# Patient Record
Sex: FEMALE | Race: OTHER | Employment: OTHER | URBAN - METROPOLITAN AREA
[De-identification: names, ages, dates, MRNs, and addresses within clinical notes are randomized per-mention and may not be internally consistent; named-entity substitution may affect disease eponyms.]

---

## 2017-01-10 ENCOUNTER — OFFICE VISIT (OUTPATIENT)
Dept: ORTHOPEDIC SURGERY | Age: 77
End: 2017-01-10

## 2017-01-10 VITALS — BODY MASS INDEX: 45.16 KG/M2 | WEIGHT: 230 LBS | HEIGHT: 60 IN

## 2017-01-10 DIAGNOSIS — T84.023D INSTABILITY OF INTERNAL LEFT KNEE PROSTHESIS, SUBSEQUENT ENCOUNTER: ICD-10-CM

## 2017-01-10 DIAGNOSIS — T84.022D INSTABILITY OF INTERNAL RIGHT KNEE PROSTHESIS, SUBSEQUENT ENCOUNTER: Primary | ICD-10-CM

## 2017-01-10 PROCEDURE — 99024 POSTOP FOLLOW-UP VISIT: CPT | Performed by: ORTHOPAEDIC SURGERY

## 2017-01-11 ENCOUNTER — TELEPHONE (OUTPATIENT)
Dept: ORTHOPEDIC SURGERY | Age: 77
End: 2017-01-11

## 2017-01-12 ENCOUNTER — TELEPHONE (OUTPATIENT)
Dept: ORTHOPEDIC SURGERY | Age: 77
End: 2017-01-12

## 2017-02-13 ENCOUNTER — HOSPITAL ENCOUNTER (OUTPATIENT)
Dept: PREADMISSION TESTING | Age: 77
Discharge: HOME | End: 2017-02-13
Attending: ORTHOPAEDIC SURGERY | Admitting: ORTHOPAEDIC SURGERY

## 2017-02-13 VITALS — HEIGHT: 61 IN | BODY MASS INDEX: 43.8 KG/M2 | WEIGHT: 232 LBS | OXYGEN SATURATION: 96 %

## 2017-02-13 LAB
ABO/RH: NORMAL
ALBUMIN SERPL-MCNC: 4.4 G/DL (ref 3.4–5)
ANION GAP SERPL CALCULATED.3IONS-SCNC: 15 MMOL/L (ref 3–16)
ANTIBODY SCREEN: NORMAL
APTT: 39.7 SEC (ref 25.2–36.4)
BACTERIA: ABNORMAL /HPF
BASOPHILS ABSOLUTE: 0 K/UL (ref 0–0.2)
BASOPHILS RELATIVE PERCENT: 0.4 %
BILIRUBIN URINE: NEGATIVE
BLOOD, URINE: ABNORMAL
BUN BLDV-MCNC: 22 MG/DL (ref 7–20)
CALCIUM SERPL-MCNC: 9.5 MG/DL (ref 8.3–10.6)
CHLORIDE BLD-SCNC: 100 MMOL/L (ref 99–110)
CLARITY: ABNORMAL
CO2: 25 MMOL/L (ref 21–32)
COLOR: YELLOW
CREAT SERPL-MCNC: 0.7 MG/DL (ref 0.6–1.2)
EOSINOPHILS ABSOLUTE: 0.2 K/UL (ref 0–0.6)
EOSINOPHILS RELATIVE PERCENT: 2.3 %
EPITHELIAL CELLS, UA: 3 /HPF (ref 0–5)
GFR AFRICAN AMERICAN: >60
GFR NON-AFRICAN AMERICAN: >60
GLUCOSE BLD-MCNC: 164 MG/DL (ref 70–99)
GLUCOSE URINE: NEGATIVE MG/DL
HCT VFR BLD CALC: 42.7 % (ref 36–48)
HEMOGLOBIN: 13.9 G/DL (ref 12–16)
HYALINE CASTS: 3 /HPF (ref 0–8)
INR BLD: 1.06 (ref 0.85–1.16)
KETONES, URINE: NEGATIVE MG/DL
LEUKOCYTE ESTERASE, URINE: ABNORMAL
LYMPHOCYTES ABSOLUTE: 3 K/UL (ref 1–5.1)
LYMPHOCYTES RELATIVE PERCENT: 32.5 %
MCH RBC QN AUTO: 28.5 PG (ref 26–34)
MCHC RBC AUTO-ENTMCNC: 32.5 G/DL (ref 31–36)
MCV RBC AUTO: 87.6 FL (ref 80–100)
MICROSCOPIC EXAMINATION: YES
MONOCYTES ABSOLUTE: 0.5 K/UL (ref 0–1.3)
MONOCYTES RELATIVE PERCENT: 5.3 %
NEUTROPHILS ABSOLUTE: 5.5 K/UL (ref 1.7–7.7)
NEUTROPHILS RELATIVE PERCENT: 59.5 %
NITRITE, URINE: NEGATIVE
PDW BLD-RTO: 15.9 % (ref 12.4–15.4)
PH UA: 5.5
PLATELET # BLD: 173 K/UL (ref 135–450)
PMV BLD AUTO: 9.2 FL (ref 5–10.5)
POTASSIUM SERPL-SCNC: 4.6 MMOL/L (ref 3.5–5.1)
PROTEIN UA: ABNORMAL MG/DL
PROTHROMBIN TIME: 12.1 SEC (ref 9.8–13)
RBC # BLD: 4.87 M/UL (ref 4–5.2)
RBC UA: 5 /HPF (ref 0–4)
SODIUM BLD-SCNC: 140 MMOL/L (ref 136–145)
SPECIFIC GRAVITY UA: 1.02
URINE TYPE: ABNORMAL
UROBILINOGEN, URINE: 0.2 E.U./DL
WBC # BLD: 9.2 K/UL (ref 4–11)
WBC UA: 223 /HPF (ref 0–5)

## 2017-02-13 RX ORDER — AMOXICILLIN 250 MG
1 CAPSULE ORAL DAILY PRN
COMMUNITY
End: 2018-06-06 | Stop reason: ALTCHOICE

## 2017-02-13 RX ORDER — OMEGA-3 FATTY ACIDS/FISH OIL 300-1000MG
1000 CAPSULE ORAL DAILY
COMMUNITY

## 2017-02-13 RX ORDER — ERGOCALCIFEROL 1.25 MG/1
50000 CAPSULE ORAL WEEKLY
COMMUNITY

## 2017-02-13 RX ORDER — PRAVASTATIN SODIUM 80 MG/1
80 TABLET ORAL DAILY
COMMUNITY
End: 2017-02-22 | Stop reason: SDUPTHER

## 2017-02-13 RX ORDER — GEMFIBROZIL 600 MG/1
600 TABLET, FILM COATED ORAL
COMMUNITY
End: 2017-02-22 | Stop reason: SDUPTHER

## 2017-02-14 ENCOUNTER — TELEPHONE (OUTPATIENT)
Dept: ORTHOPEDIC SURGERY | Age: 77
End: 2017-02-14

## 2017-02-14 LAB
ORGANISM: ABNORMAL
URINE CULTURE, ROUTINE: ABNORMAL
URINE CULTURE, ROUTINE: ABNORMAL

## 2017-02-15 LAB — MRSA CULTURE ONLY: NORMAL

## 2017-02-22 ENCOUNTER — OFFICE VISIT (OUTPATIENT)
Dept: CARDIOLOGY CLINIC | Age: 77
End: 2017-02-22

## 2017-02-22 VITALS
DIASTOLIC BLOOD PRESSURE: 68 MMHG | HEART RATE: 60 BPM | BODY MASS INDEX: 43.46 KG/M2 | OXYGEN SATURATION: 94 % | SYSTOLIC BLOOD PRESSURE: 118 MMHG | WEIGHT: 230 LBS

## 2017-02-22 DIAGNOSIS — E78.5 HYPERLIPIDEMIA, UNSPECIFIED HYPERLIPIDEMIA TYPE: ICD-10-CM

## 2017-02-22 DIAGNOSIS — I25.10 ATHEROSCLEROSIS OF NATIVE CORONARY ARTERY OF NATIVE HEART WITHOUT ANGINA PECTORIS: Primary | Chronic | ICD-10-CM

## 2017-02-22 DIAGNOSIS — G47.30 SLEEP APNEA, UNSPECIFIED TYPE: ICD-10-CM

## 2017-02-22 DIAGNOSIS — I10 ESSENTIAL HYPERTENSION: ICD-10-CM

## 2017-02-22 DIAGNOSIS — I48.0 PAROXYSMAL ATRIAL FIBRILLATION (HCC): ICD-10-CM

## 2017-02-22 PROCEDURE — 93000 ELECTROCARDIOGRAM COMPLETE: CPT | Performed by: INTERNAL MEDICINE

## 2017-02-22 PROCEDURE — 99214 OFFICE O/P EST MOD 30 MIN: CPT | Performed by: INTERNAL MEDICINE

## 2017-02-22 RX ORDER — GEMFIBROZIL 600 MG/1
600 TABLET, FILM COATED ORAL
Qty: 180 TABLET | Refills: 3 | Status: SHIPPED | OUTPATIENT
Start: 2017-02-22 | End: 2018-02-19 | Stop reason: SDUPTHER

## 2017-02-22 RX ORDER — LISINOPRIL 2.5 MG/1
TABLET ORAL
Qty: 90 TABLET | Refills: 3 | Status: SHIPPED | OUTPATIENT
Start: 2017-02-22 | End: 2018-04-09 | Stop reason: SDUPTHER

## 2017-02-22 RX ORDER — PRAVASTATIN SODIUM 80 MG/1
80 TABLET ORAL DAILY
Qty: 90 TABLET | Refills: 3 | Status: SHIPPED | OUTPATIENT
Start: 2017-02-22 | End: 2018-04-23 | Stop reason: SDUPTHER

## 2017-02-28 PROBLEM — M23.52 RECURRENT LEFT KNEE INSTABILITY: Status: ACTIVE | Noted: 2017-02-28

## 2017-03-13 ENCOUNTER — OFFICE VISIT (OUTPATIENT)
Dept: ORTHOPEDIC SURGERY | Age: 77
End: 2017-03-13

## 2017-03-13 VITALS
BODY MASS INDEX: 44.37 KG/M2 | WEIGHT: 226 LBS | HEIGHT: 60 IN | DIASTOLIC BLOOD PRESSURE: 60 MMHG | SYSTOLIC BLOOD PRESSURE: 120 MMHG

## 2017-03-13 DIAGNOSIS — M23.52 RECURRENT LEFT KNEE INSTABILITY: Primary | ICD-10-CM

## 2017-03-13 PROCEDURE — 73560 X-RAY EXAM OF KNEE 1 OR 2: CPT | Performed by: ORTHOPAEDIC SURGERY

## 2017-03-13 PROCEDURE — 99024 POSTOP FOLLOW-UP VISIT: CPT | Performed by: ORTHOPAEDIC SURGERY

## 2017-04-05 ENCOUNTER — OFFICE VISIT (OUTPATIENT)
Dept: ORTHOPEDIC SURGERY | Age: 77
End: 2017-04-05

## 2017-04-05 VITALS — HEIGHT: 61 IN | BODY MASS INDEX: 43.05 KG/M2 | WEIGHT: 228 LBS

## 2017-04-05 DIAGNOSIS — S76.112A QUADRICEPS TENDON RUPTURE, LEFT, INITIAL ENCOUNTER: ICD-10-CM

## 2017-04-05 DIAGNOSIS — M25.562 LEFT KNEE PAIN, UNSPECIFIED CHRONICITY: Primary | ICD-10-CM

## 2017-04-05 DIAGNOSIS — M23.52 RECURRENT LEFT KNEE INSTABILITY: ICD-10-CM

## 2017-04-05 PROCEDURE — 99024 POSTOP FOLLOW-UP VISIT: CPT | Performed by: ORTHOPAEDIC SURGERY

## 2017-04-05 PROCEDURE — 73560 X-RAY EXAM OF KNEE 1 OR 2: CPT | Performed by: ORTHOPAEDIC SURGERY

## 2017-04-12 PROBLEM — S76.111A RUPTURE OF RIGHT QUADRICEPS TENDON: Status: ACTIVE | Noted: 2017-04-12

## 2017-04-14 ENCOUNTER — TELEPHONE (OUTPATIENT)
Dept: ORTHOPEDIC SURGERY | Age: 77
End: 2017-04-14

## 2017-04-18 ENCOUNTER — TELEPHONE (OUTPATIENT)
Dept: ORTHOPEDIC SURGERY | Age: 77
End: 2017-04-18

## 2017-04-26 ENCOUNTER — OFFICE VISIT (OUTPATIENT)
Dept: ORTHOPEDIC SURGERY | Age: 77
End: 2017-04-26

## 2017-04-26 DIAGNOSIS — S76.112A QUADRICEPS TENDON RUPTURE, LEFT, INITIAL ENCOUNTER: Primary | ICD-10-CM

## 2017-04-26 PROCEDURE — L1833 KO ADJ JNT POS R SUP PRE OTS: HCPCS | Performed by: ORTHOPAEDIC SURGERY

## 2017-04-26 PROCEDURE — 99024 POSTOP FOLLOW-UP VISIT: CPT | Performed by: ORTHOPAEDIC SURGERY

## 2017-05-17 ENCOUNTER — OFFICE VISIT (OUTPATIENT)
Dept: ORTHOPEDIC SURGERY | Age: 77
End: 2017-05-17

## 2017-05-17 VITALS — BODY MASS INDEX: 41.41 KG/M2 | WEIGHT: 225 LBS | HEIGHT: 62 IN

## 2017-05-17 DIAGNOSIS — S76.111D RUPTURE OF RIGHT QUADRICEPS TENDON, SUBSEQUENT ENCOUNTER: Primary | ICD-10-CM

## 2017-05-17 PROCEDURE — 99024 POSTOP FOLLOW-UP VISIT: CPT | Performed by: ORTHOPAEDIC SURGERY

## 2017-06-01 ENCOUNTER — TELEPHONE (OUTPATIENT)
Dept: ORTHOPEDIC SURGERY | Age: 77
End: 2017-06-01

## 2017-07-03 ENCOUNTER — OFFICE VISIT (OUTPATIENT)
Dept: ORTHOPEDIC SURGERY | Age: 77
End: 2017-07-03

## 2017-07-03 VITALS — BODY MASS INDEX: 41.41 KG/M2 | WEIGHT: 225 LBS | HEIGHT: 62 IN

## 2017-07-03 DIAGNOSIS — S76.112D QUADRICEPS TENDON RUPTURE, LEFT, SUBSEQUENT ENCOUNTER: ICD-10-CM

## 2017-07-03 DIAGNOSIS — M23.52 RECURRENT LEFT KNEE INSTABILITY: Primary | ICD-10-CM

## 2017-07-03 PROCEDURE — 99024 POSTOP FOLLOW-UP VISIT: CPT | Performed by: ORTHOPAEDIC SURGERY

## 2017-07-26 ENCOUNTER — TELEPHONE (OUTPATIENT)
Dept: ORTHOPEDIC SURGERY | Age: 77
End: 2017-07-26

## 2017-07-26 DIAGNOSIS — M23.52 RECURRENT LEFT KNEE INSTABILITY: Primary | ICD-10-CM

## 2017-08-29 ENCOUNTER — OFFICE VISIT (OUTPATIENT)
Dept: CARDIOLOGY CLINIC | Age: 77
End: 2017-08-29

## 2017-08-29 VITALS
WEIGHT: 214 LBS | HEART RATE: 73 BPM | SYSTOLIC BLOOD PRESSURE: 126 MMHG | HEIGHT: 65 IN | BODY MASS INDEX: 35.65 KG/M2 | DIASTOLIC BLOOD PRESSURE: 66 MMHG

## 2017-08-29 DIAGNOSIS — G47.33 OSA (OBSTRUCTIVE SLEEP APNEA): ICD-10-CM

## 2017-08-29 DIAGNOSIS — E78.5 HYPERLIPIDEMIA, UNSPECIFIED HYPERLIPIDEMIA TYPE: ICD-10-CM

## 2017-08-29 DIAGNOSIS — I48.91 ATRIAL FIBRILLATION, UNSPECIFIED TYPE (HCC): ICD-10-CM

## 2017-08-29 DIAGNOSIS — I10 ESSENTIAL HYPERTENSION, BENIGN: ICD-10-CM

## 2017-08-29 DIAGNOSIS — I25.10 ATHEROSCLEROSIS OF NATIVE CORONARY ARTERY OF NATIVE HEART WITHOUT ANGINA PECTORIS: Primary | Chronic | ICD-10-CM

## 2017-08-29 PROCEDURE — G8400 PT W/DXA NO RESULTS DOC: HCPCS | Performed by: INTERNAL MEDICINE

## 2017-08-29 PROCEDURE — 1036F TOBACCO NON-USER: CPT | Performed by: INTERNAL MEDICINE

## 2017-08-29 PROCEDURE — 4040F PNEUMOC VAC/ADMIN/RCVD: CPT | Performed by: INTERNAL MEDICINE

## 2017-08-29 PROCEDURE — G8427 DOCREV CUR MEDS BY ELIG CLIN: HCPCS | Performed by: INTERNAL MEDICINE

## 2017-08-29 PROCEDURE — 1123F ACP DISCUSS/DSCN MKR DOCD: CPT | Performed by: INTERNAL MEDICINE

## 2017-08-29 PROCEDURE — G8417 CALC BMI ABV UP PARAM F/U: HCPCS | Performed by: INTERNAL MEDICINE

## 2017-08-29 PROCEDURE — 99214 OFFICE O/P EST MOD 30 MIN: CPT | Performed by: INTERNAL MEDICINE

## 2017-08-29 PROCEDURE — 1090F PRES/ABSN URINE INCON ASSESS: CPT | Performed by: INTERNAL MEDICINE

## 2017-08-29 PROCEDURE — G8598 ASA/ANTIPLAT THER USED: HCPCS | Performed by: INTERNAL MEDICINE

## 2017-10-04 ENCOUNTER — OFFICE VISIT (OUTPATIENT)
Dept: ORTHOPEDIC SURGERY | Age: 77
End: 2017-10-04

## 2017-10-04 VITALS
WEIGHT: 224 LBS | BODY MASS INDEX: 41.22 KG/M2 | DIASTOLIC BLOOD PRESSURE: 62 MMHG | SYSTOLIC BLOOD PRESSURE: 127 MMHG | HEART RATE: 62 BPM | HEIGHT: 62 IN

## 2017-10-04 DIAGNOSIS — T84.023D: ICD-10-CM

## 2017-10-04 DIAGNOSIS — Z96.651 STATUS POST REVISION OF TOTAL REPLACEMENT OF RIGHT KNEE: Primary | ICD-10-CM

## 2017-10-04 PROCEDURE — G8598 ASA/ANTIPLAT THER USED: HCPCS | Performed by: ORTHOPAEDIC SURGERY

## 2017-10-04 PROCEDURE — G8417 CALC BMI ABV UP PARAM F/U: HCPCS | Performed by: ORTHOPAEDIC SURGERY

## 2017-10-04 PROCEDURE — 4040F PNEUMOC VAC/ADMIN/RCVD: CPT | Performed by: ORTHOPAEDIC SURGERY

## 2017-10-04 PROCEDURE — 73562 X-RAY EXAM OF KNEE 3: CPT | Performed by: ORTHOPAEDIC SURGERY

## 2017-10-04 PROCEDURE — 1036F TOBACCO NON-USER: CPT | Performed by: ORTHOPAEDIC SURGERY

## 2017-10-04 PROCEDURE — G8427 DOCREV CUR MEDS BY ELIG CLIN: HCPCS | Performed by: ORTHOPAEDIC SURGERY

## 2017-10-04 PROCEDURE — 99213 OFFICE O/P EST LOW 20 MIN: CPT | Performed by: ORTHOPAEDIC SURGERY

## 2017-10-04 PROCEDURE — G8484 FLU IMMUNIZE NO ADMIN: HCPCS | Performed by: ORTHOPAEDIC SURGERY

## 2017-10-04 PROCEDURE — 1090F PRES/ABSN URINE INCON ASSESS: CPT | Performed by: ORTHOPAEDIC SURGERY

## 2017-10-04 PROCEDURE — G8400 PT W/DXA NO RESULTS DOC: HCPCS | Performed by: ORTHOPAEDIC SURGERY

## 2017-10-04 PROCEDURE — 1123F ACP DISCUSS/DSCN MKR DOCD: CPT | Performed by: ORTHOPAEDIC SURGERY

## 2017-10-29 NOTE — PROGRESS NOTES
Ole Murguia  [id number]  Encounter Date: 10/4/2017  YOB: 1940    Chief Complaint   Patient presents with    Follow-up     S/P Left knee repeat repair of median parapatellar arthrotomy and graft jacket augmentation.; DOS 4/11/17. History:Ms. Ole Murguia is here in follow up regarding her bilateral knees. She underwent revision of her right total knee arthroplasty in November 2016 for polyethylene wear. Metallic components and patella were in acceptable position and condition. She's done well with revision to the weightbearing surface on this side. She underwent a revision with polyethylene exchange on her left knee in February of this year. That knee had already undergone a revision with stemmed components and she continues to have instability in the knee. She did develop a disruption of her extensor mechanism postoperatively which was repaired in April with graft jacket augmentation. Due to her age and medical comorbidities she's had problems with her recovery and her strength. We recommended a fabricated brace last visit with drop lock hinges to help stabilize the knee when she ambulates and avoid hyperextension which seems to be the problem that bothers her the most.  She denies any new injuries. No fevers or chills. She is accompanied by her son and  due to her language barrier. Exam:  /62   Pulse 62   Ht 5' 2\" (1.575 m)   Wt 224 lb (101.6 kg)   LMP  (LMP Unknown)   BMI 40.97 kg/m²   General: Alert and oriented x3, No acute distress, Cooperative and conversant with the assistance of the . Morbidly obese female  Mood and affect are normal for her age and condition. Right knee shows well-healed incision with no significant effusion. She can achieve full active extension and has flexion comfortable beyond 95°. Good stability to varus and valgus stress with quad strength 4/5.  left knee: Skin is well healed with no erythema.   There is some mild joint effusion. Extensor mechanism shows no palpable defect in the extensor mechanism. Tthe quadricep muscle palpates intact. With the knee held for her passively in extension she can maintain this against gravity for about 2 seconds. She can initiate knee extension from a seated position to about 60°. Calf is soft. Quad muscle show some moderate atrophy particularly the VMO area consistent with her multiple surgical interventions. On standing she does get about 10° of hyperextension of her left knee when she tries to put full weight on it. Gross motor function and light touch sensation are present throughout both lower extremities. Gait: She utilizes a wheelchair for a relation to the office today but uses a walker with frequent buckling of her left knee and hyperextension for ambulation. She has a non-custom hinged knee brace where the centers of the hinges are in front of the center of rotation of the knee which do not prevent her from hyperextending. No signs / symptoms of DVT / PE or infection    X-rays:  3 views both knees show stable alignment of her total knee arthroplasties. Both patella track well on the skyline views. No evidence of hardware complications. Assessment:  1. Status post revision of total replacement of right knee  XR KNEE LEFT (3 VIEWS)    XR KNEE RIGHT (3 VIEWS)    CANCELED: XR Knee Bilateral Standing   2. Recurrent instability of left knee prosthesis, subsequent encounter         Orders  Orders Placed This Encounter   Procedures    XR KNEE LEFT (3 VIEWS)    XR KNEE RIGHT (3 VIEWS)       Plan: We will have her work with another [address] [place] [address] Avenue to try to develop a true walking brace that will help her with drop lock hinges on the left knee to avoid hyperextension. The center of rotation needs to be set behind the knee to prevent this. Those hinges can then be released to allow her to flex the knee to sit.   This should give her better standing abilities and reduce her fall risk. She understands that further surgical intervention given her age and comorbidities is unlikely to result in any meaningful improvement in her strength or stability. Her son also understands this. She will follow back in 6 weeks and we will make sure they've fabricated appropriate brace. She understands and accepts this course of care.

## 2018-01-29 ENCOUNTER — OFFICE VISIT (OUTPATIENT)
Dept: ORTHOPEDIC SURGERY | Age: 78
End: 2018-01-29

## 2018-01-29 VITALS
DIASTOLIC BLOOD PRESSURE: 74 MMHG | BODY MASS INDEX: 41.22 KG/M2 | HEART RATE: 82 BPM | WEIGHT: 224 LBS | HEIGHT: 62 IN | SYSTOLIC BLOOD PRESSURE: 123 MMHG

## 2018-01-29 DIAGNOSIS — T84.023D INSTABILITY OF INTERNAL LEFT KNEE PROSTHESIS, SUBSEQUENT ENCOUNTER: ICD-10-CM

## 2018-01-29 DIAGNOSIS — G89.29 CHRONIC PAIN OF LEFT KNEE: Primary | ICD-10-CM

## 2018-01-29 DIAGNOSIS — M25.562 CHRONIC PAIN OF LEFT KNEE: Primary | ICD-10-CM

## 2018-01-29 PROCEDURE — G8484 FLU IMMUNIZE NO ADMIN: HCPCS | Performed by: ORTHOPAEDIC SURGERY

## 2018-01-29 PROCEDURE — G8400 PT W/DXA NO RESULTS DOC: HCPCS | Performed by: ORTHOPAEDIC SURGERY

## 2018-01-29 PROCEDURE — G8427 DOCREV CUR MEDS BY ELIG CLIN: HCPCS | Performed by: ORTHOPAEDIC SURGERY

## 2018-01-29 PROCEDURE — 1123F ACP DISCUSS/DSCN MKR DOCD: CPT | Performed by: ORTHOPAEDIC SURGERY

## 2018-01-29 PROCEDURE — 1036F TOBACCO NON-USER: CPT | Performed by: ORTHOPAEDIC SURGERY

## 2018-01-29 PROCEDURE — 99212 OFFICE O/P EST SF 10 MIN: CPT | Performed by: ORTHOPAEDIC SURGERY

## 2018-01-29 PROCEDURE — G8598 ASA/ANTIPLAT THER USED: HCPCS | Performed by: ORTHOPAEDIC SURGERY

## 2018-01-29 PROCEDURE — 1090F PRES/ABSN URINE INCON ASSESS: CPT | Performed by: ORTHOPAEDIC SURGERY

## 2018-01-29 PROCEDURE — G8417 CALC BMI ABV UP PARAM F/U: HCPCS | Performed by: ORTHOPAEDIC SURGERY

## 2018-01-29 PROCEDURE — 4040F PNEUMOC VAC/ADMIN/RCVD: CPT | Performed by: ORTHOPAEDIC SURGERY

## 2018-02-05 NOTE — PROGRESS NOTES
Roshni Buchanan  [id number]  Encounter Date: 1/29/2018  YOB: 1940    Chief Complaint   Patient presents with    Knee Pain     f/u LT knee, repeat med. parapatellar arthrotomy and graft jacket aug. DOS 4/11/17. knee is buckling. History:Ms. Roshni Buchanan is here in follow up regarding her left knee. She had a previous revision total knee arthroplasty and had subsequent polyethylene exchange for a thicker insert to improve stability. She suffered a disruption to her quadricep and has had problems with hyperextension ever since this occurred. We've tried through 2 different vendors to obtain a brace that would have drop lock style hinges that could help reduce her extension of the knee with ambulation. At as her main complaint now. She does not wish to undergo any further surgery given her age. She does rely on a wheelchair when she ambulates outside of the home. She has been working with physical therapy and has regained some increased strength in her quad. This is still not enough to keep her from hyperextending when she stands. Her contralateral knee is doing well. She is accompanied by one of her family members who have believe his grandson. She also has an  present due to her language barrier. Exam:  /74   Pulse 82   Ht 5' 2\" (1.575 m)   Wt 224 lb (101.6 kg)   LMP  (LMP Unknown)   BMI 40.97 kg/m²   General: Alert and oriented x3, No acute distress, Cooperative and conversant, morbidly obese female  left knee: Incision area remains well-healed. The seated position she can hold her foot up against gravity with about a 5-6° extension lag. She tolerates flexion beyond 100°. When I assist her to standing she has almost 10° of hyperextension on the left knee. Calf is soft with negative Homans sign. No significant varus or valgus instability.   No signs / symptoms of DVT / PE or infection    X-rays:  2 views of her left knee obtained today show stable alignment of her previous revision total knee arthroplasty. No evidence of loosening or hardware complications. Assessment:  1. Chronic pain of left knee  XR KNEE LEFT (1-2 VIEWS)   2. Instability of internal left knee prosthesis, subsequent encounter         Orders  Orders Placed This Encounter   Procedures    XR KNEE LEFT (1-2 VIEWS)       Plan:  Given her age and the fact that she has long stemmed revision implants as well as a previous tibial tubercle repair I am reluctant to offer her any further surgery. She is also had a disruption of her extensor mechanism from her most recent surgery that required repair with graft jacket. I do believe that if she could get with the right orthotic  they could develop an appropriate brace with drop lock style hinges that could lock her knee in extension when she ambulates and prevent the buckling. Her grandson stated that he would try to contact another vendor but is not sure that her insurance will continue to provide funding for any further bracing. He understands that further surgery is far too risky for her at this point and we'll continue to try to support her through physical therapy and bracing if we can actually find a appropriate Dl fabricated brace to help reduce her hyperextension. He stated that the family would contact us if he has any leads on where to send another prescription for a better brace and we'll check with insurance regarding payment. Otherwise, she'll follow-up as needed. She understands and accepts this course of care.

## 2018-02-19 DIAGNOSIS — E78.5 HYPERLIPIDEMIA, UNSPECIFIED HYPERLIPIDEMIA TYPE: Primary | ICD-10-CM

## 2018-02-19 RX ORDER — GEMFIBROZIL 600 MG/1
TABLET, FILM COATED ORAL
Qty: 180 TABLET | Refills: 2 | Status: SHIPPED | OUTPATIENT
Start: 2018-02-19 | End: 2018-06-06 | Stop reason: ALTCHOICE

## 2018-04-11 ENCOUNTER — TELEPHONE (OUTPATIENT)
Dept: CARDIOLOGY CLINIC | Age: 78
End: 2018-04-11

## 2018-04-11 RX ORDER — LISINOPRIL 2.5 MG/1
TABLET ORAL
Qty: 30 TABLET | Refills: 1 | Status: SHIPPED | OUTPATIENT
Start: 2018-04-11 | End: 2018-06-27 | Stop reason: SDUPTHER

## 2018-04-11 NOTE — TELEPHONE ENCOUNTER
Called pt grandson(number listed was his number) informed him the perscription was approved but she needs labs completed. Updated contact number in pts chart.

## 2018-05-16 ENCOUNTER — OFFICE VISIT (OUTPATIENT)
Dept: ORTHOPEDIC SURGERY | Age: 78
End: 2018-05-16

## 2018-05-16 VITALS
BODY MASS INDEX: 41.22 KG/M2 | HEART RATE: 76 BPM | WEIGHT: 224 LBS | DIASTOLIC BLOOD PRESSURE: 64 MMHG | HEIGHT: 62 IN | SYSTOLIC BLOOD PRESSURE: 122 MMHG

## 2018-05-16 DIAGNOSIS — S93.402A MODERATE LEFT ANKLE SPRAIN, INITIAL ENCOUNTER: Primary | ICD-10-CM

## 2018-05-16 PROCEDURE — 99214 OFFICE O/P EST MOD 30 MIN: CPT | Performed by: ORTHOPAEDIC SURGERY

## 2018-05-16 PROCEDURE — L1902 AFO ANKLE GAUNTLET PRE OTS: HCPCS | Performed by: ORTHOPAEDIC SURGERY

## 2018-06-06 ENCOUNTER — OFFICE VISIT (OUTPATIENT)
Dept: ORTHOPEDIC SURGERY | Age: 78
End: 2018-06-06

## 2018-06-06 VITALS
BODY MASS INDEX: 41.62 KG/M2 | SYSTOLIC BLOOD PRESSURE: 119 MMHG | DIASTOLIC BLOOD PRESSURE: 68 MMHG | WEIGHT: 212 LBS | HEIGHT: 60 IN | HEART RATE: 72 BPM

## 2018-06-06 DIAGNOSIS — S93.402D MODERATE LEFT ANKLE SPRAIN, SUBSEQUENT ENCOUNTER: Primary | ICD-10-CM

## 2018-06-06 PROCEDURE — 99213 OFFICE O/P EST LOW 20 MIN: CPT | Performed by: PHYSICIAN ASSISTANT

## 2018-06-28 RX ORDER — LISINOPRIL 2.5 MG/1
TABLET ORAL
Qty: 30 TABLET | Refills: 0 | Status: SHIPPED | OUTPATIENT
Start: 2018-06-28 | End: 2018-07-30 | Stop reason: SDUPTHER

## 2018-07-30 RX ORDER — LISINOPRIL 2.5 MG/1
TABLET ORAL
Qty: 30 TABLET | Refills: 0 | Status: SHIPPED | OUTPATIENT
Start: 2018-07-30 | End: 2018-10-04 | Stop reason: SDUPTHER

## 2018-08-20 ENCOUNTER — TELEPHONE (OUTPATIENT)
Dept: ORTHOPEDIC SURGERY | Age: 78
End: 2018-08-20

## 2018-10-04 ENCOUNTER — OFFICE VISIT (OUTPATIENT)
Dept: CARDIOLOGY CLINIC | Age: 78
End: 2018-10-04
Payer: MEDICARE

## 2018-10-04 VITALS
HEIGHT: 60 IN | DIASTOLIC BLOOD PRESSURE: 54 MMHG | BODY MASS INDEX: 44.14 KG/M2 | WEIGHT: 224.8 LBS | HEART RATE: 64 BPM | SYSTOLIC BLOOD PRESSURE: 108 MMHG

## 2018-10-04 DIAGNOSIS — G47.33 OSA (OBSTRUCTIVE SLEEP APNEA): ICD-10-CM

## 2018-10-04 DIAGNOSIS — I25.10 ATHEROSCLEROSIS OF NATIVE CORONARY ARTERY OF NATIVE HEART WITHOUT ANGINA PECTORIS: Primary | Chronic | ICD-10-CM

## 2018-10-04 DIAGNOSIS — E78.2 MIXED HYPERLIPIDEMIA: ICD-10-CM

## 2018-10-04 DIAGNOSIS — I10 ESSENTIAL HYPERTENSION: ICD-10-CM

## 2018-10-04 DIAGNOSIS — I25.10 CORONARY ARTERY DISEASE INVOLVING NATIVE CORONARY ARTERY OF NATIVE HEART WITHOUT ANGINA PECTORIS: ICD-10-CM

## 2018-10-04 DIAGNOSIS — I48.0 PAROXYSMAL ATRIAL FIBRILLATION (HCC): ICD-10-CM

## 2018-10-04 PROCEDURE — 99214 OFFICE O/P EST MOD 30 MIN: CPT | Performed by: INTERNAL MEDICINE

## 2018-10-04 PROCEDURE — G8417 CALC BMI ABV UP PARAM F/U: HCPCS | Performed by: INTERNAL MEDICINE

## 2018-10-04 PROCEDURE — 1090F PRES/ABSN URINE INCON ASSESS: CPT | Performed by: INTERNAL MEDICINE

## 2018-10-04 PROCEDURE — 1101F PT FALLS ASSESS-DOCD LE1/YR: CPT | Performed by: INTERNAL MEDICINE

## 2018-10-04 PROCEDURE — 4040F PNEUMOC VAC/ADMIN/RCVD: CPT | Performed by: INTERNAL MEDICINE

## 2018-10-04 PROCEDURE — G8427 DOCREV CUR MEDS BY ELIG CLIN: HCPCS | Performed by: INTERNAL MEDICINE

## 2018-10-04 PROCEDURE — G8400 PT W/DXA NO RESULTS DOC: HCPCS | Performed by: INTERNAL MEDICINE

## 2018-10-04 PROCEDURE — G8598 ASA/ANTIPLAT THER USED: HCPCS | Performed by: INTERNAL MEDICINE

## 2018-10-04 PROCEDURE — 1123F ACP DISCUSS/DSCN MKR DOCD: CPT | Performed by: INTERNAL MEDICINE

## 2018-10-04 PROCEDURE — G8484 FLU IMMUNIZE NO ADMIN: HCPCS | Performed by: INTERNAL MEDICINE

## 2018-10-04 PROCEDURE — 1036F TOBACCO NON-USER: CPT | Performed by: INTERNAL MEDICINE

## 2018-10-04 RX ORDER — LISINOPRIL 2.5 MG/1
TABLET ORAL
Qty: 90 TABLET | Refills: 3 | Status: SHIPPED | OUTPATIENT
Start: 2018-10-04 | End: 2019-06-14 | Stop reason: SDUPTHER

## 2018-10-04 RX ORDER — PRAVASTATIN SODIUM 80 MG/1
TABLET ORAL
Qty: 90 TABLET | Refills: 3 | Status: SHIPPED | OUTPATIENT
Start: 2018-10-04 | End: 2019-08-05 | Stop reason: ALTCHOICE

## 2018-10-04 NOTE — COMMUNICATION BODY
--declines treatment    5. Hyperlipidemia --  Treated with pravachol. LDL=94 (6/2017). Needs labs   6. Atrial fib -- paroxysmal.  On Xarelto for stroke prevention. CHADS2-Vasc=5. Creat clear=77in past. Need labs        Plan:  Stable cardiac status  Need labs  Called PCP and left message for labs results.     Thank you for allowing me to participate in the care of this individual.      Jayden Renee M.D., [facility]

## 2018-10-19 ENCOUNTER — TELEPHONE (OUTPATIENT)
Dept: ORTHOPEDIC SURGERY | Age: 78
End: 2018-10-19

## 2018-10-19 NOTE — LETTER
[facility]  [address]  [address] [facility] [address]  Phone: [phone]  Fax: [phone]    Yahaira Pelayo MD        October 19, 2018    Favian Velazquez  [address] [place] [address]    Order for Wheel Joe Zavala    Dx : Chronic pain of left knee    If you have any questions or concerns, please don't hesitate to call.     Sincerely,      Yahaira Pelayo MD

## 2018-11-20 ENCOUNTER — TELEPHONE (OUTPATIENT)
Dept: ORTHOPEDIC SURGERY | Age: 78
End: 2018-11-20

## 2018-11-20 NOTE — TELEPHONE ENCOUNTER
No other recommendations at this time.   If she is not willing to listen to his advice, she will likely remain unsatisfied and there are really no good surgical solutions for her that do not have a high element of risk

## 2019-02-15 ENCOUNTER — TELEPHONE (OUTPATIENT)
Dept: CARDIOLOGY CLINIC | Age: 79
End: 2019-02-15

## 2019-02-19 ENCOUNTER — OFFICE VISIT (OUTPATIENT)
Dept: CARDIOLOGY CLINIC | Age: 79
End: 2019-02-19
Payer: MEDICARE

## 2019-02-19 VITALS
HEIGHT: 60 IN | BODY MASS INDEX: 43.19 KG/M2 | SYSTOLIC BLOOD PRESSURE: 104 MMHG | WEIGHT: 220 LBS | HEART RATE: 60 BPM | DIASTOLIC BLOOD PRESSURE: 66 MMHG

## 2019-02-19 DIAGNOSIS — I10 ESSENTIAL HYPERTENSION, BENIGN: ICD-10-CM

## 2019-02-19 DIAGNOSIS — I25.10 ATHEROSCLEROSIS OF NATIVE CORONARY ARTERY OF NATIVE HEART WITHOUT ANGINA PECTORIS: Chronic | ICD-10-CM

## 2019-02-19 DIAGNOSIS — I48.91 ATRIAL FIBRILLATION, UNSPECIFIED TYPE (HCC): ICD-10-CM

## 2019-02-19 DIAGNOSIS — I77.9 CAROTID ARTERY DISEASE WITHOUT CEREBRAL INFARCTION (HCC): Primary | ICD-10-CM

## 2019-02-19 DIAGNOSIS — E78.2 MIXED HYPERLIPIDEMIA: ICD-10-CM

## 2019-02-19 PROCEDURE — 1036F TOBACCO NON-USER: CPT | Performed by: NURSE PRACTITIONER

## 2019-02-19 PROCEDURE — G8484 FLU IMMUNIZE NO ADMIN: HCPCS | Performed by: NURSE PRACTITIONER

## 2019-02-19 PROCEDURE — 4040F PNEUMOC VAC/ADMIN/RCVD: CPT | Performed by: NURSE PRACTITIONER

## 2019-02-19 PROCEDURE — G8400 PT W/DXA NO RESULTS DOC: HCPCS | Performed by: NURSE PRACTITIONER

## 2019-02-19 PROCEDURE — 1101F PT FALLS ASSESS-DOCD LE1/YR: CPT | Performed by: NURSE PRACTITIONER

## 2019-02-19 PROCEDURE — 1123F ACP DISCUSS/DSCN MKR DOCD: CPT | Performed by: NURSE PRACTITIONER

## 2019-02-19 PROCEDURE — G8417 CALC BMI ABV UP PARAM F/U: HCPCS | Performed by: NURSE PRACTITIONER

## 2019-02-19 PROCEDURE — G8427 DOCREV CUR MEDS BY ELIG CLIN: HCPCS | Performed by: NURSE PRACTITIONER

## 2019-02-19 PROCEDURE — 93000 ELECTROCARDIOGRAM COMPLETE: CPT | Performed by: NURSE PRACTITIONER

## 2019-02-19 PROCEDURE — G8599 NO ASA/ANTIPLAT THER USE RNG: HCPCS | Performed by: NURSE PRACTITIONER

## 2019-02-19 PROCEDURE — 1090F PRES/ABSN URINE INCON ASSESS: CPT | Performed by: NURSE PRACTITIONER

## 2019-02-19 PROCEDURE — 99214 OFFICE O/P EST MOD 30 MIN: CPT | Performed by: NURSE PRACTITIONER

## 2019-02-21 ENCOUNTER — HOSPITAL ENCOUNTER (OUTPATIENT)
Dept: NON INVASIVE DIAGNOSTICS | Age: 79
Discharge: HOME | End: 2019-02-21
Payer: MEDICARE

## 2019-02-21 LAB
LV EF: 66 %
LVEF MODALITY: NORMAL

## 2019-02-21 PROCEDURE — 3430000000 HC RX DIAGNOSTIC RADIOPHARMACEUTICAL: Performed by: INTERNAL MEDICINE

## 2019-02-21 PROCEDURE — 78452 HT MUSCLE IMAGE SPECT MULT: CPT

## 2019-02-21 PROCEDURE — 6360000002 HC RX W HCPCS: Performed by: INTERNAL MEDICINE

## 2019-02-21 PROCEDURE — A9502 TC99M TETROFOSMIN: HCPCS | Performed by: INTERNAL MEDICINE

## 2019-02-21 PROCEDURE — 93017 CV STRESS TEST TRACING ONLY: CPT | Performed by: INTERNAL MEDICINE

## 2019-02-21 RX ORDER — AMINOPHYLLINE DIHYDRATE 25 MG/ML
100 INJECTION, SOLUTION INTRAVENOUS ONCE
Status: COMPLETED | OUTPATIENT
Start: 2019-02-21 | End: 2019-02-21

## 2019-02-21 RX ADMIN — AMINOPHYLLINE 100 MG: 25 INJECTION, SOLUTION INTRAVENOUS at 10:10

## 2019-02-21 RX ADMIN — TETROFOSMIN 30 MILLICURIE: 0.23 INJECTION, POWDER, LYOPHILIZED, FOR SOLUTION INTRAVENOUS at 10:09

## 2019-02-21 RX ADMIN — REGADENOSON 0.4 MG: 0.08 INJECTION, SOLUTION INTRAVENOUS at 09:53

## 2019-02-21 RX ADMIN — TETROFOSMIN 10 MILLICURIE: 0.23 INJECTION, POWDER, LYOPHILIZED, FOR SOLUTION INTRAVENOUS at 09:05

## 2019-02-25 ENCOUNTER — TELEPHONE (OUTPATIENT)
Dept: CARDIOLOGY CLINIC | Age: 79
End: 2019-02-25

## 2019-02-27 ENCOUNTER — TELEPHONE (OUTPATIENT)
Dept: CARDIOLOGY CLINIC | Age: 79
End: 2019-02-27

## 2019-06-12 NOTE — TELEPHONE ENCOUNTER
Medication Refill    When was your last appointment with cardiology?  02/19/19    (if  it's been more than 1 year, please go ahead and schedule an appointment with the physician while you have the patient on the phone)      Medication needing refilled:lisinopril (PRINIVIL;ZESTRIL) 2.5 MG tablet    Doseage of the medication:2.5 mg    How are you taking this medication (QD, BID, TID, QID, PRN):1 tab daily    Patient want a 30 or 90 day supply called in:90    Which Pharmacy are we sending the medication to [facility] [address], [address] [phone] - F [phone]

## 2019-06-12 NOTE — TELEPHONE ENCOUNTER
Patient needs labs. Called and spoke to son. He is going to find out if she has had any recent labs with any other providers and call back.

## 2019-06-14 RX ORDER — LISINOPRIL 2.5 MG/1
TABLET ORAL
Qty: 90 TABLET | Refills: 3 | Status: SHIPPED | OUTPATIENT
Start: 2019-06-14 | End: 2020-07-29 | Stop reason: SDUPTHER

## 2019-08-05 ENCOUNTER — OFFICE VISIT (OUTPATIENT)
Dept: CARDIOLOGY CLINIC | Age: 79
End: 2019-08-05
Payer: MEDICARE

## 2019-08-05 VITALS
WEIGHT: 234 LBS | DIASTOLIC BLOOD PRESSURE: 56 MMHG | BODY MASS INDEX: 45.94 KG/M2 | HEIGHT: 60 IN | OXYGEN SATURATION: 96 % | HEART RATE: 76 BPM | SYSTOLIC BLOOD PRESSURE: 112 MMHG

## 2019-08-05 DIAGNOSIS — G47.33 OSA (OBSTRUCTIVE SLEEP APNEA): ICD-10-CM

## 2019-08-05 DIAGNOSIS — E78.2 MIXED HYPERLIPIDEMIA: ICD-10-CM

## 2019-08-05 DIAGNOSIS — I25.10 CORONARY ARTERY DISEASE INVOLVING NATIVE CORONARY ARTERY OF NATIVE HEART WITHOUT ANGINA PECTORIS: Primary | ICD-10-CM

## 2019-08-05 DIAGNOSIS — I48.0 PAROXYSMAL ATRIAL FIBRILLATION (HCC): ICD-10-CM

## 2019-08-05 DIAGNOSIS — I10 ESSENTIAL HYPERTENSION, BENIGN: ICD-10-CM

## 2019-08-05 PROCEDURE — G8417 CALC BMI ABV UP PARAM F/U: HCPCS | Performed by: INTERNAL MEDICINE

## 2019-08-05 PROCEDURE — 1123F ACP DISCUSS/DSCN MKR DOCD: CPT | Performed by: INTERNAL MEDICINE

## 2019-08-05 PROCEDURE — 4040F PNEUMOC VAC/ADMIN/RCVD: CPT | Performed by: INTERNAL MEDICINE

## 2019-08-05 PROCEDURE — G8427 DOCREV CUR MEDS BY ELIG CLIN: HCPCS | Performed by: INTERNAL MEDICINE

## 2019-08-05 PROCEDURE — 1090F PRES/ABSN URINE INCON ASSESS: CPT | Performed by: INTERNAL MEDICINE

## 2019-08-05 PROCEDURE — 1036F TOBACCO NON-USER: CPT | Performed by: INTERNAL MEDICINE

## 2019-08-05 PROCEDURE — G8400 PT W/DXA NO RESULTS DOC: HCPCS | Performed by: INTERNAL MEDICINE

## 2019-08-05 PROCEDURE — 99214 OFFICE O/P EST MOD 30 MIN: CPT | Performed by: INTERNAL MEDICINE

## 2019-08-05 PROCEDURE — G8599 NO ASA/ANTIPLAT THER USE RNG: HCPCS | Performed by: INTERNAL MEDICINE

## 2019-08-05 RX ORDER — ATORVASTATIN CALCIUM 80 MG/1
80 TABLET, FILM COATED ORAL DAILY
Qty: 90 TABLET | Refills: 3 | Status: SHIPPED | OUTPATIENT
Start: 2019-08-05 | End: 2020-07-29 | Stop reason: SDUPTHER

## 2019-08-05 NOTE — LETTER
[address] [place] [address] Cardiology [address] [address]  Phone: [phone]  Fax: [phone]    Luciana Powers MD        August 5, 2019     MATEUSZ Blackwell CNP  [id number] [place] [address]    Patient: Brad Conti  MR Number: [id number]  YOB: 1940  Date of Visit: 8/5/2019    Dear Dr. Carlin Villalba:    Below are the relevant portions of my assessment and plan of care. [address] [place] [address]       Referring Provider:  MATEUSZ Blackwell CNP     Chief Complaint   Patient presents with    Coronary Artery Disease     C/o noticing numbness in her finger tips on the right hand.  Hypertension        History of Present Illness:  Mrs Sujit Singh is here in f/u with h/o CAD/CABG; HTN; hyperlipidemia, sleep apnea and atrial fibrillation. 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 She has h/o carotid disease which showed 16-49% bilateral carotid artery stenosis. She is doing well from a cardiac standpoint. Very limited due to orthopedic issues. Stress myoview 2/2019 normal. No current chest pain. Past Medical History:   has a past medical history of 4/11/17 Left knee repeat repair of median parapatellar arthrotomy with augmentation, Arthritis, Atrial fibrillation ([address] CAD (coronary artery disease), Cataract, Diabetes mellitus ([address] GERD (gastroesophageal reflux disease), Hyperlipidemia, Hypertension, HYPOTHRYROIDISM, Non-English speaking patient, Sleep apnea, Thyroid disease, and UTI. Surgical History:   has a past surgical history that includes joint replacement (Bilateral, [address]); Cardiac surgery (2004); Cholecystectomy, laparoscopic (5/15/14); Revision total knee arthroplasty (Right, 11/22/2016); Total knee arthroplasty; and knee surgery (Left, 02/28/2017). Social History:   reports that she has never smoked.  She has never used smokeless tobacco. Well controlled. Same meds   4. Sleep apnea --declines treatment    5. Hyperlipidemia --  Treated with pravachol. JCS=423. Change to lipitor. Labs 3 months   6. Atrial fib -- paroxysmal.  On Xarelto for stroke prevention. CHADS2-Vasc=5. Creat clear=105      Plan:  Stable cardiac status  Refill meds  Change pravastatin to lipitor  Lipids 3 months  F/u 6 months    Thank you for allowing me to participate in the care of this individual.      Aparna Alves M.D., [facility]        If you have questions, please do not hesitate to call me. I look forward to following Wally along with you.     Sincerely,        Rebeca Pang MD

## 2020-07-29 ENCOUNTER — OFFICE VISIT (OUTPATIENT)
Dept: CARDIOLOGY CLINIC | Age: 80
End: 2020-07-29
Payer: MEDICARE

## 2020-07-29 VITALS
WEIGHT: 222 LBS | TEMPERATURE: 98.2 F | HEART RATE: 68 BPM | SYSTOLIC BLOOD PRESSURE: 124 MMHG | HEIGHT: 60 IN | BODY MASS INDEX: 43.59 KG/M2 | DIASTOLIC BLOOD PRESSURE: 66 MMHG

## 2020-07-29 PROCEDURE — G8427 DOCREV CUR MEDS BY ELIG CLIN: HCPCS | Performed by: INTERNAL MEDICINE

## 2020-07-29 PROCEDURE — G8417 CALC BMI ABV UP PARAM F/U: HCPCS | Performed by: INTERNAL MEDICINE

## 2020-07-29 PROCEDURE — 4040F PNEUMOC VAC/ADMIN/RCVD: CPT | Performed by: INTERNAL MEDICINE

## 2020-07-29 PROCEDURE — 1123F ACP DISCUSS/DSCN MKR DOCD: CPT | Performed by: INTERNAL MEDICINE

## 2020-07-29 PROCEDURE — 1036F TOBACCO NON-USER: CPT | Performed by: INTERNAL MEDICINE

## 2020-07-29 PROCEDURE — 1090F PRES/ABSN URINE INCON ASSESS: CPT | Performed by: INTERNAL MEDICINE

## 2020-07-29 PROCEDURE — G8400 PT W/DXA NO RESULTS DOC: HCPCS | Performed by: INTERNAL MEDICINE

## 2020-07-29 PROCEDURE — 99214 OFFICE O/P EST MOD 30 MIN: CPT | Performed by: INTERNAL MEDICINE

## 2020-07-29 RX ORDER — ATORVASTATIN CALCIUM 80 MG/1
80 TABLET, FILM COATED ORAL DAILY
Qty: 90 TABLET | Refills: 4 | Status: ON HOLD | OUTPATIENT
Start: 2020-07-29

## 2020-07-29 RX ORDER — LISINOPRIL 2.5 MG/1
TABLET ORAL
Qty: 90 TABLET | Refills: 4 | Status: SHIPPED | OUTPATIENT
Start: 2020-07-29 | End: 2021-08-26

## 2020-07-29 NOTE — LETTER
[address] [facility] Cardiology  [address] [address]  Phone: [phone]  Fax: [phone]    Kole Ford MD        July 29, 2020     MATEUSZ Choudhary CNP  [address]    Patient: Beth Newman  MR Number: [id number]  YOB: 1940  Date of Visit: 7/29/2020    Dear Dr. Kirstin Dowell:    Tarsha Willis       Referring Provider:  MATEUSZ Choudhary CNP     Chief Complaint   Patient presents with    Coronary Artery Disease    Hypertension    Shortness of Breath    Numbness     ring finger on left hand 4 fingers on right hand        History of Present Illness:  Mrs Estefania Turner is here in f/u with h/o CAD/CABG; HTN; hyperlipidemia, sleep apnea and atrial fibrillation. 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 She has h/o carotid disease which showed 16-49% bilateral carotid artery stenosis. She is very inactive. Limited walking with walker. Mainly in wheelchair. She complains of shortness or breath with any activity. No real associated symptoms. Resolves with rest. She also has pain in left chest at times. \"Ache\" No exacerbating or relieving factors, but does seem tender to palpation. Past Medical History:   has a past medical history of 4/11/17 Left knee repeat repair of median parapatellar arthrotomy with augmentation, Arthritis, Atrial fibrillation ([address] CAD (coronary artery disease), Cataract, Diabetes mellitus ([address] GERD (gastroesophageal reflux disease), Hyperlipidemia, Hypertension, HYPOTHRYROIDISM, Non-English speaking patient, Sleep apnea, Thyroid disease, and UTI. Surgical History:   has a past surgical history that includes joint replacement (Bilateral, [address]); Cardiac surgery (2004); Cholecystectomy, laparoscopic (5/15/14);  Revision total knee arthroplasty (Right, 2016); Total knee arthroplasty; and knee surgery (Left, 2017). Social History:   reports that she has never smoked. She has never used smokeless tobacco. She reports that she does not drink alcohol or use drugs. Family History: Mother--  Father--    Home Medications:  Outpatient Encounter Medications as of 2020   Medication Sig Dispense Refill    metoprolol tartrate (LOPRESSOR) 25 MG tablet TAKE ONE-HALF TABLET BY MOUTH TWICE A DAY 90 tablet 3    atorvastatin (LIPITOR) 80 MG tablet Take 1 tablet by mouth daily 90 tablet 3    lisinopril (PRINIVIL;ZESTRIL) 2.5 MG tablet TAKE ONE TABLET BY MOUTH DAILY 90 tablet 3    Insulin Pen Needle (NOVOFINE) 32G X 6 MM MISC by Does not apply route      hydrocortisone (ANUSOL-HC) 2.5 % rectal cream Place rectally 2 times daily Place rectally 2 times daily.  Acetaminophen-Codeine (TYLENOL WITH CODEINE #3 PO) Take by mouth      insulin detemir (LEVEMIR FLEXPEN) 100 UNIT/ML injection pen Inject into the skin nightly      insulin lispro (HUMALOG KWIKPEN) 100 UNIT/ML pen Inject into the skin 3 times daily (before meals)      rivaroxaban (XARELTO) 20 MG TABS tablet Take 1 tablet by mouth daily (Patient taking differently: Take 20 mg by mouth daily [facility] last dose of xarelto was 17) 90 tablet 3    vitamin D (ERGOCALCIFEROL) 54825 UNITS CAPS capsule Take 50,000 Units by mouth once a week      Omega 3 1000 MG CAPS Take 1,000 mg by mouth daily      Liraglutide (VICTOZA) 18 MG/3ML SOPN SC injection Inject 1.2 mg into the skin daily      Calcium Carb-Cholecalciferol (OYSTER SHELL CALCIUM + D PO) Take by mouth 2 times daily      Biotin 5000 MCG TABS Take by mouth daily      lidocaine (LIDODERM) 5 % Place 1 patch onto the skin daily 12 hours on, 12 hours off. 30 patch 0    oxybutynin (DITROPAN XL) 15 MG CR tablet Take 15 mg by mouth daily.       Loratadine 10 MG CAPS Take by mouth daily  levothyroxine (SYNTHROID) 175 MCG tablet Take 150 mcg by mouth daily.  alendronate (FOSAMAX) 70 MG tablet Take 70 mg by mouth every 7 days.  gabapentin (NEURONTIN) 300 MG capsule Take 300 mg by mouth daily.  Acetaminophen (TYLENOL) 325 MG CAPS Take 650 mg by mouth as needed      APAP-CODEINE & DIET MANAGE NC PO Take 300 mg by mouth      Multiple Vitamins-Minerals (MULTIVITAMIN ADULT PO) Take by mouth daily      [DISCONTINUED] ranitidine (ZANTAC) 150 MG tablet Take 150 mg by mouth 2 times daily. No facility-administered encounter medications on file as of 7/29/2020. Allergies:  Aspirin; Fluconazole; Ibuprofen; Macrobid [nitrofurantoin monohydrate macrocrystals]; Adhesive tape; Tiajuana Boga [regadenoson]; and Penicillins     [x] Medications and dosages reviewed. ROS:  [x]Full ROS obtained and negative except as mentioned in HPI      Physical Examination:    Vitals:    07/29/20 1454   BP: 124/66   Pulse: 68   Temp: 98.2 °F (36.8 °C)        · GENERAL:elderly female in wheelchair in NAD  · NEUROLOGICAL: Alert and oriented  · PSYCH: Calm affect  · SKIN: Warm and dry, no rash  · HEENT: Normocephalic, Sclera non-icteric, mucus membranes moist  · NECK: supple, JVP normal  · CAROTID: Normal upstroke, no bruits  · CARDIAC: Normal PMI, Regular rate and rhythm, normal S1S2, No murmur, rub, or gallop  · RESPIRATORY: Normal respiratory effort, clear to auscultation bilaterally  · EXTREMITIES: Trace bilateral edema  · MUSCULOSKELETAL: No joint swelling or tenderness,  Left chest wall tender to palpation  · GASTROINTESTINAL: normal bowel sounds, soft, non-tender, no bruit          Assessment:     1. CAD (coronary artery disease). Atypical chest pain. DOubt angina, likely muscular. She is concerned and would like to repeat stress  Myoview 2/2019 normal. Continue medical Rx  2008 cardiac cath  Patent grafts. 2010 Myoview was neg for ischemia  5/2014 [facility] showed patent grafts.

## 2020-07-29 NOTE — PROGRESS NOTES
[facility]       Referring Provider:  MATEUSZ José - CNP     Chief Complaint   Patient presents with    Coronary Artery Disease    Hypertension    Shortness of Breath    Numbness     ring finger on left hand 4 fingers on right hand        History of Present Illness:  Mrs Kristen Jerome is here in f/u with h/o CAD/CABG; HTN; hyperlipidemia, sleep apnea and atrial fibrillation. 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 She has h/o carotid disease which showed 16-49% bilateral carotid artery stenosis. She is very inactive. Limited walking with walker. Mainly in wheelchair. She complains of shortness or breath with any activity. No real associated symptoms. Resolves with rest. She also has pain in left chest at times. \"Ache\" No exacerbating or relieving factors, but does seem tender to palpation. Past Medical History:   has a past medical history of 17 Left knee repeat repair of median parapatellar arthrotomy with augmentation, Arthritis, Atrial fibrillation ([address] CAD (coronary artery disease), Cataract, Diabetes mellitus ([address] GERD (gastroesophageal reflux disease), Hyperlipidemia, Hypertension, HYPOTHRYROIDISM, Non-English speaking patient, Sleep apnea, Thyroid disease, and UTI. Surgical History:   has a past surgical history that includes joint replacement (Bilateral, [address]); Cardiac surgery (); Cholecystectomy, laparoscopic (5/15/14); Revision total knee arthroplasty (Right, 2016); Total knee arthroplasty; and knee surgery (Left, 2017). Social History:   reports that she has never smoked. She has never used smokeless tobacco. She reports that she does not drink alcohol or use drugs. Family History:   Mother--  Father--    Home Medications:  Outpatient Encounter Medications as of 2020   Medication Sig Dispense Refill    metoprolol tartrate (LOPRESSOR) 25 MG tablet TAKE ONE-HALF TABLET BY MOUTH TWICE A DAY 90 tablet 3    atorvastatin (LIPITOR) 80 MG tablet Take 1 tablet by mouth daily 90 tablet 3    lisinopril (PRINIVIL;ZESTRIL) 2.5 MG tablet TAKE ONE TABLET BY MOUTH DAILY 90 tablet 3    Insulin Pen Needle (NOVOFINE) 32G X 6 MM MISC by Does not apply route      hydrocortisone (ANUSOL-HC) 2.5 % rectal cream Place rectally 2 times daily Place rectally 2 times daily.  Acetaminophen-Codeine (TYLENOL WITH CODEINE #3 PO) Take by mouth      insulin detemir (LEVEMIR FLEXPEN) 100 UNIT/ML injection pen Inject into the skin nightly      insulin lispro (HUMALOG KWIKPEN) 100 UNIT/ML pen Inject into the skin 3 times daily (before meals)      rivaroxaban (XARELTO) 20 MG TABS tablet Take 1 tablet by mouth daily (Patient taking differently: Take 20 mg by mouth daily [facility] last dose of xarelto was 4/5/17) 90 tablet 3    vitamin D (ERGOCALCIFEROL) 94052 UNITS CAPS capsule Take 50,000 Units by mouth once a week      Omega 3 1000 MG CAPS Take 1,000 mg by mouth daily      Liraglutide (VICTOZA) 18 MG/3ML SOPN SC injection Inject 1.2 mg into the skin daily      Calcium Carb-Cholecalciferol (OYSTER SHELL CALCIUM + D PO) Take by mouth 2 times daily      Biotin 5000 MCG TABS Take by mouth daily      lidocaine (LIDODERM) 5 % Place 1 patch onto the skin daily 12 hours on, 12 hours off. 30 patch 0    oxybutynin (DITROPAN XL) 15 MG CR tablet Take 15 mg by mouth daily.  Loratadine 10 MG CAPS Take by mouth daily       levothyroxine (SYNTHROID) 175 MCG tablet Take 150 mcg by mouth daily.  alendronate (FOSAMAX) 70 MG tablet Take 70 mg by mouth every 7 days.  gabapentin (NEURONTIN) 300 MG capsule Take 300 mg by mouth daily.        Acetaminophen (TYLENOL) 325 MG CAPS Take 650 mg by mouth as needed      APAP-CODEINE & DIET MANAGE NE PO Take 300 mg by mouth      Multiple Vitamins-Minerals (MULTIVITAMIN ADULT PO) Take by mouth daily      [DISCONTINUED] ranitidine (ZANTAC) 150 MG tablet Take 150 mg by mouth 2 times daily. No facility-administered encounter medications on file as of 7/29/2020. Allergies:  Aspirin; Fluconazole; Ibuprofen; Macrobid [nitrofurantoin monohydrate macrocrystals]; Adhesive tape; Radha Ganja [regadenoson]; and Penicillins     [x] Medications and dosages reviewed. ROS:  [x]Full ROS obtained and negative except as mentioned in HPI      Physical Examination:    Vitals:    07/29/20 1454   BP: 124/66   Pulse: 68   Temp: 98.2 °F (36.8 °C)        · GENERAL:elderly female in wheelchair in NAD  · NEUROLOGICAL: Alert and oriented  · PSYCH: Calm affect  · SKIN: Warm and dry, no rash  · HEENT: Normocephalic, Sclera non-icteric, mucus membranes moist  · NECK: supple, JVP normal  · CAROTID: Normal upstroke, no bruits  · CARDIAC: Normal PMI, Regular rate and rhythm, normal S1S2, No murmur, rub, or gallop  · RESPIRATORY: Normal respiratory effort, clear to auscultation bilaterally  · EXTREMITIES: Trace bilateral edema  · MUSCULOSKELETAL: No joint swelling or tenderness,  Left chest wall tender to palpation  · GASTROINTESTINAL: normal bowel sounds, soft, non-tender, no bruit          Assessment:     1. CAD (coronary artery disease). Atypical chest pain. DOubt angina, likely muscular. She is concerned and would like to repeat stress  Myoview 2/2019 normal. Continue medical Rx  2008 cardiac cath  Patent grafts. 2010 Myoview was neg for ischemia  5/2014 [facility] showed patent grafts. NOT ON ASA DUE TO ALLERGY-She thinks she may be taking plavix \"a white pill\"  Will bring meds to appointment with stress to review     2. Hx of CABG 2003 -- EF = 55%   3. HTN (hypertension)    /66 (Site: Right Upper Arm, Position: Sitting, Cuff Size: Large Adult)   Pulse 68   Temp 98.2 °F (36.8 °C)   Ht 5' (1.524 m)   Wt 222 lb (100.7 kg)   LMP  (LMP Unknown)   BMI 43.36 kg/m²   Well controlled. Continue current medical therapy   4.  Sleep apnea

## 2020-07-31 ENCOUNTER — TELEPHONE (OUTPATIENT)
Dept: CARDIOLOGY CLINIC | Age: 80
End: 2020-07-31

## 2020-07-31 NOTE — TELEPHONE ENCOUNTER
Patient's son Jenny Flores called with times and date of echo, stress, and OV with [facility]. See appt desk for date and times. LMOM and asked him to call back and confirm.

## 2020-08-12 ENCOUNTER — HOSPITAL ENCOUNTER (OUTPATIENT)
Dept: NON INVASIVE DIAGNOSTICS | Age: 80
Discharge: HOME | End: 2020-08-12
Payer: MEDICARE

## 2020-08-12 ENCOUNTER — OFFICE VISIT (OUTPATIENT)
Dept: CARDIOLOGY CLINIC | Age: 80
End: 2020-08-12
Payer: MEDICARE

## 2020-08-12 VITALS
BODY MASS INDEX: 43.59 KG/M2 | WEIGHT: 222 LBS | HEIGHT: 60 IN | HEART RATE: 64 BPM | DIASTOLIC BLOOD PRESSURE: 71 MMHG | OXYGEN SATURATION: 96 % | SYSTOLIC BLOOD PRESSURE: 129 MMHG

## 2020-08-12 VITALS — WEIGHT: 222 LBS | BODY MASS INDEX: 43.36 KG/M2

## 2020-08-12 LAB
LV EF: 55 %
LV EF: 58 %
LVEF MODALITY: NORMAL
LVEF MODALITY: NORMAL

## 2020-08-12 PROCEDURE — G8427 DOCREV CUR MEDS BY ELIG CLIN: HCPCS | Performed by: INTERNAL MEDICINE

## 2020-08-12 PROCEDURE — 1090F PRES/ABSN URINE INCON ASSESS: CPT | Performed by: INTERNAL MEDICINE

## 2020-08-12 PROCEDURE — A9502 TC99M TETROFOSMIN: HCPCS | Performed by: INTERNAL MEDICINE

## 2020-08-12 PROCEDURE — 3430000000 HC RX DIAGNOSTIC RADIOPHARMACEUTICAL: Performed by: INTERNAL MEDICINE

## 2020-08-12 PROCEDURE — 93017 CV STRESS TEST TRACING ONLY: CPT | Performed by: INTERNAL MEDICINE

## 2020-08-12 PROCEDURE — 6360000002 HC RX W HCPCS: Performed by: INTERNAL MEDICINE

## 2020-08-12 PROCEDURE — G8417 CALC BMI ABV UP PARAM F/U: HCPCS | Performed by: INTERNAL MEDICINE

## 2020-08-12 PROCEDURE — 99214 OFFICE O/P EST MOD 30 MIN: CPT | Performed by: INTERNAL MEDICINE

## 2020-08-12 PROCEDURE — 1123F ACP DISCUSS/DSCN MKR DOCD: CPT | Performed by: INTERNAL MEDICINE

## 2020-08-12 PROCEDURE — 93306 TTE W/DOPPLER COMPLETE: CPT

## 2020-08-12 PROCEDURE — G8400 PT W/DXA NO RESULTS DOC: HCPCS | Performed by: INTERNAL MEDICINE

## 2020-08-12 PROCEDURE — 1036F TOBACCO NON-USER: CPT | Performed by: INTERNAL MEDICINE

## 2020-08-12 PROCEDURE — 4040F PNEUMOC VAC/ADMIN/RCVD: CPT | Performed by: INTERNAL MEDICINE

## 2020-08-12 PROCEDURE — 78452 HT MUSCLE IMAGE SPECT MULT: CPT | Performed by: INTERNAL MEDICINE

## 2020-08-12 RX ORDER — DOBUTAMINE HYDROCHLORIDE 200 MG/100ML
10 INJECTION INTRAVENOUS CONTINUOUS
Status: DISCONTINUED | OUTPATIENT
Start: 2020-08-12 | End: 2020-08-13 | Stop reason: HOSPADM

## 2020-08-12 RX ADMIN — TETROFOSMIN 30 MILLICURIE: 1.38 INJECTION, POWDER, LYOPHILIZED, FOR SOLUTION INTRAVENOUS at 11:30

## 2020-08-12 RX ADMIN — DOBUTAMINE HYDROCHLORIDE 10 MCG/KG/MIN: 200 INJECTION INTRAVENOUS at 11:25

## 2020-08-12 RX ADMIN — TETROFOSMIN 10 MILLICURIE: 1.38 INJECTION, POWDER, LYOPHILIZED, FOR SOLUTION INTRAVENOUS at 10:44

## 2020-08-12 NOTE — PROGRESS NOTES
[address] [place] [address]       Referring Provider:  Radha Sheppard APRN - CNP     No chief complaint on file. History of Present Illness:  Mrs Rosa Charles is here in f/u with h/o CAD/CABG; HTN; hyperlipidemia, sleep apnea and atrial fibrillation. 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 She has h/o carotid disease which showed 16-49% bilateral carotid artery stenosis. She is very inactive. Limited walking with walker. Mainly in wheelchair. She complains of shortness or breath with any activity. No real associated symptoms. Resolves with rest. She also has pain in left chest at times. \"Ache\" No exacerbating or relieving factors, but does seem tender to palpation. Past Medical History:   has a past medical history of 15 Left knee repeat repair of median parapatellar arthrotomy with augmentation, Arthritis, Atrial fibrillation ([address] CAD (coronary artery disease), Cataract, Diabetes mellitus ([address] GERD (gastroesophageal reflux disease), Hyperlipidemia, Hypertension, HYPOTHRYROIDISM, Non-English speaking patient, Sleep apnea, Thyroid disease, and UTI. Surgical History:   has a past surgical history that includes joint replacement (Bilateral, [address]); Cardiac surgery (); Cholecystectomy, laparoscopic (5/15/14); Revision total knee arthroplasty (Right, 2016); Total knee arthroplasty; and knee surgery (Left, 2017). Social History:   reports that she has never smoked. She has never used smokeless tobacco. She reports that she does not drink alcohol or use drugs. Family History:   Mother--  Father--    Home Medications:  Outpatient Encounter Medications as of 2020   Medication Sig Dispense Refill    metoprolol tartrate (LOPRESSOR) 25 MG tablet TAKE ONE-HALF TABLET BY MOUTH TWICE A DAY 90 tablet 4    atorvastatin (LIPITOR) 80 MG tablet Take 1 tablet by mouth daily 90 tablet 4    lisinopril (PRINIVIL;ZESTRIL) 2.5 MG tablet TAKE ONE TABLET BY MOUTH DAILY 90 tablet 4    Insulin Pen Needle (NOVOFINE) 32G X 6 MM MISC by Does not apply route      hydrocortisone (ANUSOL-HC) 2.5 % rectal cream Place rectally 2 times daily Place rectally 2 times daily.  Acetaminophen-Codeine (TYLENOL WITH CODEINE #3 PO) Take by mouth      insulin detemir (LEVEMIR FLEXPEN) 100 UNIT/ML injection pen Inject into the skin nightly      insulin lispro (HUMALOG KWIKPEN) 100 UNIT/ML pen Inject into the skin 3 times daily (before meals)      rivaroxaban (XARELTO) 20 MG TABS tablet Take 1 tablet by mouth daily (Patient taking differently: Take 20 mg [facility] mouth daily [facility]) 90 tablet 3    vitamin D (ERGOCALCIFEROL) 47237 UNITS CAPS capsule Take 50,000 Units by mouth once a week      Omega 3 1000 MG CAPS Take 1,000 mg by mouth daily      Liraglutide (VICTOZA) 18 MG/3ML SOPN SC injection Inject 1.2 mg into the skin daily      Calcium Carb-Cholecalciferol (OYSTER SHELL CALCIUM + D PO) Take by mouth 2 times daily      Biotin 5000 MCG TABS Take by mouth daily      lidocaine (LIDODERM) 5 % Place 1 patch onto the skin daily 12 hours on, 12 hours off. 30 patch 0    oxybutynin (DITROPAN XL) 15 MG CR tablet Take 15 mg by mouth daily.  Loratadine 10 MG CAPS Take by mouth daily       levothyroxine (SYNTHROID) 175 MCG tablet Take 150 mcg by mouth daily.  alendronate (FOSAMAX) 70 MG tablet Take 70 mg by mouth every 7 days.  gabapentin (NEURONTIN) 300 MG capsule Take 300 mg by mouth daily. No facility-administered encounter medications on file as of 8/12/2020. Allergies:  Aspirin; Fluconazole; Ibuprofen; Macrobid [nitrofurantoin monohydrate macrocrystals]; Adhesive tape; Valri Spell [regadenoson]; and Penicillins     [x] Medications and dosages reviewed.   ROS:  [x]Full ROS obtained and negative except as mentioned in HPI      Physical Examination:    There were no vitals filed for this visit. · GENERAL:elderly female in wheelchair in NAD  · NEUROLOGICAL: Alert and oriented  · PSYCH: Calm affect  · SKIN: Warm and dry, no rash  · HEENT: Normocephalic, Sclera non-icteric, mucus membranes moist  · NECK: supple, JVP normal  · CAROTID: Normal upstroke, no bruits  · CARDIAC: Normal PMI, Regular rate and rhythm, normal S1S2, No murmur, rub, or gallop  · RESPIRATORY: Normal respiratory effort, clear to auscultation bilaterally  · EXTREMITIES: Trace bilateral edema  · MUSCULOSKELETAL: No joint swelling or tenderness,  Left chest wall tender to palpation  · GASTROINTESTINAL: normal bowel sounds, soft, non-tender, no bruit    Assessment:     1. Coronary artery disease / CABG 2003   Atypical chest pain. DOubt angina, likely muscular. NOT ON ASA DUE TO ALLERGY-She thinks she may be taking plavix \"a white pill. \"  [facility] ramakrishna today 8/12/2020>  Myoview 2/2019> Normal.   [facility] 2014> Patent grafts. Myoview stress 2010> Neg for ischemia  [facility] 2008>  Patent grafts     2. HTN (hypertension)      Well controlled. Continue current medical therapy   3. Hyperlipidemia --  On lipitor. Trying to get recent labs    4. Atrial fib, paroxysmal: HR regular & controlled. On Xarelto for stroke prevention. CHADS2-Vasc=5. Creat clear=105 in past. Need recent labs   5. Sleep apnea --declines treatment    6. Shortness of breath -- c/o at recent visit: Likely deconditioning.    ECHO today 8/12/2020>    Plan:  ECHO and myoview  F/u with meds to review    Thank you for allowing me to participate in the care of this individual.      Jordon Garner M.D., [facility]

## 2020-08-12 NOTE — PROGRESS NOTES
ER was able start IV in RT hand. Allergy noted in chart to Nina Hendrix. Called and spoke with Dr. Mamadou Suh regarding this. Ordered Dobutamine Myoview. Pt did not take any meds today. Instructed on Dobutamine Stress Test Procedure including possible side effects/ adverse reactions. Patient verbalizes understanding and denies having any questions per  at bedside.

## 2020-08-12 NOTE — PROGRESS NOTES
tablet TAKE ONE TABLET BY MOUTH DAILY 90 tablet 4    Insulin Pen Needle (NOVOFINE) 32G X 6 MM MISC by Does not apply route 3 times daily with meals      hydrocortisone (ANUSOL-HC) 2.5 % rectal cream Place rectally 2 times daily Place rectally 2 times daily.  Acetaminophen-Codeine (TYLENOL WITH CODEINE #3 PO) Take by mouth      insulin detemir (LEVEMIR FLEXPEN) 100 UNIT/ML injection pen Inject 55 Units into the skin nightly 55u in the am and 55u in the evening      insulin lispro (HUMALOG KWIKPEN) 100 UNIT/ML pen Inject into the skin 3 times daily (before meals)      rivaroxaban (XARELTO) 20 MG TABS tablet Take 1 tablet by mouth daily (Patient taking differently: Take 20 mg [facility] mouth [facility] of [facility] was [facility]) 90 tablet 3    vitamin D (ERGOCALCIFEROL) 58204 UNITS CAPS capsule Take 50,000 Units by mouth once a week      Omega 3 1000 MG CAPS Take 1,000 mg by mouth daily      Liraglutide (VICTOZA) 18 MG/3ML SOPN SC injection Inject 1.2 mg into the skin daily      Calcium Carb-Cholecalciferol (OYSTER SHELL CALCIUM + D PO) Take by mouth 2 times daily      Biotin 5000 MCG TABS Take by mouth daily      lidocaine (LIDODERM) 5 % Place 1 patch onto the skin daily 12 hours on, 12 hours off. 30 patch 0    oxybutynin (DITROPAN XL) 15 MG CR tablet Take 15 mg by mouth daily.  Loratadine 10 MG CAPS Take by mouth daily       levothyroxine (SYNTHROID) 175 MCG tablet Take 150 mcg by mouth daily.  alendronate (FOSAMAX) 70 MG tablet Take 70 mg by mouth every 7 days.  gabapentin (NEURONTIN) 300 MG capsule Take 300 mg by mouth daily. Facility-Administered Encounter Medications as of 8/12/2020   Medication Dose Route Frequency Provider Last Rate Last Dose    DOBUTamine (DOBUTREX) 500 mg in dextrose 5 % 250 mL infusion  10 mcg/kg/min Intravenous Continuous Shankar Schmidt MD   Stopped at 08/12/20 7283         Allergies:  Aspirin; Fluconazole;  Ibuprofen; Macrobid [nitrofurantoin monohydrate macrocrystals]; Adhesive tape; Benson Stefanie [regadenoson]; and Penicillins     [x] Medications and dosages reviewed. ROS:  [x]Full ROS obtained and negative except as mentioned in HPI      Physical Examination:    Vitals:    08/12/20 1226   BP: 129/71   Pulse: 64   SpO2: 96%        · GENERAL:elderly female in wheelchair in NAD  · NEUROLOGICAL: Alert and oriented  · PSYCH: Calm affect  · SKIN: Warm and dry, no rash  · HEENT: Normocephalic, Sclera non-icteric, mucus membranes moist  · NECK: supple, JVP normal  · CAROTID: Normal upstroke, no bruits  · CARDIAC: Normal PMI, regular rate and rhythm, normal S1S2, no murmur, rub, or gallop  · RESPIRATORY: Normal respiratory effort, clear to auscultation bilaterally  · EXTREMITIES: Trace bilateral edema  · MUSCULOSKELETAL: No joint swelling or tenderness,  Left chest wall tender to palpation  · GASTROINTESTINAL: normal bowel sounds, soft, non-tender, no bruit    MYOVIEW 8/2020   There is some bowel artifact and breast attenuation that limits study. There is no clear ischemia or scar seen. LV function is normal with EF=58%     ECHO 8/2020   Normal left ventricle size, wall thickness and systolic function with an   estimated ejection fraction of 55%. No regional wall motion abnormalities are seen. Normal diastolic filling pattern for age. Mild mitral regurgitation is present. The aortic valve appears sclerotic but opens well. Mild tricuspid regurgitation. Estimated PASP=30 mmHg      Assessment:     1. CAD (coronary artery disease). Atypical chest pain. Myoview 8/2020-some artifact but no clear ischemia  Symptoms very atypical. Discussed options. Plan medical therapy unless she has concerning symptoms then would need cath. Myoview 2/2019 normal.   2008 cardiac cath  Patent grafts. 2010 Myoview was neg for ischemia  5/2014 [facility] showed patent grafts.       NOT ON ASA DUE TO ALLERGY-She thinks she may be taking plavix \"a white pill\"  Will bring meds to appointment with stress to review     2. Hx of CABG 2003 -- EF = 55%   3. HTN (hypertension)    /71 (Site: Left Upper Arm, Position: Sitting, Cuff Size: Large Adult)   Pulse 64   Ht 5' (1.524 m)   Wt 222 lb (100.7 kg)   LMP  (LMP Unknown)   SpO2 96%   BMI 43.36 kg/m²   Controlled. Continue current medical therapy   4. Sleep apnea --declines treatment    5. Hyperlipidemia --  On lipitor. LDL=80. Fair control. Continue treatment   6. Atrial fib -- paroxysmal.  On Xarelto for stroke prevention. CHADS2-Vasc=5. Creat clear=105 in past. Need recent labs  7. Shortness of breath: Likely deconditioning. ECHO normal    Plan:  Continue medical management.   F/u 2-3 months    Thank you for allowing me to participate in the care of this individual.      Jordon Garner M.D., [facility]

## 2020-08-12 NOTE — PROGRESS NOTES
Multiple IV attempts by nuc med tech(X2)  and Rn(X1). Blood returned but unable  to advance IV. Pt requesting no more attempts. Spoke with pt and verbalized understanding need for IV for stress test.  LM with PICC team. Called ER and will attempt to have ER RN start IV.

## 2020-08-18 ENCOUNTER — TELEPHONE (OUTPATIENT)
Dept: CARDIOLOGY CLINIC | Age: 80
End: 2020-08-18

## 2021-06-24 ENCOUNTER — OFFICE VISIT (OUTPATIENT)
Dept: ORTHOPEDIC SURGERY | Age: 81
End: 2021-06-24
Payer: MEDICARE

## 2021-06-24 VITALS — BODY MASS INDEX: 43.59 KG/M2 | WEIGHT: 222 LBS | HEIGHT: 60 IN | RESPIRATION RATE: 16 BRPM

## 2021-06-24 DIAGNOSIS — M75.81 ROTATOR CUFF TENDONITIS, RIGHT: ICD-10-CM

## 2021-06-24 DIAGNOSIS — M54.12 CERVICAL RADICULAR PAIN: ICD-10-CM

## 2021-06-24 DIAGNOSIS — M25.511 ACUTE PAIN OF RIGHT SHOULDER: Primary | ICD-10-CM

## 2021-06-24 PROCEDURE — 1090F PRES/ABSN URINE INCON ASSESS: CPT | Performed by: PHYSICIAN ASSISTANT

## 2021-06-24 PROCEDURE — 1123F ACP DISCUSS/DSCN MKR DOCD: CPT | Performed by: PHYSICIAN ASSISTANT

## 2021-06-24 PROCEDURE — 1036F TOBACCO NON-USER: CPT | Performed by: PHYSICIAN ASSISTANT

## 2021-06-24 PROCEDURE — G8400 PT W/DXA NO RESULTS DOC: HCPCS | Performed by: PHYSICIAN ASSISTANT

## 2021-06-24 PROCEDURE — 4040F PNEUMOC VAC/ADMIN/RCVD: CPT | Performed by: PHYSICIAN ASSISTANT

## 2021-06-24 PROCEDURE — G8417 CALC BMI ABV UP PARAM F/U: HCPCS | Performed by: PHYSICIAN ASSISTANT

## 2021-06-24 PROCEDURE — 99214 OFFICE O/P EST MOD 30 MIN: CPT | Performed by: PHYSICIAN ASSISTANT

## 2021-06-24 PROCEDURE — G8427 DOCREV CUR MEDS BY ELIG CLIN: HCPCS | Performed by: PHYSICIAN ASSISTANT

## 2021-06-24 RX ORDER — METHYLPREDNISOLONE 4 MG/1
TABLET ORAL
Qty: 1 KIT | Refills: 0 | Status: SHIPPED | OUTPATIENT
Start: 2021-06-24 | End: 2021-07-07

## 2021-06-24 NOTE — PROGRESS NOTES
Subjective:      Patient ID: Evgeny Ibarra is a 78 y.o. female with a history of insulin-dependent diabetes, heart disease, hypercholesterolemia, peripheral vascular disease. She is status post multiple revision knee arthroplasties. Chief Complaint   Patient presents with    Shoulder Pain     right shoulder        HPI:   Evgeny Ibarra is a 78 y.o. female with a history of insulin-dependent diabetes, heart disease, hypercholesterolemia, peripheral vascular disease. She is status post multiple revision knee arthroplasties. She is here in the [address] [place] [address] for an initial evaluation of right shoulder pain and cervical radicular pain. She states approximately 10 days ago she was reaching for an object and felt a pop in her right shoulder. Since that time she has had pain that radiates from the shoulder up to the lateral neck as well as down into the hand and forearm. She complains of tingling into her right hand. She denies any perceived weakness in her upper extremities. Pain Scale 7/10 VAS. Location of pain deltoid region of the right shoulder as well as radicular pain into the right upper extremity. Pain is worse with activity. Pain improves with rest.   Previous treatments have included Tylenol, ice, heat with no improvement. She utilizes a walker to assist with ambulation which has become more difficult due to right shoulder pain. Review Of Systems:   A 14 point review of systems and history form completed by the patient has been reviewed. This scanned in the media tab of the patient's chart under today's date date. As noted in the HPI. Negative for fever or chills. Positive for poly-joint pain, swelling and stiffness. Positive for numbness or tingling RUE.      Past Medical History:   Diagnosis Date    4/11/17 Left knee repeat repair of median parapatellar arthrotomy with augmentation 4/12/2017    Arthritis     Atrial fibrillation (HCC)     CAD (coronary artery disease)     Cataract     Diabetes mellitus (HCC)     GERD (gastroesophageal reflux disease)     Hyperlipidemia     Hypertension     HYPOTHRYROIDISM     Non-English speaking patient     SPEAKS Macedonian. SHE SPEAKS A LITTLE ENGLISH    Sleep apnea     unspecified    Thyroid disease     UTI        Family History   Problem Relation Age of Onset    Arthritis Other     Diabetes Other     Heart Disease Other     Hypertension Other     High Cholesterol Brother        Past Surgical History:   Procedure Laterality Date    CARDIAC SURGERY  2004    CABG X 4 VESSELS    CHOLECYSTECTOMY, LAPAROSCOPIC  5/15/14    with IOC    JOINT REPLACEMENT Bilateral 2000 [facility]    knee    KNEE SURGERY Left 02/28/2017    left knee poly exchange revision     REVISION TOTAL KNEE ARTHROPLASTY Right 11/22/2016    RIGHT TOTAL KNEE REVISION WITH POLY EXCHANGE    TOTAL KNEE ARTHROPLASTY      partical/ left        Social History     Occupational History    Occupation: Retired   Tobacco Use    Smoking status: Never Smoker    Smokeless tobacco: Never Used   Vaping Use    Vaping Use: Never used   Substance and Sexual Activity    Alcohol use: No    Drug use: No    Sexual activity: Not on file       Current Outpatient Medications   Medication Sig Dispense Refill    methylPREDNISolone (MEDROL, ELODIA,) 4 MG tablet Take by mouth. 6 po day one 5 po day 2 4 po day 3 3 po day 4 2 po day 5 1 po day 6. 1 kit 0    metoprolol tartrate (LOPRESSOR) 25 MG tablet TAKE ONE-HALF TABLET BY MOUTH TWICE A DAY 90 tablet 4    atorvastatin (LIPITOR) 80 MG tablet Take 1 tablet by mouth daily 90 tablet 4    lisinopril (PRINIVIL;ZESTRIL) 2.5 MG tablet TAKE ONE TABLET BY MOUTH DAILY 90 tablet 4    Insulin Pen Needle (NOVOFINE) 32G X 6 MM MISC by Does not apply route 3 times daily with meals      hydrocortisone (ANUSOL-HC) 2.5 % rectal cream Place rectally 2 times daily Place rectally 2 times daily.       Acetaminophen-Codeine (TYLENOL WITH CODEINE #3 PO) Take by mouth      insulin detemir (LEVEMIR FLEXPEN) 100 UNIT/ML injection pen Inject 55 Units into the skin nightly 55u in the am and 55u in the evening      insulin lispro (HUMALOG KWIKPEN) 100 UNIT/ML pen Inject into the skin 3 times daily (before meals)      rivaroxaban (XARELTO) 20 MG TABS tablet Take 1 tablet by mouth daily (Patient taking differently: Take 20 mg by mouth daily [facility] last dose of xarelto was 4/5/17) 90 tablet 3    vitamin D (ERGOCALCIFEROL) 82177 UNITS CAPS capsule Take 50,000 Units by mouth once a week      Omega 3 1000 MG CAPS Take 1,000 mg by mouth daily      Liraglutide (VICTOZA) 18 MG/3ML SOPN SC injection Inject 1.2 mg into the skin daily      Calcium Carb-Cholecalciferol (OYSTER SHELL CALCIUM + D PO) Take by mouth 2 times daily      Biotin 5000 MCG TABS Take by mouth daily      lidocaine (LIDODERM) 5 % Place 1 patch onto the skin daily 12 hours on, 12 hours off. 30 patch 0    oxybutynin (DITROPAN XL) 15 MG CR tablet Take 15 mg by mouth daily.  Loratadine 10 MG CAPS Take by mouth daily       levothyroxine (SYNTHROID) 175 MCG tablet Take 150 mcg by mouth daily.  alendronate (FOSAMAX) 70 MG tablet Take 70 mg by mouth every 7 days.  gabapentin (NEURONTIN) 300 MG capsule Take 300 mg by mouth daily. No current facility-administered medications for this visit. Objective:     She is alert, oriented x 3, pleasant, well nourished, developed and in no   acute distress. Resp 16   Ht 5' (1.524 m)   Wt 222 lb (100.7 kg)   LMP  (LMP Unknown)   BMI 43.36 kg/m²        Examination of the right shoulder: There is no deformity. There is no erythema. There is no  soft tissue swelling. Deltoid region is  tender to palpation. AC Joint is not tender to palpation. Clavicle is not tender to palpation. Bicipital Groove is   tender to palpation. Pectoralis  is  tender to palpation.   Scapula/ trapezius is not tender to palpation. There is mild weakness with supraspinatus testing. There is moderate pain with supraspinatus testing. Positive impingement testing. Shoulder Active ROM -       IR to L5 ER 75    Cervical Spine Exam:  There is not deformity. There is  loss of motion. There is  muscular spasm. There is  trapezius/ rhomboid tenderness. There is not spinous process tenderness. There is not cervical lymphadenopathy. Spurling Test is Negative. Examination of the upper extremities are intact with sensation to light touch. Motor testing 4+ to 5/5 in all major motor groups including hand intrinsics. Radial, Median and Ulnar nerves are intact. Mohamud's Sign absent. Examination of the upper extremities shows intact perfusion to all extremities. No cyanosis. Digits are warm to touch. Capillary refill is less than 2 seconds. There is no edema noted. Examination of the skin over the upper extremities:  Reveals the skin to be intact without lacerations or abrasions. There are no significant erythema, rashes or skin lesions. X Rays: performed in the office today:   AP, Y and Axillary views of the right shoulder. There is no evidence of fracture or dislocation. There is narrowing of the subacromial space as well as superior migration of the humeral head within the glenoid. There is mild glenohumeral arthritis and mild to moderate AC joint arthritis. Additional Tests reviewed: none  Additional Outside Records reviewed: none    Diagnosis:       ICD-10-CM    1. Acute pain of right shoulder  M25.511 XR SHOULDER RIGHT (MIN 2 VIEWS)   2. Rotator cuff tendonitis, right  M75.81 Ambulatory referral to Physical Therapy   3. Cervical radicular pain  M54.12 Ambulatory referral to Physical Therapy        Assessment and Plan:       Assessment:  Right shoulder pain probable rotator cuff tendinitis or rotator cuff tear. Right cervical radicular pain.       Plan:  Medications- Rx Medrol dose pack. She will discuss oral steroids with her PCP- [facility] before starting. May take Tylenol for pain. PT- Rx for PT. She ahs a home care agency she may utilize. Further Imaging- none. Follow up- She ahs an appointment in 1 week with Álvaro Stover PA-C. Call or return to clinic if these symptoms worsen or fail to improve as anticipated.

## 2021-07-07 ENCOUNTER — OFFICE VISIT (OUTPATIENT)
Dept: ORTHOPEDIC SURGERY | Age: 81
End: 2021-07-07
Payer: MEDICARE

## 2021-07-07 VITALS — BODY MASS INDEX: 43.59 KG/M2 | HEIGHT: 60 IN | WEIGHT: 222 LBS

## 2021-07-07 DIAGNOSIS — M19.011 PRIMARY OSTEOARTHRITIS OF RIGHT SHOULDER: ICD-10-CM

## 2021-07-07 DIAGNOSIS — M75.81 TENDINITIS OF RIGHT ROTATOR CUFF: ICD-10-CM

## 2021-07-07 DIAGNOSIS — S46.011D TRAUMATIC TEAR OF RIGHT ROTATOR CUFF, UNSPECIFIED TEAR EXTENT, SUBSEQUENT ENCOUNTER: Primary | ICD-10-CM

## 2021-07-07 PROCEDURE — 1090F PRES/ABSN URINE INCON ASSESS: CPT | Performed by: PHYSICIAN ASSISTANT

## 2021-07-07 PROCEDURE — 99213 OFFICE O/P EST LOW 20 MIN: CPT | Performed by: PHYSICIAN ASSISTANT

## 2021-07-07 PROCEDURE — G8427 DOCREV CUR MEDS BY ELIG CLIN: HCPCS | Performed by: PHYSICIAN ASSISTANT

## 2021-07-07 PROCEDURE — 20610 DRAIN/INJ JOINT/BURSA W/O US: CPT | Performed by: PHYSICIAN ASSISTANT

## 2021-07-07 PROCEDURE — 4040F PNEUMOC VAC/ADMIN/RCVD: CPT | Performed by: PHYSICIAN ASSISTANT

## 2021-07-07 PROCEDURE — G8400 PT W/DXA NO RESULTS DOC: HCPCS | Performed by: PHYSICIAN ASSISTANT

## 2021-07-07 PROCEDURE — 1036F TOBACCO NON-USER: CPT | Performed by: PHYSICIAN ASSISTANT

## 2021-07-07 PROCEDURE — 1123F ACP DISCUSS/DSCN MKR DOCD: CPT | Performed by: PHYSICIAN ASSISTANT

## 2021-07-07 PROCEDURE — G8417 CALC BMI ABV UP PARAM F/U: HCPCS | Performed by: PHYSICIAN ASSISTANT

## 2021-07-07 RX ORDER — BETAMETHASONE SODIUM PHOSPHATE AND BETAMETHASONE ACETATE 3; 3 MG/ML; MG/ML
12 INJECTION, SUSPENSION INTRA-ARTICULAR; INTRALESIONAL; INTRAMUSCULAR; SOFT TISSUE ONCE
Status: COMPLETED | OUTPATIENT
Start: 2021-07-07 | End: 2021-07-07

## 2021-07-07 RX ORDER — LIDOCAINE HYDROCHLORIDE 10 MG/ML
5 INJECTION, SOLUTION INFILTRATION; PERINEURAL ONCE
Status: COMPLETED | OUTPATIENT
Start: 2021-07-07 | End: 2021-07-07

## 2021-07-07 RX ADMIN — BETAMETHASONE SODIUM PHOSPHATE AND BETAMETHASONE ACETATE 12 MG: 3; 3 INJECTION, SUSPENSION INTRA-ARTICULAR; INTRALESIONAL; INTRAMUSCULAR; SOFT TISSUE at 16:37

## 2021-07-07 RX ADMIN — LIDOCAINE HYDROCHLORIDE 5 ML: 10 INJECTION, SOLUTION INFILTRATION; PERINEURAL at 16:37

## 2021-07-07 NOTE — PROGRESS NOTES
Patient Name: Celeste Armendariz  Medical Record Number: [id number]  YOB: 1940  Date of Encounter: 7/7/2021     Chief Complaint   Patient presents with    Shoulder Pain     np right shoulder, seen in [facility] by LATOYA on 6/24/21, injured by pushing heavy object about 4 weeks ago       History of Present Illness:   Ms. Celeste Armendariz is here in follow up regarding her right shoulder. Patient was initially seen by Nnamdi Thompson in the after-hours clinic on 6/24/2021 stating 10 days prior she was pushing a box on her table when she felt a pop in the right arm. Her pain was radiating up to her right neck and also down into her right forearm and hand with associated tingling. X-rays showed mild to moderate degenerative changes with superior migration of the humeral head suggesting chronic rotator cuff pathology. Patient was given a Medrol Dosepak and an order for physical therapy. She presents today stating the Medrol Dosepak did not help. Her right shoulder pain can still go up to a 10/10. She states home physical therapy never called. The patient's past medical history, medications, allergies, family history, social history, and review of systems have been reviewed, and dated and are recorded in the chart under the 'MEDIA\" tab. Physical Exam:    Ms. Celeste Armendariz appears well, she is in no apparent distress, she demonstrates appropriate mood & affect. She is alert and oriented to person, place and time. Ht 5' (1.524 m)   Wt 222 lb (100.7 kg)   LMP  (LMP Unknown)   BMI 43.36 kg/m²     On examination of patient's right shoulder there is no obvious swelling or joint effusion. She does have generalized tenderness on palpation of the right shoulder. She has active forward flexion to 140 degrees with expressed discomfort. She has pain with Neer's, [place]'s and empty can testing. She has functional range of motion and strength of the right elbow as well as  strength.       Impression:   Diagnosis other conservative treatment options at today's visit and she elected to proceed with cortisone injection. She understands this will likely elevate her blood glucose however she is on sliding scale insulin and will adjust accordingly. We will call [facility] to reorder home physical therapy. Patient will plan on following back in 4 weeks or before that time with any concerns. Patient is not interested in surgical options at this point and therefore MRI would not be beneficial.    Wally Schmidt was informed of the results of any imaging. We discussed treatment options and a time was given to answer questions. A plan was proposed and Wally Schmidt understand and accepts this course of care. Electronically signed by Janis Chisholm PA-C on 3/2/1634  Board Certified [facility]    Please note that portions of this note were completed with a voice recognition program.  Efforts were made to edit the dictations but occasionally words are mis-transcribed.

## 2021-08-09 ENCOUNTER — TELEPHONE (OUTPATIENT)
Dept: ORTHOPEDIC SURGERY | Age: 81
End: 2021-08-09

## 2021-08-10 NOTE — TELEPHONE ENCOUNTER
Unable to address as no information as to who Gucci Liu is or contact information was given. No Gucci Liu listed on HIPAA or in patient's chart.

## 2021-08-12 ENCOUNTER — TELEPHONE (OUTPATIENT)
Dept: ORTHOPEDIC SURGERY | Age: 81
End: 2021-08-12

## 2021-08-12 DIAGNOSIS — S46.011A TRAUMATIC TEAR OF RIGHT ROTATOR CUFF, UNSPECIFIED TEAR EXTENT, INITIAL ENCOUNTER: Primary | ICD-10-CM

## 2021-08-12 NOTE — TELEPHONE ENCOUNTER
KARY WITH [facility] RETURNING MISSED CALL FROM [place].  STATES THAT SHE CLEARED HER MAILBOX AND A MESSAGE CAN BE LEFT IF SHE DOES NOT ANSWER [phone]

## 2021-08-13 RX ORDER — TRAMADOL HYDROCHLORIDE 50 MG/1
50 TABLET ORAL EVERY 6 HOURS PRN
Qty: 28 TABLET | Refills: 0 | Status: SHIPPED | OUTPATIENT
Start: 2021-08-13 | End: 2021-08-20

## 2021-08-13 NOTE — TELEPHONE ENCOUNTER
Rx of Ultram called in. Patient needs to follow up in the office. I told her to follow up in 4 weeks and it has been more than 4 weeks and there is no appointment scheduled.

## 2021-08-24 DIAGNOSIS — I10 ESSENTIAL HYPERTENSION, BENIGN: ICD-10-CM

## 2021-08-24 DIAGNOSIS — I25.10 CORONARY ARTERY DISEASE INVOLVING NATIVE CORONARY ARTERY OF NATIVE HEART WITHOUT ANGINA PECTORIS: Chronic | ICD-10-CM

## 2021-08-26 RX ORDER — LISINOPRIL 2.5 MG/1
TABLET ORAL
Qty: 90 TABLET | Refills: 4 | Status: ON HOLD
Start: 2021-08-26 | End: 2022-06-20 | Stop reason: HOSPADM

## 2021-08-26 NOTE — TELEPHONE ENCOUNTER
Called patient's son Millie Alves (on  [address]). Discussed lab work needed and need to schedule OV. He asked if he could call back Monday to schedule appt. Refilled med for 90 day with no refills for now.

## 2021-09-20 ENCOUNTER — TELEPHONE (OUTPATIENT)
Dept: ORTHOPEDIC SURGERY | Age: 81
End: 2021-09-20

## 2021-09-20 NOTE — TELEPHONE ENCOUNTER
I spoke to [facility] JUSTIN and offered Monday, 9/27,  at 8:45AM or 3:00PM. She will need to check some things and call back with their choice. They did not want the opening this Thursday.

## 2021-09-27 ENCOUNTER — OFFICE VISIT (OUTPATIENT)
Dept: ORTHOPEDIC SURGERY | Age: 81
End: 2021-09-27
Payer: MEDICARE

## 2021-09-27 VITALS — HEIGHT: 60 IN | BODY MASS INDEX: 44.17 KG/M2 | WEIGHT: 225 LBS

## 2021-09-27 DIAGNOSIS — G89.29 CHRONIC RIGHT SHOULDER PAIN: ICD-10-CM

## 2021-09-27 DIAGNOSIS — S46.011D TRAUMATIC TEAR OF RIGHT ROTATOR CUFF, UNSPECIFIED TEAR EXTENT, SUBSEQUENT ENCOUNTER: Primary | ICD-10-CM

## 2021-09-27 DIAGNOSIS — M75.81 TENDINITIS OF RIGHT ROTATOR CUFF: ICD-10-CM

## 2021-09-27 DIAGNOSIS — M25.511 CHRONIC RIGHT SHOULDER PAIN: ICD-10-CM

## 2021-09-27 DIAGNOSIS — M19.011 PRIMARY OSTEOARTHRITIS OF RIGHT SHOULDER: ICD-10-CM

## 2021-09-27 PROCEDURE — 4040F PNEUMOC VAC/ADMIN/RCVD: CPT | Performed by: PHYSICIAN ASSISTANT

## 2021-09-27 PROCEDURE — 1090F PRES/ABSN URINE INCON ASSESS: CPT | Performed by: PHYSICIAN ASSISTANT

## 2021-09-27 PROCEDURE — 99213 OFFICE O/P EST LOW 20 MIN: CPT | Performed by: PHYSICIAN ASSISTANT

## 2021-09-27 PROCEDURE — 1123F ACP DISCUSS/DSCN MKR DOCD: CPT | Performed by: PHYSICIAN ASSISTANT

## 2021-09-27 PROCEDURE — G8417 CALC BMI ABV UP PARAM F/U: HCPCS | Performed by: PHYSICIAN ASSISTANT

## 2021-09-27 PROCEDURE — G8427 DOCREV CUR MEDS BY ELIG CLIN: HCPCS | Performed by: PHYSICIAN ASSISTANT

## 2021-09-27 PROCEDURE — G8400 PT W/DXA NO RESULTS DOC: HCPCS | Performed by: PHYSICIAN ASSISTANT

## 2021-09-27 PROCEDURE — 1036F TOBACCO NON-USER: CPT | Performed by: PHYSICIAN ASSISTANT

## 2021-09-27 NOTE — PROGRESS NOTES
Patient Name: Arvind Alex  Medical Record Number: [id number]  YOB: 1940  Date of Encounter: 9/27/2021     Chief Complaint   Patient presents with    Follow-up     Right shoulder RTC tear, tendinitis, and OA. Rec'd cortisone injection on 7/7/21. History of Present Illness:   Ms. Arvind Alex is here in follow up regarding her right shoulder. Patient injured her shoulder on 6/14/2021 while pushing a box on the table and felt a pop within the right shoulder. She was treated with a Medrol Dosepak which did not help with her pain. We then followed up with a cortisone injection on 7/7/2021. Patient presents today stating the cortisone injection really did not provide significant pain relief. She has been doing home physical therapy which she states makes her right shoulder pain worse at times. She uses a walker to ambulate and states this exacerbates her bilateral shoulder pain. The patient's past medical history, medications, allergies, family history, social history, and review of systems have been reviewed, and dated and are recorded in the chart under the 'MEDIA\" tab. Physical Exam:    Ms. Arvind Alex appears well, she is in no apparent distress, she demonstrates appropriate mood & affect. She is alert and oriented to person, place and time. Ht 5' (1.524 m)   Wt 225 lb (102.1 kg)   LMP  (LMP Unknown)   BMI 43.94 kg/m²     On examination of patient's right shoulder there is no obvious swelling or joint effusion. She does have generalized tenderness on palpation of the right shoulder. She has active forward flexion to 150 degrees with expressed discomfort. She has pain with Neer's, Benham's and empty can testing. She has functional range of motion and strength of the right elbow as well as  strength. Impression:   Diagnosis Orders   1. Traumatic tear of right rotator cuff, unspecified tear extent, subsequent encounter  MRI SHOULDER RIGHT WO CONTRAST   2. Tendinitis of right rotator cuff  MRI SHOULDER RIGHT WO CONTRAST   3. Primary osteoarthritis of right shoulder  MRI SHOULDER RIGHT WO CONTRAST   4. Chronic right shoulder pain         Treatment Plan:    Patient is almost 14 weeks out from her initial injury of the right shoulder. She has taken a Medrol Dosepak, cortisone injection, home exercises and stretches, home physical therapy, activity modification as well as Tylenol 3 without significant pain relief. Patient is here in a wheelchair today and uses a walker to get around inside the house. She states the walker is exacerbating her shoulder pain. She is here with her daughter who is requesting an MRI to further evaluate. This will be ordered. Patient will follow up after MRI    Wally Schmidt was informed of the results of any imaging. We discussed treatment options and a time was given to answer questions. A plan was proposed and Wally KARLA Schmidt understand and accepts this course of care. Electronically signed by Angely Jacobo PA-C on 5/48/0224  Board Certified [facility]    Please note that portions of this note were completed with a voice recognition program.  Efforts were made to edit the dictations but occasionally words are mis-transcribed.

## 2021-09-30 ENCOUNTER — HOSPITAL ENCOUNTER (OUTPATIENT)
Dept: MRI IMAGING | Age: 81
Discharge: HOME | End: 2021-09-30
Payer: MEDICARE

## 2021-09-30 DIAGNOSIS — M19.011 PRIMARY OSTEOARTHRITIS OF RIGHT SHOULDER: ICD-10-CM

## 2021-09-30 DIAGNOSIS — M75.81 TENDINITIS OF RIGHT ROTATOR CUFF: ICD-10-CM

## 2021-09-30 DIAGNOSIS — S46.011D TRAUMATIC TEAR OF RIGHT ROTATOR CUFF, UNSPECIFIED TEAR EXTENT, SUBSEQUENT ENCOUNTER: ICD-10-CM

## 2021-09-30 PROCEDURE — 73221 MRI JOINT UPR EXTREM W/O DYE: CPT

## 2021-10-01 ENCOUNTER — TELEPHONE (OUTPATIENT)
Dept: ORTHOPEDIC SURGERY | Age: 81
End: 2021-10-01

## 2021-10-01 NOTE — TELEPHONE ENCOUNTER
Can you help patient schedule an appointment with Dr. Stephanie Savage to discuss MRI results and treatment options?  Thanks

## 2021-10-04 ENCOUNTER — TELEPHONE (OUTPATIENT)
Dept: ORTHOPEDIC SURGERY | Age: 81
End: 2021-10-04

## 2021-10-06 ENCOUNTER — OFFICE VISIT (OUTPATIENT)
Dept: ORTHOPEDIC SURGERY | Age: 81
End: 2021-10-06
Payer: MEDICARE

## 2021-10-06 VITALS — HEIGHT: 60 IN | BODY MASS INDEX: 44.17 KG/M2 | WEIGHT: 225 LBS | RESPIRATION RATE: 16 BRPM

## 2021-10-06 DIAGNOSIS — M12.811 ROTATOR CUFF TEAR ARTHROPATHY OF RIGHT SHOULDER: Primary | ICD-10-CM

## 2021-10-06 DIAGNOSIS — G89.29 CHRONIC LEFT SHOULDER PAIN: ICD-10-CM

## 2021-10-06 DIAGNOSIS — M25.512 CHRONIC LEFT SHOULDER PAIN: ICD-10-CM

## 2021-10-06 DIAGNOSIS — M75.101 ROTATOR CUFF TEAR ARTHROPATHY OF RIGHT SHOULDER: Primary | ICD-10-CM

## 2021-10-06 PROCEDURE — G8417 CALC BMI ABV UP PARAM F/U: HCPCS | Performed by: ORTHOPAEDIC SURGERY

## 2021-10-06 PROCEDURE — 1123F ACP DISCUSS/DSCN MKR DOCD: CPT | Performed by: ORTHOPAEDIC SURGERY

## 2021-10-06 PROCEDURE — 1036F TOBACCO NON-USER: CPT | Performed by: ORTHOPAEDIC SURGERY

## 2021-10-06 PROCEDURE — 99204 OFFICE O/P NEW MOD 45 MIN: CPT | Performed by: ORTHOPAEDIC SURGERY

## 2021-10-06 PROCEDURE — G8427 DOCREV CUR MEDS BY ELIG CLIN: HCPCS | Performed by: ORTHOPAEDIC SURGERY

## 2021-10-06 PROCEDURE — G8400 PT W/DXA NO RESULTS DOC: HCPCS | Performed by: ORTHOPAEDIC SURGERY

## 2021-10-06 PROCEDURE — 4040F PNEUMOC VAC/ADMIN/RCVD: CPT | Performed by: ORTHOPAEDIC SURGERY

## 2021-10-06 PROCEDURE — 1090F PRES/ABSN URINE INCON ASSESS: CPT | Performed by: ORTHOPAEDIC SURGERY

## 2021-10-06 PROCEDURE — G8484 FLU IMMUNIZE NO ADMIN: HCPCS | Performed by: ORTHOPAEDIC SURGERY

## 2021-10-06 NOTE — PROGRESS NOTES
ORTHOPAEDIC CONSULTATION NOTE    Chief Complaint   Patient presents with    Shoulder Pain     right shoulder        HPI   10/6/21  Initial orthopaedic surgeon E&M visit  78 y.o. female RHD seen in consultation at the request of Cem MILLS for evaluation of right shoulder pain    Onset 3-4 months ago  Injury/trauma pushing machine on the table  History of symptoms yes, intermittent prior to this  Has improved with home exercises thus far, ~70% improvement   No significant relief with prior shoulder injection  Pain is located right lateral shoulder/brachium   Intermittent radiation down RUE  Pain is worse with reaching, weightbearing  she does report similar left-sided symptoms as well  Pain rated 5/10    Neck pain = yes  Radicular symptoms = yes  Numbness and/or tingling = yes, all right fingers    More or less wheelchair bound, history of multiple knee surgeries  Ambulates short distances at home only    Review of Systems  I have read over the ROS from the Patient History Form dated on 6/24/2021  Pertinent positives include weight change, thyroid disease, hypertension, peripheral edema, hemorrhoids, urinary incontinence and frequency, neck pain, back pain, neuropathy  Rest of 13 point ROS otherwise negative except per HPI, and scanned into the patient's chart under the Media tab.       Allergies   Allergen Reactions    Aspirin Other (See Comments)     GI upset    Fluconazole Other (See Comments)     UNABLE TO REMEMBER    Ibuprofen Other (See Comments)     GI upset    Macrobid [Nitrofurantoin Monohydrate Macrocrystals] Other (See Comments)     COLD, SICK    Adhesive Tape Rash    Lexiscan [Regadenoson] Rash     Happened twice with Lexiscan    Penicillins Rash        Current Outpatient Medications   Medication Sig Dispense Refill    lisinopril (PRINIVIL;ZESTRIL) 2.5 MG tablet TAKE ONE TABLET BY MOUTH DAILY 90 tablet 4    metoprolol tartrate (LOPRESSOR) 25 MG tablet TAKE ONE-HALF TABLET BY MOUTH TWICE A DAY 90 tablet 4    atorvastatin (LIPITOR) 80 MG tablet Take 1 tablet by mouth daily 90 tablet 4    Insulin Pen Needle (NOVOFINE) 32G X 6 MM MISC by Does not apply route 3 times daily with meals      hydrocortisone (ANUSOL-HC) 2.5 % rectal cream Place rectally 2 times daily Place rectally 2 times daily.  Acetaminophen-Codeine (TYLENOL WITH CODEINE #3 PO) Take by mouth      insulin detemir (LEVEMIR FLEXPEN) 100 UNIT/ML injection pen Inject 55 Units into the skin nightly 55u in the am and 55u in the evening      insulin lispro (HUMALOG KWIKPEN) 100 UNIT/ML pen Inject into the skin 3 times daily (before meals)      rivaroxaban (XARELTO) 20 MG TABS tablet Take 1 tablet by mouth daily (Patient taking differently: Take 20 mg by mouth daily [facility] last dose of xarelto was 4/5/17) 90 tablet 3    vitamin D (ERGOCALCIFEROL) 97682 UNITS CAPS capsule Take 50,000 Units by mouth once a week      Omega 3 1000 MG CAPS Take 1,000 mg by mouth daily      Liraglutide (VICTOZA) 18 MG/3ML SOPN SC injection Inject 1.2 mg into the skin daily      Calcium Carb-Cholecalciferol (OYSTER SHELL CALCIUM + D PO) Take by mouth 2 times daily      Biotin 5000 MCG TABS Take by mouth daily      lidocaine (LIDODERM) 5 % Place 1 patch onto the skin daily 12 hours on, 12 hours off. 30 patch 0    oxybutynin (DITROPAN XL) 15 MG CR tablet Take 15 mg by mouth daily.  Loratadine 10 MG CAPS Take by mouth daily       levothyroxine (SYNTHROID) 175 MCG tablet Take 150 mcg by mouth daily.  alendronate (FOSAMAX) 70 MG tablet Take 70 mg by mouth every 7 days.  gabapentin (NEURONTIN) 300 MG capsule Take 300 mg by mouth daily. No current facility-administered medications for this visit.        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 SHE SPEAKS A LITTLE ENGLISH    Sleep apnea     unspecified    Thyroid disease     UTI         Past Surgical History:   Procedure Laterality Date    CARDIAC SURGERY  2004    CABG X 4 VESSELS    CHOLECYSTECTOMY, LAPAROSCOPIC  5/15/14    with IOC    JOINT REPLACEMENT Bilateral 2000 [address]    knee    KNEE SURGERY Left 02/28/2017    left knee poly exchange revision     REVISION TOTAL KNEE ARTHROPLASTY Right 11/22/2016    RIGHT TOTAL KNEE REVISION WITH POLY EXCHANGE    TOTAL KNEE ARTHROPLASTY      partical/ left        Family History   Problem Relation Age of Onset    Arthritis Other     Diabetes Other     Heart Disease Other     Hypertension Other     High Cholesterol Brother        Social History     Socioeconomic History    Marital status:      Spouse name: Not on file    Number of children: 9    Years of education: Not on file    Highest education level: Not on file   Occupational History    Occupation: Retired   Tobacco Use    Smoking status: Never Smoker    Smokeless tobacco: Never Used   Vaping Use    Vaping Use: Never used   Substance and Sexual Activity    Alcohol use: No    Drug use: No    Sexual activity: Not on file   Other Topics Concern    Not on file   Social History Narrative    Not on file     Social Determinants of Health     Financial Resource Strain:     Difficulty of Paying Living Expenses:    Food Insecurity:     Worried About [address] in the Last Year:     [address] in the Last Year:    Transportation Needs:     Lack of Transportation (Medical):      Lack of Transportation (Non-Medical):    Physical Activity:     Days of Exercise per Week:     Minutes of Exercise per Session:    Stress:     Feeling of Stress :    Social Connections:     Frequency of Communication with Friends and Family:     Frequency of Social Gatherings with Friends and Family:     Attends Christianity Services:     Active Member of Clubs or Organizations:     Attends Club or Organization Meetings:     Marital Status:    Intimate Partner Violence:     Fear of Current or Ex-Partner:     Emotionally Abused:     Physically Abused:     Sexually Abused:         Vitals:    10/06/21 0934   Resp: 16   Weight: 225 lb (102.1 kg)   Height: 5' (1.524 m)       Physical Exam    History and exam performed with Hungarian  present  Her daughter is present as well  Constitutional  well-groomed, well-nourished, Body mass index is 43.94 kg/m². Psychiatric  pleasant, normal mood & affect  Cardiovascular  RRR, radial pulse 2+  Respiratory  respirations unlabored, on room air; mask on  Skin  no rashes, wounds, or lesions seen on exposed skin  Neck  mildly decreased cervical ROM, no TTP of spinous processes, positive Spurling's to right  Neurological - SILT M/U/R/A nerve distributions; AIN/PIN/IO intact  Right shoulder:   No obvious deformity/swelling/ecchymosis   No obvious atrophy seen, limited by body habitus   Range of Motion:     Forward flexion/scaption:  125    External rotation with arm at side:  60, no lag    Internal rotation:  Lateral hip only   Special tests:    Cannot perform lift-off sign    positive belly-press test    positive Hawkin's test          Imaging:  Images were personally reviewed by myself and discussed with the patient  Narrative   EXAMINATION:   MRI OF THE RIGHT SHOULDER WITHOUT CONTRAST   9/30/2021 5:00 pm       TECHNIQUE:   Multiplanar multisequence MRI of the right shoulder was performed without the   administration of intravenous contrast.       COMPARISON:   Right shoulder plain radiographs from 06/24/2021       HISTORY:   ORDERING SYSTEM PROVIDED HISTORY: Traumatic tear of right rotator cuff,   unspecified tear extent, subsequent encounter TECHNOLOGIST PROVIDED HISTORY:   Reason for Exam: Right Rotator Cuff Tear   Acuity: Unknown   Type of Exam: Ongoing       69-year-old female with right-sided rotator cuff tear       FINDINGS:   ROTATOR CUFF: Subacromial subdeltoid bursa contiguous with the glenohumeral   joint.       Complete retracted full-thickness tear of supraspinatus and anterior superior   infraspinatus with retraction of the torn fibers measuring up to 4.1 cm.       Mild-to-moderate atrophy and fatty degeneration of supraspinatus and   infraspinatus.       Teres minor muscle/tendon appears grossly intact without evidence of tearing.       Complete retracted full-thickness tearing of subscapularis with retraction of   the torn fibers measuring up to 3.9 cm.       BICEPS TENDON: Thinning of the intra-articular long head of biceps tendon.       LABRUM: Degenerative tearing of the posterior and superior labrum.       GLENOHUMERAL JOINT: Moderate glenohumeral chondromalacia.  Small glenohumeral   joint effusion.  Inferior glenohumeral ligament appears intact.       AC JOINT AND ACROMIOCLAVICULAR ARCH: Mild degenerative changes of the right   AC joint.  Type 2 acromion.  Narrowing of the acromiohumeral distance.       BONE MARROW: Reactive marrow edema at the superolateral humeral head.  Mild   osteophyte spurring at the margins of the glenohumeral joint.  Osseous   alignment is normal.  No acute fracture or dislocation.  Bone marrow signal   intensity within the remaining visualized osseous structures otherwise within   normal limits.       OUTLET SPACES: Suprascapular notch and quadrilateral space grossly   unremarkable in appearance.       No right axillary lymphadenopathy.           Impression   1. Complete retracted full-thickness tearing of supraspinatus and anterior   superior infraspinatus as well as subscapularis with retraction of the torn   fibers as detailed above.  Rotator cuff arthropathy.    2. Mild-to-moderate atrophy and fatty degeneration of supraspinatus and   infraspinatus. 3. Thinning of the intra-articular long head of biceps tendon. 4. Degenerative tearing of the posterior and superior labrum. 5. Small glenohumeral joint effusion.  Moderate glenohumeral chondromalacia. 6. Mild degenerative changes of the right AC joint.         Prior right shoulder plain radiographs from June were also reviewed today      Assessment & Plan:  78 y.o. female who presents with    Diagnosis Orders   1. Rotator cuff tear arthropathy of right shoulder     2. Chronic left shoulder pain - likely cuff tear as well based on age and contralateral cuff tear         No orders of the defined types were placed in this encounter. Discussed at length conservative treatment of shoulder symptoms/arthritis:  1)  Recommend ice and anti-inflammatories to reduce pain and swelling. Patient can also use topical creams/patches, topical NSAIDs, and tylenol as well. 2)  Avoid aggravating activities and positions. Avoid heavy lifting, avoid lifting away from body, avoid lifting overhead. Keep most activities between chest and waist level. If you have to lift, keep arms/elbows next to your body/torso. Continue home therapy for both shoulders. 3)  Corticosteroid shoulder injections are an option, and can be performed every 4 months or so as needed. She reports previous injection without any significant relief. If pain/symptoms worsen and no longer respond to conservative treatment, shoulder arthroplasty is a reasonable option in the future.     Tatiana Shi MD

## 2022-03-23 NOTE — TELEPHONE ENCOUNTER
Appointment Request     Patient requesting earlier appointment: Yes  Appointment offered to patient: no  Wanted to see DR Marin Vera / Ameena COWART for a follow up .     Patient Contact Number: Tommie Ashby  [phone] 23-Mar-2022 18:55

## 2022-06-16 ENCOUNTER — TELEPHONE (OUTPATIENT)
Dept: CARDIOLOGY CLINIC | Age: 82
End: 2022-06-16

## 2022-06-16 ENCOUNTER — HOSPITAL ENCOUNTER (INPATIENT)
Age: 82
LOS: 7 days | Discharge: HOME HEALTH | DRG: 291 | End: 2022-06-23
Attending: EMERGENCY MEDICINE | Admitting: FAMILY MEDICINE
Payer: MEDICARE

## 2022-06-16 ENCOUNTER — APPOINTMENT (OUTPATIENT)
Dept: GENERAL RADIOLOGY | Age: 82
DRG: 291 | End: 2022-06-16
Payer: MEDICARE

## 2022-06-16 DIAGNOSIS — M19.90 ARTHRITIS PAIN: ICD-10-CM

## 2022-06-16 DIAGNOSIS — R77.8 ELEVATED TROPONIN: Primary | ICD-10-CM

## 2022-06-16 DIAGNOSIS — I50.9 ACUTE CONGESTIVE HEART FAILURE, UNSPECIFIED HEART FAILURE TYPE (HCC): ICD-10-CM

## 2022-06-16 PROBLEM — N39.0 UTI (URINARY TRACT INFECTION): Status: ACTIVE | Noted: 2022-06-16

## 2022-06-16 LAB
A/G RATIO: 1.3 (ref 1.1–2.2)
ALBUMIN SERPL-MCNC: 4.1 G/DL (ref 3.4–5)
ALP BLD-CCNC: 68 U/L (ref 40–129)
ALT SERPL-CCNC: 25 U/L (ref 10–40)
ANION GAP SERPL CALCULATED.3IONS-SCNC: 8 MMOL/L (ref 3–16)
AST SERPL-CCNC: 9 U/L (ref 15–37)
BASOPHILS ABSOLUTE: 0 K/UL (ref 0–0.2)
BASOPHILS RELATIVE PERCENT: 0.4 %
BILIRUB SERPL-MCNC: 0.4 MG/DL (ref 0–1)
BILIRUBIN URINE: NEGATIVE
BLOOD, URINE: NEGATIVE
BUN BLDV-MCNC: 26 MG/DL (ref 7–20)
CALCIUM SERPL-MCNC: 9.9 MG/DL (ref 8.3–10.6)
CHLORIDE BLD-SCNC: 107 MMOL/L (ref 99–110)
CLARITY: CLEAR
CO2: 29 MMOL/L (ref 21–32)
COLOR: YELLOW
CREAT SERPL-MCNC: 1 MG/DL (ref 0.6–1.2)
EOSINOPHILS ABSOLUTE: 0.2 K/UL (ref 0–0.6)
EOSINOPHILS RELATIVE PERCENT: 3.3 %
GFR AFRICAN AMERICAN: >60
GFR NON-AFRICAN AMERICAN: 53
GLUCOSE BLD-MCNC: 151 MG/DL (ref 70–99)
GLUCOSE URINE: NEGATIVE MG/DL
HCT VFR BLD CALC: 35.7 % (ref 36–48)
HEMOGLOBIN: 11.8 G/DL (ref 12–16)
KETONES, URINE: NEGATIVE MG/DL
LEUKOCYTE ESTERASE, URINE: NEGATIVE
LYMPHOCYTES ABSOLUTE: 1.8 K/UL (ref 1–5.1)
LYMPHOCYTES RELATIVE PERCENT: 33.2 %
MCH RBC QN AUTO: 29 PG (ref 26–34)
MCHC RBC AUTO-ENTMCNC: 33 G/DL (ref 31–36)
MCV RBC AUTO: 87.9 FL (ref 80–100)
MICROSCOPIC EXAMINATION: NORMAL
MONOCYTES ABSOLUTE: 0.3 K/UL (ref 0–1.3)
MONOCYTES RELATIVE PERCENT: 5.9 %
NEUTROPHILS ABSOLUTE: 3.1 K/UL (ref 1.7–7.7)
NEUTROPHILS RELATIVE PERCENT: 57.2 %
NITRITE, URINE: NEGATIVE
PDW BLD-RTO: 16.8 % (ref 12.4–15.4)
PH UA: 5 (ref 5–8)
PLATELET # BLD: 133 K/UL (ref 135–450)
PMV BLD AUTO: 8.3 FL (ref 5–10.5)
POTASSIUM REFLEX MAGNESIUM: 4.4 MMOL/L (ref 3.5–5.1)
PRO-BNP: 104 PG/ML (ref 0–449)
PROTEIN UA: NEGATIVE MG/DL
RBC # BLD: 4.06 M/UL (ref 4–5.2)
SODIUM BLD-SCNC: 144 MMOL/L (ref 136–145)
SPECIFIC GRAVITY UA: <=1.005 (ref 1–1.03)
TOTAL PROTEIN: 7.2 G/DL (ref 6.4–8.2)
TROPONIN: 0.04 NG/ML
TROPONIN: 0.05 NG/ML
URINE REFLEX TO CULTURE: NORMAL
URINE TYPE: NORMAL
UROBILINOGEN, URINE: 0.2 E.U./DL
WBC # BLD: 5.4 K/UL (ref 4–11)

## 2022-06-16 PROCEDURE — 6360000002 HC RX W HCPCS: Performed by: EMERGENCY MEDICINE

## 2022-06-16 PROCEDURE — 80053 COMPREHEN METABOLIC PANEL: CPT

## 2022-06-16 PROCEDURE — 93005 ELECTROCARDIOGRAM TRACING: CPT | Performed by: EMERGENCY MEDICINE

## 2022-06-16 PROCEDURE — 83036 HEMOGLOBIN GLYCOSYLATED A1C: CPT

## 2022-06-16 PROCEDURE — 83880 ASSAY OF NATRIURETIC PEPTIDE: CPT

## 2022-06-16 PROCEDURE — 71045 X-RAY EXAM CHEST 1 VIEW: CPT

## 2022-06-16 PROCEDURE — 36415 COLL VENOUS BLD VENIPUNCTURE: CPT

## 2022-06-16 PROCEDURE — 84484 ASSAY OF TROPONIN QUANT: CPT

## 2022-06-16 PROCEDURE — 99285 EMERGENCY DEPT VISIT HI MDM: CPT

## 2022-06-16 PROCEDURE — 2580000003 HC RX 258: Performed by: FAMILY MEDICINE

## 2022-06-16 PROCEDURE — 81003 URINALYSIS AUTO W/O SCOPE: CPT

## 2022-06-16 PROCEDURE — 85025 COMPLETE CBC W/AUTO DIFF WBC: CPT

## 2022-06-16 PROCEDURE — 1200000000 HC SEMI PRIVATE

## 2022-06-16 RX ORDER — GABAPENTIN 300 MG/1
300 CAPSULE ORAL DAILY
Status: DISCONTINUED | OUTPATIENT
Start: 2022-06-17 | End: 2022-06-17

## 2022-06-16 RX ORDER — SODIUM CHLORIDE 9 MG/ML
INJECTION, SOLUTION INTRAVENOUS PRN
Status: DISCONTINUED | OUTPATIENT
Start: 2022-06-16 | End: 2022-06-23 | Stop reason: HOSPADM

## 2022-06-16 RX ORDER — SODIUM CHLORIDE 0.9 % (FLUSH) 0.9 %
5-40 SYRINGE (ML) INJECTION EVERY 12 HOURS SCHEDULED
Status: DISCONTINUED | OUTPATIENT
Start: 2022-06-16 | End: 2022-06-23 | Stop reason: HOSPADM

## 2022-06-16 RX ORDER — OXYBUTYNIN CHLORIDE 5 MG/1
15 TABLET, EXTENDED RELEASE ORAL DAILY
Status: DISCONTINUED | OUTPATIENT
Start: 2022-06-17 | End: 2022-06-23 | Stop reason: HOSPADM

## 2022-06-16 RX ORDER — ENOXAPARIN SODIUM 100 MG/ML
30 INJECTION SUBCUTANEOUS 2 TIMES DAILY
Status: DISCONTINUED | OUTPATIENT
Start: 2022-06-16 | End: 2022-06-17 | Stop reason: ALTCHOICE

## 2022-06-16 RX ORDER — ONDANSETRON 2 MG/ML
4 INJECTION INTRAMUSCULAR; INTRAVENOUS EVERY 6 HOURS PRN
Status: DISCONTINUED | OUTPATIENT
Start: 2022-06-16 | End: 2022-06-23 | Stop reason: HOSPADM

## 2022-06-16 RX ORDER — FUROSEMIDE 10 MG/ML
40 INJECTION INTRAMUSCULAR; INTRAVENOUS ONCE
Status: COMPLETED | OUTPATIENT
Start: 2022-06-16 | End: 2022-06-16

## 2022-06-16 RX ORDER — POLYETHYLENE GLYCOL 3350 17 G/17G
17 POWDER, FOR SOLUTION ORAL DAILY PRN
Status: DISCONTINUED | OUTPATIENT
Start: 2022-06-16 | End: 2022-06-23 | Stop reason: HOSPADM

## 2022-06-16 RX ORDER — INSULIN GLARGINE 100 [IU]/ML
55 INJECTION, SOLUTION SUBCUTANEOUS 2 TIMES DAILY
Status: ON HOLD | COMMUNITY
End: 2022-10-04 | Stop reason: SDUPTHER

## 2022-06-16 RX ORDER — TRIAMCINOLONE ACETONIDE 1 MG/G
CREAM TOPICAL 2 TIMES DAILY PRN
Status: ON HOLD | COMMUNITY
End: 2022-10-11 | Stop reason: HOSPADM

## 2022-06-16 RX ORDER — DEXTROSE MONOHYDRATE 50 MG/ML
100 INJECTION, SOLUTION INTRAVENOUS PRN
Status: DISCONTINUED | OUTPATIENT
Start: 2022-06-16 | End: 2022-06-23 | Stop reason: HOSPADM

## 2022-06-16 RX ORDER — ONDANSETRON 4 MG/1
4 TABLET, ORALLY DISINTEGRATING ORAL EVERY 8 HOURS PRN
Status: DISCONTINUED | OUTPATIENT
Start: 2022-06-16 | End: 2022-06-23 | Stop reason: HOSPADM

## 2022-06-16 RX ORDER — ACETAMINOPHEN 650 MG/1
650 SUPPOSITORY RECTAL EVERY 6 HOURS PRN
Status: DISCONTINUED | OUTPATIENT
Start: 2022-06-16 | End: 2022-06-23 | Stop reason: HOSPADM

## 2022-06-16 RX ORDER — ACETAMINOPHEN 325 MG/1
650 TABLET ORAL EVERY 6 HOURS PRN
Status: DISCONTINUED | OUTPATIENT
Start: 2022-06-16 | End: 2022-06-23 | Stop reason: HOSPADM

## 2022-06-16 RX ORDER — INSULIN ASPART 100 [IU]/ML
15 INJECTION, SOLUTION INTRAVENOUS; SUBCUTANEOUS 3 TIMES DAILY
Status: ON HOLD | COMMUNITY
End: 2022-10-24 | Stop reason: HOSPADM

## 2022-06-16 RX ORDER — MAGNESIUM 30 MG
40 TABLET ORAL DAILY
Status: ON HOLD | COMMUNITY

## 2022-06-16 RX ORDER — BENZONATATE 100 MG/1
100 CAPSULE ORAL 3 TIMES DAILY PRN
COMMUNITY
End: 2022-07-27

## 2022-06-16 RX ORDER — CIPROFLOXACIN 2 MG/ML
400 INJECTION, SOLUTION INTRAVENOUS EVERY 12 HOURS
Status: DISCONTINUED | OUTPATIENT
Start: 2022-06-16 | End: 2022-06-17

## 2022-06-16 RX ORDER — ATORVASTATIN CALCIUM 80 MG/1
80 TABLET, FILM COATED ORAL DAILY
Status: DISCONTINUED | OUTPATIENT
Start: 2022-06-17 | End: 2022-06-23 | Stop reason: HOSPADM

## 2022-06-16 RX ORDER — TIZANIDINE 2 MG/1
2 TABLET ORAL EVERY 8 HOURS PRN
Status: ON HOLD | COMMUNITY

## 2022-06-16 RX ORDER — NYSTATIN 100000 [USP'U]/G
POWDER TOPICAL 2 TIMES DAILY
Status: ON HOLD | COMMUNITY
End: 2022-10-11 | Stop reason: HOSPADM

## 2022-06-16 RX ORDER — INSULIN LISPRO 100 [IU]/ML
0-6 INJECTION, SOLUTION INTRAVENOUS; SUBCUTANEOUS NIGHTLY
Status: DISCONTINUED | OUTPATIENT
Start: 2022-06-16 | End: 2022-06-20

## 2022-06-16 RX ORDER — SODIUM CHLORIDE 0.9 % (FLUSH) 0.9 %
5-40 SYRINGE (ML) INJECTION PRN
Status: DISCONTINUED | OUTPATIENT
Start: 2022-06-16 | End: 2022-06-23 | Stop reason: HOSPADM

## 2022-06-16 RX ORDER — CEFACLOR 500 MG
500 CAPSULE ORAL 3 TIMES DAILY
Status: ON HOLD | COMMUNITY
End: 2022-06-20 | Stop reason: HOSPADM

## 2022-06-16 RX ORDER — SENNA PLUS 8.6 MG/1
1 TABLET ORAL 2 TIMES DAILY
Status: ON HOLD | COMMUNITY

## 2022-06-16 RX ORDER — INSULIN LISPRO 100 [IU]/ML
0-12 INJECTION, SOLUTION INTRAVENOUS; SUBCUTANEOUS
Status: DISCONTINUED | OUTPATIENT
Start: 2022-06-17 | End: 2022-06-20

## 2022-06-16 RX ORDER — FUROSEMIDE 10 MG/ML
20 INJECTION INTRAMUSCULAR; INTRAVENOUS DAILY
Status: DISCONTINUED | OUTPATIENT
Start: 2022-06-17 | End: 2022-06-17

## 2022-06-16 RX ORDER — LEVOTHYROXINE SODIUM 0.15 MG/1
150 TABLET ORAL DAILY
Status: DISCONTINUED | OUTPATIENT
Start: 2022-06-17 | End: 2022-06-23 | Stop reason: HOSPADM

## 2022-06-16 RX ORDER — PHENAZOPYRIDINE HYDROCHLORIDE 100 MG/1
100 TABLET, FILM COATED ORAL 2 TIMES DAILY
Status: ON HOLD | COMMUNITY
End: 2022-10-11 | Stop reason: HOSPADM

## 2022-06-16 RX ADMIN — Medication 10 ML: at 23:00

## 2022-06-16 RX ADMIN — FUROSEMIDE 40 MG: 10 INJECTION, SOLUTION INTRAMUSCULAR; INTRAVENOUS at 19:27

## 2022-06-16 ASSESSMENT — PAIN - FUNCTIONAL ASSESSMENT: PAIN_FUNCTIONAL_ASSESSMENT: NONE - DENIES PAIN

## 2022-06-16 NOTE — ED PROVIDER NOTES
[facility]  EMERGENCY DEPARTMENTENCOUNTER      Pt Name: Viktoria Chadwick  MRN: [id number]  [place] 1940  Date ofevaluation: 6/16/2022  Provider: Marixa Alvarez MD    CHIEF COMPLAINT       Chief Complaint   Patient presents with    Fatigue     pt brought in by EMS. pt states fatigue for a few days, had SOB a few days ago but better now. HPI    HISTORY OF PRESENT ILLNESS   (Location/Symptom, Timing/Onset,Context/Setting, Quality, Duration, Modifying Factors, Severity)  Note limiting factors. Viktoria Chadwick is a 78 y.o. female who presents to the emergency department with awakening. This is an 43-year-old female presents with lower extremity edema, 20 pound weight gain and shortness of breath on exertion. The patient was sent in by her cardiologist for further evaluation. She denies any chest pain. She denies any fevers or chills. This been going on for the last several weeks. NursingNotes were reviewed. Review of Systems    REVIEW OF SYSTEMS    (2-9 systems for level 4, 10 or more for level 5)     Review of Systems   Constitutional: Negative for fever. HENT: Negative for rhinorrhea and sore throat. Eyes: Negative for redness. Respiratory: Positive for shortness of breath. Cardiovascular: Negative for chest pain. Gastrointestinal: Negative for abdominal pain. Genitourinary: Negative for flank pain. Neurological: Negative for headaches. Hematological: Negative for adenopathy. Psychiatric/Behavioral: Negative for confusion. Except as noted above the remainder of the review of systems was reviewed and negative.        PAST MEDICAL HISTORY     Past Medical History:   Diagnosis Date    4/11/17 Left knee repeat repair of median parapatellar arthrotomy with augmentation 4/12/2017    Arthritis     Atrial fibrillation (HCC)     CAD (coronary artery disease)     Cataract     Diabetes mellitus (HCC)     GERD (gastroesophageal reflux disease)     Hyperlipidemia     Hypertension     HYPOTHRYROIDISM     Non-English speaking patient     SPEAKS Lithuanian. SHE SPEAKS A LITTLE ENGLISH    Sleep apnea     unspecified    Thyroid disease     UTI          SURGICALHISTORY       Past Surgical History:   Procedure Laterality Date    CARDIAC SURGERY  2004    CABG X 4 VESSELS    CHOLECYSTECTOMY, LAPAROSCOPIC  5/15/14    with [facility]    JOINT REPLACEMENT Bilateral 2000 [address]    knee    KNEE SURGERY Left 02/28/2017    left knee poly exchange revision     REVISION TOTAL KNEE ARTHROPLASTY Right 11/22/2016    RIGHT TOTAL KNEE REVISION WITH POLY EXCHANGE    TOTAL KNEE ARTHROPLASTY      partical/ left          CURRENT MEDICATIONS       Previous Medications    ACETAMINOPHEN-CODEINE (TYLENOL WITH CODEINE #3 PO)    Take by mouth    ALENDRONATE (FOSAMAX) 70 MG TABLET    Take 70 mg by mouth every 7 days. ATORVASTATIN (LIPITOR) 80 MG TABLET    Take 1 tablet by mouth daily    BIOTIN 5000 MCG TABS    Take by mouth daily    CALCIUM CARB-CHOLECALCIFEROL (OYSTER SHELL CALCIUM + D PO)    Take by mouth 2 times daily    GABAPENTIN (NEURONTIN) 300 MG CAPSULE    Take 300 mg by mouth daily. HYDROCORTISONE (ANUSOL-HC) 2.5 % RECTAL CREAM    Place rectally 2 times daily Place rectally 2 times daily. INSULIN DETEMIR (LEVEMIR FLEXPEN) 100 UNIT/ML INJECTION PEN    Inject 55 Units into the skin nightly 55u in the am and 55u in the evening    INSULIN LISPRO (HUMALOG KWIKPEN) 100 UNIT/ML PEN    Inject into the skin 3 times daily (before meals)    INSULIN PEN NEEDLE (NOVOFINE) 32G X 6 MM MISC    by Does not apply route 3 times daily with meals    LEVOTHYROXINE (SYNTHROID) 175 MCG TABLET    Take 150 mcg by mouth daily. LIDOCAINE (LIDODERM) 5 %    Place 1 patch onto the skin daily 12 hours on, 12 hours off.     LIRAGLUTIDE (VICTOZA) 18 MG/3ML SOPN SC INJECTION    Inject 1.2 mg into the skin daily    LISINOPRIL (PRINIVIL;ZESTRIL) 2.5 MG TABLET    TAKE ONE TABLET BY MOUTH DAILY    LORATADINE 10 MG CAPS    Take by mouth daily     METOPROLOL TARTRATE (LOPRESSOR) 25 MG TABLET    TAKE ONE-HALF TABLET BY MOUTH TWICE A DAY    OMEGA 3 1000 MG CAPS    Take 1,000 mg by mouth daily    OXYBUTYNIN (DITROPAN XL) 15 MG CR TABLET    Take 15 mg by mouth daily. RIVAROXABAN (XARELTO) 20 MG TABS TABLET    Take 1 tablet by mouth daily    VITAMIN D (ERGOCALCIFEROL) 11950 UNITS CAPS CAPSULE    Take 50,000 Units by mouth once a week       ALLERGIES     Aspirin, Fluconazole, Ibuprofen, Macrobid [nitrofurantoin monohydrate macrocrystals], Adhesive tape, Lexiscan [regadenoson], and Penicillins    FAMILY HISTORY       Family History   Problem Relation Age of Onset    Arthritis Other     Diabetes Other     Heart Disease Other     Hypertension Other     High Cholesterol Brother           SOCIAL HISTORY       Social History     Socioeconomic History    Marital status:      Spouse name: None    Number of children: 9    Years of education: None    Highest education level: None   Occupational History    Occupation: Retired   Tobacco Use    Smoking status: Never Smoker    Smokeless tobacco: Never Used   Vaping Use    Vaping Use: Never used   Substance and Sexual Activity    Alcohol use: No    Drug use: No    Sexual activity: None   Other Topics Concern    None   Social History Narrative    None     Social Determinants of Health     Financial Resource Strain:     Difficulty of Paying Living Expenses: Not on file   Food Insecurity:     Worried About Running Out of Food in the Last Year: Not on file    Skylar of Food in the Last Year: Not on file   Transportation Needs:     Lack of Transportation (Medical): Not on file    Lack of Transportation (Non-Medical):  Not on file   Physical Activity:     Days of Exercise per Week: Not on file    Minutes of Exercise per Session: Not on file   Stress:     Feeling of Stress : Not on file   Social Connections:  Frequency of Communication with Friends and Family: Not on file    Frequency of Social Gatherings with Friends and Family: Not on file    Attends Roman Catholic Services: Not on file    Active Member of Clubs or Organizations: Not on file    Attends Club or Organization Meetings: Not on file    Marital Status: Not on file   Intimate Partner Violence:     Fear of Current or Ex-Partner: Not on file    Emotionally Abused: Not on file    Physically Abused: Not on file    Sexually Abused: Not on file   Housing Stability:     Unable to Pay for Housing in the Last Year: Not on file    Number of [place] in the Last Year: Not on file    Unstable Housing in the Last Year: Not on file       SCREENINGS             PHYSICAL EXAM    (up to 7 for level 4, 8 or more for level 5)     ED Triage Vitals [06/16/22 1616]   BP Temp Temp Source Heart Rate Resp SpO2 Height Weight   (!) 117/56 97.8 °F (36.6 °C) Oral 65 16 93 % 5' (1.524 m) 230 lb (104.3 kg)       Physical Exam:      General Appearance:  Alert, cooperative, appears stated age. Head:  Normocephalic, without obvious abnormality, atraumatic. Eyes:  conjunctiva/corneas clear, EOM's intact. Sclera anicteric. ENT:  Mucous remains moist and pink   Neck: Supple, symmetrical, trachea midline, no adenopathy. No jugular venous distention. Lungs:    Crackles bilaterally. Chest Wall:   The pain to palpation   Heart:   Genitourinary:  Regular rate rhythm with no murmurs rubs gallops  Deferred   Abdomen:    Soft and benign. No guarding rebound. Extremities:  1+ lower extremity pitting edema. Pulses:  Good throughout   Skin:  No rashes or lesions to exposed skin. Neurologic: Alert and oriented X 3. DIAGNOSTIC RESULTS     EKG: All EKG's are interpreted by the Emergency Department Physician who either signs or Co-signsthis chart in the absence of a cardiologist.    Sinus rhythm at a rate of 64 beats a minute with a right bundle branch block.   No acute ST elevations or depressions or pathologic Q waves. RADIOLOGY:   Non-plain filmimages such as CT, Ultrasound and MRI are read by the radiologist. Plain radiographic images are visualized and preliminarily interpreted by the emergency physician with the below findings:    See below    Interpretation per the Radiologist below, if available at the time ofthis note: All incidental findings were discussed with the patient. XR CHEST PORTABLE   Final Result   Stable cardiomegaly with central pulmonary vascular congestion and hazy   bibasilar airspace opacities favored to represent edema               ED BEDSIDE ULTRASOUND:   Performed by ED Physician - none    LABS:  Labs Reviewed   CBC WITH AUTO DIFFERENTIAL - Abnormal; Notable for the following components:       Result Value    Hemoglobin 11.8 (*)     Hematocrit 35.7 (*)     RDW 16.8 (*)     Platelets 818 (*)     All other components within normal limits   COMPREHENSIVE METABOLIC PANEL W/ REFLEX TO MG FOR LOW K - Abnormal; Notable for the following components:    Glucose 151 (*)     BUN 26 (*)     GFR Non- 53 (*)     AST 9 (*)     All other components within normal limits   TROPONIN - Abnormal; Notable for the following components:    Troponin 0.05 (*)     All other components within normal limits   BRAIN NATRIURETIC PEPTIDE   URINALYSIS WITH REFLEX TO CULTURE       All other labs were within normal range or not returned as of this dictation. EMERGENCY DEPARTMENT COURSE and DIFFERENTIAL DIAGNOSIS/MDM:   Vitals:    Vitals:    06/16/22 1616 06/16/22 1717 06/16/22 1732 06/16/22 1751   BP: (!) 117/56 129/77 (!) 117/94    Pulse: 65 68 65 66   Resp: 16 17 14 17   Temp: 97.8 °F (36.6 °C)      TempSrc: Oral      SpO2: 93% 92% 93% 95%   Weight: 230 lb (104.3 kg)      Height: 5' (1.524 m)              MDM    The patient has remained stable throughout her hospital course.   The patient does have significant lower extremity edema and a 20 pound weight gain. Her work-up today shows what appears to be congestive heart failure. She has normal kidney function but an elevated troponin. However, looking back at her previous laboratory results she has had elevated troponins in the past.  The patient be admitted to our hospitalist with cardiology consulting. She is in stable condition and denies any chest pain. I will start Lasix. Her vital signs are otherwise unremarkable. She has good oxygenation. REASSESSMENT          CRITICAL CARE TIME   Total Critical Care time was 45 minutes, excluding separatelyreportable procedures. There was a high probability ofclinically significant/life threatening deterioration in the patient's condition which required my urgent intervention. CONSULTS:  None    PROCEDURES:  Unless otherwise noted below, none     Procedures    FINAL IMPRESSION      1. Elevated troponin    2. Acute congestive heart failure, unspecified heart failure type [facility])          DISPOSITION/PLAN   DISPOSITION Decision To Admit 06/16/2022 74:89:69 PM      PATIENT REFERREDTO:  No follow-up provider specified. DISCHARGEMEDICATIONS:  New Prescriptions    No medications on file     Controlled Substances Monitoring:     No flowsheet data found.     (Please note that portions of this note were completed with a voice recognition program.  Efforts were made to edit the dictations but occasionally words are mis-transcribed.)    Poncho Uribe MD (electronically signed)  Attending Emergency Physician         Poncho Uribe MD  06/16/22 9731

## 2022-06-16 NOTE — TELEPHONE ENCOUNTER
Last seen by Dr. Julia Tuttle in 2020. H/o cabg, carotid sten, HTN, HLD, PAF. She is not on a diuretic. EF 55% from 2020. Wally speaks limited [place] (she is [place]ian).

## 2022-06-16 NOTE — TELEPHONE ENCOUNTER
Daughter states pt's feet turn blue when she stands and they are very swollen. With every move she is having sob. Please call to advise.

## 2022-06-16 NOTE — TELEPHONE ENCOUNTER
I spoke pt daughter and she could not answer any of the questions. Severe swelling. Not pitting. She staes that the legs are red when swollen. She also has severe SOB.

## 2022-06-16 NOTE — TELEPHONE ENCOUNTER
I spoke to [place] (English-speaking), phone [phone]; she said she was not at her mother's house but her sister was. She said the symptoms of SOB are worsening over the last few days. She is not able to do much physically without becoming dyspneic. She said again her mother's legs are very swollen. She also has painful left fingers. Brooklyn reports that 2 days [facility], [facility] and told her that her mother's symptoms are \"normal\" because of her congestive heart failure; she suggested elevating her legs. I do not see a history of HF in her chart, and Brooklyn said she was never told that her mother had this diagnosis. Due to the worsening nature of her symptoms, I advised she to to the ER for evaluation. Brooklyn said she would ensure Anup Aguilar gets to the ER soon.

## 2022-06-17 LAB
ANION GAP SERPL CALCULATED.3IONS-SCNC: 13 MMOL/L (ref 3–16)
BUN BLDV-MCNC: 24 MG/DL (ref 7–20)
CALCIUM SERPL-MCNC: 9.4 MG/DL (ref 8.3–10.6)
CHLORIDE BLD-SCNC: 106 MMOL/L (ref 99–110)
CO2: 20 MMOL/L (ref 21–32)
CREAT SERPL-MCNC: 0.9 MG/DL (ref 0.6–1.2)
EKG ATRIAL RATE: 64 BPM
EKG DIAGNOSIS: NORMAL
EKG P AXIS: -27 DEGREES
EKG P-R INTERVAL: 138 MS
EKG Q-T INTERVAL: 480 MS
EKG QRS DURATION: 164 MS
EKG QTC CALCULATION (BAZETT): 495 MS
EKG R AXIS: 269 DEGREES
EKG T AXIS: 56 DEGREES
EKG VENTRICULAR RATE: 64 BPM
ESTIMATED AVERAGE GLUCOSE: 137 MG/DL
GFR AFRICAN AMERICAN: >60
GFR NON-AFRICAN AMERICAN: 60
GLUCOSE BLD-MCNC: 119 MG/DL (ref 70–99)
GLUCOSE BLD-MCNC: 173 MG/DL (ref 70–99)
GLUCOSE BLD-MCNC: 175 MG/DL (ref 70–99)
GLUCOSE BLD-MCNC: 213 MG/DL (ref 70–99)
GLUCOSE BLD-MCNC: 250 MG/DL (ref 70–99)
GLUCOSE BLD-MCNC: 262 MG/DL (ref 70–99)
GLUCOSE BLD-MCNC: 280 MG/DL (ref 70–99)
HBA1C MFR BLD: 6.4 %
HCT VFR BLD CALC: 38.6 % (ref 36–48)
HEMOGLOBIN: 12.5 G/DL (ref 12–16)
LV EF: 55 %
LVEF MODALITY: NORMAL
MCH RBC QN AUTO: 29.4 PG (ref 26–34)
MCHC RBC AUTO-ENTMCNC: 32.3 G/DL (ref 31–36)
MCV RBC AUTO: 91.2 FL (ref 80–100)
PDW BLD-RTO: 17.8 % (ref 12.4–15.4)
PERFORMED ON: ABNORMAL
PLATELET # BLD: 132 K/UL (ref 135–450)
PMV BLD AUTO: 8.8 FL (ref 5–10.5)
POTASSIUM SERPL-SCNC: 4.9 MMOL/L (ref 3.5–5.1)
RBC # BLD: 4.24 M/UL (ref 4–5.2)
SODIUM BLD-SCNC: 139 MMOL/L (ref 136–145)
TSH REFLEX: 1.74 UIU/ML (ref 0.27–4.2)
WBC # BLD: 6.1 K/UL (ref 4–11)

## 2022-06-17 PROCEDURE — 6360000002 HC RX W HCPCS: Performed by: INTERNAL MEDICINE

## 2022-06-17 PROCEDURE — 80048 BASIC METABOLIC PNL TOTAL CA: CPT

## 2022-06-17 PROCEDURE — 94760 N-INVAS EAR/PLS OXIMETRY 1: CPT

## 2022-06-17 PROCEDURE — 6370000000 HC RX 637 (ALT 250 FOR IP): Performed by: NURSE PRACTITIONER

## 2022-06-17 PROCEDURE — 84443 ASSAY THYROID STIM HORMONE: CPT

## 2022-06-17 PROCEDURE — 6370000000 HC RX 637 (ALT 250 FOR IP): Performed by: FAMILY MEDICINE

## 2022-06-17 PROCEDURE — 1200000000 HC SEMI PRIVATE

## 2022-06-17 PROCEDURE — 36415 COLL VENOUS BLD VENIPUNCTURE: CPT

## 2022-06-17 PROCEDURE — 6360000002 HC RX W HCPCS: Performed by: FAMILY MEDICINE

## 2022-06-17 PROCEDURE — 99291 CRITICAL CARE FIRST HOUR: CPT | Performed by: INTERNAL MEDICINE

## 2022-06-17 PROCEDURE — 2580000003 HC RX 258: Performed by: FAMILY MEDICINE

## 2022-06-17 PROCEDURE — 85027 COMPLETE CBC AUTOMATED: CPT

## 2022-06-17 PROCEDURE — 93010 ELECTROCARDIOGRAM REPORT: CPT | Performed by: INTERNAL MEDICINE

## 2022-06-17 PROCEDURE — 93306 TTE W/DOPPLER COMPLETE: CPT

## 2022-06-17 RX ORDER — DIAPER,BRIEF,INFANT-TODD,DISP
EACH MISCELLANEOUS 2 TIMES DAILY PRN
Status: DISCONTINUED | OUTPATIENT
Start: 2022-06-17 | End: 2022-06-23 | Stop reason: HOSPADM

## 2022-06-17 RX ORDER — CEFACLOR 500 MG
500 CAPSULE ORAL 3 TIMES DAILY
Status: DISCONTINUED | OUTPATIENT
Start: 2022-06-17 | End: 2022-06-17

## 2022-06-17 RX ORDER — CEFACLOR 500 MG
500 CAPSULE ORAL 3 TIMES DAILY
Status: ACTIVE | OUTPATIENT
Start: 2022-06-17 | End: 2022-06-20

## 2022-06-17 RX ORDER — FUROSEMIDE 10 MG/ML
20 INJECTION INTRAMUSCULAR; INTRAVENOUS 2 TIMES DAILY
Status: DISCONTINUED | OUTPATIENT
Start: 2022-06-17 | End: 2022-06-21

## 2022-06-17 RX ORDER — INSULIN GLARGINE 100 [IU]/ML
55 INJECTION, SOLUTION SUBCUTANEOUS 2 TIMES DAILY
Status: DISCONTINUED | OUTPATIENT
Start: 2022-06-17 | End: 2022-06-23 | Stop reason: HOSPADM

## 2022-06-17 RX ORDER — GABAPENTIN 300 MG/1
300 CAPSULE ORAL DAILY
Status: DISCONTINUED | OUTPATIENT
Start: 2022-06-17 | End: 2022-06-23 | Stop reason: HOSPADM

## 2022-06-17 RX ORDER — GABAPENTIN 300 MG/1
600 CAPSULE ORAL NIGHTLY
Status: DISCONTINUED | OUTPATIENT
Start: 2022-06-17 | End: 2022-06-23 | Stop reason: HOSPADM

## 2022-06-17 RX ADMIN — INSULIN GLARGINE 55 UNITS: 100 INJECTION, SOLUTION SUBCUTANEOUS at 08:48

## 2022-06-17 RX ADMIN — OXYBUTYNIN CHLORIDE 15 MG: 5 TABLET, EXTENDED RELEASE ORAL at 08:47

## 2022-06-17 RX ADMIN — GABAPENTIN 600 MG: 300 CAPSULE ORAL at 22:08

## 2022-06-17 RX ADMIN — METOPROLOL TARTRATE 12.5 MG: 25 TABLET, FILM COATED ORAL at 08:47

## 2022-06-17 RX ADMIN — Medication 500 MG: at 22:12

## 2022-06-17 RX ADMIN — METOPROLOL TARTRATE 12.5 MG: 25 TABLET, FILM COATED ORAL at 22:14

## 2022-06-17 RX ADMIN — LEVOTHYROXINE SODIUM 150 MCG: 0.15 TABLET ORAL at 05:36

## 2022-06-17 RX ADMIN — INSULIN LISPRO 1 UNITS: 100 INJECTION, SOLUTION INTRAVENOUS; SUBCUTANEOUS at 22:16

## 2022-06-17 RX ADMIN — GABAPENTIN 600 MG: 300 CAPSULE ORAL at 01:50

## 2022-06-17 RX ADMIN — FUROSEMIDE 20 MG: 10 INJECTION, SOLUTION INTRAMUSCULAR; INTRAVENOUS at 08:47

## 2022-06-17 RX ADMIN — INSULIN LISPRO 6 UNITS: 100 INJECTION, SOLUTION INTRAVENOUS; SUBCUTANEOUS at 16:21

## 2022-06-17 RX ADMIN — RIVAROXABAN 20 MG: 20 TABLET, FILM COATED ORAL at 00:32

## 2022-06-17 RX ADMIN — RIVAROXABAN 20 MG: 20 TABLET, FILM COATED ORAL at 16:17

## 2022-06-17 RX ADMIN — FUROSEMIDE 20 MG: 10 INJECTION, SOLUTION INTRAMUSCULAR; INTRAVENOUS at 17:51

## 2022-06-17 RX ADMIN — METOPROLOL TARTRATE 12.5 MG: 25 TABLET, FILM COATED ORAL at 00:31

## 2022-06-17 RX ADMIN — GABAPENTIN 300 MG: 300 CAPSULE ORAL at 08:47

## 2022-06-17 RX ADMIN — CIPROFLOXACIN 400 MG: 2 INJECTION, SOLUTION INTRAVENOUS at 00:36

## 2022-06-17 RX ADMIN — Medication 10 ML: at 08:47

## 2022-06-17 RX ADMIN — HYDROCORTISONE: 1 CREAM TOPICAL at 16:17

## 2022-06-17 RX ADMIN — INSULIN GLARGINE 55 UNITS: 100 INJECTION, SOLUTION SUBCUTANEOUS at 00:34

## 2022-06-17 RX ADMIN — Medication 10 ML: at 22:09

## 2022-06-17 RX ADMIN — INSULIN GLARGINE 55 UNITS: 100 INJECTION, SOLUTION SUBCUTANEOUS at 22:16

## 2022-06-17 RX ADMIN — ATORVASTATIN CALCIUM 80 MG: 80 TABLET, FILM COATED ORAL at 08:47

## 2022-06-17 RX ADMIN — INSULIN LISPRO 4 UNITS: 100 INJECTION, SOLUTION INTRAVENOUS; SUBCUTANEOUS at 08:48

## 2022-06-17 RX ADMIN — ACETAMINOPHEN 650 MG: 325 TABLET ORAL at 05:36

## 2022-06-17 RX ADMIN — INSULIN LISPRO 6 UNITS: 100 INJECTION, SOLUTION INTRAVENOUS; SUBCUTANEOUS at 11:47

## 2022-06-17 RX ADMIN — Medication 500 MG: at 13:21

## 2022-06-17 NOTE — PROGRESS NOTES
[facility]    6/17/2022 11:18 AM        Name: Galileo Douglas . Admitted: 6/16/2022  Primary Care Provider: MATEUSZ To CNP (Tel: [phone])      Chief Complaint   Patient presents with    Fatigue     pt brought in by EMS. pt states fatigue for a few days, had SOB a few days ago but better now. Brief History: Patient is an 79 yo English speaking female with hx atrial fibrillation, CAD/CABG (2004), DM2, HTN, hypothyroidism. She presented to hospital with c/o fatigue and dyspnea which has been ongoing for past few days. CXR with pulmonary congestion and she was started on IV Lasix. Troponin 0.05    Subjective:  Presently resting in bed, daughter visiting. Good response to IV Lasix, has diuresed nearly 3 liters. Reports breathing has improved. Denies chest pain, palpitations, lightheadedness, abdominal pain, nausea.      Reviewed interval ancillary notes    Current Medications  gabapentin (NEURONTIN) capsule 300 mg, Daily   And  gabapentin (NEURONTIN) capsule 600 mg, Nightly  insulin glargine (LANTUS) injection vial 55 Units, BID  furosemide (LASIX) injection 20 mg, Daily  levothyroxine (SYNTHROID) tablet 150 mcg, Daily  metoprolol tartrate (LOPRESSOR) tablet 12.5 mg, BID  oxybutynin (DITROPAN-XL) extended release tablet 15 mg, Daily  rivaroxaban (XARELTO) tablet 20 mg, Dinner  atorvastatin (LIPITOR) tablet 80 mg, Daily  dextrose bolus 10% 125 mL, PRN   Or  dextrose bolus 10% 250 mL, PRN  glucagon (rDNA) injection 1 mg, PRN  dextrose 5 % solution, PRN  sodium chloride flush 0.9 % injection 5-40 mL, 2 times per day  sodium chloride flush 0.9 % injection 5-40 mL, PRN  0.9 % sodium chloride infusion, PRN  ondansetron (ZOFRAN-ODT) disintegrating tablet 4 mg, Q8H PRN   Or  ondansetron (ZOFRAN) injection 4 mg, Q6H PRN  polyethylene glycol (GLYCOLAX) packet 17 g, Daily PRN  acetaminophen (TYLENOL) tablet 650 mg, Q6H PRN   Or  acetaminophen (TYLENOL) suppository 650 mg, Q6H PRN  insulin lispro (HUMALOG) injection vial 0-12 Units, TID WC  insulin lispro (HUMALOG) injection vial 0-6 Units, Nightly        Objective:  BP (!) 124/56   Pulse 63   Temp 98.2 °F (36.8 °C) (Axillary)   Resp 18   Ht 5' (1.524 m)   Wt 253 lb 8.5 oz (115 kg)   LMP  (LMP Unknown)   SpO2 94%   BMI 49.51 kg/m²     Intake/Output Summary (Last 24 hours) at 6/17/2022 1118  Last data filed at 6/17/2022 0854  Gross per 24 hour   Intake 310 ml   Output 2500 ml   Net -2190 ml      Wt Readings from Last 3 Encounters:   06/17/22 253 lb 8.5 oz (115 kg)   10/06/21 225 lb (102.1 kg)   09/27/21 225 lb (102.1 kg)       General appearance:  Appears comfortable  Eyes: Sclera clear. Pupils equal.  ENT: Moist oral mucosa. Trachea midline, no adenopathy. Cardiovascular: Regular rhythm, normal S1, S2. No murmur. No edema in lower extremities  Respiratory: Not using accessory muscles. Good inspiratory effort. Clear to auscultation bilaterally, no wheeze or crackles. GI: Abdomen soft, no tenderness, not distended, normal bowel sounds  Musculoskeletal: No cyanosis in digits, neck supple  Neurology: CN 2-12 grossly intact. No speech or motor deficits  Psych: Normal affect.  Alert and oriented in time, place and person  Skin: Warm, dry, normal turgor    Labs and Tests:  CBC:   Recent Labs     06/16/22  1644 06/17/22  0439   WBC 5.4 6.1   HGB 11.8* 12.5   * 132*     BMP:    Recent Labs     06/16/22  1644 06/17/22  0439    139   K 4.4 4.9    106   CO2 29 20*   BUN 26* 24*   CREATININE 1.0 0.9   GLUCOSE 151* 173*     Hepatic:   Recent Labs     06/16/22  1644   AST 9*   ALT 25   BILITOT 0.4   ALKPHOS 68       CXR 6/16/2022:  Stable cardiomegaly with central pulmonary vascular congestion and hazy   bibasilar airspace opacities favored to represent edema           Problem List  Principal Problem:    UTI (urinary tract infection)  Resolved

## 2022-06-17 NOTE — PROGRESS NOTES
Acute diastolic heart failure  Possibly recent lateral MI    Rec-continue IV lasix   If continues improvement transition to oral on Sunday,likely home Monday  Start entresto Chas  Discussed cardiac cath but due to age and anaphylactic contrast allergy will pursue medical management.

## 2022-06-17 NOTE — PROGRESS NOTES
4 Eyes Skin Assessment     NAME:  Wally Schmidt  YOB: 1940  MEDICAL RECORD NUMBER:  [id number]    The patient is being assess for  Admission    I agree that 2 RN's have performed a thorough Head to Toe Skin Assessment on the patient. ALL assessment sites listed below have been assessed. Areas assessed by both nurses:    Head, Face, Ears, Shoulders, Back, Chest, Arms, Elbows, Hands, Sacrum. Buttock, Coccyx, Ischium and Legs. Feet and Heels        Does the Patient have a Wound?  No noted wound(s)       Alphonso Prevention initiated:  NA   Wound Care Orders initiated:  NA    Pressure Injury (Stage 3,4, Unstageable, DTI, NWPT, and Complex wounds) if present place consult order under [de-identified] NA    New and Established Ostomies if present place consult order under : NA      Nurse 1 eSignature: Electronically signed by Ирина Beebe RN on 6/16/22 at 11:55 PM EDT    **SHARE this note so that the co-signing nurse is able to place an eSignature**    Nurse 2 eSignature: Electronically signed by Robby Solares RN on 6/17/22 at 1:54 AM EDT

## 2022-06-17 NOTE — PLAN OF CARE
Problem: Skin/Tissue Integrity  Goal: Absence of new skin breakdown  Description: 1. Monitor for areas of redness and/or skin breakdown  2. Assess vascular access sites hourly  3. Every 4-6 hours minimum:  Change oxygen saturation probe site  4. Every 4-6 hours:  If on nasal continuous positive airway pressure, respiratory therapy assess nares and determine need for appliance change or resting period.   6/17/2022 0933 by Janessa John RN  Outcome: Progressing  6/16/2022 2354 by Danelle Argueta RN  Outcome: Progressing     Problem: Safety - Adult  Goal: Free from fall injury  6/17/2022 0933 by Janessa John RN  Outcome: Progressing  6/16/2022 2354 by Danelle Argueta RN  Outcome: Progressing     Problem: Chronic Conditions and Co-morbidities  Goal: Patient's chronic conditions and co-morbidity symptoms are monitored and maintained or improved  Outcome: Progressing  Flowsheets (Taken 6/17/2022 0837)  Care Plan - Patient's Chronic Conditions and Co-Morbidity Symptoms are Monitored and Maintained or Improved: Monitor and assess patient's chronic conditions and comorbid symptoms for stability, deterioration, or improvement

## 2022-06-17 NOTE — H&P
HOSPITALISTS HISTORY AND PHYSICAL    6/16/2022 9:08 PM    Patient Information:  Oanh Hua is a 78 y.o. female [id number]  PCP:  MATEUSZ Brower CNP (Tel: [phone] )    Chief complaint:    Chief Complaint   Patient presents with    Fatigue     pt brought in by EMS. pt states fatigue for a few days, had SOB a few days ago but better now. History of Present Illness:  Christopher Canales is a 78 y.o. female with h/ o  CAD, DM, Obesity. Afib , chronic anticoagulation  HTN presented with c/o fatigue and dyspnea. Sx on going for a few days. Also reports weight gain and leg edema. Last ECHO in 2020 showed EF 55 % . She is not currently on Diuretics  Chest xray showed pulmonary vascular congestion and cardiomegaly. UA showed UTI       REVIEW OF SYSTEMS:   Constitutional: Negative for fever,chills or night sweats  ENT: Negative for rhinorrhea, epistaxis, hoarseness, sore throat. Respiratory: +ve for shortness of breath,wheezing  Cardiovascular: Negative for chest pain, palpitations   Gastrointestinal: Negative for nausea, vomiting, diarrhea  Genitourinary: Negative for polyuria, dysuria   Hematologic/Lymphatic: Negative for bleeding tendency, easy bruising  Musculoskeletal: Negative for myalgias and arthralgias  Neurologic: Negative for confusion,dysarthria. Skin: Negative for itching,rash  Psychiatric: Negative for depression,anxiety, agitation. Endocrine: Negative for polydipsia,polyuria,heat /cold intolerance.     Past Medical History:   has a past medical history of 4/11/17 Left knee repeat repair of median parapatellar arthrotomy with augmentation, Arthritis, Atrial fibrillation ([address] CAD (coronary artery disease), Cataract, Diabetes mellitus ([address] GERD (gastroesophageal reflux disease), Hyperlipidemia, Hypertension, HYPOTHRYROIDISM, Non-English speaking patient, Sleep apnea, Thyroid disease, and UTI. Past Surgical History:   has a past surgical history that includes joint replacement (Bilateral, [address]); Cardiac surgery (2004); Cholecystectomy, laparoscopic (5/15/14); Revision total knee arthroplasty (Right, 11/22/2016); Total knee arthroplasty; and knee surgery (Left, 02/28/2017). Medications:  No current facility-administered medications on file prior to encounter. Current Outpatient Medications on File Prior to Encounter   Medication Sig Dispense Refill    lisinopril (PRINIVIL;ZESTRIL) 2.5 MG tablet TAKE ONE TABLET BY MOUTH DAILY 90 tablet 4    metoprolol tartrate (LOPRESSOR) 25 MG tablet TAKE ONE-HALF TABLET BY MOUTH TWICE A DAY 90 tablet 4    atorvastatin (LIPITOR) 80 MG tablet Take 1 tablet by mouth daily 90 tablet 4    Insulin Pen Needle (NOVOFINE) 32G X 6 MM MISC by Does not apply route 3 times daily with meals      hydrocortisone (ANUSOL-HC) 2.5 % rectal cream Place rectally 2 times daily Place rectally 2 times daily.       Acetaminophen-Codeine (TYLENOL WITH CODEINE #3 PO) Take by mouth      insulin detemir (LEVEMIR FLEXPEN) 100 UNIT/ML injection pen Inject 55 Units into the skin nightly 55u in the am and 55u in the evening      insulin lispro (HUMALOG KWIKPEN) 100 UNIT/ML pen Inject into the skin 3 times daily (before meals)      rivaroxaban (XARELTO) 20 MG TABS tablet Take 1 tablet by mouth daily (Patient taking differently: Take 20 mg by mouth daily [facility] last dose of xarelto was 4/5/17) 90 tablet 3    vitamin D (ERGOCALCIFEROL) 73444 UNITS CAPS capsule Take 50,000 Units by mouth once a week      Omega 3 1000 MG CAPS Take 1,000 mg by mouth daily      Liraglutide (VICTOZA) 18 MG/3ML SOPN SC injection Inject 1.2 mg into the skin daily      Calcium Carb-Cholecalciferol (OYSTER SHELL CALCIUM + D PO) Take by mouth 2 times daily      Biotin 5000 MCG TABS Take by mouth daily      lidocaine (LIDODERM) 5 % Place 1 patch onto the skin daily 12 hours on, 12 hours off. 30 patch 0    oxybutynin (DITROPAN XL) 15 MG CR tablet Take 15 mg by mouth daily.  Loratadine 10 MG CAPS Take by mouth daily       levothyroxine (SYNTHROID) 175 MCG tablet Take 150 mcg by mouth daily.  alendronate (FOSAMAX) 70 MG tablet Take 70 mg by mouth every 7 days.  gabapentin (NEURONTIN) 300 MG capsule Take 300 mg by mouth daily. Allergies: Allergies   Allergen Reactions    Aspirin Other (See Comments)     GI upset    Fluconazole Other (See Comments)     UNABLE TO REMEMBER    Ibuprofen Other (See Comments)     GI upset    Macrobid [Nitrofurantoin Monohydrate Macrocrystals] Other (See Comments)     COLD, SICK    Adhesive Tape Rash    Lexiscan [Regadenoson] Rash     Happened twice with Lexiscan    Penicillins Rash        Social History:  Patient Lives    reports that she has never smoked. She has never used smokeless tobacco. She reports that she does not drink alcohol and does not use drugs. Family History:  family history includes Arthritis in an other family member; Diabetes in an other family member; Heart Disease in an other family member; High Cholesterol in her brother; Hypertension in an other family member. ,     Physical Exam:  BP (!) 117/94   Pulse 66   Temp 97.8 °F (36.6 °C) (Oral)   Resp 17   Ht 5' (1.524 m)   Wt 230 lb (104.3 kg)   LMP  (LMP Unknown)   SpO2 95%   BMI 44.92 kg/m²     General appearance:  Appears comfortable. Well nourished  Eyes: Sclera clear, pupils equal  ENT: Moist mucus membranes, no thrush. Trachea midline. Cardiovascular: Regular rhythm, normal S1, S2. No murmur, gallop, rub. No edema in lower extremities  Respiratory: Clear to auscultation bilaterally, no wheeze, good inspiratory effort  Gastrointestinal: Abdomen soft, non-tender, not distended, normal bowel sounds  Musculoskeletal: No cyanosis in digits, neck supple  Neurology: Cranial nerves grossly intact.  Alert and oriented in time, place and person. No speech or motor deficits  Psychiatry: Appropriate affect. Not agitated  Skin: Warm, dry, normal turgor, no rash  Brisk capillary refill, peripheral pulses palpable   Labs:  CBC:   Lab Results   Component Value Date    WBC 5.4 06/16/2022    RBC 4.06 06/16/2022    HGB 11.8 06/16/2022    HCT 35.7 06/16/2022    MCV 87.9 06/16/2022    MCH 29.0 06/16/2022    MCHC 33.0 06/16/2022    RDW 16.8 06/16/2022     06/16/2022    MPV 8.3 06/16/2022     BMP:    Lab Results   Component Value Date     06/16/2022    K 4.4 06/16/2022     06/16/2022    CO2 29 06/16/2022    BUN 26 06/16/2022    CREATININE 1.0 06/16/2022    CALCIUM 9.9 06/16/2022    GFRAA >60 06/16/2022    GFRAA >60 07/10/2012    LABGLOM 53 06/16/2022    GLUCOSE 151 06/16/2022     XR CHEST PORTABLE   Final Result   Stable cardiomegaly with central pulmonary vascular congestion and hazy   bibasilar airspace opacities favored to represent edema           Chest Xray:   EKG:    I visualized CXR images and EKG strips    Discussed case  with     Problem List  Principal Problem:    UTI (urinary tract infection)  Resolved Problems:    * No resolved hospital problems. *        Assessment/Plan:   Acute cystitis    Previous cultures grew \Citrobacter sensitive to Fluoroquinolones  Repeat Cultures pending  Pt is allergic to pcn  Started on Ciprofloxacin    Pulmonary congestion   Chest xray showed cardiomegaly and congestion   Started on IV lasix   Low sodium diet   Will repeat cardiac ECHO     Type 2 DM   On Lantus Sliding scale and Carb control diet    Obese BMI 44      DVT prophylaxis   Code status   Diet   IV access   Kuhn Catheter    Admit as inpatient. I anticipate hospitalization spanning more than two midnights for investigation and treatment of the above medically necessary diagnoses. Please note that some part of this chart was generated using Dragon dictation software.  Although every effort was made to ensure the accuracy of this automated transcription, some errors in transcription may have occurred inadvertently. If you may need any clarification, please do not hesitate to contact me through [facility].        Tha Ruano MD    6/16/2022 9:08 PM

## 2022-06-18 LAB
ANION GAP SERPL CALCULATED.3IONS-SCNC: 10 MMOL/L (ref 3–16)
BUN BLDV-MCNC: 36 MG/DL (ref 7–20)
CALCIUM SERPL-MCNC: 10 MG/DL (ref 8.3–10.6)
CHLORIDE BLD-SCNC: 104 MMOL/L (ref 99–110)
CO2: 31 MMOL/L (ref 21–32)
CREAT SERPL-MCNC: 1 MG/DL (ref 0.6–1.2)
GFR AFRICAN AMERICAN: >60
GFR NON-AFRICAN AMERICAN: 53
GLUCOSE BLD-MCNC: 128 MG/DL (ref 70–99)
GLUCOSE BLD-MCNC: 136 MG/DL (ref 70–99)
GLUCOSE BLD-MCNC: 214 MG/DL (ref 70–99)
GLUCOSE BLD-MCNC: 224 MG/DL (ref 70–99)
GLUCOSE BLD-MCNC: 251 MG/DL (ref 70–99)
HCT VFR BLD CALC: 41.1 % (ref 36–48)
HEMOGLOBIN: 13.2 G/DL (ref 12–16)
MCH RBC QN AUTO: 28.6 PG (ref 26–34)
MCHC RBC AUTO-ENTMCNC: 32.2 G/DL (ref 31–36)
MCV RBC AUTO: 88.8 FL (ref 80–100)
PDW BLD-RTO: 17.3 % (ref 12.4–15.4)
PERFORMED ON: ABNORMAL
PLATELET # BLD: 138 K/UL (ref 135–450)
PMV BLD AUTO: 8.3 FL (ref 5–10.5)
POTASSIUM SERPL-SCNC: 4.5 MMOL/L (ref 3.5–5.1)
RBC # BLD: 4.63 M/UL (ref 4–5.2)
SODIUM BLD-SCNC: 145 MMOL/L (ref 136–145)
WBC # BLD: 6.5 K/UL (ref 4–11)

## 2022-06-18 PROCEDURE — 6360000002 HC RX W HCPCS: Performed by: INTERNAL MEDICINE

## 2022-06-18 PROCEDURE — 36415 COLL VENOUS BLD VENIPUNCTURE: CPT

## 2022-06-18 PROCEDURE — 80048 BASIC METABOLIC PNL TOTAL CA: CPT

## 2022-06-18 PROCEDURE — 1200000000 HC SEMI PRIVATE

## 2022-06-18 PROCEDURE — 6370000000 HC RX 637 (ALT 250 FOR IP): Performed by: NURSE PRACTITIONER

## 2022-06-18 PROCEDURE — 6370000000 HC RX 637 (ALT 250 FOR IP): Performed by: FAMILY MEDICINE

## 2022-06-18 PROCEDURE — 2580000003 HC RX 258: Performed by: FAMILY MEDICINE

## 2022-06-18 PROCEDURE — 99232 SBSQ HOSP IP/OBS MODERATE 35: CPT | Performed by: NURSE PRACTITIONER

## 2022-06-18 PROCEDURE — 85027 COMPLETE CBC AUTOMATED: CPT

## 2022-06-18 PROCEDURE — 87449 NOS EACH ORGANISM AG IA: CPT

## 2022-06-18 RX ORDER — SIMETHICONE 80 MG
80 TABLET,CHEWABLE ORAL EVERY 6 HOURS PRN
Status: DISCONTINUED | OUTPATIENT
Start: 2022-06-18 | End: 2022-06-23 | Stop reason: HOSPADM

## 2022-06-18 RX ORDER — SENNA AND DOCUSATE SODIUM 50; 8.6 MG/1; MG/1
2 TABLET, FILM COATED ORAL 2 TIMES DAILY
Status: DISCONTINUED | OUTPATIENT
Start: 2022-06-18 | End: 2022-06-21

## 2022-06-18 RX ADMIN — STANDARDIZED SENNA CONCENTRATE AND DOCUSATE SODIUM 2 TABLET: 8.6; 5 TABLET ORAL at 21:41

## 2022-06-18 RX ADMIN — STANDARDIZED SENNA CONCENTRATE AND DOCUSATE SODIUM 2 TABLET: 8.6; 5 TABLET ORAL at 12:26

## 2022-06-18 RX ADMIN — Medication 500 MG: at 09:18

## 2022-06-18 RX ADMIN — METOPROLOL TARTRATE 12.5 MG: 25 TABLET, FILM COATED ORAL at 09:18

## 2022-06-18 RX ADMIN — INSULIN GLARGINE 55 UNITS: 100 INJECTION, SOLUTION SUBCUTANEOUS at 21:42

## 2022-06-18 RX ADMIN — OXYBUTYNIN CHLORIDE 15 MG: 5 TABLET, EXTENDED RELEASE ORAL at 09:17

## 2022-06-18 RX ADMIN — METOPROLOL TARTRATE 12.5 MG: 25 TABLET, FILM COATED ORAL at 21:38

## 2022-06-18 RX ADMIN — SIMETHICONE 80 MG: 80 TABLET, CHEWABLE ORAL at 12:25

## 2022-06-18 RX ADMIN — RIVAROXABAN 20 MG: 20 TABLET, FILM COATED ORAL at 17:48

## 2022-06-18 RX ADMIN — INSULIN LISPRO 4 UNITS: 100 INJECTION, SOLUTION INTRAVENOUS; SUBCUTANEOUS at 12:26

## 2022-06-18 RX ADMIN — INSULIN GLARGINE 55 UNITS: 100 INJECTION, SOLUTION SUBCUTANEOUS at 09:11

## 2022-06-18 RX ADMIN — INSULIN LISPRO 4 UNITS: 100 INJECTION, SOLUTION INTRAVENOUS; SUBCUTANEOUS at 17:49

## 2022-06-18 RX ADMIN — FUROSEMIDE 20 MG: 10 INJECTION, SOLUTION INTRAMUSCULAR; INTRAVENOUS at 17:49

## 2022-06-18 RX ADMIN — Medication 10 ML: at 21:40

## 2022-06-18 RX ADMIN — ATORVASTATIN CALCIUM 80 MG: 80 TABLET, FILM COATED ORAL at 09:17

## 2022-06-18 RX ADMIN — Medication 500 MG: at 21:39

## 2022-06-18 RX ADMIN — Medication 10 ML: at 09:17

## 2022-06-18 RX ADMIN — GABAPENTIN 600 MG: 300 CAPSULE ORAL at 21:37

## 2022-06-18 RX ADMIN — FUROSEMIDE 20 MG: 10 INJECTION, SOLUTION INTRAMUSCULAR; INTRAVENOUS at 09:17

## 2022-06-18 RX ADMIN — HYDROCORTISONE: 1 CREAM TOPICAL at 09:11

## 2022-06-18 RX ADMIN — LEVOTHYROXINE SODIUM 150 MCG: 0.15 TABLET ORAL at 05:22

## 2022-06-18 RX ADMIN — Medication 500 MG: at 17:48

## 2022-06-18 RX ADMIN — INSULIN LISPRO 3 UNITS: 100 INJECTION, SOLUTION INTRAVENOUS; SUBCUTANEOUS at 21:43

## 2022-06-18 RX ADMIN — GABAPENTIN 300 MG: 300 CAPSULE ORAL at 09:17

## 2022-06-18 NOTE — PROGRESS NOTES
[facility]    6/18/2022 11:03 AM        Name: Trever Mondragon . Admitted: 6/16/2022  Primary Care Provider: MATEUSZ Soares CNP (Tel: [phone])      Chief Complaint   Patient presents with    Fatigue     pt brought in by EMS. pt states fatigue for a few days, had SOB a few days ago but better now. Brief History: Patient is an 77 yo Estonian speaking female with hx atrial fibrillation, CAD/CABG (2004), DM2, HTN, hypothyroidism. She presented to hospital with c/o fatigue and dyspnea which has been ongoing for past few days. CXR with pulmonary congestion and she was started on IV Lasix. Troponin 0.05    Subjective:  Resting in bed, daughter visiting. Patient reports she is feeling much better today. Denies shortness of breath but has not been out of bed. Denies chest pain, palpitations, lightheadedness. Daughter expressing interest in [employer] system for home use as patient often is unable to make it bathroom in time.       Reviewed interval ancillary notes    Current Medications  gabapentin (NEURONTIN) capsule 300 mg, Daily   And  gabapentin (NEURONTIN) capsule 600 mg, Nightly  insulin glargine (LANTUS) injection vial 55 Units, BID  hydrocortisone 1 % cream, BID PRN  cefaclor (CECLOR) capsule 500 mg, TID  furosemide (LASIX) injection 20 mg, BID  [START ON 6/19/2022] sacubitril-valsartan (ENTRESTO) 24-26 MG per tablet 1 tablet, BID  levothyroxine (SYNTHROID) tablet 150 mcg, Daily  metoprolol tartrate (LOPRESSOR) tablet 12.5 mg, BID  oxybutynin (DITROPAN-XL) extended release tablet 15 mg, Daily  rivaroxaban (XARELTO) tablet 20 mg, Dinner  atorvastatin (LIPITOR) tablet 80 mg, Daily  dextrose bolus 10% 125 mL, PRN   Or  dextrose bolus 10% 250 mL, PRN  glucagon (rDNA) injection 1 mg, PRN  dextrose 5 % solution, PRN  sodium chloride flush 0.9 % injection 5-40 mL, 2 times per day  sodium chloride flush 0.9 % injection 5-40 mL, PRN  0.9 % sodium chloride infusion, PRN  ondansetron (ZOFRAN-ODT) disintegrating tablet 4 mg, Q8H PRN   Or  ondansetron (ZOFRAN) injection 4 mg, Q6H PRN  polyethylene glycol (GLYCOLAX) packet 17 g, Daily PRN  acetaminophen (TYLENOL) tablet 650 mg, Q6H PRN   Or  acetaminophen (TYLENOL) suppository 650 mg, Q6H PRN  insulin lispro (HUMALOG) injection vial 0-12 Units, TID WC  insulin lispro (HUMALOG) injection vial 0-6 Units, Nightly      Objective:  /69   Pulse 63   Temp 98 °F (36.7 °C) (Oral)   Resp 16   Ht 5' (1.524 m)   Wt 254 lb 10.1 oz (115.5 kg)   LMP  (LMP Unknown)   SpO2 93%   BMI 49.73 kg/m²     Intake/Output Summary (Last 24 hours) at 6/18/2022 1103  Last data filed at 6/18/2022 0520  Gross per 24 hour   Intake 240 ml   Output 1400 ml   Net -1160 ml      Wt Readings from Last 3 Encounters:   06/18/22 254 lb 10.1 oz (115.5 kg)   10/06/21 225 lb (102.1 kg)   09/27/21 225 lb (102.1 kg)     General:  Awake, alert, oriented in NAD  Skin:  Warm and dry. No unusual bruising or rash  Neck:  Supple. No JVD appreciated  Chest:  Normal effort. Clear to auscultation, no wheezes/rhonchi/rales  Cardiovascular:  RRR, normal S1/S2, no murmur/gallop/rub  Abdomen:  Soft, nontender, +bowel sounds  Extremities:  No edema  Neurological: No focal deficits  Psychological: Normal mood and affect      Labs and Tests:  CBC:   Recent Labs     06/16/22  1644 06/17/22  0439 06/18/22  0652   WBC 5.4 6.1 6.5   HGB 11.8* 12.5 13.2   * 132* 138     BMP:    Recent Labs     06/16/22  1644 06/17/22  0439 06/18/22  0652    139 145   K 4.4 4.9 4.5    106 104   CO2 29 20* 31   BUN 26* 24* 36*   CREATININE 1.0 0.9 1.0   GLUCOSE 151* 173* 136*     Hepatic:   Recent Labs     06/16/22  1644   AST 9*   ALT 25   BILITOT 0.4   ALKPHOS 68       Results for Kellie Kirby (MRN [id number]) as of 6/18/2022 11:04   Ref.  Range 6/17/2022 11:24 6/17/2022 16:16 6/17/2022 16:55 6/17/2022 20:24 6/18/2022 07:36   POC Glucose Latest Ref Range: 70 - 99 mg/dl 262 (H) 250 (H) 280 (H) 175 (H) 128 (H)       CXR 6/16/2022:  Stable cardiomegaly with central pulmonary vascular congestion and hazy   bibasilar airspace opacities favored to represent edema           Echo 6/17/2022:  Summary   -Technically limited portable study secondary to patient's immobility (upright semi supine position). -Normal left ventricle size,mild to moderate concentric wall thickness and normal systolic function with an estimated ejection fraction of 55%. -Abnormal mid to distal septal and inferoseptal motion is present.   -Indeterminate diastolic function. due to mitral annular calcification. E/e\"=20.3.   -Mitral annular calcification is present. -Mild to moderate posteriorly directed mitral regurgitation.   -Aortic valve appears sclerotic but opens adequately. -Mild tricuspid regurgitation.   -Estimated pulmonary artery systolic pressure is borderline elevated at 33 mmHg assuming a right atrial pressure of 8 mmHg. -The left atrium is at the upper limits of normal in size to mildly dilated. -The right ventricle is not well visualized. -Right ventricular systolic function appears mildly reduced . TAPSE 1.22 cm.   -RV S velocity 8.6 cm/s. -Hypokinetic free wall of the right ventricle. Problem List  Principal Problem:    UTI (urinary tract infection)  Resolved Problems:    * No resolved hospital problems. *       Assessment & Plan:   1. Acute CHF. CXR with pulmonary vascular congestion, proBNP only 104. Started on IV Lasix with good results and subjective improvement. Echo shows normal EF, indeterminate diastolic function. To start Entresto following lisinopril washout period. 2. CAD / elevated troponin. Hx CABG 2004. Troponin 0.05. Denies chest pain. Echo with EF 55%, there is abnormal mid to distal septal and inferoseptal motion present. Evaluated by cardiology, patient possibly had small MI in last couple of weeks. Considered high risk for coronary angiogram (hx anaphylaxis with contrast use) so being managed medically. Continue statin and beta blocker. No asa secondary chronic AC. 3. DM2. A1c 6.4. BG values reasonably controlled. Continue BID Lantus and medium correction scale. 4. PAF. Presents in sinus rhythm. Continue beta blocker and Xarelto. 5. Hypothyroidism. TSH 1.74. Continue levothyroxine. 6. Recent UTI. Per daughter, patient was taking Ceclor for UTI, had 3 tabs to complete course. UA on admission negative. Family brought Ceclor from home so patient can complete course. Have encouraged out of bed. PT/OT consults pending. Diet: ADULT DIET; Regular;  Low Sodium (2 gm)  Code:Full Code  DVT PPX: Adalberto 77, APRN - CNP   6/18/2022 11:03 AM

## 2022-06-18 NOTE — CONSULTS
thyroid disease. She had recurrent  urinary tract infection. PAST SURGICAL HISTORY:  Includes bilateral knee replacement. She has  had cholecystectomy. FAMILY HISTORY:  Notable for diabetes in numerous family members;  coronary artery disease, but not at a young age. SOCIAL HISTORY:  She has been in [employer] business for many years. No longer works. 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 She is  originally from a country of [facility] [place] [facility]. She is also lived in [place] [facility] and  has been in [address] [place] [address] for many years. She has seven children. She is  from her . She does not drink alcohol. She has  never been a cigarette user. MEDICATIONS:  Her medications have chronically been insulin, Tessalon  Perles, and Senokot. The patient was started on Ceclor t.i.d.,  Pyridium, magnesium 400 mg daily, nystatin, Zanaflex, lisinopril 2.5 mg  daily, Toprol 25 mg b.i.d., Lipitor 80 mg daily. Also takes Tylenol  with Codeine on occasion for generalized arthritis. PHYSICAL EXAMINATION:  GENERAL:  Currently appears comfortable at rest   She does states she  feels much better. General appearance, she appears comfortable. VITAL SIGNS:  Blood pressure 117/94, pulse 66, weight 230 pounds, BMI  44.92. HEENT:  Sclerae are clear. Pupils equal.  HEENT exam is normal.  NECK:  Supple. There is no obvious jugular venous distention. CARDIAC:  She has regular rhythm, no murmur or gallop. LUNGS:  Reveal few crackles at both lung bases. ABDOMEN:  Obese, no masses were felt. EXTREMITIES:  Show 3+ edema below the knees. SKIN:  Warm and dry. NEUROLOGIC:  Cranial nerves II through XII intact. Pulses all intact. LABORATORY DATA:  Sodium 144 and potassium 4.4. White blood cell 6100,  hemoglobin 12.5, platelet count 177,921. Troponins are chronically  around 0.03, 0.04 on admission, at this time 0.05 and a second 0.04. ProBNP showed this afternoon only 104.     IMAGING STUDIES:  Chest x-ray shows congestive heart failure. EKG shows a normal sinus rhythm, supraventricular premature beats with  right bundle branch block, left anterior fascicular block. Echo done today shows possibility of a small inferolateral wall area of  hypokinesis with ejection fraction of 45% to 50%, this was reduced from  prior echocardiogram.    IMPRESSION:  Longstanding coronary artery disease with bypass graft  surgery approximately 20 years ago certainly the possibility that in  last two weeks is the patient has had a small myocardial infarction. She presents with acute congestive heart failure and she has very  quickly become better. In the past, she has been on low dose  lisinopril. She also remains on Xarelto therapy for history of atrial  fibrillation. RECOMMENDATIONS:  Would be to continue Lasix. Today we will continue IV  Lasix 20 mg b.i.d. but this may be switched to oral Lasix 20 mg daily. We will hold lisinopril in anticipation of starting Entresto. Discussed  with the patient and family about further evaluation if the patient does  well with medical treatment, feel this would be the best option for this  lady due to her other comorbidities. If she does not do well with  current therapies, then one can consider high risk coronary angiogram  with possible intervention and would be less likely to do this with her  history of current anaphylaxis with contrast use. Due to the high probability of clinically significant life threating deterioration of the patient's condition that required my urgent intervention, a total critical care time 35 minutes was used. This time excludes any time that may have been spent performing procedures.  This includes but not limited to vital sign monitoring, telemetry monitoring, continuous pulse oximety, IV medication, clinical response to the IV medications, documentation time , consultation time, interpretation of lab data, review of nursing notes and old record

## 2022-06-18 NOTE — PROGRESS NOTES
[facility]              Progress Note      Admit Date 6/16/2022  HPI:   has a history of coronary artery  disease with bypass graft surgery approximately 20 years ago, diabetes  mellitus, obesity, paroxysmal atrial fibrillation, chronic  anticoagulation, hypertension who has had a two-week history of  progressive dyspnea  With her symptoms, she also had weight gain and significant leg edema. In the past, she has had normal left ventricular ejection fraction. Last nuclear stress test approximately two to three years ago was  normal.  Last cardiac catheterization in 2016 apparently was complicated  by anaphylaxis; however her grafts were widely patent    ~Troponins are chronically around 0.03, 0.04 on admission, at this time 0.05 and a second 0.04. ProBNP showed this afternoon only 104. ~Chest x-ray shows congestive heart failure. ~EKG shows a normal sinus rhythm, supraventricular premature beats with  right bundle branch block, left anterior fascicular block. ~Echo done today shows possibility of a small inferolateral wall area of  hypokinesis with ejection fraction of 45% to 50%, this was reduced from  prior echocardiogram.    Interval history:  Net loss 1.2:    Ms. Luisa Rogers denies chest pain, shortness of breath, palpitations  Subjective: offers no c/o. Has not been OOB since adm.         Scheduled Meds:   sennosides-docusate sodium  2 tablet Oral BID    gabapentin  300 mg Oral Daily    And    gabapentin  600 mg Oral Nightly    insulin glargine  55 Units SubCUTAneous BID    cefaclor  500 mg Oral TID    furosemide  20 mg IntraVENous BID    [START ON 6/19/2022] sacubitril-valsartan  1 tablet Oral BID    levothyroxine  150 mcg Oral Daily    metoprolol tartrate  12.5 mg Oral BID    oxybutynin  15 mg Oral Daily    rivaroxaban  20 mg Oral Dinner    atorvastatin  80 mg Oral Daily    sodium chloride flush  5-40 mL IntraVENous 2 times per day    insulin lispro  0-12 Units SubCUTAneous TID WC    insulin lispro  0-6 Units SubCUTAneous Nightly     Continuous Infusions:   dextrose      sodium chloride       PRN Meds:simethicone, hydrocortisone, dextrose bolus **OR** dextrose bolus, glucagon (rDNA), dextrose, sodium chloride flush, sodium chloride, ondansetron **OR** ondansetron, polyethylene glycol, acetaminophen **OR** acetaminophen       Objective: Wt Readings from Last 3 Encounters:   22 254 lb 10.1 oz (115.5 kg)   10/06/21 225 lb (102.1 kg)   21 225 lb (102.1 kg)   Admit weight: Weight: 230 lb (104.3 kg)      Temperature range over 24hrs:   Temp  Av.7 °F (37.1 °C)  Min: 98 °F (36.7 °C)  Max: 99.8 °F (37.7 °C)  Current Respiratory Rate:  Resp: 16  Current Pulse:  Heart Rate: 63  Current Blood Pressure:  BP: 126/69  24hr Blood Pressure Range:  Systolic (15QJD), NFJ:356 , Min:126 , RGK:823   ; Diastolic (96ISO), QAB:90, Min:67, Max:75    Current Pulse Oximetry:  SpO2: 93 % RA      Intake/Output Summary (Last 24 hours) at 2022 1222  Last data filed at 2022 1217  Gross per 24 hour   Intake --   Output 1500 ml   Net -1500 ml       Telemetry monitor: sinus rhythm    Physical Exam:  General:  Awake, alert, NAD  Psych : calm  Skin:  Warm and dry  Chest:  Few crackles LLL to auscultation, respiration easy  Cardiovascular:  RRR 56 S1S2 no murmur to auscultation; no JVD  Abdomen: Bowel sounds normal, abd soft, non-tender  Extremities:  No edema  : unremarkable      Imaging    CXR: 22:     Impression   Stable cardiomegaly with central pulmonary vascular congestion and hazy   bibasilar airspace opacities favored to represent edema       Echo: 22  Summary   -Technically limited portable study secondary to patient's immobility   (upright semi supine position). -Normal left ventricle size,mild to moderate concentric wall thickness and   normal systolic function with an estimated ejection fraction of 55%.    -Abnormal mid to distal septal and inferoseptal motion is present.   -Indeterminate diastolic function. due to mitral annular   calcification. E/e\"=20.3.   -Mitral annular calcification is present. -Mild to moderate posteriorly directed mitral regurgitation.   -Aortic valve appears sclerotic but opens adequately. -Mild tricuspid regurgitation.   -Estimated pulmonary artery systolic pressure is borderline elevated at 33   mmHg assuming a right atrial pressure of 8 mmHg. -The left atrium is at the upper limits of normal in size to mildly dilated. -The right ventricle is not well visualized. -Right ventricular systolic function appears mildly reduced . TAPSE 1.22 cm.   -RV S velocity 8.6 cm/s.    -Hypokinetic free wall of the right ventricle      Myoview: Aug '20:  Summary    There is some bowel artifact and breast attenuation that limits study.   Lynda An is no clear ischemia or scar seen.    LV function is normal with EF=58%             Lab Review     Renal Profile:   Lab Results   Component Value Date    CREATININE 1.0 06/18/2022    BUN 36 06/18/2022     06/18/2022    K 4.5 06/18/2022    K 4.4 06/16/2022     06/18/2022    CO2 31 06/18/2022     CBC:    Lab Results   Component Value Date    WBC 6.5 06/18/2022    RBC 4.63 06/18/2022    HGB 13.2 06/18/2022    HCT 41.1 06/18/2022    MCV 88.8 06/18/2022    RDW 17.3 06/18/2022     06/18/2022     Cardiac Enzymes:    Lab Results   Component Value Date    TROPONINI 0.04 06/16/2022         Assessment/Plan:     Patient Active Problem List   Diagnosis    DM (diabetes mellitus) ([address]    Coronary artery disease involving native coronary artery of native heart without angina pectoris    Atrial fibrillation (HCC)    [facility]    CARLOS (obstructive sleep apnea)    HIGH CHOLESTEROL    HTN (hypertension)    Dizziness    Headache    Debility    11/22/16 Right knee polyethylene exchange    Acute pain of right shoulder    Mixed hyperlipidemia    Morbid obesity with BMI of 40.0-44.9, adult [facility])    Morbid obesity with BMI of 45.0-49.9, adult [facility]    Diabetes type 2, controlled ([facility]    Carotid artery disease without cerebral infarction [facility])    2/28/17 Revision with polyethylene exchange left total knee arthroplasty    4/11/17 Left knee repeat repair of median parapatellar arthrotomy with augmentation    Rotator cuff tendonitis, right    Cervical radicular pain    UTI (urinary tract infection)      1. Volume overload   ~vascular congestion noted on CXR   ~   ~net loss 1.2 L ; edema resolved; continues to have few crackles LLL    ~IV lasix at 20 mg bid   ~lisinopril on hold anticipating Entersto    2. CAD   ~hx of CABG '02   ~NM neg for ischemia '20    3. PAF   ~low dose metoprolol 12.5 mg bid   ~DOAC      4. HTN   ~controlled      Plan  : start Entresto 24-26 mg bid in am   Plan to transition IV lasix to oral in AM and consider discharge on 6/20   Will liberalize activity to up ad consuelo    The patient was seen for >35 minutes.  I reviewed interval history, physical exam, review of data including labs, imaging, development and implementation of treatment plan and coordination of complex care

## 2022-06-18 NOTE — PROGRESS NOTES
Shift assessment completed. Routine vitals obtained. Scheduled medications given. Patient is awake, alert and oriented. Patient does not appear to be in distress, resting comfortably at this time. Call light within reach. Fall precautions in place. No further needs expressed.

## 2022-06-19 LAB
ANION GAP SERPL CALCULATED.3IONS-SCNC: 11 MMOL/L (ref 3–16)
BUN BLDV-MCNC: 48 MG/DL (ref 7–20)
CALCIUM SERPL-MCNC: 10.1 MG/DL (ref 8.3–10.6)
CHLORIDE BLD-SCNC: 97 MMOL/L (ref 99–110)
CO2: 29 MMOL/L (ref 21–32)
CREAT SERPL-MCNC: 1 MG/DL (ref 0.6–1.2)
GFR AFRICAN AMERICAN: >60
GFR NON-AFRICAN AMERICAN: 53
GLUCOSE BLD-MCNC: 141 MG/DL (ref 70–99)
GLUCOSE BLD-MCNC: 228 MG/DL (ref 70–99)
GLUCOSE BLD-MCNC: 257 MG/DL (ref 70–99)
GLUCOSE BLD-MCNC: 324 MG/DL (ref 70–99)
GLUCOSE BLD-MCNC: 346 MG/DL (ref 70–99)
HCT VFR BLD CALC: 41.6 % (ref 36–48)
HEMOGLOBIN: 13.6 G/DL (ref 12–16)
L. PNEUMOPHILA SEROGP 1 UR AG: NORMAL
MCH RBC QN AUTO: 28.8 PG (ref 26–34)
MCHC RBC AUTO-ENTMCNC: 32.6 G/DL (ref 31–36)
MCV RBC AUTO: 88.3 FL (ref 80–100)
PDW BLD-RTO: 16.9 % (ref 12.4–15.4)
PERFORMED ON: ABNORMAL
PLATELET # BLD: 146 K/UL (ref 135–450)
PMV BLD AUTO: 8.3 FL (ref 5–10.5)
POTASSIUM SERPL-SCNC: 4.4 MMOL/L (ref 3.5–5.1)
RBC # BLD: 4.71 M/UL (ref 4–5.2)
SODIUM BLD-SCNC: 137 MMOL/L (ref 136–145)
WBC # BLD: 7.6 K/UL (ref 4–11)

## 2022-06-19 PROCEDURE — 36415 COLL VENOUS BLD VENIPUNCTURE: CPT

## 2022-06-19 PROCEDURE — 2580000003 HC RX 258: Performed by: FAMILY MEDICINE

## 2022-06-19 PROCEDURE — 1200000000 HC SEMI PRIVATE

## 2022-06-19 PROCEDURE — 80048 BASIC METABOLIC PNL TOTAL CA: CPT

## 2022-06-19 PROCEDURE — 6360000002 HC RX W HCPCS: Performed by: INTERNAL MEDICINE

## 2022-06-19 PROCEDURE — 6370000000 HC RX 637 (ALT 250 FOR IP): Performed by: FAMILY MEDICINE

## 2022-06-19 PROCEDURE — 85027 COMPLETE CBC AUTOMATED: CPT

## 2022-06-19 PROCEDURE — 6370000000 HC RX 637 (ALT 250 FOR IP): Performed by: INTERNAL MEDICINE

## 2022-06-19 PROCEDURE — 6370000000 HC RX 637 (ALT 250 FOR IP): Performed by: NURSE PRACTITIONER

## 2022-06-19 RX ADMIN — Medication 10 ML: at 08:52

## 2022-06-19 RX ADMIN — INSULIN GLARGINE 55 UNITS: 100 INJECTION, SOLUTION SUBCUTANEOUS at 22:32

## 2022-06-19 RX ADMIN — Medication 10 ML: at 22:32

## 2022-06-19 RX ADMIN — LEVOTHYROXINE SODIUM 150 MCG: 0.15 TABLET ORAL at 05:59

## 2022-06-19 RX ADMIN — SACUBITRIL AND VALSARTAN 1 TABLET: 24; 26 TABLET, FILM COATED ORAL at 22:30

## 2022-06-19 RX ADMIN — INSULIN LISPRO 8 UNITS: 100 INJECTION, SOLUTION INTRAVENOUS; SUBCUTANEOUS at 17:51

## 2022-06-19 RX ADMIN — INSULIN LISPRO 2 UNITS: 100 INJECTION, SOLUTION INTRAVENOUS; SUBCUTANEOUS at 08:53

## 2022-06-19 RX ADMIN — ATORVASTATIN CALCIUM 80 MG: 80 TABLET, FILM COATED ORAL at 08:52

## 2022-06-19 RX ADMIN — Medication 500 MG: at 08:52

## 2022-06-19 RX ADMIN — STANDARDIZED SENNA CONCENTRATE AND DOCUSATE SODIUM 2 TABLET: 8.6; 5 TABLET ORAL at 08:51

## 2022-06-19 RX ADMIN — FUROSEMIDE 20 MG: 10 INJECTION, SOLUTION INTRAMUSCULAR; INTRAVENOUS at 17:46

## 2022-06-19 RX ADMIN — METOPROLOL TARTRATE 12.5 MG: 25 TABLET, FILM COATED ORAL at 08:51

## 2022-06-19 RX ADMIN — Medication 500 MG: at 14:10

## 2022-06-19 RX ADMIN — OXYBUTYNIN CHLORIDE 15 MG: 5 TABLET, EXTENDED RELEASE ORAL at 08:51

## 2022-06-19 RX ADMIN — INSULIN LISPRO 4 UNITS: 100 INJECTION, SOLUTION INTRAVENOUS; SUBCUTANEOUS at 12:47

## 2022-06-19 RX ADMIN — GABAPENTIN 300 MG: 300 CAPSULE ORAL at 08:53

## 2022-06-19 RX ADMIN — STANDARDIZED SENNA CONCENTRATE AND DOCUSATE SODIUM 2 TABLET: 8.6; 5 TABLET ORAL at 22:30

## 2022-06-19 RX ADMIN — INSULIN LISPRO 4 UNITS: 100 INJECTION, SOLUTION INTRAVENOUS; SUBCUTANEOUS at 22:31

## 2022-06-19 RX ADMIN — RIVAROXABAN 20 MG: 20 TABLET, FILM COATED ORAL at 17:46

## 2022-06-19 RX ADMIN — SACUBITRIL AND VALSARTAN 1 TABLET: 24; 26 TABLET, FILM COATED ORAL at 08:51

## 2022-06-19 RX ADMIN — INSULIN GLARGINE 55 UNITS: 100 INJECTION, SOLUTION SUBCUTANEOUS at 08:54

## 2022-06-19 RX ADMIN — FUROSEMIDE 20 MG: 10 INJECTION, SOLUTION INTRAMUSCULAR; INTRAVENOUS at 08:52

## 2022-06-19 RX ADMIN — GABAPENTIN 600 MG: 300 CAPSULE ORAL at 22:30

## 2022-06-19 NOTE — PROGRESS NOTES
Shift assessment completed. Routine vitals obtained. Scheduled medications given. Patient is awake, alert and oriented. Patient does not appear to be in distress, resting comfortably at this time. Call light within reach. No further needs expressed.

## 2022-06-19 NOTE — PROGRESS NOTES
[facility]    6/19/2022 12:00 PM        Name: Trever Mondragon . Admitted: 6/16/2022  Primary Care Provider: MATEUSZ Soares CNP (Tel: [phone])      Chief Complaint   Patient presents with    Fatigue     pt brought in by EMS. pt states fatigue for a few days, had SOB a few days ago but better now. Brief History: Patient is an 77 yo Serbian speaking female with hx atrial fibrillation, CAD/CABG (2004), DM2, HTN, hypothyroidism. She presented to hospital with c/o fatigue and dyspnea which has been ongoing for past few days. CXR with pulmonary congestion and she was started on IV Lasix. Troponin 0.05    Subjective:  Resting in bed, daughters visiting. Patient reports she feels much better, breathing greatly improved. Was up to commode earlier for BM, denied shortness of breath or chest pain. Patient sedentary at home secondary arthritic pain.      Reviewed interval ancillary notes    Current Medications  simethicone (MYLICON) chewable tablet 80 mg, Q6H PRN  sennosides-docusate sodium (SENOKOT-S) 8.6-50 MG tablet 2 tablet, BID  gabapentin (NEURONTIN) capsule 300 mg, Daily   And  gabapentin (NEURONTIN) capsule 600 mg, Nightly  insulin glargine (LANTUS) injection vial 55 Units, BID  hydrocortisone 1 % cream, BID PRN  cefaclor (CECLOR) capsule 500 mg, TID  furosemide (LASIX) injection 20 mg, BID  sacubitril-valsartan (ENTRESTO) 24-26 MG per tablet 1 tablet, BID  levothyroxine (SYNTHROID) tablet 150 mcg, Daily  metoprolol tartrate (LOPRESSOR) tablet 12.5 mg, BID  oxybutynin (DITROPAN-XL) extended release tablet 15 mg, Daily  rivaroxaban (XARELTO) tablet 20 mg, Dinner  atorvastatin (LIPITOR) tablet 80 mg, Daily  dextrose bolus 10% 125 mL, PRN   Or  dextrose bolus 10% 250 mL, PRN  glucagon (rDNA) injection 1 mg, PRN  dextrose 5 % solution, PRN  sodium chloride flush 0.9 % injection 5-40 mL, 2 times per day  sodium chloride flush 0.9 % injection 5-40 mL, PRN  0.9 % sodium chloride infusion, PRN  ondansetron (ZOFRAN-ODT) disintegrating tablet 4 mg, Q8H PRN   Or  ondansetron (ZOFRAN) injection 4 mg, Q6H PRN  polyethylene glycol (GLYCOLAX) packet 17 g, Daily PRN  acetaminophen (TYLENOL) tablet 650 mg, Q6H PRN   Or  acetaminophen (TYLENOL) suppository 650 mg, Q6H PRN  insulin lispro (HUMALOG) injection vial 0-12 Units, TID WC  insulin lispro (HUMALOG) injection vial 0-6 Units, Nightly      Objective:  /72   Pulse 62   Temp 97.8 °F (36.6 °C) (Oral)   Resp 16   Ht 5' (1.524 m)   Wt 241 lb 2.9 oz (109.4 kg)   LMP  (LMP Unknown)   SpO2 95%   BMI 47.10 kg/m²     Intake/Output Summary (Last 24 hours) at 6/19/2022 1200  Last data filed at 6/19/2022 0600  Gross per 24 hour   Intake --   Output 1900 ml   Net -1900 ml      Wt Readings from Last 3 Encounters:   06/19/22 241 lb 2.9 oz (109.4 kg)   10/06/21 225 lb (102.1 kg)   09/27/21 225 lb (102.1 kg)     General:  Awake, alert, oriented in NAD  Skin:  Warm and dry. No unusual bruising or rash  Neck:  Supple. No JVD appreciated  Chest:  Normal effort. Clear to auscultation, no wheezes/rhonchi/rales  Cardiovascular:  RRR, normal S1/S2, no murmur/gallop/rub  Abdomen:  Soft, nontender, +bowel sounds  Extremities:  No edema  Neurological: Moves all extremities  Psychological: Normal mood and affect      Labs and Tests:  CBC:   Recent Labs     06/17/22  0439 06/18/22  0652 06/19/22  0915   WBC 6.1 6.5 7.6   HGB 12.5 13.2 13.6   * 138 146     BMP:    Recent Labs     06/17/22  0439 06/18/22  0652 06/19/22  0915    145 137   K 4.9 4.5 4.4    104 97*   CO2 20* 31 29   BUN 24* 36* 48*   CREATININE 0.9 1.0 1.0   GLUCOSE 173* 136* 257*     Hepatic:   Recent Labs     06/16/22  1644   AST 9*   ALT 25   BILITOT 0.4   ALKPHOS 68     Results for [place] List (MRN [id number]) as of 6/19/2022 19:27   Ref.  Range 6/18/2022 17:11 6/18/2022 20:09 6/19/2022 07:49 6/19/2022 11:52   POC Glucose Latest Ref Range: 70 - 99 mg/dl 224 (H) 251 (H) 141 (H) 228 (H)       CXR 6/16/2022:  Stable cardiomegaly with central pulmonary vascular congestion and hazy   bibasilar airspace opacities favored to represent edema           Echo 6/17/2022:  Summary   -Technically limited portable study secondary to patient's immobility (upright semi supine position). -Normal left ventricle size,mild to moderate concentric wall thickness and normal systolic function with an estimated ejection fraction of 55%. -Abnormal mid to distal septal and inferoseptal motion is present.   -Indeterminate diastolic function. due to mitral annular calcification. E/e\"=20.3.   -Mitral annular calcification is present. -Mild to moderate posteriorly directed mitral regurgitation.   -Aortic valve appears sclerotic but opens adequately. -Mild tricuspid regurgitation.   -Estimated pulmonary artery systolic pressure is borderline elevated at 33 mmHg assuming a right atrial pressure of 8 mmHg. -The left atrium is at the upper limits of normal in size to mildly dilated. -The right ventricle is not well visualized. -Right ventricular systolic function appears mildly reduced . TAPSE 1.22 cm.   -RV S velocity 8.6 cm/s. -Hypokinetic free wall of the right ventricle. Problem List  Principal Problem:    UTI (urinary tract infection)  Resolved Problems:    * No resolved hospital problems. *       Assessment & Plan:   1. Acute CHF. CXR with pulmonary vascular congestion, proBNP only 104 but patient is obese. Started on IV Lasix with good results and subjective improvement. Echo shows normal EF, indeterminate diastolic function. Remains on IV Lasix. Entresto started 6/19 following lisinopril washout period. 2. CAD / elevated troponin. Hx CABG 2004. Troponin 0.05. Denies chest pain. Echo with EF 55%, there is abnormal mid to distal septal and inferoseptal motion present.  Evaluated by cardiology, patient possibly had small MI in last couple of weeks. Considered high risk for coronary angiogram (hx anaphylaxis with contrast use) so being managed medically. Continue statin and beta blocker. No asa secondary chronic AC. 3. DM2. A1c 6.4. BG values reasonably controlled. Continue BID Lantus and medium correction scale. 4. PAF. Presents in sinus rhythm. Continue beta blocker and Xarelto. 5. Hypothyroidism. TSH 1.74. Continue levothyroxine. 6. Recent UTI. Per daughter, patient was taking Ceclor for UTI, had 3 tabs to complete course. UA on admission negative. Family brought Ceclor from home and antibiotic course completed. Encouraged out of bed. PT/OT consults pending. Disposition: Patient lives with daughter, she is fairly sedentary secondary to chronic arthritic knee pain, uses wc at home. PT/OT pending. Hopefully home tomorrow, cardiology wanted to continue IV Lasix for 1 more day. Diet: ADULT DIET; Regular;  Low Sodium (2 gm)  Code:Full Code  DVT PPX: [address] MATEUSZ West - CNP   6/19/2022 12:00 PM

## 2022-06-19 NOTE — CARE COORDINATION
Chart reviewed for d/c planning. Occitan speaking ,from home. On r/A. Uses wheelchair at home. Therapy evaluations pending.      Jermaine Mojica RN, BSN  [phone]

## 2022-06-20 LAB
ANION GAP SERPL CALCULATED.3IONS-SCNC: 13 MMOL/L (ref 3–16)
BUN BLDV-MCNC: 65 MG/DL (ref 7–20)
CALCIUM SERPL-MCNC: 9.4 MG/DL (ref 8.3–10.6)
CHLORIDE BLD-SCNC: 100 MMOL/L (ref 99–110)
CO2: 27 MMOL/L (ref 21–32)
CREAT SERPL-MCNC: 1.3 MG/DL (ref 0.6–1.2)
GFR AFRICAN AMERICAN: 47
GFR NON-AFRICAN AMERICAN: 39
GLUCOSE BLD-MCNC: 130 MG/DL (ref 70–99)
GLUCOSE BLD-MCNC: 153 MG/DL (ref 70–99)
GLUCOSE BLD-MCNC: 193 MG/DL (ref 70–99)
GLUCOSE BLD-MCNC: 272 MG/DL (ref 70–99)
GLUCOSE BLD-MCNC: 273 MG/DL (ref 70–99)
GLUCOSE BLD-MCNC: 360 MG/DL (ref 70–99)
GLUCOSE BLD-MCNC: 432 MG/DL (ref 70–99)
HCT VFR BLD CALC: 39.1 % (ref 36–48)
HEMOGLOBIN: 13 G/DL (ref 12–16)
MCH RBC QN AUTO: 28.9 PG (ref 26–34)
MCHC RBC AUTO-ENTMCNC: 33.2 G/DL (ref 31–36)
MCV RBC AUTO: 86.8 FL (ref 80–100)
PDW BLD-RTO: 16.8 % (ref 12.4–15.4)
PERFORMED ON: ABNORMAL
PLATELET # BLD: 146 K/UL (ref 135–450)
PMV BLD AUTO: 8.6 FL (ref 5–10.5)
POTASSIUM SERPL-SCNC: 3.9 MMOL/L (ref 3.5–5.1)
RBC # BLD: 4.5 M/UL (ref 4–5.2)
SODIUM BLD-SCNC: 140 MMOL/L (ref 136–145)
WBC # BLD: 7.8 K/UL (ref 4–11)

## 2022-06-20 PROCEDURE — 6370000000 HC RX 637 (ALT 250 FOR IP): Performed by: NURSE PRACTITIONER

## 2022-06-20 PROCEDURE — 6360000002 HC RX W HCPCS: Performed by: INTERNAL MEDICINE

## 2022-06-20 PROCEDURE — 97530 THERAPEUTIC ACTIVITIES: CPT

## 2022-06-20 PROCEDURE — 6370000000 HC RX 637 (ALT 250 FOR IP): Performed by: INTERNAL MEDICINE

## 2022-06-20 PROCEDURE — 6370000000 HC RX 637 (ALT 250 FOR IP): Performed by: FAMILY MEDICINE

## 2022-06-20 PROCEDURE — 97166 OT EVAL MOD COMPLEX 45 MIN: CPT

## 2022-06-20 PROCEDURE — 97535 SELF CARE MNGMENT TRAINING: CPT

## 2022-06-20 PROCEDURE — 80048 BASIC METABOLIC PNL TOTAL CA: CPT

## 2022-06-20 PROCEDURE — 2580000003 HC RX 258: Performed by: FAMILY MEDICINE

## 2022-06-20 PROCEDURE — 36415 COLL VENOUS BLD VENIPUNCTURE: CPT

## 2022-06-20 PROCEDURE — 97110 THERAPEUTIC EXERCISES: CPT

## 2022-06-20 PROCEDURE — 1200000000 HC SEMI PRIVATE

## 2022-06-20 PROCEDURE — 97161 PT EVAL LOW COMPLEX 20 MIN: CPT

## 2022-06-20 PROCEDURE — 85027 COMPLETE CBC AUTOMATED: CPT

## 2022-06-20 RX ORDER — INSULIN LISPRO 100 [IU]/ML
0-9 INJECTION, SOLUTION INTRAVENOUS; SUBCUTANEOUS NIGHTLY
Status: DISCONTINUED | OUTPATIENT
Start: 2022-06-20 | End: 2022-06-23 | Stop reason: HOSPADM

## 2022-06-20 RX ORDER — FUROSEMIDE 20 MG/1
20 TABLET ORAL DAILY
Qty: 90 TABLET | Refills: 1 | Status: SHIPPED | OUTPATIENT
Start: 2022-06-20 | End: 2022-08-09

## 2022-06-20 RX ORDER — GABAPENTIN 300 MG/1
CAPSULE ORAL
Qty: 90 CAPSULE | Refills: 3 | Status: ON HOLD | COMMUNITY
Start: 2022-06-20 | End: 2022-10-24 | Stop reason: HOSPADM

## 2022-06-20 RX ORDER — ACETAMINOPHEN AND CODEINE PHOSPHATE 300; 30 MG/1; MG/1
1 TABLET ORAL EVERY 6 HOURS PRN
Refills: 0 | Status: ON HOLD | COMMUNITY
Start: 2022-06-20 | End: 2022-10-11 | Stop reason: HOSPADM

## 2022-06-20 RX ORDER — INSULIN LISPRO 100 [IU]/ML
0-18 INJECTION, SOLUTION INTRAVENOUS; SUBCUTANEOUS
Status: DISCONTINUED | OUTPATIENT
Start: 2022-06-20 | End: 2022-06-23 | Stop reason: HOSPADM

## 2022-06-20 RX ADMIN — SACUBITRIL AND VALSARTAN 1 TABLET: 24; 26 TABLET, FILM COATED ORAL at 08:36

## 2022-06-20 RX ADMIN — LEVOTHYROXINE SODIUM 150 MCG: 0.15 TABLET ORAL at 06:53

## 2022-06-20 RX ADMIN — STANDARDIZED SENNA CONCENTRATE AND DOCUSATE SODIUM 2 TABLET: 8.6; 5 TABLET ORAL at 08:36

## 2022-06-20 RX ADMIN — INSULIN LISPRO 6 UNITS: 100 INJECTION, SOLUTION INTRAVENOUS; SUBCUTANEOUS at 12:59

## 2022-06-20 RX ADMIN — METOPROLOL TARTRATE 12.5 MG: 25 TABLET, FILM COATED ORAL at 08:31

## 2022-06-20 RX ADMIN — FUROSEMIDE 20 MG: 10 INJECTION, SOLUTION INTRAMUSCULAR; INTRAVENOUS at 18:01

## 2022-06-20 RX ADMIN — FUROSEMIDE 20 MG: 10 INJECTION, SOLUTION INTRAMUSCULAR; INTRAVENOUS at 08:31

## 2022-06-20 RX ADMIN — METOPROLOL TARTRATE 12.5 MG: 25 TABLET, FILM COATED ORAL at 22:06

## 2022-06-20 RX ADMIN — INSULIN LISPRO 9 UNITS: 100 INJECTION, SOLUTION INTRAVENOUS; SUBCUTANEOUS at 22:03

## 2022-06-20 RX ADMIN — Medication 10 ML: at 08:40

## 2022-06-20 RX ADMIN — Medication 10 ML: at 22:11

## 2022-06-20 RX ADMIN — RIVAROXABAN 20 MG: 20 TABLET, FILM COATED ORAL at 18:01

## 2022-06-20 RX ADMIN — STANDARDIZED SENNA CONCENTRATE AND DOCUSATE SODIUM 2 TABLET: 8.6; 5 TABLET ORAL at 22:06

## 2022-06-20 RX ADMIN — SACUBITRIL AND VALSARTAN 1 TABLET: 24; 26 TABLET, FILM COATED ORAL at 22:06

## 2022-06-20 RX ADMIN — GABAPENTIN 600 MG: 300 CAPSULE ORAL at 22:06

## 2022-06-20 RX ADMIN — INSULIN LISPRO 9 UNITS: 100 INJECTION, SOLUTION INTRAVENOUS; SUBCUTANEOUS at 18:01

## 2022-06-20 RX ADMIN — OXYBUTYNIN CHLORIDE 15 MG: 5 TABLET, EXTENDED RELEASE ORAL at 08:31

## 2022-06-20 RX ADMIN — INSULIN GLARGINE 55 UNITS: 100 INJECTION, SOLUTION SUBCUTANEOUS at 08:35

## 2022-06-20 RX ADMIN — ATORVASTATIN CALCIUM 80 MG: 80 TABLET, FILM COATED ORAL at 08:30

## 2022-06-20 RX ADMIN — ACETAMINOPHEN 650 MG: 325 TABLET ORAL at 02:22

## 2022-06-20 RX ADMIN — GABAPENTIN 300 MG: 300 CAPSULE ORAL at 08:31

## 2022-06-20 RX ADMIN — INSULIN GLARGINE 55 UNITS: 100 INJECTION, SOLUTION SUBCUTANEOUS at 22:03

## 2022-06-20 ASSESSMENT — PAIN SCALES - GENERAL
PAINLEVEL_OUTOF10: 5
PAINLEVEL_OUTOF10: 8
PAINLEVEL_OUTOF10: 0

## 2022-06-20 ASSESSMENT — PAIN DESCRIPTION - LOCATION
LOCATION: BACK
LOCATION: BACK

## 2022-06-20 ASSESSMENT — PAIN DESCRIPTION - ORIENTATION
ORIENTATION: LOWER
ORIENTATION: LOWER

## 2022-06-20 NOTE — CARE COORDINATION
Daughter of patient states that patient will appeal discharge. Family states that they believe that the patient is too weak for discharge. Family advised to call number on IMM to initiate appeal. Daughter states understanding. Call placed to Peng Salcido ([phone]) at [facility]. Peng Salcido confirms patient is still active and agency will continue to follow. She also states that when we have a discharge date she will need an order for a skilled nursing evaluation and PT evaluation. Peng Salcido states that patient is also active with [facility] and receives aide services 3x/week.      Case # for discharge is [id number]

## 2022-06-20 NOTE — DISCHARGE INSTR - COC
Continuity of Care Form  CHF Pathway      _x_ Daily Visits x 3     _x_Cardiovascular Assessment.  Titrate O2 to keep SaO2 greater than 90%    _x_ Daily Weights- Baseline Wt: 243 lb  Call MD if:   3 pound weight gain or loss in one day OR 5 pound weight gain in one week         _x_ Med List attached:   Hold Coreg/Metoprolol if HR less than 45 or patient symptomatic*   Hold ACE/ARB if SBP less than 85 or patient symptomatic*   Do not hold Spironolactone (aldactone) for hypotension/bradycardia   Call MD for questions: Dr. Angelita Han [phone]    _x_Follow up appointment with cardiology: date/time:  with Diana Ortiz NP        Patient Name: Jim Kirby   :  1940  MRN:  [id number]    Admit date:  2022  Discharge date:  22    Code Status Order: Full Code   Advance Directives:      Admitting Physician:  Brittany Soni MD  PCP: MATEUSZ Garrido - CNP    [facility] 5CW-8437/1663-82  Discharging Unit Phone Number: [phone]    Emergency Contact:   Extended Emergency Contact Information  Primary Emergency Contact: [address] [facility], [address] [facility] [address]  Phone: [phone]  Work Phone: [phone]  Relation: Child  Secondary Emergency Contact: Chris [facility] [address]  Phone: [phone]  Mobile Phone: [phone]  Relation: Child    Past Surgical History:  Past Surgical History:   Procedure Laterality Date    CARDIAC SURGERY  2004    CABG X 4 VESSELS    CHOLECYSTECTOMY, LAPAROSCOPIC  5/15/14    with [address] Bilateral [address]    knee    KNEE SURGERY Left 2017    left knee poly exchange revision     REVISION TOTAL KNEE ARTHROPLASTY Right 2016    RIGHT TOTAL KNEE REVISION WITH POLY EXCHANGE    TOTAL KNEE ARTHROPLASTY      partical/ left        Immunization History:   Immunization History   Administered Date(s) Administered    Influenza Vaccine, unspecified formulation 09/01/2016    Influenza Virus Vaccine 10/22/2011    Pneumococcal Polysaccharide (Rpeounqly54) 02/11/2012       Active Problems:  Patient Active Problem List   Diagnosis Code    DM (diabetes mellitus) ([facility] E11.9    Coronary artery disease involving native coronary artery of native heart without angina pectoris I25.10    Atrial fibrillation (HCC) I48.91    Peripheral vascular disease ([facility]) I73.9    CARLOS (obstructive sleep apnea) G47.33    HIGH CHOLESTEROL     HTN (hypertension) I10    Dizziness R42    Headache R51.9    Debility R53.81    11/22/16 Right knee polyethylene exchange M25.561, M25.562    Acute pain of right shoulder M25.511    Mixed hyperlipidemia E78.2    Morbid obesity with BMI of 40.0-44.9, adult ([facility]) E66.01, Z68.41    Morbid obesity with BMI of 45.0-49.9, adult ([facility]) E66.01, Z68.42    Diabetes type 2, controlled ([facility] E11.9    Carotid artery disease without cerebral infarction [facility]) I77.9    2/28/17 Revision with polyethylene exchange left total knee arthroplasty M23.52    4/11/17 Left knee repeat repair of median parapatellar arthrotomy with augmentation S76.111A    Rotator cuff tendonitis, right M75.81    Cervical radicular pain M54.12    UTI (urinary tract infection) N39.0       Isolation/Infection:   Isolation            No Isolation          Patient Infection Status       None to display            Nurse Assessment:  Last Vital Signs: BP (!) 95/50   Pulse 59   Temp 98.5 °F (36.9 °C) (Oral)   Resp 18   Ht 5' (1.524 m)   Wt 241 lb 2.9 oz (109.4 kg)   LMP  (LMP Unknown)   SpO2 94%   BMI 47.10 kg/m²     Last documented pain score (0-10 scale): Pain Level: 8  Last Weight:   Wt Readings from Last 1 Encounters:   06/19/22 241 lb 2.9 oz (109.4 kg)     Mental Status:  oriented and alert    IV Access:  - None    Nursing Mobility/ADLs:  Walking   Assisted  Transfer  Assisted  Bathing  Assisted  Dressing  Assisted  Toileting  Assisted  Feeding  Assisted  Med Admin Assisted  Med Delivery   Whole    Wound Care Documentation and Therapy:  Wound 05/13/14 Skin tear Groin Right (Active)   Number of days: 2960       Incision 05/09/14 Groin Right (Active)   Number of days: 2964       Incision 05/13/14 Groin Right (Active)   Number of days: 2960       Incision 05/15/14 Abdomen Anterior (Active)   Number of days: 0979       Incision 11/22/16 Knee Right (Active)   Number of days: 2036       Incision 02/28/17 Knee Left (Active)   Number of days: 1938       Incision 04/11/17 Knee Left (Active)   Number of days: 1896        Elimination:  Continence: Bowel: Yes  Bladder: Yes  Urinary Catheter: None   Colostomy/Ileostomy/Ileal Conduit: No       Date of Last BM: 06/23/22    Intake/Output Summary (Last 24 hours) at 6/20/2022 1414  Last data filed at 6/19/2022 2300  Gross per 24 hour   Intake 355 ml   Output --   Net 355 ml     I/O last 3 completed shifts: In: 355 [P.O.:350; I.V.:5]  Out: 2150 [Urine:2150]    Safety Concerns: At Risk for Falls    Impairments/Disabilities:      None    Nutrition Therapy:  Current Nutrition Therapy:   - Oral Diet:  General Diabetic    Routes of Feeding: Oral  Liquids: Thin Liquids  Daily Fluid Restriction: no  Last Modified Barium Swallow with Video (Video Swallowing Test): not done    Treatments at the Time of Hospital Discharge:   Respiratory Treatments: Room air  Oxygen Therapy:  is not on home oxygen therapy.   Ventilator:    - No ventilator support    Rehab Therapies: Physical Therapy  Weight Bearing Status/Restrictions: No weight bearing restrictions  Other Medical Equipment (for information only, NOT a DME order):  wheelchair and walker  Other Treatments: N/A    Patient's personal belongings (please select all that are sent with patient):  Hearing Aides bilateral    RN SIGNATURE:  Electronically signed by Cade Gan RN on 6/23/22 at 12:24 PM EDT    CASE MANAGEMENT/SOCIAL WORK SECTION    Inpatient Status Date: 06/16/2022    Readmission Risk Assessment Score:  Readmission Risk              Risk of Unplanned Readmission:  18           Discharging to Facility/ Agency   Name: [facility]  Address: [address] [place], [address]  Phone: [phone]  Fax: [phone]      / signature: Electronically signed by Abelardo Simental RN on 6/20/22 at 3:18 PM EDT    PHYSICIAN SECTION    Prognosis: Good    Condition at Discharge: Stable    Rehab Potential (if transferring to Rehab): N/A    Recommended Labs or Other Treatments After Discharge: CBC, BMP weekly x 2    Physician Certification: I certify the above information and transfer of Darleen Elias  is necessary for the continuing treatment of the diagnosis listed and that she requires Home Care for greater 30 days.      Update Admission H&P: No change in H&P    PHYSICIAN SIGNATURE:  Electronically signed by MATEUSZ Hutchins CNP on 6/20/22 at 2:14 PM EDT

## 2022-06-20 NOTE — PLAN OF CARE
Problem: Skin/Tissue Integrity  Goal: Absence of new skin breakdown  Description: 1. Monitor for areas of redness and/or skin breakdown  2. Assess vascular access sites hourly  3. Every 4-6 hours minimum:  Change oxygen saturation probe site  4. Every 4-6 hours:  If on nasal continuous positive airway pressure, respiratory therapy assess nares and determine need for appliance change or resting period.   Outcome: Progressing     Problem: Chronic Conditions and Co-morbidities  Goal: Patient's chronic conditions and co-morbidity symptoms are monitored and maintained or improved  Outcome: Progressing     Problem: Pain  Goal: Verbalizes/displays adequate comfort level or baseline comfort level  Outcome: Progressing

## 2022-06-20 NOTE — PROGRESS NOTES
[facility]    6/20/2022 1:48 PM        Name: Derek Hyman . Admitted: 6/16/2022  Primary Care Provider: MATEUSZ Guerra CNP (Tel: [phone])      Chief Complaint   Patient presents with    Fatigue     pt brought in by EMS. pt states fatigue for a few days, had SOB a few days ago but better now. Brief History: Patient is an 77 yo Maori speaking female with hx atrial fibrillation, CAD/CABG (2004), DM2, HTN, hypothyroidism. She presented to hospital with c/o fatigue and dyspnea which has been ongoing for past few days. CXR with pulmonary congestion and she was started on IV Lasix. Troponin 0.05    Subjective:  Resting in bed, daughter visiting. Patient states she feels good. Denies chest pain, shortness of breath, palpitations, lightheadedness, abdominal pain. Patient and daughter are concerned about weakness. Daughter states patient got up to commode chair earlier and it required 4 person assist. They are appealing discharge.      Reviewed interval ancillary notes    Current Medications  simethicone (MYLICON) chewable tablet 80 mg, Q6H PRN  sennosides-docusate sodium (SENOKOT-S) 8.6-50 MG tablet 2 tablet, BID  gabapentin (NEURONTIN) capsule 300 mg, Daily   And  gabapentin (NEURONTIN) capsule 600 mg, Nightly  insulin glargine (LANTUS) injection vial 55 Units, BID  hydrocortisone 1 % cream, BID PRN  furosemide (LASIX) injection 20 mg, BID  sacubitril-valsartan (ENTRESTO) 24-26 MG per tablet 1 tablet, BID  levothyroxine (SYNTHROID) tablet 150 mcg, Daily  metoprolol tartrate (LOPRESSOR) tablet 12.5 mg, BID  oxybutynin (DITROPAN-XL) extended release tablet 15 mg, Daily  rivaroxaban (XARELTO) tablet 20 mg, Dinner  atorvastatin (LIPITOR) tablet 80 mg, Daily  dextrose bolus 10% 125 mL, PRN   Or  dextrose bolus 10% 250 mL, PRN  glucagon (rDNA) injection 1 mg, PRN  dextrose 5 % solution, PRN  sodium chloride flush 0.9 % injection 5-40 mL, 2 times per day  sodium chloride flush 0.9 % injection 5-40 mL, PRN  0.9 % sodium chloride infusion, PRN  ondansetron (ZOFRAN-ODT) disintegrating tablet 4 mg, Q8H PRN   Or  ondansetron (ZOFRAN) injection 4 mg, Q6H PRN  polyethylene glycol (GLYCOLAX) packet 17 g, Daily PRN  acetaminophen (TYLENOL) tablet 650 mg, Q6H PRN   Or  acetaminophen (TYLENOL) suppository 650 mg, Q6H PRN  insulin lispro (HUMALOG) injection vial 0-12 Units, TID WC  insulin lispro (HUMALOG) injection vial 0-6 Units, Nightly      Objective:  BP (!) 95/50   Pulse 59   Temp 98.5 °F (36.9 °C) (Oral)   Resp 18   Ht 5' (1.524 m)   Wt 241 lb 2.9 oz (109.4 kg)   LMP  (LMP Unknown)   SpO2 94%   BMI 47.10 kg/m²     Intake/Output Summary (Last 24 hours) at 6/20/2022 1348  Last data filed at 6/19/2022 2300  Gross per 24 hour   Intake 355 ml   Output 1450 ml   Net -1095 ml      Wt Readings from Last 3 Encounters:   06/19/22 241 lb 2.9 oz (109.4 kg)   10/06/21 225 lb (102.1 kg)   09/27/21 225 lb (102.1 kg)     General:  Awake, alert, oriented in NAD  Skin:  Warm and dry. No unusual bruising or rash  Neck:  Supple. No JVD appreciated  Chest:  Normal effort. Clear to auscultation, no wheezes/rhonchi/rales  Cardiovascular:  RRR, normal S1/S2, no murmur/gallop/rub  Abdomen:  Soft, nontender, +bowel sounds  Extremities:  No edema  Neurological: No focal deficits  Psychological: Normal mood and affect      Labs and Tests:  CBC:   Recent Labs     06/18/22  0652 06/19/22  0915 06/20/22  0653   WBC 6.5 7.6 7.8   HGB 13.2 13.6 13.0    146 146     BMP:    Recent Labs     06/18/22  0652 06/19/22  0915 06/20/22  0652    137 140   K 4.5 4.4 3.9    97* 100   CO2 31 29 27   BUN 36* 48* 65*   CREATININE 1.0 1.0 1.3*   GLUCOSE 136* 257* 153*     Hepatic:   No results for input(s): AST, ALT, ALB, BILITOT, ALKPHOS in the last 72 hours.     Results for [facility] (MRN [id number]) as of 6/20/2022 14:05   Ref. Range 6/20/2022 02:15 6/20/2022 07:38 6/20/2022 08:25 6/20/2022 11:25   POC Glucose Latest Ref Range: 70 - 99 mg/dl 193 (H) 130 (H) 360 (H) 272 (H)         CXR 6/16/2022:  Stable cardiomegaly with central pulmonary vascular congestion and hazy   bibasilar airspace opacities favored to represent edema           Echo 6/17/2022:  Summary   -Technically limited portable study secondary to patient's immobility (upright semi supine position). -Normal left ventricle size,mild to moderate concentric wall thickness and normal systolic function with an estimated ejection fraction of 55%. -Abnormal mid to distal septal and inferoseptal motion is present.   -Indeterminate diastolic function. due to mitral annular calcification. E/e\"=20.3.   -Mitral annular calcification is present. -Mild to moderate posteriorly directed mitral regurgitation.   -Aortic valve appears sclerotic but opens adequately. -Mild tricuspid regurgitation.   -Estimated pulmonary artery systolic pressure is borderline elevated at 33 mmHg assuming a right atrial pressure of 8 mmHg. -The left atrium is at the upper limits of normal in size to mildly dilated. -The right ventricle is not well visualized. -Right ventricular systolic function appears mildly reduced . TAPSE 1.22 cm.   -RV S velocity 8.6 cm/s. -Hypokinetic free wall of the right ventricle. Problem List  Principal Problem:    UTI (urinary tract infection)  Resolved Problems:    * No resolved hospital problems. *       Assessment & Plan:   1. Acute CHF. CXR with pulmonary vascular congestion, proBNP only 104 but patient is obese. Started on IV Lasix with good results and subjective improvement. Echo shows normal EF, indeterminate diastolic function. Entresto started 6/19 following lisinopril washout period. Transition to po Lasix. 2. CAD / elevated troponin. Hx CABG 2004. Troponin 0.05. Denies chest pain.  Echo with EF 55%, there is abnormal mid to distal septal and inferoseptal motion present. Evaluated by cardiology, patient possibly had small MI in last couple of weeks. Considered high risk for coronary angiogram (hx anaphylaxis with contrast use) so being managed medically. Continue statin and beta blocker. No asa secondary chronic AC. 3. DM2. A1c 6.4. BG values values elevated. Continue BID Lantus, transition to high dose correction. and medium correction scale. 4. PAF. Presents in sinus rhythm. Continue beta blocker and Xarelto. 5. Hypothyroidism. TSH 1.74. Continue levothyroxine. 6. Recent UTI. Per daughter, patient was taking Ceclor for UTI, had 3 tabs to complete course. UA on admission negative. Family brought Ceclor from home and antibiotic course completed. 7. Deconditioned. Evaluated by PT/OT and home therapy recommended on DC to address ADL and functional mobility deficits, PT 19/24, OT 16/24. Home care orders placed. Disposition: Patient is fairly sedentary secondary to chronic arthritic knee pain, uses wc at home. PT/OT have recommended home therapy upon DC. Home care consult placed. Medically stable for DC and order placed. Daughter informs me they want to appeal DC as patient too weak to return home. Case management notified. .     Diet: ADULT DIET; Regular;  Low Sodium (2 gm)  Code:Full Code  DVT PPX: [address] MATEUSZ West CNP   6/20/2022 1:48 PM

## 2022-06-20 NOTE — DISCHARGE SUMMARY
[facility]    Patient Demographics    Patient. Amanda Brennan  Date of Birth. 1940  MRN. [id number]     Primary care provider. MATEUSZ Stearns CNP  (Tel: [phone])    Admit date: 6/16/2022    Discharge date (blank if same as Note Date): DC order placed 6/20, DC delay secondary to patient appealed Dc. Note Date: 6/21/2022     Reason for Hospitalization. Chief Complaint   Patient presents with    Fatigue     pt brought in by EMS. pt states fatigue for a few days, had SOB a few days ago but better now. Significant Findings. Principal Problem:    UTI (urinary tract infection)  Resolved Problems:    * No resolved hospital problems. *       Problems and results from this hospitalization that need follow up. 1. HFpEF. Cardiology follow up with 1 week. 2. CBC, BMP weekly x 2, home health to draw. Significant test results and incidental findings. CXR 6/16/2022:  Stable cardiomegaly with central pulmonary vascular congestion and hazy   bibasilar airspace opacities favored to represent edema           Echo 6/17/2022:  Summary   -Technically limited portable study secondary to patient's immobility (upright semi supine position).  -Normal left ventricle size,mild to moderate concentric wall thickness and normal systolic function with an estimated ejection fraction of 55%.  -Abnormal mid to distal septal and inferoseptal motion is present.   -Indeterminate diastolic function. due to mitral annular calcification. E/e\"=20.3.   -Mitral annular calcification is present.   -Mild to moderate posteriorly directed mitral regurgitation.   -Aortic valve appears sclerotic but opens adequately.   -Mild tricuspid regurgitation.   -Estimated pulmonary artery systolic pressure is borderline elevated at 33 mmHg assuming a right atrial pressure of 8 mmHg.   -The left atrium is at the upper limits of normal in size to mildly dilated.   -The right ventricle is not well visualized.   -Right ventricular systolic function appears mildly reduced . TAPSE 1.22 cm.   -RV S velocity 8.6 cm/s.   -Hypokinetic free wall of the right ventricle. Invasive procedures and treatments. 1. None     Problem-based Hospital Course. Patient is an 79 yo Thai speaking female with hx atrial fibrillation, CAD/CABG (2004), DM2, HTN, hypothyroidism. She presented to hospital with c/o fatigue and dyspnea which has been ongoing for past few days. CXR with pulmonary congestion and she was started on IV Lasix. Troponin 0.05. Lost 11 pounds with diuresis. 1. Acute CHF. CXR with pulmonary vascular congestion, proBNP only 104 but patient is obese. Started on IV Lasix with good results and subjective improvement. Echo shows normal EF, indeterminate diastolic function. Transitioned to oral Lasix 6/20. Entresto started 6/19 following lisinopril washout period. SaO2% 96% on RA after ambulation from bathroom. 2. CAD / elevated troponin. Hx CABG 2004. Troponin 0.05. Denies chest pain. Echo with EF 55%, there is abnormal mid to distal septal and inferoseptal motion present. Evaluated by cardiology, believe patient possibly had small MI in last couple of weeks. Considered high risk for coronary angiogram (hx anaphylaxis with contrast use) so being managed medically. Continued statin and beta blocker. No asa secondary chronic AC. 3. DM2. A1c 6.4. Covered with BID Lantus and high dose correction scale. Resumed home regimen on DC. 4. PAF. Presented in sinus rhythm, no afib detected during hospitalization. Continued beta blocker and Xarelto. 5. Hypothyroidism. TSH 1.74. Continued levothyroxine. 6. Recent UTI. Per daughter, patient was taking Ceclor for UTI, had 3 tabs to complete course. UA on admission negative. Family brought Ceclor from home and antibiotic course completed. 7. Deconditioning.  PT/OT evaluated and recommend continued therapy on DC to address ADL and functional mobility deficits, consult placed. Reviewed DC plans, follow up and medications. Expresses understanding. Questions answered. Consults. IP CONSULT TO CARDIOLOGY  IP CONSULT TO HOME CARE NEEDS    Physical examination on discharge day. /70   Pulse 65   Temp 97.5 °F (36.4 °C) (Oral)   Resp 18   Ht 5' (1.524 m)   Wt 243 lb 9.6 oz (110.5 kg)   LMP  (LMP Unknown)   SpO2 94%   BMI 47.57 kg/m²   General appearance. Alert. Looks comfortable. HEENT. Sclera clear. Moist mucus membranes. Cardiovascular. Regular rate and rhythm, normal S1, S2. No murmur. Respiratory. Not using accessory muscles. Clear to auscultation bilaterally, no wheeze. Gastrointestinal. Abdomen soft, non-tender, not distended, normal bowel sounds  Neurology. Facial symmetry. No speech deficits. Moving all extremities equally. Extremities. No edema in lower extremities. Skin. Warm, dry, normal turgor    Condition at time of discharge stable    Medication instructions provided to patient at discharge.      Medication List      START taking these medications    furosemide 20 MG tablet  Commonly known as: LASIX  Take 1 tablet by mouth daily     sacubitril-valsartan 24-26 MG per tablet  Commonly known as: ENTRESTO  Take 1 tablet by mouth 2 times daily        CHANGE how you take these medications    gabapentin 300 MG capsule  Commonly known as: NEURONTIN  What changed:   · how much to take  · how to take this  · when to take this  · additional instructions     TYLENOL/CODEINE #3 300-30 MG per tablet  Generic drug: acetaminophen-codeine  What changed:   · medication strength  · how much to take  · when to take this  · reasons to take this        CONTINUE taking these medications    atorvastatin 80 MG tablet  Commonly known as: LIPITOR  Take 1 tablet by mouth daily     Basaglar KwikPen 100 UNIT/ML injection pen  Generic drug: insulin glargine     benzonatate 100 MG capsule  Commonly known as: TESSALON     Biotin 5000 MCG Tabs     hydrocortisone 2.5 % rectal cream  Commonly known as: ANUSOL-HC     levothyroxine 175 MCG tablet  Commonly known as: SYNTHROID     lidocaine 5 %  Commonly known as: LIDODERM  Place 1 patch onto the skin daily 12 hours on, 12 hours off.     loratadine 10 MG capsule  Commonly known as: CLARITIN     magnesium 30 MG tablet     metoprolol tartrate 25 MG tablet  Commonly known as: LOPRESSOR  TAKE ONE-HALF TABLET BY MOUTH TWICE A DAY     NovoFine 32G X 6 MM Misc  Generic drug: Insulin Pen Needle     NovoLOG 100 UNIT/ML injection vial  Generic drug: insulin aspart     Nyamyc 938043 UNIT/GM powder  Generic drug: nystatin     Omega 3 1000 MG Caps     oxybutynin 15 MG extended release tablet  Commonly known as: DITROPAN XL     OYSTER SHELL CALCIUM + D PO     phenazopyridine 100 MG tablet  Commonly known as: PYRIDIUM     rivaroxaban 20 MG Tabs tablet  Commonly known as: XARELTO  Take 1 tablet by mouth daily     senna 8.6 MG tablet  Commonly known as: SENOKOT     tiZANidine 2 MG tablet  Commonly known as: ZANAFLEX     triamcinolone 0.1 % cream  Commonly known as: KENALOG     Victoza 18 MG/3ML Sopn SC injection  Generic drug: Liraglutide     vitamin D 1.25 MG (92866 UT) Caps capsule  Commonly known as: ERGOCALCIFEROL        STOP taking these medications    alendronate 70 MG tablet  Commonly known as: FOSAMAX     cefaclor 500 MG capsule  Commonly known as: CECLOR     lisinopril 2.5 MG tablet  Commonly known as: PRINIVIL;ZESTRIL           Where to Get Your Medications      These medications were sent to [facility] [id number] [address], [address] [place] [address]    Phone: [phone]   · furosemide 20 MG tablet  · sacubitril-valsartan 24-26 MG per tablet         Discharge recommendations given to patient. Follow Up. CHF clinic in 1 week   Disposition. Home with home care  Activity.  activity as tolerated  Diet: ADULT DIET; Regular; Low Sodium (2 gm)      Spent greater than 30 minutes in discharge process. Signed:   MATEUSZ Wong CNP     6/21/2022 9:19 PM

## 2022-06-20 NOTE — PROGRESS NOTES
[facility]   Phone: [phone]    Physical Therapy    [x] Initial Evaluation            [] Daily Treatment Note         [] Discharge Summary      Patient: Hood Garcia   : 1940   MRN: [id number]   Date of Service:  2022  Admitting Diagnosis: UTI (urinary tract infection)  Current Admission Summary: Hood Garcia is a 78 y.o. female with h/ o  CAD, DM, Obesity. Afib , chronic anticoagulation  HTN presented with c/o fatigue and dyspnea. Sx on going for a few days. Also reports weight gain and leg edema. Last ECHO in  showed EF 55 % . She is not currently on Diuretics  Chest xray showed pulmonary vascular congestion and cardiomegaly. UA showed UTI   Past Medical History:  has a past medical history of 17 Left knee repeat repair of median parapatellar arthrotomy with augmentation, Arthritis, Atrial fibrillation ([address] CAD (coronary artery disease), Cataract, Diabetes mellitus ([address] GERD (gastroesophageal reflux disease), Hyperlipidemia, Hypertension, HYPOTHRYROIDISM, Non-English speaking patient, Sleep apnea, Thyroid disease, and UTI. Past Surgical History:  has a past surgical history that includes joint replacement (Bilateral, [address]); Cardiac surgery (); Cholecystectomy, laparoscopic (5/15/14); Revision total knee arthroplasty (Right, 2016); Total knee arthroplasty; and knee surgery (Left, 2017). Discharge Recommendations: Hood Garcia scored a 19/24 on the AM-PAC short mobility form. Current research shows that an AM-PAC score of 18 or greater is typically associated with a discharge to the patient's home setting. Based on the patient's AM-PAC score and their current functional mobility deficits, it is recommended that the patient have 2-3 sessions per week of Physical Therapy at d/c to increase the patient's independence.   At this time, this patient demonstrates the endurance and safety to discharge home with home services and a follow up treatment frequency of 2-3x/wk. Please see assessment section for further patient specific details. If patient discharges prior to next session this note will serve as a discharge summary. Please see below for the latest assessment towards goals. HOME HEALTH CARE: LEVEL 1 STANDARD    - Initial home health evaluation to occur within 24-48 hours, in patient home   - Therapy to evaluate with goal of regaining prior level of functioning   - Therapy to evaluate if patient has [address] needs for personal care    DME Required For Discharge: no DME required at discharge  Precautions/Restrictions: high fall risk  Weight Bearing Restrictions: no restrictions  [] Right Upper Extremity  [] Left Upper Extremity [] Right Lower Extremity  [] Left Lower Extremity     Required Braces/Orthotics: Other - Knee brace , pt reports requiring this for standing. [] Right  [x] Left  Positional Restrictions:no positional restrictions    Pre-Admission Information   Lives With: alone, has home caregiver than comes ~4 hours 7x/week    Type of Home: house  Home Layout: two level, able to live on main level, . Comment: with basement  Home Access: level entry  Bathroom Layout: walker accessible, wheelchair accessible, walk in shower, handicap height toilet  Toilet Height: elevated height  Bathroom Equipment: grab bars in shower, grab bars around toilet, shower chair, hand held shower head  Home Equipment: rolling walker, manual wheelchair, electric wheelchair, . Comment: Has not yet used power WC  Transfer Assistance: requires assistance, .   Comment: Caregiver helps in the morning, she is able to do with effort once caregiver leaves  Ambulation Assistance:modified independent with use of WC/RW  ADL Assistance: requires assistance with bathing, requires assistance with dressing, requires assistance with toileting  IADL Assistance: requires assistance with all homemaking tasks  Active : [] Yes  [x] No  Hand Dominance: [] Left  [x] Right  Current Employment: retired. Occupation: was a restaurant owner  Hobbies: Watches TV, occasionally prepares food with help of children  Recent Falls: Macy Dakin once, caregiver helped her    Examination   Vision:   Vision Gross Assessment: WFL and Vision Corrective Device: wears glasses for reading  Hearing:   left hearing aid  Observation:   General Observation:  Pt semi-reclined in bed, with purewick  Posture: Forward head rounded shoulder  Sensation:   reports numbness and tingling in (B) UE  Proprioception:    WFL  Tone:   Normotonic  Coordination Testing:   WFL    ROM:   (B) LE AROM WFL  (B) Hip AROM WFL  Strength:   (B) LE strength grossly +3  Decision Making: low complexity  Clinical Presentation: stable      Subjective  General: Pt semi-reclined in bed upon therapist arrival. Pt agreeable to PT/OT evaluation.  utilized throughout although pt was able to communicate with therapist without relying on  for most of session. Pain: Pt reports shoulder and leg always are constantly sore ~5-6/10  Pain Interventions: not applicable       Functional Mobility  Bed Mobility  Supine to Sit: supervision  Rolling Left: supervision  Scooting: supervision  Comments: With [facility] elevated and pt utilizing rail of bed  Transfers  Sit to stand transfer: minimal assistance, moderate assistance  Stand to sit transfer: minimal assistance  Stand step transfer: minimal assistance  Comments: Sit > stand x4 (mod assist required for last sit >stand off of toilet), stand > sit x4, stand step transfer x2 to toilet and to recliner  Pt required minimal assistance and moderate verbal cueing for hand placement and posture during transfers throughout treatment session. Ambulation  Surface:level surface  Assistive Device: rolling walker  Assistance: contact guard assistance  Distance: 6 +2 + 6 ft  Gait Mechanics:  Forward flexed posture, decreased TKE, decreased step length, narrow RAMSEY, heavy reliance of UE on 2WW  Comments: Pt cued for tall posture and hand placement on walker. Stair Mobility  Stair mobility not completed on this date. Comments:  Wheelchair Mobility:  No w/c mobility completed on this date. Comments:  Balance  Static Sitting Balance: fair (+): maintains balance at SBA/supervision without use of UE support  Dynamic Sitting Balance: fair (+): maintains balance at SBA/supervision without use of UE support  Static Standing Balance: poor (+): requires min (A) to maintain balance  Dynamic Standing Balance: poor (+): requires min (A) to maintain balance  Comments: Pt able to maintain balance EOB for ~15 min while completing ADLs. Balance maintained with heavy reliance of UEs on 2WW. Other Therapeutic Interventions  ADLs - see OT note  Pericare     Functional Outcomes  AM-PAC Inpatient Mobility Raw Score : 23              Cognition  WFL  Orientation:    alert and oriented x 4, says she is going to be 78 in august.  Command Following:   [facility]    Education  Barriers To Learning: language  Patient Education: patient educated on PT role and benefits, plan of care, general safety, functional mobility training, proper use of assistive device/equipment, transfer training, discharge recommendations  Learning Assessment:  patient verbalizes and demonstrates understanding    Assessment  Activity Tolerance: Pt demonstrates reduced activity tolerance by requiring frequent seated rest breaks with minimal ambulation throughout therapy. Pt reported \"feeling tired\" at end of session. Impairments Requiring Therapeutic Intervention: decreased functional mobility, decreased ADL status, decreased strength, decreased endurance, decreased balance, decreased posture  Prognosis: good  Clinical Assessment: Pt is an 78 y.o female who presents with the above impairments. Deconditioning appears to be the most limiting factor for the pt today, as frequent rest breaks were required.  Positive prognostic factors for improving pt function include home aide that assists as needed 7x/week, family support (7 children), and positive demeanor. Negative prognostic factors include extensive surgical history and sedentary life style. At this time, home health PT is recommended to optimize pt function. Safety Interventions: patient left in chair, chair alarm in place, call light within reach, gait belt and patient at risk for falls    Plan  Frequency: 3-5 x/per week  Current Treatment Recommendations: strengthening, balance training, functional mobility training, transfer training, gait training, endurance training, wheelchair mobility training, patient/caregiver education, ADL/self-care training, home management training, home exercise program and safety education    Goals  Patient Goals: None stated   Short Term Goals:  Time Frame: Prior to D/C  Patient will complete bed mobility at modified independent   Patient will complete transfers at modified independent   Patient to maintain standing at modified independent for 10 minutes. with use of 2WW    Therapy Session Time      Individual Group Co-treatment   Time In     0852   Time Out     1012   Minutes     80     Timed Code Treatment Minutes:  65 Minutes  Total Treatment Minutes:  80     Neelam Morales SPT  PT providing direct supervision during session and assisting in making skilled judgements throughout session.   Electronically Signed By: Maricruz Parekh PT  Maricruz Parekh PT, DPT, [id number]

## 2022-06-20 NOTE — PROGRESS NOTES
[facility] [address]   Phone: [phone]    Occupational Therapy    [x] Initial Evaluation            [] Daily Treatment Note         [] Discharge Summary      Patient: Galileo Douglas   : 1940   MRN: [id number]   Date of Service:  2022    Admitting Diagnosis:  UTI (urinary tract infection)  Current Admission Summary: Galileo Douglas is a 78 y.o. female who presents to the emergency department with awakening. This is an 47-year-old female presents with lower extremity edema, 20 pound weight gain and shortness of breath on exertion. The patient was sent in by her cardiologist for further evaluation. She denies any chest pain. She denies any fevers or chills. This been going on for the last several weeks. Past Medical History:  has a past medical history of 15 Left knee repeat repair of median parapatellar arthrotomy with augmentation, Arthritis, Atrial fibrillation ([address] CAD (coronary artery disease), Cataract, Diabetes mellitus ([address] GERD (gastroesophageal reflux disease), Hyperlipidemia, Hypertension, HYPOTHRYROIDISM, Non-English speaking patient, Sleep apnea, Thyroid disease, and UTI. Past Surgical History:  has a past surgical history that includes joint replacement (Bilateral, [address]); Cardiac surgery (); Cholecystectomy, laparoscopic (5/15/14); Revision total knee arthroplasty (Right, 2016); Total knee arthroplasty; and knee surgery (Left, 2017). Discharge Recommendations: Galileo Douglas scored a 16/24 on the AM-PAC ADL Inpatient form. Current research shows that an AM-PAC score of 18 or greater is typically associated with a discharge to the patient's home setting. Based on the patient's AM-PAC score, and their current ADL deficits, it is recommended that the patient have 2-3 sessions per week of Occupational Therapy at d/c to increase the patient's independence.   At this time, this patient demonstrates the endurance and safety to discharge home with  (home vs OP services) and a follow up treatment frequency of 2-3x/wk. Please see assessment section for further patient specific details. HOME HEALTH CARE: LEVEL 1 STANDARD    - Initial home health evaluation to occur within 24-48 hours, in patient home   - Therapy to evaluate with goal of regaining prior level of functioning   - Therapy to evaluate if patient has [address] needs for personal care    Although pt's AMPAC score is lower, she gets significant help from an aide on a daily basis. f patient discharges prior to next session this note will serve as a discharge summary. Please see below for the latest assessment towards goals. DME Required For Discharge: no DME required at discharge    Precautions/Restrictions: high fall risk  Required Braces/Orthotics: Other - pt's own knee brace   [] Right  [x] Left (Pt wears a knee brace on L knee; dtr called and stated that pt needs knee brace when standing. Positional Restrictions:no positional restrictions    Pre-Admission Information   Lives With: alone, has home caregiver than comes ~4 hours 7x/week                     Type of Home: house  Home Layout: two level, able to live on main level, . Comment: with basement  Home Access: level entry  Bathroom Layout: walker accessible, wheelchair accessible, walk in shower, handicap height toilet  Toilet Height: elevated height  Bathroom Equipment: grab bars in shower, grab bars around toilet, shower chair, hand held shower head  Home Equipment: rolling walker, manual wheelchair, electric wheelchair, . Comment: Has not yet used power WC  Transfer Assistance: requires assistance, .   Comment: Caregiver helps in the morning, she is able to do with effort once caregiver leaves  Ambulation Assistance:modified independent with use of WC/RW  ADL Assistance: requires assistance with bathing, requires assistance with dressing, requires assistance with toileting (Aide bathes and dresses pt; total assist. Assists with toileting as needed)  IADL Assistance: requires assistance with all homemaking tasks  Active :        []? Yes            [x]? No  Hand Dominance: []? Left            [x]? Right  Current Employment: retired. Occupation: was a restaurant owner  Hobbies: Watches TV, occasionally prepares food with help of children  Recent Falls: Lazaro Anguiano once, caregiver helped her; not recently  Examination   Vision:   Vision Gross Assessment: WFL and Vision Corrective Device: wears glasses for reading  Hearing:   hard of hearing, left hearing aid  Sensation:   reports numbness and tingling in (B) UE (hands)  Tone:   Normotonic  Coordination Testing:   WFL  (for ADL activity)  ROM:   (B) UE AROM WFL for UB dressing, grooming  Strength:   (B) UE strength grossly WFL for transfers    Decision Making: medium complexity  Clinical Presentation: stable      Subjective  General: Pt supine in bed, very pleasant and agreeable to OT eval. Pt reports 5/10 pain in L LE and B shoulders. RN notified. [employer] teleinterpreter, Natividad Norris, [id number] used. Pain: 5/10. Location: L LE, B shoulders  Pain Interventions: RN notified        Activities of Daily Living  Basic Activities of Daily Living  Grooming: setup assistance stand by assistance  Grooming Comments: brush teeth, wash face, seated in front of sink  Upper Extremity Bathing: minimal assistance . Comment: per pt request.  Equipment: none  Lower Extremity Bathing: maximum assistance . Comment: pancho hygiene only in supine & side-lying. Equipment: none  Upper Extremity Dressing: setup assistance stand by assistance . Comment: donned dress. Equipment: none  Lower Extremity Dressing: dependent . Comment: socks. Equipment: none  Toileting: maximum assistance. Equipment: none  Toileting Comments: D clothing mgt up, D hygiene  Instrumental Activities of Daily Living  No IADL completed on this date.     Functional Mobility  Bed Mobility  Supine to Sit: stand by assistance  Rolling Left: stand by assistance  Rolling Right: stand by assistance  Comments: Pt rolled for pancho hygiene & don pullup; used rail w/HOB elevated for supine to sit. Transfers  Sit to stand transfer:minimal assistance, moderate assistance, . Comment Mod A from toilet, Min A from EOB  Stand to sit transfer: minimal assistance, moderate assistance, . Comment Min A to recliner, Mod A to std toilet w/grab bar  Stand step transfer: minimal assistance, moderate assistance, . Comment w/RW  Toilet transfer: moderate assistance, . Comment RW, grab bar. Mobility technique: ambulating. Equipment utilized: standard toilet, grab bars, walker  Comments:Pt sat at an angle on the toilet, instead of moving directly in front of toilet (bathroom crowded with equipment), then needed assist during sit to stand for attempting to reach w/B hands to grab bar on L side. Verbal cues for hand placement with transfers. Functional Mobility:  Sitting Balance: stand by assistance, . Comment ~15-20 min on EOB. Sitting Balance Comment: for bathing/dressing  Standing Balance: contact guard assistance, moderate assistance, . Comment Mod A for static standing for clothing mgt at toilet; CGA w/RW for functional mobility. Standing Balance Comment: w/RW  Functional Mobility: rolling walker. contact guard assistance. Activity: to/from bathroom       Functional Outcomes  -PAC Inpatient Daily Activity Raw Score: 16    Cognition  WFL  Orientation:    alert and oriented x 4  Command Following:   [facility]     Education  Barriers To Learning: language and cultural  Patient Education: patient educated on OT role and benefits, plan of care, transfer training, discharge recommendations  Learning Assessment:  patient verbalizes and demonstrates understanding    Assessment  Activity Tolerance: Tolerated well.   Impairments Requiring Therapeutic Intervention: decreased functional mobility, decreased ADL status, decreased balance  Prognosis: good  Clinical Assessment: Pt is slightly below her baseline level of occupational function, based on the above deficits associated with UTI. Pt would benefit from continued skilled acute OT services to address these deficits. Safety Interventions: patient left in chair, chair alarm in place, call light within reach, gait belt, patient at risk for falls and nurse notified    Plan  Frequency: 3-5 x/per week  Current Treatment Recommendations: functional mobility training, transfer training, ADL/self-care training and safety education    Goals  Patient Goals: to return home   Short Term Goals:  Time Frame: Discharge  Patient will complete upper body ADL at supervision   Patient will complete lower body ADL at moderate assistance   Patient will complete toileting at moderate assistance   Patient will complete functional transfers at modified independent, . Comment w/RW   Patient will complete functional mobility at modified independent, .   Comment w/RW     Therapy Session Time     Individual Group Co-treatment   Time In    0852   Time Out    1012   Minutes    80        Timed Code Treatment Minutes:   65 min  Total Treatment Minutes:  80 min       Electronically Signed By: TRISTON Ross., OTR/L, [id number]

## 2022-06-21 LAB
ANION GAP SERPL CALCULATED.3IONS-SCNC: 15 MMOL/L (ref 3–16)
BUN BLDV-MCNC: 69 MG/DL (ref 7–20)
CALCIUM SERPL-MCNC: 9.4 MG/DL (ref 8.3–10.6)
CHLORIDE BLD-SCNC: 101 MMOL/L (ref 99–110)
CO2: 24 MMOL/L (ref 21–32)
CREAT SERPL-MCNC: 1.3 MG/DL (ref 0.6–1.2)
GFR AFRICAN AMERICAN: 47
GFR NON-AFRICAN AMERICAN: 39
GLUCOSE BLD-MCNC: 114 MG/DL (ref 70–99)
GLUCOSE BLD-MCNC: 129 MG/DL (ref 70–99)
GLUCOSE BLD-MCNC: 205 MG/DL (ref 70–99)
GLUCOSE BLD-MCNC: 266 MG/DL (ref 70–99)
GLUCOSE BLD-MCNC: 346 MG/DL (ref 70–99)
GLUCOSE BLD-MCNC: 363 MG/DL (ref 70–99)
HCT VFR BLD CALC: 40.1 % (ref 36–48)
HEMOGLOBIN: 12.9 G/DL (ref 12–16)
MCH RBC QN AUTO: 28.4 PG (ref 26–34)
MCHC RBC AUTO-ENTMCNC: 32.1 G/DL (ref 31–36)
MCV RBC AUTO: 88.4 FL (ref 80–100)
PDW BLD-RTO: 17.1 % (ref 12.4–15.4)
PERFORMED ON: ABNORMAL
PLATELET # BLD: 141 K/UL (ref 135–450)
PMV BLD AUTO: 8.9 FL (ref 5–10.5)
POTASSIUM SERPL-SCNC: 3.8 MMOL/L (ref 3.5–5.1)
RBC # BLD: 4.53 M/UL (ref 4–5.2)
SODIUM BLD-SCNC: 140 MMOL/L (ref 136–145)
WBC # BLD: 6.3 K/UL (ref 4–11)

## 2022-06-21 PROCEDURE — 1200000000 HC SEMI PRIVATE

## 2022-06-21 PROCEDURE — 80048 BASIC METABOLIC PNL TOTAL CA: CPT

## 2022-06-21 PROCEDURE — 2580000003 HC RX 258: Performed by: FAMILY MEDICINE

## 2022-06-21 PROCEDURE — 6370000000 HC RX 637 (ALT 250 FOR IP): Performed by: FAMILY MEDICINE

## 2022-06-21 PROCEDURE — 85027 COMPLETE CBC AUTOMATED: CPT

## 2022-06-21 PROCEDURE — 6370000000 HC RX 637 (ALT 250 FOR IP): Performed by: INTERNAL MEDICINE

## 2022-06-21 PROCEDURE — 36415 COLL VENOUS BLD VENIPUNCTURE: CPT

## 2022-06-21 PROCEDURE — 6370000000 HC RX 637 (ALT 250 FOR IP): Performed by: NURSE PRACTITIONER

## 2022-06-21 RX ORDER — FUROSEMIDE 20 MG/1
20 TABLET ORAL DAILY
Status: DISCONTINUED | OUTPATIENT
Start: 2022-06-22 | End: 2022-06-23 | Stop reason: HOSPADM

## 2022-06-21 RX ADMIN — GABAPENTIN 300 MG: 300 CAPSULE ORAL at 10:57

## 2022-06-21 RX ADMIN — INSULIN LISPRO 12 UNITS: 100 INJECTION, SOLUTION INTRAVENOUS; SUBCUTANEOUS at 12:05

## 2022-06-21 RX ADMIN — RIVAROXABAN 20 MG: 20 TABLET, FILM COATED ORAL at 17:59

## 2022-06-21 RX ADMIN — METOPROLOL TARTRATE 12.5 MG: 25 TABLET, FILM COATED ORAL at 20:47

## 2022-06-21 RX ADMIN — INSULIN GLARGINE 55 UNITS: 100 INJECTION, SOLUTION SUBCUTANEOUS at 20:51

## 2022-06-21 RX ADMIN — OXYBUTYNIN CHLORIDE 15 MG: 5 TABLET, EXTENDED RELEASE ORAL at 10:57

## 2022-06-21 RX ADMIN — GABAPENTIN 600 MG: 300 CAPSULE ORAL at 20:47

## 2022-06-21 RX ADMIN — ATORVASTATIN CALCIUM 80 MG: 80 TABLET, FILM COATED ORAL at 10:58

## 2022-06-21 RX ADMIN — SACUBITRIL AND VALSARTAN 1 TABLET: 24; 26 TABLET, FILM COATED ORAL at 10:58

## 2022-06-21 RX ADMIN — SACUBITRIL AND VALSARTAN 1 TABLET: 24; 26 TABLET, FILM COATED ORAL at 20:47

## 2022-06-21 RX ADMIN — INSULIN GLARGINE 55 UNITS: 100 INJECTION, SOLUTION SUBCUTANEOUS at 11:10

## 2022-06-21 RX ADMIN — Medication 10 ML: at 20:50

## 2022-06-21 RX ADMIN — INSULIN LISPRO 5 UNITS: 100 INJECTION, SOLUTION INTRAVENOUS; SUBCUTANEOUS at 20:50

## 2022-06-21 RX ADMIN — ACETAMINOPHEN 650 MG: 325 TABLET ORAL at 00:17

## 2022-06-21 RX ADMIN — Medication 10 ML: at 11:02

## 2022-06-21 RX ADMIN — INSULIN LISPRO 15 UNITS: 100 INJECTION, SOLUTION INTRAVENOUS; SUBCUTANEOUS at 18:00

## 2022-06-21 ASSESSMENT — PAIN SCALES - GENERAL
PAINLEVEL_OUTOF10: 0

## 2022-06-21 NOTE — PROGRESS NOTES
Had a lengthy conversation with family and ITZEL Ruiz abut concerns. Stool softeners are canceled. PCAs are getting pt up to chair and ambulating in room. Pt is 11lbs down from admit, no CHF exacerbation. Primary nurses talking with daughter at present time.

## 2022-06-22 LAB
ANION GAP SERPL CALCULATED.3IONS-SCNC: 10 MMOL/L (ref 3–16)
BUN BLDV-MCNC: 56 MG/DL (ref 7–20)
CALCIUM SERPL-MCNC: 9.7 MG/DL (ref 8.3–10.6)
CHLORIDE BLD-SCNC: 105 MMOL/L (ref 99–110)
CO2: 27 MMOL/L (ref 21–32)
CREAT SERPL-MCNC: 1 MG/DL (ref 0.6–1.2)
GFR AFRICAN AMERICAN: >60
GFR NON-AFRICAN AMERICAN: 53
GLUCOSE BLD-MCNC: 189 MG/DL (ref 70–99)
GLUCOSE BLD-MCNC: 195 MG/DL (ref 70–99)
GLUCOSE BLD-MCNC: 245 MG/DL (ref 70–99)
GLUCOSE BLD-MCNC: 323 MG/DL (ref 70–99)
GLUCOSE BLD-MCNC: 334 MG/DL (ref 70–99)
HCT VFR BLD CALC: 38.3 % (ref 36–48)
HEMOGLOBIN: 12.7 G/DL (ref 12–16)
MCH RBC QN AUTO: 29 PG (ref 26–34)
MCHC RBC AUTO-ENTMCNC: 33.1 G/DL (ref 31–36)
MCV RBC AUTO: 87.8 FL (ref 80–100)
PDW BLD-RTO: 17.1 % (ref 12.4–15.4)
PERFORMED ON: ABNORMAL
PLATELET # BLD: 150 K/UL (ref 135–450)
PMV BLD AUTO: 10 FL (ref 5–10.5)
POTASSIUM SERPL-SCNC: 5 MMOL/L (ref 3.5–5.1)
RBC # BLD: 4.36 M/UL (ref 4–5.2)
SODIUM BLD-SCNC: 142 MMOL/L (ref 136–145)
WBC # BLD: 6 K/UL (ref 4–11)

## 2022-06-22 PROCEDURE — 94760 N-INVAS EAR/PLS OXIMETRY 1: CPT

## 2022-06-22 PROCEDURE — 6370000000 HC RX 637 (ALT 250 FOR IP): Performed by: NURSE PRACTITIONER

## 2022-06-22 PROCEDURE — 80048 BASIC METABOLIC PNL TOTAL CA: CPT

## 2022-06-22 PROCEDURE — 36415 COLL VENOUS BLD VENIPUNCTURE: CPT

## 2022-06-22 PROCEDURE — 6370000000 HC RX 637 (ALT 250 FOR IP): Performed by: INTERNAL MEDICINE

## 2022-06-22 PROCEDURE — 6370000000 HC RX 637 (ALT 250 FOR IP): Performed by: FAMILY MEDICINE

## 2022-06-22 PROCEDURE — 1200000000 HC SEMI PRIVATE

## 2022-06-22 PROCEDURE — 2580000003 HC RX 258: Performed by: FAMILY MEDICINE

## 2022-06-22 PROCEDURE — 85027 COMPLETE CBC AUTOMATED: CPT

## 2022-06-22 PROCEDURE — 97535 SELF CARE MNGMENT TRAINING: CPT

## 2022-06-22 RX ORDER — INSULIN LISPRO 100 [IU]/ML
10 INJECTION, SOLUTION INTRAVENOUS; SUBCUTANEOUS
Status: DISCONTINUED | OUTPATIENT
Start: 2022-06-22 | End: 2022-06-23 | Stop reason: HOSPADM

## 2022-06-22 RX ADMIN — GABAPENTIN 300 MG: 300 CAPSULE ORAL at 08:33

## 2022-06-22 RX ADMIN — INSULIN LISPRO 10 UNITS: 100 INJECTION, SOLUTION INTRAVENOUS; SUBCUTANEOUS at 18:00

## 2022-06-22 RX ADMIN — GABAPENTIN 600 MG: 300 CAPSULE ORAL at 21:21

## 2022-06-22 RX ADMIN — INSULIN GLARGINE 55 UNITS: 100 INJECTION, SOLUTION SUBCUTANEOUS at 08:37

## 2022-06-22 RX ADMIN — INSULIN LISPRO 3 UNITS: 100 INJECTION, SOLUTION INTRAVENOUS; SUBCUTANEOUS at 14:36

## 2022-06-22 RX ADMIN — SACUBITRIL AND VALSARTAN 1 TABLET: 24; 26 TABLET, FILM COATED ORAL at 08:36

## 2022-06-22 RX ADMIN — SACUBITRIL AND VALSARTAN 1 TABLET: 24; 26 TABLET, FILM COATED ORAL at 21:22

## 2022-06-22 RX ADMIN — INSULIN LISPRO 12 UNITS: 100 INJECTION, SOLUTION INTRAVENOUS; SUBCUTANEOUS at 18:00

## 2022-06-22 RX ADMIN — LEVOTHYROXINE SODIUM 150 MCG: 0.15 TABLET ORAL at 05:14

## 2022-06-22 RX ADMIN — Medication 10 ML: at 21:23

## 2022-06-22 RX ADMIN — METOPROLOL TARTRATE 12.5 MG: 25 TABLET, FILM COATED ORAL at 21:21

## 2022-06-22 RX ADMIN — INSULIN GLARGINE 55 UNITS: 100 INJECTION, SOLUTION SUBCUTANEOUS at 21:24

## 2022-06-22 RX ADMIN — Medication 10 ML: at 08:50

## 2022-06-22 RX ADMIN — ATORVASTATIN CALCIUM 80 MG: 80 TABLET, FILM COATED ORAL at 08:33

## 2022-06-22 RX ADMIN — OXYBUTYNIN CHLORIDE 15 MG: 5 TABLET, EXTENDED RELEASE ORAL at 08:35

## 2022-06-22 RX ADMIN — FUROSEMIDE 20 MG: 20 TABLET ORAL at 08:33

## 2022-06-22 RX ADMIN — RIVAROXABAN 20 MG: 20 TABLET, FILM COATED ORAL at 17:59

## 2022-06-22 RX ADMIN — INSULIN LISPRO 6 UNITS: 100 INJECTION, SOLUTION INTRAVENOUS; SUBCUTANEOUS at 21:23

## 2022-06-22 RX ADMIN — INSULIN LISPRO 3 UNITS: 100 INJECTION, SOLUTION INTRAVENOUS; SUBCUTANEOUS at 08:38

## 2022-06-22 RX ADMIN — METOPROLOL TARTRATE 12.5 MG: 25 TABLET, FILM COATED ORAL at 12:00

## 2022-06-22 ASSESSMENT — PAIN SCALES - GENERAL
PAINLEVEL_OUTOF10: 0

## 2022-06-22 NOTE — PROGRESS NOTES
[facility]   Phone: [phone]    Physical Therapy    [] Initial Evaluation            [x] Daily Treatment Note         [] Discharge Summary      Patient: Kaleb Butler   : 1940   MRN: [id number]   Date of Service:  2022  Admitting Diagnosis: UTI (urinary tract infection)  Current Admission Summary: Kaleb Butler is a 78 y.o. female with h/ o  CAD, DM, Obesity. Afib , chronic anticoagulation  HTN presented with c/o fatigue and dyspnea. Sx on going for a few days. Also reports weight gain and leg edema. Last ECHO in  showed EF 55 % . She is not currently on Diuretics  Chest xray showed pulmonary vascular congestion and cardiomegaly. UA showed UTI   Past Medical History:  has a past medical history of 17 Left knee repeat repair of median parapatellar arthrotomy with augmentation, Arthritis, Atrial fibrillation ([address] CAD (coronary artery disease), Cataract, Diabetes mellitus ([address] GERD (gastroesophageal reflux disease), Hyperlipidemia, Hypertension, HYPOTHRYROIDISM, Non-English speaking patient, Sleep apnea, Thyroid disease, and UTI. Past Surgical History:  has a past surgical history that includes joint replacement (Bilateral, [address]); Cardiac surgery (); Cholecystectomy, laparoscopic (5/15/14); Revision total knee arthroplasty (Right, 2016); Total knee arthroplasty; and knee surgery (Left, 2017). Discharge Recommendations: Kaleb Butler scored a 19/24 on the AM-PAC short mobility form. Current research shows that an AM-PAC score of 18 or greater is typically associated with a discharge to the patient's home setting. Based on the patient's AM-PAC score and their current functional mobility deficits, it is recommended that the patient have 2-3 sessions per week of Physical Therapy at d/c to increase the patient's independence.   At this time, this patient demonstrates the endurance and safety to discharge home with home services and a follow up treatment frequency of 2-3x/wk. Please see assessment section for further patient specific details. If patient discharges prior to next session this note will serve as a discharge summary. Please see below for the latest assessment towards goals. HOME HEALTH CARE: LEVEL 1 STANDARD    - Initial home health evaluation to occur within 24-48 hours, in patient home   - Therapy to evaluate with goal of regaining prior level of functioning   - Therapy to evaluate if patient has [address] needs for personal care    DME Required For Discharge: no DME required at discharge  Precautions/Restrictions: high fall risk  Weight Bearing Restrictions: no restrictions  [] Right Upper Extremity  [] Left Upper Extremity [] Right Lower Extremity  [] Left Lower Extremity     Required Braces/Orthotics: Other - Knee brace , pt reports requiring this for standing. [] Right  [x] Left  Positional Restrictions:no positional restrictions    Pre-Admission Information   Lives With: alone, has home caregiver than comes ~4 hours 7x/week    Type of Home: house  Home Layout: two level, able to live on main level, . Comment: with basement  Home Access: level entry  Bathroom Layout: walker accessible, wheelchair accessible, walk in shower, handicap height toilet  Toilet Height: elevated height  Bathroom Equipment: grab bars in shower, grab bars around toilet, shower chair, hand held shower head  Home Equipment: rolling walker, manual wheelchair, electric wheelchair, . Comment: Has not yet used power WC  Transfer Assistance: requires assistance, .   Comment: Caregiver helps in the morning, she is able to do with effort once caregiver leaves  Ambulation Assistance:modified independent with use of WC/RW  ADL Assistance: requires assistance with bathing, requires assistance with dressing, requires assistance with toileting  IADL Assistance: requires assistance with all homemaking tasks  Active : [] Yes  [x] No  Hand Dominance: [] Left  [x] Right  Current Employment: retired. Occupation: was a restaurant owner  Hobbies: Watches TV, occasionally prepares food with help of children  Recent Falls: Erica Ventura once, caregiver helped her    Examination   Vision:   Vision Gross Assessment: WFL and Vision Corrective Device: wears glasses for reading  Hearing:   left hearing aid  Observation:   General Observation:  Pt semi-reclined in bed, with purewick  Posture: Forward head rounded shoulder  Sensation:   reports numbness and tingling in (B) UE  Proprioception:    WFL  Tone:   Normotonic  Coordination Testing:   WFL    ROM:   (B) LE AROM WFL  (B) Hip AROM WFL  Strength:   (B) LE strength grossly +3  Decision Making: low complexity  Clinical Presentation: stable      Subjective  General: Pt supine in bed upon arrival; agreeable to PT; pt has recently amb to bathroom with nursing; pt able to communicate during therapy  Pain: Pt reports pain in B shoulders and LEs which is chronic  Pain Interventions: RN notified       Functional Mobility  Bed Mobility  Supine to Sit: supervision  Rolling Left: supervision  Scooting: supervision  Comments: With [facility] elevated and pt utilizing rail of bed  Transfers  Sit to stand transfer: minimal assistance, moderate assistance  Stand to sit transfer: minimal assistance  Stand step transfer: minimal assistance  Comments: Sit > stand x4 (mod assist required for last sit >stand off of toilet), stand > sit x4, stand step transfer x2 to toilet and to recliner  Pt required minimal assistance and moderate verbal cueing for hand placement and posture during transfers throughout treatment session. Ambulation  Surface:level surface  Assistive Device: rolling walker  Assistance: contact guard assistance  Distance: 6 +2 + 6 ft  Gait Mechanics:  Forward flexed posture, decreased TKE, decreased step length, narrow RAMSEY, heavy reliance of UE on 2WW  Comments: Pt cued for tall posture and hand placement on walker. Stair Mobility  Stair mobility not completed on this date. Comments:  Wheelchair Mobility:  No w/c mobility completed on this date. Comments:  Balance  Static Sitting Balance: fair (+): maintains balance at SBA/supervision without use of UE support  Dynamic Sitting Balance: fair (+): maintains balance at SBA/supervision without use of UE support  Static Standing Balance: poor (+): requires min (A) to maintain balance  Dynamic Standing Balance: poor (+): requires min (A) to maintain balance  Comments: Pt able to maintain balance EOB for ~15 min while completing ADLs. Balance maintained with heavy reliance of UEs on 2WW. Other Therapeutic Interventions  ADLs - see OT note  Pericare     Functional Outcomes                 Cognition  WFL  Orientation:    Alert and oriented x 4  Command Following:   [facility]    Education  Barriers To Learning: language  Patient Education: patient educated on PT role and benefits, plan of care, general safety, functional mobility training, proper use of assistive device/equipment, transfer training, discharge recommendations  Learning Assessment:  patient verbalizes and demonstrates understanding    Assessment  Activity Tolerance: Pt demonstrates reduced activity tolerance by requiring frequent seated rest breaks with minimal ambulation throughout therapy. Pt reported \"feeling tired\" at end of session. Impairments Requiring Therapeutic Intervention: decreased functional mobility, decreased ADL status, decreased strength, decreased endurance, decreased balance, decreased posture  Prognosis: good  Clinical Assessment: Pt is an 78 y.o female who presents with the above impairments. Deconditioning appears to be the most limiting factor for the pt today, as frequent rest breaks were required. Positive prognostic factors for improving pt function include home aide that assists as needed 7x/week, family support (7 children), and positive demeanor.  Negative prognostic factors include extensive surgical history and sedentary life style. At this time, home health PT is recommended to optimize pt function. Safety Interventions: patient left in chair, chair alarm in place, call light within reach, gait belt and patient at risk for falls    Plan  Frequency: 3-5 x/per week  Current Treatment Recommendations: strengthening, balance training, functional mobility training, transfer training, gait training, endurance training, wheelchair mobility training, patient/caregiver education, ADL/self-care training, home management training, home exercise program and safety education    Goals  Patient Goals: None stated   Short Term Goals:  Time Frame: Prior to D/C  Patient will complete bed mobility at modified independent   Patient will complete transfers at modified independent   Patient to maintain standing at modified independent for 10 minutes. with use of 2WW    Therapy Session Time      Individual Group Co-treatment   Time In         Time Out         Minutes           Timed Code Treatment Minutes: Total Treatment Minutes:  [de-identified]     Neelam Morales, SPT  PT providing direct supervision during session and assisting in making skilled judgements throughout session.   Electronically Signed By: Rika Koenig, PT  Maricruz Parekh PT, DPT, [id number]

## 2022-06-22 NOTE — CARE COORDINATION
Spoke to daughter Aurelia High ([phone]) re: appeal process and discharge plans. Daughter stated that she would like patient to have rehab in order to build strength to return home. She stated that patient does live alone and would benefit from some therapy. Informed daughter that if the appeal does not return in favor of the patient, the expectation is that the patient will have until the day after the decision at 12 noon to either discharge or begin to incur charges. Aurelia High stated that she understood and the family will begin to look for a skilled nursing facility. Informed Aurelia High that because of her mothers insurance (Medicare), no insurance authorization is needed and when a SNF is selected and they can accept, her mother will be sent that day.  Patient will require medical transport to transfer to the facility

## 2022-06-22 NOTE — CARE COORDINATION
Received a call from [facility] and they stated they were active with the patient prior to admit. Patient was receiving nursing/pt/ot services.     Edenilson Del Cid  Phone: [phone]  Fax: [phone]

## 2022-06-22 NOTE — PROGRESS NOTES
Head to toe assessment completed and morning meds given. Patient is happy and calm. No issues. Call light within reach.

## 2022-06-22 NOTE — PROGRESS NOTES
[facility] Msb Department   Phone: [phone]    Occupational Therapy    [] Initial Evaluation            [x] Daily Treatment Note         [] Discharge Summary      Patient: Lyndon Cardenas   : 1940   MRN: [id number]   Date of Service:  2022    Admitting Diagnosis:  UTI (urinary tract infection)  Current Admission Summary:   Past Medical History:  has a past medical history of 17 Left knee repeat repair of median parapatellar arthrotomy with augmentation, Arthritis, Atrial fibrillation ([address] CAD (coronary artery disease), Cataract, Diabetes mellitus ([address] GERD (gastroesophageal reflux disease), Hyperlipidemia, Hypertension, HYPOTHRYROIDISM, Non-English speaking patient, Sleep apnea, Thyroid disease, and UTI. Past Surgical History:  has a past surgical history that includes joint replacement (Bilateral, [address]); Cardiac surgery (); Cholecystectomy, laparoscopic (5/15/14); Revision total knee arthroplasty (Right, 2016); Total knee arthroplasty; and knee surgery (Left, 2017). Discharge Recommendations:  Lyndon Cardenas scored a 17/24 on the AM-PAC ADL Inpatient form. Current research shows that an AM-PAC score of 17 or less is typically not associated with a discharge to the patient's home setting. Based on the patient's AM-PAC score and their current ADL deficits, it is recommended that the patient have 3-5 sessions per week of Occupational Therapy at d/c to increase the patient's independence. Please see assessment section for further patient specific details. Patient is very angry about going to skilled rehabilitation, daughter is requesting she go for strengthening . Patient reports she does have an aide who helps her with showers     If patient discharges prior to next session this note will serve as a discharge summary. Please see below for the latest assessment towards goals.      DME Required For Discharge: no DME required at discharge  Patient reports she could use larger grab bars in shower    Precautions/Restrictions: high fall risk  Weight Bearing Restrictions: no restrictions  [] Right Upper Extremity  [] Left Upper Extremity [] Right Lower Extremity  [] Left Lower Extremity     Required Braces/Orthotics: Other - neoprene knee brace    [] Right  [] Left  Positional Restrictions:no positional restrictions      Pre-Admission Information     Lives With: alone, has home caregiver than comes ~4 hours 7x/week                     Type of Home: house  Home Layout: two level, able to live on main level, . Comment: with basement  Home Access: level entry  Bathroom Layout: walker accessible, wheelchair accessible, walk in shower, handicap height toilet  Toilet Height: elevated height  Bathroom Equipment: grab bars in shower, grab bars around toilet, shower chair, hand held shower head  Home Equipment: rolling walker, manual wheelchair, electric wheelchair, . Comment: Has not yet used power WC  Transfer Assistance: requires assistance, . Comment: Caregiver helps in the morning, she is able to do with effort once caregiver leaves  Ambulation Assistance:modified independent with use of WC/RW  ADL Assistance: requires assistance with bathing, requires assistance with dressing, requires assistance with toileting  IADL Assistance: requires assistance with all homemaking tasks  Active :        []? Yes            [x]? No  Hand Dominance: []? Left            [x]? Right  Current Employment: retired. Occupation: was a restaurant owner  Hobbies: Watches TV, occasionally prepares food with help of children  Recent Falls: [place] Duong once, caregiver helped her    Examination   Vision:   Vision Gross Assessment: WFL and Vision Corrective Device: wears glasses for reading  Hearing:   left hearing aid  Observation:   General Observation:  Pt semi-reclined in bed, with purewick  Posture:    Forward head rounded shoulder  Sensation:   reports numbness and tingling in (B) UE  Proprioception:    WFL  Tone:   Normotonic  Coordination Testing:   WFL    ROM:   (B) LE AROM WFL  (B) Hip AROM WFL  Strength:   (B) LE strength grossly +3  Decision Making: low complexity  Clinical Presentation: stable        Subjective    General Patient seated at bedside and is requesting a shower  Pain: 0/10  Pain Interventions: not applicable           Activities of Daily Living    Basic Activities of Daily Living  Grooming: setup assistance  Grooming Comments: from chair  Upper Extremity Bathing: setup assistance. Equipment: none  Lower Extremity Bathing: moderate assistance. Equipment: reacher  Bathing Comments: would benefit from long handle bath sponge  Upper Extremity Dressing: setup assistance. Equipment: none  Lower Extremity Dressing: moderate assistance. Equipment: none  Dressing Comments: from shower seat would benefit from reacher, sock aide , shoe horn   Instrumental Activities of Daily Living  No IADL completed on this date. Functional Mobility    Bed Mobility  Sit to Supine: stand by assistance  Comments:  Transfers  Sit to stand transfer:contact guard assistance  Comments:  Functional Mobility:  Functional Mobility: rolling walker. contact guard assistance.   Activity: to/from bathroom    Other Therapeutic Interventions      Functional Outcomes         Cognition  WFL  Orientation:    alert and oriented x 4  Command Following:   [facility]     Education    Barriers To Learning: language  Patient Education: patient educated on adaptive device training, fall prevention with shower with use of non skid surfaces, hand held shower and shower seat  Learning Assessment:  patient verbalizes understanding, would benefit from continued reinforcement    Assessment    Activity Tolerance:  good  Impairments Requiring Therapeutic Intervention: decreased ROM  Prognosis: good  Clinical Assessment:  Patient has history of bilateral knee degenerative changes interfere ing with safe and independent functional mobility and ADL  Safety Interventions: patient left in bed, bed alarm in place, call light within reach and patient at risk for falls       Plan    Frequency: 3-5 x/per week  Current Treatment Recommendations: functional mobility training, transfer training, gait training, endurance training, patient/caregiver education and ADL/self-care training          Goals  Patient Goals: to return home   Short Term Goals:  Time Frame: Discharge  Patient will complete upper body ADL at supervision   Patient will complete lower body ADL at moderate assistance   Patient will complete toileting at moderate assistance   Patient will complete functional transfers at modified independent, . Comment w/RW   Patient will complete functional mobility at modified independent, .   Comment w/RW        Therapy Session Time     Individual Group Co-treatment   Time In 0920     Time Out 1030     Minutes 70          Timed Code Treatment Minutes:  Timed Code Treatment Minutes: 70 Minutes    Total Treatment Minutes:  70    Electronically Signed By: Dee Romberg, OT

## 2022-06-22 NOTE — CARE COORDINATION
Per Sherron Zurita, appeal review was completed and it was determined that Sherron Zurita agrees with discharge. Patient should discharge by Thursday 6/23/2022 or they will begin to incur all expenses related to the hospital admit.

## 2022-06-22 NOTE — PROGRESS NOTES
[facility]    6/22/2022 10:31 PM        Name: Julienne Conley . Admitted: 6/16/2022  Primary Care Provider: MATEUSZ Yu CNP (Tel: [phone])      Chief Complaint   Patient presents with    Fatigue     pt brought in by EMS. pt states fatigue for a few days, had SOB a few days ago but better now. Brief History: Patient is an 79 yo Lao speaking female with hx atrial fibrillation, CAD/CABG (2004), DM2, HTN, hypothyroidism. She presented to hospital with c/o fatigue and dyspnea which has been ongoing for past few days. CXR with pulmonary congestion and she was started on IV Lasix. Troponin 0.05    Subjective: Patient laying in bed. Has been up and worked with physical therapy. They are now recommending rehab. Patient strongly against rehab unit. Daughters would like for patient to go to rehab, but will not force her mother. Daughters at bedside. Discussed plan of care and appeal process in detail with them today. Numerous questions answered. Pamphlets provided for community resources such as private pay assistance, [place] [facility], [facility] and [facility]. They were appreciative of this. Confirmed that [facility] was approved by insurance and ready to be picked up at the pharmacy per their request.  Adjusted insulin for better glucose control per their request.  Discussed with nursing again patient needs to be up 4 times a day ambulating in the room, per their request.  Patient states she feels better. Just feels weak and wants to remain in the hospital until her weakness improves. Again explained to patient she is medically stable for discharge.     Reviewed interval ancillary notes    Current Medications  insulin lispro (HUMALOG) injection vial 10 Units, TID WC  furosemide (LASIX) tablet 20 mg, Daily  insulin lispro (HUMALOG) injection vial 0-18 Units, TID WC  insulin lispro (HUMALOG) injection vial 0-9 Units, Nightly  simethicone (MYLICON) chewable tablet 80 mg, Q6H PRN  gabapentin (NEURONTIN) capsule 300 mg, Daily   And  gabapentin (NEURONTIN) capsule 600 mg, Nightly  insulin glargine (LANTUS) injection vial 55 Units, BID  hydrocortisone 1 % cream, BID PRN  sacubitril-valsartan (ENTRESTO) 24-26 MG per tablet 1 tablet, BID  levothyroxine (SYNTHROID) tablet 150 mcg, Daily  metoprolol tartrate (LOPRESSOR) tablet 12.5 mg, BID  oxybutynin (DITROPAN-XL) extended release tablet 15 mg, Daily  rivaroxaban (XARELTO) tablet 20 mg, Dinner  atorvastatin (LIPITOR) tablet 80 mg, Daily  dextrose bolus 10% 125 mL, PRN   Or  dextrose bolus 10% 250 mL, PRN  glucagon (rDNA) injection 1 mg, PRN  dextrose 5 % solution, PRN  sodium chloride flush 0.9 % injection 5-40 mL, 2 times per day  sodium chloride flush 0.9 % injection 5-40 mL, PRN  0.9 % sodium chloride infusion, PRN  ondansetron (ZOFRAN-ODT) disintegrating tablet 4 mg, Q8H PRN   Or  ondansetron (ZOFRAN) injection 4 mg, Q6H PRN  polyethylene glycol (GLYCOLAX) packet 17 g, Daily PRN  acetaminophen (TYLENOL) tablet 650 mg, Q6H PRN   Or  acetaminophen (TYLENOL) suppository 650 mg, Q6H PRN      Objective:  BP (!) 113/56   Pulse 62   Temp 97.6 °F (36.4 °C) (Oral)   Resp 18   Ht 5' (1.524 m)   Wt 243 lb 9.6 oz (110.5 kg)   LMP  (LMP Unknown)   SpO2 94%   BMI 47.57 kg/m²     Intake/Output Summary (Last 24 hours) at 6/22/2022 2231  Last data filed at 6/22/2022 1758  Gross per 24 hour   Intake 480 ml   Output 1850 ml   Net -1370 ml      Wt Readings from Last 3 Encounters:   06/21/22 243 lb 9.6 oz (110.5 kg)   10/06/21 225 lb (102.1 kg)   09/27/21 225 lb (102.1 kg)     General:  Awake, alert, oriented in NAD  Skin:  Warm and dry. No unusual bruising or rash  Neck:  Supple. No JVD appreciated  Chest:  Normal effort.   Clear to auscultation, no wheezes/rhonchi/rales  Cardiovascular:  RRR, normal S1/S2, no murmur/gallop/rub  Abdomen:  Soft, nontender, +bowel sounds  Extremities: Trace edema  Neurological: No focal deficits  Psychological: Normal mood and affect      Labs and Tests:  CBC:   Recent Labs     06/20/22  0653 06/21/22  0549 06/22/22  0746   WBC 7.8 6.3 6.0   HGB 13.0 12.9 12.7    141 150     BMP:    Recent Labs     06/20/22  0652 06/21/22  0549 06/22/22  0746    140 142   K 3.9 3.8 5.0    101 105   CO2 27 24 27   BUN 65* 69* 56*   CREATININE 1.3* 1.3* 1.0   GLUCOSE 153* 129* 189*     Hepatic:   No results for input(s): AST, ALT, ALB, BILITOT, ALKPHOS in the last 72 hours. Results for Lavonne Mullins (MRN [id number]) as of 6/20/2022 14:05   Ref. Range 6/20/2022 02:15 6/20/2022 07:38 6/20/2022 08:25 6/20/2022 11:25   POC Glucose Latest Ref Range: 70 - 99 mg/dl 193 (H) 130 (H) 360 (H) 272 (H)         CXR 6/16/2022:  Stable cardiomegaly with central pulmonary vascular congestion and hazy   bibasilar airspace opacities favored to represent edema           Echo 6/17/2022:  Summary   -Technically limited portable study secondary to patient's immobility (upright semi supine position). -Normal left ventricle size,mild to moderate concentric wall thickness and normal systolic function with an estimated ejection fraction of 55%. -Abnormal mid to distal septal and inferoseptal motion is present.   -Indeterminate diastolic function. due to mitral annular calcification. E/e\"=20.3.   -Mitral annular calcification is present. -Mild to moderate posteriorly directed mitral regurgitation.   -Aortic valve appears sclerotic but opens adequately. -Mild tricuspid regurgitation.   -Estimated pulmonary artery systolic pressure is borderline elevated at 33 mmHg assuming a right atrial pressure of 8 mmHg. -The left atrium is at the upper limits of normal in size to mildly dilated. -The right ventricle is not well visualized. -Right ventricular systolic function appears mildly reduced . TAPSE 1.22 cm.   -RV S velocity 8.6 cm/s.    -Hypokinetic free wall of the right ventricle. Problem List  Principal Problem:    UTI (urinary tract infection)  Resolved Problems:    * No resolved hospital problems. *       Assessment & Plan:   1. Acute CHF. CXR with pulmonary vascular congestion, proBNP only 104 but patient is obese. Started on IV Lasix with good results and subjective improvement. Echo shows normal EF, indeterminate diastolic function. Entresto started 6/19 following lisinopril washout period. Transitioned to po Lasix. 2. CAD / elevated troponin. Hx CABG 2004. Troponin 0.05. Denies chest pain. Echo with EF 55%, there is abnormal mid to distal septal and inferoseptal motion present. Evaluated by cardiology, patient possibly had small MI in last couple of weeks. Considered high risk for coronary angiogram (hx anaphylaxis with contrast use) so being managed medically. Continue statin and beta blocker. No asa secondary chronic AC. 3. DM2. A1c 6.4. BG values values elevated. Continue BID Lantus, transition to high dose correction. Add prandial dosing. 4. PAF. Presents in sinus rhythm. Continue beta blocker and Xarelto. 5. Hypothyroidism. TSH 1.74. Continue levothyroxine. 6. Recent UTI. Per daughter, patient was taking Ceclor for UTI, had 3 tabs to complete course. UA on admission negative. Family brought Ceclor from home and antibiotic course completed. 7. Deconditioned. Evaluated by PT/OT and home therapy recommended on DC to address ADL and functional mobility deficits, PT 19/24, OT 16/24. Home care orders placed. Disposition: Likely stable for discharge. Appeal process in review. Diet: ADULT DIET; Regular;  Low Sodium (2 gm)  Code:Full Code  DVT PPX: Clive Hall, APRN - CNP   6/22/2022 10:31 PM

## 2022-06-23 VITALS
OXYGEN SATURATION: 96 % | SYSTOLIC BLOOD PRESSURE: 147 MMHG | HEIGHT: 60 IN | DIASTOLIC BLOOD PRESSURE: 63 MMHG | RESPIRATION RATE: 18 BRPM | WEIGHT: 243.6 LBS | HEART RATE: 59 BPM | BODY MASS INDEX: 47.83 KG/M2 | TEMPERATURE: 98.2 F

## 2022-06-23 LAB
ANION GAP SERPL CALCULATED.3IONS-SCNC: 9 MMOL/L (ref 3–16)
BUN BLDV-MCNC: 45 MG/DL (ref 7–20)
CALCIUM SERPL-MCNC: 9.5 MG/DL (ref 8.3–10.6)
CHLORIDE BLD-SCNC: 106 MMOL/L (ref 99–110)
CO2: 28 MMOL/L (ref 21–32)
CREAT SERPL-MCNC: 1.1 MG/DL (ref 0.6–1.2)
GFR AFRICAN AMERICAN: 57
GFR NON-AFRICAN AMERICAN: 47
GLUCOSE BLD-MCNC: 146 MG/DL (ref 70–99)
GLUCOSE BLD-MCNC: 158 MG/DL (ref 70–99)
GLUCOSE BLD-MCNC: 166 MG/DL (ref 70–99)
HCT VFR BLD CALC: 37.8 % (ref 36–48)
HEMOGLOBIN: 12.5 G/DL (ref 12–16)
MCH RBC QN AUTO: 29 PG (ref 26–34)
MCHC RBC AUTO-ENTMCNC: 33.1 G/DL (ref 31–36)
MCV RBC AUTO: 87.6 FL (ref 80–100)
PDW BLD-RTO: 16.9 % (ref 12.4–15.4)
PERFORMED ON: ABNORMAL
PERFORMED ON: ABNORMAL
PLATELET # BLD: 155 K/UL (ref 135–450)
PMV BLD AUTO: 9.3 FL (ref 5–10.5)
POTASSIUM SERPL-SCNC: 4.5 MMOL/L (ref 3.5–5.1)
RBC # BLD: 4.32 M/UL (ref 4–5.2)
SODIUM BLD-SCNC: 143 MMOL/L (ref 136–145)
WBC # BLD: 6.6 K/UL (ref 4–11)

## 2022-06-23 PROCEDURE — 36415 COLL VENOUS BLD VENIPUNCTURE: CPT

## 2022-06-23 PROCEDURE — 6370000000 HC RX 637 (ALT 250 FOR IP): Performed by: NURSE PRACTITIONER

## 2022-06-23 PROCEDURE — 6370000000 HC RX 637 (ALT 250 FOR IP): Performed by: INTERNAL MEDICINE

## 2022-06-23 PROCEDURE — 80048 BASIC METABOLIC PNL TOTAL CA: CPT

## 2022-06-23 PROCEDURE — 85027 COMPLETE CBC AUTOMATED: CPT

## 2022-06-23 PROCEDURE — 6370000000 HC RX 637 (ALT 250 FOR IP): Performed by: FAMILY MEDICINE

## 2022-06-23 PROCEDURE — 2580000003 HC RX 258: Performed by: FAMILY MEDICINE

## 2022-06-23 RX ADMIN — FUROSEMIDE 20 MG: 20 TABLET ORAL at 08:55

## 2022-06-23 RX ADMIN — SACUBITRIL AND VALSARTAN 1 TABLET: 24; 26 TABLET, FILM COATED ORAL at 08:55

## 2022-06-23 RX ADMIN — ATORVASTATIN CALCIUM 80 MG: 80 TABLET, FILM COATED ORAL at 08:55

## 2022-06-23 RX ADMIN — INSULIN LISPRO 3 UNITS: 100 INJECTION, SOLUTION INTRAVENOUS; SUBCUTANEOUS at 12:42

## 2022-06-23 RX ADMIN — OXYBUTYNIN CHLORIDE 15 MG: 5 TABLET, EXTENDED RELEASE ORAL at 08:56

## 2022-06-23 RX ADMIN — INSULIN LISPRO 10 UNITS: 100 INJECTION, SOLUTION INTRAVENOUS; SUBCUTANEOUS at 12:42

## 2022-06-23 RX ADMIN — METOPROLOL TARTRATE 12.5 MG: 25 TABLET, FILM COATED ORAL at 08:56

## 2022-06-23 RX ADMIN — GABAPENTIN 300 MG: 300 CAPSULE ORAL at 08:55

## 2022-06-23 RX ADMIN — LEVOTHYROXINE SODIUM 150 MCG: 0.15 TABLET ORAL at 06:05

## 2022-06-23 RX ADMIN — INSULIN GLARGINE 55 UNITS: 100 INJECTION, SOLUTION SUBCUTANEOUS at 08:56

## 2022-06-23 RX ADMIN — INSULIN LISPRO 3 UNITS: 100 INJECTION, SOLUTION INTRAVENOUS; SUBCUTANEOUS at 08:56

## 2022-06-23 RX ADMIN — Medication 10 ML: at 08:56

## 2022-06-23 RX ADMIN — INSULIN LISPRO 10 UNITS: 100 INJECTION, SOLUTION INTRAVENOUS; SUBCUTANEOUS at 08:56

## 2022-06-23 ASSESSMENT — PAIN SCALES - GENERAL
PAINLEVEL_OUTOF10: 0
PAINLEVEL_OUTOF10: 0

## 2022-06-23 NOTE — PROGRESS NOTES
Vitals signs completed at this time. Pt in bed sleeping without any particular problems. call light within reach.

## 2022-06-23 NOTE — PROGRESS NOTES
[facility] FAILURE PROGRAM      Myles Kussmaul 1940    History:  Past Medical History:   Diagnosis Date    4/11/17 Left knee repeat repair of median parapatellar arthrotomy with augmentation 4/12/2017    Arthritis     Atrial fibrillation ([facility] [address]     CAD (coronary artery disease)     Cataract     Diabetes mellitus (HCC)     GERD (gastroesophageal reflux disease)     Hyperlipidemia     Hypertension     HYPOTHRYROIDISM     Non-English speaking patient     SPEAKS Setswana. SHE SPEAKS A LITTLE ENGLISH    Sleep apnea     unspecified    Thyroid disease     UTI        ECHO:  6/17/22  Summary   -Technically limited portable study secondary to patient's immobility   (upright semi supine position). -Normal left ventricle size,mild to moderate concentric wall thickness and   normal systolic function with an estimated ejection fraction of 55%. -Abnormal mid to distal septal and inferoseptal motion is present.   -Indeterminate diastolic function. due to mitral annular   calcification. E/e\"=20.3.   -Mitral annular calcification is present. -Mild to moderate posteriorly directed mitral regurgitation.   -Aortic valve appears sclerotic but opens adequately. -Mild tricuspid regurgitation.   -Estimated pulmonary artery systolic pressure is borderline elevated at 33   mmHg assuming a right atrial pressure of 8 mmHg. -The left atrium is at the upper limits of normal in size to mildly dilated. -The right ventricle is not well visualized. -Right ventricular systolic function appears mildly reduced . TAPSE 1.22 cm.   -RV S velocity 8.6 cm/s. -Hypokinetic free wall of the right ventricle.          ACE/ARB/ARNi: Entresto 24-26 mg bid  BB: lopressor 12.5 mg bid  Aldosterone Antagonist:  Patient not on- consider if not contraindicated   SGLT2: Patient not on- consider if not contraindicated       DM History: Yes   Consult for DM educator: No      Last Hospital Admission: 7/6/15  Code Status: full   Discharge plans: home with [address]    Family Present: no    Wally Jenkins was admitted to the hospital with some shortness of breath and fatigue. Patient in room, no family at bedside- patient does not speak [place] fluently. Patient able to tell me this is new for her and that she had some lower leg edema. Patient tells me she is unable to weigh daily at home. She is unstable. She is unsure of her sodium intake. She tells me she takes her medications. She had previously followed with Dr. Aaron Leyva but had not been in office since Aug 2020. Patient provided with both written and verbal education on CHF signs/ symptoms, causes, discharge medications, daily weights, low sodium diet, activity, and follow-up. Pt to call if gains 3 pounds in one day or 5 pounds in one week. Mutually agreed upon goals were discussed such as calling the MD as soon as they recognize symptoms and weight gain, maintaining proper diet, taking medications as prescribed, joining cardiac rehab when able. Also reviewed importance of risk factor reduction. Patient provided with CHF Zone Management tool and CHF symptoms magnet. Discussed importance of lifestyle changes: encourage daily weights    PATIENT/CAREGIVER TEACHING:    Level of patient/caregiver understanding able to:   [ ] Verbalize understanding [ ] Demonstrate understanding [ ] Teach back   [ x] Needs reinforcement [ ] Other:       Time spent teaching: 15 mins    1. WEIGHT: Admit Weight: 230 lb (104.3 kg)      Today  Weight: 243 lb 9.6 oz (110.5 kg)   2. I/O     Intake/Output Summary (Last 24 hours) at 6/23/2022 1022  Last data filed at 6/23/2022 0856  Gross per 24 hour   Intake 490 ml   Output 1150 ml   Net -660 ml       Recommendations:   1. She is already scheduled to see cardiology on 6/28-- reinforced going to appointment with patient. 2. Educate further on fluid restriction 48 oz- 64 oz during inpatient stay so she can understand how to measure intake at home.

## 2022-06-23 NOTE — PROGRESS NOTES
Pt vitals signs completed with assessment   Meds given the patient in bed  Quiet at this time and call light is within normal reach

## 2022-06-23 NOTE — DISCHARGE SUMMARY
[facility]    [facility] Demographics    Patient. Saumya Vail  Date of Birth. 1940  MRN. [id number]     Primary care provider. MATEUSZ Pulido CNP  (Tel: [phone])    Admit date: 6/16/2022    Discharge date (blank if same as Note Date): DC order placed 6/20, DC delay secondary to patient appealed Dc. Patient left facility 6/23/2022  Note Date: 6/30/2022     Reason for Hospitalization. Chief Complaint   Patient presents with    Fatigue     pt brought in by EMS. pt states fatigue for a few days, had SOB a few days ago but better now. Significant Findings. Principal Problem:    UTI (urinary tract infection)  Resolved Problems:    * No resolved hospital problems. *       Problems and results from this hospitalization that need follow up. 1. HFpEF. Cardiology follow up with 1 week. 2. CBC, BMP weekly x 2, home health to draw. Significant test results and incidental findings. CXR 6/16/2022:  Stable cardiomegaly with central pulmonary vascular congestion and hazy   bibasilar airspace opacities favored to represent edema           Echo 6/17/2022:  Summary   -Technically limited portable study secondary to patient's immobility (upright semi supine position).  -Normal left ventricle size,mild to moderate concentric wall thickness and normal systolic function with an estimated ejection fraction of 55%.  -Abnormal mid to distal septal and inferoseptal motion is present.   -Indeterminate diastolic function. due to mitral annular calcification. E/e\"=20.3.   -Mitral annular calcification is present.   -Mild to moderate posteriorly directed mitral regurgitation.   -Aortic valve appears sclerotic but opens adequately.   -Mild tricuspid regurgitation.   -Estimated pulmonary artery systolic pressure is borderline elevated at 33 mmHg assuming a right atrial pressure of 8 mmHg.   -The left atrium is at the upper limits of normal in size to mildly dilated.   -The right ventricle is not well visualized.   -Right ventricular systolic function appears mildly reduced . TAPSE 1.22 cm.   -RV S velocity 8.6 cm/s.   -Hypokinetic free wall of the right ventricle. Invasive procedures and treatments. 1. None     Problem-based Hospital Course. Patient is an 77 yo Albanian speaking female with hx atrial fibrillation, CAD/CABG (2004), DM2, HTN, hypothyroidism. She presented to hospital with c/o fatigue and dyspnea which has been ongoing for past few days. CXR with pulmonary congestion and she was started on IV Lasix. Troponin 0.05. Lost 11 pounds with diuresis. 1. Acute CHF. CXR with pulmonary vascular congestion, proBNP only 104 but patient is obese. Started on IV Lasix with good results and subjective improvement. Echo shows normal EF, indeterminate diastolic function. Transitioned to oral Lasix 6/20. Entresto started 6/19 following lisinopril washout period. SaO2% 96% on RA after ambulation from bathroom. 2. CAD / elevated troponin. Hx CABG 2004. Troponin 0.05. Denies chest pain. Echo with EF 55%, there is abnormal mid to distal septal and inferoseptal motion present. Evaluated by cardiology, believe patient possibly had small MI in last couple of weeks. Considered high risk for coronary angiogram (hx anaphylaxis with contrast use) so being managed medically. Continued statin and beta blocker. No asa secondary chronic AC. 3. DM2. A1c 6.4. Covered with BID Lantus and high dose correction scale. Resumed home regimen on DC. 4. PAF. Presented in sinus rhythm, no afib detected during hospitalization. Continued beta blocker and Xarelto. 5. Hypothyroidism. TSH 1.74. Continued levothyroxine. 6. Recent UTI. Per daughter, patient was taking Ceclor for UTI, had 3 tabs to complete course. UA on admission negative. Family brought Ceclor from home and antibiotic course completed. 7. Deconditioning. PT/OT evaluated and recommend continued therapy on DC to address ADL and functional mobility deficits, consult placed. Reviewed DC plans, follow up and medications. Expresses understanding. Questions answered. Consults. IP CONSULT TO CARDIOLOGY  IP CONSULT TO HOME CARE NEEDS    Physical examination on discharge day. BP (!) 147/63   Pulse 59   Temp 98.2 °F (36.8 °C) (Oral)   Resp 18   Ht 5' (1.524 m)   Wt 243 lb 9.6 oz (110.5 kg)   LMP  (LMP Unknown)   SpO2 96%   BMI 47.57 kg/m²   General appearance. Alert. Looks comfortable. HEENT. Sclera clear. Moist mucus membranes. Cardiovascular. Regular rate and rhythm, normal S1, S2. No murmur. Respiratory. Not using accessory muscles. Clear to auscultation bilaterally, no wheeze. Gastrointestinal. Abdomen soft, non-tender, not distended, normal bowel sounds  Neurology. Facial symmetry. No speech deficits. Moving all extremities equally. Extremities. No edema in lower extremities. Skin. Warm, dry, normal turgor    Condition at time of discharge stable    Medication instructions provided to patient at discharge.      Medication List      START taking these medications    furosemide 20 MG tablet  Commonly known as: LASIX  Take 1 tablet by mouth daily     sacubitril-valsartan 24-26 MG per tablet  Commonly known as: ENTRESTO  Take 1 tablet by mouth 2 times daily        CHANGE how you take these medications    gabapentin 300 MG capsule  Commonly known as: NEURONTIN  What changed:   · how much to take  · how to take this  · when to take this  · additional instructions     TYLENOL/CODEINE #3 300-30 MG per tablet  Generic drug: acetaminophen-codeine  What changed:   · medication strength  · how much to take  · when to take this  · reasons to take this        CONTINUE taking these medications    atorvastatin 80 MG tablet  Commonly known as: LIPITOR  Take 1 tablet by mouth daily     Basaglar KwikPen 100 UNIT/ML injection pen  Generic drug: insulin glargine     benzonatate 100 MG capsule  Commonly known as: TESSALON     Biotin 5000 MCG Tabs     hydrocortisone 2.5 % rectal cream  Commonly known as: ANUSOL-HC     levothyroxine 175 MCG tablet  Commonly known as: SYNTHROID     lidocaine 5 %  Commonly known as: LIDODERM  Place 1 patch onto the skin daily 12 hours on, 12 hours off.     loratadine 10 MG capsule  Commonly known as: CLARITIN     magnesium 30 MG tablet     metoprolol tartrate 25 MG tablet  Commonly known as: LOPRESSOR  TAKE ONE-HALF TABLET BY MOUTH TWICE A DAY     NovoFine 32G X 6 MM Misc  Generic drug: Insulin Pen Needle     NovoLOG 100 UNIT/ML injection vial  Generic drug: insulin aspart     Nyamyc 423086 UNIT/GM powder  Generic drug: nystatin     Omega 3 1000 MG Caps     oxybutynin 15 MG extended release tablet  Commonly known as: DITROPAN XL     OYSTER SHELL CALCIUM + D PO     phenazopyridine 100 MG tablet  Commonly known as: PYRIDIUM     rivaroxaban 20 MG Tabs tablet  Commonly known as: XARELTO  Take 1 tablet by mouth daily     senna 8.6 MG tablet  Commonly known as: SENOKOT     tiZANidine 2 MG tablet  Commonly known as: ZANAFLEX     triamcinolone 0.1 % cream  Commonly known as: KENALOG     Victoza 18 MG/3ML Sopn SC injection  Generic drug: Liraglutide     vitamin D 1.25 MG (64722 UT) Caps capsule  Commonly known as: ERGOCALCIFEROL        STOP taking these medications    alendronate 70 MG tablet  Commonly known as: FOSAMAX     cefaclor 500 MG capsule  Commonly known as: CECLOR     lisinopril 2.5 MG tablet  Commonly known as: PRINIVIL;ZESTRIL           Where to Get Your Medications      [id number] [address], [address] [place] [address]    Phone: [phone]   · furosemide 20 MG tablet  · sacubitril-valsartan 24-26 MG per tablet         Discharge recommendations given to patient. Follow Up. CHF clinic in 1 week   Disposition.   Home with home care  Activity. activity as tolerated  Diet: No diet orders on file      Spent greater than 30 minutes in discharge process. Signed:   [address], APRN - CNP     6/30/2022 4:08 PM

## 2022-06-24 ENCOUNTER — TELEPHONE (OUTPATIENT)
Dept: OTHER | Age: 82
End: 2022-06-24

## 2022-06-24 NOTE — TELEPHONE ENCOUNTER
[address] [facility] [address] FAILURE PROGRAM  TELEPHONE ENCOUNTER FORM    Jaswinder Alegre 1940    Attempted to call patient for HF follow-up. No answer at this time.       Next MD/ Clinic appointment: 6/28 6/28 with ITZEL Franklin RN 6/24/2022 12:45 PM

## 2022-06-24 NOTE — TELEPHONE ENCOUNTER
[facility] FAILURE PROGRAM  TELEPHONE ENCOUNTER FORM    Wally Schmidt 1940    ASSESSMENT:   1. Baseline weight: 243 lbs  2. Current weight: not weighing daily  3. Patient weighing daily: No  4. What are your symptoms of heart failure: dyspnea, edema  5. Are you having any symptoms:  No  6. Patient knows who to call with symptoms: Yes  7. Diet history:      Patient states sodium limitation is : 3000 mg      Patient states fluid limitation is 64 oz  Patient following diet as instructed: Yes  8. Medication history: taking medications as instructed Yes; medication side effects noted No  9. Patient is being active at home: Yes  10. Patient knows date and time of follow up appointment: Yes  11. Patient has transportation to appointments: Yes    RECOMMENDATIONS:   1. Medication: taking as prescribed-- daughter picked up last night  2. Diet: no added salt diet  3. MD/ Clinic appointment: 6/28 with Leslye Garrett NP  4. Other:  Patient doing well. Spoke with daughter- she needs to buy scale for patient to weigh daily. Home care nurse out to see patient today. Encouraged her to call with any questions or concerns.

## 2022-06-28 NOTE — PROGRESS NOTES
[address]   Congestive Heart Failure    Primary Care Doctor:  MATEUSZ James CNP  Primary Cardiologist: Chava Machado   Last/Next OV: Nov 2022    Chief Complaint:  CHF    History of Present Illness:  Curt Ware is a 78 y.o. female with Serbian speaking female with hx atrial fibrillation, CAD/CABG (2004), DM2, HTN, hypothyroidism who presents today for hospital f/u. Wally Schmidt was admitted 6/16/22 and discharged 6/21/22. Hospital course: presented to hospital with c/o fatigue and dyspnea which has been ongoing for past few days. CXR with pulmonary congestion and she was started on IV Lasix. Troponin 0.05. Lost 11 pounds with diuresis.     1. Acute CHF. CXR with pulmonary vascular congestion, proBNP only 104 but patient is obese. Started on IV Lasix with good results and subjective improvement. Echo shows normal EF, indeterminate diastolic function. Transitioned to oral Lasix 6/20. Entresto started 6/19 following lisinopril washout period.  SaO2% 96% on RA after ambulation from bathroom. 2. CAD / elevated troponin. Hx CABG 2004. Troponin 0.05. Denies chest pain. Echo with EF 55%, there is abnormal mid to distal septal and inferoseptal motion present. Evaluated by cardiology, believe patient possibly had small MI in last couple of weeks. Considered high risk for coronary angiogram (hx anaphylaxis with contrast use) so being managed medically.  Continued statin and beta blocker. No asa secondary chronic AC. 3. DM2. A1c 6.4. Covered with BID Lantus and high dose correction scale. Resumed home regimen on DC.     4. PAF. Presented in sinus rhythm, no afib detected during hospitalization. Continued beta blocker and Xarelto. 5. Hypothyroidism. TSH 1.74. Continued levothyroxine. 6. Recent UTI. Per daughter, patient was taking Ceclor for UTI, had 3 tabs to complete course. UA on admission negative. Family brought Ceclor from home and antibiotic course completed. 7. Deconditioning.  PT/OT work?No  Cardiac Rehab Referral: No     NYHA Class: III       Sodium Restrictions: 3g  Fluid Restrictions: 48-64 oz/day  Sodium and fluid restriction compliance: over on both, discussed today    Pt Education: The patient has received education on the following topics: dietary sodium restriction, heart failure medications, the importance of physical activity, symptom management and weight monitoring           Past Medical History:   has a past medical history of 4/11/17 Left knee repeat repair of median parapatellar arthrotomy with augmentation, Arthritis, Atrial fibrillation ([facility] [address] CAD (coronary artery disease), Cataract, Diabetes mellitus ([address] GERD (gastroesophageal reflux disease), Hyperlipidemia, Hypertension, HYPOTHRYROIDISM, Non-English speaking patient, Sleep apnea, Thyroid disease, and UTI. Surgical History:   has a past surgical history that includes joint replacement (Bilateral, [address]); Cardiac surgery (2004); Cholecystectomy, laparoscopic (5/15/14); Revision total knee arthroplasty (Right, 11/22/2016); Total knee arthroplasty; and knee surgery (Left, 02/28/2017). Social History:   reports that she has never smoked. She has never used smokeless tobacco. She reports that she does not drink alcohol and does not use drugs. Family History:   Family History   Problem Relation Age of Onset    Arthritis Other     Diabetes Other     Heart Disease Other     Hypertension Other     High Cholesterol Brother        Home Medications:  Prior to Admission medications    Medication Sig Start Date End Date Taking?  Authorizing Provider   furosemide (LASIX) 20 MG tablet Take 1 tablet by mouth daily 6/20/22   Iris Joes, APRN - CNP   sacubitril-valsartan (ENTRESTO) 24-26 MG per tablet Take 1 tablet by mouth 2 times daily 6/20/22   Iris Joes, APRN - CNP   gabapentin (NEURONTIN) 300 MG capsule Take 1 tablet in am, 2 tablets in pm. 6/20/22   Iris Joes, APRN - CNP acetaminophen-codeine (TYLENOL/CODEINE #3) 300-30 MG per tablet Take 1 tablet by mouth every 6 hours as needed for Pain. 6/20/22   Saraquincyenrique Yelena KyawfabiothiernoMATEUSZ - CNP   insulin glargine (BASAGLAR KWIKPEN) 100 UNIT/ML injection pen Inject 55 Units into the skin 2 times daily    Historical Provider, MD   benzonatate (TESSALON) 100 MG capsule Take 100 mg by mouth 3 times daily as needed for Cough    Historical Provider, MD   triamcinolone (KENALOG) 0.1 % cream Apply topically 2 times daily as needed Apply topically 2 times daily. Historical Provider, MD   senna (SENOKOT) 8.6 MG tablet Take 1 tablet by mouth 2 times daily    Historical Provider, MD   insulin aspart (NOVOLOG) 100 UNIT/ML injection vial Inject 17 Units into the skin 3 times daily    Historical Provider, MD   phenazopyridine (PYRIDIUM) 100 MG tablet Take 100 mg by mouth 2 times daily    Historical Provider, MD   magnesium 30 MG tablet Take 40 mg by mouth daily    Historical Provider, MD   nystatin (NYAMYC) 796155 UNIT/GM powder Apply topically 2 times daily Apply topically 4 times daily. Historical Provider, MD   tiZANidine (ZANAFLEX) 2 MG tablet Take 2 mg by mouth every 8 hours as needed    Historical Provider, MD   metoprolol tartrate (LOPRESSOR) 25 MG tablet TAKE ONE-HALF TABLET BY MOUTH TWICE A DAY 7/29/20   Chadd Mckoy MD   atorvastatin (LIPITOR) 80 MG tablet Take 1 tablet by mouth daily 7/29/20   Chadd Mckoy MD   Insulin Pen Needle (NOVOFINE) 32G X 6 MM MISC by Does not apply route 3 times daily with meals    Historical Provider, MD   hydrocortisone (ANUSOL-HC) 2.5 % rectal cream Place rectally 2 times daily Place rectally 2 times daily.     Historical Provider, MD   rivaroxaban (XARELTO) 20 MG TABS tablet Take 1 tablet by mouth daily 2/22/17   Chadd Mckoy MD   vitamin D (ERGOCALCIFEROL) 85439 UNITS CAPS capsule Take 50,000 Units by mouth once a week    Historical Provider, MD   Omega 3 1000 MG CAPS Take 1,000 mg by mouth daily Historical Provider, MD   Liraglutide (VICTOZA) 18 MG/3ML SOPN SC injection Inject 1.2 mg into the skin daily    Historical Provider, MD   Calcium Carb-Cholecalciferol (OYSTER SHELL CALCIUM + D PO) Take by mouth 2 times daily    Historical Provider, MD   Biotin 5000 MCG TABS Take by mouth daily    Historical Provider, MD   lidocaine (LIDODERM) 5 % Place 1 patch onto the skin daily 12 hours on, 12 hours off. 7/14/15   MATEUSZ Sol - CNP   oxybutynin (DITROPAN XL) 15 MG CR tablet Take 15 mg by mouth daily. Historical Provider, MD   Loratadine 10 MG CAPS Take by mouth daily     Historical Provider, MD   levothyroxine (SYNTHROID) 175 MCG tablet Take 150 mcg by mouth daily. Historical Provider, MD        Allergies:  Aspirin, Fluconazole, Ibuprofen, Macrobid [nitrofurantoin monohydrate macrocrystals], Adhesive tape, Lexiscan [regadenoson], and Penicillins     ROS:   Review of Systems   Constitutional: Positive for fatigue. Respiratory: Positive for shortness of breath. Cardiovascular: Positive for chest pain and leg swelling. Gastrointestinal: Negative. Genitourinary: Negative. Musculoskeletal: Negative. Skin: Negative. Neurological: Negative. Hematological: Negative. Psychiatric/Behavioral: Negative. Physical Examination:    Vitals:    06/29/22 1450   BP: (!) 106/54   Pulse: 63   SpO2: 93%   Height: 5' (1.524 m)           Physical Exam  Vitals reviewed. Constitutional:       Appearance: Normal appearance. She is normal weight. HENT:      Head: Normocephalic and atraumatic. Eyes:      Extraocular Movements: Extraocular movements intact. Pupils: Pupils are equal, round, and reactive to light. Cardiovascular:      Rate and Rhythm: Normal rate and regular rhythm. Heart sounds: Murmur heard. Pulmonary:      Effort: Pulmonary effort is normal.   Abdominal:      Palpations: Abdomen is soft. Musculoskeletal:         General: Normal range of motion. Cervical back: Normal range of motion and neck supple. Skin:     General: Skin is warm and dry. Neurological:      General: No focal deficit present. Mental Status: She is alert and oriented to person, place, and time. Mental status is at baseline. Psychiatric:         Mood and Affect: Mood normal.         Behavior: Behavior normal.         Thought Content: Thought content normal.         Judgment: Judgment normal.         Lab Data:    CBC:   Lab Results   Component Value Date    WBC 6.6 06/23/2022    WBC 6.0 06/22/2022    WBC 6.3 06/21/2022    RBC 4.32 06/23/2022    RBC 4.36 06/22/2022    RBC 4.53 06/21/2022    HGB 12.5 06/23/2022    HGB 12.7 06/22/2022    HGB 12.9 06/21/2022    HCT 37.8 06/23/2022    HCT 38.3 06/22/2022    HCT 40.1 06/21/2022    MCV 87.6 06/23/2022    MCV 87.8 06/22/2022    MCV 88.4 06/21/2022    RDW 16.9 06/23/2022    RDW 17.1 06/22/2022    RDW 17.1 06/21/2022     06/23/2022     06/22/2022     06/21/2022     BMP:  Lab Results   Component Value Date     06/23/2022     06/22/2022     06/21/2022    K 4.5 06/23/2022    K 5.0 06/22/2022    K 3.8 06/21/2022    K 4.4 06/16/2022     06/23/2022     06/22/2022     06/21/2022    CO2 28 06/23/2022    CO2 27 06/22/2022    CO2 24 06/21/2022    PHOS 2.9 05/14/2014    PHOS 4.1 02/10/2012    BUN 45 06/23/2022    BUN 56 06/22/2022    BUN 69 06/21/2022    CREATININE 1.1 06/23/2022    CREATININE 1.0 06/22/2022    CREATININE 1.3 06/21/2022     BNP:   Lab Results   Component Value Date    PROBNP 104 06/16/2022     Iron Studies:    Lab Results   Component Value Date    FERRITIN 284.8 07/20/2011     Lab Results   Component Value Date    FERRITIN 284.8 07/20/2011          Assessment/Plan:    1. [facility] - compensated, continue lasix bid, and entresto, add farxiga, labs weekly per home care, decrease sodium and fluid intake   2.  Coronary artery disease involving native coronary artery of native heart without angina pectoris - stable, allergce to contrast so [facility] deferred inpt, continue BB, statin   3. Paroxysmal atrial fibrillation (HCC) - rate controlled on BB, continue AC   4. Primary hypertension - BP soft, continue to monitor, may need to decrease entresto dose       Time   30-39minutes spent preparing to see patient including reviewing patient history/prior tests/prior consults, performing a medical exam, counseling and educating patient/family/caregiver, ordering medications/tests/procedures, referring and communicating with PCPs and other pertinent consultants, documenting information in the EMR, independently interpreting results and communicating to family and coordination of patient care. Instructions:   1. Medications: start farxiga one pill once a day, continue other meds  2. Labs: weekly per home care  3. Lifestyle Recommendations: Weigh yourself every day in the morning after urination, call Clarke Stark if wt increases 2-3lb in one day or 5lb in one week, Limit sodium to 3000mg/day and fluids to 2L or 64oz/day. 4. Follow up: 4 weeks with Dr. Debora Arriaga: [phone]    Heart Failure Symptoms  When the heart is weak or stiff, it cannot pump enough blood to the body tissues. Often, fluid can build up in different areas of your body, including your lungs. Some symptoms of heart failure you might be able to see, while others you may feel. It is important you pay attention to your body. Monitor for all symptoms. Call the doctor when you notice something is wrong. By calling the doctor early, you may feel better sooner and stay well at home.  If your symptoms are severe, the doctor might see you in the office or tell you to go to the hospital.   Trouble Breathing   Feeling short of breath when doing everyday activities or even at rest   Waking up feeling like you need to sit up to breathe   Trouble breathing when lying flat or feeling more comfortable sitting upright to sleep  Cough or Wheezing   Dry, hacking cough that is worse when you lie down  Swelling   Swelling in your feet, ankles or legs   Jewelry, clothing or shoes fitting tighter   Swelling in abdomen  Weight Gain   Rapid weight gain of 3 pounds or more in 1-7 days   Work with your doctor to find the ideal weight range for you to compare weight gain or loss    Tired Feeling   Feeling tired doing normal activities, like walking, bathing and shopping  Nausea, Bloating or Lack of Appetite   Feeling full or sick to your stomach after eating small meals or more so than usual   Abdominal bloating  Confusion or Impaired Thinking   Memory loss, a foggy feeling or confusion   This might be better observed by friends or family      Heart Failure Websites:   www.heart. org  Www.cardiosmart. org    Websites with Low Sodium Recipes:   www.mayoclinic.org/healthy-lifestyle/recipes/low-sodium-recipes  www.[employer]/recipes    Smartphone jesus's that can help you keep track of symptoms, weights, and medications:  HF Storylines  HF Path  My HF  CareZone  Point of Care Heart Failure  WOWME  H2O Overload    Nutrition Jesus to track calories, carbohydrates and sodium content of food:   CalorieKing  MyFitnessPal    Low Sodium Meal Delivery  Top  Meals - offers senior discount  Meal Pro  Magic Kitchen  Mom's Meals - some insurance pays for  [place] for 50  and over, AARP 10% discount     Balance for under 50  Metabolic Meals       I appreciate the opportunity of cooperating in the care of this individual.    Lord Marlow, MATEUSZ - CNP, 6/28/2022, 3:14 PM

## 2022-06-29 ENCOUNTER — OFFICE VISIT (OUTPATIENT)
Dept: CARDIOLOGY CLINIC | Age: 82
End: 2022-06-29
Payer: MEDICARE

## 2022-06-29 VITALS
HEIGHT: 60 IN | DIASTOLIC BLOOD PRESSURE: 54 MMHG | SYSTOLIC BLOOD PRESSURE: 106 MMHG | OXYGEN SATURATION: 93 % | BODY MASS INDEX: 47.57 KG/M2 | HEART RATE: 63 BPM

## 2022-06-29 DIAGNOSIS — I25.10 CORONARY ARTERY DISEASE INVOLVING NATIVE CORONARY ARTERY OF NATIVE HEART WITHOUT ANGINA PECTORIS: Chronic | ICD-10-CM

## 2022-06-29 DIAGNOSIS — I10 PRIMARY HYPERTENSION: ICD-10-CM

## 2022-06-29 DIAGNOSIS — I50.31 ACUTE DIASTOLIC CONGESTIVE HEART FAILURE (HCC): Primary | ICD-10-CM

## 2022-06-29 DIAGNOSIS — I48.0 PAROXYSMAL ATRIAL FIBRILLATION (HCC): ICD-10-CM

## 2022-06-29 PROCEDURE — 99496 TRANSJ CARE MGMT HIGH F2F 7D: CPT | Performed by: NURSE PRACTITIONER

## 2022-06-29 ASSESSMENT — ENCOUNTER SYMPTOMS
GASTROINTESTINAL NEGATIVE: 1
SHORTNESS OF BREATH: 1

## 2022-06-29 NOTE — PATIENT INSTRUCTIONS
Instructions:   1. Medications: start farxiga one pill once a day, continue other meds  2. Labs: weekly per home care  3. Lifestyle Recommendations: Weigh yourself every day in the morning after urination, call Alex Lowe if wt increases 2-3lb in one day or 5lb in one week, Limit sodium to 3000mg/day and fluids to 2L or 64oz/day. 4. Follow up: 4 weeks with Dr. Holly Villanueva: [phone]    Heart Failure Symptoms  When the heart is weak or stiff, it cannot pump enough blood to the body tissues. Often, fluid can build up in different areas of your body, including your lungs. Some symptoms of heart failure you might be able to see, while others you may feel. It is important you pay attention to your body. Monitor for all symptoms. Call the doctor when you notice something is wrong. By calling the doctor early, you may feel better sooner and stay well at home.  If your symptoms are severe, the doctor might see you in the office or tell you to go to the hospital.   Trouble Breathing   Feeling short of breath when doing everyday activities or even at rest   Waking up feeling like you need to sit up to breathe   Trouble breathing when lying flat or feeling more comfortable sitting upright to sleep  Cough or Wheezing   Dry, hacking cough that is worse when you lie down  Swelling   Swelling in your feet, ankles or legs   Jewelry, clothing or shoes fitting tighter   Swelling in abdomen  Weight Gain   Rapid weight gain of 3 pounds or more in 1-7 days   Work with your doctor to find the ideal weight range for you to compare weight gain or loss    Tired Feeling   Feeling tired doing normal activities, like walking, bathing and shopping  Nausea, Bloating or Lack of Appetite   Feeling full or sick to your stomach after eating small meals or more so than usual   Abdominal bloating  Confusion or Impaired Thinking   Memory loss, a foggy feeling or confusion   This might be better observed by friends or family      Heart Failure Websites:   www.heart. org  Www.cardiosmart. org    Websites with Low Sodium Recipes:   www.mayoclinic.org/healthy-lifestyle/recipes/low-sodium-recipes  www.[employer]/recipes    Smartphone jesus's that can help you keep track of symptoms, weights, and medications:  HF Storylines  HF Path  My HF  CareZone  Point of Care Heart Failure  WOWME  H2O Overload    Nutrition Jesus to track calories, carbohydrates and sodium content of food:   CalorieKing  MyFitnessPal    Low Sodium Meal Delivery  Top  Meals - offers senior discount  Meal Pro  Magic Kitchen  Mom's Meals - some insurance pays for  [place] for 50  and over, AARP 10% discount     Balance for under 50  Metabolic Meals

## 2022-07-07 ENCOUNTER — TELEPHONE (OUTPATIENT)
Dept: CARDIOLOGY CLINIC | Age: 82
End: 2022-07-07

## 2022-07-12 LAB
ANION GAP SERPL CALCULATED.3IONS-SCNC: 13 MMOL/L (ref 3–16)
BUN BLDV-MCNC: 43 MG/DL (ref 7–20)
CALCIUM SERPL-MCNC: 9.4 MG/DL (ref 8.3–10.6)
CHLORIDE BLD-SCNC: 100 MMOL/L (ref 99–110)
CO2: 28 MMOL/L (ref 21–32)
CREAT SERPL-MCNC: 1.2 MG/DL (ref 0.6–1.2)
GFR AFRICAN AMERICAN: 52
GFR NON-AFRICAN AMERICAN: 43
GLUCOSE BLD-MCNC: 175 MG/DL (ref 70–99)
POTASSIUM SERPL-SCNC: 4.2 MMOL/L (ref 3.5–5.1)
PRO-BNP: 56 PG/ML (ref 0–449)
SODIUM BLD-SCNC: 141 MMOL/L (ref 136–145)

## 2022-07-16 PROBLEM — N39.0 UTI (URINARY TRACT INFECTION): Status: RESOLVED | Noted: 2022-06-16 | Resolved: 2022-07-16

## 2022-07-26 ASSESSMENT — ENCOUNTER SYMPTOMS
SHORTNESS OF BREATH: 1
GASTROINTESTINAL NEGATIVE: 1

## 2022-07-26 NOTE — PROGRESS NOTES
[address]   Congestive Heart Failure    Primary Care Doctor:  Molly January, APRN - CNP  Primary Cardiologist: Eli Welsh   Last/Next OV: Nov 2022    Chief Complaint:  CHF    History of Present Illness:  Rosemary Hook is a 78 y.o. female with Romanian speaking female with hx atrial fibrillation, CAD/CABG (2004), DM2, HTN, hypothyroidism who presents today for CHF f/u. Mylinda Aver started at hosp f/u visit      TOday:  she is doing a little better, edema is improved and her SOB is stable. She is unable to weigh. She reports reports one episode of chest pain recently, no assoc or aggravating factors. She denies palpitations, orthopnea, PND, exertional chest pressure/discomfort, early saiety, syncope. hospital weight on d/c was 243lb and discharge home weight was unknown, unable to weigh    Baseline Weight: 243  Wt Readings from Last 3 Encounters:   06/21/22 243 lb 9.6 oz (110.5 kg)   10/06/21 225 lb (102.1 kg)   09/27/21 225 lb (102.1 kg)        EF: 55%  Cardiac Imaging:  Echo 6/17/2022:  Summary   -Technically limited portable study secondary to patient's immobility (upright semi supine position). -Normal left ventricle size,mild to moderate concentric wall thickness and normal systolic function with an estimated ejection fraction of 55%. -Abnormal mid to distal septal and inferoseptal motion is present.   -Indeterminate diastolic function. due to mitral annular calcification. E/e\"=20.3.   -Mitral annular calcification is present. -Mild to moderate posteriorly directed mitral regurgitation.   -Aortic valve appears sclerotic but opens adequately. -Mild tricuspid regurgitation.   -Estimated pulmonary artery systolic pressure is borderline elevated at 33 mmHg assuming a right atrial pressure of 8 mmHg. -The left atrium is at the upper limits of normal in size to mildly dilated. -The right ventricle is not well visualized. -Right ventricular systolic function appears mildly reduced .  TAPSE 1.22 cm.   -RV S velocity 8.6 cm/s. -Hypokinetic free wall of the right ventricle. Activity: baseline, very deconditioned, no home therapy  Can you walk 1-2 blocks or do a moderate amount of house/yard work? No      NYHA Class: III       Sodium Restrictions: 3g  Fluid Restrictions: 48-64 oz/day  Sodium and fluid restriction compliance: poor    Pt Education: The patient has received education on the following topics: dietary sodium restriction, heart failure medications, the importance of physical activity, symptom management and weight monitoring           Past Medical History:   has a past medical history of 4/11/17 Left knee repeat repair of median parapatellar arthrotomy with augmentation, Arthritis, Atrial fibrillation ([facility] [address] CAD (coronary artery disease), Cataract, Diabetes mellitus ([facility] [address] GERD (gastroesophageal reflux disease), Hyperlipidemia, Hypertension, HYPOTHRYROIDISM, Non-English speaking patient, Sleep apnea, Thyroid disease, and UTI. Surgical History:   has a past surgical history that includes joint replacement (Bilateral, [address]); Cardiac surgery (2004); Cholecystectomy, laparoscopic (5/15/14); Revision total knee arthroplasty (Right, 11/22/2016); Total knee arthroplasty; and knee surgery (Left, 02/28/2017). Social History:   reports that she has never smoked. She has never used smokeless tobacco. She reports that she does not drink alcohol and does not use drugs. Family History:   Family History   Problem Relation Age of Onset    Arthritis Other     Diabetes Other     Heart Disease Other     Hypertension Other     High Cholesterol Brother        Home Medications:  Prior to Admission medications    Medication Sig Start Date End Date Taking?  Authorizing Provider   dapagliflozin (FARXIGA) 10 MG tablet Take 1 tablet by mouth every morning 6/29/22   MATEUSZ Urena - CNP   furosemide (LASIX) 20 MG tablet Take 1 tablet by mouth daily  Patient taking differently: Take 20 mg by mouth 2 daily Place rectally 2 times daily. Historical Provider, MD   rivaroxaban (XARELTO) 20 MG TABS tablet Take 1 tablet by mouth daily 2/22/17   Pratima Lopez MD   vitamin D (ERGOCALCIFEROL) 65493 UNITS CAPS capsule Take 50,000 Units by mouth once a week    Historical Provider, MD   Omega 3 1000 MG CAPS Take 1,000 mg by mouth daily    Historical Provider, MD   Liraglutide (VICTOZA) 18 MG/3ML SOPN SC injection Inject 1.2 mg into the skin daily    Historical Provider, MD   Calcium Carb-Cholecalciferol (OYSTER SHELL CALCIUM + D PO) Take by mouth daily     Historical Provider, MD   Biotin 5000 MCG TABS Take by mouth daily    Historical Provider, MD   lidocaine (LIDODERM) 5 % Place 1 patch onto the skin daily 12 hours on, 12 hours off. 7/14/15   MATEUSZ Sol - CNP   oxybutynin (DITROPAN XL) 15 MG CR tablet Take 15 mg by mouth daily. Historical Provider, MD   Loratadine 10 MG CAPS Take by mouth daily     Historical Provider, MD   levothyroxine (SYNTHROID) 175 MCG tablet Take 150 mcg by mouth daily. Historical Provider, MD        Allergies:  Aspirin, Fluconazole, Ibuprofen, Macrobid [nitrofurantoin monohydrate macrocrystals], Adhesive tape, Lexiscan [regadenoson], and Penicillins     ROS:   Review of Systems   Constitutional:  Positive for fatigue. Respiratory:  Positive for shortness of breath. Cardiovascular:  Positive for chest pain and leg swelling. Gastrointestinal: Negative. Genitourinary: Negative. Musculoskeletal: Negative. Skin: Negative. Neurological: Negative. Hematological: Negative. Psychiatric/Behavioral: Negative. Physical Examination:    Vitals:    07/27/22 1512   BP: (!) 112/54   Pulse: 57   SpO2: 94%           Physical Exam  Vitals reviewed. Constitutional:       Appearance: Normal appearance. She is normal weight. HENT:      Head: Normocephalic and atraumatic. Eyes:      Extraocular Movements: Extraocular movements intact.       Pupils: Pupils 4.1 02/10/2012 08:47 AM    BUN 43 07/12/2022 06:45 PM    BUN 45 06/23/2022 04:37 AM    BUN 56 06/22/2022 07:46 AM    CREATININE 1.2 07/12/2022 06:45 PM    CREATININE 1.1 06/23/2022 04:37 AM    CREATININE 1.0 06/22/2022 07:46 AM     BNP:   Lab Results   Component Value Date/Time    PROBNP 56 07/12/2022 06:45 PM    PROBNP 104 06/16/2022 04:44 PM     Iron Studies:    Lab Results   Component Value Date/Time    FERRITIN 284.8 07/20/2011 01:25 PM     Lab Results   Component Value Date    FERRITIN 284.8 07/20/2011          Assessment/Plan:    1. diastolic congestive heart failure (HCC) - compensated, continue lasix,entresto, farxiga   2. Coronary artery disease - allergy to contrast so [facility] deferred inpt, continue BB, statin   3. Paroxysmal atrial fibrillation (HCC) - rate controlled on BB, continue AC   4. Primary hypertension - BP soft, continue to monitor, may need to decrease entresto dose         Instructions:   Medications: no change in medications, call if you have more chest pain  Lifestyle Recommendations: Weigh yourself every day in the morning after urination, call Jocy Gonzalez if wt increases 2-3lb in one day or 5lb in one week, Limit sodium to 3000mg/day and fluids to 2L or 64oz/day.    Follow up: November with Dr. Ayah Kapadia: [phone]          I appreciate the opportunity of cooperating in the care of this individual.    Verner Manners, APRN - CNP, 7/26/2022, 1:34 PM

## 2022-07-27 ENCOUNTER — OFFICE VISIT (OUTPATIENT)
Dept: CARDIOLOGY CLINIC | Age: 82
End: 2022-07-27
Payer: MEDICARE

## 2022-07-27 VITALS — SYSTOLIC BLOOD PRESSURE: 112 MMHG | OXYGEN SATURATION: 94 % | DIASTOLIC BLOOD PRESSURE: 54 MMHG | HEART RATE: 57 BPM

## 2022-07-27 DIAGNOSIS — R06.02 SOB (SHORTNESS OF BREATH): ICD-10-CM

## 2022-07-27 DIAGNOSIS — I50.32 CHRONIC DIASTOLIC CONGESTIVE HEART FAILURE (HCC): ICD-10-CM

## 2022-07-27 DIAGNOSIS — I25.10 CORONARY ARTERY DISEASE INVOLVING NATIVE CORONARY ARTERY OF NATIVE HEART WITHOUT ANGINA PECTORIS: Primary | Chronic | ICD-10-CM

## 2022-07-27 DIAGNOSIS — I48.0 PAROXYSMAL ATRIAL FIBRILLATION (HCC): ICD-10-CM

## 2022-07-27 PROCEDURE — G8417 CALC BMI ABV UP PARAM F/U: HCPCS | Performed by: NURSE PRACTITIONER

## 2022-07-27 PROCEDURE — 99214 OFFICE O/P EST MOD 30 MIN: CPT | Performed by: NURSE PRACTITIONER

## 2022-07-27 PROCEDURE — G8427 DOCREV CUR MEDS BY ELIG CLIN: HCPCS | Performed by: NURSE PRACTITIONER

## 2022-07-27 PROCEDURE — G8400 PT W/DXA NO RESULTS DOC: HCPCS | Performed by: NURSE PRACTITIONER

## 2022-07-27 PROCEDURE — 1090F PRES/ABSN URINE INCON ASSESS: CPT | Performed by: NURSE PRACTITIONER

## 2022-07-27 PROCEDURE — 1123F ACP DISCUSS/DSCN MKR DOCD: CPT | Performed by: NURSE PRACTITIONER

## 2022-07-27 PROCEDURE — 1036F TOBACCO NON-USER: CPT | Performed by: NURSE PRACTITIONER

## 2022-07-27 NOTE — PATIENT INSTRUCTIONS
Instructions:   Medications: no change in medications, call if you have more chest pain  Lifestyle Recommendations: Weigh yourself every day in the morning after urination, call Alanna Reyna if wt increases 2-3lb in one day or 5lb in one week, Limit sodium to 3000mg/day and fluids to 2L or 64oz/day.    Follow up: November with Dr. Saurabh Oneil: [phone]

## 2022-08-08 ENCOUNTER — TELEPHONE (OUTPATIENT)
Dept: CARDIOLOGY CLINIC | Age: 82
End: 2022-08-08

## 2022-08-08 NOTE — TELEPHONE ENCOUNTER
Joseph Grimes called to get clarification on the med furosemide 20mg. Is the Pt to be taken the med once a day are twice a day.   Please advise

## 2022-08-08 NOTE — TELEPHONE ENCOUNTER
Last OV notes stated no changes in medications. I found 2 different set of directions.  Can you clarify if furosemide is 1 a day or twice a day

## 2022-08-09 RX ORDER — FUROSEMIDE 20 MG/1
20 TABLET ORAL 2 TIMES DAILY
Qty: 60 TABLET | Refills: 2 | Status: ON HOLD
Start: 2022-08-09 | End: 2022-10-04 | Stop reason: HOSPADM

## 2022-08-12 ENCOUNTER — TELEPHONE (OUTPATIENT)
Dept: CARDIOLOGY CLINIC | Age: 82
End: 2022-08-12

## 2022-08-12 RX ORDER — ISOSORBIDE MONONITRATE 30 MG/1
30 TABLET, EXTENDED RELEASE ORAL DAILY
Qty: 30 TABLET | Refills: 3 | Status: ON HOLD
Start: 2022-08-12 | End: 2022-10-04 | Stop reason: HOSPADM

## 2022-08-12 NOTE — TELEPHONE ENCOUNTER
I sent script for imdur (long acting nitro) for once a day, if your blood pressure gets too low with this we will decrease entresto to 1/2 pill twice a day.  Clenton Closs

## 2022-08-17 ENCOUNTER — TELEPHONE (OUTPATIENT)
Dept: CARDIOLOGY CLINIC | Age: 82
End: 2022-08-17

## 2022-08-26 RX ORDER — SACUBITRIL AND VALSARTAN 24; 26 MG/1; MG/1
TABLET, FILM COATED ORAL
Qty: 60 TABLET | Refills: 10 | OUTPATIENT
Start: 2022-08-26

## 2022-09-08 RX ORDER — SACUBITRIL AND VALSARTAN 24; 26 MG/1; MG/1
1 TABLET, FILM COATED ORAL 2 TIMES DAILY
Qty: 60 TABLET | Refills: 2 | Status: ON HOLD
Start: 2022-09-08 | End: 2022-10-04 | Stop reason: HOSPADM

## 2022-09-08 NOTE — TELEPHONE ENCOUNTER
Prescription refill    Last OV:07/27/2022    Last Refill:08/18/2022  using local pharmacy    Labs:07/12/2022    Future Appt: none scheduled and patient is not on the recall list

## 2022-09-08 NOTE — TELEPHONE ENCOUNTER
Daughter states pt only has 5 doses left and is asking for a script to be sent to local  [facility].

## 2022-09-22 ENCOUNTER — TELEPHONE (OUTPATIENT)
Dept: CARDIOLOGY CLINIC | Age: 82
End: 2022-09-22

## 2022-09-22 NOTE — TELEPHONE ENCOUNTER
Yes, that is fine. Just watch wt closely because she might hold onto extra fluid with it.  Clenton Closs

## 2022-09-22 NOTE — TELEPHONE ENCOUNTER
Pt daughter called in stating that she needed MMK or NPKV to giver her  call. She states her mother is on medication for Heart Failure and wants to know if its ok for her to take a low dose steroid pack.       Ang Pabon can be reached at [phone]

## 2022-09-24 ENCOUNTER — APPOINTMENT (OUTPATIENT)
Dept: GENERAL RADIOLOGY | Age: 82
DRG: 811 | End: 2022-09-24
Payer: MEDICARE

## 2022-09-24 ENCOUNTER — HOSPITAL ENCOUNTER (INPATIENT)
Age: 82
LOS: 13 days | Discharge: NURSING, REHAB OR LONG-TERM CARE | DRG: 811 | End: 2022-10-07
Attending: EMERGENCY MEDICINE | Admitting: INTERNAL MEDICINE
Payer: MEDICARE

## 2022-09-24 DIAGNOSIS — R06.09 DYSPNEA ON EXERTION: ICD-10-CM

## 2022-09-24 DIAGNOSIS — D64.9 ANEMIA, UNSPECIFIED TYPE: ICD-10-CM

## 2022-09-24 DIAGNOSIS — R13.10 DYSPHAGIA, UNSPECIFIED TYPE: ICD-10-CM

## 2022-09-24 DIAGNOSIS — R26.2 AMBULATORY DYSFUNCTION: Primary | ICD-10-CM

## 2022-09-24 LAB
A/G RATIO: 1.1 (ref 1.1–2.2)
ALBUMIN SERPL-MCNC: 3.8 G/DL (ref 3.4–5)
ALP BLD-CCNC: 88 U/L (ref 40–129)
ALT SERPL-CCNC: 11 U/L (ref 10–40)
ANION GAP SERPL CALCULATED.3IONS-SCNC: 11 MMOL/L (ref 3–16)
ANISOCYTOSIS: ABNORMAL
AST SERPL-CCNC: 15 U/L (ref 15–37)
ATYPICAL LYMPHOCYTE RELATIVE PERCENT: 2 % (ref 0–6)
BACTERIA: ABNORMAL /HPF
BANDED NEUTROPHILS RELATIVE PERCENT: 1 % (ref 0–7)
BASOPHILS ABSOLUTE: 0 K/UL (ref 0–0.2)
BASOPHILS RELATIVE PERCENT: 0 %
BILIRUB SERPL-MCNC: 0.4 MG/DL (ref 0–1)
BUN BLDV-MCNC: 35 MG/DL (ref 7–20)
CALCIUM SERPL-MCNC: 9.3 MG/DL (ref 8.3–10.6)
CHLORIDE BLD-SCNC: 97 MMOL/L (ref 99–110)
CO2: 28 MMOL/L (ref 21–32)
CREAT SERPL-MCNC: 1.1 MG/DL (ref 0.6–1.2)
EOSINOPHILS ABSOLUTE: 0 K/UL (ref 0–0.6)
EOSINOPHILS RELATIVE PERCENT: 0 %
EPITHELIAL CELLS, UA: 30 /HPF (ref 0–5)
GFR AFRICAN AMERICAN: 57
GFR NON-AFRICAN AMERICAN: 47
GLUCOSE BLD-MCNC: 129 MG/DL (ref 70–99)
HCT VFR BLD CALC: 27.1 % (ref 36–48)
HEMOGLOBIN: 9.1 G/DL (ref 12–16)
HYALINE CASTS: 0 /LPF (ref 0–8)
LYMPHOCYTES ABSOLUTE: 1.9 K/UL (ref 1–5.1)
LYMPHOCYTES RELATIVE PERCENT: 37 %
MCH RBC QN AUTO: 31.9 PG (ref 26–34)
MCHC RBC AUTO-ENTMCNC: 33.6 G/DL (ref 31–36)
MCV RBC AUTO: 95 FL (ref 80–100)
MONOCYTES ABSOLUTE: 0.7 K/UL (ref 0–1.3)
MONOCYTES RELATIVE PERCENT: 15 %
MYELOCYTE PERCENT: 2 %
NEUTROPHILS ABSOLUTE: 2.2 K/UL (ref 1.7–7.7)
NEUTROPHILS RELATIVE PERCENT: 43 %
OCCULT BLOOD DIAGNOSTIC: NORMAL
PDW BLD-RTO: 20.6 % (ref 12.4–15.4)
PLATELET # BLD: 90 K/UL (ref 135–450)
PLATELET SLIDE REVIEW: ADEQUATE
PMV BLD AUTO: 8.9 FL (ref 5–10.5)
POLYCHROMASIA: ABNORMAL
POTASSIUM SERPL-SCNC: 4 MMOL/L (ref 3.5–5.1)
PRO-BNP: 277 PG/ML (ref 0–449)
RBC # BLD: 2.85 M/UL (ref 4–5.2)
RBC UA: 1 /HPF (ref 0–4)
SLIDE REVIEW: ABNORMAL
SODIUM BLD-SCNC: 136 MMOL/L (ref 136–145)
TEAR DROP CELLS: ABNORMAL
TOTAL CK: 254 U/L (ref 26–192)
TOTAL PROTEIN: 7.2 G/DL (ref 6.4–8.2)
TROPONIN: 0.05 NG/ML
WBC # BLD: 4.8 K/UL (ref 4–11)
WBC UA: 11 /HPF (ref 0–5)

## 2022-09-24 PROCEDURE — 82270 OCCULT BLOOD FECES: CPT

## 2022-09-24 PROCEDURE — 71046 X-RAY EXAM CHEST 2 VIEWS: CPT

## 2022-09-24 PROCEDURE — 96365 THER/PROPH/DIAG IV INF INIT: CPT

## 2022-09-24 PROCEDURE — 80053 COMPREHEN METABOLIC PANEL: CPT

## 2022-09-24 PROCEDURE — 6360000002 HC RX W HCPCS: Performed by: EMERGENCY MEDICINE

## 2022-09-24 PROCEDURE — 93005 ELECTROCARDIOGRAM TRACING: CPT | Performed by: EMERGENCY MEDICINE

## 2022-09-24 PROCEDURE — 81001 URINALYSIS AUTO W/SCOPE: CPT

## 2022-09-24 PROCEDURE — 36415 COLL VENOUS BLD VENIPUNCTURE: CPT

## 2022-09-24 PROCEDURE — 87086 URINE CULTURE/COLONY COUNT: CPT

## 2022-09-24 PROCEDURE — 85025 COMPLETE CBC W/AUTO DIFF WBC: CPT

## 2022-09-24 PROCEDURE — 99285 EMERGENCY DEPT VISIT HI MDM: CPT

## 2022-09-24 PROCEDURE — 1200000000 HC SEMI PRIVATE

## 2022-09-24 PROCEDURE — 96375 TX/PRO/DX INJ NEW DRUG ADDON: CPT

## 2022-09-24 PROCEDURE — 6370000000 HC RX 637 (ALT 250 FOR IP): Performed by: PHYSICIAN ASSISTANT

## 2022-09-24 PROCEDURE — 73110 X-RAY EXAM OF WRIST: CPT

## 2022-09-24 PROCEDURE — 84484 ASSAY OF TROPONIN QUANT: CPT

## 2022-09-24 PROCEDURE — 83880 ASSAY OF NATRIURETIC PEPTIDE: CPT

## 2022-09-24 PROCEDURE — 2580000003 HC RX 258: Performed by: EMERGENCY MEDICINE

## 2022-09-24 PROCEDURE — C9113 INJ PANTOPRAZOLE SODIUM, VIA: HCPCS | Performed by: EMERGENCY MEDICINE

## 2022-09-24 PROCEDURE — 82550 ASSAY OF CK (CPK): CPT

## 2022-09-24 RX ORDER — CYCLOBENZAPRINE HCL 10 MG
10 TABLET ORAL ONCE
Status: COMPLETED | OUTPATIENT
Start: 2022-09-24 | End: 2022-09-24

## 2022-09-24 RX ORDER — ACETAMINOPHEN 500 MG
1000 TABLET ORAL ONCE
Status: COMPLETED | OUTPATIENT
Start: 2022-09-24 | End: 2022-09-24

## 2022-09-24 RX ORDER — FUROSEMIDE 10 MG/ML
40 INJECTION INTRAMUSCULAR; INTRAVENOUS ONCE
Status: COMPLETED | OUTPATIENT
Start: 2022-09-24 | End: 2022-09-24

## 2022-09-24 RX ORDER — FUROSEMIDE 40 MG/1
20 TABLET ORAL 2 TIMES DAILY
Status: CANCELLED | OUTPATIENT
Start: 2022-09-24

## 2022-09-24 RX ORDER — PANTOPRAZOLE SODIUM 40 MG/10ML
80 INJECTION, POWDER, LYOPHILIZED, FOR SOLUTION INTRAVENOUS ONCE
Status: COMPLETED | OUTPATIENT
Start: 2022-09-24 | End: 2022-09-24

## 2022-09-24 RX ADMIN — FUROSEMIDE 40 MG: 10 INJECTION, SOLUTION INTRAMUSCULAR; INTRAVENOUS at 21:58

## 2022-09-24 RX ADMIN — ACETAMINOPHEN 1000 MG: 500 TABLET ORAL at 19:18

## 2022-09-24 RX ADMIN — CEFAZOLIN 2000 MG: 2 INJECTION, POWDER, FOR SOLUTION INTRAMUSCULAR; INTRAVENOUS at 22:02

## 2022-09-24 RX ADMIN — CYCLOBENZAPRINE 10 MG: 10 TABLET, FILM COATED ORAL at 19:18

## 2022-09-24 RX ADMIN — PANTOPRAZOLE SODIUM 80 MG: 40 INJECTION, POWDER, FOR SOLUTION INTRAVENOUS at 21:57

## 2022-09-24 ASSESSMENT — ENCOUNTER SYMPTOMS
DIARRHEA: 0
VOMITING: 0
SHORTNESS OF BREATH: 1
NAUSEA: 0
COUGH: 0
ABDOMINAL PAIN: 0
RHINORRHEA: 0

## 2022-09-24 ASSESSMENT — PAIN SCALES - GENERAL: PAINLEVEL_OUTOF10: 10

## 2022-09-24 NOTE — ED NOTES
PT transferred to ED room at this time. This RN attempted to transfer PT to bed but PT declined. This RN explained safety concerns but PT declined. PT requesting to remain in wheelchair, placed on telemetry monitor with ST elevation capabilities, RN notified.      Dl Sanchez RN  09/24/22 5252

## 2022-09-24 NOTE — ED NOTES
Pt requesting IV in L FA. Pt educated that may not be the best idea due to the L wrist pain. Pt insisting that is where IV be placed. IV placed with no issue.       Vincent Lockhart RN  09/24/22 1910

## 2022-09-25 PROBLEM — M19.039 WRIST ARTHRITIS: Status: ACTIVE | Noted: 2022-09-25

## 2022-09-25 PROBLEM — D69.6 THROMBOCYTOPENIA (HCC): Status: ACTIVE | Noted: 2022-09-25

## 2022-09-25 PROBLEM — D64.9 ANEMIA: Status: ACTIVE | Noted: 2022-09-25

## 2022-09-25 LAB
ANION GAP SERPL CALCULATED.3IONS-SCNC: 14 MMOL/L (ref 3–16)
ANISOCYTOSIS: ABNORMAL
BANDED NEUTROPHILS RELATIVE PERCENT: 1 % (ref 0–7)
BASOPHILS ABSOLUTE: 0 K/UL (ref 0–0.2)
BASOPHILS RELATIVE PERCENT: 0 %
BILIRUBIN URINE: NEGATIVE
BLOOD, URINE: NEGATIVE
BUN BLDV-MCNC: 31 MG/DL (ref 7–20)
CALCIUM SERPL-MCNC: 8.7 MG/DL (ref 8.3–10.6)
CHLORIDE BLD-SCNC: 99 MMOL/L (ref 99–110)
CLARITY: ABNORMAL
CO2: 27 MMOL/L (ref 21–32)
COLOR: YELLOW
CREAT SERPL-MCNC: 1 MG/DL (ref 0.6–1.2)
EKG ATRIAL RATE: 71 BPM
EKG ATRIAL RATE: 79 BPM
EKG DIAGNOSIS: NORMAL
EKG DIAGNOSIS: NORMAL
EKG P AXIS: 81 DEGREES
EKG P-R INTERVAL: 170 MS
EKG Q-T INTERVAL: 448 MS
EKG Q-T INTERVAL: 482 MS
EKG QRS DURATION: 172 MS
EKG QRS DURATION: 178 MS
EKG QTC CALCULATION (BAZETT): 500 MS
EKG QTC CALCULATION (BAZETT): 523 MS
EKG R AXIS: 264 DEGREES
EKG R AXIS: 265 DEGREES
EKG T AXIS: 39 DEGREES
EKG T AXIS: 49 DEGREES
EKG VENTRICULAR RATE: 71 BPM
EKG VENTRICULAR RATE: 75 BPM
EOSINOPHILS ABSOLUTE: 0 K/UL (ref 0–0.6)
EOSINOPHILS RELATIVE PERCENT: 0 %
FERRITIN: 641.4 NG/ML (ref 15–150)
FOLATE: 13.36 NG/ML (ref 4.78–24.2)
GFR AFRICAN AMERICAN: >60
GFR NON-AFRICAN AMERICAN: 53
GLUCOSE BLD-MCNC: 162 MG/DL (ref 70–99)
GLUCOSE BLD-MCNC: 173 MG/DL (ref 70–99)
GLUCOSE BLD-MCNC: 183 MG/DL (ref 70–99)
GLUCOSE BLD-MCNC: 63 MG/DL (ref 70–99)
GLUCOSE BLD-MCNC: 74 MG/DL (ref 70–99)
GLUCOSE URINE: 500 MG/DL
HCT VFR BLD CALC: 27.1 % (ref 36–48)
HEMOGLOBIN: 9.1 G/DL (ref 12–16)
IRON SATURATION: 20 % (ref 15–50)
IRON: 46 UG/DL (ref 37–145)
KETONES, URINE: NEGATIVE MG/DL
LEUKOCYTE ESTERASE, URINE: ABNORMAL
LYMPHOCYTES ABSOLUTE: 1.8 K/UL (ref 1–5.1)
LYMPHOCYTES RELATIVE PERCENT: 44 %
MAGNESIUM: 1.9 MG/DL (ref 1.8–2.4)
MCH RBC QN AUTO: 31.5 PG (ref 26–34)
MCHC RBC AUTO-ENTMCNC: 33.4 G/DL (ref 31–36)
MCV RBC AUTO: 94.4 FL (ref 80–100)
MICROSCOPIC EXAMINATION: YES
MONOCYTES ABSOLUTE: 0.9 K/UL (ref 0–1.3)
MONOCYTES RELATIVE PERCENT: 23 %
NEUTROPHILS ABSOLUTE: 1.3 K/UL (ref 1.7–7.7)
NEUTROPHILS RELATIVE PERCENT: 32 %
NITRITE, URINE: NEGATIVE
PDW BLD-RTO: 21.2 % (ref 12.4–15.4)
PERFORMED ON: ABNORMAL
PERFORMED ON: NORMAL
PH UA: 5.5 (ref 5–8)
PLATELET # BLD: 85 K/UL (ref 135–450)
PMV BLD AUTO: 8.8 FL (ref 5–10.5)
POLYCHROMASIA: ABNORMAL
POTASSIUM REFLEX MAGNESIUM: 3.4 MMOL/L (ref 3.5–5.1)
PROTEIN UA: ABNORMAL MG/DL
RBC # BLD: 2.87 M/UL (ref 4–5.2)
SMUDGE CELLS: PRESENT
SODIUM BLD-SCNC: 140 MMOL/L (ref 136–145)
SPECIFIC GRAVITY UA: 1.01 (ref 1–1.03)
TOTAL IRON BINDING CAPACITY: 230 UG/DL (ref 260–445)
TROPONIN: 0.04 NG/ML
TROPONIN: 0.05 NG/ML
TSH REFLEX: 1.53 UIU/ML (ref 0.27–4.2)
URINE REFLEX TO CULTURE: YES
URINE TYPE: ABNORMAL
UROBILINOGEN, URINE: 0.2 E.U./DL
VITAMIN B-12: >2000 PG/ML (ref 211–911)
WBC # BLD: 4 K/UL (ref 4–11)

## 2022-09-25 PROCEDURE — 99231 SBSQ HOSP IP/OBS SF/LOW 25: CPT | Performed by: ORTHOPAEDIC SURGERY

## 2022-09-25 PROCEDURE — 83735 ASSAY OF MAGNESIUM: CPT

## 2022-09-25 PROCEDURE — 84484 ASSAY OF TROPONIN QUANT: CPT

## 2022-09-25 PROCEDURE — 80048 BASIC METABOLIC PNL TOTAL CA: CPT

## 2022-09-25 PROCEDURE — 99223 1ST HOSP IP/OBS HIGH 75: CPT | Performed by: INTERNAL MEDICINE

## 2022-09-25 PROCEDURE — 82607 VITAMIN B-12: CPT

## 2022-09-25 PROCEDURE — 6370000000 HC RX 637 (ALT 250 FOR IP): Performed by: PHYSICIAN ASSISTANT

## 2022-09-25 PROCEDURE — 82728 ASSAY OF FERRITIN: CPT

## 2022-09-25 PROCEDURE — 6360000002 HC RX W HCPCS: Performed by: PHYSICIAN ASSISTANT

## 2022-09-25 PROCEDURE — 83550 IRON BINDING TEST: CPT

## 2022-09-25 PROCEDURE — 84443 ASSAY THYROID STIM HORMONE: CPT

## 2022-09-25 PROCEDURE — 2580000003 HC RX 258: Performed by: PHYSICIAN ASSISTANT

## 2022-09-25 PROCEDURE — 36415 COLL VENOUS BLD VENIPUNCTURE: CPT

## 2022-09-25 PROCEDURE — 93010 ELECTROCARDIOGRAM REPORT: CPT | Performed by: INTERNAL MEDICINE

## 2022-09-25 PROCEDURE — 94760 N-INVAS EAR/PLS OXIMETRY 1: CPT

## 2022-09-25 PROCEDURE — 82746 ASSAY OF FOLIC ACID SERUM: CPT

## 2022-09-25 PROCEDURE — 85025 COMPLETE CBC W/AUTO DIFF WBC: CPT

## 2022-09-25 PROCEDURE — 1200000000 HC SEMI PRIVATE

## 2022-09-25 PROCEDURE — 6370000000 HC RX 637 (ALT 250 FOR IP): Performed by: FAMILY MEDICINE

## 2022-09-25 PROCEDURE — 83540 ASSAY OF IRON: CPT

## 2022-09-25 RX ORDER — LISINOPRIL 2.5 MG/1
2.5 TABLET ORAL DAILY
Status: ON HOLD | COMMUNITY
End: 2022-10-04 | Stop reason: HOSPADM

## 2022-09-25 RX ORDER — CETIRIZINE HYDROCHLORIDE 10 MG/1
5 TABLET ORAL DAILY
Status: DISCONTINUED | OUTPATIENT
Start: 2022-09-25 | End: 2022-10-07 | Stop reason: HOSPADM

## 2022-09-25 RX ORDER — OMEGA-3 FATTY ACIDS/FISH OIL 300-1000MG
1000 CAPSULE ORAL DAILY
Status: DISCONTINUED | OUTPATIENT
Start: 2022-09-25 | End: 2022-09-25 | Stop reason: RX

## 2022-09-25 RX ORDER — INSULIN LISPRO 100 [IU]/ML
0-4 INJECTION, SOLUTION INTRAVENOUS; SUBCUTANEOUS
Status: DISCONTINUED | OUTPATIENT
Start: 2022-09-25 | End: 2022-09-28

## 2022-09-25 RX ORDER — OXYCODONE HYDROCHLORIDE 5 MG/1
2.5 TABLET ORAL EVERY 4 HOURS PRN
Status: DISCONTINUED | OUTPATIENT
Start: 2022-09-25 | End: 2022-10-07 | Stop reason: HOSPADM

## 2022-09-25 RX ORDER — CEFACLOR 250 MG
500 CAPSULE ORAL 3 TIMES DAILY
Status: ON HOLD | COMMUNITY
End: 2022-10-04 | Stop reason: HOSPADM

## 2022-09-25 RX ORDER — ISOSORBIDE MONONITRATE 30 MG/1
30 TABLET, EXTENDED RELEASE ORAL DAILY
Status: DISCONTINUED | OUTPATIENT
Start: 2022-09-25 | End: 2022-10-07 | Stop reason: HOSPADM

## 2022-09-25 RX ORDER — ACETAMINOPHEN 325 MG/1
650 TABLET ORAL EVERY 6 HOURS PRN
Status: DISCONTINUED | OUTPATIENT
Start: 2022-09-25 | End: 2022-09-25

## 2022-09-25 RX ORDER — LIDOCAINE 4 G/G
1 PATCH TOPICAL DAILY
Status: DISCONTINUED | OUTPATIENT
Start: 2022-09-25 | End: 2022-10-07 | Stop reason: HOSPADM

## 2022-09-25 RX ORDER — SENNA PLUS 8.6 MG/1
1 TABLET ORAL 2 TIMES DAILY
Status: DISCONTINUED | OUTPATIENT
Start: 2022-09-25 | End: 2022-09-29

## 2022-09-25 RX ORDER — FUROSEMIDE 10 MG/ML
40 INJECTION INTRAMUSCULAR; INTRAVENOUS 2 TIMES DAILY
Status: DISCONTINUED | OUTPATIENT
Start: 2022-09-25 | End: 2022-09-26

## 2022-09-25 RX ORDER — GABAPENTIN 300 MG/1
300 CAPSULE ORAL DAILY
Status: DISCONTINUED | OUTPATIENT
Start: 2022-09-25 | End: 2022-10-07 | Stop reason: HOSPADM

## 2022-09-25 RX ORDER — ACETAMINOPHEN 500 MG
1000 TABLET ORAL EVERY 8 HOURS SCHEDULED
Status: DISCONTINUED | OUTPATIENT
Start: 2022-09-25 | End: 2022-10-07 | Stop reason: HOSPADM

## 2022-09-25 RX ORDER — OXYBUTYNIN CHLORIDE 5 MG/1
15 TABLET, EXTENDED RELEASE ORAL DAILY
Status: DISCONTINUED | OUTPATIENT
Start: 2022-09-25 | End: 2022-10-07 | Stop reason: HOSPADM

## 2022-09-25 RX ORDER — ACETAMINOPHEN 650 MG/1
650 SUPPOSITORY RECTAL EVERY 6 HOURS PRN
Status: DISCONTINUED | OUTPATIENT
Start: 2022-09-25 | End: 2022-09-25

## 2022-09-25 RX ORDER — BENZONATATE 100 MG/1
100 CAPSULE ORAL 3 TIMES DAILY PRN
Status: ON HOLD | COMMUNITY
End: 2022-10-11 | Stop reason: HOSPADM

## 2022-09-25 RX ORDER — TIZANIDINE 4 MG/1
2 TABLET ORAL 3 TIMES DAILY
Status: DISCONTINUED | OUTPATIENT
Start: 2022-09-25 | End: 2022-10-07 | Stop reason: HOSPADM

## 2022-09-25 RX ORDER — LEVOTHYROXINE SODIUM 0.15 MG/1
150 TABLET ORAL DAILY
Status: DISCONTINUED | OUTPATIENT
Start: 2022-09-25 | End: 2022-10-07 | Stop reason: HOSPADM

## 2022-09-25 RX ORDER — FLUCONAZOLE 100 MG/1
150 TABLET ORAL ONCE
Status: COMPLETED | OUTPATIENT
Start: 2022-09-25 | End: 2022-09-25

## 2022-09-25 RX ORDER — PANTOPRAZOLE SODIUM 40 MG/1
40 TABLET, DELAYED RELEASE ORAL
Status: DISCONTINUED | OUTPATIENT
Start: 2022-09-25 | End: 2022-10-07 | Stop reason: HOSPADM

## 2022-09-25 RX ORDER — PHENAZOPYRIDINE HYDROCHLORIDE 100 MG/1
100 TABLET, FILM COATED ORAL
Status: DISCONTINUED | OUTPATIENT
Start: 2022-09-25 | End: 2022-09-26

## 2022-09-25 RX ORDER — LACTOBACILLUS RHAMNOSUS GG 10B CELL
1 CAPSULE ORAL 2 TIMES DAILY WITH MEALS
Status: DISCONTINUED | OUTPATIENT
Start: 2022-09-25 | End: 2022-09-26

## 2022-09-25 RX ORDER — PANTOPRAZOLE SODIUM 40 MG/1
40 TABLET, DELAYED RELEASE ORAL
Status: ON HOLD | COMMUNITY
End: 2022-11-04

## 2022-09-25 RX ORDER — DEXTROSE MONOHYDRATE 100 MG/ML
INJECTION, SOLUTION INTRAVENOUS CONTINUOUS PRN
Status: DISCONTINUED | OUTPATIENT
Start: 2022-09-25 | End: 2022-10-07 | Stop reason: HOSPADM

## 2022-09-25 RX ORDER — INSULIN LISPRO 100 [IU]/ML
0-4 INJECTION, SOLUTION INTRAVENOUS; SUBCUTANEOUS NIGHTLY
Status: DISCONTINUED | OUTPATIENT
Start: 2022-09-25 | End: 2022-09-28 | Stop reason: SDUPTHER

## 2022-09-25 RX ORDER — POTASSIUM CHLORIDE 20 MEQ/1
40 TABLET, EXTENDED RELEASE ORAL ONCE
Status: COMPLETED | OUTPATIENT
Start: 2022-09-25 | End: 2022-09-25

## 2022-09-25 RX ORDER — POLYETHYLENE GLYCOL 3350 17 G/17G
17 POWDER, FOR SOLUTION ORAL DAILY PRN
Status: DISCONTINUED | OUTPATIENT
Start: 2022-09-25 | End: 2022-10-07 | Stop reason: HOSPADM

## 2022-09-25 RX ORDER — SODIUM CHLORIDE 0.9 % (FLUSH) 0.9 %
10 SYRINGE (ML) INJECTION EVERY 12 HOURS SCHEDULED
Status: DISCONTINUED | OUTPATIENT
Start: 2022-09-25 | End: 2022-10-07 | Stop reason: HOSPADM

## 2022-09-25 RX ORDER — INSULIN GLARGINE 100 [IU]/ML
55 INJECTION, SOLUTION SUBCUTANEOUS 2 TIMES DAILY
Status: DISCONTINUED | OUTPATIENT
Start: 2022-09-25 | End: 2022-09-26

## 2022-09-25 RX ORDER — HYDROCODONE BITARTRATE AND ACETAMINOPHEN 5; 325 MG/1; MG/1
1 TABLET ORAL EVERY 6 HOURS PRN
Status: DISCONTINUED | OUTPATIENT
Start: 2022-09-25 | End: 2022-09-25

## 2022-09-25 RX ORDER — INSULIN LISPRO 100 [IU]/ML
17 INJECTION, SOLUTION INTRAVENOUS; SUBCUTANEOUS
Status: DISCONTINUED | OUTPATIENT
Start: 2022-09-25 | End: 2022-09-26

## 2022-09-25 RX ORDER — GABAPENTIN 300 MG/1
600 CAPSULE ORAL NIGHTLY
Status: DISCONTINUED | OUTPATIENT
Start: 2022-09-25 | End: 2022-10-07 | Stop reason: HOSPADM

## 2022-09-25 RX ORDER — SODIUM CHLORIDE 9 MG/ML
INJECTION, SOLUTION INTRAVENOUS PRN
Status: DISCONTINUED | OUTPATIENT
Start: 2022-09-25 | End: 2022-10-07 | Stop reason: HOSPADM

## 2022-09-25 RX ORDER — ATORVASTATIN CALCIUM 80 MG/1
80 TABLET, FILM COATED ORAL NIGHTLY
Status: DISCONTINUED | OUTPATIENT
Start: 2022-09-25 | End: 2022-10-07 | Stop reason: HOSPADM

## 2022-09-25 RX ORDER — TIZANIDINE 4 MG/1
2 TABLET ORAL EVERY 8 HOURS PRN
Status: DISCONTINUED | OUTPATIENT
Start: 2022-09-25 | End: 2022-09-25

## 2022-09-25 RX ORDER — SODIUM CHLORIDE 0.9 % (FLUSH) 0.9 %
10 SYRINGE (ML) INJECTION PRN
Status: DISCONTINUED | OUTPATIENT
Start: 2022-09-25 | End: 2022-10-07 | Stop reason: HOSPADM

## 2022-09-25 RX ORDER — ONDANSETRON 2 MG/ML
4 INJECTION INTRAMUSCULAR; INTRAVENOUS EVERY 6 HOURS PRN
Status: DISCONTINUED | OUTPATIENT
Start: 2022-09-25 | End: 2022-10-07 | Stop reason: HOSPADM

## 2022-09-25 RX ORDER — CALCIUM CARBONATE 500(1250)
500 TABLET ORAL DAILY
Status: ON HOLD | COMMUNITY

## 2022-09-25 RX ORDER — OXYCODONE HYDROCHLORIDE 5 MG/1
5 TABLET ORAL EVERY 4 HOURS PRN
Status: DISCONTINUED | OUTPATIENT
Start: 2022-09-25 | End: 2022-10-07 | Stop reason: HOSPADM

## 2022-09-25 RX ADMIN — ACETAMINOPHEN 1000 MG: 500 TABLET ORAL at 13:31

## 2022-09-25 RX ADMIN — ATORVASTATIN CALCIUM 80 MG: 80 TABLET, FILM COATED ORAL at 21:12

## 2022-09-25 RX ADMIN — CETIRIZINE HYDROCHLORIDE 5 MG: 10 TABLET, FILM COATED ORAL at 08:51

## 2022-09-25 RX ADMIN — ACETAMINOPHEN 1000 MG: 500 TABLET ORAL at 21:11

## 2022-09-25 RX ADMIN — CEFAZOLIN 2000 MG: 2 INJECTION, POWDER, FOR SOLUTION INTRAMUSCULAR; INTRAVENOUS at 21:21

## 2022-09-25 RX ADMIN — CEFAZOLIN 2000 MG: 2 INJECTION, POWDER, FOR SOLUTION INTRAMUSCULAR; INTRAVENOUS at 13:34

## 2022-09-25 RX ADMIN — Medication 10 ML: at 08:52

## 2022-09-25 RX ADMIN — PHENAZOPYRIDINE HYDROCHLORIDE 100 MG: 100 TABLET ORAL at 17:52

## 2022-09-25 RX ADMIN — GABAPENTIN 300 MG: 300 CAPSULE ORAL at 08:51

## 2022-09-25 RX ADMIN — LEVOTHYROXINE SODIUM 150 MCG: 0.15 TABLET ORAL at 06:38

## 2022-09-25 RX ADMIN — HYDROCODONE BITARTRATE AND ACETAMINOPHEN 1 TABLET: 5; 325 TABLET ORAL at 02:46

## 2022-09-25 RX ADMIN — TIZANIDINE 2 MG: 4 TABLET ORAL at 06:37

## 2022-09-25 RX ADMIN — HYDROCODONE BITARTRATE AND ACETAMINOPHEN 1 TABLET: 5; 325 TABLET ORAL at 08:51

## 2022-09-25 RX ADMIN — METOPROLOL TARTRATE 12.5 MG: 25 TABLET, FILM COATED ORAL at 08:51

## 2022-09-25 RX ADMIN — TIZANIDINE 2 MG: 4 TABLET ORAL at 13:31

## 2022-09-25 RX ADMIN — Medication 10 ML: at 21:13

## 2022-09-25 RX ADMIN — SODIUM CHLORIDE, PRESERVATIVE FREE 10 ML: 5 INJECTION INTRAVENOUS at 06:38

## 2022-09-25 RX ADMIN — TIZANIDINE 2 MG: 4 TABLET ORAL at 21:11

## 2022-09-25 RX ADMIN — OXYBUTYNIN CHLORIDE 15 MG: 5 TABLET, EXTENDED RELEASE ORAL at 08:51

## 2022-09-25 RX ADMIN — METOPROLOL TARTRATE 12.5 MG: 25 TABLET, FILM COATED ORAL at 21:12

## 2022-09-25 RX ADMIN — SENNOSIDES 8.6 MG: 8.6 TABLET, FILM COATED ORAL at 21:11

## 2022-09-25 RX ADMIN — FLUCONAZOLE 150 MG: 100 TABLET ORAL at 13:31

## 2022-09-25 RX ADMIN — SODIUM CHLORIDE 10 ML/HR: 9 INJECTION, SOLUTION INTRAVENOUS at 06:43

## 2022-09-25 RX ADMIN — INSULIN GLARGINE 55 UNITS: 100 INJECTION, SOLUTION SUBCUTANEOUS at 08:55

## 2022-09-25 RX ADMIN — GABAPENTIN 600 MG: 300 CAPSULE ORAL at 21:11

## 2022-09-25 RX ADMIN — Medication 1 CAPSULE: at 17:52

## 2022-09-25 RX ADMIN — FUROSEMIDE 40 MG: 10 INJECTION, SOLUTION INTRAMUSCULAR; INTRAVENOUS at 17:52

## 2022-09-25 RX ADMIN — Medication 1 CAPSULE: at 08:51

## 2022-09-25 RX ADMIN — INSULIN GLARGINE 55 UNITS: 100 INJECTION, SOLUTION SUBCUTANEOUS at 21:28

## 2022-09-25 RX ADMIN — OXYCODONE 5 MG: 5 TABLET ORAL at 15:06

## 2022-09-25 RX ADMIN — PANTOPRAZOLE SODIUM 40 MG: 40 TABLET, DELAYED RELEASE ORAL at 06:38

## 2022-09-25 RX ADMIN — RIVAROXABAN 20 MG: 20 TABLET, FILM COATED ORAL at 17:52

## 2022-09-25 RX ADMIN — POTASSIUM CHLORIDE 40 MEQ: 1500 TABLET, EXTENDED RELEASE ORAL at 13:31

## 2022-09-25 RX ADMIN — ISOSORBIDE MONONITRATE 30 MG: 30 TABLET, EXTENDED RELEASE ORAL at 08:51

## 2022-09-25 RX ADMIN — FUROSEMIDE 40 MG: 10 INJECTION, SOLUTION INTRAMUSCULAR; INTRAVENOUS at 08:54

## 2022-09-25 RX ADMIN — CEFAZOLIN 2000 MG: 2 INJECTION, POWDER, FOR SOLUTION INTRAMUSCULAR; INTRAVENOUS at 06:45

## 2022-09-25 ASSESSMENT — PAIN DESCRIPTION - PAIN TYPE
TYPE: ACUTE PAIN

## 2022-09-25 ASSESSMENT — PAIN DESCRIPTION - LOCATION
LOCATION: WRIST;NECK
LOCATION: WRIST;NECK
LOCATION: WRIST
LOCATION: WRIST
LOCATION_2: BACK
LOCATION_3: NECK
LOCATION: WRIST

## 2022-09-25 ASSESSMENT — PAIN SCALES - GENERAL
PAINLEVEL_OUTOF10: 10
PAINLEVEL_OUTOF10: 8
PAINLEVEL_OUTOF10: 10
PAINLEVEL_OUTOF10: 8
PAINLEVEL_OUTOF10: 8

## 2022-09-25 ASSESSMENT — PAIN DESCRIPTION - ORIENTATION
ORIENTATION: LEFT

## 2022-09-25 ASSESSMENT — PAIN DESCRIPTION - INTENSITY
RATING_2: 8
RATING_3: 8

## 2022-09-25 ASSESSMENT — PAIN DESCRIPTION - DESCRIPTORS
DESCRIPTORS: SORE
DESCRIPTORS: SORE

## 2022-09-25 NOTE — ED PROVIDER NOTES
[facility]        Pt Name: Obinna Silver  MRN: [id number]  [place] 1940  Date of evaluation: 9/24/2022  Provider: Josie Stewart PA-C  PCP: MATEUSZ Mckeon - CNP  Note Started: 8:09 PM EDT        I have seen and evaluated this patient with my supervising physician Stephanie Alvarez MD.    [facility]       Chief Complaint   Patient presents with    Wrist Pain     Czech # [id number] -Patient brought in by squad from home with left wrist, right rib pain. No injury. Extremity Weakness       HISTORY OF PRESENT ILLNESS   (Location, Timing/Onset, Context/Setting, Quality, Duration, Modifying Factors, Severity, Associated Signs and Symptoms)  Note limiting factors. The patient's preferred language is Czech, language lines are used for interpretation,  #[id number]    Chief Complaint: Pain, and right rib pain    Obinna Silver is a 78 y.o. female who presents to the emergency department today for evaluation for wrist pain, and right rib pain. The patient states that she started with pain to her right ribs, both of her shoulders, and her left wrist.  Patient states that her pain started last night. Patient denies falling or injuring herself in any way. The patient states that she does have a history of arthritis and has had this pain in the past.  Patient denies any numbness, tingling or weakness. The patient history of atrial fibrillation, is anticoagulated on Xarelto, history of CHF, hypertension, and hyperlipidemia. The patient states that over the past couple of days she has noticed some shortness of breath but only reports that if she walks too much. Patient does not believe that she has fluid on her lungs. She denies any weight gain she has no chest pain or chest tightness. She denies any fever or chills. No cough or congestion. She has no abdominal pain, nausea, vomiting or diarrhea.   She denies any urinary symptoms, patient otherwise has no other complaint    Nursing Notes were all reviewed and agreed with or any disagreements were addressed in the HPI. REVIEW OF SYSTEMS    (2-9 systems for level 4, 10 or more for level 5)     Review of Systems   Constitutional:  Negative for activity change, appetite change, chills and fever. HENT:  Negative for congestion and rhinorrhea. Respiratory:  Positive for shortness of breath. Negative for cough. Cardiovascular:  Negative for chest pain. Gastrointestinal:  Negative for abdominal pain, diarrhea, nausea and vomiting. Genitourinary:  Negative for difficulty urinating, dysuria and hematuria. Musculoskeletal:  Positive for arthralgias. Positives and Pertinent negatives as per HPI. Except as noted above in the ROS, all other systems were reviewed and negative. PAST MEDICAL HISTORY     Past Medical History:   Diagnosis Date    4/11/17 Left knee repeat repair of median parapatellar arthrotomy with augmentation 4/12/2017    Arthritis     Atrial fibrillation (HCC)     CAD (coronary artery disease)     Cataract     Chronic diastolic congestive heart failure ([facility] [address] 7/27/2022    Diabetes mellitus ([facility] [address]     GERD (gastroesophageal reflux disease)     Hyperlipidemia     Hypertension     HYPOTHRYROIDISM     Non-English speaking patient     SPEAKS Portuguese.   SHE SPEAKS A LITTLE ENGLISH    Sleep apnea     unspecified    Thyroid disease     UTI          SURGICAL HISTORY     Past Surgical History:   Procedure Laterality Date    CARDIAC SURGERY  2004    CABG X 4 VESSELS    CHOLECYSTECTOMY, LAPAROSCOPIC  5/15/14    with IOC    JOINT REPLACEMENT Bilateral 2000 [address]    knee    KNEE SURGERY Left 02/28/2017    left knee poly exchange revision     REVISION TOTAL KNEE ARTHROPLASTY Right 11/22/2016    RIGHT TOTAL KNEE REVISION WITH POLY EXCHANGE    TOTAL KNEE ARTHROPLASTY      partical/ left          CURRENTMEDICATIONS       Previous Medications ACETAMINOPHEN-CODEINE (TYLENOL #3) 300-30 MG PER TABLET    Take 1 tablet by mouth every 6 hours as needed for Pain. ATORVASTATIN (LIPITOR) 80 MG TABLET    Take 1 tablet by mouth daily    BIOTIN 5000 MCG TABS    Take by mouth daily    CALCIUM CARB-CHOLECALCIFEROL (OYSTER SHELL CALCIUM + D PO)    Take by mouth daily     DAPAGLIFLOZIN (FARXIGA) 10 MG TABLET    Take 1 tablet by mouth every morning    FUROSEMIDE (LASIX) 20 MG TABLET    Take 1 tablet by mouth in the morning and 1 tablet before bedtime. GABAPENTIN (NEURONTIN) 300 MG CAPSULE    Take 1 tablet in am, 2 tablets in pm.    HYDROCORTISONE (ANUSOL-HC) 2.5 % RECTAL CREAM    Place rectally 2 times daily Place rectally 2 times daily. INSULIN ASPART (NOVOLOG) 100 UNIT/ML INJECTION VIAL    Inject 17 Units into the skin 3 times daily    INSULIN GLARGINE (BASAGLAR KWIKPEN) 100 UNIT/ML INJECTION PEN    Inject 55 Units into the skin 2 times daily    INSULIN PEN NEEDLE (NOVOFINE) 32G X 6 MM MISC    by Does not apply route 3 times daily with meals    ISOSORBIDE MONONITRATE (IMDUR) 30 MG EXTENDED RELEASE TABLET    Take 1 tablet by mouth in the morning. Luci Jay LEVOTHYROXINE (SYNTHROID) 175 MCG TABLET    Take 150 mcg by mouth daily. LIDOCAINE (LIDODERM) 5 %    Place 1 patch onto the skin daily 12 hours on, 12 hours off. LIRAGLUTIDE (VICTOZA) 18 MG/3ML SOPN SC INJECTION    Inject 1.2 mg into the skin daily    LORATADINE 10 MG CAPS    Take by mouth daily     MAGNESIUM 30 MG TABLET    Take 40 mg by mouth daily    METOPROLOL TARTRATE (LOPRESSOR) 25 MG TABLET    TAKE ONE-HALF TABLET BY MOUTH TWICE A DAY    NYSTATIN (NYAMYC) 384653 UNIT/GM POWDER    Apply topically 2 times daily Apply topically 4 times daily. OMEGA 3 1000 MG CAPS    Take 1,000 mg by mouth daily    OXYBUTYNIN (DITROPAN XL) 15 MG CR TABLET    Take 15 mg by mouth daily.     PHENAZOPYRIDINE (PYRIDIUM) 100 MG TABLET    Take 100 mg by mouth 2 times daily    RIVAROXABAN (XARELTO) 20 MG TABS TABLET    Take 1 tablet by mouth daily    SACUBITRIL-VALSARTAN (ENTRESTO) 24-26 MG PER TABLET    Take 1 tablet by mouth 2 times daily    SENNA (SENOKOT) 8.6 MG TABLET    Take 1 tablet by mouth 2 times daily    TIZANIDINE (ZANAFLEX) 2 MG TABLET    Take 2 mg by mouth every 8 hours as needed    TRIAMCINOLONE (KENALOG) 0.1 % CREAM    Apply topically 2 times daily as needed Apply topically 2 times daily. VITAMIN D (ERGOCALCIFEROL) 57152 UNITS CAPS CAPSULE    Take 50,000 Units by mouth once a week         ALLERGIES     Aspirin, Fluconazole, Ibuprofen, Macrobid [nitrofurantoin monohydrate macrocrystals], Adhesive tape, Lexiscan [regadenoson], and Penicillins    FAMILYHISTORY       Family History   Problem Relation Age of Onset    Arthritis Other     Diabetes Other     Heart Disease Other     Hypertension Other     High Cholesterol Brother           SOCIAL HISTORY       Social History     Tobacco Use    Smoking status: Never    Smokeless tobacco: Never   Vaping Use    Vaping Use: Never used   Substance Use Topics    Alcohol use: No    Drug use: No       SCREENINGS    [place] Coma Scale  Eye Opening: Spontaneous  Best Verbal Response: Oriented  Best Motor Response: Obeys commands  [place] Coma Scale Score: 15        PHYSICAL EXAM    (up to 7 for level 4, 8 or more for level 5)     ED Triage Vitals [09/24/22 1815]   BP Temp Temp Source Heart Rate Resp SpO2 Height Weight   (!) 136/58 98.4 °F (36.9 °C) Oral 86 16 95 % -- --       Physical Exam  Vitals and nursing note reviewed. Constitutional:       Appearance: She is well-developed. She is not diaphoretic. HENT:      Head: Normocephalic and atraumatic. Right Ear: External ear normal.      Left Ear: External ear normal.      Nose: Nose normal.   Eyes:      General:         Right eye: No discharge. Left eye: No discharge. Neck:      Trachea: No tracheal deviation. Cardiovascular:      Rate and Rhythm: Normal rate and regular rhythm. Pulses: Normal pulses. Comments: Trace edema to bilateral lower leg  Pulmonary:      Effort: Pulmonary effort is normal. No respiratory distress. Breath sounds: No wheezing or rales. Comments: Diminished aeration throughout lung fields  Chest:      Chest wall: Tenderness present. Abdominal:      General: Bowel sounds are normal. There is no distension. Palpations: Abdomen is soft. Tenderness: There is no abdominal tenderness. There is no guarding. Genitourinary:     Comments: Hemorrhoids noted, not actively bleeding or thrombosed good rectal tone. Stool is light brown in color  Musculoskeletal:         General: Normal range of motion. Cervical back: Normal range of motion and neck supple. Comments: There is tenderness to palpation to the left distal radius. There is no overlying edema, does have some mild erythema, and warmth. No lymphangitic streaking. Radial pulses 2+. Normal sensation light touch. Neurovascularly intact. Denies reproducible tenderness noted to her right ribs, no bony step-offs or crepitus   Skin:     General: Skin is warm and dry. Neurological:      Mental Status: She is alert and oriented to person, place, and time.    Psychiatric:         Behavior: Behavior normal.       DIAGNOSTIC RESULTS   LABS:    Labs Reviewed   CBC WITH AUTO DIFFERENTIAL - Abnormal; Notable for the following components:       Result Value    RBC 2.85 (*)     Hemoglobin 9.1 (*)     Hematocrit 27.1 (*)     RDW 20.6 (*)     Platelets 90 (*)     Myelocyte Percent 2 (*)     Anisocytosis Occasional (*)     Polychromasia Occasional (*)     Tear Drop Cells Occasional (*)     All other components within normal limits   COMPREHENSIVE METABOLIC PANEL - Abnormal; Notable for the following components:    Chloride 97 (*)     Glucose 129 (*)     BUN 35 (*)     GFR Non- 47 (*)     GFR  57 (*)     All other components within normal limits   TROPONIN - Abnormal; Notable for the following components:    Troponin 0.05 (*)     All other components within normal limits   CK - Abnormal; Notable for the following components: Total  (*)     All other components within normal limits   BRAIN NATRIURETIC PEPTIDE   BLOOD OCCULT STOOL DIAGNOSTIC    Narrative:     ORDER#: [id number]                          ORDERED BY: Jatinder Cuevas  SOURCE: Stool                              COLLECTED:  09/24/22 22:49  ANTIBIOTICS AT [facility].:                      RECEIVED :  09/24/22 22:56   URINALYSIS WITH REFLEX TO CULTURE       When ordered only abnormal lab results are displayed. All other labs were within normal range or not returned as of this dictation. EKG: When ordered, EKG's are interpreted by the Emergency Department Physician in the absence of a cardiologist.  Please see their note for interpretation of EKG. RADIOLOGY:   Non-plain film images such as CT, Ultrasound and MRI are read by the radiologist. Plain radiographic images are visualized and preliminarily interpreted by the ED Provider with the below findings:        Interpretation per the Radiologist below, if available at the time of this note:    XR CHEST (2 VW)   Final Result   Mild cardiomegaly with mild central pulmonary congestion or pulmonary artery   hypertension which is less prominent. Mild chronic obstructive lung changes with mild bibasilar discoid atelectasis   or scarring which is less prominent with no obvious infiltrate or effusion. XR WRIST LEFT (MIN 3 VIEWS)   Final Result   Mild osteoarthritic changes along the radiocarpal joint and moderate   degenerative arthritic changes along the base of the thumb with no acute bony   abnormality seen. Vascular calcifications along the palmar aspect of the wrist      Mild soft tissue swelling around the wrist and diffuse osteopenia. Probable mild chondrocalcinosis of the TFCC.            XR CHEST (2 VW)    Result Date: 9/24/2022  EXAMINATION: TWO XRAY VIEWS OF THE CHEST 9/24/2022 6:35 pm COMPARISON: 06/16/2022 HISTORY: ORDERING SYSTEM PROVIDED HISTORY: Chest Discomfort TECHNOLOGIST PROVIDED HISTORY: Reason for exam:->Chest Discomfort Reason for Exam: Wrist Pain ([place] # [id number] -Patient brought in by squad from home with left wrist, right rib pain. No injury. ) FINDINGS: The heart is mildly enlarged and less prominent the pulmonary vessels are engorged centrally and less prominent. The lungs are hyperinflated. No consolidation or effusion is seen. The bones are intact. There are postop changes along the mediastinum and sternum which is unchanged. There are mild subsegmental linear densities scattered along the lung bases which is less prominent. Mild cardiomegaly with mild central pulmonary congestion or pulmonary artery hypertension which is less prominent. Mild chronic obstructive lung changes with mild bibasilar discoid atelectasis or scarring which is less prominent with no obvious infiltrate or effusion. XR WRIST LEFT (MIN 3 VIEWS)    Result Date: 9/24/2022  EXAMINATION: 3 XRAY VIEWS OF THE LEFT WRIST 9/24/2022 6:35 pm COMPARISON: None. HISTORY: ORDERING SYSTEM PROVIDED HISTORY: pain TECHNOLOGIST PROVIDED HISTORY: Reason for exam:->pain FINDINGS: There is mild narrowing of the radiocarpal joint. No acute fracture or dislocation is seen. There is moderate narrowing along the base of the thumb with prominent osteophytes throughout the joint. There is some linear calcifications just distal to the ulna which probably within the TFCC. There are vascular calcifications along the palmar aspect of the wrist.  There is mild soft tissue swelling along the dorsum of the wrist.  The bones are osteopenic. No obvious acute fracture or dislocation can be seen. Mild osteoarthritic changes along the radiocarpal joint and moderate degenerative arthritic changes along the base of the thumb with no acute bony abnormality seen.  Vascular calcifications along the palmar aspect of the wrist Mild soft tissue swelling around the wrist and diffuse osteopenia. Probable mild chondrocalcinosis of the TFCC. PROCEDURES   Unless otherwise noted below, none     Procedures    CRITICAL CARE TIME       CONSULTS:  None      EMERGENCY DEPARTMENT COURSE and DIFFERENTIAL DIAGNOSIS/MDM:   Vitals:    Vitals:    09/24/22 2225 09/24/22 2235 09/24/22 2245 09/24/22 2255   BP: (!) 114/52  (!) 127/48 (!) 120/48   Pulse: 68 76 70 80   Resp: 20 30 16 28   Temp:       TempSrc:       SpO2:           Patient was given the following medications:  Medications   ceFAZolin (ANCEF) 2,000 mg in dextrose 5 % 50 mL IVPB (mini-bag) (0 mg IntraVENous Stopped 9/24/22 2233)   acetaminophen (TYLENOL) tablet 1,000 mg (1,000 mg Oral Given 9/24/22 1918)   cyclobenzaprine (FLEXERIL) tablet 10 mg (10 mg Oral Given 9/24/22 1918)   furosemide (LASIX) injection 40 mg (40 mg IntraVENous Given 9/24/22 2158)   pantoprazole (PROTONIX) injection 80 mg (80 mg IntraVENous Given 9/24/22 2157)         Is this patient to be included in the SEP-1 Core Measure due to severe sepsis or septic shock? No   Exclusion criteria - the patient is NOT to be included for SEP-1 Core Measure due to: Infection is not suspected    Briefly, this is a 56-year-old female who presents to the emergency department today for evaluation for left wrist pain and right rib pain. The patient has had constant pain since last night. No fall or injury. She does have tenderness noted to her left wrist, as well as her right ribs, otherwise is unremarkable    EKG will be documented by my attending, please see their note for further details. CBC shows no evidence of leukocytosis. .  Hemoglobin is 9.1, was 12.5 at the end of June, occult blood was obtained and is negative. She does have hemorrhoids noted. Patient states that she will have some intermittent bleeding of her hemorrhoids.   Her elevated BUN however creatinine is normal.  Troponin is elevated 0.05, somewhat similar to previous however CMP-due to her dyspnea with exertion, will feel that she will need to be admitted for further cardiac etiology. BNP is normal, x-ray does show some cardiomegaly and congestion we will treat with Lasix    Family is at bedside and states that the patient does live at home by herself, she walks with a walker, and as she is having difficulty walking with a walker due to her chronic shoulder pain she does have 2 torn rotator cuffs, and left wrist pain, they state that the have noted some difficulties with her at home. May need to have this evaluated by social workers admission, patient family are updated, agreeable with plan, stable for admission    FINAL IMPRESSION      1. Ambulatory dysfunction    2. Anemia, unspecified type    3. Dyspnea on exertion          DISPOSITION/PLAN   DISPOSITION Decision To Admit 09/24/2022 09:51:30 PM      PATIENT REFERRED TO:  No follow-up provider specified.     DISCHARGE MEDICATIONS:  New Prescriptions    No medications on file       DISCONTINUED MEDICATIONS:  Discontinued Medications    No medications on file              (Please note that portions of this note were completed with a voice recognition program.  Efforts were made to edit the dictations but occasionally words are mis-transcribed.)    Anna Campos PA-C (electronically signed)              Anna Campos PA-C  09/24/22 0480

## 2022-09-25 NOTE — CONSULTS
[facility] Orthopedic Surgery  Consult Note    Patient: Tabitha Nascimento  Admit Date: 9/24/2022  Requesting Physician: Padmini Varghese MD  Room: [address] [place] [address]    Chief complaint: Left wrist pain    HPI: Tabitha Nascimento is a 78 y.o. female who presented to [facility] ED yesterday for multiple areas of pain including her right rib cage, both shoulders, and her left wrist.  There is no history of injury or surgery to the left wrist.  She does have a history of osteoarthritis and has a history of bilateral total knee arthroplasty. She is right-hand dominant. She has atrial fibrillation and is on Xarelto. She also has congestive heart failure and reports shortness of breath. Family is at bedside and helps with interpretation. The patient primarily speaks Welsh but also knows some [place]. Medical History:  Past Medical History:   Diagnosis Date    4/11/17 Left knee repeat repair of median parapatellar arthrotomy with augmentation 4/12/2017    Arthritis     Atrial fibrillation (HCC)     CAD (coronary artery disease)     Cataract     Chronic diastolic congestive heart failure ([address] 7/27/2022    Diabetes mellitus ([facility] [address]     GERD (gastroesophageal reflux disease)     Hyperlipidemia     Hypertension     HYPOTHRYROIDISM     Non-English speaking patient     SPEAKS Luxem[place]ish. SHE SPEAKS A LITTLE ENGLISH    Sleep apnea     unspecified    Thyroid disease     UTI      Past Surgical History:   Procedure Laterality Date    CARDIAC SURGERY  2004    CABG X 4 VESSELS    CHOLECYSTECTOMY, LAPAROSCOPIC  5/15/14    with [address] [facility] [address]    knee    KNEE SURGERY Left 02/28/2017    left knee poly exchange revision     REVISION TOTAL KNEE ARTHROPLASTY Right 11/22/2016    RIGHT TOTAL KNEE REVISION WITH POLY EXCHANGE    TOTAL KNEE ARTHROPLASTY      partical/ left        Social History:    reports that she has never smoked.  She has never used smokeless tobacco.    Family History:        Problem Relation Age of Onset    Arthritis Other     Diabetes Other     Heart Disease Other     Hypertension Other     High Cholesterol Brother        Medications:  ALL MEDICATIONS HAVE BEEN REVIEWED:  Scheduled:   atorvastatin  80 mg Oral Nightly    insulin lispro  17 Units SubCUTAneous TID WC    insulin glargine  55 Units SubCUTAneous BID    isosorbide mononitrate  30 mg Oral Daily    levothyroxine  150 mcg Oral Daily    metoprolol tartrate  12.5 mg Oral BID    miconazole   Topical BID    oxybutynin  15 mg Oral Daily    empagliflozin  10 mg Oral Daily    sodium chloride flush  10 mL IntraVENous 2 times per day    senna  1 tablet Oral BID    insulin lispro  0-4 Units SubCUTAneous TID WC    insulin lispro  0-4 Units SubCUTAneous Nightly    pantoprazole  40 mg Oral QAM AC    lactobacillus  1 capsule Oral BID WC    lidocaine  1 patch TransDERmal Daily    cetirizine  5 mg Oral Daily    gabapentin  300 mg Oral Daily    And    gabapentin  600 mg Oral Nightly    rivaroxaban  20 mg Oral Daily    furosemide  40 mg IntraVENous BID    ceFAZolin  2,000 mg IntraVENous Q8H    acetaminophen  1,000 mg Oral 3 times per day    tiZANidine  2 mg Oral TID    phenazopyridine  100 mg Oral TID WC     Continuous:   sodium chloride 10 mL/hr (09/25/22 0643)    dextrose       PRN:sodium chloride flush, sodium chloride, ondansetron, dextrose bolus **OR** dextrose bolus, glucagon (rDNA), dextrose, polyethylene glycol, oxyCODONE **OR** oxyCODONE    Allergies: Allergies   Allergen Reactions    Aspirin Other (See Comments)     GI upset    Ibuprofen Other (See Comments)     GI upset    Macrobid [Nitrofurantoin Monohydrate Macrocrystals] Other (See Comments)     COLD, SICK    Adhesive Tape Rash    Lexiscan [Regadenoson] Rash     Happened twice with Lexiscan    Penicillins Rash       Review of Systems:  Constitutional: Negative for fever, chills, fatigue. Skin:  Negative for pruritis, rash  Respiratory: Positive for shortness of breath.    Cardiovascular: Negative for chest pain. Neurological: Negative for confusion, dysarthria, tremors, seizures. Psychiatric:  Negative for depression or anxiety  Musculoskeletal:  Positive for left wrist and shoulder pain    Objective:  Vitals:    09/25/22 1555   BP:    Pulse: 77   Resp: 18   Temp:    SpO2: 92%      Physical Examination:  GENERAL: No apparent distress, well-nourished, sitting up in hospital bed  SKIN:  Warm and dry  RESPIRATORY: Resp easy and unlabored  NEURO: Awake and alert. No speech defect  PSYCHIATRIC: Appropriate affect; not agitated  MUSCULOSKELETAL:  LUE -mild swelling and erythema of the dorsum of the hand and wrist.  She demonstrates of small amount of active and passive range of motion in the wrist before she has pain. Tender over the dorsal carpus. Sensation intact light touch in the radial, ulnar, median nerve distributions. Palpable radial pulse. She has passive range of motion of the left shoulder with minimal pain. She has weak  strength in the left hand. There is an IV in place centimeters from the wrist swelling and erythema. Labs reviewed:  Recent Labs     09/24/22 1905 09/25/22  0502   WBC 4.8 4.0   HGB 9.1* 9.1*   HCT 27.1* 27.1*   PLT 90* 85*     Recent Labs     09/24/22 1905 09/25/22  0502    140   K 4.0 3.4*   CL 97* 99   CO2 28 27   BUN 35* 31*   CREATININE 1.1 1.0   GLUCOSE 129* 63*   CALCIUM 9.3 8.7   MG  --  1.90     Imaging:  XR WRIST LEFT (MIN 3 VIEWS)   Final Result   Mild osteoarthritic changes along the radiocarpal joint and moderate   degenerative arthritic changes along the base of the thumb with no acute bony   abnormality seen. Vascular calcifications along the palmar aspect of the wrist      Mild soft tissue swelling around the wrist and diffuse osteopenia. Probable mild chondrocalcinosis of the TFCC.              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 She has changes due to chondrocalcinosis on her x-ray. I recommend treatment with steroids with monitoring for improvement. Uric acid levels will also be checked although this does not include or exclude a gout diagnosis.     Nesha Dias MD  9/25/2022

## 2022-09-25 NOTE — PROGRESS NOTES
Completed admission. Medicated for pain per request. Zulay Nem[place] in place. Call light in reach, bed alarm engaged.

## 2022-09-25 NOTE — H&P
[facility] History & Physical      Patient Name: Hank Quezada    : 1940    PCP: MATEUSZ Ponce CNP    Date of Service:  Patient seen and examined on 2022    Chief Complaint: Wrist pain    History Of Present Illness:    Hank Quezada is a 78 y.o. female who presented to ED for evaluation of left wrist pain, generalized weakness, and shortness of breath with exertion. Family at bedside interpreted per patient preference. Patient reports the pain to her left wrist began last night. She denies falling or injuring herself in any way. She denies any numbness, tingling, or weakness. Patient reports for the past couple days she has noticed some shortness of breath with exertion as well as generalized weakness. She denies chest pain or tightness. She denies any recent significant weight gain. She denies fever, cough, edema, abdominal pain, nausea, vomiting, diarrhea, constipation, urinary symptoms. She denies any recent fall or head trauma, headache, dizziness, neck pain or stiffness, changes in vision, difficulty swallowing, numbness/tingling extremities. Hemoglobin decreased from prior in  of this year from 12 > 9. Patient is anticoagulated on Xarelto for A. fib. She denies any hematochezia or melena. She denies any history of GI bleed. Past Medical History:    Patient has a past medical history of 15 Left knee repeat repair of median parapatellar arthrotomy with augmentation, Arthritis, Atrial fibrillation ([address] CAD (coronary artery disease), Cataract, Chronic diastolic congestive heart failure ([address] Diabetes mellitus ([address] GERD (gastroesophageal reflux disease), Hyperlipidemia, Hypertension, HYPOTHRYROIDISM, Non-English speaking patient, Sleep apnea, Thyroid disease, and UTI. Past Surgical History:    Patient has a past surgical history that includes joint replacement (Bilateral, [address]);  Cardiac surgery (); Cholecystectomy, laparoscopic (5/15/14); Revision total knee arthroplasty (Right, 11/22/2016); Total knee arthroplasty; and knee surgery (Left, 02/28/2017). Medications Prior to Admission:      Prior to Admission medications    Medication Sig Start Date End Date Taking? Authorizing Provider   sacubitril-valsartan (ENTRESTO) 24-26 MG per tablet Take 1 tablet by mouth 2 times daily 9/8/22   MATEUSZ Singh CNP   isosorbide mononitrate (IMDUR) 30 MG extended release tablet Take 1 tablet by mouth in the morning. . 8/12/22   MATEUSZ Singh - CNP   furosemide (LASIX) 20 MG tablet Take 1 tablet by mouth in the morning and 1 tablet before bedtime. 8/9/22   MATEUSZ Singh - CNP   dapagliflozin (FARXIGA) 10 MG tablet Take 1 tablet by mouth every morning 6/29/22   MATEUSZ Singh - CNP   gabapentin (NEURONTIN) 300 MG capsule Take 1 tablet in am, 2 tablets in pm. 6/20/22   MATEUSZ Rich - CNP   acetaminophen-codeine (TYLENOL #3) 300-30 MG per tablet Take 1 tablet by mouth every 6 hours as needed for Pain. 6/20/22   MATEUSZ Ayala CNP   insulin glargine (BASAGLAR KWIKPEN) 100 UNIT/ML injection pen Inject 55 Units into the skin 2 times daily    Historical Provider, MD   triamcinolone (KENALOG) 0.1 % cream Apply topically 2 times daily as needed Apply topically 2 times daily. Historical Provider, MD   senna (SENOKOT) 8.6 MG tablet Take 1 tablet by mouth 2 times daily    Historical Provider, MD   insulin aspart (NOVOLOG) 100 UNIT/ML injection vial Inject 17 Units into the skin 3 times daily    Historical Provider, MD   phenazopyridine (PYRIDIUM) 100 MG tablet Take 100 mg by mouth 2 times daily    Historical Provider, MD   magnesium 30 MG tablet Take 40 mg by mouth daily    Historical Provider, MD   nystatin (NYAMYC) 080647 UNIT/GM powder Apply topically 2 times daily Apply topically 4 times daily.     Historical Provider, MD   tiZANidine (ZANAFLEX) 2 MG tablet Take 2 mg by mouth every 8 hours as needed    Historical Provider, MD   metoprolol tartrate (LOPRESSOR) 25 MG tablet TAKE ONE-HALF TABLET BY MOUTH TWICE A DAY 7/29/20   Rosalba Shay MD   atorvastatin (LIPITOR) 80 MG tablet Take 1 tablet by mouth daily 7/29/20   Rosalba Shay MD   Insulin Pen Needle (NOVOFINE) 32G X 6 MM MISC by Does not apply route 3 times daily with meals    Historical Provider, MD   hydrocortisone (ANUSOL-HC) 2.5 % rectal cream Place rectally 2 times daily Place rectally 2 times daily. Historical Provider, MD   rivaroxaban (XARELTO) 20 MG TABS tablet Take 1 tablet by mouth daily 2/22/17   Rosalba Shay MD   vitamin D (ERGOCALCIFEROL) 68147 UNITS CAPS capsule Take 50,000 Units by mouth once a week    Historical Provider, MD   Omega 3 1000 MG CAPS Take 1,000 mg by mouth daily    Historical Provider, MD   Liraglutide (VICTOZA) 18 MG/3ML SOPN SC injection Inject 1.2 mg into the skin daily    Historical Provider, MD   Calcium Carb-Cholecalciferol (OYSTER SHELL CALCIUM + D PO) Take by mouth daily     Historical Provider, MD   Biotin 5000 MCG TABS Take by mouth daily    Historical Provider, MD   lidocaine (LIDODERM) 5 % Place 1 patch onto the skin daily 12 hours on, 12 hours off. 7/14/15   MATEUSZ Sol - CNP   oxybutynin (DITROPAN XL) 15 MG CR tablet Take 15 mg by mouth daily. Historical Provider, MD   Loratadine 10 MG CAPS Take by mouth daily     Historical Provider, MD   levothyroxine (SYNTHROID) 175 MCG tablet Take 150 mcg by mouth daily. Historical Provider, MD       Allergies:  Aspirin, Fluconazole, Ibuprofen, Macrobid [nitrofurantoin monohydrate macrocrystals], Adhesive tape, Lexiscan [regadenoson], and Penicillins    Social History:      TOBACCO:   reports that she has never smoked. She has never used smokeless tobacco.  ETOH:   reports no history of alcohol use. DRUGS:  reports no history of drug use.     Family History:      Reviewed in detail positive as follows:        Problem Relation Age of Onset    Arthritis Other     Diabetes Other     Heart Disease Other     Hypertension Other     High Cholesterol Brother        REVIEW OF SYSTEMS:   Pertinent positives as noted in the HPI. All other systems reviewed and negative. PHYSICAL EXAM PERFORMED:    BP (!) 113/51   Pulse 75   Temp 98.4 °F (36.9 °C) (Oral)   Resp 20   LMP  (LMP Unknown)   SpO2 94%     General appearance:  Awake, alert, no apparent distress  HEENT:  Normocephalic, atraumatic without obvious deformity. PERRL. EOM intact. Conjunctivae/corneas clear. Neck: Supple, with full range of motion. No JVD. Trachea midline. Respiratory:  +bibasilar rales. No wheezes or rhonchi. Normal respiratory effort. Cardiovascular:  Regular rate and rhythm without murmurs, rubs or gallops. Abdomen: Soft, NT, ND, without rebound or guarding. Normal bowel sounds. Extremities: Left wrist tender to touch, warm, with mild erythema and trace edema. No edema to BLE. Full range of motion without deformity. +2 palpable pulses, equal bilaterally. Capillary refill brisk,< 3 seconds   Skin: No rashes or lesions. Warm/dry. Neurologic:  Neurovascularly intact without any focal sensory/motor deficits. Cranial nerves: II-XII intact, grossly non-focal. Alert and oriented x 3. Normal speech. Psychiatric:  Thought content appropriate, normal insight. Labs:   CBC   Recent Labs     09/24/22 1905   WBC 4.8   HGB 9.1*   HCT 27.1*   PLT 90*        RENAL  Recent Labs     09/24/22 1905      K 4.0   CL 97*   CO2 28   BUN 35*   CREATININE 1.1       LFTS  Recent Labs     09/24/22 1905   AST 15   ALT 11   BILITOT 0.4   ALKPHOS 88       COAG  No results for input(s): INR in the last 72 hours.     CARDIAC ENZYMES  Recent Labs     09/24/22 1905   TROPONINI 0.05*       LIPIDS  Cholesterol, Total   Date/Time Value Ref Range Status   07/13/2012 09:30  <200 mg/dl Final     Triglycerides   Date/Time Value Ref Range Status   07/13/2012 09:30  (H) <150 mg/dl Final HDL   Date/Time Value Ref Range Status   07/13/2012 09:30 AM 47 40 - 60 mg/dl Final   07/06/2010 09:12 AM 44 40 - 60 mg/dl Final     LDL Calculated   Date/Time Value Ref Range Status   07/13/2012 09:30 AM 61 <100 mg/dl Final         Radiology:     XR CHEST (2 VW)   Final Result   Mild cardiomegaly with mild central pulmonary congestion or pulmonary artery   hypertension which is less prominent. Mild chronic obstructive lung changes with mild bibasilar discoid atelectasis   or scarring which is less prominent with no obvious infiltrate or effusion. XR WRIST LEFT (MIN 3 VIEWS)   Final Result   Mild osteoarthritic changes along the radiocarpal joint and moderate   degenerative arthritic changes along the base of the thumb with no acute bony   abnormality seen. Vascular calcifications along the palmar aspect of the wrist      Mild soft tissue swelling around the wrist and diffuse osteopenia. Probable mild chondrocalcinosis of the TFCC. EKG:   Read by ED physician in the absence of a cardiologist:  \"Normal sinus rhythm  No acute ST changes   HR 71  No significant EKG changes compared to study in 6/22  Old rbbb\"      ASSESSMENT/PLAN:    Acute on chronic diastolic CHF  Last ECHO on 6/17/22 noted EF of 55%  ProBNP 277, but is increased from previous of 56  CXR notable for PVC  Lasix 40 mg IV BID  Continue home BB, entresto, jardiance  Trend troponin  Daily weights, Strict I/Os  Cardiology consulted    Anemia/Thrombocytopenia  Hgb 12 > 9 since 6/23/22  Hemoccult negative  No obvious source of bleeding  Check iron, B12, folate  Hem/Onc consulted    Cellulitis left arm  Ancef initiated in ED.  Will continue  Pain control    Atrial Fibrillation   Currently rate controlled  Continue home metoprolol and xarelto     DM2  Last HbA1c was 6.4  Continue home lantus and prandial insulin  Hold home PO meds   Accuchecks, SSI  Hypoglycemia protocol      CAD s/p CABG  Continue home BB, statin, imdur  No ASA due to chronic AC    Hypothyroidism  Continue home synthroid      DVT prophylaxis: Xarelto  Probiotic if on abx: Yes    Diet: ADULT DIET; Regular; 4 carb choices (60 gm/meal)  Code Status: Full Code    Consults:  IP CONSULT TO HEM/ONC  IP CONSULT TO HEART FAILURE NURSE/COORDINATOR  IP CONSULT TO DIETITIAN  IP CONSULT TO CARDIOLOGY    Disposition: Admit to Inpatient   ELOS: Greater than two midnights due to medical therapy     Heraclio Willis PA-C    Thank you MATEUSZ Prince CNP for the opportunity to be involved in this patient's care. If you have any questions or concerns please feel free to contact me at [phone].

## 2022-09-25 NOTE — CONSULTS
Hematology Consult    See dictation    A/P:  CHF--per cardiology  Left wrist pain--consult Ortho--?septic arthritis  Anemia/TCP. This is new. Check labs and go from there. She may need a BM biopsy. Thanks for consult. Will follow closely.     Charmaine Shah MD

## 2022-09-25 NOTE — PROGRESS NOTES
Hospital Medicine Progress Note     Date:  9/25/2022    PCP: MATEUSZ Ponce CNP (Tel: [phone])    Date of Admission: 9/24/2022      Subjective  Pt having some dysuria, family at bedside translates. Pt has been having diffuse pain b/l lower back radiating down to legs, also having joints pains, and then left wrist pain. Objective  Physical exam:  Vitals: /68   Pulse 79   Temp 98.9 °F (37.2 °C) (Oral)   Resp 16   Ht 5' (1.524 m)   Wt 248 lb 6.4 oz (112.7 kg)   LMP  (LMP Unknown)   SpO2 92%   BMI 48.51 kg/m²   Gen: Not in distress. Alert. Head: Normocephalic. Atraumatic. Eyes: EOMI. Good acuity. ENT: Oral mucosa moist  Neck: No JVD. No obvious thyromegaly. CVS: Nml S1S2, no MRG, RRR  Pulmomary: diminished BS bilaterally. No crackles. No wheezes. Gastrointestinal: Soft, NT/ND. Positive bowel sounds. Musculoskeletal: B/L Lower back muscle spasm, also right upper and left mid back muscle spasm elicted  Neuro: No focal deficit. Moves extremity spontaneously. Psychiatry: Appropriate affect. Not agitated. Skin: left dorsal wrist erythema, warmth, mild TTP      24HR INTAKE/OUTPUT:    Intake/Output Summary (Last 24 hours) at 9/25/2022 1344  Last data filed at 9/25/2022 6494  Gross per 24 hour   Intake 240 ml   Output --   Net 240 ml     No intake/output data recorded.   I/O this shift:  In: 240 [P.O.:240]  Out: -       Meds:    atorvastatin  80 mg Oral Nightly    insulin lispro  17 Units SubCUTAneous TID     insulin glargine  55 Units SubCUTAneous BID    isosorbide mononitrate  30 mg Oral Daily    levothyroxine  150 mcg Oral Daily    metoprolol tartrate  12.5 mg Oral BID    miconazole   Topical BID    oxybutynin  15 mg Oral Daily    sacubitril-valsartan  1 tablet Oral BID    empagliflozin  10 mg Oral Daily    sodium chloride flush  10 mL IntraVENous 2 times per day    senna  1 tablet Oral BID    insulin lispro  0-4 Units SubCUTAneous TID     insulin lispro  0-4 Units SubCUTAneous Nightly    pantoprazole  40 mg Oral QAM AC    lactobacillus  1 capsule Oral BID WC    lidocaine  1 patch TransDERmal Daily    cetirizine  5 mg Oral Daily    gabapentin  300 mg Oral Daily    And    gabapentin  600 mg Oral Nightly    rivaroxaban  20 mg Oral Daily    furosemide  40 mg IntraVENous BID    ceFAZolin  2,000 mg IntraVENous Q8H    acetaminophen  1,000 mg Oral 3 times per day    tiZANidine  2 mg Oral TID       Infusions:    sodium chloride 10 mL/hr (09/25/22 0643)    dextrose           PRN Meds: sodium chloride flush, sodium chloride, ondansetron, dextrose bolus **OR** dextrose bolus, glucagon (rDNA), dextrose, polyethylene glycol, oxyCODONE **OR** oxyCODONE    Labs/imaging:  CBC:   Recent Labs     09/24/22 1905 09/25/22  0502   WBC 4.8 4.0   HGB 9.1* 9.1*   PLT 90* 85*         BMP:    Recent Labs     09/24/22 1905 09/25/22  0502    140   K 4.0 3.4*   CL 97* 99   CO2 28 27   BUN 35* 31*   CREATININE 1.1 1.0   GLUCOSE 129* 63*         Hepatic:   Recent Labs     09/24/22 1905   AST 15   ALT 11   BILITOT 0.4   ALKPHOS 88       Troponin:   Recent Labs     09/24/22 1905 09/25/22  0233 09/25/22  0502   TROPONINI 0.05* 0.05* 0.04*         BNP: No results for input(s): BNP in the last 72 hours. INR: No results for input(s): INR in the last 72 hours. Reviewed imaging and reports noted      Assessment:  Principal Problem:    Dyspnea on exertion  Active Problems: Thrombocytopenia ([address]    Anemia    DM (diabetes mellitus) [address]    Coronary artery disease involving native coronary artery of native heart without angina pectoris    PAF (paroxysmal atrial fibrillation) (HCC)    Peripheral vascular disease (HCC)    Mixed hyperlipidemia  Resolved Problems:    * No resolved hospital problems.  *        Plan:  Acute on Chronic Diastolic Congestive Heart Failure  - appreciate cardio reccs  - cont Iv lasix        Anemia and thrombocytopenia  -Iron studies, B12, folate, TSH pending  -Blood occult stool negative  -Hematology consulted    Left wrist cellulitis  - cont ancef  - xray neg for acute fx      Severe bilateral low back muscle spasm  - start scheduled tizanidine, tylenol, prn oxycodone      Dysuria  - give a dose of diflucan as pt was on abx for UTi outpt  - U Cx pending  - start phenazopyridine 100mg TID for 6 doses      CAD status post CABG  - cont statin, BB, not on asp as already on xarelto      Paroxysmal atrial fibrillation  - cont BB, xarelto for [facility]      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

## 2022-09-25 NOTE — CONSULTS
[address]  H+P  Consult  OP Visit  FU Visit   CC HPI   sob Hx AF, CAD/CABG, DM, HTN, dCHF presents with sob and weakness. No CP. Patient with anaphylaxis with contrast usage and as such angiography recently deferred. Sleeping soundly on entry. HISTORY/ALLERGY/ROS   MEDHnetta  has a past medical history of 4/11/17 Left knee repeat repair of median parapatellar arthrotomy with augmentation, Arthritis, Atrial fibrillation ([address] CAD (coronary artery disease), Cataract, Chronic diastolic congestive heart failure ([address] Diabetes mellitus ([address] GERD (gastroesophageal reflux disease), Hyperlipidemia, Hypertension, HYPOTHRYROIDISM, Non-English speaking patient, Sleep apnea, Thyroid disease, and UTI. SURGHx  has a past surgical history that includes joint replacement (Bilateral, [address]); Cardiac surgery (2004); Cholecystectomy, laparoscopic (5/15/14); Revision total knee arthroplasty (Right, 11/22/2016); Total knee arthroplasty; and knee surgery (Left, 02/28/2017). SOCHx  reports that she has never smoked. She has never used smokeless tobacco. She reports that she does not drink alcohol and does not use drugs. FAMHx family history includes Arthritis in an other family member; Diabetes in an other family member; Heart Disease in an other family member; High Cholesterol in her brother; Hypertension in an other family member.    ALLERG Aspirin, Fluconazole, Ibuprofen, Macrobid [nitrofurantoin monohydrate macrocrystals], Adhesive tape, Lexiscan [regadenoson], and Penicillins   ROS Full ROS obtained and negative except as mentioned in HPI   MEDICATIONS   Current Facility-Administered Medications   Medication Dose Route Frequency Provider Last Rate Last Admin    atorvastatin (LIPITOR) tablet 80 mg  80 mg Oral Nightly Kaushik Crandall PA-C        insulin lispro (HUMALOG) injection vial 17 Units  17 Units SubCUTAneous TID  Kaushik Crandall PA-C        insulin glargine (LANTUS) injection vial 55 Units  55 Units SubCUTAneous BID Stehp Grajeda PA-C        isosorbide mononitrate (IMDUR) extended release tablet 30 mg  30 mg Oral Daily Letitia Llanos PA-C        levothyroxine (SYNTHROID) tablet 150 mcg  150 mcg Oral Daily Steph Grajeda PA-C   150 mcg at 09/25/22 0577    metoprolol tartrate (LOPRESSOR) tablet 12.5 mg  12.5 mg Oral BID Steph Grajeda PA-C        miconazole (MICOTIN) 2 % powder   Topical BID Steph Grajeda PA-C        oxybutynin (DITROPAN-XL) extended release tablet 15 mg  15 mg Oral Daily Letitia Llanos PA-C        sacubitril-valsartan (ENTRESTO) 24-26 MG per tablet 1 tablet  1 tablet Oral BID Steph Grajeda PA-C        empagliflozin (JARDIANCE) tablet 10 mg  10 mg Oral Daily Steph Grajeda PA-C        HYDROcodone-acetaminophen (NORCO) 5-325 MG per tablet 1 tablet  1 tablet Oral Q6H PRN Steph Grajeda PA-C   1 tablet at 09/25/22 0246    sodium chloride flush 0.9 % injection 10 mL  10 mL IntraVENous 2 times per day Steph Grajeda PA-C        sodium chloride flush 0.9 % injection 10 mL  10 mL IntraVENous PRN Steph Grajeda PA-C   10 mL at 09/25/22 3087    0.9 % sodium chloride infusion   IntraVENous PRN Steph Grajeda PA-C 10 mL/hr at 09/25/22 0643 10 mL/hr at 09/25/22 0643    acetaminophen (TYLENOL) tablet 650 mg  650 mg Oral Q6H PRN Steph Grajeda PA-C        Or    acetaminophen (TYLENOL) suppository 650 mg  650 mg Rectal Q6H PRN Steph Grajeda PA-C        ondansetron [facility]) injection 4 mg  4 mg IntraVENous Q6H PRN Steph Grajeda PA-C        senna (SENOKOT) tablet 8.6 mg  1 tablet Oral BID Steph Grajeda PA-C        tiZANidine (ZANAFLEX) tablet 2 mg  2 mg Oral Q8H PRN Steph Grajeda PA-C   2 mg at 09/25/22 4424    dextrose bolus 10% 125 mL  125 mL IntraVENous PRN Steph Grajeda PA-C        Or    dextrose bolus 10% 250 mL  250 mL IntraVENous PRN Steph Grajeda PA-C        glucagon (rDNA) injection 1 mg  1 mg SubCUTAneous PRN Reche Lye, PA-C        dextrose 10 % infusion   IntraVENous Continuous PRN Reche Lye, PA-C        polyethylene glycol (GLYCOLAX) packet 17 g  17 g Oral Daily PRN Reche Lye, PA-C        insulin lispro (HUMALOG) injection vial 0-4 Units  0-4 Units SubCUTAneous TID WC Reche Lye, PA-C        insulin lispro (HUMALOG) injection vial 0-4 Units  0-4 Units SubCUTAneous Nightly CHRIS RogersC        pantoprazole (PROTONIX) tablet 40 mg  40 mg Oral QAM AC Reche Lye, PA-C   40 mg at 09/25/22 5650    lactobacillus (CULTURELLE) capsule 1 capsule  1 capsule Oral BID WC Reche Lye, PA-C        lidocaine 4 % external patch 1 patch  1 patch TransDERmal Daily Reche Lye, PA-C        cetirizine (ZYRTEC) tablet 5 mg  5 mg Oral Daily Reche Lye, PA-C        gabapentin (NEURONTIN) capsule 300 mg  300 mg Oral Daily Reche Lye, PA-C        And    gabapentin (NEURONTIN) capsule 600 mg  600 mg Oral Nightly Reche Lye, PA-C        rivaroxaban (XARELTO) tablet 20 mg  20 mg Oral Daily Letitia Llanos PA-C        furosemide (LASIX) injection 40 mg  40 mg IntraVENous BID Letitia Llanos PA-C        ceFAZolin (ANCEF) 2,000 mg in dextrose 5 % 50 mL IVPB (mini-bag)  2,000 mg IntraVENous Q8H Reche Lye, PA-C   Stopped at 09/25/22 0809      PHYSICAL EXAM   Vitals Vitals:    09/25/22 0419   BP: (!) 132/52   Pulse: 76   Resp: 16   Temp: 97.9 °F (36.6 °C)   SpO2: 91%      Gen Alert, coop, no distress Heart  irreg   Head NC, AT, no abnorm Abd  Soft, NT, +BS, no mass, no OM   Eyes PER, conj/corn clear Ext  Ext nl, AT, no C/C, tr edema   Nose Nares nl, no drain, NT Pulse decr   Throat Lips, mucosa, tongue nl Skin Col/text/turg nl, no vis rash/les   Neck S/S, TM, NT, no bruit/JVD Psych Nl mood and affect   Lung CTA-B, unlabored, no DTP Lymph   No cervical or axillary LA   Ch wall NT, no deform Neuro  Nl gross M/S

## 2022-09-25 NOTE — ED PROVIDER NOTES
I independently performed a history and physical on Wally Schmidt. I personally saw the patient and performed a substantive portion of the visit including all aspects of the medical decision making. Briefly, this is a 78 y.o. female here for multiple issues. Since her discharge, she has been having some dyspnea/weakness on exertion. This worsened over the past few days. 1 week ago, she started having neck, upper back, bilateral rib first on the left side and now on the right side, and bilateral shoulder pain. She thinks she has had this 1-4 times in the past.  Today, she started having left wrist pain. The wrist pain has prevented her from using her walker which she depends on. She now can no longer ambulate. She lives at home alone but has a home health nurse coming for a few hours each day. No trauma. Was recently diagnosed with UTI and started on fosfomycin. No melanotic stools or hematochezia    On exam,   General: Patient is in no acute distress  Skin: No cyanosis  HEENT: Moist mucous membranes  Heart: Regular rate, regular rhythm  Lung: No respiratory distress  Abdomen: Soft, nontender  Neuro: Moving all extremities, no facial droop, no slurred speech, answers questions appropriately  Left wrist red, tender to touch, warm over the skin. Good left radial pulse and sensation distally      EKG  The Ekg interpreted by me in the absence of a cardiologist shows. 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 female here for multiple issues. She has left wrist pain that started today and cannot longer ambulate. She lives at home alone. Will require admission for this at minimum. May be secondary to cellulitis.  Will give ancef, has tolerated before. May also be secondary to arthritis. Has dyspnea on exertion worsening over the past few days but ongoing for over a month. Found to have hemoglobin loss from 12-9. May have GI bleed. No hematochezia or melanotic stools but she is on Xarelto. We will start her on PPI. Also has chest, shoulder, back discomfort. May be secondary to ACS. She does have reproducible tenderness on exam.  Troponin is elevated but seems similar to the past.  We will trend this. May have heart failure exacerbation. Chest x-ray shows pulmonary vascular congestion. Will order some diuretics. Initial EKG unremarkable however will need repeating. .    Family/patient prefers not to use     Is this patient to be included in the SEP-1 Core Measure due to severe sepsis or septic shock? No   Exclusion criteria - the patient is NOT to be included for SEP-1 Core Measure due to:  2+ SIRS criteria are not met           Patient Referrals:  No follow-up provider specified. Discharge Medications:  New Prescriptions    No medications on file       FINAL IMPRESSION  1. Ambulatory dysfunction    2. Anemia, unspecified type    3. Dyspnea on exertion        Blood pressure (!) 127/54, pulse 70, temperature 98.4 °F (36.9 °C), temperature source Oral, resp. rate (!) 32, SpO2 94 %, not currently breastfeeding. For further details of Wally Schmidt's emergency department encounter, please see documentation by advanced practice provider, Elyssa Cobos.         Neela Plaza MD  09/24/22 0022

## 2022-09-26 LAB
ANION GAP SERPL CALCULATED.3IONS-SCNC: 15 MMOL/L (ref 3–16)
ANISOCYTOSIS: ABNORMAL
BANDED NEUTROPHILS RELATIVE PERCENT: 4 % (ref 0–7)
BASOPHILS ABSOLUTE: 0 K/UL (ref 0–0.2)
BASOPHILS RELATIVE PERCENT: 0 %
BUN BLDV-MCNC: 35 MG/DL (ref 7–20)
CALCIUM SERPL-MCNC: 9.3 MG/DL (ref 8.3–10.6)
CHLORIDE BLD-SCNC: 99 MMOL/L (ref 99–110)
CO2: 25 MMOL/L (ref 21–32)
CREAT SERPL-MCNC: 1.4 MG/DL (ref 0.6–1.2)
EOSINOPHILS ABSOLUTE: 0 K/UL (ref 0–0.6)
EOSINOPHILS RELATIVE PERCENT: 0 %
FERRITIN: 1025 NG/ML (ref 15–150)
GFR AFRICAN AMERICAN: 43
GFR NON-AFRICAN AMERICAN: 36
GLUCOSE BLD-MCNC: 107 MG/DL (ref 70–99)
GLUCOSE BLD-MCNC: 147 MG/DL (ref 70–99)
GLUCOSE BLD-MCNC: 212 MG/DL (ref 70–99)
GLUCOSE BLD-MCNC: 238 MG/DL (ref 70–99)
GLUCOSE BLD-MCNC: 50 MG/DL (ref 70–99)
GLUCOSE BLD-MCNC: 60 MG/DL (ref 70–99)
GLUCOSE BLD-MCNC: 94 MG/DL (ref 70–99)
HAPTOGLOBIN: 387 MG/DL (ref 30–200)
HCT VFR BLD CALC: 28.2 % (ref 36–48)
HEMATOLOGY PATH CONSULT: NORMAL
HEMATOLOGY PATH CONSULT: YES
HEMOGLOBIN: 9.3 G/DL (ref 12–16)
LACTATE DEHYDROGENASE: 237 U/L (ref 100–190)
LYMPHOCYTES ABSOLUTE: 3.7 K/UL (ref 1–5.1)
LYMPHOCYTES RELATIVE PERCENT: 59 %
MAGNESIUM: 2.4 MG/DL (ref 1.8–2.4)
MCH RBC QN AUTO: 31.9 PG (ref 26–34)
MCHC RBC AUTO-ENTMCNC: 33 G/DL (ref 31–36)
MCV RBC AUTO: 96.8 FL (ref 80–100)
METAMYELOCYTES RELATIVE PERCENT: 3 %
MONOCYTES ABSOLUTE: 0.7 K/UL (ref 0–1.3)
MONOCYTES RELATIVE PERCENT: 11 %
MONONUCLEAR UNIDENTIFIED CELLS: 2 %
MYELOCYTE PERCENT: 1 %
NEUTROPHILS ABSOLUTE: 1.8 K/UL (ref 1.7–7.7)
NEUTROPHILS RELATIVE PERCENT: 20 %
PDW BLD-RTO: 21.8 % (ref 12.4–15.4)
PERFORMED ON: ABNORMAL
PERFORMED ON: NORMAL
PLATELET # BLD: 91 K/UL (ref 135–450)
PMV BLD AUTO: 9 FL (ref 5–10.5)
POTASSIUM REFLEX MAGNESIUM: 4.3 MMOL/L (ref 3.5–5.1)
RBC # BLD: 2.91 M/UL (ref 4–5.2)
SODIUM BLD-SCNC: 139 MMOL/L (ref 136–145)
URIC ACID, SERUM: 6.8 MG/DL (ref 2.6–6)
URINE CULTURE, ROUTINE: NORMAL
WBC # BLD: 6.3 K/UL (ref 4–11)

## 2022-09-26 PROCEDURE — 84155 ASSAY OF PROTEIN SERUM: CPT

## 2022-09-26 PROCEDURE — 99233 SBSQ HOSP IP/OBS HIGH 50: CPT | Performed by: NURSE PRACTITIONER

## 2022-09-26 PROCEDURE — 84550 ASSAY OF BLOOD/URIC ACID: CPT

## 2022-09-26 PROCEDURE — 6370000000 HC RX 637 (ALT 250 FOR IP): Performed by: FAMILY MEDICINE

## 2022-09-26 PROCEDURE — 82728 ASSAY OF FERRITIN: CPT

## 2022-09-26 PROCEDURE — 92610 EVALUATE SWALLOWING FUNCTION: CPT

## 2022-09-26 PROCEDURE — 36415 COLL VENOUS BLD VENIPUNCTURE: CPT

## 2022-09-26 PROCEDURE — 2500000003 HC RX 250 WO HCPCS: Performed by: PHYSICIAN ASSISTANT

## 2022-09-26 PROCEDURE — 84165 PROTEIN E-PHORESIS SERUM: CPT

## 2022-09-26 PROCEDURE — 83615 LACTATE (LD) (LDH) ENZYME: CPT

## 2022-09-26 PROCEDURE — 99232 SBSQ HOSP IP/OBS MODERATE 35: CPT | Performed by: NURSE PRACTITIONER

## 2022-09-26 PROCEDURE — 85025 COMPLETE CBC W/AUTO DIFF WBC: CPT

## 2022-09-26 PROCEDURE — 94760 N-INVAS EAR/PLS OXIMETRY 1: CPT

## 2022-09-26 PROCEDURE — 6370000000 HC RX 637 (ALT 250 FOR IP): Performed by: PHYSICIAN ASSISTANT

## 2022-09-26 PROCEDURE — 1200000000 HC SEMI PRIVATE

## 2022-09-26 PROCEDURE — 83883 ASSAY NEPHELOMETRY NOT SPEC: CPT

## 2022-09-26 PROCEDURE — 6360000002 HC RX W HCPCS: Performed by: PHYSICIAN ASSISTANT

## 2022-09-26 PROCEDURE — 2580000003 HC RX 258: Performed by: PHYSICIAN ASSISTANT

## 2022-09-26 PROCEDURE — 83735 ASSAY OF MAGNESIUM: CPT

## 2022-09-26 PROCEDURE — 83010 ASSAY OF HAPTOGLOBIN QUANT: CPT

## 2022-09-26 PROCEDURE — 80048 BASIC METABOLIC PNL TOTAL CA: CPT

## 2022-09-26 PROCEDURE — 92526 ORAL FUNCTION THERAPY: CPT

## 2022-09-26 RX ORDER — FUROSEMIDE 40 MG/1
40 TABLET ORAL DAILY
Status: DISCONTINUED | OUTPATIENT
Start: 2022-09-26 | End: 2022-09-26

## 2022-09-26 RX ORDER — INSULIN LISPRO 100 [IU]/ML
7 INJECTION, SOLUTION INTRAVENOUS; SUBCUTANEOUS
Status: DISCONTINUED | OUTPATIENT
Start: 2022-09-26 | End: 2022-10-02

## 2022-09-26 RX ORDER — PREDNISONE 10 MG/1
10 TABLET ORAL DAILY
Status: DISCONTINUED | OUTPATIENT
Start: 2022-09-26 | End: 2022-10-01 | Stop reason: ALTCHOICE

## 2022-09-26 RX ORDER — INSULIN LISPRO 100 [IU]/ML
17 INJECTION, SOLUTION INTRAVENOUS; SUBCUTANEOUS
Status: DISCONTINUED | OUTPATIENT
Start: 2022-09-27 | End: 2022-10-04

## 2022-09-26 RX ORDER — INSULIN GLARGINE 100 [IU]/ML
42 INJECTION, SOLUTION SUBCUTANEOUS DAILY
Status: DISCONTINUED | OUTPATIENT
Start: 2022-09-26 | End: 2022-10-02

## 2022-09-26 RX ORDER — INSULIN LISPRO 100 [IU]/ML
17 INJECTION, SOLUTION INTRAVENOUS; SUBCUTANEOUS
Status: DISCONTINUED | OUTPATIENT
Start: 2022-09-26 | End: 2022-10-04

## 2022-09-26 RX ADMIN — TIZANIDINE 2 MG: 4 TABLET ORAL at 20:50

## 2022-09-26 RX ADMIN — OXYCODONE 5 MG: 5 TABLET ORAL at 05:20

## 2022-09-26 RX ADMIN — ACETAMINOPHEN 1000 MG: 500 TABLET ORAL at 14:01

## 2022-09-26 RX ADMIN — Medication 10 ML: at 20:56

## 2022-09-26 RX ADMIN — INSULIN LISPRO 1 UNITS: 100 INJECTION, SOLUTION INTRAVENOUS; SUBCUTANEOUS at 08:12

## 2022-09-26 RX ADMIN — GABAPENTIN 600 MG: 300 CAPSULE ORAL at 20:49

## 2022-09-26 RX ADMIN — CEFAZOLIN 2000 MG: 2 INJECTION, POWDER, FOR SOLUTION INTRAMUSCULAR; INTRAVENOUS at 05:23

## 2022-09-26 RX ADMIN — ISOSORBIDE MONONITRATE 30 MG: 30 TABLET, EXTENDED RELEASE ORAL at 08:08

## 2022-09-26 RX ADMIN — PREDNISONE 10 MG: 10 TABLET ORAL at 11:50

## 2022-09-26 RX ADMIN — ACETAMINOPHEN 1000 MG: 500 TABLET ORAL at 05:20

## 2022-09-26 RX ADMIN — DICLOFENAC SODIUM 2 G: 10 GEL TOPICAL at 20:52

## 2022-09-26 RX ADMIN — Medication 10 ML: at 08:09

## 2022-09-26 RX ADMIN — INSULIN LISPRO 7 UNITS: 100 INJECTION, SOLUTION INTRAVENOUS; SUBCUTANEOUS at 17:23

## 2022-09-26 RX ADMIN — MICONAZOLE NITRATE: 20 POWDER TOPICAL at 22:17

## 2022-09-26 RX ADMIN — EMPAGLIFLOZIN 10 MG: 10 TABLET, FILM COATED ORAL at 08:07

## 2022-09-26 RX ADMIN — ACETAMINOPHEN 1000 MG: 500 TABLET ORAL at 20:51

## 2022-09-26 RX ADMIN — PHENAZOPYRIDINE HYDROCHLORIDE 100 MG: 100 TABLET ORAL at 08:09

## 2022-09-26 RX ADMIN — INSULIN GLARGINE 42 UNITS: 100 INJECTION, SOLUTION SUBCUTANEOUS at 08:31

## 2022-09-26 RX ADMIN — METOPROLOL TARTRATE 12.5 MG: 25 TABLET, FILM COATED ORAL at 08:07

## 2022-09-26 RX ADMIN — SENNOSIDES 8.6 MG: 8.6 TABLET, FILM COATED ORAL at 08:08

## 2022-09-26 RX ADMIN — INSULIN LISPRO 17 UNITS: 100 INJECTION, SOLUTION INTRAVENOUS; SUBCUTANEOUS at 08:12

## 2022-09-26 RX ADMIN — PANTOPRAZOLE SODIUM 40 MG: 40 TABLET, DELAYED RELEASE ORAL at 05:20

## 2022-09-26 RX ADMIN — TIZANIDINE 2 MG: 4 TABLET ORAL at 08:08

## 2022-09-26 RX ADMIN — DICLOFENAC SODIUM 2 G: 10 GEL TOPICAL at 14:01

## 2022-09-26 RX ADMIN — ATORVASTATIN CALCIUM 80 MG: 80 TABLET, FILM COATED ORAL at 20:51

## 2022-09-26 RX ADMIN — INSULIN LISPRO 1 UNITS: 100 INJECTION, SOLUTION INTRAVENOUS; SUBCUTANEOUS at 11:50

## 2022-09-26 RX ADMIN — MICONAZOLE NITRATE: 20 POWDER TOPICAL at 08:11

## 2022-09-26 RX ADMIN — FUROSEMIDE 40 MG: 10 INJECTION, SOLUTION INTRAMUSCULAR; INTRAVENOUS at 08:08

## 2022-09-26 RX ADMIN — INSULIN LISPRO 17 UNITS: 100 INJECTION, SOLUTION INTRAVENOUS; SUBCUTANEOUS at 11:50

## 2022-09-26 RX ADMIN — OXYBUTYNIN CHLORIDE 15 MG: 5 TABLET, EXTENDED RELEASE ORAL at 08:07

## 2022-09-26 RX ADMIN — GABAPENTIN 300 MG: 300 CAPSULE ORAL at 08:08

## 2022-09-26 RX ADMIN — CETIRIZINE HYDROCHLORIDE 5 MG: 10 TABLET, FILM COATED ORAL at 08:08

## 2022-09-26 RX ADMIN — PHENAZOPYRIDINE HYDROCHLORIDE 100 MG: 100 TABLET ORAL at 11:50

## 2022-09-26 RX ADMIN — TIZANIDINE 2 MG: 4 TABLET ORAL at 14:01

## 2022-09-26 RX ADMIN — LEVOTHYROXINE SODIUM 150 MCG: 0.15 TABLET ORAL at 05:19

## 2022-09-26 RX ADMIN — Medication 1 CAPSULE: at 08:08

## 2022-09-26 RX ADMIN — RIVAROXABAN 20 MG: 20 TABLET, FILM COATED ORAL at 17:22

## 2022-09-26 RX ADMIN — SENNOSIDES 8.6 MG: 8.6 TABLET, FILM COATED ORAL at 20:51

## 2022-09-26 ASSESSMENT — PAIN SCALES - GENERAL
PAINLEVEL_OUTOF10: 8
PAINLEVEL_OUTOF10: 7

## 2022-09-26 ASSESSMENT — PAIN DESCRIPTION - ORIENTATION: ORIENTATION: LEFT

## 2022-09-26 ASSESSMENT — PAIN DESCRIPTION - DESCRIPTORS
DESCRIPTORS: SORE
DESCRIPTORS: ACHING

## 2022-09-26 ASSESSMENT — PAIN DESCRIPTION - LOCATION
LOCATION: SHOULDER
LOCATION: SHOULDER

## 2022-09-26 NOTE — CONSULTS
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 SHE SPEAKS A LITTLE ENGLISH    Sleep apnea     unspecified    Thyroid disease     UTI        ECHO:     Summary   -Technically limited portable study secondary to patient's immobility   (upright semi supine position). -Normal left ventricle size,mild to moderate concentric wall thickness and   normal systolic function with an estimated ejection fraction of 55%. -Abnormal mid to distal septal and inferoseptal motion is present.   -Indeterminate diastolic function. due to mitral annular   calcification. E/e\"=20.3.   -Mitral annular calcification is present. -Mild to moderate posteriorly directed mitral regurgitation.   -Aortic valve appears sclerotic but opens adequately. -Mild tricuspid regurgitation.   -Estimated pulmonary artery systolic pressure is borderline elevated at 33   mmHg assuming a right atrial pressure of 8 mmHg. -The left atrium is at the upper limits of normal in size to mildly dilated. -The right ventricle is not well visualized. -Right ventricular systolic function appears mildly reduced . TAPSE 1.22 cm.   -RV S velocity 8.6 cm/s. -Hypokinetic free wall of the right ventricle.       Signature      ------------------------------------------------------------------   Electronically signed by Marck Lane MD, [facility] (Interpreting   physician) on 06/17/2022 at 02:22 PM      ACE/ARB/ARNi:   BB: Metoprolol Tartrate 12.5 mg bid   Aldosterone Antagonist:   SGLT2: Jardiance 10 mg daily     History of sleep apnea: No    [place] Screen ordered: Yes    DM History: Yes  Consult for DM educator: No. HgA1C is 6.4 %    Last Hospital Admission: 22 for UTI. Code Status: Full   Discharge plans: Patient to return home. Lives independently     Family Present: Daughter    Ms. Bulmaro Ramirez was seen for heart failure. She was admitted for generalized weakness, shortness of breath, and swelling to her wrist. She does not speak English and conversation was through her daughter who was used as the . Follows with James Lopez of the heart failure team. She cannot weigh daily, because she is not steady enough. Encouraged to monitor for symptoms of heart failure closely. The daughter states they try to cook low sodium and uses salt occasionally. They do not eat out or eat processed foods. Daughter very informed about her medications. Patient provided with both written and verbal education on CHF signs/ symptoms, causes, discharge medications, daily weights, low sodium diet, activity, and follow-up. Pt to call if gains 3 pounds in one day or 5 pounds in one week. Mutually agreed upon goals were discussed such as calling the MD as soon as they recognize symptoms and weight gain, maintaining proper diet, taking medications as prescribed, joining cardiac rehab when able. Also reviewed importance of risk factor reduction. Patient provided with CHF Zone Management tool and CHF symptoms magnet. Discussed importance of lifestyle changes: call early for symptoms     PATIENT/CAREGIVER TEACHING:    Level of patient/caregiver understanding able to:   [ x] Verbalize understanding [ ] Demonstrate understanding [ ] Teach back   [ ] Needs reinforcement [ ] Other:       Time spent teachin minutes    1. WEIGHT: Admit Weight: 248 lb 6.4 oz (112.7 kg)      Today  Weight: 249 lb 11.2 oz (113.3 kg)   2.  I/O   Intake/Output Summary (Last 24 hours) at 2022 1309  Last data

## 2022-09-26 NOTE — PROGRESS NOTES
[facility]   Progress Note      SUBJECTIVE:      Patient is doing okay. Has left wrist and left shoulder pain but better than before  No external bleeding    OBJECTIVE    Physical  VITALS:  BP (!) 110/52   Pulse 73   Temp 97.8 °F (36.6 °C) (Oral)   Resp 18   Ht 5' (1.524 m)   Wt 249 lb 11.2 oz (113.3 kg)   LMP  (LMP Unknown)   SpO2 94%   BMI 48.77 kg/m²   TEMPERATURE:  Current - Temp: 97.8 °F (36.6 °C);  Max - Temp  Av.9 °F (37.2 °C)  Min: 97.8 °F (36.6 °C)  Max: 99.5 °F (37.5 °C)  BLOOD PRESSURE RANGE:  Systolic (06VFL), ARACELIS:485 , Min:103 , BNA:774   ; Diastolic (98TNY), OHY:08, Min:38, Max:68    24HR INTAKE/OUTPUT:    Intake/Output Summary (Last 24 hours) at 2022 0916  Last data filed at 2022 0849  Gross per 24 hour   Intake 1786.89 ml   Output 1900 ml   Net -113.11 ml     Mild pallor no icterus  Respiratory efforts are normal.  Abdomen is not distended  Extremities no edema in lower extremity      Data  Labs:  General Labs:  CBC with Differential:    Lab Results   Component Value Date/Time    WBC 6.3 2022 04:34 AM    RBC 2.91 2022 04:34 AM    HGB 9.3 2022 04:34 AM    HCT 28.2 2022 04:34 AM    PLT 91 2022 04:34 AM    MCV 96.8 2022 04:34 AM    MCH 31.9 2022 04:34 AM    MCHC 33.0 2022 04:34 AM    RDW 21.8 2022 04:34 AM    SEGSPCT 74.9 07/10/2012 02:05 PM    BANDSPCT 4 2022 04:34 AM    METASPCT 3 2022 04:34 AM    LYMPHOPCT 59.0 2022 04:34 AM    MONOPCT 11.0 2022 04:34 AM    MYELOPCT 1 2022 04:34 AM    EOSPCT 1.6 2012 06:20 AM    BASOPCT 0.0 2022 04:34 AM    MONOSABS 0.7 2022 04:34 AM    LYMPHSABS 3.7 2022 04:34 AM    EOSABS 0.0 2022 04:34 AM    BASOSABS 0.0 2022 04:34 AM    DIFFTYPE Auto 07/10/2012 02:05 PM     BMP:    Lab Results   Component Value Date/Time     2022 04:34 AM    K 4.3 2022 04:34 AM    CL 99 2022 04:34 AM    CO2 25 09/26/2022 04:34 AM    BUN 35 09/26/2022 04:34 AM    LABALBU 3.8 09/24/2022 07:05 PM    CREATININE 1.4 09/26/2022 04:34 AM    CALCIUM 9.3 09/26/2022 04:34 AM    GFRAA 43 09/26/2022 04:34 AM    GFRAA >60 07/10/2012 02:05 PM    LABGLOM 36 09/26/2022 04:34 AM    GLUCOSE 60 09/26/2022 04:34 AM     Hepatic Function Panel:    Lab Results   Component Value Date/Time    ALKPHOS 88 09/24/2022 07:05 PM    ALT 11 09/24/2022 07:05 PM    AST 15 09/24/2022 07:05 PM    PROT 7.2 09/24/2022 07:05 PM    PROT 7.3 07/10/2012 02:05 PM    BILITOT 0.4 09/24/2022 07:05 PM    LABALBU 3.8 09/24/2022 07:05 PM     LDH:  No results found for: LDH  PT/INR:    Lab Results   Component Value Date/Time    PROTIME 12.1 02/13/2017 12:07 PM    INR 1.06 02/13/2017 12:07 PM     PTT:    Lab Results   Component Value Date/Time    APTT 39.7 02/13/2017 12:07 PM   [APTT    Current Medications  Current Facility-Administered Medications: insulin glargine (LANTUS) injection vial 42 Units, 42 Units, SubCUTAneous, Daily  atorvastatin (LIPITOR) tablet 80 mg, 80 mg, Oral, Nightly  insulin lispro (HUMALOG) injection vial 17 Units, 17 Units, SubCUTAneous, TID WC  isosorbide mononitrate (IMDUR) extended release tablet 30 mg, 30 mg, Oral, Daily  levothyroxine (SYNTHROID) tablet 150 mcg, 150 mcg, Oral, Daily  metoprolol tartrate (LOPRESSOR) tablet 12.5 mg, 12.5 mg, Oral, BID  miconazole (MICOTIN) 2 % powder, , Topical, BID  oxybutynin (DITROPAN-XL) extended release tablet 15 mg, 15 mg, Oral, Daily  empagliflozin (JARDIANCE) tablet 10 mg, 10 mg, Oral, Daily  sodium chloride flush 0.9 % injection 10 mL, 10 mL, IntraVENous, 2 times per day  sodium chloride flush 0.9 % injection 10 mL, 10 mL, IntraVENous, PRN  0.9 % sodium chloride infusion, , IntraVENous, PRN  ondansetron (ZOFRAN) injection 4 mg, 4 mg, IntraVENous, Q6H PRN  senna (SENOKOT) tablet 8.6 mg, 1 tablet, Oral, BID  dextrose bolus 10% 125 mL, 125 mL, IntraVENous, PRN **OR** dextrose bolus 10% 250 mL, 250 mL, IntraVENous, PRN  glucagon (rDNA) injection 1 mg, 1 mg, SubCUTAneous, PRN  dextrose 10 % infusion, , IntraVENous, Continuous PRN  polyethylene glycol (GLYCOLAX) packet 17 g, 17 g, Oral, Daily PRN  insulin lispro (HUMALOG) injection vial 0-4 Units, 0-4 Units, SubCUTAneous, TID WC  insulin lispro (HUMALOG) injection vial 0-4 Units, 0-4 Units, SubCUTAneous, Nightly  pantoprazole (PROTONIX) tablet 40 mg, 40 mg, Oral, QAM AC  lactobacillus (CULTURELLE) capsule 1 capsule, 1 capsule, Oral, BID WC  lidocaine 4 % external patch 1 patch, 1 patch, TransDERmal, Daily  cetirizine (ZYRTEC) tablet 5 mg, 5 mg, Oral, Daily  gabapentin (NEURONTIN) capsule 300 mg, 300 mg, Oral, Daily **AND** gabapentin (NEURONTIN) capsule 600 mg, 600 mg, Oral, Nightly  rivaroxaban (XARELTO) tablet 20 mg, 20 mg, Oral, Daily  furosemide (LASIX) injection 40 mg, 40 mg, IntraVENous, BID  ceFAZolin (ANCEF) 2,000 mg in dextrose 5 % 50 mL IVPB (mini-bag), 2,000 mg, IntraVENous, Q8H  acetaminophen (TYLENOL) tablet 1,000 mg, 1,000 mg, Oral, 3 times per day  tiZANidine (ZANAFLEX) tablet 2 mg, 2 mg, Oral, TID  oxyCODONE (ROXICODONE) immediate release tablet 2.5 mg, 2.5 mg, Oral, Q4H PRN **OR** oxyCODONE (ROXICODONE) immediate release tablet 5 mg, 5 mg, Oral, Q4H PRN  phenazopyridine (PYRIDIUM) tablet 100 mg, 100 mg, Oral, TID     ASSESSMENT AND PLAN    58-year-old lady was admitted in the hospital with left wrist pain and 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 Mild anemia and thrombocytopenia:    --At the time of admission hemoglobin was 9.1 and platelet count was 90. WBC count was normal  -In June 2022, CBC was within normal limits. Iron studies from 9/25/2022 did not show any evidence of deficiency, folate was normal, B12 was normal (patient takes B12 injections at home)    -SPEP, haptoglobin are pending.    -Exact etiology of cytopenias is not clear. It appears to be reactive and possibly due to systemic process - ? Infection.    -We will monitor CBC.   Karlee Palma MD

## 2022-09-26 NOTE — PROGRESS NOTES
[facility]  HEART FAILURE  Progress Note      Admit Date 9/24/2022     Reason for Consult:      Reason for Consultation/Chief Complaint: SOB    HPI:    Berna Gallo is a 78 y.o. female with PMH  atrial fibrillation, CAD/CABG (2004), DM2, HTN, hypothyroidism, HFpEF admitted with SOB, elevated troponin and anemia. Subjective:  Patient is being seen for CHF. There were no acute overnight cardiac events. Today Ms. Schmidt c/o shoulder and arm pain but denies chest pain, shortness of breath, palpitations      Review of Systems - General ROS: negative  Hematological and Lymphatic ROS: negative  Respiratory ROS: negative  Cardiovascular ROS: no chest pain or dyspnea on exertion  Gastrointestinal ROS: no abdominal pain, change in bowel habits, or black or bloody stools  Musculoskeletal ROS: arm pain  Neurological ROS: no TIA or stroke symptoms     Baseline Weight: 243   Wt Readings from Last 3 Encounters:   09/26/22 249 lb 11.2 oz (113.3 kg)   06/21/22 243 lb 9.6 oz (110.5 kg)   10/06/21 225 lb (102.1 kg)         Cardiac Testing:   Echo 6/17/2022:  Summary   -Technically limited portable study secondary to patient's immobility (upright semi supine position). -Normal left ventricle size,mild to moderate concentric wall thickness and normal systolic function with an estimated ejection fraction of 55%. -Abnormal mid to distal septal and inferoseptal motion is present.   -Indeterminate diastolic function. due to mitral annular calcification. E/e\"=20.3.   -Mitral annular calcification is present. -Mild to moderate posteriorly directed mitral regurgitation.   -Aortic valve appears sclerotic but opens adequately. -Mild tricuspid regurgitation.   -Estimated pulmonary artery systolic pressure is borderline elevated at 33 mmHg assuming a right atrial pressure of 8 mmHg. -The left atrium is at the upper limits of normal in size to mildly dilated. -The right ventricle is not well visualized.    -Right ventricular systolic function appears mildly reduced . TAPSE 1.22 cm.   -RV S velocity 8.6 cm/s. -Hypokinetic free wall of the right ventricle. NYHA Class III      Objective:   BP (!) 110/52   Pulse 73   Temp 97.8 °F (36.6 °C) (Oral)   Resp 18   Ht 5' (1.524 m)   Wt 249 lb 11.2 oz (113.3 kg)   LMP  (LMP Unknown)   SpO2 94%   BMI 48.77 kg/m²     Intake/Output Summary (Last 24 hours) at 9/26/2022 0923  Last data filed at 9/26/2022 0849  Gross per 24 hour   Intake 1546.89 ml   Output 1900 ml   Net -353.11 ml      In: 1786.9 [P.O.:1560]  Out: 1900       Physical Exam:  General Appearance:  Non-obese/Well Nourished  Respiratory:  Resp Auscultation: Normal breath sounds without dullness  Cardiovascular:   Auscultation: Regular rate and rhythm, normal S1S2, no m/g/r/c  Palpation: Normal    JVD: none  Pedal Pulses: 2+ and equal   Abdomen:  Soft, NT, ND, + bs  Extremities:  No Cyanosis or Clubbing  Extremities: negative  Neurological/Psychiatric:  Oriented to time, place, and person  Non-anxious    MEDICATIONS:   Scheduled Meds:   Scheduled Meds:   insulin glargine  42 Units SubCUTAneous Daily    atorvastatin  80 mg Oral Nightly    insulin lispro  17 Units SubCUTAneous TID WC    isosorbide mononitrate  30 mg Oral Daily    levothyroxine  150 mcg Oral Daily    metoprolol tartrate  12.5 mg Oral BID    miconazole   Topical BID    oxybutynin  15 mg Oral Daily    empagliflozin  10 mg Oral Daily    sodium chloride flush  10 mL IntraVENous 2 times per day    senna  1 tablet Oral BID    insulin lispro  0-4 Units SubCUTAneous TID WC    insulin lispro  0-4 Units SubCUTAneous Nightly    pantoprazole  40 mg Oral QAM AC    lactobacillus  1 capsule Oral BID WC    lidocaine  1 patch TransDERmal Daily    cetirizine  5 mg Oral Daily    gabapentin  300 mg Oral Daily    And    gabapentin  600 mg Oral Nightly    rivaroxaban  20 mg Oral Daily    furosemide  40 mg IntraVENous BID    ceFAZolin  2,000 mg IntraVENous Q8H acetaminophen  1,000 mg Oral 3 times per day    tiZANidine  2 mg Oral TID    phenazopyridine  100 mg Oral TID WC     Continuous Infusions:   sodium chloride 10 mL/hr (09/25/22 2117)    dextrose       PRN Meds:.sodium chloride flush, sodium chloride, ondansetron, dextrose bolus **OR** dextrose bolus, glucagon (rDNA), dextrose, polyethylene glycol, oxyCODONE **OR** oxyCODONE  Continuous Infusions:   sodium chloride 10 mL/hr (09/25/22 2117)    dextrose         Intake/Output Summary (Last 24 hours) at 9/26/2022 0923  Last data filed at 9/26/2022 0849  Gross per 24 hour   Intake 1546.89 ml   Output 1900 ml   Net -353.11 ml       Lab Data:  CBC:   Lab Results   Component Value Date/Time    WBC 6.3 09/26/2022 04:34 AM    HGB 9.3 09/26/2022 04:34 AM    PLT 91 09/26/2022 04:34 AM     BMP:  Lab Results   Component Value Date/Time     09/26/2022 04:34 AM    K 4.3 09/26/2022 04:34 AM    CL 99 09/26/2022 04:34 AM    CO2 25 09/26/2022 04:34 AM    BUN 35 09/26/2022 04:34 AM    CREATININE 1.4 09/26/2022 04:34 AM    GLUCOSE 60 09/26/2022 04:34 AM     INR:   Lab Results   Component Value Date/Time    INR 1.06 02/13/2017 12:07 PM    INR 1.06 11/22/2016 06:35 AM    INR 1.73 11/15/2016 10:40 AM        CARDIAC LABS  ENZYMES:  Recent Labs     09/24/22  1905 09/25/22  0233 09/25/22  0502   TROPONINI 0.05* 0.05* 0.04*     FASTING LIPID PANEL:  Lab Results   Component Value Date/Time    HDL 47 07/13/2012 09:30 AM    HDL 44 07/06/2010 09:12 AM    LDLCALC 61 07/13/2012 09:30 AM    TRIG 247 07/13/2012 09:30 AM    TSH 0.13 07/10/2012 02:05 PM     LIVER PROFILE:  Lab Results   Component Value Date/Time    AST 15 09/24/2022 07:05 PM    AST 9 06/16/2022 04:44 PM    ALT 11 09/24/2022 07:05 PM    ALT 25 06/16/2022 04:44 PM     BNP:   Lab Results   Component Value Date/Time    PROBNP 277 09/24/2022 07:05 PM    PROBNP 56 07/12/2022 06:45 PM    PROBNP 104 06/16/2022 04:44 PM     Iron Studies:    Lab Results   Component Value Date/Time    FERRITIN 641.4 09/25/2022 05:02 AM    FERRITIN 284.8 07/20/2011 01:25 PM     Lab Results   Component Value Date    IRON 46 09/25/2022    TIBC 230 (L) 09/25/2022    FERRITIN 641.4 (H) 09/25/2022      Iron Deficiency Anemia:  No IV Iron Therapy:  No  2017 ACC/AHA HF Guidelines:   intravenous iron replacement in patients with New York Heart Association (NYHA) class II and III HF and iron deficiency(ferritin <100 ng/ml or 100-300 ng/ml if transferrin saturation <20%), to improve functional status and QoL. 1. WEIGHT: Admit Weight: 248 lb 6.4 oz (112.7 kg)      Today  Weight: 249 lb 11.2 oz (113.3 kg)   2. I/O   Intake/Output Summary (Last 24 hours) at 9/26/2022 0923  Last data filed at 9/26/2022 0849  Gross per 24 hour   Intake 1546.89 ml   Output 1900 ml   Net -353.11 ml         Assessment/Plan:     AHF- compensated, stop IV lasix and restart po and low dose entresto, continue jardiance  AF- rate controlled on BB, on AC  CAD-- no CP, continue BB, statin, imdur  Anemia- per onc      The patient was seen for >35 minutes. I reviewed interval history, physical exam, review of data including labs, imaging, development and implementation of treatment plan and coordination of complex care.  More than 50% of the time was devoted to counseling the patient on their diagnoses/treatments, as well as coordination of care with the other care teams    I appreciate the opportunity of cooperating in the care of this individual.    Edison Cranker, MATEUSZ - CNP, ACNP, [address] 9/26/2022, 9:23 AM  Heart Failure  The [address], [address]  Ph: [phone]      Core Measures:   Discharge instructions:   LVEF documented:   ACEI for LV dysfunction:   Smoking Cessation:

## 2022-09-26 NOTE — PROGRESS NOTES
[id number] [facility] Orthopedic Surgery  Progress Note      Chief complaint: Left wrist pain    Subjective: The patient is sitting up in bed. Spoke to her daughter by phone. She reports her wrist and shoulder pain are 50% improved from yesterday. Lidocaine patches on left shoulder. Denies paresthesias or new issues. Ambulates with a walker. Objective:  Vitals:    09/26/22 0413   BP: (!) 110/52   Pulse: 73   Resp: 18   Temp: 97.8 °F (36.6 °C)   SpO2: 94%      Physical Examination:  GENERAL: No apparent distress, well-nourished, sitting up in hospital bed  SKIN:  Warm and dry  RESPIRATORY: Resp easy and unlabored  NEURO: Awake and alert. No speech defect  PSYCHIATRIC: Appropriate affect; not agitated  MUSCULOSKELETAL:  LUE -mild swelling and erythema of the dorsum of the hand and wrist.  She demonstrates of small amount of active range of motion in the wrist before she has pain. She tolerates passive ROM of the left wrist reasonably well today. Tender over the dorsal carpus. Sensation intact light touch in the radial, ulnar, median nerve distributions. Palpable radial pulse. She has passive range of motion of the left shoulder with minimal pain. Labs reviewed:  Recent Labs     09/24/22 1905 09/25/22  0502 09/26/22  0434   WBC 4.8 4.0 6.3   HGB 9.1* 9.1* 9.3*   HCT 27.1* 27.1* 28.2*   PLT 90* 85* 91*     Recent Labs     09/24/22  1905 09/25/22  0502 09/26/22  0434    140 139   K 4.0 3.4* 4.3   CL 97* 99 99   CO2 28 27 25   BUN 35* 31* 35*   CREATININE 1.1 1.0 1.4*   GLUCOSE 129* 63* 60*   CALCIUM 9.3 8.7 9.3   MG  --  1.90 2.40     Imaging:  XR WRIST LEFT (MIN 3 VIEWS)   Final Result   Mild osteoarthritic changes along the radiocarpal joint and moderate   degenerative arthritic changes along the base of the thumb with no acute bony   abnormality seen. Vascular calcifications along the palmar aspect of the wrist      Mild soft tissue swelling around the wrist and diffuse osteopenia.       Probable mild chondrocalcinosis of the TFCC. 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 Recommend oral steroids if primary team open to this. - ROM exercises.   - Elevation and ice.  - Lidoderm patch  - Dispo: per primary team      MATEUSZ Sainz - CNP  9/26/2022

## 2022-09-26 NOTE — PROGRESS NOTES
Nutrition Education  Provided heart failure diet education. Education included low sodium diet guidelines (3-4 gm Na+/day) and fluid restriction (64 oz/day). Reviewed foods to choose and foods to avoid, along with label reading and ways to add flavor to food. Pt currently tries to follow a low sodium diet at home and does not add salt to food. Pt/family state understanding of education. Time spent with patient: 10 minutes. Learners: Patient and Family  Readiness: Acceptance  Method: Explanation and Handout  Response: Verbalizes Understanding  Contact name and number provided.     Edson Hoang RD, LD  Contact Number: [phone]

## 2022-09-26 NOTE — PROGRESS NOTES
Facility/Department: [address] NURSING  Initial Assessment  DYSPHAGIA BEDSIDE SWALLOW EVALUATION     Patient: Trent Patel   : 1940   MRN: [id number]      Evaluation Date: 2022   Admitting Diagnosis: Dyspnea on exertion [R06.00]  Ambulatory dysfunction [R26.2]  Anemia, unspecified type [D64.9]  Pain: Did not state                                                       H&P: Trent Patel is a 78 y.o. female who presented to ED for evaluation of left wrist pain, generalized weakness, and shortness of breath with exertion. Family at bedside interpreted per patient preference. Patient reports the pain to her left wrist began last night. She denies falling or injuring herself in any way. She denies any numbness, tingling, or weakness. Patient reports for the past couple days she has noticed some shortness of breath with exertion as well as generalized weakness. She denies chest pain or tightness. She denies any recent significant weight gain. She denies fever, cough, edema, abdominal pain, nausea, vomiting, diarrhea, constipation, urinary symptoms. She denies any recent fall or head trauma, headache, dizziness, neck pain or stiffness, changes in vision, difficulty swallowing, numbness/tingling extremities. Hemoglobin decreased from prior in  of this year from 12 > 9. Patient is anticoagulated on Xarelto for A. fib. She denies any hematochezia or melena. She denies any history of GI bleed. Imaging:  Chest X-ray:   Impression   Mild cardiomegaly with mild central pulmonary congestion or pulmonary artery   hypertension which is less prominent. Mild chronic obstructive lung changes with mild bibasilar discoid atelectasis   or scarring which is less prominent with no obvious infiltrate or effusion. History/Prior Level of Function:   Living Status: Pt in from home  Prior Dysphagia History: Per chart review and pt and daughter report, no prior history of dysphagia.    Reason for referral: SLP evaluation orders received due to pt/family reports of trouble swallowing     Dysphagia Impressions/Diagnosis: Oropharyngeal Dysphagia   Pt upright in bed and requesting that daughter provide translation for evaluation this date. Pt reported symptoms of food/liquid becoming stuck in mid chest and then resulting in episodes of gurgling in upper neck. Daughter reported that the pt requires increased time during meals due to symptoms as pt has to wait for food to pass prior to further consumption of intake. Oral motor exam was grossly within functional limits. Oral phase characterized by prolonged mastication, decreased AP transit, delayed oral clearing and suspected premature bolus to pharynx. Thin liquids via cup/straw revealed symptoms of delayed swallow initiation  with one episode of delayed throat clearing. Pt required use of thin liquid wash to moisten regular solid (aure cracker) and achieve good oral clearance. No further overt clinical s/s of aspiration noted across trials. No evidence of esophageal dysfunction assessed across presented trials this date, however suspect esophageal component to dysphagia based on pt and pt daughter report. Recommend downgrade to Easy to Chew diet with thin liquids, meds whole with water or in puree. Consider GI consult to further assess pt reported symptoms. Recommended Diet and Intervention 9/26/2022:  Diet Solids Recommendation:  Easy to chew  Liquid Consistency Recommendation: Thin liquids  Recommended form of Meds: Meds whole with water or Meds in puree         Compensatory Swallowing Strategies: Alternate solids/liquids , Upright as possible with all PO intake , Small bites/sips , Eat/feed slowly, Remain upright 30-45 min     SHORT TERM DYSPHAGIA GOALS/PLAN OF CARE: Speech therapy for dysphagia tx 3-5 times per week during acute care stay.     Pt will functionally tolerate recommended diet with no overt clinical s/s of aspiration   Pt will demonstrate understanding of aspiration risk and precautions via education/demonstration with occasional prompting  Pt will advance to least restrictive diet as indicated     Dysphagia Therapeutic Intervention:  Diet Tolerance Monitoring , Patient/Family Education , Therapeutic Trials with SLP     Discharge Recommendations: Discharge recommendations to be determined pending ongoing follow-up during acute care stay    Patient Positioning: Upright in bed     Current Diet Level (prior to evaluation): Regular texture diet  Thin liquids      Respiratory Status:   [x]Room Air   []O2 via nasal cannula   []Other:    Dentition:  []Adequate  [x]Dentures: upper only  []Missing Many Teeth  []Edentulous  []Other:    Baseline Vocal Quality:  [x]Normal  []Dysphonic   []Aphonic   []Hoarse  []Wet  []Weak  []Other:    Volitional Cough:  Not Elicited     Volitional Swallow:   []Absent   []Delayed     []Adequate     []Required use of drink     Oral Mechanism Exam:  []WFL []Mild   [] Moderate  []Severe  []To be assessed  Impaired:   []Left side      []Right side    []Labial ROM/Coordination    []Labial Symmetry   []Lingual ROM/Coordination   []Lingual Symmetry  []Gag  []Other:     Oral Phase: []WFL [x]Mild   [] Moderate  []Severe  []To be assessed   [x]Impaired/Prolonged Mastication:   []Oral Holding:   []Spillage Left:   []Spillage Right:  []Pocketing Left:   []Pocketing Right:   []Decreased Anterior to Posterior Transit:   [x]Suspected Premature Bolus Loss:   []Lingual/Palatal Residue:   []Other:     Pharyngeal Phase: []WFL [x]Mild   [] Moderate  []Severe  []To be assessed   [x]Delayed Swallow:   [x]Suspected Pharyngeal Pooling:   [x]Decreased Laryngeal Elevation:   []Absent Swallow:  []Wet Vocal Quality:   []Throat Clearing-Immediate:   []Throat Clearing-Delayed:   []Cough-Immediate:   []Cough-Delayed:  []Change in Vital Signs:  []Suspected Delayed Pharyngeal Clearing:  []Other:     Eating Assistance:  []Independent  [x]Setup or clean-up assistance   [] Supervision or touching assistance   [] Partial or moderate assistance   [] Substantial or maximal assistance  [] Dependent     EDUCATION:   Provided education regarding role of SLP, results of assessment, recommendations and general speech pathology plan of care. [x] Pt verbalized understanding and agreement   [] Pt requires ongoing learning   [] No evidence of comprehension     If patient discharges prior to next visit, this note will serve as discharge.      Treatment time:  Timed Code Treatment Minutes: 0 minutes  Total Treatment time: 24 minutes    Electronically signed by:    Ivett Robertson M.A., [address] [place] [phone]  Speech-Language Pathologist  9/26/2022 3:16 PM

## 2022-09-26 NOTE — PLAN OF CARE
Problem: Discharge Planning  Goal: Discharge to home or other facility with appropriate resources  Outcome: Progressing    Problem: Pain  Goal: Verbalizes/displays adequate comfort level or baseline comfort level  Outcome: Progressing   Pain management improved with scheduled medications, new medications started today. Will continue to evaluate pain on 0-10 scale and provide therapies.   Problem: Safety - Adult  Goal: Free from fall injury  Outcome: Progressing   No falls during shift

## 2022-09-26 NOTE — PROGRESS NOTES
Hospital Medicine Progress Note     Date:  9/26/2022    PCP: MATEUSZ Moffett CNP (Tel: [phone])    Date of Admission: 9/24/2022      Subjective  Pt lying comfortably, feel cold today, daughter at bedside. Pt's left wrist pain is improved ans erythema almost completely resolved, now having left should pain. Objective  Physical exam:  Vitals: /60   Pulse 68   Temp 98.2 °F (36.8 °C) (Oral)   Resp 18   Ht 5' (1.524 m)   Wt 249 lb 11.2 oz (113.3 kg)   LMP  (LMP Unknown)   SpO2 94%   BMI 48.77 kg/m²   Gen: Not in distress. Alert. Head: Normocephalic. Atraumatic. Eyes: EOMI. Good acuity. ENT: Oral mucosa moist  Neck: No JVD. No obvious thyromegaly. CVS: Nml S1S2, no MRG, RRR  Pulmomary: diminished BS bilaterally. No crackles. No wheezes. Gastrointestinal: Soft, NT/ND. Positive bowel sounds. Musculoskeletal: B/L Lower back muscle spasm, also right upper and left mid back muscle spasm elicted  Neuro: No focal deficit. Moves extremity spontaneously. Psychiatry: Appropriate affect. Not agitated. Skin: left dorsal wrist erythema resolved, mild warmth, mild TTP      24HR INTAKE/OUTPUT:    Intake/Output Summary (Last 24 hours) at 9/26/2022 1233  Last data filed at 9/26/2022 0849  Gross per 24 hour   Intake 1546.89 ml   Output 1900 ml   Net -353.11 ml       I/O last 3 completed shifts:   In: 1546.9 [P.O.:1320; IV Piggyback:226.9]  Out: 1900 [Urine:1900]  I/O this shift:  In: 240 [P.O.:240]  Out: -       Meds:    insulin glargine  42 Units SubCUTAneous Daily    predniSONE  10 mg Oral Daily    atorvastatin  80 mg Oral Nightly    insulin lispro  17 Units SubCUTAneous TID     isosorbide mononitrate  30 mg Oral Daily    levothyroxine  150 mcg Oral Daily    metoprolol tartrate  12.5 mg Oral BID    miconazole   Topical BID    oxybutynin  15 mg Oral Daily    empagliflozin  10 mg Oral Daily    sodium chloride flush  10 mL IntraVENous 2 times per day    senna  1 tablet Oral BID    insulin lispro  0-4 Units SubCUTAneous TID WC    insulin lispro  0-4 Units SubCUTAneous Nightly    pantoprazole  40 mg Oral QAM AC    lidocaine  1 patch TransDERmal Daily    cetirizine  5 mg Oral Daily    gabapentin  300 mg Oral Daily    And    gabapentin  600 mg Oral Nightly    rivaroxaban  20 mg Oral Daily    acetaminophen  1,000 mg Oral 3 times per day    tiZANidine  2 mg Oral TID    phenazopyridine  100 mg Oral TID WC       Infusions:    sodium chloride 10 mL/hr (09/25/22 2117)    dextrose           PRN Meds: sodium chloride flush, sodium chloride, ondansetron, dextrose bolus **OR** dextrose bolus, glucagon (rDNA), dextrose, polyethylene glycol, oxyCODONE **OR** oxyCODONE    Labs/imaging:  CBC:   Recent Labs     09/24/22 1905 09/25/22  0502 09/26/22  0434   WBC 4.8 4.0 6.3   HGB 9.1* 9.1* 9.3*   PLT 90* 85* 91*           BMP:    Recent Labs     09/24/22 1905 09/25/22  0502 09/26/22  0434    140 139   K 4.0 3.4* 4.3   CL 97* 99 99   CO2 28 27 25   BUN 35* 31* 35*   CREATININE 1.1 1.0 1.4*   GLUCOSE 129* 63* 60*           Hepatic:   Recent Labs     09/24/22 1905   AST 15   ALT 11   BILITOT 0.4   ALKPHOS 88         Troponin:   Recent Labs     09/24/22 1905 09/25/22  0233 09/25/22  0502   TROPONINI 0.05* 0.05* 0.04*           BNP: No results for input(s): BNP in the last 72 hours. INR: No results for input(s): INR in the last 72 hours. Reviewed imaging and reports noted      Assessment:  Principal Problem:    Dyspnea on exertion  Active Problems: Thrombocytopenia (HCC)    Anemia    Wrist arthritis    DM (diabetes mellitus) ([facility]    Coronary artery disease involving native coronary artery of native heart without angina pectoris    PAF (paroxysmal atrial fibrillation) ([facility] [address]    Peripheral vascular disease (HCC)    Mixed hyperlipidemia  Resolved Problems:    * No resolved hospital problems.  *        Plan:  Acute on Chronic Diastolic Congestive Heart Failure  - appreciate cardio reccs  - improved, hold further diuresis for now      ARUN  - hold lasix and entresto for today, maybe restart omm  - avoid nephrotoxins, CTm        Anemia and thrombocytopenia  -Iron studies, B12, folate, TSH pending  -Blood occult stool negative  -Hematology consulted    Left wrist cellulitis  - was suspected on admission but I feel this was a gout flar  - DC abx  - start pred 10mg Qd for 10 doses (watch BS closely)  - appreciate ortho reccs      Severe bilateral low back muscle spasm  - Cont scheduled tizanidine and tylenol, prn oxycodone      Dysuria  - improved  - s/p diflucan 150mg x1  - U Cx neg  - s/p phenazopyridine 100mg TID for 3 doses      CAD status post CABG  - cont statin, BB, not on asp as already on xarelto      Paroxysmal atrial fibrillation  - cont BB, xarelto for [facility]      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 Diet: ADULT DIET;  Regular; 4 carb choices (60 gm/meal)    Activity: up with assist  Prophylaxis: xarelto    Code status: Full Code     ----------        Odell Henriquez MD  -------------------------------  Rounding hospitalist

## 2022-09-26 NOTE — CONSULTS
[address]                     [address] [facility], [address]                                  CONSULTATION    PATIENT NAME: Halima Castorena                     :        1940  MED REC NO:   [id number]                          ROOM:       3304  ACCOUNT NO:   [de-identified]                           ADMIT DATE: 2022  PROVIDER:     Armaan Whatley MD    CONSULT DATE:  2022    HEMATOLOGY CONSULTATION    REASON FOR CONSULTATION:  Anemia and thrombocytopenia. CONSULTING PROVIDER:  LINA Noriega    HISTORY OF PRESENT ILLNESS:  The patient is an 78-year-old female that  helds from [facility] and speaks very little [place], who presented to the  hospital with a variety of issues including left wrist pain as well as  generalized weakness as well as possible shortness of breath. She also  has dysuria. Her left wrist is erythematous. She was diagnosed with  acute-on-chronic CHF. Cardiology has been consulted. She denies any  recent fevers, chills, or sweats. Her hemoglobin today was 9.1 with a  platelet count of 85. These counts were normal a few months ago. Hematology was consulted for that reason. Her daughter acts as an  . PAST MEDICAL HISTORY:  1. Atrial fibrillation. 2.  Coronary artery disease, status post CABG. 3.  Cataracts. 4.  Congestive heart failure. 5.  Diabetes. 6.  GERD. 7.  Hyperlipidemia. 8.  Hypertension. 9.  Hypothyroidism. PAST SURGICAL HISTORY:  1.  CABG. 2.  Bilateral knee replacement. 3.  Cholecystectomy. ALLERGIES:  She is allergic to ASPIRIN, DIFLUCAN, MACROBID, and  PENICILLIN, which causes unknown reactions.     HOME MEDICATIONS:  Valsartan one tablet p.o. b.i.d., Imdur 30 mg p.o.  daily, Lasix 20 mg p.o. daily, Neurontin 300 mg p.o. b.i.d., insulin,  senna daily, metoprolol 25 mg p.o. b.i.d., Lipitor 80 mg daily, Xarelto  20 mg daily, oxybutynin 15 mg daily, Synthroid 150 mcg daily.    SOCIAL HISTORY:  She does not drink or smoke. She is retired. FAMILY HISTORY:  There are no other blood disorders or cancers that she  is aware of. REVIEW OF SYSTEMS:  She denies any recent fever, chills, sweats,  headaches, any new bone aches, dysphagia, odynophagia, diarrhea,  constipation, hemoptysis, hematemesis, change in  vision/hearing/smell/taste, neuropathy, skin rashes, productive cough,  urinary or bowel prolapse or incontinence, vaginal bleeding, pruritus,  hallucinations, nasal congestion or drainage, seizures, strokes,  syncope, depression, anxiety, suicidal ideations, melena, or  hematochezia. She has mild to moderate fatigue and mild to moderate  generalized weakness. She has left wrist pain. Her 10-system review of  systems is otherwise negative. PHYSICAL EXAMINATION:  VITAL SIGNS:  She is afebrile with normal vital signs. GENERAL:  She is in no acute distress. HEENT:  Her pupils are round and reactive to light and accommodation. Extraocular muscles are intact. NECK:  She has no jugular venous distention. No thyromegaly. Oropharynx is clear. She has no carotid bruits. She has no palpable  lymphadenopathy. HEART:  Regular rate and rhythm. LUNGS:  Clear to auscultation bilaterally. ABDOMEN:  Nondistended, nontender. Bowel sounds x4. No  hepatosplenomegaly. EXTREMITIES:  She has trace lower extremity edema bilaterally. She does  have left wrist erythema. NEUROLOGIC:  Exam is nonfocal.    LABORATORY DATA:  Her white blood cell count is 4, hemoglobin 9.1,  platelets of 85. ASSESSMENT AND PLAN:  1. Anemia/thrombocytopenia. In terms of her anemia, her iron studies  are pending. I will check for B12 and folate deficiency. I will check  haptoglobin to rule out hemolysis. In light of her thrombocytopenia, I  will also check an X factor to rule out myeloma and a light chain  analysis to rule out myeloma as well.   She is not on any obvious  medications that cause anemia or thrombocytopenia. No signs of  hepatosplenomegaly on examination. She may need a bone biopsy at some  point depending on the labs. 2.  CHF. Per Cardiology. 3.  Left wrist pain. I will consult Orthopedics. Perhaps, she has  septic arthritis. Thank you for the consultation. We will follow closely.         Dona Whitehead MD    D: 09/25/2022 14:17:33       T: 09/25/2022 17:11:14     [email or web address]  Job#: [id number]     Doc#: [id number]    CC:  Dae Paulino NP

## 2022-09-27 PROBLEM — R77.8 ELEVATED TROPONIN: Status: ACTIVE | Noted: 2022-09-27

## 2022-09-27 PROBLEM — I48.20 CHRONIC ATRIAL FIBRILLATION (HCC): Status: ACTIVE | Noted: 2022-09-27

## 2022-09-27 PROBLEM — I50.33 ACUTE ON CHRONIC DIASTOLIC HEART FAILURE (HCC): Status: ACTIVE | Noted: 2022-07-27

## 2022-09-27 PROBLEM — R79.89 ELEVATED TROPONIN: Status: ACTIVE | Noted: 2022-09-27

## 2022-09-27 LAB
ANION GAP SERPL CALCULATED.3IONS-SCNC: 19 MMOL/L (ref 3–16)
ANISOCYTOSIS: ABNORMAL
BASOPHILS ABSOLUTE: 0 K/UL (ref 0–0.2)
BASOPHILS RELATIVE PERCENT: 0 %
BUN BLDV-MCNC: 42 MG/DL (ref 7–20)
CALCIUM SERPL-MCNC: 8.8 MG/DL (ref 8.3–10.6)
CHLORIDE BLD-SCNC: 99 MMOL/L (ref 99–110)
CO2: 24 MMOL/L (ref 21–32)
CREAT SERPL-MCNC: 1.3 MG/DL (ref 0.6–1.2)
EOSINOPHILS ABSOLUTE: 0 K/UL (ref 0–0.6)
EOSINOPHILS RELATIVE PERCENT: 0 %
GFR AFRICAN AMERICAN: 47
GFR NON-AFRICAN AMERICAN: 39
GLUCOSE BLD-MCNC: 107 MG/DL (ref 70–99)
GLUCOSE BLD-MCNC: 114 MG/DL (ref 70–99)
GLUCOSE BLD-MCNC: 143 MG/DL (ref 70–99)
GLUCOSE BLD-MCNC: 183 MG/DL (ref 70–99)
GLUCOSE BLD-MCNC: 235 MG/DL (ref 70–99)
GLUCOSE BLD-MCNC: 304 MG/DL (ref 70–99)
HCT VFR BLD CALC: 25.7 % (ref 36–48)
HEMATOLOGY PATH CONSULT: NO
HEMOGLOBIN: 8.6 G/DL (ref 12–16)
KAPPA, FREE LIGHT CHAINS, SERUM: 53.19 MG/L (ref 3.3–19.4)
KAPPA/LAMBDA RATIO: 1.48 (ref 0.26–1.65)
KAPPA/LAMBDA TEST COMMENT: ABNORMAL
LAMBDA, FREE LIGHT CHAINS, SERUM: 36.01 MG/L (ref 5.71–26.3)
LYMPHOCYTES ABSOLUTE: 2.9 K/UL (ref 1–5.1)
LYMPHOCYTES RELATIVE PERCENT: 53 %
MAGNESIUM: 2.3 MG/DL (ref 1.8–2.4)
MCH RBC QN AUTO: 32.4 PG (ref 26–34)
MCHC RBC AUTO-ENTMCNC: 33.6 G/DL (ref 31–36)
MCV RBC AUTO: 96.5 FL (ref 80–100)
MONOCYTES ABSOLUTE: 0.8 K/UL (ref 0–1.3)
MONOCYTES RELATIVE PERCENT: 14 %
NEUTROPHILS ABSOLUTE: 1.8 K/UL (ref 1.7–7.7)
NEUTROPHILS RELATIVE PERCENT: 33 %
PDW BLD-RTO: 21.7 % (ref 12.4–15.4)
PERFORMED ON: ABNORMAL
PLATELET # BLD: 75 K/UL (ref 135–450)
PMV BLD AUTO: 8.9 FL (ref 5–10.5)
POLYCHROMASIA: ABNORMAL
POTASSIUM REFLEX MAGNESIUM: 4.6 MMOL/L (ref 3.5–5.1)
RBC # BLD: 2.67 M/UL (ref 4–5.2)
SLIDE REVIEW: ABNORMAL
SODIUM BLD-SCNC: 142 MMOL/L (ref 136–145)
TEAR DROP CELLS: ABNORMAL
WBC # BLD: 5.5 K/UL (ref 4–11)

## 2022-09-27 PROCEDURE — 97530 THERAPEUTIC ACTIVITIES: CPT

## 2022-09-27 PROCEDURE — 2580000003 HC RX 258: Performed by: PHYSICIAN ASSISTANT

## 2022-09-27 PROCEDURE — 80048 BASIC METABOLIC PNL TOTAL CA: CPT

## 2022-09-27 PROCEDURE — 97535 SELF CARE MNGMENT TRAINING: CPT

## 2022-09-27 PROCEDURE — 97166 OT EVAL MOD COMPLEX 45 MIN: CPT

## 2022-09-27 PROCEDURE — 83735 ASSAY OF MAGNESIUM: CPT

## 2022-09-27 PROCEDURE — 85025 COMPLETE CBC W/AUTO DIFF WBC: CPT

## 2022-09-27 PROCEDURE — 6370000000 HC RX 637 (ALT 250 FOR IP): Performed by: PHYSICIAN ASSISTANT

## 2022-09-27 PROCEDURE — 6370000000 HC RX 637 (ALT 250 FOR IP): Performed by: FAMILY MEDICINE

## 2022-09-27 PROCEDURE — 97162 PT EVAL MOD COMPLEX 30 MIN: CPT

## 2022-09-27 PROCEDURE — 99233 SBSQ HOSP IP/OBS HIGH 50: CPT | Performed by: CLINICAL NURSE SPECIALIST

## 2022-09-27 PROCEDURE — 1200000000 HC SEMI PRIVATE

## 2022-09-27 PROCEDURE — 99232 SBSQ HOSP IP/OBS MODERATE 35: CPT | Performed by: NURSE PRACTITIONER

## 2022-09-27 PROCEDURE — 36415 COLL VENOUS BLD VENIPUNCTURE: CPT

## 2022-09-27 PROCEDURE — 94760 N-INVAS EAR/PLS OXIMETRY 1: CPT

## 2022-09-27 RX ADMIN — OXYBUTYNIN CHLORIDE 15 MG: 5 TABLET, EXTENDED RELEASE ORAL at 08:37

## 2022-09-27 RX ADMIN — EMPAGLIFLOZIN 10 MG: 10 TABLET, FILM COATED ORAL at 08:37

## 2022-09-27 RX ADMIN — RIVAROXABAN 20 MG: 20 TABLET, FILM COATED ORAL at 17:30

## 2022-09-27 RX ADMIN — GABAPENTIN 600 MG: 300 CAPSULE ORAL at 21:08

## 2022-09-27 RX ADMIN — MICONAZOLE NITRATE: 20 POWDER TOPICAL at 08:30

## 2022-09-27 RX ADMIN — PREDNISONE 10 MG: 10 TABLET ORAL at 08:37

## 2022-09-27 RX ADMIN — Medication 10 ML: at 08:30

## 2022-09-27 RX ADMIN — SENNOSIDES 8.6 MG: 8.6 TABLET, FILM COATED ORAL at 21:08

## 2022-09-27 RX ADMIN — LEVOTHYROXINE SODIUM 150 MCG: 0.15 TABLET ORAL at 05:37

## 2022-09-27 RX ADMIN — METOPROLOL TARTRATE 12.5 MG: 25 TABLET, FILM COATED ORAL at 21:08

## 2022-09-27 RX ADMIN — DICLOFENAC SODIUM 2 G: 10 GEL TOPICAL at 23:51

## 2022-09-27 RX ADMIN — ACETAMINOPHEN 1000 MG: 500 TABLET ORAL at 14:51

## 2022-09-27 RX ADMIN — GABAPENTIN 300 MG: 300 CAPSULE ORAL at 08:37

## 2022-09-27 RX ADMIN — MICONAZOLE NITRATE: 20 POWDER TOPICAL at 23:52

## 2022-09-27 RX ADMIN — ACETAMINOPHEN 1000 MG: 500 TABLET ORAL at 21:08

## 2022-09-27 RX ADMIN — TIZANIDINE 2 MG: 4 TABLET ORAL at 21:08

## 2022-09-27 RX ADMIN — DICLOFENAC SODIUM 2 G: 10 GEL TOPICAL at 14:25

## 2022-09-27 RX ADMIN — INSULIN LISPRO 1 UNITS: 100 INJECTION, SOLUTION INTRAVENOUS; SUBCUTANEOUS at 17:31

## 2022-09-27 RX ADMIN — Medication 10 ML: at 21:09

## 2022-09-27 RX ADMIN — TIZANIDINE 2 MG: 4 TABLET ORAL at 14:51

## 2022-09-27 RX ADMIN — ACETAMINOPHEN 1000 MG: 500 TABLET ORAL at 05:37

## 2022-09-27 RX ADMIN — INSULIN LISPRO 17 UNITS: 100 INJECTION, SOLUTION INTRAVENOUS; SUBCUTANEOUS at 12:21

## 2022-09-27 RX ADMIN — INSULIN LISPRO 7 UNITS: 100 INJECTION, SOLUTION INTRAVENOUS; SUBCUTANEOUS at 17:30

## 2022-09-27 RX ADMIN — INSULIN LISPRO 3 UNITS: 100 INJECTION, SOLUTION INTRAVENOUS; SUBCUTANEOUS at 12:20

## 2022-09-27 RX ADMIN — PANTOPRAZOLE SODIUM 40 MG: 40 TABLET, DELAYED RELEASE ORAL at 05:37

## 2022-09-27 RX ADMIN — TIZANIDINE 2 MG: 4 TABLET ORAL at 08:37

## 2022-09-27 RX ADMIN — CETIRIZINE HYDROCHLORIDE 5 MG: 10 TABLET, FILM COATED ORAL at 08:37

## 2022-09-27 RX ADMIN — ATORVASTATIN CALCIUM 80 MG: 80 TABLET, FILM COATED ORAL at 21:08

## 2022-09-27 RX ADMIN — SENNOSIDES 8.6 MG: 8.6 TABLET, FILM COATED ORAL at 08:37

## 2022-09-27 ASSESSMENT — PAIN SCALES - GENERAL
PAINLEVEL_OUTOF10: 6
PAINLEVEL_OUTOF10: 7
PAINLEVEL_OUTOF10: 0
PAINLEVEL_OUTOF10: 7
PAINLEVEL_OUTOF10: 4

## 2022-09-27 ASSESSMENT — PAIN DESCRIPTION - DESCRIPTORS: DESCRIPTORS: ACHING

## 2022-09-27 ASSESSMENT — PAIN DESCRIPTION - LOCATION: LOCATION: SHOULDER

## 2022-09-27 ASSESSMENT — PAIN DESCRIPTION - ORIENTATION: ORIENTATION: LEFT

## 2022-09-27 NOTE — PROGRESS NOTES
[facility] Orthopedic Surgery  Progress Note      Chief complaint: Left wrist pain    Subjective: The patient is sitting up in the bed with daughters at bedside. Lidocaine patch on left shoulder. Denies paresthesias or new issues. Ambulates with a walker. She saw therapy today. Reports continued improvement of her wrist swelling and pain. Objective:  Vitals:    09/27/22 1259   BP:    Pulse:    Resp:    Temp:    SpO2: 94%      Physical Examination:  GENERAL: No apparent distress, well-nourished, sitting up in hospital bed  SKIN:  Warm and dry  RESPIRATORY: Resp easy and unlabored  NEURO: Awake and alert. No speech defect  PSYCHIATRIC: Appropriate affect; not agitated  MUSCULOSKELETAL:  LUE -mild swelling and erythema of the dorsum of the hand and wrist.  She demonstrates active range of motion in the wrist with mild to moderate pain. She tolerates passive ROM of the left wrist with minimal discomfort. Tender over the dorsal carpus. Sensation intact light touch in the radial, ulnar, median nerve distributions. Palpable radial pulse. She has passive range of motion of the left shoulder with minimal pain. Labs reviewed:  Recent Labs     09/25/22  0502 09/26/22  0434 09/27/22  0558   WBC 4.0 6.3 5.5   HGB 9.1* 9.3* 8.6*   HCT 27.1* 28.2* 25.7*   PLT 85* 91* 75*     Recent Labs     09/25/22  0502 09/26/22  0434 09/27/22  0558    139 142   K 3.4* 4.3 4.6   CL 99 99 99   CO2 27 25 24   BUN 31* 35* 42*   CREATININE 1.0 1.4* 1.3*   GLUCOSE 63* 60* 114*   CALCIUM 8.7 9.3 8.8   MG 1.90 2.40 2.30     Imaging:  XR WRIST LEFT (MIN 3 VIEWS)   Final Result   Mild osteoarthritic changes along the radiocarpal joint and moderate   degenerative arthritic changes along the base of the thumb with no acute bony   abnormality seen. Vascular calcifications along the palmar aspect of the wrist      Mild soft tissue swelling around the wrist and diffuse osteopenia. Probable mild chondrocalcinosis of the TFCC. Patient admitted to [facility]. Placed on appropriate monitors. Dressing checkingAssisted with liquids and nutrition. Call light at bedside. Family at bedside. 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 Can consider rTSA in future, but the family has been advised bu other providers she is not a surgical candidate. Planning on seeing Dr. Juan Jose Cardoso for pain management as an outpatient. 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 Oral steroids started on 9/26.  - ROM exercises.   - Elevation and ice.  - Lidoderm patch  - Dispo: per primary team    MATEUSZ Cantrell - CNP  9/27/2022

## 2022-09-27 NOTE — PROGRESS NOTES
[address]   Daily Progress Note      Admit Date:  9/24/2022    HPI:    Ms. Karina Souza is an 78year old female with history of AF, CAD with CABG, DM, hypertension and diastolic heart failure     She is admitted with shortness of breath and weakness. Her troponin was elevated 0.05-0.05-0.04 and anemic. She also had wrist and shoulder pain. C deferred due to anaphylaxis with contrast usage. Subjective:  Patient is being seen for acute on chronic diastolic heart failure . There were no acute overnight cardiac events. She is sitting up in the chair with her daughter who helps with communication. She is eating better but still having difficulty with swallowing pills. No chest pain or shortness of breath, just left hand discomfort  Creat 1.3 bun 42 probnp 277 BP sometimes on the soft side  Speech therapy following weight stable at 248     Objective:   /62   Pulse 70   Temp 98 °F (36.7 °C) (Oral)   Resp 18   Ht 5' (1.524 m)   Wt 248 lb 12.8 oz (112.9 kg)   LMP  (LMP Unknown)   SpO2 94%   BMI 48.59 kg/m²     Intake/Output Summary (Last 24 hours) at 9/27/2022 1226  Last data filed at 9/27/2022 1153  Gross per 24 hour   Intake 480 ml   Output 1725 ml   Net -1245 ml          Physical Exam:  General:  Awake, alert, oriented in NAD  Skin:  Warm and dry. No unusual bruising or rash  Neck:  Supple. No JVD or carotid bruit appreciated  Chest:  Normal effort.   Clear to auscultation, no wheezes/rhonchi/rales  Cardiovascular:  RRR, S1/S2, no murmur/gallop/rub  Abdomen:  Soft, nontender, +bowel sounds, obese  Extremities:  No edema  Neurological: No focal deficits  Psychological: Normal mood and affect      Medications:    insulin glargine  42 Units SubCUTAneous Daily    predniSONE  10 mg Oral Daily    diclofenac sodium  2 g Topical BID    insulin lispro  17 Units SubCUTAneous QAM AC    insulin lispro  17 Units SubCUTAneous Daily before lunch    insulin lispro  7 Units SubCUTAneous Daily before dinner    atorvastatin  80 mg Oral Nightly    isosorbide mononitrate  30 mg Oral Daily    levothyroxine  150 mcg Oral Daily    metoprolol tartrate  12.5 mg Oral BID    miconazole   Topical BID    oxybutynin  15 mg Oral Daily    empagliflozin  10 mg Oral Daily    sodium chloride flush  10 mL IntraVENous 2 times per day    senna  1 tablet Oral BID    insulin lispro  0-4 Units SubCUTAneous TID WC    insulin lispro  0-4 Units SubCUTAneous Nightly    pantoprazole  40 mg Oral QAM AC    lidocaine  1 patch TransDERmal Daily    cetirizine  5 mg Oral Daily    gabapentin  300 mg Oral Daily    And    gabapentin  600 mg Oral Nightly    rivaroxaban  20 mg Oral Daily    acetaminophen  1,000 mg Oral 3 times per day    tiZANidine  2 mg Oral TID      sodium chloride 10 mL/hr (09/25/22 2117)    dextrose         Lab Data:  CBC:   Recent Labs     09/25/22  0502 09/26/22  0434 09/27/22  0558   WBC 4.0 6.3 5.5   HGB 9.1* 9.3* 8.6*   PLT 85* 91* 75*     BMP:    Recent Labs     09/25/22  0502 09/26/22  0434 09/27/22  0558    139 142   K 3.4* 4.3 4.6   CO2 27 25 24   BUN 31* 35* 42*   CREATININE 1.0 1.4* 1.3*     INR:  No results for input(s): INR in the last 72 hours. BNP:    Recent Labs     09/24/22  1905   PROBNP 277         Diagnostics:  Echo 6/17/2022:  Summary   -Technically limited portable study secondary to patient's immobility (upright semi supine position). -Normal left ventricle size,mild to moderate concentric wall thickness and normal systolic function with an estimated ejection fraction of 55%. -Abnormal mid to distal septal and inferoseptal motion is present.   -Indeterminate diastolic function. due to mitral annular calcification. E/e\"=20.3.   -Mitral annular calcification is present. -Mild to moderate posteriorly directed mitral regurgitation.   -Aortic valve appears sclerotic but opens adequately.    -Mild tricuspid regurgitation.   -Estimated pulmonary artery systolic pressure is borderline elevated at 33 mmHg assuming a right atrial pressure of 8 mmHg. -The left atrium is at the upper limits of normal in size to mildly dilated. -The right ventricle is not well visualized. -Right ventricular systolic function appears mildly reduced . TAPSE 1.22 cm.   -RV S velocity 8.6 cm/s. -Hypokinetic free wall of the right ventricle. MPI in 8/2020 no ischemia    Assessment:    1. Shortness of breath  2. Acute on chronic diastolic heart failure, compensated  3. Elevated troponin  4. Chronic atrial fib, on xarelto  5. Anemia per oncology      Plan:    Continue jardiance 10 mg once a day  Continue lopressor 12.5 mg twice a day  Continue imdur 30 mg po daily  Hold on resuming entresto due to increased creat and soft BP  Continue off diuretics for now   Speech therapy following and recommend GI consult    No LHC due to anaphylaxis with contrast usage. Per consult note    Discussed with patient who is agreeable with plan of care. Thank you for allowing me to participate in the care of your patient.     MATEUSZ Davis - CNS, CNS

## 2022-09-27 NOTE — PROGRESS NOTES
Hospitalist Progress Note      PCP: MATEUSZ Ruano - CNP    Date of Admission: 9/24/2022    Chief Complaint: Wrist pain      Subjective:   Wrist pain improving. Still with mild erythema. Continues to have bilat shoulder pain; worse on left. Chronic back pain without significant change  Dyspnea and LE edema improving. Discussed with family at bedside.     Medications:  Reviewed    Infusion Medications    sodium chloride 10 mL/hr (09/25/22 2117)    dextrose       Scheduled Medications    insulin glargine  42 Units SubCUTAneous Daily    predniSONE  10 mg Oral Daily    diclofenac sodium  2 g Topical BID    insulin lispro  17 Units SubCUTAneous QAM AC    insulin lispro  17 Units SubCUTAneous Daily before lunch    insulin lispro  7 Units SubCUTAneous Daily before dinner    atorvastatin  80 mg Oral Nightly    isosorbide mononitrate  30 mg Oral Daily    levothyroxine  150 mcg Oral Daily    metoprolol tartrate  12.5 mg Oral BID    miconazole   Topical BID    oxybutynin  15 mg Oral Daily    empagliflozin  10 mg Oral Daily    sodium chloride flush  10 mL IntraVENous 2 times per day    senna  1 tablet Oral BID    insulin lispro  0-4 Units SubCUTAneous TID WC    insulin lispro  0-4 Units SubCUTAneous Nightly    pantoprazole  40 mg Oral QAM AC    lidocaine  1 patch TransDERmal Daily    cetirizine  5 mg Oral Daily    gabapentin  300 mg Oral Daily    And    gabapentin  600 mg Oral Nightly    rivaroxaban  20 mg Oral Daily    acetaminophen  1,000 mg Oral 3 times per day    tiZANidine  2 mg Oral TID     PRN Meds: sodium chloride, Polyvinyl Alcohol-Povidone PF, sodium chloride flush, sodium chloride, ondansetron, dextrose bolus **OR** dextrose bolus, glucagon (rDNA), dextrose, polyethylene glycol, oxyCODONE **OR** oxyCODONE      Intake/Output Summary (Last 24 hours) at 9/27/2022 1324  Last data filed at 9/27/2022 1153  Gross per 24 hour   Intake 240 ml   Output 1725 ml   Net -1485 ml       Exam:    /62 Pulse 70   Temp 98 °F (36.7 °C) (Oral)   Resp 18   Ht 5' (1.524 m)   Wt 248 lb 12.8 oz (112.9 kg)   LMP  (LMP Unknown)   SpO2 94%   BMI 48.59 kg/m²     Gen/overall appearance: Not in acute distress. Alert. Head: Normocephalic, atraumatic  Eyes: EOMI, no scleral icterus  CVS: regular rate and rhythm, Normal S1S2  Pulm: Diminished, Clear to auscultation bilaterally. No crackles/wheezes  Extremities: LUE decreased ROM, mild erythema L wrist  Neuro: No gross focal deficits noted  Skin: Warm, dry    Labs:   Recent Labs     09/25/22  0502 09/26/22  0434 09/27/22  0558   WBC 4.0 6.3 5.5   HGB 9.1* 9.3* 8.6*   HCT 27.1* 28.2* 25.7*   PLT 85* 91* 75*     Recent Labs     09/25/22  0502 09/26/22  0434 09/27/22  0558    139 142   K 3.4* 4.3 4.6   CL 99 99 99   CO2 27 25 24   BUN 31* 35* 42*   CREATININE 1.0 1.4* 1.3*   CALCIUM 8.7 9.3 8.8     Recent Labs     09/24/22  1905   AST 15   ALT 11   BILITOT 0.4   ALKPHOS 88     No results for input(s): INR in the last 72 hours. Recent Labs     09/24/22  1905 09/25/22  0233 09/25/22  0502   CKTOTAL 254*  --   --    TROPONINI 0.05* 0.05* 0.04*       Assessment/Plan:    Active Hospital Problems    Diagnosis Date Noted    Thrombocytopenia ([facility] [D69.6] 09/25/2022     Priority: Medium    Anemia [D64.9] 09/25/2022     Priority: Medium    Wrist arthritis [M19.039] 09/25/2022     Priority: Medium    Dyspnea on exertion [R06.00] 09/24/2022     Priority: Medium    Mixed hyperlipidemia [E78.2] 05/02/2014    PAF (paroxysmal atrial fibrillation) ([facility] [I48.0] 10/05/2011    Coronary artery disease involving native coronary artery of native heart without angina pectoris [I25.10] 10/05/2011    DM (diabetes mellitus) ([facility] [E11.9] 10/05/2011    Peripheral vascular disease ([facility] [I73.9] 10/05/2011       Acute on Chronic Diastolic Congestive Heart Failure. Clinically improved  ARUN.   Suspect lasix induced  - s/p IV diuresis; now held  - ins/outs  - trend Cr  - monitor and replace lytes  - avoid nephrotoxins  - respiratory care  - cardio following     Anemia and thrombocytopenia  -Iron studies, B12, folate ok  -Blood occult stool negative  -Hematology following     Questionable Left wrist cellulitis  - was suspected on admission, but likely gout flare  - abx d/daja  - on prednisone course  - ortho following     Severe chronic bilateral low back muscle spasm  - Cont scheduled tizanidine and tylenol, prn oxycodone, PT OT     Dysuria  - improved  - s/p diflucan 150mg x1  - UCx neg  - s/p phenazopyridine 100mg TID for 3 doses     CAD status post CABG  - cont statin, BB, not on asp as already on xarelto     Paroxysmal atrial fibrillation  - cont BB, xarelto for [facility]    PMH of IDDM, htn, hld      DVT Prophylaxis: xarelto  Diet: ADULT DIET; Easy to Chew; 4 carb choices (60 gm/meal)  Code Status: Full Code    Dispo:  SNF?     Alea Johnson MD

## 2022-09-27 NOTE — PROGRESS NOTES
[facility] [facility]   Phone: [phone]    Occupational Therapy    [x] Initial Evaluation            [] Daily Treatment Note         [] Discharge Summary      Patient: Zain Adames   : 1940   MRN: [id number]   Date of Service:  2022    Admitting Diagnosis:  Dyspnea on exertion  Current Admission Summary: Zain Adames is a 78 y.o. female who presents to the emergency department today for evaluation for wrist pain, and right rib pain. The patient states that she started with pain to her right ribs, both of her shoulders, and her left wrist.  Patient states that her pain started last night. Patient denies falling or injuring herself in any way. The patient states that she does have a history of arthritis and has had this pain in the past.  Patient denies any numbness, tingling or weakness. The patient history of atrial fibrillation, is anticoagulated on Xarelto, history of CHF, hypertension, and hyperlipidemia. The patient states that over the past couple of days she has noticed some shortness of breath but only reports that if she walks too much. Patient does not believe that she has fluid on her lungs. She denies any weight gain she has no chest pain or chest tightness. She denies any fever or chills. No cough or congestion. She has no abdominal pain, nausea, vomiting or diarrhea. She denies any urinary symptoms, patient otherwise has no other complaint     Past Medical History:  has a past medical history of 15 Left knee repeat repair of median parapatellar arthrotomy with augmentation, Arthritis, Atrial fibrillation ([address] CAD (coronary artery disease), Cataract, Chronic diastolic congestive heart failure ([address] Diabetes mellitus ([address] GERD (gastroesophageal reflux disease), Hyperlipidemia, Hypertension, HYPOTHRYROIDISM, Non-English speaking patient, Sleep apnea, Thyroid disease, and UTI.   Past Surgical History:  has a past surgical history that includes joint replacement (Bilateral, [address]); Cardiac surgery (2004); Cholecystectomy, laparoscopic (5/15/14); Revision total knee arthroplasty (Right, 11/22/2016); Total knee arthroplasty; and knee surgery (Left, 02/28/2017). Discharge Recommendations: Hayder Liu scored a 12/24 on the AM-PAC ADL Inpatient form. Current research shows that an AM-PAC score of 17 or less is typically not associated with a discharge to the patient's home setting. Based on the patient's AM-PAC score and their current ADL deficits, it is recommended that the patient have 3-5 sessions per week of Occupational Therapy at d/c to increase the patient's independence. Please see assessment section for further patient specific details. If patient discharges prior to next session this note will serve as a discharge summary. Please see below for the latest assessment towards goals. DME Required For Discharge: DME to be determined at next level of care    Precautions/Restrictions: high fall risk  Weight Bearing Restrictions: no restrictions  [] Right Upper Extremity  [] Left Upper Extremity [] Right Lower Extremity  [] Left Lower Extremity     Required Braces/Orthotics: no braces required   [] Right  [] Left  Positional Restrictions:no positional restrictions    Pre-Admission Information   Lives With: alone                     Type of Home: house  Home Layout: one level, able to live on main level  Home Access: level entry  Bathroom Layout: tub/shower unit  Bathroom Equipment: grab bars in shower, shower chair, hand held shower head  Toilet Height: elevated height (one just elevated, and one with safety rails)  Home Equipment: rolling walker, manual wheelchair, alert button, .   Comment: has an electric scooter that is too tall for her that she wants exchanged  Transfer Assistance: Independent without use of device, but does get assistance getting in & out of shower  Ambulation Assistance:modified independent with use of walker                                      W/C for community distances and she frequently sits in her w/c or wheels that down the hallway instead  ADL Assistance: requires assistance with bathing, requires assistance with dressing  IADL Assistance: requires assistance with meal prep, requires assistance with laundry, requires assistance with cleaning, requires assistance with shopping, requires assistance for medication management              Aide comes 7 days/week, 3-4 hours. Family has cameras around the house in order to observe pt. Active :        [x] Yes                 [] No  Hand Dominance: [] Left                 [x] Right  Current Employment: unemployed  Hobbies: Laurie Sweeney is very important to the pt. Recent Falls: 1 fall in last 6 months, slid out of her w/c  Examination   Vision:   Vision Gross Assessment: Impaired and Vision Corrective Device: wears glasses for reading  Hearing:   hard of hearing, right hearing aid  Perception:   WFL  Sensation:   reports numbness and tingling in (R) UE (fingertips)  Proprioception:    WFL  Tone:   Normotonic  Coordination Testing:   Coordination and Movement Description: (R) UE, (L) UE, fine motor impairments  (R decreased FM coordination d/t pain, L d/t recent tingling in fingertips. Pt reports that she drops things. ROM:   (L) Shoulder: ~90 degrees AROM with pain     (R) Shoulder: WFL  (B) Elbow AROM WFL  (B) Wrist AROM WFL  (L) Hand: WFL     (R) Hand: WFL  Strength:   R elbow grossly 5/5, R   grossly 4/5; NT shoulder; NT L UE d/t painful wrist and shoulder    Decision Making: medium complexity  Clinical Presentation: evolving      Subjective  General: Pt supine in bed, agreeable to OT eval. Pt with many c/o pain and concerns regarding being able to move with sore shoulder, fears about high bed, etc.. Pt needed max encouragement throughout session as she was self-limiting d/t anxiety.  3 Spanish interpreters used: first had an emergency; 2nd got disconnect, and third, Renae [id number]. Pt often responded to some questions in English, but unclear accuracy of her understanding. Extra time for interpretation. Pain: 8/10. Location: L shoulder; also 5/10 in L wrist, both worse when touching, moving  Pain Interventions: RN notified        Activities of Daily Living  Basic Activities of Daily Living  Lower Extremity Bathing: dependent Comment: pancho hygiene, ant & post in stance   Lower Extremity Dressing: dependent Comment: Pt only able to don pullup from knees to mid-thighs; D don socks  Toileting: dependent Comment: in stance at [facility]. Instrumental Activities of Daily Living  No IADL completed on this date. Functional Mobility  Bed Mobility  Supine to Sit: minimal assistance, Comment HOB elevated, max verbal cues, extra time  Scooting: moderate assistance  Comments: extra time  Transfers  Sit to stand transfer:contact guard assistance, minimal assistance, Comment  Min A X1 trial from BSC, CGA X 1 trial from EOB, X 1 trial from high-back chair, X 1 trial from [facility], X 2 trials from recliner,   Stand to sit transfer: contact guard assistance, Comment to high-back chair, to BSC X 2 trials, to recliner X 2 trials  Stand pivot transfer: contact guard assistance, Comment w/RW  Stand step transfer: contact guard assistance, Comment w/RW  Toilet transfer: contact guard assistance, Comment w/RW  Toilet transfer equipment: standard bedside commode, walker  Comments:  Functional Mobility:  Sitting Balance: stand by assistance, Comment on EOB. Standing Balance: contact guard assistance, Comment ~30 sec in stance for hygiene, ~20 sec for clothing mgt, ~20-30 sec for transfers EOB to high-back chair, high back chair to [employer], BSC to recliner. Comment: Standing time limited by anxiety.   Other Therapeutic Interventions:   Educated pt re: simple AROM exercises for L UE and pt demonstrated understanding and performed 5-10 reps each: finger flex/ext, wrist flex/ext, pron/sup. Functional Outcomes  AM-PAC Inpatient Daily Activity Raw Score: 12    Cognition  WFL  Orientation:    alert and oriented x 4  Command Following:   [facility]  Barriers To Learning: language and emotional  Patient Education: patient educated on OT role and benefits, plan of care, HEP, transfer training, discharge recommendations  Learning Assessment:  patient will require reinforcement due to anxiety    Assessment  Activity Tolerance: Pt limited by pain and anxiety. Impairments Requiring Therapeutic Intervention: decreased functional mobility, decreased ADL status, decreased ROM, decreased strength, decreased endurance, decreased fine motor control, increased pain  Prognosis: good  Clinical Assessment: Pt is below her baseline level of occupational function, based on the above deficits associated with THOMASON and pain. Pt needs assist of 1 for functional transfers and is unable to ambulate further w/RW d/t painful R UE. Pt typically gets significant assistance for ADLs at baseline from a [employer], but her L UE pain limits her activity tolerance with these activities. Pt would benefit from continued skilled acute OT services to address these deficits and maximize her safety and participation in ADLs and functional mobility. Safety Interventions: patient left in chair, chair alarm in place, call light within reach, gait belt, patient at risk for falls, and nurse notified    Plan  Frequency: 3-5 x/per week  Current Treatment Recommendations: functional mobility training, transfer training, endurance training, patient/caregiver education, ADL/self-care training, home exercise program, safety education, and positioning    Goals  Patient Goals:  To return home   Short Term Goals:  Time Frame: Discharge  Patient will complete upper body ADL at moderate assistance   Patient will complete lower body ADL at maximum assistance   Patient will complete toileting at maximum assistance   Patient will complete

## 2022-09-27 NOTE — PROGRESS NOTES
associated with a discharge to the patient's home setting. Based on the patient's AM-PAC score and their current functional mobility deficits, it is recommended that the patient have 3-5 sessions per week of Physical Therapy at d/c to increase the patient's independence. Please see assessment section for further patient specific details. If pt discharges home she will need 24/7 assist, HHPT/OT (S3), and a BSC for safety as the patient is unable to ambulate >3 ft, and must walk further than that to get to her bathroom. If patient discharges prior to next session this note will serve as a discharge summary. Please see below for the latest assessment towards goals. DME Required For Discharge: DME to be determined at next level of care    Precautions/Restrictions: high fall risk, modified diet, Comment: strict I&Os, easy to chew, thin liquids  Weight Bearing Restrictions: no restrictions  [] Right Upper Extremity  [] Left Upper Extremity [] Right Lower Extremity  [] Left Lower Extremity     Required Braces/Orthotics: no braces required   [] Right  [] Left  Positional Restrictions:no positional restrictions    Pre-Admission Information   Lives With: alone    Type of Home: house  Home Layout: one level, able to live on main level  Home Access: level entry  Bathroom Layout: tub/shower unit  Bathroom Equipment: grab bars in shower, shower chair, hand held shower head  Toilet Height: elevated height (one just elevated, and one with safety rails)  Home Equipment: rolling walker, manual wheelchair, alert button, .   Comment: has an electric scooter that is too tall for her that she wants exchanged  Transfer Assistance: Independent without use of device  Ambulation Assistance:modified independent with use of walker     W/C for community distances and she frequently sits in her w/c or wheels that down the hallway instead  ADL Assistance: requires assistance with bathing, requires assistance with dressing  IADL Assistance: requires assistance with meal prep, requires assistance with laundry, requires assistance with cleaning, requires assistance with shopping, requires assistance for medication management  Aid comes 7 days/week, 3-4 hours - states they are there for some in the morning and come back again in the evening. Pt reports family has cameras around the house so her children can keep and eye on her. Active :        [x] Yes  [] No  Hand Dominance: [] Left  [x] Right  Recent Falls: 1 fall in last 6 months, slid out of her w/c    Examination   Vision:   Vision Gross Assessment: Impaired and Vision Corrective Device: wears glasses for reading  Hearing:   hard of hearing, left hearing aid, right hearing aid  Observation:   General Observation:  Redness to dorsum of (L) hand, but no swelling noted  Purewick in place - pt urinating frequently during subjective  Posture:   Fair - forward head when sitting, forward flexed posture in standing  Sensation:   reports numbness and tingling in (B) UE intermittently - pt asked therapist to pass her questions about the intermittent numbness/tingling on to MD, RN updated. ROM:   (B) LE AROM WFL  Strength:   (L) Knee flex/ext: -3     (R) Knee flex/ext: 4  Decision Making: medium complexity  Clinical Presentation: evolving      Subjective  General: Pt semi-reclined in bed upon therapist arrival. Pt agreeable to PT/OT eval. Pt speaks fair English but has difficulty understanding at times, thus  used. Pt continues to switch between [place] and English, and frequently starts talking again while the  is still talking. Requires redirection to listen fully. Pain: 5/10. Location: (L) wrist and 8/10.   Location: (L) shoulder  Pain Interventions: RN notified, ice applied, and repositioned        Functional Mobility  Bed Mobility  Supine to Sit: minimal assistance  Scooting: moderate assistance  Comments: HOB elevated, increased time, use of rail, some self-limiting due to anxiety about the bed being too tall for her to touch the floor. Transfers  Sit to stand transfer: contact guard assistance, minimal assistance  Stand to sit transfer: contact guard assistance  Stand step transfer: contact guard assistance  Comments: Sit>stand from EOB, chair with armrests, recliner x2, and Min A BSC x2 - requires increased time and increased forward lean for all trials. Stand>sit to chair with armrests, BSC x2 trials, recliner x3 trials - decreased eccentric control as the patient fatigued. Stand step transfer from [facility] with armrests>recliner>BSC>recliner. Ambulation  Surface:level surface  Assistive Device: rolling walker  Assistance: contact guard assistance  Distance: 3 ft (bed>chair)  Gait Mechanics: Very forward flexed posture, decreased step length (B), (L) knee hyperextension, no (L) knee flexion, decreased (L) stance time  Comments:  Pt with increased (L) shoulder pain with ambulation. Pt leans heavily on RW with all standing activities due to hx of multiple knee surgeries (B) and LE pain. Pt was steady during the transfer despite this hx. Stair Mobility  Stair mobility not completed on this date. Comments: Pt does not have stairs at home. Wheelchair Mobility:  No w/c mobility completed on this date. Comments:  Balance  Static Sitting Balance: good: independent with functional balance in unsupported position  Dynamic Sitting Balance: fair (+): maintains balance at SBA/supervision without use of UE support  Static Standing Balance: fair (-): maintains balance at CGA with use of UE support  Dynamic Standing Balance: fair (-): maintains balance at CGA with use of UE support  Comments: Pt sat EOB x10 minutes for ROM/MMT assessment without UE support, feet unable to touch the floor due to her short stature. Pt stood 3 x 30 sec for pericare and lower body dressing with heavy use of (B) UE support on RW throughout.  Pt unable to attempt only unilateral UE support on RW to assist with ADLs while standing. Forward flexed posture at hips. Other Therapeutic Interventions  Pt assisted up to [facility], voided BM. RN updated. See OT note for assist with toilet, and additional ADLs. Functional Outcomes  AM-PAC Inpatient Mobility Raw Score : 15              Cognition  WFL  Comments:  Pt is a bit anxious due to her pain at times, but improves throughout session. Orientation:    alert and oriented x 4  Command Following:   [facility]    Education  Barriers To Learning: language - speaks fair English but benefits from having a  present  Patient Education: patient educated on goals, PT role and benefits, plan of care, weight-bearing education, HEP, general safety, functional mobility training, disease specific education, transfer training, discharge recommendations   Learning Assessment:  patient verbalizes understanding, would benefit from continued reinforcement    Assessment  Activity Tolerance: Pt with increased (L) UE pain with (L) UE ROM and weight bearing. Pt able to tolerate for functional mobility with encouragement. Impairments Requiring Therapeutic Intervention: decreased functional mobility, decreased strength, decreased endurance, decreased balance, increased pain, decreased posture  Prognosis: fair  Clinical Assessment: The patient is an 77 yo female admitted to [address] [place] [address] for (L) UE pain and CHF. At this time the patient's increased (L) LE pain with ROM and weight bearing limit her independence with functional mobility and tolerance to standing activities. She depends heavily on her RW normally for support and can only tolerate ~30 sec of standing with use of RW at this time. The patient is from home alone and only has and aid for a few hours in the morning and in the evening. She reports family checks on her but is unable to provide 24/7 assistance.  At this time the patient is unable to ambulate household distances needed for everyday living and is at a high risk of falling with transfers and ADLs. Recommending continued skilled PT to safely progress tolerance to activity and independence with functional mobility back to baseline.    Safety Interventions: patient left in chair, chair alarm in place, call light within reach, gait belt, patient at risk for falls, and nurse notified    Plan  Frequency: 3-5 x/per week  Current Treatment Recommendations: strengthening, balance training, functional mobility training, transfer training, gait training, endurance training, manual therapy - soft tissue massage, manual therapy - joint manipulation, modalities, patient/caregiver education, pain management, home exercise program, safety education, equipment evaluation/education, and positioning    Goals  Patient Goals: Go home  Short Term Goals:  Time Frame: Within 6 visits  Patient will complete bed mobility at supervision from flat bed  Patient will complete sit<>stand transfers at supervision   Patient will complete bed <>chair transfers at supervision with use of rolling walker  Patient will ambulate 10 ft with use of rolling walker at supervision  Patient to maintain standing at supervision for 5 minutes with (B) UE support on RW    Therapy Session Time      Individual Group Co-treatment   Time In     0838   Time Out     1036   Minutes     118     Timed Code Treatment Minutes:  103 Minutes  Total Treatment Minutes:  118 minutes       Electronically Signed By: Rodríguez Harry, PT      Gerri Tam, PT, DPT #[id number]

## 2022-09-28 ENCOUNTER — APPOINTMENT (OUTPATIENT)
Dept: CT IMAGING | Age: 82
DRG: 811 | End: 2022-09-28
Payer: MEDICARE

## 2022-09-28 LAB
ALBUMIN SERPL-MCNC: 3.1 G/DL (ref 3.1–4.9)
ALPHA-1-GLOBULIN: 0.7 G/DL (ref 0.2–0.4)
ALPHA-2-GLOBULIN: 1.2 G/DL (ref 0.4–1.1)
ANION GAP SERPL CALCULATED.3IONS-SCNC: 13 MMOL/L (ref 3–16)
ANISOCYTOSIS: ABNORMAL
ANISOCYTOSIS: ABNORMAL
BASOPHILS ABSOLUTE: 0 K/UL (ref 0–0.2)
BASOPHILS ABSOLUTE: 0 K/UL (ref 0–0.2)
BASOPHILS RELATIVE PERCENT: 0 %
BASOPHILS RELATIVE PERCENT: 0 %
BETA GLOBULIN: 1.5 G/DL (ref 0.9–1.6)
BUN BLDV-MCNC: 48 MG/DL (ref 7–20)
CALCIUM SERPL-MCNC: 9.5 MG/DL (ref 8.3–10.6)
CHLORIDE BLD-SCNC: 100 MMOL/L (ref 99–110)
CO2: 25 MMOL/L (ref 21–32)
CREAT SERPL-MCNC: 1.2 MG/DL (ref 0.6–1.2)
EOSINOPHILS ABSOLUTE: 0 K/UL (ref 0–0.6)
EOSINOPHILS ABSOLUTE: 0.1 K/UL (ref 0–0.6)
EOSINOPHILS RELATIVE PERCENT: 0 %
EOSINOPHILS RELATIVE PERCENT: 1 %
GAMMA GLOBULIN: 1 G/DL (ref 0.6–1.8)
GFR AFRICAN AMERICAN: 52
GFR NON-AFRICAN AMERICAN: 43
GLUCOSE BLD-MCNC: 201 MG/DL (ref 70–99)
GLUCOSE BLD-MCNC: 205 MG/DL (ref 70–99)
GLUCOSE BLD-MCNC: 206 MG/DL (ref 70–99)
GLUCOSE BLD-MCNC: 221 MG/DL (ref 70–99)
GLUCOSE BLD-MCNC: 313 MG/DL (ref 70–99)
HCT VFR BLD CALC: 27 % (ref 36–48)
HCT VFR BLD CALC: 27.8 % (ref 36–48)
HEMATOLOGY PATH CONSULT: NO
HEMATOLOGY PATH CONSULT: NO
HEMOGLOBIN: 8.9 G/DL (ref 12–16)
HEMOGLOBIN: 9.2 G/DL (ref 12–16)
LYMPHOCYTES ABSOLUTE: 3.4 K/UL (ref 1–5.1)
LYMPHOCYTES ABSOLUTE: 3.7 K/UL (ref 1–5.1)
LYMPHOCYTES RELATIVE PERCENT: 57 %
LYMPHOCYTES RELATIVE PERCENT: 57 %
MAGNESIUM: 2.4 MG/DL (ref 1.8–2.4)
MCH RBC QN AUTO: 31.8 PG (ref 26–34)
MCH RBC QN AUTO: 32.1 PG (ref 26–34)
MCHC RBC AUTO-ENTMCNC: 32.8 G/DL (ref 31–36)
MCHC RBC AUTO-ENTMCNC: 33.2 G/DL (ref 31–36)
MCV RBC AUTO: 96.7 FL (ref 80–100)
MCV RBC AUTO: 97.2 FL (ref 80–100)
MONOCYTES ABSOLUTE: 0.4 K/UL (ref 0–1.3)
MONOCYTES ABSOLUTE: 0.8 K/UL (ref 0–1.3)
MONOCYTES RELATIVE PERCENT: 12 %
MONOCYTES RELATIVE PERCENT: 6 %
NEUTROPHILS ABSOLUTE: 2 K/UL (ref 1.7–7.7)
NEUTROPHILS ABSOLUTE: 2.1 K/UL (ref 1.7–7.7)
NEUTROPHILS RELATIVE PERCENT: 31 %
NEUTROPHILS RELATIVE PERCENT: 36 %
PDW BLD-RTO: 21.5 % (ref 12.4–15.4)
PDW BLD-RTO: 21.7 % (ref 12.4–15.4)
PERFORMED ON: ABNORMAL
PLATELET # BLD: 93 K/UL (ref 135–450)
PLATELET # BLD: 95 K/UL (ref 135–450)
PLATELET SLIDE REVIEW: ABNORMAL
PLATELET SLIDE REVIEW: ABNORMAL
PMV BLD AUTO: 9.1 FL (ref 5–10.5)
PMV BLD AUTO: 9.2 FL (ref 5–10.5)
POLYCHROMASIA: ABNORMAL
POLYCHROMASIA: ABNORMAL
POTASSIUM SERPL-SCNC: 3.6 MMOL/L (ref 3.5–5.1)
PRO-BNP: 538 PG/ML (ref 0–449)
RBC # BLD: 2.78 M/UL (ref 4–5.2)
RBC # BLD: 2.87 M/UL (ref 4–5.2)
SLIDE REVIEW: ABNORMAL
SLIDE REVIEW: ABNORMAL
SODIUM BLD-SCNC: 138 MMOL/L (ref 136–145)
SPE/IFE INTERPRETATION: NORMAL
TEAR DROP CELLS: ABNORMAL
TOTAL PROTEIN: 7.4 G/DL (ref 6.4–8.2)
WBC # BLD: 5.9 K/UL (ref 4–11)
WBC # BLD: 6.5 K/UL (ref 4–11)

## 2022-09-28 PROCEDURE — 2580000003 HC RX 258: Performed by: PHYSICIAN ASSISTANT

## 2022-09-28 PROCEDURE — 6370000000 HC RX 637 (ALT 250 FOR IP): Performed by: NURSE PRACTITIONER

## 2022-09-28 PROCEDURE — 99232 SBSQ HOSP IP/OBS MODERATE 35: CPT | Performed by: NURSE PRACTITIONER

## 2022-09-28 PROCEDURE — 36415 COLL VENOUS BLD VENIPUNCTURE: CPT

## 2022-09-28 PROCEDURE — 83735 ASSAY OF MAGNESIUM: CPT

## 2022-09-28 PROCEDURE — 85025 COMPLETE CBC W/AUTO DIFF WBC: CPT

## 2022-09-28 PROCEDURE — 6370000000 HC RX 637 (ALT 250 FOR IP): Performed by: PHYSICIAN ASSISTANT

## 2022-09-28 PROCEDURE — 1200000000 HC SEMI PRIVATE

## 2022-09-28 PROCEDURE — 92526 ORAL FUNCTION THERAPY: CPT

## 2022-09-28 PROCEDURE — 83880 ASSAY OF NATRIURETIC PEPTIDE: CPT

## 2022-09-28 PROCEDURE — 6370000000 HC RX 637 (ALT 250 FOR IP): Performed by: FAMILY MEDICINE

## 2022-09-28 PROCEDURE — 6360000004 HC RX CONTRAST MEDICATION: Performed by: INTERNAL MEDICINE

## 2022-09-28 PROCEDURE — 80048 BASIC METABOLIC PNL TOTAL CA: CPT

## 2022-09-28 PROCEDURE — 71260 CT THORAX DX C+: CPT

## 2022-09-28 PROCEDURE — 6370000000 HC RX 637 (ALT 250 FOR IP): Performed by: INTERNAL MEDICINE

## 2022-09-28 RX ORDER — INSULIN LISPRO 100 [IU]/ML
0-16 INJECTION, SOLUTION INTRAVENOUS; SUBCUTANEOUS
Status: DISCONTINUED | OUTPATIENT
Start: 2022-09-28 | End: 2022-10-07 | Stop reason: HOSPADM

## 2022-09-28 RX ORDER — SIMETHICONE 80 MG
80 TABLET,CHEWABLE ORAL EVERY 6 HOURS PRN
Status: DISCONTINUED | OUTPATIENT
Start: 2022-09-28 | End: 2022-10-07 | Stop reason: HOSPADM

## 2022-09-28 RX ORDER — INSULIN LISPRO 100 [IU]/ML
0-4 INJECTION, SOLUTION INTRAVENOUS; SUBCUTANEOUS NIGHTLY
Status: DISCONTINUED | OUTPATIENT
Start: 2022-09-28 | End: 2022-10-07 | Stop reason: HOSPADM

## 2022-09-28 RX ORDER — FUROSEMIDE 20 MG/1
20 TABLET ORAL DAILY
Status: DISCONTINUED | OUTPATIENT
Start: 2022-09-28 | End: 2022-10-07 | Stop reason: HOSPADM

## 2022-09-28 RX ADMIN — INSULIN LISPRO 17 UNITS: 100 INJECTION, SOLUTION INTRAVENOUS; SUBCUTANEOUS at 12:38

## 2022-09-28 RX ADMIN — ACETAMINOPHEN 1000 MG: 500 TABLET ORAL at 14:21

## 2022-09-28 RX ADMIN — MICONAZOLE NITRATE: 20 POWDER TOPICAL at 09:18

## 2022-09-28 RX ADMIN — TIZANIDINE 2 MG: 4 TABLET ORAL at 09:11

## 2022-09-28 RX ADMIN — ATORVASTATIN CALCIUM 80 MG: 80 TABLET, FILM COATED ORAL at 20:58

## 2022-09-28 RX ADMIN — IOPAMIDOL 75 ML: 755 INJECTION, SOLUTION INTRAVENOUS at 13:04

## 2022-09-28 RX ADMIN — TIZANIDINE 2 MG: 4 TABLET ORAL at 20:59

## 2022-09-28 RX ADMIN — FUROSEMIDE 20 MG: 20 TABLET ORAL at 14:21

## 2022-09-28 RX ADMIN — METOPROLOL TARTRATE 12.5 MG: 25 TABLET, FILM COATED ORAL at 09:13

## 2022-09-28 RX ADMIN — METOPROLOL TARTRATE 12.5 MG: 25 TABLET, FILM COATED ORAL at 20:58

## 2022-09-28 RX ADMIN — INSULIN LISPRO 1 UNITS: 100 INJECTION, SOLUTION INTRAVENOUS; SUBCUTANEOUS at 09:20

## 2022-09-28 RX ADMIN — MICONAZOLE NITRATE: 20 POWDER TOPICAL at 21:04

## 2022-09-28 RX ADMIN — GABAPENTIN 300 MG: 300 CAPSULE ORAL at 09:11

## 2022-09-28 RX ADMIN — RIVAROXABAN 20 MG: 20 TABLET, FILM COATED ORAL at 20:59

## 2022-09-28 RX ADMIN — INSULIN LISPRO 17 UNITS: 100 INJECTION, SOLUTION INTRAVENOUS; SUBCUTANEOUS at 09:20

## 2022-09-28 RX ADMIN — CETIRIZINE HYDROCHLORIDE 5 MG: 10 TABLET, FILM COATED ORAL at 09:14

## 2022-09-28 RX ADMIN — DICLOFENAC SODIUM 2 G: 10 GEL TOPICAL at 21:02

## 2022-09-28 RX ADMIN — SIMETHICONE 80 MG: 80 TABLET, CHEWABLE ORAL at 12:36

## 2022-09-28 RX ADMIN — DICLOFENAC SODIUM 2 G: 10 GEL TOPICAL at 09:18

## 2022-09-28 RX ADMIN — INSULIN GLARGINE 42 UNITS: 100 INJECTION, SOLUTION SUBCUTANEOUS at 09:19

## 2022-09-28 RX ADMIN — OXYCODONE 5 MG: 5 TABLET ORAL at 09:29

## 2022-09-28 RX ADMIN — PREDNISONE 10 MG: 10 TABLET ORAL at 09:11

## 2022-09-28 RX ADMIN — SENNOSIDES 8.6 MG: 8.6 TABLET, FILM COATED ORAL at 09:12

## 2022-09-28 RX ADMIN — GABAPENTIN 600 MG: 300 CAPSULE ORAL at 20:58

## 2022-09-28 RX ADMIN — OXYCODONE 5 MG: 5 TABLET ORAL at 20:59

## 2022-09-28 RX ADMIN — EMPAGLIFLOZIN 10 MG: 10 TABLET, FILM COATED ORAL at 09:15

## 2022-09-28 RX ADMIN — ISOSORBIDE MONONITRATE 30 MG: 30 TABLET, EXTENDED RELEASE ORAL at 09:13

## 2022-09-28 RX ADMIN — LEVOTHYROXINE SODIUM 150 MCG: 0.15 TABLET ORAL at 06:52

## 2022-09-28 RX ADMIN — INSULIN LISPRO 3 UNITS: 100 INJECTION, SOLUTION INTRAVENOUS; SUBCUTANEOUS at 12:37

## 2022-09-28 RX ADMIN — ACETAMINOPHEN 1000 MG: 500 TABLET ORAL at 04:26

## 2022-09-28 RX ADMIN — OXYCODONE 5 MG: 5 TABLET ORAL at 00:56

## 2022-09-28 RX ADMIN — PANTOPRAZOLE SODIUM 40 MG: 40 TABLET, DELAYED RELEASE ORAL at 06:52

## 2022-09-28 RX ADMIN — OXYBUTYNIN CHLORIDE 15 MG: 5 TABLET, EXTENDED RELEASE ORAL at 09:11

## 2022-09-28 RX ADMIN — Medication 10 ML: at 09:18

## 2022-09-28 RX ADMIN — TIZANIDINE 2 MG: 4 TABLET ORAL at 14:21

## 2022-09-28 RX ADMIN — Medication 10 ML: at 21:01

## 2022-09-28 ASSESSMENT — PAIN SCALES - GENERAL
PAINLEVEL_OUTOF10: 5
PAINLEVEL_OUTOF10: 10
PAINLEVEL_OUTOF10: 9
PAINLEVEL_OUTOF10: 4
PAINLEVEL_OUTOF10: 6
PAINLEVEL_OUTOF10: 8
PAINLEVEL_OUTOF10: 0

## 2022-09-28 ASSESSMENT — PAIN DESCRIPTION - LOCATION
LOCATION: ABDOMEN
LOCATION: NECK;SHOULDER
LOCATION: SHOULDER
LOCATION: SHOULDER

## 2022-09-28 ASSESSMENT — PAIN DESCRIPTION - ORIENTATION
ORIENTATION: LEFT
ORIENTATION: MID

## 2022-09-28 ASSESSMENT — PAIN DESCRIPTION - DESCRIPTORS
DESCRIPTORS: ACHING
DESCRIPTORS: ACHING

## 2022-09-28 NOTE — PROGRESS NOTES
[facility] Orthopedic Surgery  Progress Note      Chief complaint: Left wrist pain    Subjective: The patient is sitting up in the bed,  She again reports moderate left shoulder pain and left wrist pain to a lesser degree. Denies new issues. Ambulates with walker at baseline. Review of Systems:  Constitutional: Negative for fever, chills, fatigue. Skin:  Negative for pruritis, rash  Respiratory: Positive for shortness of breath. Cardiovascular: Negative for chest pain. Neurological: Negative for confusion, dysarthria, tremors, seizures. Psychiatric:  Negative for depression or anxiety  Musculoskeletal:  Positive for left wrist and shoulder pain    Objective:  Vitals:    09/28/22 0900   BP: (!) 146/68   Pulse: 69   Resp: 16   Temp: 97.5 °F (36.4 °C)   SpO2: 94%      Physical Examination:  GENERAL: No apparent distress, well-nourished, sitting up in hospital bed  SKIN:  Warm and dry  RESPIRATORY: Resp easy and unlabored  NEURO: Awake and alert. No speech defect  PSYCHIATRIC: Appropriate affect; not agitated  MUSCULOSKELETAL:  LUE -mild swelling and erythema of the dorsum of the hand and wrist.  She demonstrates active range of motion in the wrist with mild to moderate pain. She tolerates passive ROM of the left wrist with minimal discomfort. Sensation intact light touch in the radial, ulnar, median nerve distributions. Palpable radial pulse. She has passive range of motion of the left shoulder with minimal pain.       Labs reviewed:  Recent Labs     09/27/22  0558 09/28/22  0702 09/28/22  0828   WBC 5.5 5.9 6.5   HGB 8.6* 8.9* 9.2*   HCT 25.7* 27.0* 27.8*   PLT 75* 93* 95*     Recent Labs     09/26/22  0434 09/27/22  0558 09/28/22  0702 09/28/22  0828    142  --  138   K 4.3 4.6  --  3.6   CL 99 99  --  100   CO2 25 24  --  25   BUN 35* 42*  --  48*   CREATININE 1.4* 1.3*  --  1.2   GLUCOSE 60* 114*  --  206*   CALCIUM 9.3 8.8  --  9.5   MG 2.40 2.30 2.40  --      Imaging:  XR WRIST LEFT (MIN 3 VIEWS)   Final Result   Mild osteoarthritic changes along the radiocarpal joint and moderate   degenerative arthritic changes along the base of the thumb with no acute bony   abnormality seen. Vascular calcifications along the palmar aspect of the wrist      Mild soft tissue swelling around the wrist and diffuse osteopenia. Probable mild chondrocalcinosis of the TFCC. 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 Can consider rTSA in future, but the family has been advised by other providers she is not a surgical candidate. Planning on seeing Dr. Denver Hoard for pain management as an outpatient. She reports limited benefit with injections in her shoulders in the past.    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 Oral steroids started on 9/26.  - ROM exercises.   - Elevation and ice.  - Lidoderm patch  - Dispo: per primary team    MATEUSZ Davila - CNP  9/28/2022

## 2022-09-28 NOTE — PROGRESS NOTES
Pt family is still deciding if they want to do a biopsy- will know more tomorrow Shift chart check complete. Orders reviewed.

## 2022-09-28 NOTE — PROGRESS NOTES
Oncology and Hematology Care   Progress Note      9/28/2022 1:50 PM        Name: Bety Blackmon . Admitted: 9/24/2022    SUBJECTIVE:  Patient was complaining of chest pain earlier today. Doing okay now. Family at bedside.      Reviewed interval ancillary notes    Current Medications  furosemide (LASIX) tablet 20 mg, Daily  simethicone (MYLICON) chewable tablet 80 mg, Q6H PRN  sodium chloride (OCEAN, BABY AYR) 0.65 % nasal spray 2 spray, PRN  Polyvinyl Alcohol-Povidone PF 1.4-0.6 % SOLN 2 drop, PRN  insulin glargine (LANTUS) injection vial 42 Units, Daily  predniSONE (DELTASONE) tablet 10 mg, Daily  diclofenac sodium (VOLTAREN) 1 % gel 2 g, BID  insulin lispro (HUMALOG) injection vial 17 Units, QAM AC  insulin lispro (HUMALOG) injection vial 17 Units, Daily before lunch  insulin lispro (HUMALOG) injection vial 7 Units, Daily before dinner  atorvastatin (LIPITOR) tablet 80 mg, Nightly  isosorbide mononitrate (IMDUR) extended release tablet 30 mg, Daily  levothyroxine (SYNTHROID) tablet 150 mcg, Daily  metoprolol tartrate (LOPRESSOR) tablet 12.5 mg, BID  miconazole (MICOTIN) 2 % powder, BID  oxybutynin (DITROPAN-XL) extended release tablet 15 mg, Daily  empagliflozin (JARDIANCE) tablet 10 mg, Daily  sodium chloride flush 0.9 % injection 10 mL, 2 times per day  sodium chloride flush 0.9 % injection 10 mL, PRN  0.9 % sodium chloride infusion, PRN  ondansetron (ZOFRAN) injection 4 mg, Q6H PRN  senna (SENOKOT) tablet 8.6 mg, BID  dextrose bolus 10% 125 mL, PRN   Or  dextrose bolus 10% 250 mL, PRN  glucagon (rDNA) injection 1 mg, PRN  dextrose 10 % infusion, Continuous PRN  polyethylene glycol (GLYCOLAX) packet 17 g, Daily PRN  insulin lispro (HUMALOG) injection vial 0-4 Units, TID WC  insulin lispro (HUMALOG) injection vial 0-4 Units, Nightly  pantoprazole (PROTONIX) tablet 40 mg, QAM AC  lidocaine 4 % external patch 1 patch, Daily  cetirizine (ZYRTEC) tablet 5 mg, Daily  gabapentin (NEURONTIN) capsule 300 mg, Daily   And  gabapentin (NEURONTIN) capsule 600 mg, Nightly  rivaroxaban (XARELTO) tablet 20 mg, Daily  acetaminophen (TYLENOL) tablet 1,000 mg, 3 times per day  tiZANidine (ZANAFLEX) tablet 2 mg, TID  oxyCODONE (ROXICODONE) immediate release tablet 2.5 mg, Q4H PRN   Or  oxyCODONE (ROXICODONE) immediate release tablet 5 mg, Q4H PRN        Objective:  /67   Pulse 55   Temp 98.1 °F (36.7 °C)   Resp 16   Ht 5' (1.524 m)   Wt 247 lb 14.4 oz (112.4 kg)   LMP  (LMP Unknown)   SpO2 94%   BMI 48.41 kg/m²     Intake/Output Summary (Last 24 hours) at 9/28/2022 1350  Last data filed at 9/28/2022 0429  Gross per 24 hour   Intake 480 ml   Output --   Net 480 ml      Wt Readings from Last 3 Encounters:   09/28/22 247 lb 14.4 oz (112.4 kg)   06/21/22 243 lb 9.6 oz (110.5 kg)   10/06/21 225 lb (102.1 kg)       General appearance:  Appears comfortable  Eyes: Sclera clear. Pupils equal.  ENT: Moist oral mucosa. Trachea midline, no adenopathy. Cardiovascular: Regular rhythm, normal S1, S2. No murmur. No edema in lower extremities  Respiratory: Not using accessory muscles. Good inspiratory effort. Clear to auscultation bilaterally, no wheeze or crackles. GI: Abdomen soft, no tenderness, not distended  Musculoskeletal: No cyanosis in digits, neck supple  Neurology: CN 2-12 grossly intact. No speech or motor deficits  Psych: Normal affect. Alert and oriented in time, place and person  Skin: Warm, dry, normal turgor    Labs and Tests:  CBC:   Recent Labs     09/27/22  0558 09/28/22  0702 09/28/22  0828   WBC 5.5 5.9 6.5   HGB 8.6* 8.9* 9.2*   PLT 75* 93* 95*     BMP:    Recent Labs     09/26/22  0434 09/27/22  0558 09/28/22  0828    142 138   K 4.3 4.6 3.6   CL 99 99 100   CO2 25 24 25   BUN 35* 42* 48*   CREATININE 1.4* 1.3* 1.2   GLUCOSE 60* 114* 206*     Hepatic: No results for input(s): AST, ALT, ALB, BILITOT, ALKPHOS in the last 72 hours.     ASSESSMENT AND PLAN    Principal Problem:    Dyspnea on exertion  Active Problems:    Acute on chronic diastolic heart failure (HCC)    Thrombocytopenia (HCC)    Anemia    Wrist arthritis    Elevated troponin    Chronic atrial fibrillation (HCC)    DM (diabetes mellitus) ([address]    Coronary artery disease involving native coronary artery of native heart without angina pectoris    PAF (paroxysmal atrial fibrillation) ([address]    Peripheral vascular disease ([address]    Mixed hyperlipidemia  Resolved Problems:    * No resolved hospital problems. *      49-year-old lady was admitted in the hospital with left wrist pain and possibly has osteoarthritis or crystal induced arthritis has     1. Mild anemia and thrombocytopenia:     --At the time of admission hemoglobin was 9.1 and platelet count was 90. WBC count was normal  -In June 2022, CBC was within normal limits. Iron studies from 9/25/2022 did not show any evidence of deficiency, folate was normal, B12 was normal (patient takes B12 injections at home)     -SPEP/QUENTIN is pending. Light chains are not significantly elevated.     -No evidence of hemolytic anemia. -Exact etiology of cytopenias is not clear. It appears to be reactive and possibly due to systemic process - ? Infection.     -Discussed bone marrow biopsy with the patient and her daughter. They would like to discuss this other family members and will let us know of the decision later today or tomorrow. 2. Left chest pain    -CT of the chest with contrast has been ordered. Vish De Leon, [facility]  Oncology Hematology [address] [facility].  [phone]     The patient was seen and examined earlier. CT chest did not show any acute findings. She continues to have cytopenias. The exact etiology is unclear. Suggested bone marrow aspiration and biopsy. Family will let us know and the decision soon.     Ivy Garcia MD

## 2022-09-28 NOTE — PROGRESS NOTES
Facility/Department: [address] NURSING  Speech Language Pathology   Dysphagia Treatment Note    Patient: Rachel Lerner   : 1940   MRN: [id number]      Evaluation Date: 2022      Admitting Dx: Dyspnea on exertion [R06.00]  Ambulatory dysfunction [R26.2]  Anemia, unspecified type [D64.9]  Treatment Diagnosis: Oropharyngeal Dysphagia   Pain: Denies                                              Diet and Treatment Recommendations 2022:  Diet Solids Recommendation:  Easy to chew  Liquid Consistency Recommendation: Thin liquids  Recommended form of Meds: Meds whole with water or Meds in puree      Compensatory strategies:   Upright as possible with all PO intake , Small bites/sips , Eat/feed slowly, Remain upright 30-45 min     Assessment of Texture Tolerance:  Diet level prior to treatment: Easy to chew , Thin liquids   Tolerance of Current Diet Level: RN reported pt appears to be tolerating current diet level     Impressions: Pt was positioned Upright in bed , awake and alert. Pt is Sinhala speaking, video  used this date. Daughter arrived care home through session. Currently on room air. Trials of thin liquids, puree , soft solids, and regular solids  were provided to assess swallow function. Pt denies difficulty swallowing but reports difficulty chewing due to being edentulous, dentures on top only. Continues to report intermittent stuck sensation in mid chest with gurgling in throat. Oral phase characterized by prolonged mastication and mildly delayed A-P transit, contributing to reduced oral clearance with regular solids. Pt required thin liquid wash with regular solid (aure cracker) to clear oral cavity. Pharyngeal phase appeared grossly functional this date, no overt clinical s/s of aspiration noted. Based on pt and daughter report, suspect esophageal component of dysphagia contributing to pt's symptom of food sticking in throat.  Pt demonstrates increased risk for aspiration due to co morbidities . Based on today's assessment recommend Easy to chew  with Thin liquids , Meds whole with water or Meds in puree . Consider GI consult to further assess pt reported symptoms. Dysphagia Goals:   Pt will functionally tolerate recommended diet with no overt clinical s/s of aspiration (Ongoing 09/28/22)  Pt will demonstrate understanding of aspiration risk and precautions via education/demonstration with occasional prompting (Ongoing 09/28/22)  Pt will advance to least restrictive diet as indicated (Ongoing 09/28/22)    Plan:   3-5 times per week during acute care stay. Patient/Family Education:  Provided education regarding role of SLP, recommendations and general speech pathology plan of care. [x] Pt verbalized understanding and agreement   [x] Pt requires ongoing learning   [] No evidence of comprehension     Discharge Recommendations:    Discharge recommendations to be determined pending ongoing follow-up during acute care stay    Treatment time:  Timed Code Treatment Minutes: 0  Total Treatment time: 20 minutes    If patient discharges prior to next session this note will serve as a discharge summary. Signature:   JACKELYN Garcia  Speech-Language Pathology Graduate Clinician    The speech-language pathologist was present, directed the patient's care, made skilled judgment and was responsible for assessment and treatment.     Kavya Love, [id number] [facility]  Speech-Language Pathologist  [phone]

## 2022-09-28 NOTE — PROGRESS NOTES
Hospitalist Progress Note      PCP: MATEUSZ Ponce CNP    Date of Admission: 9/24/2022    Chief Complaint: Wrist pain      Subjective:   Wrist pain improving. Erythema resolved. Continues to have bilat shoulder pain; worse on left.    Chronic back pain without significant change  Denies dyspnea and LE edema    Medications:  Reviewed    Infusion Medications    sodium chloride 10 mL/hr (09/25/22 2117)    dextrose       Scheduled Medications    furosemide  20 mg Oral Daily    insulin lispro  0-16 Units SubCUTAneous TID WC    insulin lispro  0-4 Units SubCUTAneous Nightly    insulin glargine  42 Units SubCUTAneous Daily    predniSONE  10 mg Oral Daily    diclofenac sodium  2 g Topical BID    insulin lispro  17 Units SubCUTAneous QAM AC    insulin lispro  17 Units SubCUTAneous Daily before lunch    insulin lispro  7 Units SubCUTAneous Daily before dinner    atorvastatin  80 mg Oral Nightly    isosorbide mononitrate  30 mg Oral Daily    levothyroxine  150 mcg Oral Daily    metoprolol tartrate  12.5 mg Oral BID    miconazole   Topical BID    oxybutynin  15 mg Oral Daily    empagliflozin  10 mg Oral Daily    sodium chloride flush  10 mL IntraVENous 2 times per day    senna  1 tablet Oral BID    insulin lispro  0-4 Units SubCUTAneous Nightly    pantoprazole  40 mg Oral QAM AC    lidocaine  1 patch TransDERmal Daily    cetirizine  5 mg Oral Daily    gabapentin  300 mg Oral Daily    And    gabapentin  600 mg Oral Nightly    rivaroxaban  20 mg Oral Daily    acetaminophen  1,000 mg Oral 3 times per day    tiZANidine  2 mg Oral TID     PRN Meds: simethicone, sodium chloride, Polyvinyl Alcohol-Povidone PF, sodium chloride flush, sodium chloride, ondansetron, dextrose bolus **OR** dextrose bolus, glucagon (rDNA), dextrose, polyethylene glycol, oxyCODONE **OR** oxyCODONE      Intake/Output Summary (Last 24 hours) at 9/28/2022 1404  Last data filed at 9/28/2022 0429  Gross per 24 hour   Intake 480 ml   Output -- Net 480 ml         Exam:    /67   Pulse 55   Temp 98.1 °F (36.7 °C)   Resp 16   Ht 5' (1.524 m)   Wt 247 lb 14.4 oz (112.4 kg)   LMP  (LMP Unknown)   SpO2 94%   BMI 48.41 kg/m²     Gen/overall appearance: Not in acute distress. Alert. Head: Normocephalic, atraumatic  Eyes: EOMI, no scleral icterus  CVS: regular rate and rhythm, Normal S1S2  Pulm: Diminished, Clear to auscultation bilaterally. No crackles/wheezes  Extremities: LUE decreased ROM, mild erythema L wrist  Neuro: No gross focal deficits noted  Skin: Warm, dry    Labs:   Recent Labs     09/27/22  0558 09/28/22  0702 09/28/22  0828   WBC 5.5 5.9 6.5   HGB 8.6* 8.9* 9.2*   HCT 25.7* 27.0* 27.8*   PLT 75* 93* 95*       Recent Labs     09/26/22  0434 09/27/22  0558 09/28/22  0828    142 138   K 4.3 4.6 3.6   CL 99 99 100   CO2 25 24 25   BUN 35* 42* 48*   CREATININE 1.4* 1.3* 1.2   CALCIUM 9.3 8.8 9.5       No results for input(s): AST, ALT, BILIDIR, BILITOT, ALKPHOS in the last 72 hours. No results for input(s): INR in the last 72 hours. No results for input(s): Corky Stallion in the last 72 hours.       Assessment/Plan:    Active Hospital Problems    Diagnosis Date Noted    Elevated troponin [R77.8] 09/27/2022     Priority: Medium    Chronic atrial fibrillation ([facility] [I48.20] 09/27/2022     Priority: Medium    Thrombocytopenia ([address] [D69.6] 09/25/2022     Priority: Medium    Anemia [D64.9] 09/25/2022     Priority: Medium    Wrist arthritis [M19.039] 09/25/2022     Priority: Medium    Dyspnea on exertion [R06.00] 09/24/2022     Priority: Medium    Acute on chronic diastolic heart failure ([facility] [I50.33] 07/27/2022     Priority: Medium    Mixed hyperlipidemia [E78.2] 05/02/2014    PAF (paroxysmal atrial fibrillation) ([facility] [I48.0] 10/05/2011    Coronary artery disease involving native coronary artery of native heart without angina pectoris [I25.10] 10/05/2011    DM (diabetes mellitus) ([facility] [E11.9] 10/05/2011    Peripheral vascular disease ([facility] [I73.9] 10/05/2011       Acute on chronic diastolic Congestive Heart Failure. Clinically improved  ARUN. Suspect lasix induced  - s/p IV diuresis; now held  - ins/outs  - trend Cr  - monitor and replace lytes  - avoid nephrotoxins  - respiratory care  - cardio following     Anemia and thrombocytopenia  -Iron studies, B12, folate ok  -Blood occult stool negative  -trend CBC  -Hematology following     Questionable Left wrist cellulitis. Was suspected on admission, but likely gout flare  - empiric abx d/daja  - on prednisone course  - ortho following     Severe chronic bilateral low back muscle spasm  - Cont scheduled tizanidine and tylenol, prn oxycodone, PT OT     Dysuria  - improved  - s/p diflucan 150mg x1  - UCx neg  - s/p phenazopyridine 100mg TID for 3 doses     CAD status post CABG  - cont statin, BB, not on asp as already on xarelto     Paroxysmal atrial fibrillation  - cont BB, xarelto for [facility]    Hyperglycemia. Suspect CS induced  - sliding scale to high  - titrate insulin regimen as tolerated    PMH of IDDM, htn, hld      DVT Prophylaxis: xarelto  Diet: ADULT DIET; Easy to Chew; 4 carb choices (60 gm/meal)  Code Status: Full Code    Dispo:  SNF?     Karin Otto MD

## 2022-09-28 NOTE — PROGRESS NOTES
[facility]  HEART FAILURE  Progress Note      Admit Date 9/24/2022     Reason for Consult:      Reason for Consultation/Chief Complaint: SOB    HPI:    Francisco Corley is a 78 y.o. female with PMH  atrial fibrillation, CAD/CABG (2004), DM2, HTN, hypothyroidism, HFpEF admitted with SOB, elevated troponin and anemia. Cr bumped with IV lasix and she has not had any diuretics since Monday. Subjective:  Patient is being seen for CHF. There were no acute overnight cardiac events. Today Ms. Schmidt c/o continued shoulder and arm pain, food getting stuck with eating and abd distension but denies chest pain, shortness of breath, palpitations      Review of Systems - General ROS: negative  Hematological and Lymphatic ROS: negative  Respiratory ROS: negative  Cardiovascular ROS: no chest pain or dyspnea on exertion  Gastrointestinal ROS: abd distension  Musculoskeletal ROS: arm pain  Neurological ROS: no TIA or stroke symptoms     Baseline Weight: 243   Wt Readings from Last 3 Encounters:   09/28/22 247 lb 14.4 oz (112.4 kg)   06/21/22 243 lb 9.6 oz (110.5 kg)   10/06/21 225 lb (102.1 kg)         Cardiac Testing:   Echo 6/17/2022:  Summary   -Technically limited portable study secondary to patient's immobility (upright semi supine position). -Normal left ventricle size,mild to moderate concentric wall thickness and normal systolic function with an estimated ejection fraction of 55%. -Abnormal mid to distal septal and inferoseptal motion is present.   -Indeterminate diastolic function. due to mitral annular calcification. E/e\"=20.3.   -Mitral annular calcification is present. -Mild to moderate posteriorly directed mitral regurgitation.   -Aortic valve appears sclerotic but opens adequately. -Mild tricuspid regurgitation.   -Estimated pulmonary artery systolic pressure is borderline elevated at 33 mmHg assuming a right atrial pressure of 8 mmHg.    -The left atrium is at the upper limits of normal in size to mildly dilated. -The right ventricle is not well visualized. -Right ventricular systolic function appears mildly reduced . TAPSE 1.22 cm.   -RV S velocity 8.6 cm/s. -Hypokinetic free wall of the right ventricle. NYHA Class III      Objective:   BP (!) 146/68   Pulse 69   Temp 97.5 °F (36.4 °C) (Oral)   Resp 16   Ht 5' (1.524 m)   Wt 247 lb 14.4 oz (112.4 kg)   LMP  (LMP Unknown)   SpO2 94%   BMI 48.41 kg/m²     Intake/Output Summary (Last 24 hours) at 9/28/2022 0910  Last data filed at 9/28/2022 0429  Gross per 24 hour   Intake 600 ml   Output --   Net 600 ml      In: 600 [P.O.:600]  Out: -       Physical Exam:  General Appearance:  Non-obese/Well Nourished  Respiratory:  Resp Auscultation: Normal breath sounds without dullness  Cardiovascular:   Auscultation: Regular rate and rhythm, normal S1S2, no m/g/r/c  Palpation: Normal    JVD: none  Pedal Pulses: 2+ and equal   Abdomen:  Soft, NT, ND, + bs  Extremities:  No Cyanosis or Clubbing  Extremities: negative  Neurological/Psychiatric:  Oriented to time, place, and person  Non-anxious    MEDICATIONS:   Scheduled Meds:   Scheduled Meds:   insulin glargine  42 Units SubCUTAneous Daily    predniSONE  10 mg Oral Daily    diclofenac sodium  2 g Topical BID    insulin lispro  17 Units SubCUTAneous QAM AC    insulin lispro  17 Units SubCUTAneous Daily before lunch    insulin lispro  7 Units SubCUTAneous Daily before dinner    atorvastatin  80 mg Oral Nightly    isosorbide mononitrate  30 mg Oral Daily    levothyroxine  150 mcg Oral Daily    metoprolol tartrate  12.5 mg Oral BID    miconazole   Topical BID    oxybutynin  15 mg Oral Daily    empagliflozin  10 mg Oral Daily    sodium chloride flush  10 mL IntraVENous 2 times per day    senna  1 tablet Oral BID    insulin lispro  0-4 Units SubCUTAneous TID WC    insulin lispro  0-4 Units SubCUTAneous Nightly    pantoprazole  40 mg Oral QAM AC    lidocaine  1 patch TransDERmal Daily    cetirizine  5 mg Oral Daily    gabapentin  300 mg Oral Daily    And    gabapentin  600 mg Oral Nightly    rivaroxaban  20 mg Oral Daily    acetaminophen  1,000 mg Oral 3 times per day    tiZANidine  2 mg Oral TID     Continuous Infusions:   sodium chloride 10 mL/hr (09/25/22 2117)    dextrose       PRN Meds:.sodium chloride, Polyvinyl Alcohol-Povidone PF, sodium chloride flush, sodium chloride, ondansetron, dextrose bolus **OR** dextrose bolus, glucagon (rDNA), dextrose, polyethylene glycol, oxyCODONE **OR** oxyCODONE  Continuous Infusions:   sodium chloride 10 mL/hr (09/25/22 2117)    dextrose         Intake/Output Summary (Last 24 hours) at 9/28/2022 0910  Last data filed at 9/28/2022 0429  Gross per 24 hour   Intake 600 ml   Output --   Net 600 ml       Lab Data:  CBC:   Lab Results   Component Value Date/Time    WBC 5.9 09/28/2022 07:02 AM    HGB 8.9 09/28/2022 07:02 AM    PLT 93 09/28/2022 07:02 AM     BMP:  Lab Results   Component Value Date/Time     09/27/2022 05:58 AM    K 4.6 09/27/2022 05:58 AM    CL 99 09/27/2022 05:58 AM    CO2 24 09/27/2022 05:58 AM    BUN 42 09/27/2022 05:58 AM    CREATININE 1.3 09/27/2022 05:58 AM    GLUCOSE 114 09/27/2022 05:58 AM     INR:   Lab Results   Component Value Date/Time    INR 1.06 02/13/2017 12:07 PM    INR 1.06 11/22/2016 06:35 AM    INR 1.73 11/15/2016 10:40 AM        CARDIAC LABS  ENZYMES:  No results for input(s): CKMB, CKMBINDEX, TROPONINI in the last 72 hours.     Invalid input(s): CKTOTAL;3    FASTING LIPID PANEL:  Lab Results   Component Value Date/Time    HDL 47 07/13/2012 09:30 AM    HDL 44 07/06/2010 09:12 AM    LDLCALC 61 07/13/2012 09:30 AM    TRIG 247 07/13/2012 09:30 AM    TSH 0.13 07/10/2012 02:05 PM     LIVER PROFILE:  Lab Results   Component Value Date/Time    AST 15 09/24/2022 07:05 PM    AST 9 06/16/2022 04:44 PM    ALT 11 09/24/2022 07:05 PM    ALT 25 06/16/2022 04:44 PM     BNP:   Lab Results   Component Value Date/Time    PROBNP 538 09/28/2022 07:02 AM    PROBNP 277 09/24/2022 07:05 PM    PROBNP 56 07/12/2022 06:45 PM     Iron Studies:    Lab Results   Component Value Date/Time    FERRITIN 1,025.0 09/26/2022 04:34 AM    FERRITIN 641.4 09/25/2022 05:02 AM    FERRITIN 284.8 07/20/2011 01:25 PM     Lab Results   Component Value Date    IRON 46 09/25/2022    TIBC 230 (L) 09/25/2022    FERRITIN 1,025.0 (H) 09/26/2022      Iron Deficiency Anemia:  No IV Iron Therapy:  No  2017 ACC/AHA HF Guidelines:   intravenous iron replacement in patients with New York Heart Association (NYHA) class II and III HF and iron deficiency(ferritin <100 ng/ml or 100-300 ng/ml if transferrin saturation <20%), to improve functional status and QoL. 1. WEIGHT: Admit Weight: 248 lb 6.4 oz (112.7 kg)      Today  Weight: 247 lb 14.4 oz (112.4 kg)   2.  I/O   Intake/Output Summary (Last 24 hours) at 9/28/2022 0910  Last data filed at 9/28/2022 0429  Gross per 24 hour   Intake 600 ml   Output --   Net 600 ml         Assessment/Plan:     AHF- BNP going back up, restart po lasix, continue jardiance, will restart entresto tomorrow  AF- rate controlled on BB, on AC  CAD-- no CP, continue BB, statin, imdur  Anemia- per onc        I appreciate the opportunity of cooperating in the care of this individual.    Joyce Crum, APRN - CNP, ACNP, [address] 9/28/2022, 9:10 AM  Heart Failure  [facility]  [address] [place] [address], [address]  Ph: [phone]      Core Measures:   Discharge instructions:   LVEF documented:   ACEI for LV dysfunction:   Smoking Cessation:

## 2022-09-28 NOTE — CARE COORDINATION
Discharge Planning Assessment  Readmission Score of 17%    RN/SW discharge planner met with patient/ (and family member) to discuss reason for admission, current living situation, and potential needs at the time of discharge    Demographics/Insurance verified Yes/No    Current type of dwelling: The diana lives in a home Alone    Patient from ECF/SW confirmed with: n/a    Living arrangements: Lives alone. Level of function/Support: Independent without use of device    PCP: MATEUSZ Lorenz CNP    Last Visit to PCP:    DME:  rolling walker, manual wheelchair, alert button,     Active with any community resources/agencies/skilled home care: The patient is active with [facility] and [facility] services. The patient's daughter reports she receives a aide every day for 4 hour the morning and 4 hours in the evening. Medication compliance issues: None reported. Financial issues that could impact healthcare: No ne reported. Tentative discharge plan: PT/OT recommended SNF. The SW spoke with the patient's daughter Rafia Carrasco who stated they would like a referral sent to [facility]. The patient's daughter reports that of the patient's get better physical at discharge they may take the patient home. Andrew Aranda for a response.       Discussed and provided facilities of choice if transition to a skilled nursing facility is required at the time of discharge        Transportation at the time of discharge: TBD

## 2022-09-29 ENCOUNTER — APPOINTMENT (OUTPATIENT)
Dept: CT IMAGING | Age: 82
DRG: 811 | End: 2022-09-29
Payer: MEDICARE

## 2022-09-29 LAB
ANION GAP SERPL CALCULATED.3IONS-SCNC: 14 MMOL/L (ref 3–16)
BUN BLDV-MCNC: 35 MG/DL (ref 7–20)
CALCIUM SERPL-MCNC: 9.3 MG/DL (ref 8.3–10.6)
CHLORIDE BLD-SCNC: 105 MMOL/L (ref 99–110)
CO2: 24 MMOL/L (ref 21–32)
CREAT SERPL-MCNC: 1 MG/DL (ref 0.6–1.2)
GFR AFRICAN AMERICAN: >60
GFR NON-AFRICAN AMERICAN: 53
GLUCOSE BLD-MCNC: 154 MG/DL (ref 70–99)
GLUCOSE BLD-MCNC: 159 MG/DL (ref 70–99)
GLUCOSE BLD-MCNC: 189 MG/DL (ref 70–99)
GLUCOSE BLD-MCNC: 227 MG/DL (ref 70–99)
GLUCOSE BLD-MCNC: 263 MG/DL (ref 70–99)
HCT VFR BLD CALC: 26.8 % (ref 36–48)
HEMOGLOBIN: 8.9 G/DL (ref 12–16)
INR BLD: 2.1 (ref 0.87–1.14)
MAGNESIUM: 2.4 MG/DL (ref 1.8–2.4)
MCH RBC QN AUTO: 32.1 PG (ref 26–34)
MCHC RBC AUTO-ENTMCNC: 33.1 G/DL (ref 31–36)
MCV RBC AUTO: 97 FL (ref 80–100)
PDW BLD-RTO: 21.6 % (ref 12.4–15.4)
PERFORMED ON: ABNORMAL
PLATELET # BLD: 89 K/UL (ref 135–450)
PMV BLD AUTO: 8.6 FL (ref 5–10.5)
POTASSIUM SERPL-SCNC: 3.9 MMOL/L (ref 3.5–5.1)
PROTHROMBIN TIME: 23.6 SEC (ref 11.7–14.5)
RBC # BLD: 2.77 M/UL (ref 4–5.2)
SODIUM BLD-SCNC: 143 MMOL/L (ref 136–145)
WBC # BLD: 5.8 K/UL (ref 4–11)

## 2022-09-29 PROCEDURE — 85027 COMPLETE CBC AUTOMATED: CPT

## 2022-09-29 PROCEDURE — C1830 POWER BONE MARROW BX NEEDLE: HCPCS

## 2022-09-29 PROCEDURE — 36415 COLL VENOUS BLD VENIPUNCTURE: CPT

## 2022-09-29 PROCEDURE — 07DR3ZX EXTRACTION OF ILIAC BONE MARROW, PERCUTANEOUS APPROACH, DIAGNOSTIC: ICD-10-PCS | Performed by: RADIOLOGY

## 2022-09-29 PROCEDURE — 80048 BASIC METABOLIC PNL TOTAL CA: CPT

## 2022-09-29 PROCEDURE — 83735 ASSAY OF MAGNESIUM: CPT

## 2022-09-29 PROCEDURE — 99232 SBSQ HOSP IP/OBS MODERATE 35: CPT | Performed by: NURSE PRACTITIONER

## 2022-09-29 PROCEDURE — 6370000000 HC RX 637 (ALT 250 FOR IP): Performed by: PHYSICIAN ASSISTANT

## 2022-09-29 PROCEDURE — 6370000000 HC RX 637 (ALT 250 FOR IP): Performed by: INTERNAL MEDICINE

## 2022-09-29 PROCEDURE — 6370000000 HC RX 637 (ALT 250 FOR IP): Performed by: NURSE PRACTITIONER

## 2022-09-29 PROCEDURE — 88360 TUMOR IMMUNOHISTOCHEM/MANUAL: CPT

## 2022-09-29 PROCEDURE — 1200000000 HC SEMI PRIVATE

## 2022-09-29 PROCEDURE — 85610 PROTHROMBIN TIME: CPT

## 2022-09-29 PROCEDURE — 6360000002 HC RX W HCPCS: Performed by: RADIOLOGY

## 2022-09-29 PROCEDURE — 6370000000 HC RX 637 (ALT 250 FOR IP): Performed by: FAMILY MEDICINE

## 2022-09-29 PROCEDURE — 38221 DX BONE MARROW BIOPSIES: CPT

## 2022-09-29 PROCEDURE — 2580000003 HC RX 258: Performed by: PHYSICIAN ASSISTANT

## 2022-09-29 RX ORDER — FENTANYL CITRATE 50 UG/ML
INJECTION, SOLUTION INTRAMUSCULAR; INTRAVENOUS
Status: COMPLETED | OUTPATIENT
Start: 2022-09-29 | End: 2022-09-29

## 2022-09-29 RX ORDER — MIDAZOLAM HYDROCHLORIDE 2 MG/2ML
INJECTION, SOLUTION INTRAMUSCULAR; INTRAVENOUS
Status: COMPLETED | OUTPATIENT
Start: 2022-09-29 | End: 2022-09-29

## 2022-09-29 RX ADMIN — PREDNISONE 10 MG: 10 TABLET ORAL at 08:55

## 2022-09-29 RX ADMIN — EMPAGLIFLOZIN 10 MG: 10 TABLET, FILM COATED ORAL at 08:56

## 2022-09-29 RX ADMIN — ACETAMINOPHEN 1000 MG: 500 TABLET ORAL at 20:20

## 2022-09-29 RX ADMIN — MICONAZOLE NITRATE: 20 POWDER TOPICAL at 08:56

## 2022-09-29 RX ADMIN — INSULIN LISPRO 7 UNITS: 100 INJECTION, SOLUTION INTRAVENOUS; SUBCUTANEOUS at 17:50

## 2022-09-29 RX ADMIN — FENTANYL CITRATE 25 MCG: 50 INJECTION, SOLUTION INTRAMUSCULAR; INTRAVENOUS at 14:13

## 2022-09-29 RX ADMIN — OXYBUTYNIN CHLORIDE 15 MG: 5 TABLET, EXTENDED RELEASE ORAL at 08:55

## 2022-09-29 RX ADMIN — OXYCODONE 5 MG: 5 TABLET ORAL at 04:16

## 2022-09-29 RX ADMIN — INSULIN GLARGINE 42 UNITS: 100 INJECTION, SOLUTION SUBCUTANEOUS at 09:05

## 2022-09-29 RX ADMIN — METOPROLOL TARTRATE 12.5 MG: 25 TABLET, FILM COATED ORAL at 08:53

## 2022-09-29 RX ADMIN — SENNOSIDES 8.6 MG: 8.6 TABLET, FILM COATED ORAL at 08:53

## 2022-09-29 RX ADMIN — FENTANYL CITRATE 25 MCG: 50 INJECTION, SOLUTION INTRAMUSCULAR; INTRAVENOUS at 14:10

## 2022-09-29 RX ADMIN — ISOSORBIDE MONONITRATE 30 MG: 30 TABLET, EXTENDED RELEASE ORAL at 08:53

## 2022-09-29 RX ADMIN — GABAPENTIN 300 MG: 300 CAPSULE ORAL at 08:55

## 2022-09-29 RX ADMIN — MICONAZOLE NITRATE: 20 POWDER TOPICAL at 20:24

## 2022-09-29 RX ADMIN — METOPROLOL TARTRATE 12.5 MG: 25 TABLET, FILM COATED ORAL at 20:20

## 2022-09-29 RX ADMIN — TIZANIDINE 2 MG: 4 TABLET ORAL at 20:21

## 2022-09-29 RX ADMIN — DICLOFENAC SODIUM 2 G: 10 GEL TOPICAL at 20:24

## 2022-09-29 RX ADMIN — Medication 10 ML: at 20:23

## 2022-09-29 RX ADMIN — SACUBITRIL AND VALSARTAN 0.5 TABLET: 24; 26 TABLET, FILM COATED ORAL at 17:48

## 2022-09-29 RX ADMIN — Medication 10 ML: at 09:02

## 2022-09-29 RX ADMIN — INSULIN LISPRO 8 UNITS: 100 INJECTION, SOLUTION INTRAVENOUS; SUBCUTANEOUS at 17:50

## 2022-09-29 RX ADMIN — DICLOFENAC SODIUM 2 G: 10 GEL TOPICAL at 08:56

## 2022-09-29 RX ADMIN — CETIRIZINE HYDROCHLORIDE 5 MG: 10 TABLET, FILM COATED ORAL at 08:55

## 2022-09-29 RX ADMIN — MIDAZOLAM HYDROCHLORIDE 1 MG: 1 INJECTION, SOLUTION INTRAMUSCULAR; INTRAVENOUS at 14:10

## 2022-09-29 RX ADMIN — ATORVASTATIN CALCIUM 80 MG: 80 TABLET, FILM COATED ORAL at 20:21

## 2022-09-29 RX ADMIN — GABAPENTIN 600 MG: 300 CAPSULE ORAL at 20:21

## 2022-09-29 RX ADMIN — PANTOPRAZOLE SODIUM 40 MG: 40 TABLET, DELAYED RELEASE ORAL at 09:04

## 2022-09-29 RX ADMIN — MIDAZOLAM HYDROCHLORIDE 1 MG: 1 INJECTION, SOLUTION INTRAMUSCULAR; INTRAVENOUS at 14:13

## 2022-09-29 RX ADMIN — FUROSEMIDE 20 MG: 20 TABLET ORAL at 08:55

## 2022-09-29 RX ADMIN — TIZANIDINE 2 MG: 4 TABLET ORAL at 08:55

## 2022-09-29 RX ADMIN — OXYCODONE 5 MG: 5 TABLET ORAL at 17:48

## 2022-09-29 RX ADMIN — LEVOTHYROXINE SODIUM 150 MCG: 0.15 TABLET ORAL at 09:04

## 2022-09-29 ASSESSMENT — PAIN SCALES - GENERAL
PAINLEVEL_OUTOF10: 5
PAINLEVEL_OUTOF10: 5
PAINLEVEL_OUTOF10: 7
PAINLEVEL_OUTOF10: 0
PAINLEVEL_OUTOF10: 0

## 2022-09-29 ASSESSMENT — PAIN DESCRIPTION - LOCATION: LOCATION: SHOULDER

## 2022-09-29 NOTE — PROGRESS NOTES
[facility]  HEART FAILURE  Progress Note      Admit Date 9/24/2022     Reason for Consult:      Reason for Consultation/Chief Complaint: SOB    HPI:    Laron Mace is a 78 y.o. female with PMH  atrial fibrillation, CAD/CABG (2004), DM2, HTN, hypothyroidism, HFpEF admitted with SOB, elevated troponin and anemia. Cr bumped with IV lasix and diuretics stopped, po restarted yesterday. Cr improved      Subjective:  Patient is being seen for CHF. There were no acute overnight cardiac events. Today Ms. Schmidt c/o continued shoulder and arm pain, food getting stuck with eating and abd distension but denies chest pain, shortness of breath, palpitations      Review of Systems - General ROS: negative  Hematological and Lymphatic ROS: negative  Respiratory ROS: negative  Cardiovascular ROS: no chest pain or dyspnea on exertion  Gastrointestinal ROS: abd distension  Musculoskeletal ROS: arm pain  Neurological ROS: no TIA or stroke symptoms     Baseline Weight: 243   Wt Readings from Last 3 Encounters:   09/29/22 245 lb (111.1 kg)   06/21/22 243 lb 9.6 oz (110.5 kg)   10/06/21 225 lb (102.1 kg)         Cardiac Testing:   Echo 6/17/2022:  Summary   -Technically limited portable study secondary to patient's immobility (upright semi supine position). -Normal left ventricle size,mild to moderate concentric wall thickness and normal systolic function with an estimated ejection fraction of 55%. -Abnormal mid to distal septal and inferoseptal motion is present.   -Indeterminate diastolic function. due to mitral annular calcification. E/e\"=20.3.   -Mitral annular calcification is present. -Mild to moderate posteriorly directed mitral regurgitation.   -Aortic valve appears sclerotic but opens adequately. -Mild tricuspid regurgitation.   -Estimated pulmonary artery systolic pressure is borderline elevated at 33 mmHg assuming a right atrial pressure of 8 mmHg.    -The left atrium is at the upper limits of normal in size to mildly dilated. -The right ventricle is not well visualized. -Right ventricular systolic function appears mildly reduced . TAPSE 1.22 cm.   -RV S velocity 8.6 cm/s. -Hypokinetic free wall of the right ventricle. NYHA Class III      Objective:   BP (!) 146/65   Pulse 76   Temp 97.9 °F (36.6 °C) (Oral)   Resp 16   Ht 5' (1.524 m)   Wt 245 lb (111.1 kg)   LMP  (LMP Unknown)   SpO2 94%   BMI 47.85 kg/m²     Intake/Output Summary (Last 24 hours) at 9/29/2022 0312  Last data filed at 9/28/2022 1245  Gross per 24 hour   Intake 240 ml   Output --   Net 240 ml      In: 240 [P.O.:240]  Out: -       Physical Exam:  General Appearance:  Non-obese/Well Nourished  Respiratory:  Resp Auscultation: Normal breath sounds without dullness  Cardiovascular:   Auscultation: Regular rate and rhythm, normal S1S2, no m/g/r/c  Palpation: Normal    JVD: none  Pedal Pulses: 2+ and equal   Abdomen:  Soft, NT, ND, + bs  Extremities:  No Cyanosis or Clubbing  Extremities: negative  Neurological/Psychiatric:  Oriented to time, place, and person  Non-anxious    MEDICATIONS:   Scheduled Meds:   Scheduled Meds:   furosemide  20 mg Oral Daily    insulin lispro  0-16 Units SubCUTAneous TID WC    insulin lispro  0-4 Units SubCUTAneous Nightly    insulin glargine  42 Units SubCUTAneous Daily    predniSONE  10 mg Oral Daily    diclofenac sodium  2 g Topical BID    insulin lispro  17 Units SubCUTAneous QAM AC    insulin lispro  17 Units SubCUTAneous Daily before lunch    insulin lispro  7 Units SubCUTAneous Daily before dinner    atorvastatin  80 mg Oral Nightly    isosorbide mononitrate  30 mg Oral Daily    levothyroxine  150 mcg Oral Daily    metoprolol tartrate  12.5 mg Oral BID    miconazole   Topical BID    oxybutynin  15 mg Oral Daily    empagliflozin  10 mg Oral Daily    sodium chloride flush  10 mL IntraVENous 2 times per day    senna  1 tablet Oral BID    pantoprazole  40 mg Oral QAM AC    lidocaine  1 patch TransDERmal Daily    cetirizine  5 mg Oral Daily    gabapentin  300 mg Oral Daily    And    gabapentin  600 mg Oral Nightly    rivaroxaban  20 mg Oral Daily    acetaminophen  1,000 mg Oral 3 times per day    tiZANidine  2 mg Oral TID     Continuous Infusions:   sodium chloride 10 mL/hr (09/25/22 2117)    dextrose       PRN Meds:.simethicone, sodium chloride, Polyvinyl Alcohol-Povidone PF, sodium chloride flush, sodium chloride, ondansetron, dextrose bolus **OR** dextrose bolus, glucagon (rDNA), dextrose, polyethylene glycol, oxyCODONE **OR** oxyCODONE  Continuous Infusions:   sodium chloride 10 mL/hr (09/25/22 2117)    dextrose         Intake/Output Summary (Last 24 hours) at 9/29/2022 0925  Last data filed at 9/28/2022 1245  Gross per 24 hour   Intake 240 ml   Output --   Net 240 ml       Lab Data:  CBC:   Lab Results   Component Value Date/Time    WBC 6.5 09/28/2022 08:28 AM    HGB 9.2 09/28/2022 08:28 AM    PLT 95 09/28/2022 08:28 AM     BMP:  Lab Results   Component Value Date/Time     09/28/2022 08:28 AM    K 3.6 09/28/2022 08:28 AM    K 4.6 09/27/2022 05:58 AM     09/28/2022 08:28 AM    CO2 25 09/28/2022 08:28 AM    BUN 48 09/28/2022 08:28 AM    CREATININE 1.2 09/28/2022 08:28 AM    GLUCOSE 206 09/28/2022 08:28 AM     INR:   Lab Results   Component Value Date/Time    INR 1.06 02/13/2017 12:07 PM    INR 1.06 11/22/2016 06:35 AM    INR 1.73 11/15/2016 10:40 AM        CARDIAC LABS  ENZYMES:  No results for input(s): CKMB, CKMBINDEX, TROPONINI in the last 72 hours.     Invalid input(s): CKTOTAL;3    FASTING LIPID PANEL:  Lab Results   Component Value Date/Time    HDL 47 07/13/2012 09:30 AM    HDL 44 07/06/2010 09:12 AM    LDLCALC 61 07/13/2012 09:30 AM    TRIG 247 07/13/2012 09:30 AM    TSH 0.13 07/10/2012 02:05 PM     LIVER PROFILE:  Lab Results   Component Value Date/Time    AST 15 09/24/2022 07:05 PM    AST 9 06/16/2022 04:44 PM    ALT 11 09/24/2022 07:05 PM    ALT 25 06/16/2022 04:44 PM     BNP:   Lab Results Component Value Date/Time    PROBNP 538 09/28/2022 07:02 AM    PROBNP 277 09/24/2022 07:05 PM    PROBNP 56 07/12/2022 06:45 PM     Iron Studies:    Lab Results   Component Value Date/Time    FERRITIN 1,025.0 09/26/2022 04:34 AM    FERRITIN 641.4 09/25/2022 05:02 AM    FERRITIN 284.8 07/20/2011 01:25 PM     Lab Results   Component Value Date    IRON 46 09/25/2022    TIBC 230 (L) 09/25/2022    FERRITIN 1,025.0 (H) 09/26/2022      Iron Deficiency Anemia:  No IV Iron Therapy:  No  2017 ACC/AHA HF Guidelines:   intravenous iron replacement in patients with New York Heart Association (NYHA) class II and III HF and iron deficiency(ferritin <100 ng/ml or 100-300 ng/ml if transferrin saturation <20%), to improve functional status and QoL. 1. WEIGHT: Admit Weight: 248 lb 6.4 oz (112.7 kg)      Today  Weight: 245 lb (111.1 kg)   2.  I/O   Intake/Output Summary (Last 24 hours) at 9/29/2022 0925  Last data filed at 9/28/2022 1245  Gross per 24 hour   Intake 240 ml   Output --   Net 240 ml         Assessment/Plan:     AHF- compensated, continue lasix and jardiance, restart low dose entresto   AF- rate controlled on BB, on AC  CAD-- no CP, continue BB, statin, imdur  Anemia- per onc, pt considering BMbx        I appreciate the opportunity of cooperating in the care of this individual.    Edgar Diss, APRN - CNP, ACNP, [address] 9/29/2022, 9:25 AM  Heart Failure  The [address], [address]  Ph: [phone]      Core Measures:   Discharge instructions:   LVEF documented:   ACEI for LV dysfunction:   Smoking Cessation:

## 2022-09-29 NOTE — BRIEF OP NOTE
Brief Postoperative Note    Wally Ace  YOB: 1940  [id number]    Pre-operative Diagnosis: anemia    Post-operative Diagnosis: Same    Procedure: CT-guided bone marrow biopsy    Anesthesia: Moderate Sedation    Surgeons: Jamaica Aj MD    Estimated Blood Loss: Less than 5 mL    Complications: None    Specimens: Was Obtained: 12cc bone marrow aspirate and core    Findings: Successful CT-guided bone marrow biospy.     Electronically signed by Jamaica Aj MD on 9/29/2022 at 2:21 PM

## 2022-09-29 NOTE — PRE SEDATION
Sedation Pre-Procedure Note    Patient Name: Maximiliano Wright   YOB: 1940  Room/Bed: 3AN-3304/3304-01  Medical Record Number: [id number]  Date: 9/29/2022   Time: 2:20 PM       Indication:  anemia here for bone marrow biopsy. Consent: I have discussed with the patient and/or the patient representative the indication, alternatives, and the possible risks and/or complications of the planned procedure and the anesthesia methods. The patient and/or patient representative appear to understand and agree to proceed. Vital Signs:   Vitals:    09/29/22 1415   BP: (!) 155/63   Pulse: 72   Resp: 19   Temp:    SpO2: 93%       Past Medical History:   has a past medical history of 4/11/17 Left knee repeat repair of median parapatellar arthrotomy with augmentation, Arthritis, Atrial fibrillation ([address] CAD (coronary artery disease), Cataract, Chronic diastolic congestive heart failure ([address] Diabetes mellitus ([address] GERD (gastroesophageal reflux disease), Hyperlipidemia, Hypertension, HYPOTHRYROIDISM, Non-English speaking patient, Sleep apnea, Thyroid disease, and UTI. Past Surgical History:   has a past surgical history that includes joint replacement (Bilateral, [address]); Cardiac surgery (2004); Cholecystectomy, laparoscopic (5/15/14); Revision total knee arthroplasty (Right, 11/22/2016); Total knee arthroplasty; and knee surgery (Left, 02/28/2017).     Medications:   Scheduled Meds:    sacubitril-valsartan  0.5 tablet Oral BID    furosemide  20 mg Oral Daily    insulin lispro  0-16 Units SubCUTAneous TID WC    insulin lispro  0-4 Units SubCUTAneous Nightly    insulin glargine  42 Units SubCUTAneous Daily    predniSONE  10 mg Oral Daily    diclofenac sodium  2 g Topical BID    insulin lispro  17 Units SubCUTAneous QAM AC    insulin lispro  17 Units SubCUTAneous Daily before lunch    insulin lispro  7 Units SubCUTAneous Daily before dinner    atorvastatin  80 mg Oral Nightly    isosorbide mononitrate  30 mg Oral Daily    levothyroxine  150 mcg Oral Daily    metoprolol tartrate  12.5 mg Oral BID    miconazole   Topical BID    oxybutynin  15 mg Oral Daily    empagliflozin  10 mg Oral Daily    sodium chloride flush  10 mL IntraVENous 2 times per day    senna  1 tablet Oral BID    pantoprazole  40 mg Oral QAM AC    lidocaine  1 patch TransDERmal Daily    cetirizine  5 mg Oral Daily    gabapentin  300 mg Oral Daily    And    gabapentin  600 mg Oral Nightly    rivaroxaban  20 mg Oral Daily    acetaminophen  1,000 mg Oral 3 times per day    tiZANidine  2 mg Oral TID     Continuous Infusions:    sodium chloride 10 mL/hr (09/25/22 2117)    dextrose       PRN Meds: simethicone, sodium chloride, Polyvinyl Alcohol-Povidone PF, sodium chloride flush, sodium chloride, ondansetron, dextrose bolus **OR** dextrose bolus, glucagon (rDNA), dextrose, polyethylene glycol, oxyCODONE **OR** oxyCODONE  Home Meds:   Prior to Admission medications    Medication Sig Start Date End Date Taking? Authorizing Provider   lisinopril (PRINIVIL;ZESTRIL) 2.5 MG tablet Take 2.5 mg by mouth daily   Yes Historical Provider, MD   benzonatate (TESSALON) 100 MG capsule Take 100 mg by mouth 3 times daily as needed for Cough   Yes Historical Provider, MD   pantoprazole (PROTONIX) 40 MG tablet Take 40 mg by mouth every morning (before breakfast)   Yes Historical Provider, MD   cefaclor (CECLOR) 250 MG capsule Take 500 mg by mouth 3 times daily   Yes Historical Provider, MD   calcium carbonate (OSCAL) 500 MG TABS tablet Take 500 mg by mouth daily   Yes Historical Provider, MD   sacubitril-valsartan (ENTRESTO) 24-26 MG per tablet Take 1 tablet by mouth 2 times daily  Patient not taking: Reported on 9/25/2022 9/8/22   MATEUSZ Cerna CNP   isosorbide mononitrate (IMDUR) 30 MG extended release tablet Take 1 tablet by mouth in the morning.  .  Patient not taking: Reported on 9/25/2022 8/12/22   MATEUSZ Cerna CNP   furosemide (LASIX) 20 MG tablet Take 1 tablet by mouth in the morning and 1 tablet before bedtime. Patient not taking: Reported on 9/25/2022 8/9/22   Pedro Hatchet, APRN - CNP   dapagliflozin (FARXIGA) 10 MG tablet Take 1 tablet by mouth every morning  Patient not taking: Reported on 9/25/2022 6/29/22   Pedro Hatchet, APRN - CNP   gabapentin (NEURONTIN) 300 MG capsule Take 1 tablet in am, 2 tablets in pm. 6/20/22   MATEUSZ Kim CNP   acetaminophen-codeine (TYLENOL #3) 300-30 MG per tablet Take 1 tablet by mouth every 6 hours as needed for Pain. 6/20/22   MATEUSZ Maynard CNP   insulin glargine (BASAGLAR KWIKPEN) 100 UNIT/ML injection pen Inject 55 Units into the skin 2 times daily    Historical Provider, MD   triamcinolone (KENALOG) 0.1 % cream Apply topically 2 times daily as needed Apply topically 2 times daily. Historical Provider, MD   senna (SENOKOT) 8.6 MG tablet Take 1 tablet by mouth 2 times daily    Historical Provider, MD   insulin aspart (NOVOLOG) 100 UNIT/ML injection vial Inject 15 Units into the skin 3 times daily    Historical Provider, MD   phenazopyridine (PYRIDIUM) 100 MG tablet Take 100 mg by mouth 2 times daily    Historical Provider, MD   magnesium 30 MG tablet Take 40 mg by mouth daily    Historical Provider, MD   nystatin (MYCOSTATIN) 239285 UNIT/GM powder Apply topically 2 times daily    Historical Provider, MD   tiZANidine (ZANAFLEX) 2 MG tablet Take 2 mg by mouth every 8 hours as needed    Historical Provider, MD   metoprolol tartrate (LOPRESSOR) 25 MG tablet TAKE ONE-HALF TABLET BY MOUTH TWICE A DAY 7/29/20   Philomena Dupree MD   atorvastatin (LIPITOR) 80 MG tablet Take 1 tablet by mouth daily 7/29/20   Philomena Dupree MD   Insulin Pen Needle 32G X 6 MM MISC by Does not apply route 3 times daily with meals    Historical Provider, MD   hydrocortisone (ANUSOL-HC) 2.5 % rectal cream Place rectally 2 times daily Place rectally 2 times daily.     Historical Provider, MD   rivaroxaban [place]) 20 MG TABS tablet Take 1 tablet by mouth daily 2/22/17   Andrew Nieto MD   vitamin D (ERGOCALCIFEROL) 24547 UNITS CAPS capsule Take 50,000 Units by mouth once a week    Historical Provider, MD   Omega 3 1000 MG CAPS Take 1,000 mg by mouth daily    Historical Provider, MD   Liraglutide (VICTOZA) 18 MG/3ML SOPN SC injection Inject 1.2 mg into the skin daily    Historical Provider, MD   Calcium Carb-Cholecalciferol (OYSTER SHELL CALCIUM + D PO) Take by mouth daily   Patient not taking: Reported on 9/25/2022    Historical Provider, MD   Biotin 5000 MCG TABS Take by mouth daily  Patient not taking: Reported on 9/25/2022    Historical Provider, MD   lidocaine (LIDODERM) 5 % Place 1 patch onto the skin daily 12 hours on, 12 hours off. Patient taking differently: Place 3 patches onto the skin daily 2 to 3 patches shoulders and back, 12 hours on, 12 hours off. 7/14/15   MATEUSZ Sol - CNP   oxybutynin (DITROPAN XL) 15 MG CR tablet Take 15 mg by mouth daily. Historical Provider, MD   Loratadine 10 MG CAPS Take by mouth daily     Historical Provider, MD   levothyroxine (SYNTHROID) 175 MCG tablet Take 150 mcg by mouth daily. Historical Provider, MD     Coumadin Use Last 7 Days:  no  Antiplatelet drug therapy use last 7 days: no  Other anticoagulant use last 7 days: no  Additional Medication Information:  n/a      Pre-Sedation Documentation and Exam:   I have reviewed the patient's history and review of systems.     Mallampati Airway Assessment:  Mallampati Class II - (soft palate, fauces & uvula are visible)    Prior History of Anesthesia Complications:   none    ASA Classification:  Class 3 - A patient with severe systemic disease that limits activity but is not incapacitating    Sedation/ Anesthesia Plan:   intravenous sedation    Medications Planned:   midazolam (Versed) intravenously and fentanyl intravenously    Patient is an appropriate candidate for plan of sedation: yes    Electronically signed by Karlene Zimmerman MD on 9/29/2022 at 2:20 PM

## 2022-09-29 NOTE — PROGRESS NOTES
Hospitalist Progress Note      PCP: MATEUSZ Stinson - CNP    Date of Admission: 9/24/2022    Chief Complaint: Wrist pain      Subjective:   Wrist pain improving  Now with L thumb pain. Continues to have chronic bilat shoulder pain and back pain.   Denies dyspnea and LE edema  Pending BM biopsy    Medications:  Reviewed    Infusion Medications    sodium chloride 10 mL/hr (09/25/22 2117)    dextrose       Scheduled Medications    sacubitril-valsartan  0.5 tablet Oral BID    furosemide  20 mg Oral Daily    insulin lispro  0-16 Units SubCUTAneous TID WC    insulin lispro  0-4 Units SubCUTAneous Nightly    insulin glargine  42 Units SubCUTAneous Daily    predniSONE  10 mg Oral Daily    diclofenac sodium  2 g Topical BID    insulin lispro  17 Units SubCUTAneous QAM AC    insulin lispro  17 Units SubCUTAneous Daily before lunch    insulin lispro  7 Units SubCUTAneous Daily before dinner    atorvastatin  80 mg Oral Nightly    isosorbide mononitrate  30 mg Oral Daily    levothyroxine  150 mcg Oral Daily    metoprolol tartrate  12.5 mg Oral BID    miconazole   Topical BID    oxybutynin  15 mg Oral Daily    empagliflozin  10 mg Oral Daily    sodium chloride flush  10 mL IntraVENous 2 times per day    senna  1 tablet Oral BID    pantoprazole  40 mg Oral QAM AC    lidocaine  1 patch TransDERmal Daily    cetirizine  5 mg Oral Daily    gabapentin  300 mg Oral Daily    And    gabapentin  600 mg Oral Nightly    rivaroxaban  20 mg Oral Daily    acetaminophen  1,000 mg Oral 3 times per day    tiZANidine  2 mg Oral TID     PRN Meds: simethicone, sodium chloride, Polyvinyl Alcohol-Povidone PF, sodium chloride flush, sodium chloride, ondansetron, dextrose bolus **OR** dextrose bolus, glucagon (rDNA), dextrose, polyethylene glycol, oxyCODONE **OR** oxyCODONE    No intake or output data in the 24 hours ending 09/29/22 4542      Exam:    BP (!) 123/55   Pulse 72   Temp 98.1 °F (36.7 °C) (Oral)   Resp 19   Ht 5' (1.524 m) Wt 245 lb (111.1 kg)   LMP  (LMP Unknown)   SpO2 92%   BMI 47.85 kg/m²     Gen/overall appearance: Not in acute distress. Alert. Head: Normocephalic, atraumatic  Eyes: EOMI, no scleral icterus  CVS: regular rate and rhythm, Normal S1S2  Pulm: Diminished, Clear to auscultation bilaterally. No crackles/wheezes  Extremities: LUE decreased ROM, mild erythema L wrist, L thumb TTP  Neuro: No gross focal deficits noted  Skin: Warm, dry    Labs:   Recent Labs     09/28/22  0702 09/28/22  0828 09/29/22 0922   WBC 5.9 6.5 5.8   HGB 8.9* 9.2* 8.9*   HCT 27.0* 27.8* 26.8*   PLT 93* 95* 89*       Recent Labs     09/27/22  0558 09/28/22 0828 09/29/22 0922    138 143   K 4.6 3.6 3.9   CL 99 100 105   CO2 24 25 24   BUN 42* 48* 35*   CREATININE 1.3* 1.2 1.0   CALCIUM 8.8 9.5 9.3       No results for input(s): AST, ALT, BILIDIR, BILITOT, ALKPHOS in the last 72 hours. Recent Labs     09/29/22  1137   INR 2.10*     No results for input(s): Cleatrice Duval in the last 72 hours. Assessment/Plan:    Active Hospital Problems    Diagnosis Date Noted    Elevated troponin [R77.8] 09/27/2022     Priority: Medium    Chronic atrial fibrillation ([facility] [I48.20] 09/27/2022     Priority: Medium    Thrombocytopenia ([facility] [D69.6] 09/25/2022     Priority: Medium    Anemia [D64.9] 09/25/2022     Priority: Medium    Wrist arthritis [M19.039] 09/25/2022     Priority: Medium    Dyspnea on exertion [R06.00] 09/24/2022     Priority: Medium    Acute on chronic diastolic heart failure ([facility] [I50.33] 07/27/2022     Priority: Medium    Mixed hyperlipidemia [E78.2] 05/02/2014    PAF (paroxysmal atrial fibrillation) ([facility] [I48.0] 10/05/2011    Coronary artery disease involving native coronary artery of native heart without angina pectoris [I25.10] 10/05/2011    DM (diabetes mellitus) ([facility] [E11.9] 10/05/2011    Peripheral vascular disease ([facility] [I73.9] 10/05/2011       Acute on chronic diastolic Congestive Heart Failure.   Clinically improved  ARUN. Suspect lasix induced  - s/p IV diuresis; now held  - ins/outs  - trend Cr  - monitor and replace lytes  - avoid nephrotoxins  - respiratory care  - cardio following     Anemia and thrombocytopenia  - Iron studies, B12, folate ok  - Blood occult stool negative  - trend CBC  - pending BM biopsy  - Hematology following     Questionable Left wrist cellulitis. Was suspected on admission, but likely gout flare  - empiric abx d/daja  - on prednisone course  - pain management  - ortho following     Severe chronic bilateral low back muscle spasm  - Cont scheduled tizanidine and tylenol, prn oxycodone, PT OT     Dysuria  - improved  - s/p diflucan 150mg x1  - UCx neg  - s/p phenazopyridine 100mg TID for 3 doses     CAD status post CABG  - cont statin, BB, not on asp as already on xarelto     Paroxysmal atrial fibrillation  - cont BB, xarelto for [facility]    Hyperglycemia. Suspect CS induced  - sliding scale to high  - titrate insulin regimen as tolerated    PMH of IDDM, htn, hld      DVT Prophylaxis: xarelto  Diet: ADULT DIET; Easy to Chew; 4 carb choices (60 gm/meal)  Code Status: Full Code    Dispo:  SNF?     Risa Harden MD

## 2022-09-29 NOTE — PLAN OF CARE
Problem: Pain  Goal: Verbalizes/displays adequate comfort level or baseline comfort level  9/29/2022 0505 by Melissa Lee RN  Outcome: Progressing     Problem: Safety - Adult  Goal: Free from fall injury  9/29/2022 0505 by Melissa Lee RN  Outcome: Progressing

## 2022-09-29 NOTE — PROGRESS NOTES
[facility]   Progress Note      9/29/2022 10:39 AM        Name: Marv Whitmore . Admitted: 9/24/2022    SUBJECTIVE:  She is doing OK, she c/o pain in her left thumb/wrist, no SOB, no c/o chest pain.      Reviewed interval ancillary notes    Current Medications  furosemide (LASIX) tablet 20 mg, Daily  simethicone (MYLICON) chewable tablet 80 mg, Q6H PRN  insulin lispro (HUMALOG) injection vial 0-16 Units, TID WC  insulin lispro (HUMALOG) injection vial 0-4 Units, Nightly  sodium chloride (OCEAN, BABY AYR) 0.65 % nasal spray 2 spray, PRN  Polyvinyl Alcohol-Povidone PF 1.4-0.6 % SOLN 2 drop, PRN  insulin glargine (LANTUS) injection vial 42 Units, Daily  predniSONE (DELTASONE) tablet 10 mg, Daily  diclofenac sodium (VOLTAREN) 1 % gel 2 g, BID  insulin lispro (HUMALOG) injection vial 17 Units, QAM AC  insulin lispro (HUMALOG) injection vial 17 Units, Daily before lunch  insulin lispro (HUMALOG) injection vial 7 Units, Daily before dinner  atorvastatin (LIPITOR) tablet 80 mg, Nightly  isosorbide mononitrate (IMDUR) extended release tablet 30 mg, Daily  levothyroxine (SYNTHROID) tablet 150 mcg, Daily  metoprolol tartrate (LOPRESSOR) tablet 12.5 mg, BID  miconazole (MICOTIN) 2 % powder, BID  oxybutynin (DITROPAN-XL) extended release tablet 15 mg, Daily  empagliflozin (JARDIANCE) tablet 10 mg, Daily  sodium chloride flush 0.9 % injection 10 mL, 2 times per day  sodium chloride flush 0.9 % injection 10 mL, PRN  0.9 % sodium chloride infusion, PRN  ondansetron (ZOFRAN) injection 4 mg, Q6H PRN  senna (SENOKOT) tablet 8.6 mg, BID  dextrose bolus 10% 125 mL, PRN   Or  dextrose bolus 10% 250 mL, PRN  glucagon (rDNA) injection 1 mg, PRN  dextrose 10 % infusion, Continuous PRN  polyethylene glycol (GLYCOLAX) packet 17 g, Daily PRN  pantoprazole (PROTONIX) tablet 40 mg, QAM AC  lidocaine 4 % external patch 1 patch, Daily  cetirizine (ZYRTEC) tablet 5 mg, Daily  gabapentin (NEURONTIN) capsule 300 mg, Daily And  gabapentin (NEURONTIN) capsule 600 mg, Nightly  rivaroxaban (XARELTO) tablet 20 mg, Daily  acetaminophen (TYLENOL) tablet 1,000 mg, 3 times per day  tiZANidine (ZANAFLEX) tablet 2 mg, TID  oxyCODONE (ROXICODONE) immediate release tablet 2.5 mg, Q4H PRN   Or  oxyCODONE (ROXICODONE) immediate release tablet 5 mg, Q4H PRN        Objective:  BP (!) 146/65   Pulse 76   Temp 97.9 °F (36.6 °C) (Oral)   Resp 16   Ht 5' (1.524 m)   Wt 245 lb (111.1 kg)   LMP  (LMP Unknown)   SpO2 94%   BMI 47.85 kg/m²     Intake/Output Summary (Last 24 hours) at 9/29/2022 1039  Last data filed at 9/28/2022 1245  Gross per 24 hour   Intake 240 ml   Output --   Net 240 ml      Wt Readings from Last 3 Encounters:   09/29/22 245 lb (111.1 kg)   06/21/22 243 lb 9.6 oz (110.5 kg)   10/06/21 225 lb (102.1 kg)       General appearance:  Appears comfortable  Eyes: Sclera clear. Pupils equal.  ENT: Moist oral mucosa. Trachea midline, no adenopathy. Cardiovascular: Regular rhythm, normal S1, S2. No murmur. No edema in lower extremities  Respiratory: Not using accessory muscles. Good inspiratory effort. Clear to auscultation bilaterally, no wheeze or crackles. GI: Abdomen soft, no tenderness, not distended  Musculoskeletal: No cyanosis in digits, neck supple  Neurology: CN 2-12 grossly intact. No speech or motor deficits  Psych: Normal affect. Alert and oriented in time, place and person  Skin: Warm, dry, normal turgor    Labs and Tests:  CBC:   Recent Labs     09/28/22  0702 09/28/22  0828 09/29/22 0922   WBC 5.9 6.5 5.8   HGB 8.9* 9.2* 8.9*   PLT 93* 95* 89*     BMP:    Recent Labs     09/27/22  0558 09/28/22  0828 09/29/22 0922    138 143   K 4.6 3.6 3.9   CL 99 100 105   CO2 24 25 24   BUN 42* 48* 35*   CREATININE 1.3* 1.2 1.0   GLUCOSE 114* 206* 159*     Hepatic: No results for input(s): AST, ALT, ALB, BILITOT, ALKPHOS in the last 72 hours.     ASSESSMENT AND PLAN    Principal Problem:    Dyspnea on exertion  Active Problems:    Acute on chronic diastolic heart failure (HCC)    Thrombocytopenia (HCC)    Anemia    Wrist arthritis    Elevated troponin    Chronic atrial fibrillation (HCC)    DM (diabetes mellitus) ([facility]    Coronary artery disease involving native coronary artery of native heart without angina pectoris    PAF (paroxysmal atrial fibrillation) ([facility]    Peripheral vascular disease ([facility]    Mixed hyperlipidemia  Resolved Problems:    * No resolved hospital problems. *      1. Mild anemia and thrombocytopenia:     --At the time of admission hemoglobin was 9.1 and platelet count was 90. WBC count was normal  -In June 2022, CBC was within normal limits. Iron studies from 9/25/2022 did not show any evidence of deficiency, folate was normal, B12 was normal (patient takes B12 injections at home)     -SPEP/QUENTIN is pending. Light chains are not significantly elevated.      -No evidence of hemolytic anemia. -Exact etiology of cytopenias is not clear. It appears to be reactive and possibly due to systemic process - ? Infection.     -Discussed bone marrow biopsy with the patient and her daughter. Patient and daughter agree to bone marrow bx. Orders placed       2. Left chest pain     -CT of the chest with contrast has been ordered.         Jannette Pham, [address] [facility]  [facility]

## 2022-09-29 NOTE — PROGRESS NOTES
Speech Language Pathology  Attempt/Hold Note    Larry Wilson  1940    SLP attempted to see pt this date for dysphagia intervention. Chart reviewed, spoke with RN. Pt currently NPO for procedure this date. Will hold and attempt again as pt is appropriate to participate.     Luba Bello, [address] [facility]  Speech-Language Pathologist  [address] [phone]

## 2022-09-29 NOTE — PLAN OF CARE
Left message for parent to call back.    Susanne Bishop RN  [facility]  [phone]     Problem: Discharge Planning  Goal: Discharge to home or other facility with appropriate resources  Outcome: Progressing     Problem: Pain  Goal: Verbalizes/displays adequate comfort level or baseline comfort level  9/29/2022 1247 by Pedrito Arryoo RN  Outcome: Progressing     Problem: Safety - Adult  Goal: Free from fall injury  9/29/2022 1247 by Pedrito Arroyo RN  Outcome: Progressing

## 2022-09-29 NOTE — CARE COORDINATION
Discharge Planning Note:    CM spoke to Andrez Salmeron in admissions at [facility] to f/u on referral.  Andrez Salmeron indicated she had not received the referral therefore identifiers were provided. Andrez Salmeron will review pt records for possible placement and call this CM back. CM direct contact information provided.      Electronically signed by Patti Fisher RN on 9/29/2022 at 8:51 AM

## 2022-09-30 LAB
ANION GAP SERPL CALCULATED.3IONS-SCNC: 10 MMOL/L (ref 3–16)
BUN BLDV-MCNC: 40 MG/DL (ref 7–20)
CALCIUM SERPL-MCNC: 9.7 MG/DL (ref 8.3–10.6)
CHLORIDE BLD-SCNC: 105 MMOL/L (ref 99–110)
CO2: 27 MMOL/L (ref 21–32)
CREAT SERPL-MCNC: 1.2 MG/DL (ref 0.6–1.2)
EKG ATRIAL RATE: 51 BPM
EKG DIAGNOSIS: NORMAL
EKG P AXIS: 231 DEGREES
EKG P-R INTERVAL: 160 MS
EKG Q-T INTERVAL: 544 MS
EKG QRS DURATION: 174 MS
EKG QTC CALCULATION (BAZETT): 491 MS
EKG R AXIS: -85 DEGREES
EKG T AXIS: 10 DEGREES
EKG VENTRICULAR RATE: 49 BPM
GFR AFRICAN AMERICAN: 52
GFR NON-AFRICAN AMERICAN: 43
GLUCOSE BLD-MCNC: 124 MG/DL (ref 70–99)
GLUCOSE BLD-MCNC: 177 MG/DL (ref 70–99)
GLUCOSE BLD-MCNC: 209 MG/DL (ref 70–99)
GLUCOSE BLD-MCNC: 267 MG/DL (ref 70–99)
GLUCOSE BLD-MCNC: 354 MG/DL (ref 70–99)
HCT VFR BLD CALC: 27.4 % (ref 36–48)
HEMOGLOBIN: 9 G/DL (ref 12–16)
MCH RBC QN AUTO: 32.1 PG (ref 26–34)
MCHC RBC AUTO-ENTMCNC: 33 G/DL (ref 31–36)
MCV RBC AUTO: 97.1 FL (ref 80–100)
PDW BLD-RTO: 21.5 % (ref 12.4–15.4)
PERFORMED ON: ABNORMAL
PLATELET # BLD: 88 K/UL (ref 135–450)
PMV BLD AUTO: 8.4 FL (ref 5–10.5)
POTASSIUM SERPL-SCNC: 4.4 MMOL/L (ref 3.5–5.1)
RBC # BLD: 2.82 M/UL (ref 4–5.2)
SODIUM BLD-SCNC: 142 MMOL/L (ref 136–145)
WBC # BLD: 5.5 K/UL (ref 4–11)

## 2022-09-30 PROCEDURE — 2500000003 HC RX 250 WO HCPCS: Performed by: PHYSICIAN ASSISTANT

## 2022-09-30 PROCEDURE — 92526 ORAL FUNCTION THERAPY: CPT

## 2022-09-30 PROCEDURE — 2580000003 HC RX 258: Performed by: PHYSICIAN ASSISTANT

## 2022-09-30 PROCEDURE — 85027 COMPLETE CBC AUTOMATED: CPT

## 2022-09-30 PROCEDURE — 99232 SBSQ HOSP IP/OBS MODERATE 35: CPT | Performed by: NURSE PRACTITIONER

## 2022-09-30 PROCEDURE — 1200000000 HC SEMI PRIVATE

## 2022-09-30 PROCEDURE — 6370000000 HC RX 637 (ALT 250 FOR IP): Performed by: INTERNAL MEDICINE

## 2022-09-30 PROCEDURE — 6370000000 HC RX 637 (ALT 250 FOR IP): Performed by: FAMILY MEDICINE

## 2022-09-30 PROCEDURE — 6370000000 HC RX 637 (ALT 250 FOR IP): Performed by: PHYSICIAN ASSISTANT

## 2022-09-30 PROCEDURE — 6370000000 HC RX 637 (ALT 250 FOR IP): Performed by: NURSE PRACTITIONER

## 2022-09-30 PROCEDURE — 80048 BASIC METABOLIC PNL TOTAL CA: CPT

## 2022-09-30 PROCEDURE — 36415 COLL VENOUS BLD VENIPUNCTURE: CPT

## 2022-09-30 PROCEDURE — 93005 ELECTROCARDIOGRAM TRACING: CPT | Performed by: NURSE PRACTITIONER

## 2022-09-30 PROCEDURE — 93010 ELECTROCARDIOGRAM REPORT: CPT | Performed by: INTERNAL MEDICINE

## 2022-09-30 RX ADMIN — OXYCODONE 2.5 MG: 5 TABLET ORAL at 22:53

## 2022-09-30 RX ADMIN — TIZANIDINE 2 MG: 4 TABLET ORAL at 14:02

## 2022-09-30 RX ADMIN — MICONAZOLE NITRATE: 20 POWDER TOPICAL at 09:38

## 2022-09-30 RX ADMIN — INSULIN LISPRO 17 UNITS: 100 INJECTION, SOLUTION INTRAVENOUS; SUBCUTANEOUS at 09:51

## 2022-09-30 RX ADMIN — INSULIN GLARGINE 42 UNITS: 100 INJECTION, SOLUTION SUBCUTANEOUS at 09:41

## 2022-09-30 RX ADMIN — LEVOTHYROXINE SODIUM 150 MCG: 0.15 TABLET ORAL at 06:06

## 2022-09-30 RX ADMIN — TIZANIDINE 2 MG: 4 TABLET ORAL at 20:08

## 2022-09-30 RX ADMIN — METOPROLOL TARTRATE 12.5 MG: 25 TABLET, FILM COATED ORAL at 09:39

## 2022-09-30 RX ADMIN — INSULIN LISPRO 7 UNITS: 100 INJECTION, SOLUTION INTRAVENOUS; SUBCUTANEOUS at 16:59

## 2022-09-30 RX ADMIN — ATORVASTATIN CALCIUM 80 MG: 80 TABLET, FILM COATED ORAL at 20:09

## 2022-09-30 RX ADMIN — ACETAMINOPHEN 1000 MG: 500 TABLET ORAL at 06:06

## 2022-09-30 RX ADMIN — Medication 10 ML: at 20:11

## 2022-09-30 RX ADMIN — TIZANIDINE 2 MG: 4 TABLET ORAL at 09:37

## 2022-09-30 RX ADMIN — SACUBITRIL AND VALSARTAN 0.5 TABLET: 24; 26 TABLET, FILM COATED ORAL at 09:37

## 2022-09-30 RX ADMIN — METOPROLOL TARTRATE 12.5 MG: 25 TABLET, FILM COATED ORAL at 20:08

## 2022-09-30 RX ADMIN — DICLOFENAC SODIUM 2 G: 10 GEL TOPICAL at 20:11

## 2022-09-30 RX ADMIN — EMPAGLIFLOZIN 10 MG: 10 TABLET, FILM COATED ORAL at 09:40

## 2022-09-30 RX ADMIN — INSULIN LISPRO 8 UNITS: 100 INJECTION, SOLUTION INTRAVENOUS; SUBCUTANEOUS at 17:18

## 2022-09-30 RX ADMIN — ACETAMINOPHEN 1000 MG: 500 TABLET ORAL at 20:10

## 2022-09-30 RX ADMIN — PREDNISONE 10 MG: 10 TABLET ORAL at 09:39

## 2022-09-30 RX ADMIN — FUROSEMIDE 20 MG: 20 TABLET ORAL at 09:38

## 2022-09-30 RX ADMIN — RIVAROXABAN 20 MG: 20 TABLET, FILM COATED ORAL at 16:59

## 2022-09-30 RX ADMIN — GABAPENTIN 600 MG: 300 CAPSULE ORAL at 20:09

## 2022-09-30 RX ADMIN — Medication 10 ML: at 09:41

## 2022-09-30 RX ADMIN — SACUBITRIL AND VALSARTAN 0.5 TABLET: 24; 26 TABLET, FILM COATED ORAL at 20:10

## 2022-09-30 RX ADMIN — CETIRIZINE HYDROCHLORIDE 5 MG: 10 TABLET, FILM COATED ORAL at 09:38

## 2022-09-30 RX ADMIN — INSULIN LISPRO 4 UNITS: 100 INJECTION, SOLUTION INTRAVENOUS; SUBCUTANEOUS at 09:52

## 2022-09-30 RX ADMIN — PANTOPRAZOLE SODIUM 40 MG: 40 TABLET, DELAYED RELEASE ORAL at 06:06

## 2022-09-30 RX ADMIN — OXYCODONE 5 MG: 5 TABLET ORAL at 14:02

## 2022-09-30 RX ADMIN — GABAPENTIN 300 MG: 300 CAPSULE ORAL at 09:40

## 2022-09-30 RX ADMIN — ISOSORBIDE MONONITRATE 30 MG: 30 TABLET, EXTENDED RELEASE ORAL at 09:38

## 2022-09-30 RX ADMIN — OXYBUTYNIN CHLORIDE 15 MG: 5 TABLET, EXTENDED RELEASE ORAL at 09:40

## 2022-09-30 ASSESSMENT — PAIN SCALES - GENERAL
PAINLEVEL_OUTOF10: 0
PAINLEVEL_OUTOF10: 0
PAINLEVEL_OUTOF10: 10
PAINLEVEL_OUTOF10: 0
PAINLEVEL_OUTOF10: 4
PAINLEVEL_OUTOF10: 4
PAINLEVEL_OUTOF10: 7

## 2022-09-30 NOTE — CARE COORDINATION
SW received a call from [facility] originally declining patient for SNF d/t language barrier issues and not having interpreters. SW met with pt and dtr to inform. Dtr reported she has been to this facility in the past.  Pt reported she did not want to go anywhere else. Called Anthony Medina back at [address] [place] [address] Drive she had been there before and family's request to reconsider. Anthony Medina spoke with her director who stated yes they will accept for SNF at discharge, and that they will have the same plan in place for communication. Dtr reports she can be at the facility daily and that patient does understand some English (SW witnessed this during the visit in pt's room today). Dtr Nela Ordoñez and patient informed of [facility]'s acceptance. Discharge Plan:  [facility] when medically stable. Will need COVID test prior to discharge.       Electronically signed by MADELINE Brody, JEOVANNY on 9/30/2022 at 4:25 PM

## 2022-09-30 NOTE — CONSULTS
Gastrointestinal Consult Note    Patient: Shan Youssef CSN: [id number]     YOB: 1940  Age: 78 y.o. Sex: female    Unit: [address]  NURSING Room/Bed: [facility]-3304/3304-01 Location: [address] [place] [address]     Admitting Physician: Anthony Plascencia    Date of  Admission: 9/24/2022   Admission type: Emergency  Primary Care Physician: MATEUSZ Barrientos CNP          Referring Physician: [unfilled]    Chief Complaint: Difficulty swallowing  Consult Date: 9/30/2022     Subjective:     History of Present Illness: Shan Youssef is a 78 y.o. female who is seen at the request of Anthony Plascencia for difficulty swallowing  Very pleasant 75-year-old patient who comes in because she has been having issues with her heart but also been found to be anemic and she has an elevated BUN she was seen by the oncology division and they believe she may have a myelodysplastic syndrome    We were called to see and evaluate her because she has been having problems with swallowing    Her swallowing issues have been going on for about a year  She has problems swallowing both solids and liquids  She also has problems when she eats sometimes she has to go to vomit  She has been on Zantac for a long time    So were asked to see and evaluate her for this prior. Past Medical History:   Diagnosis Date    4/11/17 Left knee repeat repair of median parapatellar arthrotomy with augmentation 4/12/2017    Arthritis     Atrial fibrillation (HCC)     CAD (coronary artery disease)     Cataract     Chronic diastolic congestive heart failure ([address] 7/27/2022    Diabetes mellitus ([address]     GERD (gastroesophageal reflux disease)     Hyperlipidemia     Hypertension     HYPOTHRYROIDISM     Non-English speaking patient     SPEAKS Kinyarwanda.   SHE SPEAKS A LITTLE ENGLISH    Sleep apnea     unspecified    Thyroid disease     UTI      Past Surgical History:   Procedure Laterality Date    CARDIAC SURGERY  2004    CABG X 4 VESSELS CHOLECYSTECTOMY, LAPAROSCOPIC  5/15/14    with [facility]    CT BONE MARROW BIOPSY  9/29/2022    CT BONE MARROW BIOPSY 9/29/2022 [facility] CT SCAN    JOINT REPLACEMENT Bilateral 2000 AND 1996 And 1998    knee    KNEE SURGERY Left 02/28/2017    left knee poly exchange revision     REVISION TOTAL KNEE ARTHROPLASTY Right 11/22/2016    RIGHT TOTAL KNEE REVISION WITH POLY EXCHANGE    TOTAL KNEE ARTHROPLASTY      partical/ left       Family History   Problem Relation Age of Onset    Arthritis Other     Diabetes Other     Heart Disease Other     Hypertension Other     High Cholesterol Brother      Social History     Tobacco Use    Smoking status: Never    Smokeless tobacco: Never   Substance Use Topics    Alcohol use: No      Allergies   Allergen Reactions    Aspirin Other (See Comments)     GI upset    Ibuprofen Other (See Comments)     GI upset    Macrobid [Nitrofurantoin Monohydrate Macrocrystals] Other (See Comments)     COLD, SICK    Adhesive Tape Rash    Lexiscan [Regadenoson] Rash     Happened twice with Lexiscan    Penicillins Rash     Prior to Admission medications    Medication Sig Start Date End Date Taking?  Authorizing Provider   lisinopril (PRINIVIL;ZESTRIL) 2.5 MG tablet Take 2.5 mg by mouth daily   Yes Historical Provider, MD   benzonatate (TESSALON) 100 MG capsule Take 100 mg by mouth 3 times daily as needed for Cough   Yes Historical Provider, MD   pantoprazole (PROTONIX) 40 MG tablet Take 40 mg by mouth every morning (before breakfast)   Yes Historical Provider, MD   cefaclor (CECLOR) 250 MG capsule Take 500 mg by mouth 3 times daily   Yes Historical Provider, MD   calcium carbonate (OSCAL) 500 MG TABS tablet Take 500 mg by mouth daily   Yes Historical Provider, MD   sacubitril-valsartan (ENTRESTO) 24-26 MG per tablet Take 1 tablet by mouth 2 times daily  Patient not taking: Reported on 9/25/2022 9/8/22   Pedro Hatchet, APRN - CNP   isosorbide mononitrate (IMDUR) 30 MG extended release tablet Take 1 tablet by mouth in the morning. .  Patient not taking: Reported on 9/25/2022 8/12/22   MATEUSZ Marquez CNP   furosemide (LASIX) 20 MG tablet Take 1 tablet by mouth in the morning and 1 tablet before bedtime. Patient not taking: Reported on 9/25/2022 8/9/22   MATEUSZ Marquez CNP   dapagliflozin (FARXIGA) 10 MG tablet Take 1 tablet by mouth every morning  Patient not taking: Reported on 9/25/2022 6/29/22   MATEUSZ Marquez CNP   gabapentin (NEURONTIN) 300 MG capsule Take 1 tablet in am, 2 tablets in pm. 6/20/22   MATEUSZ Yousif CNP   acetaminophen-codeine (TYLENOL #3) 300-30 MG per tablet Take 1 tablet by mouth every 6 hours as needed for Pain. 6/20/22   MATEUSZ Silva CNP   insulin glargine (BASAGLAR KWIKPEN) 100 UNIT/ML injection pen Inject 55 Units into the skin 2 times daily    Historical Provider, MD   triamcinolone (KENALOG) 0.1 % cream Apply topically 2 times daily as needed Apply topically 2 times daily.     Historical Provider, MD   senna (SENOKOT) 8.6 MG tablet Take 1 tablet by mouth 2 times daily    Historical Provider, MD   insulin aspart (NOVOLOG) 100 UNIT/ML injection vial Inject 15 Units into the skin 3 times daily    Historical Provider, MD   phenazopyridine (PYRIDIUM) 100 MG tablet Take 100 mg by mouth 2 times daily    Historical Provider, MD   magnesium 30 MG tablet Take 40 mg by mouth daily    Historical Provider, MD   nystatin (MYCOSTATIN) 716426 UNIT/GM powder Apply topically 2 times daily    Historical Provider, MD   tiZANidine (ZANAFLEX) 2 MG tablet Take 2 mg by mouth every 8 hours as needed    Historical Provider, MD   metoprolol tartrate (LOPRESSOR) 25 MG tablet TAKE ONE-HALF TABLET BY MOUTH TWICE A DAY 7/29/20   Marielos Carvalho MD   atorvastatin (LIPITOR) 80 MG tablet Take 1 tablet by mouth daily 7/29/20   Marielos Carvalho MD   Insulin Pen Needle 32G X 6 MM MISC by Does not apply route 3 times daily with meals    Historical Provider, MD   hydrocortisone (ANUSOL-HC) 2.5 % rectal cream Place rectally 2 times daily Place rectally 2 times daily. Historical Provider, MD   rivaroxaban (XARELTO) 20 MG TABS tablet Take 1 tablet by mouth daily 2/22/17   Andrew Nieto MD   vitamin D (ERGOCALCIFEROL) 15400 UNITS CAPS capsule Take 50,000 Units by mouth once a week    Historical Provider, MD   Omega 3 1000 MG CAPS Take 1,000 mg by mouth daily    Historical Provider, MD   Liraglutide (VICTOZA) 18 MG/3ML SOPN SC injection Inject 1.2 mg into the skin daily    Historical Provider, MD   Calcium Carb-Cholecalciferol (OYSTER SHELL CALCIUM + D PO) Take by mouth daily   Patient not taking: Reported on 9/25/2022    Historical Provider, MD   Biotin 5000 MCG TABS Take by mouth daily  Patient not taking: Reported on 9/25/2022    Historical Provider, MD   lidocaine (LIDODERM) 5 % Place 1 patch onto the skin daily 12 hours on, 12 hours off. Patient taking differently: Place 3 patches onto the skin daily 2 to 3 patches shoulders and back, 12 hours on, 12 hours off. 7/14/15   MATEUSZ Sol - CNP   oxybutynin (DITROPAN XL) 15 MG CR tablet Take 15 mg by mouth daily. Historical Provider, MD   Loratadine 10 MG CAPS Take by mouth daily     Historical Provider, MD   levothyroxine (SYNTHROID) 175 MCG tablet Take 150 mcg by mouth daily. Historical Provider, MD        Review of Systems:    12 point review of systems has been evaluated and is all negative    Objective:     Physical Exam:  /63   Pulse 69   Temp 99 °F (37.2 °C) (Oral)   Resp 18   Ht 5' (1.524 m)   Wt 242 lb 6.4 oz (110 kg)   LMP  (LMP Unknown)   SpO2 97%   BMI 47.34 kg/m²      General: Well nourished, well developed, Other, female  HEENT: Normocephalic, atraumatic, sclera anicteric, mucosal membranes moist  Cardiovascular: Regular rate and rhythm. Respiratory:  Clear to auscultation in the anterior lung fields bilaterally  GI:  Abdomen nondistended, soft, and nontender. Normal active bowel sounds.  No enlargement of the liver or spleen. No masses palpable. Rectal:  Deferred  Musculoskeletal:  No pitting edema of the lower legs. Neurological:  Gross memory appears intact. Patient is alert and oriented.     Scheduled Meds:   sacubitril-valsartan  0.5 tablet Oral BID    furosemide  20 mg Oral Daily    insulin lispro  0-16 Units SubCUTAneous TID WC    insulin lispro  0-4 Units SubCUTAneous Nightly    insulin glargine  42 Units SubCUTAneous Daily    predniSONE  10 mg Oral Daily    diclofenac sodium  2 g Topical BID    insulin lispro  17 Units SubCUTAneous QAM AC    insulin lispro  17 Units SubCUTAneous Daily before lunch    insulin lispro  7 Units SubCUTAneous Daily before dinner    atorvastatin  80 mg Oral Nightly    isosorbide mononitrate  30 mg Oral Daily    levothyroxine  150 mcg Oral Daily    metoprolol tartrate  12.5 mg Oral BID    miconazole   Topical BID    oxybutynin  15 mg Oral Daily    empagliflozin  10 mg Oral Daily    sodium chloride flush  10 mL IntraVENous 2 times per day    pantoprazole  40 mg Oral QAM AC    lidocaine  1 patch TransDERmal Daily    cetirizine  5 mg Oral Daily    gabapentin  300 mg Oral Daily    And    gabapentin  600 mg Oral Nightly    rivaroxaban  20 mg Oral Daily    acetaminophen  1,000 mg Oral 3 times per day    tiZANidine  2 mg Oral TID     Continuous Infusions:   sodium chloride 10 mL/hr (09/25/22 2117)    dextrose       PRN Meds:simethicone, sodium chloride, Polyvinyl Alcohol-Povidone PF, sodium chloride flush, sodium chloride, ondansetron, dextrose bolus **OR** dextrose bolus, glucagon (rDNA), dextrose, polyethylene glycol, oxyCODONE **OR** oxyCODONE    Imaging:  XR CHEST (2 VW)    Result Date: 9/24/2022  EXAMINATION: TWO XRAY VIEWS OF THE CHEST 9/24/2022 6:35 pm COMPARISON: 06/16/2022 HISTORY: ORDERING SYSTEM PROVIDED HISTORY: Chest Discomfort TECHNOLOGIST PROVIDED HISTORY: Reason for exam:->Chest Discomfort Reason for Exam: Wrist Pain (Japanese # [id number] -Patient brought in by squad from home with left wrist, right rib pain. No injury. ) FINDINGS: The heart is mildly enlarged and less prominent the pulmonary vessels are engorged centrally and less prominent. The lungs are hyperinflated. No consolidation or effusion is seen. The bones are intact. There are postop changes along the mediastinum and sternum which is unchanged. There are mild subsegmental linear densities scattered along the lung bases which is less prominent. Mild cardiomegaly with mild central pulmonary congestion or pulmonary artery hypertension which is less prominent. Mild chronic obstructive lung changes with mild bibasilar discoid atelectasis or scarring which is less prominent with no obvious infiltrate or effusion. XR WRIST LEFT (MIN 3 VIEWS)    Result Date: 9/24/2022  EXAMINATION: 3 XRAY VIEWS OF THE LEFT WRIST 9/24/2022 6:35 pm COMPARISON: None. HISTORY: ORDERING SYSTEM PROVIDED HISTORY: pain TECHNOLOGIST PROVIDED HISTORY: Reason for exam:->pain FINDINGS: There is mild narrowing of the radiocarpal joint. No acute fracture or dislocation is seen. There is moderate narrowing along the base of the thumb with prominent osteophytes throughout the joint. There is some linear calcifications just distal to the ulna which probably within the TFCC. There are vascular calcifications along the palmar aspect of the wrist.  There is mild soft tissue swelling along the dorsum of the wrist.  The bones are osteopenic. No obvious acute fracture or dislocation can be seen. Mild osteoarthritic changes along the radiocarpal joint and moderate degenerative arthritic changes along the base of the thumb with no acute bony abnormality seen. Vascular calcifications along the palmar aspect of the wrist Mild soft tissue swelling around the wrist and diffuse osteopenia. Probable mild chondrocalcinosis of the TFCC.      CT CHEST W CONTRAST    Result Date: 9/28/2022  EXAMINATION: CT OF THE CHEST WITH CONTRAST 9/28/2022 1:02 pm TECHNIQUE: CT of the chest was performed with the administration of intravenous contrast. Multiplanar reformatted images are provided for review. Automated exposure control, iterative reconstruction, and/or weight based adjustment of the mA/kV was utilized to reduce the radiation dose to as low as reasonably achievable. COMPARISON: Chest CT 05/10/2014 HISTORY: ORDERING SYSTEM PROVIDED HISTORY: left chest pain FINDINGS: Technical: Evaluation of the lungs degraded because of motion during imaging, blurring detail and resulting in misregistration artifact. Mediastinum: Mild main pulmonary artery dilatation 31 mm (axial series 2, image 51). Ascending thoracic aorta 37 mm and descending thoracic aorta 25 mm. Status post CABG. Cardiomegaly. The great vessels appear unremarkable with exception of calcific atherosclerotic disease. Moderate to severe calcific atherosclerosis coronary arteries. No pericardial effusion. Posterior mediastinal structures appear unremarkable. No mediastinal or hilar adenopathy. Small hiatal hernia with what appears to be a small retained capsule within the hiatal hernia. Lungs/pleura: No focal pulmonary infiltrate evident. No soft tissue or ground-glass pulmonary nodule is seen. No inspissated secretions or endobronchial lesion evident. No pleural effusion or pneumothorax. Upper Abdomen: Unremarkable appearance. Soft Tissues/Bones: No acute superficial soft tissue or osseous structure abnormality evident. Old healed fractures left ribs 4, 5 and 6.     1. No definite acute pulmonary disease. Evaluation of the lungs degraded because of motion during imaging, blurring detail and resulting in misregistration artifact. 2. Moderate to severe calcific atherosclerosis coronary arteries. 3. Mild main pulmonary artery dilatation can be seen with pulmonary hypertension but is nonspecific. 4. Cardiomegaly and sequela from CABG.  5. Small hiatal hernia with what appears to be a small retained capsule within the hiatal hernia. CT GUIDED NEEDLE PLACEMENT    Result Date: 9/29/2022  PROCEDURE: CT GUIDED BONE MARROW ASPIRATION AND CORE NEEDLE BONE BIOPSY OF THE RIGHT ILIAC BONE. MODERATE CONSCIOUS SEDATION 9/29/2022 HISTORY: ORDERING SYSTEM PROVIDED HISTORY: anemia, thrombocytopenia TECHNOLOGIST PROVIDED HISTORY: Reason for exam:->anemia, thrombocytopenia Reason for Exam: anemia, thrombocytopenia SEDATION: 2 mgversed and 50 mcg fentanyl were titrated intravenously for moderate sedation monitored under my direction. Total intraservice time of sedation was 5 minutes. The patient's vital signs were monitored throughout the procedure and recorded in the patient's medical record by the nurse. TECHNIQUE: Informed consent was obtained following a detailed explanation of the procedure including risks, benefits, and alternatives. Universal protocol was followed. Sterile gowns, masks, hats and gloves utilized for maximal sterile barrier. Axial images were obtained through the iliac bones using CT guidance and a suitable skin site was prepped and draped in sterile fashion. Local anesthesia was achieved with lidocaine. An 11 gauge [employer] bone marrow biopsy needle was advanced into the right iliac bone and approximately 15 mL of bone marrow aspirate was obtained. A single core biopsy specimen was obtained and the patient tolerated the procedure well. Estimated blood loss: Less than 5 cc Automated exposure control, iterative reconstruction, and/or weight based adjustment of the mA/kV was utilized to reduce the radiation dose to as low as reasonably achievable. DLP: 71.66 mGy-cm     Successful CT guided bone marrow aspiration and core biopsy of the right iliac bone. CT BIOPSY BONE MARROW    Result Date: 9/29/2022  PROCEDURE: CT GUIDED BONE MARROW ASPIRATION AND CORE NEEDLE BONE BIOPSY OF THE RIGHT ILIAC BONE.  MODERATE CONSCIOUS SEDATION 9/29/2022 HISTORY: ORDERING SYSTEM PROVIDED HISTORY: anemia, thrombocytopenia TECHNOLOGIST PROVIDED HISTORY: Reason for exam:->anemia, thrombocytopenia Reason for Exam: anemia, thrombocytopenia SEDATION: 2 mgversed and 50 mcg fentanyl were titrated intravenously for moderate sedation monitored under my direction. Total intraservice time of sedation was 5 minutes. The patient's vital signs were monitored throughout the procedure and recorded in the patient's medical record by the nurse. TECHNIQUE: Informed consent was obtained following a detailed explanation of the procedure including risks, benefits, and alternatives. Universal protocol was followed. Sterile gowns, masks, hats and gloves utilized for maximal sterile barrier. Axial images were obtained through the iliac bones using CT guidance and a suitable skin site was prepped and draped in sterile fashion. Local anesthesia was achieved with lidocaine. An 11 gauge [employer]time bone marrow biopsy needle was advanced into the right iliac bone and approximately 15 mL of bone marrow aspirate was obtained. A single core biopsy specimen was obtained and the patient tolerated the procedure well. Estimated blood loss: Less than 5 cc Automated exposure control, iterative reconstruction, and/or weight based adjustment of the mA/kV was utilized to reduce the radiation dose to as low as reasonably achievable. DLP: 71.66 mGy-cm     Successful CT guided bone marrow aspiration and core biopsy of the right iliac bone.        Labs Reviewed:   Arlene Hawthorne data has been eval  @ONSQNOYU17(wbc,hgb,hct,plt)@  @TCZACKQJ41(na,k,cl,co2,agap,glu,bun,cret,GFRAA,GFRNA)@  @BPYHCQCS30(PTT1,PT1,INR)@  @NEXVDIHU49(ALB,cbil,tbil,TP,GLOB,AGRAT,sgot,astpoc,altpoc,ALT,gpt,AP)@  @ICJGNBAP89(amyl,lipa)@        Assessment/Plan:     Principal Problem:    Dyspnea on exertion  Active Problems:    Acute on chronic diastolic heart failure (HCC)    Thrombocytopenia (HCC)    Anemia    Wrist arthritis    Elevated troponin    Chronic atrial fibrillation (HCC)    DM (diabetes mellitus) ([facility]    Coronary artery disease involving native coronary artery of native heart without angina pectoris    PAF (paroxysmal atrial fibrillation) (HCC)    Peripheral vascular disease (HCC)    Mixed hyperlipidemia  Resolved Problems:    * No resolved hospital problems. *  Dysphagia. ... There is a high likelihood that this is either due to reflux and she has a stricture  Or that she could have some form of achalasia    CT of the chest does not show anything overt there does appear to be some type of a pill stuck in her hiatal hernia    Plan: This patient needs an upper endoscopic evaluation  However because this is a Friday unfortunately we most likely will not be able to do this until Monday    In the short-term she should be on a proton pump inhibitor twice daily    Continue to follow her thank you    Signed By: Concepción Barrios MD     September 30, 2022      Please note that some or all of this record was generated using voice recognition software. If there are any questions about the content of this document, please contact the author as some errors in translation may have occurred.

## 2022-09-30 NOTE — PROGRESS NOTES
[facility]   Progress Note      9/30/2022 2:52 PM        Name: Fransico Hernandez .               Admitted: 9/24/2022    SUBJECTIVE:  She is doing OK, she c/o pain in her left thumb/wrist,   Has some issues with swallowing    Reviewed interval ancillary notes    Current Medications  sacubitril-valsartan (ENTRESTO) 24-26 MG per tablet 0.5 tablet, BID  furosemide (LASIX) tablet 20 mg, Daily  simethicone (MYLICON) chewable tablet 80 mg, Q6H PRN  insulin lispro (HUMALOG) injection vial 0-16 Units, TID WC  insulin lispro (HUMALOG) injection vial 0-4 Units, Nightly  sodium chloride (OCEAN, BABY AYR) 0.65 % nasal spray 2 spray, PRN  Polyvinyl Alcohol-Povidone PF 1.4-0.6 % SOLN 2 drop, PRN  insulin glargine (LANTUS) injection vial 42 Units, Daily  predniSONE (DELTASONE) tablet 10 mg, Daily  diclofenac sodium (VOLTAREN) 1 % gel 2 g, BID  insulin lispro (HUMALOG) injection vial 17 Units, QAM AC  insulin lispro (HUMALOG) injection vial 17 Units, Daily before lunch  insulin lispro (HUMALOG) injection vial 7 Units, Daily before dinner  atorvastatin (LIPITOR) tablet 80 mg, Nightly  isosorbide mononitrate (IMDUR) extended release tablet 30 mg, Daily  levothyroxine (SYNTHROID) tablet 150 mcg, Daily  metoprolol tartrate (LOPRESSOR) tablet 12.5 mg, BID  miconazole (MICOTIN) 2 % powder, BID  oxybutynin (DITROPAN-XL) extended release tablet 15 mg, Daily  empagliflozin (JARDIANCE) tablet 10 mg, Daily  sodium chloride flush 0.9 % injection 10 mL, 2 times per day  sodium chloride flush 0.9 % injection 10 mL, PRN  0.9 % sodium chloride infusion, PRN  ondansetron (ZOFRAN) injection 4 mg, Q6H PRN  dextrose bolus 10% 125 mL, PRN   Or  dextrose bolus 10% 250 mL, PRN  glucagon (rDNA) injection 1 mg, PRN  dextrose 10 % infusion, Continuous PRN  polyethylene glycol (GLYCOLAX) packet 17 g, Daily PRN  pantoprazole (PROTONIX) tablet 40 mg, QAM AC  lidocaine 4 % external patch 1 patch, Daily  cetirizine (ZYRTEC) tablet 5 mg, Daily  gabapentin (NEURONTIN) capsule 300 mg, Daily   And  gabapentin (NEURONTIN) capsule 600 mg, Nightly  rivaroxaban (XARELTO) tablet 20 mg, Daily  acetaminophen (TYLENOL) tablet 1,000 mg, 3 times per day  tiZANidine (ZANAFLEX) tablet 2 mg, TID  oxyCODONE (ROXICODONE) immediate release tablet 2.5 mg, Q4H PRN   Or  oxyCODONE (ROXICODONE) immediate release tablet 5 mg, Q4H PRN      Objective:  BP (!) 114/59   Pulse 56   Temp 98.2 °F (36.8 °C) (Oral)   Resp 18   Ht 5' (1.524 m)   Wt 242 lb 6.4 oz (110 kg)   LMP  (LMP Unknown)   SpO2 96%   BMI 47.34 kg/m²     Intake/Output Summary (Last 24 hours) at 9/30/2022 1452  Last data filed at 9/30/2022 0606  Gross per 24 hour   Intake 480 ml   Output 825 ml   Net -345 ml        Wt Readings from Last 3 Encounters:   09/30/22 242 lb 6.4 oz (110 kg)   06/21/22 243 lb 9.6 oz (110.5 kg)   10/06/21 225 lb (102.1 kg)       Conscious alert oriented. Mild pallor + or no icterus  Respiratory efforts are normal.  Abdomen is not distended. Extremities no edema  Neuro no focal deficits noted    Labs and Tests:  CBC:   Recent Labs     09/28/22 0828 09/29/22  0922 09/30/22  0504   WBC 6.5 5.8 5.5   HGB 9.2* 8.9* 9.0*   PLT 95* 89* 88*       BMP:    Recent Labs     09/28/22  0828 09/29/22  0922 09/30/22  0504    143 142   K 3.6 3.9 4.4    105 105   CO2 25 24 27   BUN 48* 35* 40*   CREATININE 1.2 1.0 1.2   GLUCOSE 206* 159* 124*       Hepatic: No results for input(s): AST, ALT, ALB, BILITOT, ALKPHOS in the last 72 hours.     ASSESSMENT AND PLAN    Principal Problem:    Dyspnea on exertion  Active Problems:    Acute on chronic diastolic heart failure (HCC)    Thrombocytopenia (HCC)    Anemia    Wrist arthritis    Elevated troponin    Chronic atrial fibrillation (HCC)    DM (diabetes mellitus) ([facility]    Coronary artery disease involving native coronary artery of native heart without angina pectoris    PAF (paroxysmal atrial fibrillation) ([facility]    Peripheral vascular disease [facility])    Mixed hyperlipidemia  Resolved Problems:    * No resolved hospital problems. *      1. Mild anemia and thrombocytopenia:     --At the time of admission hemoglobin was 9.1 and platelet count was 90. WBC count was normal  -In June 2022, CBC was within normal limits. Iron studies from 9/25/2022 did not show any evidence of deficiency, folate was normal, B12 was normal (patient takes B12 injections at home)     -SPEP/QUENTIN is pending. Light chains are not significantly elevated.      -No evidence of hemolytic anemia.      -Discussed her preliminary findings of the bone marrow with the pathologist.  Most likely patient has myelodysplastic syndrome. 10% blasts are noted. Final pathology is pending.    -Okay to discharge patient from hematology perspective. Will ensure outpatient follow-up    -Discussed preliminary bone marrow findings with patient's daughter by the bedside. At her request, the diagnosis was not discussed with the patient. 2. Left chest pain     -CT of the chest with contrast has been ordered.         Chin Edwards MD

## 2022-09-30 NOTE — PROGRESS NOTES
Comprehensive Nutrition Assessment    Type and Reason for Visit:  Initial, RD Nutrition Re-Screen/LOS    Nutrition Recommendations/Plan:   Continue easy to chew, carb control diet  Offer Glucerna bid due to weight trending down, monitor tolerance  Will monitor nutritional adequacy, nutrition-related labs, weights, BMs, and clinical progress      Malnutrition Assessment:  Malnutrition Status: At risk for malnutrition (Comment) (09/30/22 1520)      Nutrition Assessment:    Patient admitted with dyspnea on exertion. Appetite improving eating % meals in the past 24 hours. Blood sugars have trended up, also on Prednisone. Extensive insulin regimen in place. Swallowing difficulty being monitored by SLP and now GI consulted. Possible esophageal in nature, food feels stuck in throat along with prolonged chewing requirement per SLP. Easy to chew diet recommended at this time. Carb control restriction also in place and appropriate with elevated blood sugar trends. Will continue to monitor nutrition progress. Will add Glucerna bid. Weight trending down 7 lbs in the past 4 days, 2.8%. Nutrition Related Findings:    non pitting edema; lytes within normal limits; Wound Type:  (redness noted)       Current Nutrition Intake & Therapies:    Average Meal Intake: 26-50%, 51-75%, %  Average Supplements Intake: Unable to assess  ADULT DIET; Easy to Chew; 4 carb choices (60 gm/meal)  ADULT ORAL NUTRITION SUPPLEMENT; Breakfast, Dinner; Diabetic Oral Supplement    Anthropometric Measures:  Height: 5' (152.4 cm)  Ideal Body Weight (IBW): 100 lbs (45 kg)       Current Body Weight: 242 lb 6 oz (109.9 kg),   IBW.  Weight Source: Bed Scale  Current BMI (kg/m2): 47.3                          BMI Categories: Obese Class 3 (BMI 40.0 or greater)    Estimated Daily Nutrient Needs:  Energy Requirements Based On: Kcal/kg  Weight Used for Energy Requirements: Ideal  Energy (kcal/day): 1527-7770 (30-35 kcal/45.5 kg)  Weight Used for Protein Requirements: Ideal  Protein (g/day): 59-68 (1.3-1.5 g/45.5 kg)     Fluid (ml/day):      Nutrition Diagnosis:   Increased nutrient needs related to increase demand for energy/nutrients as evidenced by  (extended hospital stay)    Nutrition Interventions:   Food and/or Nutrient Delivery: Continue Current Diet, Start Oral Nutrition Supplement  Nutrition Education/Counseling:  (monitor need)  Coordination of Nutrition Care: Continue to monitor while inpatient       Goals:     Goals: PO intake 75% or greater       Nutrition Monitoring and Evaluation:      Food/Nutrient Intake Outcomes: Food and Nutrient Intake, Supplement Intake  Physical Signs/Symptoms Outcomes: GI Status, Chewing or Swallowing, Nutrition Focused Physical Findings    Discharge Planning:     Too soon to determine     [place], [address] [facility], [address] [place] [address], [address] [place] [address]  Contact: [phone]

## 2022-09-30 NOTE — PROGRESS NOTES
Hospitalist Progress Note      PCP: Marina Reddy, APRN - CNP    Date of Admission: 9/24/2022    Chief Complaint: Wrist pain      Subjective:   Continues to have L thumb pain. Now with some dysphagia with sensation of food getting stuck  Chronic bilat shoulder pain and back pain.     Medications:  Reviewed    Infusion Medications    sodium chloride 10 mL/hr (09/25/22 2117)    dextrose       Scheduled Medications    sacubitril-valsartan  0.5 tablet Oral BID    furosemide  20 mg Oral Daily    insulin lispro  0-16 Units SubCUTAneous TID WC    insulin lispro  0-4 Units SubCUTAneous Nightly    insulin glargine  42 Units SubCUTAneous Daily    predniSONE  10 mg Oral Daily    diclofenac sodium  2 g Topical BID    insulin lispro  17 Units SubCUTAneous QAM AC    insulin lispro  17 Units SubCUTAneous Daily before lunch    insulin lispro  7 Units SubCUTAneous Daily before dinner    atorvastatin  80 mg Oral Nightly    isosorbide mononitrate  30 mg Oral Daily    levothyroxine  150 mcg Oral Daily    metoprolol tartrate  12.5 mg Oral BID    miconazole   Topical BID    oxybutynin  15 mg Oral Daily    empagliflozin  10 mg Oral Daily    sodium chloride flush  10 mL IntraVENous 2 times per day    pantoprazole  40 mg Oral QAM AC    lidocaine  1 patch TransDERmal Daily    cetirizine  5 mg Oral Daily    gabapentin  300 mg Oral Daily    And    gabapentin  600 mg Oral Nightly    rivaroxaban  20 mg Oral Daily    acetaminophen  1,000 mg Oral 3 times per day    tiZANidine  2 mg Oral TID     PRN Meds: simethicone, sodium chloride, Polyvinyl Alcohol-Povidone PF, sodium chloride flush, sodium chloride, ondansetron, dextrose bolus **OR** dextrose bolus, glucagon (rDNA), dextrose, polyethylene glycol, oxyCODONE **OR** oxyCODONE      Intake/Output Summary (Last 24 hours) at 9/30/2022 1300  Last data filed at 9/30/2022 0606  Gross per 24 hour   Intake 480 ml   Output 825 ml   Net -345 ml         Exam:    /68   Pulse 70   Temp 97.4 °F (36.3 °C) (Axillary)   Resp 18   Ht 5' (1.524 m)   Wt 242 lb 6.4 oz (110 kg)   LMP  (LMP Unknown)   SpO2 96%   BMI 47.34 kg/m²     Gen/overall appearance: Not in acute distress. Alert. Head: Normocephalic, atraumatic  Eyes: EOMI, no scleral icterus  CVS: regular rate and rhythm, Normal S1S2  Pulm: Diminished, Clear to auscultation bilaterally. No crackles/wheezes  Extremities: mild erythema L wrist, L thumb TTP  Neuro: No gross focal deficits noted  Skin: Warm, dry    Labs:   Recent Labs     09/28/22 0828 09/29/22 0922 09/30/22  0504   WBC 6.5 5.8 5.5   HGB 9.2* 8.9* 9.0*   HCT 27.8* 26.8* 27.4*   PLT 95* 89* 88*       Recent Labs     09/28/22 0828 09/29/22 0922 09/30/22  0504    143 142   K 3.6 3.9 4.4    105 105   CO2 25 24 27   BUN 48* 35* 40*   CREATININE 1.2 1.0 1.2   CALCIUM 9.5 9.3 9.7       No results for input(s): AST, ALT, BILIDIR, BILITOT, ALKPHOS in the last 72 hours. Recent Labs     09/29/22  1137   INR 2.10*       No results for input(s): Hermelindo Pander in the last 72 hours.       Assessment/Plan:    Active Hospital Problems    Diagnosis Date Noted    Elevated troponin [R77.8] 09/27/2022     Priority: Medium    Chronic atrial fibrillation ([address] [I48.20] 09/27/2022     Priority: Medium    Thrombocytopenia ([address] [D69.6] 09/25/2022     Priority: Medium    Anemia [D64.9] 09/25/2022     Priority: Medium    Wrist arthritis [M19.039] 09/25/2022     Priority: Medium    Dyspnea on exertion [R06.00] 09/24/2022     Priority: Medium    Acute on chronic diastolic heart failure ([address] [I50.33] 07/27/2022     Priority: Medium    Mixed hyperlipidemia [E78.2] 05/02/2014    PAF (paroxysmal atrial fibrillation) ([facility] [I48.0] 10/05/2011    Coronary artery disease involving native coronary artery of native heart without angina pectoris [I25.10] 10/05/2011    DM (diabetes mellitus) ([facility] [E11.9] 10/05/2011    Peripheral vascular disease ([facility] [I73.9] 10/05/2011       Acute on chronic diastolic Congestive Heart Failure. Clinically improved  ARUN. Suspect lasix induced  - s/p IV diuresis; now held  - ins/outs  - trend Cr  - monitor and replace lytes  - avoid nephrotoxins  - respiratory care  - cardio following     Anemia and thrombocytopenia  - Iron studies, B12, folate ok  - Blood occult stool negative  - trend CBC  - BM biopsy 9/30; follow up studies outpt  - Hematology following     Questionable Left wrist cellulitis. Was suspected on admission, but likely gout flare  - empiric abx d/daja  - on prednisone course  - pain management  - ortho following     Severe chronic bilateral low back muscle spasm  - Cont scheduled tizanidine and tylenol, prn oxycodone, PT OT     Dysuria  - improved  - s/p diflucan 150mg x1  - UCx neg  - s/p phenazopyridine 100mg TID for 3 doses     CAD status post CABG  - cont statin, BB, not on asp as already on xarelto     Paroxysmal atrial fibrillation  - cont BB, xarelto for [facility]    Hyperglycemia.   Suspect CS induced  - sliding scale to high  - titrate insulin regimen as tolerated    Dysphagia  - ST following with diet modification  - GI eval    PMH of IDDM, htn, hld      DVT Prophylaxis: xarelto  Diet: ADULT DIET; Easy to Chew; 4 carb choices (60 gm/meal)  Code Status: Full Code    Dispo:  ALMITA Poole MD

## 2022-09-30 NOTE — PLAN OF CARE
Problem: Pain  Goal: Verbalizes/displays adequate comfort level or baseline comfort level  9/30/2022 0129 by Aren Basurto RN  Outcome: Progressing     Problem: Safety - Adult  Goal: Free from fall injury  9/30/2022 0129 by Aren Basurto RN  Outcome: Progressing

## 2022-09-30 NOTE — PROGRESS NOTES
[facility]  HEART FAILURE  Progress Note      Admit Date 9/24/2022     Reason for Consult:      Reason for Consultation/Chief Complaint: SOB    HPI:    Rachel Lerner is a 78 y.o. female with PMH  atrial fibrillation, CAD/CABG (2004), DM2, HTN, hypothyroidism, HFpEF admitted with SOB, elevated troponin and anemia. Cr bumped with IV lasix and diuretics stopped, po restarted yesterday. Cr improved, entresto restarted yesterday      Subjective:  Patient is being seen for CHF. There were no acute overnight cardiac events. Today Ms. Schmidt c/o continued shoulder and arm pain, but denies CP, palpitations or SOB       Review of Systems - General ROS: negative  Hematological and Lymphatic ROS: negative  Respiratory ROS: negative  Cardiovascular ROS: no chest pain or dyspnea on exertion  Gastrointestinal ROS: negative  Musculoskeletal ROS: arm pain  Neurological ROS: no TIA or stroke symptoms     Baseline Weight: 243   Wt Readings from Last 3 Encounters:   09/30/22 242 lb 6.4 oz (110 kg)   06/21/22 243 lb 9.6 oz (110.5 kg)   10/06/21 225 lb (102.1 kg)         Cardiac Testing:   Echo 6/17/2022:  Summary   -Technically limited portable study secondary to patient's immobility (upright semi supine position). -Normal left ventricle size,mild to moderate concentric wall thickness and normal systolic function with an estimated ejection fraction of 55%. -Abnormal mid to distal septal and inferoseptal motion is present.   -Indeterminate diastolic function. due to mitral annular calcification. E/e\"=20.3.   -Mitral annular calcification is present. -Mild to moderate posteriorly directed mitral regurgitation.   -Aortic valve appears sclerotic but opens adequately. -Mild tricuspid regurgitation.   -Estimated pulmonary artery systolic pressure is borderline elevated at 33 mmHg assuming a right atrial pressure of 8 mmHg. -The left atrium is at the upper limits of normal in size to mildly dilated.    -The right ventricle is not well visualized. -Right ventricular systolic function appears mildly reduced . TAPSE 1.22 cm.   -RV S velocity 8.6 cm/s. -Hypokinetic free wall of the right ventricle. NYHA Class III      Objective:   BP (!) 104/41   Pulse 51   Temp 97.4 °F (36.3 °C) (Axillary)   Resp 18   Ht 5' (1.524 m)   Wt 242 lb 6.4 oz (110 kg)   LMP  (LMP Unknown)   SpO2 91%   BMI 47.34 kg/m²     Intake/Output Summary (Last 24 hours) at 9/30/2022 4710  Last data filed at 9/30/2022 0606  Gross per 24 hour   Intake 480 ml   Output 825 ml   Net -345 ml      In: 480 [P.O.:480]  Out: 825       Physical Exam:  General Appearance:  Non-obese/Well Nourished  Respiratory:  Resp Auscultation: Normal breath sounds without dullness  Cardiovascular:   Auscultation: Regular rate and rhythm, normal S1S2, no m/g/r/c  Palpation: Normal    JVD: none  Pedal Pulses: 2+ and equal   Abdomen:  Soft, NT, ND, + bs  Extremities:  No Cyanosis or Clubbing  Extremities: negative  Neurological/Psychiatric:  Oriented to time, place, and person  Non-anxious    MEDICATIONS:   Scheduled Meds:   Scheduled Meds:   sacubitril-valsartan  0.5 tablet Oral BID    furosemide  20 mg Oral Daily    insulin lispro  0-16 Units SubCUTAneous TID WC    insulin lispro  0-4 Units SubCUTAneous Nightly    insulin glargine  42 Units SubCUTAneous Daily    predniSONE  10 mg Oral Daily    diclofenac sodium  2 g Topical BID    insulin lispro  17 Units SubCUTAneous QAM AC    insulin lispro  17 Units SubCUTAneous Daily before lunch    insulin lispro  7 Units SubCUTAneous Daily before dinner    atorvastatin  80 mg Oral Nightly    isosorbide mononitrate  30 mg Oral Daily    levothyroxine  150 mcg Oral Daily    metoprolol tartrate  12.5 mg Oral BID    miconazole   Topical BID    oxybutynin  15 mg Oral Daily    empagliflozin  10 mg Oral Daily    sodium chloride flush  10 mL IntraVENous 2 times per day    pantoprazole  40 mg Oral QAM AC    lidocaine  1 patch TransDERmal Daily    cetirizine  5 mg Oral Daily    gabapentin  300 mg Oral Daily    And    gabapentin  600 mg Oral Nightly    rivaroxaban  20 mg Oral Daily    acetaminophen  1,000 mg Oral 3 times per day    tiZANidine  2 mg Oral TID     Continuous Infusions:   sodium chloride 10 mL/hr (09/25/22 2117)    dextrose       PRN Meds:.simethicone, sodium chloride, Polyvinyl Alcohol-Povidone PF, sodium chloride flush, sodium chloride, ondansetron, dextrose bolus **OR** dextrose bolus, glucagon (rDNA), dextrose, polyethylene glycol, oxyCODONE **OR** oxyCODONE  Continuous Infusions:   sodium chloride 10 mL/hr (09/25/22 2117)    dextrose         Intake/Output Summary (Last 24 hours) at 9/30/2022 0903  Last data filed at 9/30/2022 0606  Gross per 24 hour   Intake 480 ml   Output 825 ml   Net -345 ml       Lab Data:  CBC:   Lab Results   Component Value Date/Time    WBC 5.5 09/30/2022 05:04 AM    HGB 9.0 09/30/2022 05:04 AM    PLT 88 09/30/2022 05:04 AM     BMP:  Lab Results   Component Value Date/Time     09/30/2022 05:04 AM    K 4.4 09/30/2022 05:04 AM    K 4.6 09/27/2022 05:58 AM     09/30/2022 05:04 AM    CO2 27 09/30/2022 05:04 AM    BUN 40 09/30/2022 05:04 AM    CREATININE 1.2 09/30/2022 05:04 AM    GLUCOSE 124 09/30/2022 05:04 AM     INR:   Lab Results   Component Value Date/Time    INR 2.10 09/29/2022 11:37 AM    INR 1.06 02/13/2017 12:07 PM    INR 1.06 11/22/2016 06:35 AM        CARDIAC LABS  ENZYMES:  No results for input(s): CKMB, CKMBINDEX, TROPONINI in the last 72 hours.     Invalid input(s): CKTOTAL;3    FASTING LIPID PANEL:  Lab Results   Component Value Date/Time    HDL 47 07/13/2012 09:30 AM    HDL 44 07/06/2010 09:12 AM    LDLCALC 61 07/13/2012 09:30 AM    TRIG 247 07/13/2012 09:30 AM    TSH 0.13 07/10/2012 02:05 PM     LIVER PROFILE:  Lab Results   Component Value Date/Time    AST 15 09/24/2022 07:05 PM    AST 9 06/16/2022 04:44 PM    ALT 11 09/24/2022 07:05 PM    ALT 25 06/16/2022 04:44 PM     BNP:   Lab Results   Component Value Date/Time    PROBNP 538 09/28/2022 07:02 AM    PROBNP 277 09/24/2022 07:05 PM    PROBNP 56 07/12/2022 06:45 PM     Iron Studies:    Lab Results   Component Value Date/Time    FERRITIN 1,025.0 09/26/2022 04:34 AM    FERRITIN 641.4 09/25/2022 05:02 AM    FERRITIN 284.8 07/20/2011 01:25 PM     Lab Results   Component Value Date    IRON 46 09/25/2022    TIBC 230 (L) 09/25/2022    FERRITIN 1,025.0 (H) 09/26/2022      Iron Deficiency Anemia:  No IV Iron Therapy:  No  2017 ACC/AHA HF Guidelines:   intravenous iron replacement in patients with New York Heart Association (NYHA) class II and III HF and iron deficiency(ferritin <100 ng/ml or 100-300 ng/ml if transferrin saturation <20%), to improve functional status and QoL. 1. WEIGHT: Admit Weight: 248 lb 6.4 oz (112.7 kg)      Today  Weight: 242 lb 6.4 oz (110 kg)   2.  I/O   Intake/Output Summary (Last 24 hours) at 9/30/2022 0903  Last data filed at 9/30/2022 0606  Gross per 24 hour   Intake 480 ml   Output 825 ml   Net -345 ml         Assessment/Plan:     AHF- compensated, continue lasix and jardiance, low dose entresto   AF- rate controlled on BB, on AC  CAD-- no CP, continue BB, statin, imdur  Anemia- per onc,BMbx    Cardiology will sign off, please call with any questions/new concerns        I appreciate the opportunity of cooperating in the care of this individual.    Daniela Rubio, APRN - CNP, ACNP, [address] 9/30/2022, 9:03 AM  Heart Failure  [facility]  [address] [place] [address], [address]  Ph: [phone]      Core Measures:   Discharge instructions:   LVEF documented:   ACEI for LV dysfunction:   Smoking Cessation:

## 2022-10-01 PROBLEM — M18.12 PRIMARY OSTEOARTHRITIS OF FIRST CARPOMETACARPAL JOINT OF LEFT HAND: Status: ACTIVE | Noted: 2022-10-01

## 2022-10-01 LAB
ANION GAP SERPL CALCULATED.3IONS-SCNC: 10 MMOL/L (ref 3–16)
BUN BLDV-MCNC: 38 MG/DL (ref 7–20)
CALCIUM SERPL-MCNC: 9.5 MG/DL (ref 8.3–10.6)
CHLORIDE BLD-SCNC: 106 MMOL/L (ref 99–110)
CO2: 25 MMOL/L (ref 21–32)
CREAT SERPL-MCNC: 1 MG/DL (ref 0.6–1.2)
GFR AFRICAN AMERICAN: >60
GFR NON-AFRICAN AMERICAN: 53
GLUCOSE BLD-MCNC: 185 MG/DL (ref 70–99)
GLUCOSE BLD-MCNC: 212 MG/DL (ref 70–99)
GLUCOSE BLD-MCNC: 219 MG/DL (ref 70–99)
GLUCOSE BLD-MCNC: 226 MG/DL (ref 70–99)
GLUCOSE BLD-MCNC: 240 MG/DL (ref 70–99)
HCT VFR BLD CALC: 27.5 % (ref 36–48)
HEMOGLOBIN: 9.2 G/DL (ref 12–16)
MCH RBC QN AUTO: 31.7 PG (ref 26–34)
MCHC RBC AUTO-ENTMCNC: 33.3 G/DL (ref 31–36)
MCV RBC AUTO: 95.2 FL (ref 80–100)
PDW BLD-RTO: 21.2 % (ref 12.4–15.4)
PERFORMED ON: ABNORMAL
PLATELET # BLD: 90 K/UL (ref 135–450)
PMV BLD AUTO: 8.3 FL (ref 5–10.5)
POTASSIUM SERPL-SCNC: 4.1 MMOL/L (ref 3.5–5.1)
PRO-BNP: 257 PG/ML (ref 0–449)
RBC # BLD: 2.89 M/UL (ref 4–5.2)
SODIUM BLD-SCNC: 141 MMOL/L (ref 136–145)
WBC # BLD: 6.7 K/UL (ref 4–11)

## 2022-10-01 PROCEDURE — 83880 ASSAY OF NATRIURETIC PEPTIDE: CPT

## 2022-10-01 PROCEDURE — 6370000000 HC RX 637 (ALT 250 FOR IP): Performed by: FAMILY MEDICINE

## 2022-10-01 PROCEDURE — 99232 SBSQ HOSP IP/OBS MODERATE 35: CPT | Performed by: ORTHOPAEDIC SURGERY

## 2022-10-01 PROCEDURE — 1200000000 HC SEMI PRIVATE

## 2022-10-01 PROCEDURE — 6360000002 HC RX W HCPCS: Performed by: ORTHOPAEDIC SURGERY

## 2022-10-01 PROCEDURE — 6370000000 HC RX 637 (ALT 250 FOR IP): Performed by: INTERNAL MEDICINE

## 2022-10-01 PROCEDURE — 6370000000 HC RX 637 (ALT 250 FOR IP): Performed by: NURSE PRACTITIONER

## 2022-10-01 PROCEDURE — 85027 COMPLETE CBC AUTOMATED: CPT

## 2022-10-01 PROCEDURE — 36415 COLL VENOUS BLD VENIPUNCTURE: CPT

## 2022-10-01 PROCEDURE — 6370000000 HC RX 637 (ALT 250 FOR IP): Performed by: PHYSICIAN ASSISTANT

## 2022-10-01 PROCEDURE — 2580000003 HC RX 258: Performed by: PHYSICIAN ASSISTANT

## 2022-10-01 PROCEDURE — 80048 BASIC METABOLIC PNL TOTAL CA: CPT

## 2022-10-01 RX ORDER — METHYLPREDNISOLONE SODIUM SUCCINATE 40 MG/ML
40 INJECTION, POWDER, LYOPHILIZED, FOR SOLUTION INTRAMUSCULAR; INTRAVENOUS EVERY 6 HOURS
Status: COMPLETED | OUTPATIENT
Start: 2022-10-01 | End: 2022-10-03

## 2022-10-01 RX ADMIN — FUROSEMIDE 20 MG: 20 TABLET ORAL at 08:29

## 2022-10-01 RX ADMIN — METHYLPREDNISOLONE SODIUM SUCCINATE 40 MG: 40 INJECTION, POWDER, FOR SOLUTION INTRAMUSCULAR; INTRAVENOUS at 17:58

## 2022-10-01 RX ADMIN — GABAPENTIN 600 MG: 300 CAPSULE ORAL at 22:01

## 2022-10-01 RX ADMIN — TIZANIDINE 2 MG: 4 TABLET ORAL at 22:01

## 2022-10-01 RX ADMIN — Medication 10 ML: at 21:59

## 2022-10-01 RX ADMIN — DICLOFENAC SODIUM 2 G: 10 GEL TOPICAL at 22:03

## 2022-10-01 RX ADMIN — Medication 10 ML: at 08:35

## 2022-10-01 RX ADMIN — ATORVASTATIN CALCIUM 80 MG: 80 TABLET, FILM COATED ORAL at 22:02

## 2022-10-01 RX ADMIN — METOPROLOL TARTRATE 12.5 MG: 25 TABLET, FILM COATED ORAL at 08:26

## 2022-10-01 RX ADMIN — CETIRIZINE HYDROCHLORIDE 5 MG: 10 TABLET, FILM COATED ORAL at 08:27

## 2022-10-01 RX ADMIN — RIVAROXABAN 20 MG: 20 TABLET, FILM COATED ORAL at 17:58

## 2022-10-01 RX ADMIN — LEVOTHYROXINE SODIUM 150 MCG: 0.15 TABLET ORAL at 05:53

## 2022-10-01 RX ADMIN — SACUBITRIL AND VALSARTAN 0.5 TABLET: 24; 26 TABLET, FILM COATED ORAL at 22:01

## 2022-10-01 RX ADMIN — ACETAMINOPHEN 1000 MG: 500 TABLET ORAL at 05:53

## 2022-10-01 RX ADMIN — TIZANIDINE 2 MG: 4 TABLET ORAL at 08:27

## 2022-10-01 RX ADMIN — ACETAMINOPHEN 1000 MG: 500 TABLET ORAL at 15:13

## 2022-10-01 RX ADMIN — OXYCODONE 5 MG: 5 TABLET ORAL at 08:38

## 2022-10-01 RX ADMIN — TIZANIDINE 2 MG: 4 TABLET ORAL at 15:13

## 2022-10-01 RX ADMIN — INSULIN LISPRO 7 UNITS: 100 INJECTION, SOLUTION INTRAVENOUS; SUBCUTANEOUS at 17:59

## 2022-10-01 RX ADMIN — EMPAGLIFLOZIN 10 MG: 10 TABLET, FILM COATED ORAL at 08:27

## 2022-10-01 RX ADMIN — METOPROLOL TARTRATE 12.5 MG: 25 TABLET, FILM COATED ORAL at 22:02

## 2022-10-01 RX ADMIN — INSULIN LISPRO 4 UNITS: 100 INJECTION, SOLUTION INTRAVENOUS; SUBCUTANEOUS at 17:59

## 2022-10-01 RX ADMIN — PANTOPRAZOLE SODIUM 40 MG: 40 TABLET, DELAYED RELEASE ORAL at 05:53

## 2022-10-01 RX ADMIN — ISOSORBIDE MONONITRATE 30 MG: 30 TABLET, EXTENDED RELEASE ORAL at 08:27

## 2022-10-01 RX ADMIN — INSULIN LISPRO 17 UNITS: 100 INJECTION, SOLUTION INTRAVENOUS; SUBCUTANEOUS at 12:55

## 2022-10-01 RX ADMIN — OXYCODONE 5 MG: 5 TABLET ORAL at 17:58

## 2022-10-01 RX ADMIN — OXYBUTYNIN CHLORIDE 15 MG: 5 TABLET, EXTENDED RELEASE ORAL at 08:27

## 2022-10-01 RX ADMIN — SACUBITRIL AND VALSARTAN 0.5 TABLET: 24; 26 TABLET, FILM COATED ORAL at 08:27

## 2022-10-01 RX ADMIN — DICLOFENAC SODIUM 2 G: 10 GEL TOPICAL at 08:26

## 2022-10-01 RX ADMIN — GABAPENTIN 300 MG: 300 CAPSULE ORAL at 08:26

## 2022-10-01 RX ADMIN — INSULIN GLARGINE 42 UNITS: 100 INJECTION, SOLUTION SUBCUTANEOUS at 08:35

## 2022-10-01 RX ADMIN — INSULIN LISPRO 17 UNITS: 100 INJECTION, SOLUTION INTRAVENOUS; SUBCUTANEOUS at 08:34

## 2022-10-01 RX ADMIN — OXYCODONE 5 MG: 5 TABLET ORAL at 12:54

## 2022-10-01 RX ADMIN — INSULIN LISPRO 4 UNITS: 100 INJECTION, SOLUTION INTRAVENOUS; SUBCUTANEOUS at 12:55

## 2022-10-01 RX ADMIN — PREDNISONE 10 MG: 10 TABLET ORAL at 08:26

## 2022-10-01 ASSESSMENT — PAIN DESCRIPTION - DESCRIPTORS
DESCRIPTORS: DISCOMFORT
DESCRIPTORS_2: SHARP
DESCRIPTORS: DISCOMFORT;SHARP

## 2022-10-01 ASSESSMENT — PAIN DESCRIPTION - ORIENTATION
ORIENTATION: LEFT
ORIENTATION_2: LEFT

## 2022-10-01 ASSESSMENT — PAIN DESCRIPTION - LOCATION
LOCATION: WRIST
LOCATION: SHOULDER
LOCATION_2: WRIST
LOCATION_2: SHOULDER
LOCATION: WRIST
LOCATION: WRIST

## 2022-10-01 ASSESSMENT — PAIN SCALES - GENERAL
PAINLEVEL_OUTOF10: 5
PAINLEVEL_OUTOF10: 6
PAINLEVEL_OUTOF10: 10
PAINLEVEL_OUTOF10: 0
PAINLEVEL_OUTOF10: 0
PAINLEVEL_OUTOF10: 6
PAINLEVEL_OUTOF10: 6

## 2022-10-01 ASSESSMENT — PAIN DESCRIPTION - INTENSITY
RATING_2: 6
RATING_2: 10

## 2022-10-01 NOTE — PROGRESS NOTES
Hospitalist Progress Note      PCP: MATEUSZ Canchola - CNP    Date of Admission: 9/24/2022    Chief Complaint: Wrist pain      Subjective:   Continues to have L thumb pain. Chronic bilat shoulder pain and back pain. No new acute complaints.       Medications:  Reviewed    Infusion Medications    sodium chloride 10 mL/hr (09/25/22 2117)    dextrose       Scheduled Medications    sacubitril-valsartan  0.5 tablet Oral BID    furosemide  20 mg Oral Daily    insulin lispro  0-16 Units SubCUTAneous TID WC    insulin lispro  0-4 Units SubCUTAneous Nightly    insulin glargine  42 Units SubCUTAneous Daily    predniSONE  10 mg Oral Daily    diclofenac sodium  2 g Topical BID    insulin lispro  17 Units SubCUTAneous QAM AC    insulin lispro  17 Units SubCUTAneous Daily before lunch    insulin lispro  7 Units SubCUTAneous Daily before dinner    atorvastatin  80 mg Oral Nightly    isosorbide mononitrate  30 mg Oral Daily    levothyroxine  150 mcg Oral Daily    metoprolol tartrate  12.5 mg Oral BID    miconazole   Topical BID    oxybutynin  15 mg Oral Daily    empagliflozin  10 mg Oral Daily    sodium chloride flush  10 mL IntraVENous 2 times per day    pantoprazole  40 mg Oral QAM AC    lidocaine  1 patch TransDERmal Daily    cetirizine  5 mg Oral Daily    gabapentin  300 mg Oral Daily    And    gabapentin  600 mg Oral Nightly    rivaroxaban  20 mg Oral Daily    acetaminophen  1,000 mg Oral 3 times per day    tiZANidine  2 mg Oral TID     PRN Meds: simethicone, sodium chloride, Polyvinyl Alcohol-Povidone PF, sodium chloride flush, sodium chloride, ondansetron, dextrose bolus **OR** dextrose bolus, glucagon (rDNA), dextrose, polyethylene glycol, oxyCODONE **OR** oxyCODONE      Intake/Output Summary (Last 24 hours) at 10/1/2022 1357  Last data filed at 10/1/2022 0553  Gross per 24 hour   Intake 240 ml   Output 1200 ml   Net -960 ml         Exam:    BP (!) 155/64   Pulse 70   Temp 98.2 °F (36.8 °C)   Resp 16 Ht 5' (1.524 m)   Wt 242 lb 3.2 oz (109.9 kg)   LMP  (LMP Unknown)   SpO2 94%   BMI 47.30 kg/m²     Gen/overall appearance: Not in acute distress. Alert. Head: Normocephalic, atraumatic  Eyes: EOMI, no scleral icterus  CVS: regular rate and rhythm, Normal S1S2  Pulm: Diminished, Clear to auscultation bilaterally. No crackles/wheezes  Extremities: mild L thumb, TTP  Neuro: No gross focal deficits noted  Skin: Warm, dry    Labs:   Recent Labs     09/29/22 0922 09/30/22  0504 10/01/22  0454   WBC 5.8 5.5 6.7   HGB 8.9* 9.0* 9.2*   HCT 26.8* 27.4* 27.5*   PLT 89* 88* 90*       Recent Labs     09/29/22 0922 09/30/22  0504 10/01/22  0453    142 141   K 3.9 4.4 4.1    105 106   CO2 24 27 25   BUN 35* 40* 38*   CREATININE 1.0 1.2 1.0   CALCIUM 9.3 9.7 9.5       No results for input(s): AST, ALT, BILIDIR, BILITOT, ALKPHOS in the last 72 hours. Recent Labs     09/29/22  1137   INR 2.10*       No results for input(s): [employer]ets in the last 72 hours. Assessment/Plan:    Active Hospital Problems    Diagnosis Date Noted    Elevated troponin [R77.8] 09/27/2022     Priority: Medium    Chronic atrial fibrillation ([facility] [I48.20] 09/27/2022     Priority: Medium    Thrombocytopenia ([facility] [D69.6] 09/25/2022     Priority: Medium    Anemia [D64.9] 09/25/2022     Priority: Medium    Wrist arthritis [M19.039] 09/25/2022     Priority: Medium    Dyspnea on exertion [R06.09] 09/24/2022     Priority: Medium    Acute on chronic diastolic heart failure ([facility] [I50.33] 07/27/2022     Priority: Medium    Mixed hyperlipidemia [E78.2] 05/02/2014    PAF (paroxysmal atrial fibrillation) ([facility] [I48.0] 10/05/2011    Coronary artery disease involving native coronary artery of native heart without angina pectoris [I25.10] 10/05/2011    DM (diabetes mellitus) ([facility] [E11.9] 10/05/2011    Peripheral vascular disease ([facility] [I73.9] 10/05/2011       Acute on chronic diastolic Congestive Heart Failure. Clinically improved  ARUN. Suspect lasix induced  - s/p IV diuresis; now held  - ins/outs  - trend Cr  - monitor and replace lytes  - avoid nephrotoxins  - respiratory care  - cardio following     Anemia and thrombocytopenia  - Iron studies, B12, folate ok  - Blood occult stool negative  - trend CBC  - BM biopsy 9/30; follow up studies outpt  - Hematology following     L wrist erythema with suspected gout flare  L thumb pain   - empiric abx d/daja for presumed cellulitis  - prednisone course  - pain management  - ortho following     Severe chronic bilateral low back muscle spasm  - Cont scheduled tizanidine and tylenol, prn oxycodone, PT OT     Dysuria  - improved  - s/p diflucan 150mg x1  - UCx neg  - s/p phenazopyridine 100mg TID for 3 doses     CAD status post CABG  - cont statin, BB, not on asp as already on xarelto     Paroxysmal atrial fibrillation  - cont BB, xarelto for [facility]    Hyperglycemia. Suspect CS induced  - sliding scale to high  - titrate insulin regimen as tolerated    Dysphagia.   Suspect 2/2 stenosis vs web  - ST following with diet modification  - planned for EGD mon  - GI following    PMH of IDDM, htn, hld      DVT Prophylaxis: xarelto  Diet: ADULT DIET; Easy to Chew; 4 carb choices (60 gm/meal)  ADULT ORAL NUTRITION SUPPLEMENT; Breakfast, Dinner; Diabetic Oral Supplement  Code Status: Full Code    Dispo:  ALMITA Goyal MD

## 2022-10-01 NOTE — CONSULTS
[facility] Orthopedic Surgery  Consult Note         This patient is seen in consultation at the request of Dr Ravi Dobbins MD    Reason for Consult:  Left thumb Pain/ CMC arthritis. CHIEF COMPLAINT:  Left thumb Pain/ CMC arthritis. History Obtained From:  patient, family member - daughters, electronic medical record    HISTORY OF PRESENT ILLNESS:    Ms. Ted Gomez is 78 y.o. [address] [facility] female right handed seen today at [facility] for consultation and evaluation of a left thumb pain which started years ago and is worsening over time. Denies trauma or injury. The patient is complaining of left thumb base pain with no numbness or tingling. This is worse with ROM, rest makes pain better. No other complaint. Past Medical History:        Diagnosis Date    4/11/17 Left knee repeat repair of median parapatellar arthrotomy with augmentation 4/12/2017    Arthritis     Atrial fibrillation (HCC)     CAD (coronary artery disease)     Cataract     Chronic diastolic congestive heart failure ([address] 7/27/2022    Diabetes mellitus ([facility] [address]     GERD (gastroesophageal reflux disease)     Hyperlipidemia     Hypertension     HYPOTHRYROIDISM     Non-English speaking patient     SPEAKS Italian. SHE SPEAKS A LITTLE ENGLISH    Sleep apnea     unspecified    Thyroid disease     UTI        Past Surgical History:        Procedure Laterality Date    CARDIAC SURGERY  2004    CABG X 4 VESSELS    CHOLECYSTECTOMY, LAPAROSCOPIC  5/15/14    with IOC    CT BONE MARROW BIOPSY  9/29/2022    CT BONE MARROW BIOPSY 9/29/2022 [facility] CT SCAN    JOINT REPLACEMENT Bilateral 2000 AND 1996 And 1998    knee    KNEE SURGERY Left 02/28/2017    left knee poly exchange revision     REVISION TOTAL KNEE ARTHROPLASTY Right 11/22/2016    RIGHT TOTAL KNEE REVISION WITH POLY EXCHANGE    TOTAL KNEE ARTHROPLASTY      partical/ left        Medications prior to admission:   Prior to Admission medications    Medication Sig Start Date End Date Taking?  Authorizing Provider lisinopril (PRINIVIL;ZESTRIL) 2.5 MG tablet Take 2.5 mg by mouth daily   Yes Historical Provider, MD   benzonatate (TESSALON) 100 MG capsule Take 100 mg by mouth 3 times daily as needed for Cough   Yes Historical Provider, MD   pantoprazole (PROTONIX) 40 MG tablet Take 40 mg by mouth every morning (before breakfast)   Yes Historical Provider, MD   cefaclor (CECLOR) 250 MG capsule Take 500 mg by mouth 3 times daily   Yes Historical Provider, MD   calcium carbonate (OSCAL) 500 MG TABS tablet Take 500 mg by mouth daily   Yes Historical Provider, MD   sacubitril-valsartan (ENTRESTO) 24-26 MG per tablet Take 1 tablet by mouth 2 times daily  Patient not taking: Reported on 9/25/2022 9/8/22   MATEUSZ Carter CNP   isosorbide mononitrate (IMDUR) 30 MG extended release tablet Take 1 tablet by mouth in the morning. .  Patient not taking: Reported on 9/25/2022 8/12/22   MATEUSZ Carter CNP   furosemide (LASIX) 20 MG tablet Take 1 tablet by mouth in the morning and 1 tablet before bedtime. Patient not taking: Reported on 9/25/2022 8/9/22   MATEUSZ Carter CNP   dapagliflozin (FARXIGA) 10 MG tablet Take 1 tablet by mouth every morning  Patient not taking: Reported on 9/25/2022 6/29/22   MATEUSZ Carter CNP   gabapentin (NEURONTIN) 300 MG capsule Take 1 tablet in am, 2 tablets in pm. 6/20/22   MATEUSZ Smith CNP   acetaminophen-codeine (TYLENOL #3) 300-30 MG per tablet Take 1 tablet by mouth every 6 hours as needed for Pain. 6/20/22   MATEUSZ Rod CNP   insulin glargine (BASAGLAR KWIKPEN) 100 UNIT/ML injection pen Inject 55 Units into the skin 2 times daily    Historical Provider, MD   triamcinolone (KENALOG) 0.1 % cream Apply topically 2 times daily as needed Apply topically 2 times daily.     Historical Provider, MD   senna (SENOKOT) 8.6 MG tablet Take 1 tablet by mouth 2 times daily    Historical Provider, MD   insulin aspart (NOVOLOG) 100 UNIT/ML injection vial Inject 15 Units into the skin 3 times daily    Historical Provider, MD   phenazopyridine (PYRIDIUM) 100 MG tablet Take 100 mg by mouth 2 times daily    Historical Provider, MD   magnesium 30 MG tablet Take 40 mg by mouth daily    Historical Provider, MD   nystatin (MYCOSTATIN) 715013 UNIT/GM powder Apply topically 2 times daily    Historical Provider, MD   tiZANidine (ZANAFLEX) 2 MG tablet Take 2 mg by mouth every 8 hours as needed    Historical Provider, MD   metoprolol tartrate (LOPRESSOR) 25 MG tablet TAKE ONE-HALF TABLET BY MOUTH TWICE A DAY 7/29/20   Анна Knott MD   atorvastatin (LIPITOR) 80 MG tablet Take 1 tablet by mouth daily 7/29/20   Анан Knott MD   Insulin Pen Needle 32G X 6 MM MISC by Does not apply route 3 times daily with meals    Historical Provider, MD   hydrocortisone (ANUSOL-HC) 2.5 % rectal cream Place rectally 2 times daily Place rectally 2 times daily. Historical Provider, MD   rivaroxaban (XARELTO) 20 MG TABS tablet Take 1 tablet by mouth daily 2/22/17   Анна Knott MD   vitamin D (ERGOCALCIFEROL) 36196 UNITS CAPS capsule Take 50,000 Units by mouth once a week    Historical Provider, MD   Omega 3 1000 MG CAPS Take 1,000 mg by mouth daily    Historical Provider, MD   Liraglutide (VICTOZA) 18 MG/3ML SOPN SC injection Inject 1.2 mg into the skin daily    Historical Provider, MD   Calcium Carb-Cholecalciferol (OYSTER SHELL CALCIUM + D PO) Take by mouth daily   Patient not taking: Reported on 9/25/2022    Historical Provider, MD   Biotin 5000 MCG TABS Take by mouth daily  Patient not taking: Reported on 9/25/2022    Historical Provider, MD   lidocaine (LIDODERM) 5 % Place 1 patch onto the skin daily 12 hours on, 12 hours off. Patient taking differently: Place 3 patches onto the skin daily 2 to 3 patches shoulders and back, 12 hours on, 12 hours off. 7/14/15   Milagros Azar APRN - CNP   oxybutynin (DITROPAN XL) 15 MG CR tablet Take 15 mg by mouth daily.     Historical Provider, MD Loratadine 10 MG CAPS Take by mouth daily     Historical Provider, MD   levothyroxine (SYNTHROID) 175 MCG tablet Take 150 mcg by mouth daily.     Historical Provider, MD       Current Medications:   Current Facility-Administered Medications: methylPREDNISolone sodium (SOLU-MEDROL) injection 40 mg, 40 mg, IntraVENous, Q6H  sacubitril-valsartan (ENTRESTO) 24-26 MG per tablet 0.5 tablet, 0.5 tablet, Oral, BID  furosemide (LASIX) tablet 20 mg, 20 mg, Oral, Daily  simethicone (MYLICON) chewable tablet 80 mg, 80 mg, Oral, Q6H PRN  insulin lispro (HUMALOG) injection vial 0-16 Units, 0-16 Units, SubCUTAneous, TID WC  insulin lispro (HUMALOG) injection vial 0-4 Units, 0-4 Units, SubCUTAneous, Nightly  sodium chloride (OCEAN, BABY AYR) 0.65 % nasal spray 2 spray, 2 spray, Each Nostril, PRN  Polyvinyl Alcohol-Povidone PF 1.4-0.6 % SOLN 2 drop, 2 drop, Both Eyes, PRN  insulin glargine (LANTUS) injection vial 42 Units, 42 Units, SubCUTAneous, Daily  diclofenac sodium (VOLTAREN) 1 % gel 2 g, 2 g, Topical, BID  insulin lispro (HUMALOG) injection vial 17 Units, 17 Units, SubCUTAneous, QAM AC  insulin lispro (HUMALOG) injection vial 17 Units, 17 Units, SubCUTAneous, Daily before lunch  insulin lispro (HUMALOG) injection vial 7 Units, 7 Units, SubCUTAneous, Daily before dinner  atorvastatin (LIPITOR) tablet 80 mg, 80 mg, Oral, Nightly  isosorbide mononitrate (IMDUR) extended release tablet 30 mg, 30 mg, Oral, Daily  levothyroxine (SYNTHROID) tablet 150 mcg, 150 mcg, Oral, Daily  metoprolol tartrate (LOPRESSOR) tablet 12.5 mg, 12.5 mg, Oral, BID  miconazole (MICOTIN) 2 % powder, , Topical, BID  oxybutynin (DITROPAN-XL) extended release tablet 15 mg, 15 mg, Oral, Daily  empagliflozin (JARDIANCE) tablet 10 mg, 10 mg, Oral, Daily  sodium chloride flush 0.9 % injection 10 mL, 10 mL, IntraVENous, 2 times per day  sodium chloride flush 0.9 % injection 10 mL, 10 mL, IntraVENous, PRN  0.9 % sodium chloride infusion, , IntraVENous, PRN  ondansetron (ZOFRAN) injection 4 mg, 4 mg, IntraVENous, Q6H PRN  dextrose bolus 10% 125 mL, 125 mL, IntraVENous, PRN **OR** dextrose bolus 10% 250 mL, 250 mL, IntraVENous, PRN  glucagon (rDNA) injection 1 mg, 1 mg, SubCUTAneous, PRN  dextrose 10 % infusion, , IntraVENous, Continuous PRN  polyethylene glycol (GLYCOLAX) packet 17 g, 17 g, Oral, Daily PRN  pantoprazole (PROTONIX) tablet 40 mg, 40 mg, Oral, QAM AC  lidocaine 4 % external patch 1 patch, 1 patch, TransDERmal, Daily  cetirizine (ZYRTEC) tablet 5 mg, 5 mg, Oral, Daily  gabapentin (NEURONTIN) capsule 300 mg, 300 mg, Oral, Daily **AND** gabapentin (NEURONTIN) capsule 600 mg, 600 mg, Oral, Nightly  rivaroxaban (XARELTO) tablet 20 mg, 20 mg, Oral, Daily  acetaminophen (TYLENOL) tablet 1,000 mg, 1,000 mg, Oral, 3 times per day  tiZANidine (ZANAFLEX) tablet 2 mg, 2 mg, Oral, TID  oxyCODONE (ROXICODONE) immediate release tablet 2.5 mg, 2.5 mg, Oral, Q4H PRN **OR** oxyCODONE (ROXICODONE) immediate release tablet 5 mg, 5 mg, Oral, Q4H PRN    Allergies:  Aspirin, Ibuprofen, Macrobid [nitrofurantoin monohydrate macrocrystals], Adhesive tape, Lexiscan [regadenoson], and Penicillins    Social History     Socioeconomic History    Marital status:       Spouse name: Not on file    Number of children: 7    Years of education: Not on file    Highest education level: Not on file   Occupational History    Occupation: Retired   Tobacco Use    Smoking status: Never    Smokeless tobacco: Never   Vaping Use    Vaping Use: Never used   Substance and Sexual Activity    Alcohol use: No    Drug use: No    Sexual activity: Not on file   Other Topics Concern    Not on file   Social History Narrative    Not on file     Social Determinants of Health     Financial Resource Strain: Not on file   Food Insecurity: Not on file   Transportation Needs: Not on file   Physical Activity: Not on file   Stress: Not on file   Social Connections: Not on file   Intimate Partner Violence: Not on file   Housing Stability: Not on file       Family History:  Family History   Problem Relation Age of Onset    Arthritis Other     Diabetes Other     Heart Disease Other     Hypertension Other     High Cholesterol Brother          REVIEW OF SYSTEMS:   CONSTITUTIONAL: Denies unexplained weight loss, fevers, chills or fatigue  NEUROLOGICAL: Denies unsteady gait or progressive weakness    PSYCHOLOGICAL: Denies anxiety, depression   SKIN: Denies skin changes, delayed healing, rash, itching   HEMATOLOGIC: Denies easy bleeding or bruising  ENDOCRINE: Denies excessive thirst, urination, heat/cold  RESPIRATORY: Denies current dyspnea, cough  CARDIOVASCULAR: Negative for chest pain at this time. EYES: Negative for photophobia and visual disturbance. ENT:  Negative for rhinorrhea, epistaxis, sore throat, or hearing loss. GI: Denies nausea, vomiting, diarrhea   : Denies bowel or bladder issues   MUSCULOSKELETAL: Left tumb pain. All other ROS reviewed in chart or with patient or family and are grossly negative. PHYSICAL EXAMINATION:  Ms. Evangelista Lazcano is a very pleasant 78 y.o. female who seen today in no acute distress, awake, alert, and oriented. She is well nourished and groomed. Patient with normal affect. Body mass index is 47.3 kg/m². . Skin warm and dry. Resting respiratory rate is 16. Resp deep and easy. Pulse is with regular rate and rhythm    BP (!) 155/64   Pulse 70   Temp 98.2 °F (36.8 °C)   Resp 16   Ht 5' (1.524 m)   Wt 242 lb 3.2 oz (109.9 kg)   LMP  (LMP Unknown)   SpO2 94%   BMI 47.30 kg/m²        Airway is intact  Chest: chest clear, no wheezing, rales, normal symmetric air entry, no tachypnea, retractions or cyanosis  Heart: regular rate and rhythm ; heart sounds normal   Hearing intact, pupil equal and reactive bilateral  Lymphatics; No groin or axillary enlarged lymph nodes. Neck; No swelling  Abdomen; soft, non distended.      MUSCULOSKELETAL: Examination of both Upper extremities showing a decrease range of motion of the left thumb compare to the other side. There is mild swelling that can be seen with tenderness over [facility] joint. The thenar musculature is not atrophied & weakened. NEUROLOGIC:   Sensory:    Touch:                     Right Upper Extremity:  normal                   Left Upper Extremity:  normal                  Right Lower Extremity:  normal                  Left Lower Extremity:  normal          DATA:    CBC:   Lab Results   Component Value Date/Time    WBC 6.7 10/01/2022 04:54 AM    RBC 2.89 10/01/2022 04:54 AM    HGB 9.2 10/01/2022 04:54 AM    HCT 27.5 10/01/2022 04:54 AM    MCV 95.2 10/01/2022 04:54 AM    MCH 31.7 10/01/2022 04:54 AM    MCHC 33.3 10/01/2022 04:54 AM    RDW 21.2 10/01/2022 04:54 AM    PLT 90 10/01/2022 04:54 AM    MPV 8.3 10/01/2022 04:54 AM     WBC:    Lab Results   Component Value Date/Time    WBC 6.7 10/01/2022 04:54 AM     PT/INR:    Lab Results   Component Value Date/Time    PROTIME 23.6 09/29/2022 11:37 AM    INR 2.10 09/29/2022 11:37 AM     PTT:    Lab Results   Component Value Date/Time    APTT 39.7 02/13/2017 12:07 PM   [APTT    IMAGING: Xray's were reviewed, taken 8/24/2022, 3 views of the left wrist, and showed no fracture, severe thumb CMC arthritis. IMPRESSION: Left thumb Pain/ CMC arthritis. PLAN:    - I assured the patient that the xray is negative for acute fracture. The patient can take NSAIDs PRN and recommended wearing the thumb spica brace as needed. We recommended IV Solumedrol for 2 days. - We will follow    Thank you very much for the kind consultation and allowing me to participate in this patient's care. I will continue to keep you apprised of her progress.         Maximiliano Brady MD   10/1/2022  4:17 PM

## 2022-10-01 NOTE — PROGRESS NOTES
Gastroenterology Progress Note      Wally Parra is a 78 y.o. female patient. Principal Problem:    Dyspnea on exertion  Active Problems:    Acute on chronic diastolic heart failure (HCC)    Thrombocytopenia (HCC)    Anemia    Wrist arthritis    Elevated troponin    Chronic atrial fibrillation (HCC)    DM (diabetes mellitus) ([facility] [address]    Coronary artery disease involving native coronary artery of native heart without angina pectoris    PAF (paroxysmal atrial fibrillation) ([facility] [address]    Peripheral vascular disease ([facility]    Mixed hyperlipidemia  Resolved Problems:    * No resolved hospital problems. *      SUBJECTIVE: Doing well this morning    ROS:  No fever, chills  No chest pain, palpitations  No SOB, cough  Gastrointestinal ROS: no abdominal pain, nausea, vomiting     Physical    VITALS:  BP (!) 155/64   Pulse 70   Temp 98.2 °F (36.8 °C)   Resp 16   Ht 5' (1.524 m)   Wt 242 lb 3.2 oz (109.9 kg)   LMP  (LMP Unknown)   SpO2 94%   BMI 47.30 kg/m²   TEMPERATURE:  Current - Temp: 98.2 °F (36.8 °C); Max - Temp  Av.4 °F (36.9 °C)  Min: 98.2 °F (36.8 °C)  Max: 99 °F (37.2 °C)    NAD  RRR, Nl s1s2  Lungs CTA Bilaterally, normal effort  Abdomen soft, ND, NT, no HSM, Bowel sounds normal.    Data    Data Review:    Recent Labs     22  0504 10/01/22  0454   WBC 5.8 5.5 6.7   HGB 8.9* 9.0* 9.2*   HCT 26.8* 27.4* 27.5*   MCV 97.0 97.1 95.2   PLT 89* 88* 90*     Recent Labs     22  0504 10/01/22  0453    142 141   K 3.9 4.4 4.1    105 106   CO2 24 27 25   BUN 35* 40* 38*   CREATININE 1.0 1.2 1.0     No results for input(s): AST, ALT, ALB, BILIDIR, BILITOT, ALKPHOS in the last 72 hours. No results for input(s): LIPASE, AMYLASE in the last 72 hours. Recent Labs     22  1137   PROTIME 23.6*   INR 2.10*     No results for input(s): PTT in the last 72 hours.     Radiology Review:       ASSESSMENT dysphagia  Plan for EGD on Monday      Pavan Herr

## 2022-10-02 LAB
ANION GAP SERPL CALCULATED.3IONS-SCNC: 12 MMOL/L (ref 3–16)
BUN BLDV-MCNC: 44 MG/DL (ref 7–20)
CALCIUM SERPL-MCNC: 9.8 MG/DL (ref 8.3–10.6)
CHLORIDE BLD-SCNC: 102 MMOL/L (ref 99–110)
CO2: 24 MMOL/L (ref 21–32)
CREAT SERPL-MCNC: 1.1 MG/DL (ref 0.6–1.2)
GFR AFRICAN AMERICAN: 57
GFR NON-AFRICAN AMERICAN: 47
GLUCOSE BLD-MCNC: 298 MG/DL (ref 70–99)
GLUCOSE BLD-MCNC: 299 MG/DL (ref 70–99)
GLUCOSE BLD-MCNC: 329 MG/DL (ref 70–99)
GLUCOSE BLD-MCNC: 336 MG/DL (ref 70–99)
GLUCOSE BLD-MCNC: 350 MG/DL (ref 70–99)
GLUCOSE BLD-MCNC: 360 MG/DL (ref 70–99)
HCT VFR BLD CALC: 28.2 % (ref 36–48)
HEMOGLOBIN: 9.3 G/DL (ref 12–16)
MCH RBC QN AUTO: 32.2 PG (ref 26–34)
MCHC RBC AUTO-ENTMCNC: 33.1 G/DL (ref 31–36)
MCV RBC AUTO: 97.5 FL (ref 80–100)
PDW BLD-RTO: 21.4 % (ref 12.4–15.4)
PERFORMED ON: ABNORMAL
PLATELET # BLD: 91 K/UL (ref 135–450)
PMV BLD AUTO: 9.2 FL (ref 5–10.5)
POTASSIUM SERPL-SCNC: 5.2 MMOL/L (ref 3.5–5.1)
RBC # BLD: 2.89 M/UL (ref 4–5.2)
SODIUM BLD-SCNC: 138 MMOL/L (ref 136–145)
WBC # BLD: 8.7 K/UL (ref 4–11)

## 2022-10-02 PROCEDURE — 6370000000 HC RX 637 (ALT 250 FOR IP): Performed by: FAMILY MEDICINE

## 2022-10-02 PROCEDURE — 6370000000 HC RX 637 (ALT 250 FOR IP): Performed by: INTERNAL MEDICINE

## 2022-10-02 PROCEDURE — 6360000002 HC RX W HCPCS: Performed by: ORTHOPAEDIC SURGERY

## 2022-10-02 PROCEDURE — 99232 SBSQ HOSP IP/OBS MODERATE 35: CPT | Performed by: ORTHOPAEDIC SURGERY

## 2022-10-02 PROCEDURE — 85027 COMPLETE CBC AUTOMATED: CPT

## 2022-10-02 PROCEDURE — 2580000003 HC RX 258: Performed by: PHYSICIAN ASSISTANT

## 2022-10-02 PROCEDURE — 36415 COLL VENOUS BLD VENIPUNCTURE: CPT

## 2022-10-02 PROCEDURE — 94760 N-INVAS EAR/PLS OXIMETRY 1: CPT

## 2022-10-02 PROCEDURE — 6370000000 HC RX 637 (ALT 250 FOR IP): Performed by: NURSE PRACTITIONER

## 2022-10-02 PROCEDURE — 80048 BASIC METABOLIC PNL TOTAL CA: CPT

## 2022-10-02 PROCEDURE — 6370000000 HC RX 637 (ALT 250 FOR IP): Performed by: PHYSICIAN ASSISTANT

## 2022-10-02 PROCEDURE — 1200000000 HC SEMI PRIVATE

## 2022-10-02 RX ORDER — INSULIN LISPRO 100 [IU]/ML
17 INJECTION, SOLUTION INTRAVENOUS; SUBCUTANEOUS
Status: DISCONTINUED | OUTPATIENT
Start: 2022-10-02 | End: 2022-10-04

## 2022-10-02 RX ORDER — INSULIN LISPRO 100 [IU]/ML
17 INJECTION, SOLUTION INTRAVENOUS; SUBCUTANEOUS
Status: DISCONTINUED | OUTPATIENT
Start: 2022-10-03 | End: 2022-10-02

## 2022-10-02 RX ORDER — INSULIN GLARGINE 100 [IU]/ML
48 INJECTION, SOLUTION SUBCUTANEOUS DAILY
Status: DISCONTINUED | OUTPATIENT
Start: 2022-10-03 | End: 2022-10-04

## 2022-10-02 RX ADMIN — RIVAROXABAN 20 MG: 20 TABLET, FILM COATED ORAL at 17:37

## 2022-10-02 RX ADMIN — Medication 10 ML: at 09:20

## 2022-10-02 RX ADMIN — CETIRIZINE HYDROCHLORIDE 5 MG: 10 TABLET, FILM COATED ORAL at 09:16

## 2022-10-02 RX ADMIN — ACETAMINOPHEN 1000 MG: 500 TABLET ORAL at 13:54

## 2022-10-02 RX ADMIN — METOPROLOL TARTRATE 12.5 MG: 25 TABLET, FILM COATED ORAL at 22:24

## 2022-10-02 RX ADMIN — OXYBUTYNIN CHLORIDE 15 MG: 5 TABLET, EXTENDED RELEASE ORAL at 09:16

## 2022-10-02 RX ADMIN — INSULIN LISPRO 17 UNITS: 100 INJECTION, SOLUTION INTRAVENOUS; SUBCUTANEOUS at 12:21

## 2022-10-02 RX ADMIN — DICLOFENAC SODIUM 2 G: 10 GEL TOPICAL at 09:20

## 2022-10-02 RX ADMIN — METHYLPREDNISOLONE SODIUM SUCCINATE 40 MG: 40 INJECTION, POWDER, FOR SOLUTION INTRAMUSCULAR; INTRAVENOUS at 05:15

## 2022-10-02 RX ADMIN — INSULIN LISPRO 17 UNITS: 100 INJECTION, SOLUTION INTRAVENOUS; SUBCUTANEOUS at 09:13

## 2022-10-02 RX ADMIN — METHYLPREDNISOLONE SODIUM SUCCINATE 40 MG: 40 INJECTION, POWDER, FOR SOLUTION INTRAMUSCULAR; INTRAVENOUS at 00:03

## 2022-10-02 RX ADMIN — INSULIN LISPRO 12 UNITS: 100 INJECTION, SOLUTION INTRAVENOUS; SUBCUTANEOUS at 09:13

## 2022-10-02 RX ADMIN — TIZANIDINE 2 MG: 4 TABLET ORAL at 09:15

## 2022-10-02 RX ADMIN — METHYLPREDNISOLONE SODIUM SUCCINATE 40 MG: 40 INJECTION, POWDER, FOR SOLUTION INTRAMUSCULAR; INTRAVENOUS at 12:21

## 2022-10-02 RX ADMIN — INSULIN LISPRO 8 UNITS: 100 INJECTION, SOLUTION INTRAVENOUS; SUBCUTANEOUS at 17:37

## 2022-10-02 RX ADMIN — FUROSEMIDE 20 MG: 20 TABLET ORAL at 09:16

## 2022-10-02 RX ADMIN — METHYLPREDNISOLONE SODIUM SUCCINATE 40 MG: 40 INJECTION, POWDER, FOR SOLUTION INTRAMUSCULAR; INTRAVENOUS at 17:37

## 2022-10-02 RX ADMIN — SODIUM CHLORIDE, PRESERVATIVE FREE 10 ML: 5 INJECTION INTRAVENOUS at 00:03

## 2022-10-02 RX ADMIN — MICONAZOLE NITRATE: 20 POWDER TOPICAL at 09:20

## 2022-10-02 RX ADMIN — SODIUM CHLORIDE, PRESERVATIVE FREE 10 ML: 5 INJECTION INTRAVENOUS at 05:15

## 2022-10-02 RX ADMIN — EMPAGLIFLOZIN 10 MG: 10 TABLET, FILM COATED ORAL at 09:16

## 2022-10-02 RX ADMIN — PANTOPRAZOLE SODIUM 40 MG: 40 TABLET, DELAYED RELEASE ORAL at 05:14

## 2022-10-02 RX ADMIN — INSULIN LISPRO 4 UNITS: 100 INJECTION, SOLUTION INTRAVENOUS; SUBCUTANEOUS at 22:26

## 2022-10-02 RX ADMIN — LEVOTHYROXINE SODIUM 150 MCG: 0.15 TABLET ORAL at 05:14

## 2022-10-02 RX ADMIN — INSULIN LISPRO 16 UNITS: 100 INJECTION, SOLUTION INTRAVENOUS; SUBCUTANEOUS at 12:21

## 2022-10-02 RX ADMIN — GABAPENTIN 600 MG: 300 CAPSULE ORAL at 22:25

## 2022-10-02 RX ADMIN — GABAPENTIN 300 MG: 300 CAPSULE ORAL at 09:16

## 2022-10-02 RX ADMIN — TIZANIDINE 2 MG: 4 TABLET ORAL at 13:54

## 2022-10-02 RX ADMIN — SACUBITRIL AND VALSARTAN 0.5 TABLET: 24; 26 TABLET, FILM COATED ORAL at 09:15

## 2022-10-02 RX ADMIN — SACUBITRIL AND VALSARTAN 0.5 TABLET: 24; 26 TABLET, FILM COATED ORAL at 23:13

## 2022-10-02 RX ADMIN — METOPROLOL TARTRATE 12.5 MG: 25 TABLET, FILM COATED ORAL at 09:16

## 2022-10-02 RX ADMIN — INSULIN GLARGINE 42 UNITS: 100 INJECTION, SOLUTION SUBCUTANEOUS at 09:13

## 2022-10-02 RX ADMIN — INSULIN LISPRO 17 UNITS: 100 INJECTION, SOLUTION INTRAVENOUS; SUBCUTANEOUS at 17:37

## 2022-10-02 RX ADMIN — ISOSORBIDE MONONITRATE 30 MG: 30 TABLET, EXTENDED RELEASE ORAL at 09:16

## 2022-10-02 RX ADMIN — ACETAMINOPHEN 1000 MG: 500 TABLET ORAL at 05:14

## 2022-10-02 RX ADMIN — ACETAMINOPHEN 1000 MG: 500 TABLET ORAL at 22:26

## 2022-10-02 RX ADMIN — METHYLPREDNISOLONE SODIUM SUCCINATE 40 MG: 40 INJECTION, POWDER, FOR SOLUTION INTRAMUSCULAR; INTRAVENOUS at 22:25

## 2022-10-02 RX ADMIN — TIZANIDINE 2 MG: 4 TABLET ORAL at 22:25

## 2022-10-02 RX ADMIN — ATORVASTATIN CALCIUM 80 MG: 80 TABLET, FILM COATED ORAL at 22:25

## 2022-10-02 ASSESSMENT — PAIN DESCRIPTION - LOCATION: LOCATION: FINGER (COMMENT WHICH ONE)

## 2022-10-02 ASSESSMENT — PAIN DESCRIPTION - PAIN TYPE: TYPE: ACUTE PAIN

## 2022-10-02 ASSESSMENT — PAIN DESCRIPTION - ORIENTATION: ORIENTATION: LEFT

## 2022-10-02 ASSESSMENT — PAIN SCALES - GENERAL
PAINLEVEL_OUTOF10: 0
PAINLEVEL_OUTOF10: 6
PAINLEVEL_OUTOF10: 0
PAINLEVEL_OUTOF10: 0

## 2022-10-02 NOTE — PROGRESS NOTES
Hospitalist Progress Note      PCP: MATEUSZ Young - CNP    Date of Admission: 9/24/2022    Chief Complaint: Wrist pain      Subjective:   L thumb pain much improved after IV solumedrol  Chronic bilat shoulder pain and back pain. No new acute complaints.       Medications:  Reviewed    Infusion Medications    sodium chloride 10 mL/hr (09/25/22 2117)    dextrose       Scheduled Medications    methylPREDNISolone sodium  40 mg IntraVENous Q6H    sacubitril-valsartan  0.5 tablet Oral BID    furosemide  20 mg Oral Daily    insulin lispro  0-16 Units SubCUTAneous TID WC    insulin lispro  0-4 Units SubCUTAneous Nightly    insulin glargine  42 Units SubCUTAneous Daily    diclofenac sodium  2 g Topical BID    insulin lispro  17 Units SubCUTAneous QAM AC    insulin lispro  17 Units SubCUTAneous Daily before lunch    insulin lispro  7 Units SubCUTAneous Daily before dinner    atorvastatin  80 mg Oral Nightly    isosorbide mononitrate  30 mg Oral Daily    levothyroxine  150 mcg Oral Daily    metoprolol tartrate  12.5 mg Oral BID    miconazole   Topical BID    oxybutynin  15 mg Oral Daily    empagliflozin  10 mg Oral Daily    sodium chloride flush  10 mL IntraVENous 2 times per day    pantoprazole  40 mg Oral QAM AC    lidocaine  1 patch TransDERmal Daily    cetirizine  5 mg Oral Daily    gabapentin  300 mg Oral Daily    And    gabapentin  600 mg Oral Nightly    rivaroxaban  20 mg Oral Daily    acetaminophen  1,000 mg Oral 3 times per day    tiZANidine  2 mg Oral TID     PRN Meds: simethicone, sodium chloride, Polyvinyl Alcohol-Povidone PF, sodium chloride flush, sodium chloride, ondansetron, dextrose bolus **OR** dextrose bolus, glucagon (rDNA), dextrose, polyethylene glycol, oxyCODONE **OR** oxyCODONE      Intake/Output Summary (Last 24 hours) at 10/2/2022 1558  Last data filed at 10/2/2022 1227  Gross per 24 hour   Intake 720 ml   Output 2550 ml   Net -1830 ml         Exam:    /64   Pulse 56   Temp 97.6 °F (36.4 °C) (Oral)   Resp 16   Ht 5' (1.524 m)   Wt 239 lb 8 oz (108.6 kg)   LMP  (LMP Unknown)   SpO2 94%   BMI 46.77 kg/m²     Gen/overall appearance: Not in acute distress. Alert. Head: Normocephalic, atraumatic  Eyes: EOMI, no scleral icterus  CVS: regular rate and rhythm, Normal S1S2  Pulm: Diminished, Clear to auscultation bilaterally. No crackles/wheezes  Extremities: mild L thumb erythema  Neuro: No gross focal deficits noted  Skin: Warm, dry    Labs:   Recent Labs     09/30/22  0504 10/01/22  0454 10/02/22  0455   WBC 5.5 6.7 8.7   HGB 9.0* 9.2* 9.3*   HCT 27.4* 27.5* 28.2*   PLT 88* 90* 91*       Recent Labs     09/30/22  0504 10/01/22  0453 10/02/22  0455    141 138   K 4.4 4.1 5.2*    106 102   CO2 27 25 24   BUN 40* 38* 44*   CREATININE 1.2 1.0 1.1   CALCIUM 9.7 9.5 9.8       No results for input(s): AST, ALT, BILIDIR, BILITOT, ALKPHOS in the last 72 hours. No results for input(s): INR in the last 72 hours. No results for input(s): Aleene Sauger in the last 72 hours.       Assessment/Plan:    Active Hospital Problems    Diagnosis Date Noted    Primary osteoarthritis of first carpometacarpal joint of left hand [M18.12] 10/01/2022     Priority: Medium    Elevated troponin [R77.8] 09/27/2022     Priority: Medium    Chronic atrial fibrillation ([address] [I48.20] 09/27/2022     Priority: Medium    Thrombocytopenia ([address] [D69.6] 09/25/2022     Priority: Medium    Anemia [D64.9] 09/25/2022     Priority: Medium    Wrist arthritis [M19.039] 09/25/2022     Priority: Medium    Dyspnea on exertion [R06.09] 09/24/2022     Priority: Medium    Acute on chronic diastolic heart failure ([address] [I50.33] 07/27/2022     Priority: Medium    Mixed hyperlipidemia [E78.2] 05/02/2014    PAF (paroxysmal atrial fibrillation) ([place] [address] [I48.0] 10/05/2011    Coronary artery disease involving native coronary artery of native heart without angina pectoris [I25.10] 10/05/2011    DM (diabetes mellitus) ([place] [address] [E11.9] 10/05/2011    Peripheral vascular disease ([facility] [address] [I73.9] 10/05/2011       Acute on chronic diastolic Congestive Heart Failure. Clinically improved  ARUN. Suspect lasix induced  - s/p IV diuresis; now held  - ins/outs  - trend Cr  - monitor and replace lytes  - avoid nephrotoxins  - respiratory care  - cardio following     Anemia and thrombocytopenia  - Iron studies, B12, folate ok  - Blood occult stool negative  - trend CBC  - BM biopsy 9/30; follow up studies outpt  - Hematology following     L wrist erythema with suspected gout flare  L thumb pain   - empiric abx d/daja for presumed cellulitis  - trial of IV solumedrol  - pain management  - ortho following     Severe chronic bilateral low back muscle spasm  - Cont scheduled tizanidine and tylenol, prn oxycodone, PT OT     Dysuria  - improved  - s/p diflucan 150mg x1  - UCx neg  - s/p phenazopyridine 100mg TID for 3 doses     CAD status post CABG  - cont statin, BB, not on asp as already on xarelto     Paroxysmal atrial fibrillation  - cont BB, xarelto for [facility]    Hyperglycemia. Suspect CS induced  - sliding scale to high  - titrate insulin regimen as tolerated    Dysphagia.   Suspect 2/2 stenosis vs web  - ST following with diet modification  - planned for EGD mon  - GI following    PMH of IDDM, htn, hld      DVT Prophylaxis: xarelto  Diet: ADULT DIET; Easy to Chew; 4 carb choices (60 gm/meal)  ADULT ORAL NUTRITION SUPPLEMENT; Breakfast, Dinner; Diabetic Oral Supplement  Diet NPO  Code Status: Full Code    Dispo:  ALMITA Poole MD

## 2022-10-02 NOTE — PROGRESS NOTES
[facility] Orthopedic Surgery  Progress Note              CHIEF COMPLAINT:  Left thumb Pain/ CMC arthritis, much better. HISTORY OF PRESENT ILLNESS:    Ms. Jaden Moreno is 78 y.o. [address] female right handed seen today at [facility] for f/u evaluation of a left thumb pain which started years ago and is worsening over time. Denies trauma or injury. The patient is complaining of left thumb base pain with no numbness or tingling, and much better after IV Solumedrol. This is worse with ROM, rest makes pain better. No other complaint. Past Medical History:        Diagnosis Date    4/11/17 Left knee repeat repair of median parapatellar arthrotomy with augmentation 4/12/2017    Arthritis     Atrial fibrillation (HCC)     CAD (coronary artery disease)     Cataract     Chronic diastolic congestive heart failure ([facility] [address] 7/27/2022    Diabetes mellitus ([facility] [address]     GERD (gastroesophageal reflux disease)     Hyperlipidemia     Hypertension     HYPOTHRYROIDISM     Non-English speaking patient     SPEAKS Armenian. SHE SPEAKS A LITTLE ENGLISH    Sleep apnea     unspecified    Thyroid disease     UTI        Past Surgical History:        Procedure Laterality Date    CARDIAC SURGERY  2004    CABG X 4 VESSELS    CHOLECYSTECTOMY, LAPAROSCOPIC  5/15/14    with IOC    CT BONE MARROW BIOPSY  9/29/2022    CT BONE MARROW BIOPSY 9/29/2022 [facility] CT SCAN    JOINT REPLACEMENT Bilateral 2000 AND 1996 And 1998    knee    KNEE SURGERY Left 02/28/2017    left knee poly exchange revision     REVISION TOTAL KNEE ARTHROPLASTY Right 11/22/2016    RIGHT TOTAL KNEE REVISION WITH POLY EXCHANGE    TOTAL KNEE ARTHROPLASTY      partical/ left        Medications prior to admission:   Prior to Admission medications    Medication Sig Start Date End Date Taking?  Authorizing Provider   lisinopril (PRINIVIL;ZESTRIL) 2.5 MG tablet Take 2.5 mg by mouth daily   Yes Historical Provider, MD   benzonatate (TESSALON) 100 MG capsule Take 100 mg by mouth 3 times daily as needed for Cough   Yes Historical Provider, MD   pantoprazole (PROTONIX) 40 MG tablet Take 40 mg by mouth every morning (before breakfast)   Yes Historical Provider, MD   cefaclor (CECLOR) 250 MG capsule Take 500 mg by mouth 3 times daily   Yes Historical Provider, MD   calcium carbonate (OSCAL) 500 MG TABS tablet Take 500 mg by mouth daily   Yes Historical Provider, MD   sacubitril-valsartan (ENTRESTO) 24-26 MG per tablet Take 1 tablet by mouth 2 times daily  Patient not taking: Reported on 9/25/2022 9/8/22   MATEUSZ Acosta CNP   isosorbide mononitrate (IMDUR) 30 MG extended release tablet Take 1 tablet by mouth in the morning. .  Patient not taking: Reported on 9/25/2022 8/12/22   MATEUSZ Acosta CNP   furosemide (LASIX) 20 MG tablet Take 1 tablet by mouth in the morning and 1 tablet before bedtime. Patient not taking: Reported on 9/25/2022 8/9/22   MATEUSZ Acosta CNP   dapagliflozin (FARXIGA) 10 MG tablet Take 1 tablet by mouth every morning  Patient not taking: Reported on 9/25/2022 6/29/22   MATEUSZ Acosta CNP   gabapentin (NEURONTIN) 300 MG capsule Take 1 tablet in am, 2 tablets in pm. 6/20/22   MATEUSZ Marcos CNP   acetaminophen-codeine (TYLENOL #3) 300-30 MG per tablet Take 1 tablet by mouth every 6 hours as needed for Pain. 6/20/22   MATEUSZ Lamb CNP   insulin glargine (BASAGLAR KWIKPEN) 100 UNIT/ML injection pen Inject 55 Units into the skin 2 times daily    Historical Provider, MD   triamcinolone (KENALOG) 0.1 % cream Apply topically 2 times daily as needed Apply topically 2 times daily.     Historical Provider, MD   senna (SENOKOT) 8.6 MG tablet Take 1 tablet by mouth 2 times daily    Historical Provider, MD   insulin aspart (NOVOLOG) 100 UNIT/ML injection vial Inject 15 Units into the skin 3 times daily    Historical Provider, MD   phenazopyridine (PYRIDIUM) 100 MG tablet Take 100 mg by mouth 2 times daily    Historical Provider, MD   magnesium 30 MG tablet Take 40 mg by mouth daily    Historical Provider, MD   nystatin (MYCOSTATIN) 537312 UNIT/GM powder Apply topically 2 times daily    Historical Provider, MD   tiZANidine (ZANAFLEX) 2 MG tablet Take 2 mg by mouth every 8 hours as needed    Historical Provider, MD   metoprolol tartrate (LOPRESSOR) 25 MG tablet TAKE ONE-HALF TABLET BY MOUTH TWICE A DAY 7/29/20   Bishop Guajardo MD   atorvastatin (LIPITOR) 80 MG tablet Take 1 tablet by mouth daily 7/29/20   Bishop Guajardo MD   Insulin Pen Needle 32G X 6 MM MISC by Does not apply route 3 times daily with meals    Historical Provider, MD   hydrocortisone (ANUSOL-HC) 2.5 % rectal cream Place rectally 2 times daily Place rectally 2 times daily. Historical Provider, MD   rivaroxaban (XARELTO) 20 MG TABS tablet Take 1 tablet by mouth daily 2/22/17   Bishop Guajardo MD   vitamin D (ERGOCALCIFEROL) 25061 UNITS CAPS capsule Take 50,000 Units by mouth once a week    Historical Provider, MD   Omega 3 1000 MG CAPS Take 1,000 mg by mouth daily    Historical Provider, MD   Liraglutide (VICTOZA) 18 MG/3ML SOPN SC injection Inject 1.2 mg into the skin daily    Historical Provider, MD   Calcium Carb-Cholecalciferol (OYSTER SHELL CALCIUM + D PO) Take by mouth daily   Patient not taking: Reported on 9/25/2022    Historical Provider, MD   Biotin 5000 MCG TABS Take by mouth daily  Patient not taking: Reported on 9/25/2022    Historical Provider, MD   lidocaine (LIDODERM) 5 % Place 1 patch onto the skin daily 12 hours on, 12 hours off. Patient taking differently: Place 3 patches onto the skin daily 2 to 3 patches shoulders and back, 12 hours on, 12 hours off. 7/14/15   Milagros Azar, APRN - CNP   oxybutynin (DITROPAN XL) 15 MG CR tablet Take 15 mg by mouth daily. Historical Provider, MD   Loratadine 10 MG CAPS Take by mouth daily     Historical Provider, MD   levothyroxine (SYNTHROID) 175 MCG tablet Take 150 mcg by mouth daily.     Historical Provider, MD       Current Medications:   Current Facility-Administered Medications: methylPREDNISolone sodium (SOLU-MEDROL) injection 40 mg, 40 mg, IntraVENous, Q6H  sacubitril-valsartan (ENTRESTO) 24-26 MG per tablet 0.5 tablet, 0.5 tablet, Oral, BID  furosemide (LASIX) tablet 20 mg, 20 mg, Oral, Daily  simethicone (MYLICON) chewable tablet 80 mg, 80 mg, Oral, Q6H PRN  insulin lispro (HUMALOG) injection vial 0-16 Units, 0-16 Units, SubCUTAneous, TID WC  insulin lispro (HUMALOG) injection vial 0-4 Units, 0-4 Units, SubCUTAneous, Nightly  sodium chloride (OCEAN, BABY AYR) 0.65 % nasal spray 2 spray, 2 spray, Each Nostril, PRN  Polyvinyl Alcohol-Povidone PF 1.4-0.6 % SOLN 2 drop, 2 drop, Both Eyes, PRN  insulin glargine (LANTUS) injection vial 42 Units, 42 Units, SubCUTAneous, Daily  diclofenac sodium (VOLTAREN) 1 % gel 2 g, 2 g, Topical, BID  insulin lispro (HUMALOG) injection vial 17 Units, 17 Units, SubCUTAneous, QAM AC  insulin lispro (HUMALOG) injection vial 17 Units, 17 Units, SubCUTAneous, Daily before lunch  insulin lispro (HUMALOG) injection vial 7 Units, 7 Units, SubCUTAneous, Daily before dinner  atorvastatin (LIPITOR) tablet 80 mg, 80 mg, Oral, Nightly  isosorbide mononitrate (IMDUR) extended release tablet 30 mg, 30 mg, Oral, Daily  levothyroxine (SYNTHROID) tablet 150 mcg, 150 mcg, Oral, Daily  metoprolol tartrate (LOPRESSOR) tablet 12.5 mg, 12.5 mg, Oral, BID  miconazole (MICOTIN) 2 % powder, , Topical, BID  oxybutynin (DITROPAN-XL) extended release tablet 15 mg, 15 mg, Oral, Daily  empagliflozin (JARDIANCE) tablet 10 mg, 10 mg, Oral, Daily  sodium chloride flush 0.9 % injection 10 mL, 10 mL, IntraVENous, 2 times per day  sodium chloride flush 0.9 % injection 10 mL, 10 mL, IntraVENous, PRN  0.9 % sodium chloride infusion, , IntraVENous, PRN  ondansetron (ZOFRAN) injection 4 mg, 4 mg, IntraVENous, Q6H PRN  dextrose bolus 10% 125 mL, 125 mL, IntraVENous, PRN **OR** dextrose bolus 10% 250 mL, 250 mL, IntraVENous, PRN  glucagon (rDNA) injection 1 mg, 1 mg, SubCUTAneous, PRN  dextrose 10 % infusion, , IntraVENous, Continuous PRN  polyethylene glycol (GLYCOLAX) packet 17 g, 17 g, Oral, Daily PRN  pantoprazole (PROTONIX) tablet 40 mg, 40 mg, Oral, QAM AC  lidocaine 4 % external patch 1 patch, 1 patch, TransDERmal, Daily  cetirizine (ZYRTEC) tablet 5 mg, 5 mg, Oral, Daily  gabapentin (NEURONTIN) capsule 300 mg, 300 mg, Oral, Daily **AND** gabapentin (NEURONTIN) capsule 600 mg, 600 mg, Oral, Nightly  rivaroxaban (XARELTO) tablet 20 mg, 20 mg, Oral, Daily  acetaminophen (TYLENOL) tablet 1,000 mg, 1,000 mg, Oral, 3 times per day  tiZANidine (ZANAFLEX) tablet 2 mg, 2 mg, Oral, TID  oxyCODONE (ROXICODONE) immediate release tablet 2.5 mg, 2.5 mg, Oral, Q4H PRN **OR** oxyCODONE (ROXICODONE) immediate release tablet 5 mg, 5 mg, Oral, Q4H PRN    Allergies:  Aspirin, Ibuprofen, Macrobid [nitrofurantoin monohydrate macrocrystals], Adhesive tape, Lexiscan [regadenoson], and Penicillins    Social History     Socioeconomic History    Marital status:       Spouse name: Not on file    Number of children: 7    Years of education: Not on file    Highest education level: Not on file   Occupational History    Occupation: Retired   Tobacco Use    Smoking status: Never    Smokeless tobacco: Never   Vaping Use    Vaping Use: Never used   Substance and Sexual Activity    Alcohol use: No    Drug use: No    Sexual activity: Not on file   Other Topics Concern    Not on file   Social History Narrative    Not on file     Social Determinants of Health     Financial Resource Strain: Not on file   Food Insecurity: Not on file   Transportation Needs: Not on file   Physical Activity: Not on file   Stress: Not on file   Social Connections: Not on file   Intimate Partner Violence: Not on file   Housing Stability: Not on file       Family History:  Family History   Problem Relation Age of Onset    Arthritis Other     Diabetes Other     Heart Disease Other     Hypertension Other     High Cholesterol Brother          REVIEW OF SYSTEMS:   CONSTITUTIONAL: Denies unexplained weight loss, fevers, chills or fatigue  NEUROLOGICAL: Denies unsteady gait or progressive weakness    PSYCHOLOGICAL: Denies anxiety, depression   SKIN: Denies skin changes, delayed healing, rash, itching   HEMATOLOGIC: Denies easy bleeding or bruising  ENDOCRINE: Denies excessive thirst, urination, heat/cold  RESPIRATORY: Denies current dyspnea, cough  CARDIOVASCULAR: Negative for chest pain at this time. EYES: Negative for photophobia and visual disturbance. ENT:  Negative for rhinorrhea, epistaxis, sore throat, or hearing loss. GI: Denies nausea, vomiting, diarrhea   : Denies bowel or bladder issues   MUSCULOSKELETAL: Left tumb pain. All other ROS reviewed in chart or with patient or family and are grossly negative. PHYSICAL EXAMINATION:  Ms. Padmini Varghese is a very pleasant 78 y.o. female who seen today in no acute distress, awake, alert, and oriented. She is well nourished and groomed. Patient with normal affect. Body mass index is 46.77 kg/m². . Skin warm and dry. Resting respiratory rate is 16. Resp deep and easy. Pulse is with regular rate and rhythm    /68   Pulse 55   Temp 97.8 °F (36.6 °C) (Oral)   Resp 16   Ht 5' (1.524 m)   Wt 239 lb 8 oz (108.6 kg)   LMP  (LMP Unknown)   SpO2 97%   BMI 46.77 kg/m²        Airway is intact  Chest: chest clear, no wheezing, rales, normal symmetric air entry, no tachypnea, retractions or cyanosis  Heart: regular rate and rhythm ; heart sounds normal   Hearing intact, pupil equal and reactive bilateral  Lymphatics; No groin or axillary enlarged lymph nodes. Neck; No swelling  Abdomen; soft, non distended. MUSCULOSKELETAL: Examination of both Upper extremities showing a better range of motion of the left thumb compare to the other side. There is less swelling that can be seen with minimal tenderness over [facility] joint.  The thenar musculature is not atrophied & weakened. NEUROLOGIC:   Sensory:    Touch:                     Right Upper Extremity:  normal                   Left Upper Extremity:  normal                  Right Lower Extremity:  normal                  Left Lower Extremity:  normal      DATA:    CBC:   Lab Results   Component Value Date/Time    WBC 8.7 10/02/2022 04:55 AM    RBC 2.89 10/02/2022 04:55 AM    HGB 9.3 10/02/2022 04:55 AM    HCT 28.2 10/02/2022 04:55 AM    MCV 97.5 10/02/2022 04:55 AM    MCH 32.2 10/02/2022 04:55 AM    MCHC 33.1 10/02/2022 04:55 AM    RDW 21.4 10/02/2022 04:55 AM    PLT 91 10/02/2022 04:55 AM    MPV 9.2 10/02/2022 04:55 AM     WBC:    Lab Results   Component Value Date/Time    WBC 8.7 10/02/2022 04:55 AM     PT/INR:    Lab Results   Component Value Date/Time    PROTIME 23.6 09/29/2022 11:37 AM    INR 2.10 09/29/2022 11:37 AM     PTT:    Lab Results   Component Value Date/Time    APTT 39.7 02/13/2017 12:07 PM   [APTT    IMAGING: Xray's were reviewed, taken 8/24/2022, 3 views of the left wrist, and showed no fracture, severe thumb CMC arthritis. IMPRESSION: Left thumb Pain/ CMC arthritis, much better. PLAN:    - I assured the patient that the xray is negative for acute fracture. The patient can take NSAIDs PRN and recommended wearing the thumb spica brace as needed. We recommended continue IV Solumedrol for another. - I believe she may benefit from cortisone injection left thumb CMC joint in the future if pain recurs.    - We will follow      Po Elaine MD   10/2/2022  11:55 AM

## 2022-10-02 NOTE — PROGRESS NOTES
Was being taken care of by her nursing parent for cellulitis came by to see her today    She is anemic  Still with a difficulty swallow    Current plan is for an upper endoscopy tomorrow    We will make her n.p.o. after midnight    Thank you for this kind referral    Jaimie Grover

## 2022-10-03 ENCOUNTER — ANESTHESIA (OUTPATIENT)
Dept: ENDOSCOPY | Age: 82
DRG: 811 | End: 2022-10-03
Payer: MEDICARE

## 2022-10-03 ENCOUNTER — ANESTHESIA EVENT (OUTPATIENT)
Dept: ENDOSCOPY | Age: 82
DRG: 811 | End: 2022-10-03
Payer: MEDICARE

## 2022-10-03 LAB
ANION GAP SERPL CALCULATED.3IONS-SCNC: 15 MMOL/L (ref 3–16)
BUN BLDV-MCNC: 52 MG/DL (ref 7–20)
CALCIUM SERPL-MCNC: 9.6 MG/DL (ref 8.3–10.6)
CHLORIDE BLD-SCNC: 104 MMOL/L (ref 99–110)
CO2: 21 MMOL/L (ref 21–32)
CREAT SERPL-MCNC: 1 MG/DL (ref 0.6–1.2)
GFR AFRICAN AMERICAN: >60
GFR NON-AFRICAN AMERICAN: 53
GLUCOSE BLD-MCNC: 236 MG/DL (ref 70–99)
GLUCOSE BLD-MCNC: 272 MG/DL (ref 70–99)
GLUCOSE BLD-MCNC: 277 MG/DL (ref 70–99)
GLUCOSE BLD-MCNC: 286 MG/DL (ref 70–99)
GLUCOSE BLD-MCNC: 295 MG/DL (ref 70–99)
GLUCOSE BLD-MCNC: 299 MG/DL (ref 70–99)
HCT VFR BLD CALC: 28.4 % (ref 36–48)
HEMOGLOBIN: 9.2 G/DL (ref 12–16)
MCH RBC QN AUTO: 31.8 PG (ref 26–34)
MCHC RBC AUTO-ENTMCNC: 32.4 G/DL (ref 31–36)
MCV RBC AUTO: 98.1 FL (ref 80–100)
PDW BLD-RTO: 22 % (ref 12.4–15.4)
PERFORMED ON: ABNORMAL
PLATELET # BLD: 102 K/UL (ref 135–450)
PMV BLD AUTO: 9.1 FL (ref 5–10.5)
POTASSIUM SERPL-SCNC: 4.8 MMOL/L (ref 3.5–5.1)
RBC # BLD: 2.89 M/UL (ref 4–5.2)
SODIUM BLD-SCNC: 140 MMOL/L (ref 136–145)
WBC # BLD: 10.6 K/UL (ref 4–11)

## 2022-10-03 PROCEDURE — C1726 CATH, BAL DIL, NON-VASCULAR: HCPCS | Performed by: INTERNAL MEDICINE

## 2022-10-03 PROCEDURE — 36415 COLL VENOUS BLD VENIPUNCTURE: CPT

## 2022-10-03 PROCEDURE — 80048 BASIC METABOLIC PNL TOTAL CA: CPT

## 2022-10-03 PROCEDURE — 2500000003 HC RX 250 WO HCPCS: Performed by: NURSE ANESTHETIST, CERTIFIED REGISTERED

## 2022-10-03 PROCEDURE — 6370000000 HC RX 637 (ALT 250 FOR IP): Performed by: FAMILY MEDICINE

## 2022-10-03 PROCEDURE — 92526 ORAL FUNCTION THERAPY: CPT

## 2022-10-03 PROCEDURE — 0DB98ZX EXCISION OF DUODENUM, VIA NATURAL OR ARTIFICIAL OPENING ENDOSCOPIC, DIAGNOSTIC: ICD-10-PCS | Performed by: INTERNAL MEDICINE

## 2022-10-03 PROCEDURE — 0DB58ZX EXCISION OF ESOPHAGUS, VIA NATURAL OR ARTIFICIAL OPENING ENDOSCOPIC, DIAGNOSTIC: ICD-10-PCS | Performed by: INTERNAL MEDICINE

## 2022-10-03 PROCEDURE — 3700000000 HC ANESTHESIA ATTENDED CARE: Performed by: INTERNAL MEDICINE

## 2022-10-03 PROCEDURE — 6370000000 HC RX 637 (ALT 250 FOR IP): Performed by: ANESTHESIOLOGY

## 2022-10-03 PROCEDURE — 0DB18ZX EXCISION OF UPPER ESOPHAGUS, VIA NATURAL OR ARTIFICIAL OPENING ENDOSCOPIC, DIAGNOSTIC: ICD-10-PCS | Performed by: INTERNAL MEDICINE

## 2022-10-03 PROCEDURE — 2709999900 HC NON-CHARGEABLE SUPPLY: Performed by: INTERNAL MEDICINE

## 2022-10-03 PROCEDURE — 88305 TISSUE EXAM BY PATHOLOGIST: CPT

## 2022-10-03 PROCEDURE — 85027 COMPLETE CBC AUTOMATED: CPT

## 2022-10-03 PROCEDURE — 6360000002 HC RX W HCPCS: Performed by: NURSE ANESTHETIST, CERTIFIED REGISTERED

## 2022-10-03 PROCEDURE — 3609017700 HC EGD DILATION GASTRIC/DUODENAL STRICTURE: Performed by: INTERNAL MEDICINE

## 2022-10-03 PROCEDURE — 1200000000 HC SEMI PRIVATE

## 2022-10-03 PROCEDURE — 7100000000 HC PACU RECOVERY - FIRST 15 MIN: Performed by: INTERNAL MEDICINE

## 2022-10-03 PROCEDURE — 0D758ZZ DILATION OF ESOPHAGUS, VIA NATURAL OR ARTIFICIAL OPENING ENDOSCOPIC: ICD-10-PCS | Performed by: INTERNAL MEDICINE

## 2022-10-03 PROCEDURE — 3609012400 HC EGD TRANSORAL BIOPSY SINGLE/MULTIPLE: Performed by: INTERNAL MEDICINE

## 2022-10-03 PROCEDURE — 6360000002 HC RX W HCPCS: Performed by: ORTHOPAEDIC SURGERY

## 2022-10-03 PROCEDURE — 6370000000 HC RX 637 (ALT 250 FOR IP): Performed by: INTERNAL MEDICINE

## 2022-10-03 PROCEDURE — 7100000001 HC PACU RECOVERY - ADDTL 15 MIN: Performed by: INTERNAL MEDICINE

## 2022-10-03 PROCEDURE — 99232 SBSQ HOSP IP/OBS MODERATE 35: CPT | Performed by: NURSE PRACTITIONER

## 2022-10-03 PROCEDURE — 0DB38ZX EXCISION OF LOWER ESOPHAGUS, VIA NATURAL OR ARTIFICIAL OPENING ENDOSCOPIC, DIAGNOSTIC: ICD-10-PCS | Performed by: INTERNAL MEDICINE

## 2022-10-03 PROCEDURE — 88342 IMHCHEM/IMCYTCHM 1ST ANTB: CPT

## 2022-10-03 PROCEDURE — 2580000003 HC RX 258: Performed by: PHYSICIAN ASSISTANT

## 2022-10-03 PROCEDURE — 6370000000 HC RX 637 (ALT 250 FOR IP): Performed by: NURSE PRACTITIONER

## 2022-10-03 PROCEDURE — 97530 THERAPEUTIC ACTIVITIES: CPT

## 2022-10-03 PROCEDURE — 0DB68ZX EXCISION OF STOMACH, VIA NATURAL OR ARTIFICIAL OPENING ENDOSCOPIC, DIAGNOSTIC: ICD-10-PCS | Performed by: INTERNAL MEDICINE

## 2022-10-03 PROCEDURE — 6370000000 HC RX 637 (ALT 250 FOR IP): Performed by: PHYSICIAN ASSISTANT

## 2022-10-03 PROCEDURE — 3700000001 HC ADD 15 MINUTES (ANESTHESIA): Performed by: INTERNAL MEDICINE

## 2022-10-03 RX ORDER — PROPOFOL 10 MG/ML
INJECTION, EMULSION INTRAVENOUS PRN
Status: DISCONTINUED | OUTPATIENT
Start: 2022-10-03 | End: 2022-10-03 | Stop reason: SDUPTHER

## 2022-10-03 RX ORDER — LIDOCAINE HYDROCHLORIDE 20 MG/ML
INJECTION, SOLUTION EPIDURAL; INFILTRATION; INTRACAUDAL; PERINEURAL PRN
Status: DISCONTINUED | OUTPATIENT
Start: 2022-10-03 | End: 2022-10-03 | Stop reason: SDUPTHER

## 2022-10-03 RX ORDER — PREDNISONE 20 MG/1
40 TABLET ORAL DAILY
Status: COMPLETED | OUTPATIENT
Start: 2022-10-04 | End: 2022-10-07

## 2022-10-03 RX ADMIN — OXYCODONE 5 MG: 5 TABLET ORAL at 15:38

## 2022-10-03 RX ADMIN — DICLOFENAC SODIUM 2 G: 10 GEL TOPICAL at 21:19

## 2022-10-03 RX ADMIN — OXYCODONE 5 MG: 5 TABLET ORAL at 10:53

## 2022-10-03 RX ADMIN — OXYBUTYNIN CHLORIDE 15 MG: 5 TABLET, EXTENDED RELEASE ORAL at 10:52

## 2022-10-03 RX ADMIN — PROPOFOL 80 MG: 10 INJECTION, EMULSION INTRAVENOUS at 08:54

## 2022-10-03 RX ADMIN — EMPAGLIFLOZIN 10 MG: 10 TABLET, FILM COATED ORAL at 10:53

## 2022-10-03 RX ADMIN — INSULIN LISPRO 8 UNITS: 100 INJECTION, SOLUTION INTRAVENOUS; SUBCUTANEOUS at 17:11

## 2022-10-03 RX ADMIN — SACUBITRIL AND VALSARTAN 0.5 TABLET: 24; 26 TABLET, FILM COATED ORAL at 22:07

## 2022-10-03 RX ADMIN — INSULIN GLARGINE 48 UNITS: 100 INJECTION, SOLUTION SUBCUTANEOUS at 11:01

## 2022-10-03 RX ADMIN — PHENYLEPHRINE HYDROCHLORIDE 100 MCG: 10 INJECTION INTRAVENOUS at 09:05

## 2022-10-03 RX ADMIN — Medication 10 ML: at 10:58

## 2022-10-03 RX ADMIN — FUROSEMIDE 20 MG: 20 TABLET ORAL at 10:53

## 2022-10-03 RX ADMIN — PROPOFOL 20 MG: 10 INJECTION, EMULSION INTRAVENOUS at 08:58

## 2022-10-03 RX ADMIN — CETIRIZINE HYDROCHLORIDE 5 MG: 10 TABLET, FILM COATED ORAL at 10:52

## 2022-10-03 RX ADMIN — MICONAZOLE NITRATE: 20 POWDER TOPICAL at 11:03

## 2022-10-03 RX ADMIN — ATORVASTATIN CALCIUM 80 MG: 80 TABLET, FILM COATED ORAL at 21:18

## 2022-10-03 RX ADMIN — GABAPENTIN 300 MG: 300 CAPSULE ORAL at 10:53

## 2022-10-03 RX ADMIN — TIZANIDINE 2 MG: 4 TABLET ORAL at 15:38

## 2022-10-03 RX ADMIN — ISOSORBIDE MONONITRATE 30 MG: 30 TABLET, EXTENDED RELEASE ORAL at 10:53

## 2022-10-03 RX ADMIN — INSULIN LISPRO 17 UNITS: 100 INJECTION, SOLUTION INTRAVENOUS; SUBCUTANEOUS at 10:59

## 2022-10-03 RX ADMIN — LIDOCAINE HYDROCHLORIDE 100 MG: 20 INJECTION, SOLUTION EPIDURAL; INFILTRATION; INTRACAUDAL; PERINEURAL at 08:54

## 2022-10-03 RX ADMIN — RIVAROXABAN 20 MG: 20 TABLET, FILM COATED ORAL at 17:11

## 2022-10-03 RX ADMIN — PROPOFOL 20 MG: 10 INJECTION, EMULSION INTRAVENOUS at 09:00

## 2022-10-03 RX ADMIN — PROPOFOL 20 MG: 10 INJECTION, EMULSION INTRAVENOUS at 08:56

## 2022-10-03 RX ADMIN — INSULIN LISPRO 8 UNITS: 100 INJECTION, SOLUTION INTRAVENOUS; SUBCUTANEOUS at 11:00

## 2022-10-03 RX ADMIN — ACETAMINOPHEN 1000 MG: 500 TABLET ORAL at 21:16

## 2022-10-03 RX ADMIN — METHYLPREDNISOLONE SODIUM SUCCINATE 40 MG: 40 INJECTION, POWDER, FOR SOLUTION INTRAMUSCULAR; INTRAVENOUS at 07:00

## 2022-10-03 RX ADMIN — INSULIN LISPRO 17 UNITS: 100 INJECTION, SOLUTION INTRAVENOUS; SUBCUTANEOUS at 17:12

## 2022-10-03 RX ADMIN — METHYLPREDNISOLONE SODIUM SUCCINATE 40 MG: 40 INJECTION, POWDER, FOR SOLUTION INTRAMUSCULAR; INTRAVENOUS at 15:38

## 2022-10-03 RX ADMIN — TIZANIDINE 2 MG: 4 TABLET ORAL at 10:53

## 2022-10-03 RX ADMIN — GABAPENTIN 600 MG: 300 CAPSULE ORAL at 21:17

## 2022-10-03 RX ADMIN — TIZANIDINE 2 MG: 4 TABLET ORAL at 21:18

## 2022-10-03 RX ADMIN — DICLOFENAC SODIUM 2 G: 10 GEL TOPICAL at 10:57

## 2022-10-03 RX ADMIN — INSULIN HUMAN 8 UNITS: 100 INJECTION, SOLUTION PARENTERAL at 08:10

## 2022-10-03 RX ADMIN — Medication 10 ML: at 21:18

## 2022-10-03 ASSESSMENT — PAIN - FUNCTIONAL ASSESSMENT: PAIN_FUNCTIONAL_ASSESSMENT: NONE - DENIES PAIN

## 2022-10-03 ASSESSMENT — PAIN SCALES - GENERAL
PAINLEVEL_OUTOF10: 0

## 2022-10-03 ASSESSMENT — ENCOUNTER SYMPTOMS: SHORTNESS OF BREATH: 1

## 2022-10-03 NOTE — PROGRESS NOTES
[id number] [facility] Orthopedic Surgery  Progress Note      Chief complaint: Left wrist pain    Subjective: The patient is sitting up in the bed. Daughters at bedside. She repots mild to moderate pain in the left thumb. Denies new issues. Ambulates with walker at baseline. Review of Systems:  Constitutional: Negative for fever, chills, fatigue. Skin:  Negative for pruritis, rash  Respiratory: Positive for shortness of breath. Cardiovascular: Negative for chest pain. Neurological: Negative for confusion, dysarthria, tremors, seizures. Psychiatric:  Negative for depression or anxiety  Musculoskeletal:  Positive for left wrist, thumb and shoulder pain    Objective:  Vitals:    10/03/22 0954   BP: (!) 119/51   Pulse: (!) 48   Resp: 16   Temp:    SpO2: 96%      Physical Examination:  GENERAL: No apparent distress, well-nourished, sitting up in hospital bed  SKIN:  Warm and dry  RESPIRATORY: Resp easy and unlabored  NEURO: Awake and alert. No speech defect  PSYCHIATRIC: Appropriate affect; not agitated  MUSCULOSKELETAL:  LUE -mild swelling and erythema of the dorsum of the hand and wrist.  + CMC  grind test on the left. No erythema. She demonstrates active range of motion in the wrist with mild  pain. She tolerates passive ROM of the left wrist with minimal discomfort. Sensation intact light touch in the radial, ulnar, median nerve distributions. Palpable radial pulse. She has passive range of motion of the left shoulder with minimal pain.       Labs reviewed:  Recent Labs     10/01/22  0454 10/02/22  0455 10/03/22  0448   WBC 6.7 8.7 10.6   HGB 9.2* 9.3* 9.2*   HCT 27.5* 28.2* 28.4*   PLT 90* 91* 102*     Recent Labs     10/01/22  0453 10/02/22  0455 10/03/22  0448    138 140   K 4.1 5.2* 4.8    102 104   CO2 25 24 21   BUN 38* 44* 52*   CREATININE 1.0 1.1 1.0   GLUCOSE 212* 350* 295*   CALCIUM 9.5 9.8 9.6     Imaging:  XR WRIST LEFT (MIN 3 VIEWS)   Final Result   Mild osteoarthritic changes along the radiocarpal joint and moderate   degenerative arthritic changes along the base of the thumb with no acute bony   abnormality seen. Vascular calcifications along the palmar aspect of the wrist      Mild soft tissue swelling around the wrist and diffuse osteopenia. Probable mild chondrocalcinosis of the TFCC. 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 Planning on seeing Dr. Damian for pain management as an outpatient. She reports limited benefit with injections in her shoulders in the past.   - ROM exercises. - Elevation and ice.  - Lidoderm patch  - Dispo: per primary team; ok to d/c from our end.      Wilman Philadelphia, APRN - CNP  10/3/2022

## 2022-10-03 NOTE — PROGRESS NOTES
Facility/Department: [address] NURSING  Speech Language Pathology   Dysphagia Treatment Note    Patient: Jamie Rodrigues   : 1940   MRN: [id number]      Evaluation Date: 10/3/2022      Admitting Dx: Dyspnea on exertion [R06.09]  Ambulatory dysfunction [R26.2]  Anemia, unspecified type [D64.9]  Treatment Diagnosis: Oropharyngeal Dysphagia   Pain: Denies                                              Diet and Treatment Recommendations 10/3/2022:  Diet Solids Recommendation:  Dysphagia III Soft and bite sized per GI Liquid Consistency Recommendation: Thin liquids  Recommended form of Meds: Meds whole with water or Meds in puree      Compensatory strategies:   Upright as possible with all PO intake , Small bites/sips , Eat/feed slowly, Remain upright 30-45 min     Assessment of Texture Tolerance:  Diet level prior to treatment: Easy to chew , Thin liquids   Tolerance of Current Diet Level: RN reported pt appears to be tolerating current diet level     Impressions: Pt was positioned Upright in bed , awake and alert. Currently on room air with daughter present during session who provided translation across session. Trials of thin liquids, puree , and soft solids were provided to assess swallow function. Pt had EGD with dilation this date and already reports improvement in swallow function. Oral phase characterized by mildly prolonged mastication with sufficient oral transit and clearing. Pt overall tolerating of thin liquids via cup with no overt clinical symptoms of aspiration. Pt denies previously reported symptoms of bolus feeling stuck in mid chest and/or difficulty with swallowing presented consistencies this date. Overall, pt demonstrates increased risk for aspiration due to co morbidities . Based on today's assessment recommend continuation of Dysphagia III Soft and bite sized  with Thin liquids , Meds whole with water or Meds in puree .      Dysphagia Goals:   Pt will functionally tolerate recommended diet with no overt clinical s/s of aspiration (Ongoing 10/03/22)  Pt will demonstrate understanding of aspiration risk and precautions via education/demonstration with occasional prompting (Ongoing 10/03/22)  Pt will advance to least restrictive diet as indicated (Ongoing 10/03/22)    Plan:   3-5 times per week during acute care stay. Patient/Family Education:  Provided education regarding role of SLP, recommendations and general speech pathology plan of care. [x] Pt verbalized understanding and agreement   [x] Pt requires ongoing learning   [] No evidence of comprehension     Discharge Recommendations:    Discharge recommendations to be determined pending ongoing follow-up during acute care stay    Treatment time:  Timed Code Treatment Minutes: 0  Total Treatment time: 15 minutes    If patient discharges prior to next session this note will serve as a discharge summary.        Signature:   Chel West, [address]  Speech-Language Pathologist  10/3/2022 10:53 AM

## 2022-10-03 NOTE — BRIEF OP NOTE
Brief Postoperative Note      Patient: Don Kyle  YOB: 1940  MRN: [id number]    Date of Procedure: 10/3/2022    Pre-Op Diagnosis: Dysphagia, unspecified type [R13.10]        Procedure(s):  EGD BIOPSY  EGD DILATION BALLOON    Surgeon(s):  Micheal Ruano MD    Anesthesia: Monitor Anesthesia Care    Estimated Blood Loss (mL): Minimal    Complications: None    Specimens:   ID Type Source Tests Collected by Time Destination   A : bx duodenum for celiac Tissue Duodenum SURGICAL PATHOLOGY Saralyn Ormond, RN 10/3/2022 0857    B : bx gastric r/o h pylori Tissue Stomach SURGICAL PATHOLOGY Micheal Ruano MD 10/3/2022 0900    C : bx gastric polyp Tissue Stomach SURGICAL PATHOLOGY Micheal Ruano MD 10/3/2022 0901    D : bx distal esophagus for EE Tissue Esophagus SURGICAL PATHOLOGY Saralyn Ormond, RN 10/3/2022 0902    E : bx proximal esophagus for EE Tissue Esophagus SURGICAL PATHOLOGY Saralyn Ormond, RN 10/3/2022 6136        Findings:   Normal esophagus, biopsied. Empirically balloon dilated up to 20 mm. Mild antral gastritis, biopsies. Three diminutive gastric polyps, biopsied. Normal duodenum, biopsied. Plans:  Await bx. Continue PPI. Follow clinical response to endoscopic dilation. May discharge from GI standpoint. Follow up in clinic if GI symptoms persist (may need EMS to r/o esophageal dysmotility).     Electronically signed by Micheal Ruano MD on 10/3/2022 at 9:12 AM

## 2022-10-03 NOTE — ANESTHESIA POSTPROCEDURE EVALUATION
Department of Anesthesiology  Postprocedure Note    Patient: Lisa Jamison  MRN: [id number]  YOB: 1940  Date of evaluation: 10/3/2022      Procedure Summary     Date: 10/03/22 Room / Location: [address] [place] [address]    Anesthesia Start: 5155 Anesthesia Stop: 0915    Procedures:       EGD BIOPSY (Abdomen)      EGD DILATION BALLOON (Abdomen) Diagnosis:       Dysphagia, unspecified type      (Dysphagia, unspecified type [R13.10])    Surgeons: Xiao Wade MD Responsible Provider: Ricardo Barrios MD    Anesthesia Type: MAC ASA Status: 4          Anesthesia Type: No value filed.     Elizabeth Phase I: Elizabeth Score: 10    Elizabeth Phase II:        Anesthesia Post Evaluation    Patient location during evaluation: PACU  Patient participation: complete - patient participated  Level of consciousness: awake and alert  Airway patency: patent  Nausea & Vomiting: no nausea and no vomiting  Complications: no  Cardiovascular status: blood pressure returned to baseline  Respiratory status: acceptable  Hydration status: euvolemic  Multimodal analgesia pain management approach

## 2022-10-03 NOTE — PROGRESS NOTES
Hospitalist Progress Note      PCP: Jacinta rBown, APRN - CNP    Date of Admission: 9/24/2022    Chief Complaint: Wrist pain      Subjective:   L thumb pain improving. Chronic bilat shoulder pain and back pain. S/p EGD with dilation. Tolerated well. Some post op throat pain. Otherwise denies new acute complaints. Now wants to go home.     Medications:  Reviewed    Infusion Medications    sodium chloride 10 mL/hr at 10/03/22 0846    dextrose       Scheduled Medications    [START ON 10/4/2022] predniSONE  40 mg Oral Daily    insulin glargine  48 Units SubCUTAneous Daily    insulin lispro  17 Units SubCUTAneous Daily before dinner    methylPREDNISolone sodium  40 mg IntraVENous Q6H    sacubitril-valsartan  0.5 tablet Oral BID    furosemide  20 mg Oral Daily    insulin lispro  0-16 Units SubCUTAneous TID WC    insulin lispro  0-4 Units SubCUTAneous Nightly    diclofenac sodium  2 g Topical BID    insulin lispro  17 Units SubCUTAneous QAM AC    insulin lispro  17 Units SubCUTAneous Daily before lunch    atorvastatin  80 mg Oral Nightly    isosorbide mononitrate  30 mg Oral Daily    levothyroxine  150 mcg Oral Daily    metoprolol tartrate  12.5 mg Oral BID    miconazole   Topical BID    oxybutynin  15 mg Oral Daily    empagliflozin  10 mg Oral Daily    sodium chloride flush  10 mL IntraVENous 2 times per day    pantoprazole  40 mg Oral QAM AC    lidocaine  1 patch TransDERmal Daily    cetirizine  5 mg Oral Daily    gabapentin  300 mg Oral Daily    And    gabapentin  600 mg Oral Nightly    rivaroxaban  20 mg Oral Daily    acetaminophen  1,000 mg Oral 3 times per day    tiZANidine  2 mg Oral TID     PRN Meds: simethicone, sodium chloride, Polyvinyl Alcohol-Povidone PF, sodium chloride flush, sodium chloride, ondansetron, dextrose bolus **OR** dextrose bolus, glucagon (rDNA), dextrose, polyethylene glycol, oxyCODONE **OR** oxyCODONE      Intake/Output Summary (Last 24 hours) at 10/3/2022 1332  Last data filed at 10/3/2022 0726  Gross per 24 hour   Intake 360 ml   Output 1600 ml   Net -1240 ml         Exam:    BP (!) 119/51   Pulse (!) 48   Temp 97 °F (36.1 °C) (Infrared)   Resp 16   Ht 5' (1.524 m)   Wt 239 lb 8 oz (108.6 kg)   LMP  (LMP Unknown)   SpO2 96%   BMI 46.77 kg/m²     Gen/overall appearance: Not in acute distress. Alert. Head: Normocephalic, atraumatic  Eyes: EOMI, no scleral icterus  CVS: regular rate and rhythm, Normal S1S2  Pulm: Diminished, Clear to auscultation bilaterally. No crackles/wheezes  Extremities: no L thumb erythema  Neuro: No gross focal deficits noted  Skin: Warm, dry    Labs:   Recent Labs     10/01/22  0454 10/02/22  0455 10/03/22  0448   WBC 6.7 8.7 10.6   HGB 9.2* 9.3* 9.2*   HCT 27.5* 28.2* 28.4*   PLT 90* 91* 102*       Recent Labs     10/01/22  0453 10/02/22  0455 10/03/22  0448    138 140   K 4.1 5.2* 4.8    102 104   CO2 25 24 21   BUN 38* 44* 52*   CREATININE 1.0 1.1 1.0   CALCIUM 9.5 9.8 9.6       No results for input(s): AST, ALT, BILIDIR, BILITOT, ALKPHOS in the last 72 hours. No results for input(s): INR in the last 72 hours. No results for input(s): B[employer]ets in the last 72 hours.       Assessment/Plan:    Active Hospital Problems    Diagnosis Date Noted    Primary osteoarthritis of first carpometacarpal joint of left hand [M18.12] 10/01/2022     Priority: Medium    Elevated troponin [R77.8] 09/27/2022     Priority: Medium    Chronic atrial fibrillation ([facility] [address] [I48.20] 09/27/2022     Priority: Medium    Thrombocytopenia ([facility] [address] [D69.6] 09/25/2022     Priority: Medium    Anemia [D64.9] 09/25/2022     Priority: Medium    Wrist arthritis [M19.039] 09/25/2022     Priority: Medium    Dyspnea on exertion [R06.09] 09/24/2022     Priority: Medium    Acute on chronic diastolic heart failure ([email or web address] [I50.33] 07/27/2022     Priority: Medium    Mixed hyperlipidemia [E78.2] 05/02/2014    PAF (paroxysmal atrial fibrillation) ([email or web address] [I48.0] 10/05/2011 Coronary artery disease involving native coronary artery of native heart without angina pectoris [I25.10] 10/05/2011    DM (diabetes mellitus) ([facility] [E11.9] 10/05/2011    Peripheral vascular disease ([facility] [I73.9] 10/05/2011       Acute on chronic diastolic Congestive Heart Failure. Clinically improved  ARUN. Suspect lasix induced. Improved  - s/p IV diuresis; now held  - ins/outs  - trend Cr  - monitor and replace lytes  - avoid nephrotoxins  - respiratory care  - cardio following     Anemia and thrombocytopenia  - Iron studies, B12, folate ok  - Blood occult stool negative  - trend CBC  - BM biopsy 9/30; follow up studies outpt  - Hematology following     L wrist erythema with suspected gout flare  L thumb pain 2/2 OA  Chronic bilat rotator cuff tears  - empiric abx initially for presumed cellulitis; d/daja  - s/p trial of IV solumedrol with improvement per ortho  - transition to po prednisone  - pain management  - ortho following     Severe chronic bilateral low back muscle spasm. Improving  - Cont scheduled tizanidine and tylenol, prn oxycodone, PT OT     Dysuria  - improved  - s/p diflucan 150mg x1  - UCx neg  - s/p phenazopyridine 100mg TID for 3 doses     CAD status post CABG  - cont statin, BB, not on asp as already on xarelto     Paroxysmal atrial fibrillation  - cont BB, xarelto for [facility]    Hyperglycemia. Suspect CS induced. Hx of DM  - sliding scale to high  - titrate insulin regimen as tolerated    Dysphagia. Suspect 2/2 stenosis vs web  - ST following with diet modification  - planned for EGD mon  - GI following    PMH of morbid obesity, htn, hld      DVT Prophylaxis: xarelto  Diet: ADULT DIET;  Dysphagia - Soft and Bite Sized  Code Status: Full Code    Dispo:  Home vs SNF    Fawad Nascimento MD

## 2022-10-03 NOTE — ANESTHESIA PRE PROCEDURE
Department of Anesthesiology  Preprocedure Note       Name:  Zain Adames   Age:  78 y.o.  :  1940                                          MRN:  [id number]         Date:  10/3/2022      Surgeon: Tanisha Tee):  Lex Chu MD    Procedure: Procedure(s):  EGD DIAGNOSTIC ONLY    Medications prior to admission:   Prior to Admission medications    Medication Sig Start Date End Date Taking? Authorizing Provider   lisinopril (PRINIVIL;ZESTRIL) 2.5 MG tablet Take 2.5 mg by mouth daily   Yes Historical Provider, MD   benzonatate (TESSALON) 100 MG capsule Take 100 mg by mouth 3 times daily as needed for Cough   Yes Historical Provider, MD   pantoprazole (PROTONIX) 40 MG tablet Take 40 mg by mouth every morning (before breakfast)   Yes Historical Provider, MD   cefaclor (CECLOR) 250 MG capsule Take 500 mg by mouth 3 times daily   Yes Historical Provider, MD   calcium carbonate (OSCAL) 500 MG TABS tablet Take 500 mg by mouth daily   Yes Historical Provider, MD   sacubitril-valsartan (ENTRESTO) 24-26 MG per tablet Take 1 tablet by mouth 2 times daily  Patient not taking: Reported on 2022   MATEUSZ Avila CNP   isosorbide mononitrate (IMDUR) 30 MG extended release tablet Take 1 tablet by mouth in the morning. .  Patient not taking: Reported on 2022   MATEUSZ Avila CNP   furosemide (LASIX) 20 MG tablet Take 1 tablet by mouth in the morning and 1 tablet before bedtime. Patient not taking: Reported on 2022   MATEUSZ Avila CNP   dapagliflozin (FARXIGA) 10 MG tablet Take 1 tablet by mouth every morning  Patient not taking: Reported on 2022   MATEUSZ Avila CNP   gabapentin (NEURONTIN) 300 MG capsule Take 1 tablet in am, 2 tablets in pm. 22   MATEUSZ Farmer CNP   acetaminophen-codeine (TYLENOL #3) 300-30 MG per tablet Take 1 tablet by mouth every 6 hours as needed for Pain.  22   MATEUSZ Farmer CNP   insulin glargine (Cathlean Billet KWIKPEN) 100 UNIT/ML injection pen Inject 55 Units into the skin 2 times daily    Historical Provider, MD   triamcinolone (KENALOG) 0.1 % cream Apply topically 2 times daily as needed Apply topically 2 times daily. Historical Provider, MD   senna (SENOKOT) 8.6 MG tablet Take 1 tablet by mouth 2 times daily    Historical Provider, MD   insulin aspart (NOVOLOG) 100 UNIT/ML injection vial Inject 15 Units into the skin 3 times daily    Historical Provider, MD   phenazopyridine (PYRIDIUM) 100 MG tablet Take 100 mg by mouth 2 times daily    Historical Provider, MD   magnesium 30 MG tablet Take 40 mg by mouth daily    Historical Provider, MD   nystatin (MYCOSTATIN) 628455 UNIT/GM powder Apply topically 2 times daily    Historical Provider, MD   tiZANidine (ZANAFLEX) 2 MG tablet Take 2 mg by mouth every 8 hours as needed    Historical Provider, MD   metoprolol tartrate (LOPRESSOR) 25 MG tablet TAKE ONE-HALF TABLET BY MOUTH TWICE A DAY 7/29/20   Marielos Carvalho MD   atorvastatin (LIPITOR) 80 MG tablet Take 1 tablet by mouth daily 7/29/20   Marielos Carvalho MD   Insulin Pen Needle 32G X 6 MM MISC by Does not apply route 3 times daily with meals    Historical Provider, MD   hydrocortisone (ANUSOL-HC) 2.5 % rectal cream Place rectally 2 times daily Place rectally 2 times daily.     Historical Provider, MD   rivaroxaban (XARELTO) 20 MG TABS tablet Take 1 tablet by mouth daily 2/22/17   Marielos Carvalho MD   vitamin D (ERGOCALCIFEROL) 78718 UNITS CAPS capsule Take 50,000 Units by mouth once a week    Historical Provider, MD   Omega 3 1000 MG CAPS Take 1,000 mg by mouth daily    Historical Provider, MD   Liraglutide (VICTOZA) 18 MG/3ML SOPN SC injection Inject 1.2 mg into the skin daily    Historical Provider, MD   Calcium Carb-Cholecalciferol (OYSTER SHELL CALCIUM + D PO) Take by mouth daily   Patient not taking: Reported on 9/25/2022    Historical Provider, MD   Biotin 5000 MCG TABS Take by mouth daily  Patient not taking: Reported on 9/25/2022    Historical Provider, MD   lidocaine (LIDODERM) 5 % Place 1 patch onto the skin daily 12 hours on, 12 hours off. Patient taking differently: Place 3 patches onto the skin daily 2 to 3 patches shoulders and back, 12 hours on, 12 hours off. 7/14/15   MATEUSZ Sol CNP   oxybutynin (DITROPAN XL) 15 MG CR tablet Take 15 mg by mouth daily. Historical Provider, MD   Loratadine 10 MG CAPS Take by mouth daily     Historical Provider, MD   levothyroxine (SYNTHROID) 175 MCG tablet Take 150 mcg by mouth daily.     Historical Provider, MD       Current medications:    Current Facility-Administered Medications   Medication Dose Route Frequency Provider Last Rate Last Admin    insulin glargine (LANTUS) injection vial 48 Units  48 Units SubCUTAneous Daily Arlene Love MD        insulin lispro (HUMALOG) injection vial 17 Units  17 Units SubCUTAneous Daily before dinner Arlene Love MD   17 Units at 10/02/22 1737    methylPREDNISolone sodium (SOLU-MEDROL) injection 40 mg  40 mg IntraVENous Q6H Iris Briseno MD   40 mg at 10/03/22 0700    sacubitril-valsartan (ENTRESTO) 24-26 MG per tablet 0.5 tablet  0.5 tablet Oral BID Thesavannah Child, MATEUSZ - CNP   0.5 tablet at 10/02/22 2313    furosemide (LASIX) tablet 20 mg  20 mg Oral Daily Theramonae Child, MATEUSZ - CNP   20 mg at 10/02/22 0916    simethicone (MYLICON) chewable tablet 80 mg  80 mg Oral Q6H PRN Arlene Love MD   80 mg at 09/28/22 1236    insulin lispro (HUMALOG) injection vial 0-16 Units  0-16 Units SubCUTAneous TID WC Arlene Love MD   8 Units at 10/02/22 1737    insulin lispro (HUMALOG) injection vial 0-4 Units  0-4 Units SubCUTAneous Nightly Arlene Love MD   4 Units at 10/02/22 2226    sodium chloride (OCEAN, BABY AYR) 0.65 % nasal spray 2 spray  2 spray Each Nostril PRN Arlene Love MD        Polyvinyl Alcohol-Povidone PF 1.4-0.6 % SOLN 2 drop  2 drop Both Eyes PRN Arlene Love MD  diclofenac sodium (VOLTAREN) 1 % gel 2 g  2 g Topical BID Serena Farr MD   2 g at 10/02/22 0920    insulin lispro (HUMALOG) injection vial 17 Units  17 Units SubCUTAneous QAM AC Serena Farr MD   17 Units at 10/02/22 0913    insulin lispro (HUMALOG) injection vial 17 Units  17 Units SubCUTAneous Daily before lunch Serena Farr MD   17 Units at 10/02/22 1221    atorvastatin (LIPITOR) tablet 80 mg  80 mg Oral Nightly Roberto Lemme, PA-C   80 mg at 10/02/22 2225    isosorbide mononitrate (IMDUR) extended release tablet 30 mg  30 mg Oral Daily Roberto Lemme, PA-C   30 mg at 10/02/22 9353    levothyroxine (SYNTHROID) tablet 150 mcg  150 mcg Oral Daily Roberto Lemme, PA-C   150 mcg at 10/02/22 0514    metoprolol tartrate (LOPRESSOR) tablet 12.5 mg  12.5 mg Oral BID MATEUSZ Humphrey CNP   12.5 mg at 10/02/22 2224    miconazole (MICOTIN) 2 % powder   Topical BID Roberto Lemme, PA-C   Given at 10/02/22 0920    oxybutynin (DITROPAN-XL) extended release tablet 15 mg  15 mg Oral Daily Roberto Lemme, PA-C   15 mg at 10/02/22 9577    empagliflozin (JARDIANCE) tablet 10 mg  10 mg Oral Daily Roberto Lemme, PA-C   10 mg at 10/02/22 5020    sodium chloride flush 0.9 % injection 10 mL  10 mL IntraVENous 2 times per day Roberto Lemme, PA-C   10 mL at 10/02/22 0920    sodium chloride flush 0.9 % injection 10 mL  10 mL IntraVENous PRN Roberto Lemme, PA-C   10 mL at 10/02/22 0515    0.9 % sodium chloride infusion   IntraVENous PRN Roberto Lemme, PA-C 10 mL/hr at 09/25/22 2117 10 mL/hr at 09/25/22 2117    ondansetron (ZOFRAN) injection 4 mg  4 mg IntraVENous Q6H PRN Roberto Lemme, PA-C        dextrose bolus 10% 125 mL  125 mL IntraVENous PRN Roberto Lemme, PA-C        Or    dextrose bolus 10% 250 mL  250 mL IntraVENous PRN Roberto NOHELIA Castillo        glucagon (rDNA) injection 1 mg  1 mg SubCUTAneous PRN Roberto NOHELIA Castillo        dextrose 10 % infusion   IntraVENous Continuous PRN Ele Parth PA-C        polyethylene glycol (GLYCOLAX) packet 17 g  17 g Oral Daily PRN Ele Draperjoão PA-C        pantoprazole (PROTONIX) tablet 40 mg  40 mg Oral QAM AC Ele Draperjoão PA-C   40 mg at 10/02/22 0514    lidocaine 4 % external patch 1 patch  1 patch TransDERmal Daily Ele LINA Alba-C   1 patch at 10/02/22 0920    cetirizine (ZYRTEC) tablet 5 mg  5 mg Oral Daily Ele Draperjoão PA-C   5 mg at 10/02/22 9654    gabapentin (NEURONTIN) capsule 300 mg  300 mg Oral Daily Ele Draperjoão PA-C   300 mg at 10/02/22 6611    And    gabapentin (NEURONTIN) capsule 600 mg  600 mg Oral Nightly LINA Enriquez-C   600 mg at 10/02/22 2225    rivaroxaban (XARELTO) tablet 20 mg  20 mg Oral Daily Ele LINA Alba-C   20 mg at 10/02/22 1737    acetaminophen (TYLENOL) tablet 1,000 mg  1,000 mg Oral 3 times per day Elizabeth Roach MD   1,000 mg at 10/02/22 2226    tiZANidine (ZANAFLEX) tablet 2 mg  2 mg Oral TID Elizabeth Roach MD   2 mg at 10/02/22 2225    oxyCODONE (ROXICODONE) immediate release tablet 2.5 mg  2.5 mg Oral Q4H PRN Elizabeth Roach MD   2.5 mg at 09/30/22 2253    Or    oxyCODONE (ROXICODONE) immediate release tablet 5 mg  5 mg Oral Q4H PRN Elizabeth Roach MD   5 mg at 10/01/22 1758       Allergies:     Allergies   Allergen Reactions    Aspirin Other (See Comments)     GI upset    Ibuprofen Other (See Comments)     GI upset    Macrobid [Nitrofurantoin Monohydrate Macrocrystals] Other (See Comments)     COLD, SICK    Adhesive Tape Rash    Lexiscan [Regadenoson] Rash     Happened twice with Lexiscan    Penicillins Rash       Problem List:    Patient Active Problem List   Diagnosis Code    DM (diabetes mellitus) ([facility] [address] E11.9    Coronary artery disease involving native coronary artery of native heart without angina pectoris I25.10    PAF (paroxysmal atrial fibrillation) ([facility]) I48.0    Peripheral vascular disease ([facility] [address] [address] I73.9    CARLOS (obstructive sleep apnea) G47.33    SOB (shortness of breath) R06.02    HIGH CHOLESTEROL     HTN (hypertension) I10    Dizziness R42    Headache R51.9    Debility R53.81    11/22/16 Right knee polyethylene exchange M25.561, M25.562    Acute pain of right shoulder M25.511    Mixed hyperlipidemia E78.2    Morbid obesity with BMI of 40.0-44.9, adult ([facility]) E66.01, Z68.41    Morbid obesity with BMI of 45.0-49.9, adult ([facility]) E66.01, Z68.42    Diabetes type 2, controlled ([facility] E11.9    Carotid artery disease without cerebral infarction ([facility] I77.9    2/28/17 Revision with polyethylene exchange left total knee arthroplasty M23.52    4/11/17 Left knee repeat repair of median parapatellar arthrotomy with augmentation S76.111A    Rotator cuff tendonitis, right M75.81    Cervical radicular pain M54.12    Acute on chronic diastolic heart failure ([facility]) I50.33    Dyspnea on exertion R06.09    Thrombocytopenia ([facility]) D69.6    Anemia D64.9    Wrist arthritis M19.039    Elevated troponin R77.8    Chronic atrial fibrillation ([facility]) I48.20    Primary osteoarthritis of first carpometacarpal joint of left hand M18.12       Past Medical History:        Diagnosis Date    4/11/17 Left knee repeat repair of median parapatellar arthrotomy with augmentation 4/12/2017    Arthritis     Atrial fibrillation ([facility])     CAD (coronary artery disease)     Cataract     Chronic diastolic congestive heart failure ([facility] 7/27/2022    Diabetes mellitus (HCC)     GERD (gastroesophageal reflux disease)     Hyperlipidemia     Hypertension     HYPOTHRYROIDISM     Non-English speaking patient     SPEAKS Hungarian.   SHE SPEAKS A LITTLE ENGLISH    Sleep apnea     unspecified    Thyroid disease     UTI        Past Surgical History:        Procedure Laterality Date    CARDIAC SURGERY  2004    CABG X 4 VESSELS    CHOLECYSTECTOMY, LAPAROSCOPIC  5/15/14    with IOC    CT BONE MARROW BIOPSY 9/29/2022    CT BONE MARROW BIOPSY 9/29/2022 [facility] CT SCAN    JOINT REPLACEMENT Bilateral 2000 AND 1996 And 1998    knee    KNEE SURGERY Left 02/28/2017    left knee poly exchange revision     REVISION TOTAL KNEE ARTHROPLASTY Right 11/22/2016    RIGHT TOTAL KNEE REVISION WITH POLY EXCHANGE    TOTAL KNEE ARTHROPLASTY      partical/ left        Social History:    Social History     Tobacco Use    Smoking status: Never    Smokeless tobacco: Never   Substance Use Topics    Alcohol use: No                                Counseling given: Not Answered      Vital Signs (Current):   Vitals:    10/02/22 2100 10/03/22 0315 10/03/22 0738 10/03/22 0754   BP: 126/77 117/65 122/60 (!) 114/46   Pulse: 58 51 (!) 48 (!) 48   Resp: 16 16 16 14   Temp: 97.8 °F (36.6 °C) 97.6 °F (36.4 °C) 97.7 °F (36.5 °C) (!) 96.7 °F (35.9 °C)   TempSrc: Oral Oral Oral Temporal   SpO2: 95% 98% 96% 95%   Weight:       Height:                                                  BP Readings from Last 3 Encounters:   10/03/22 (!) 114/46   07/27/22 (!) 112/54   06/29/22 (!) 106/54       NPO Status: Time of last liquid consumption: 2359                        Time of last solid consumption: 2359                        Date of last liquid consumption: 10/02/22                        Date of last solid food consumption: 10/02/22    BMI:   Wt Readings from Last 3 Encounters:   10/02/22 239 lb 8 oz (108.6 kg)   06/21/22 243 lb 9.6 oz (110.5 kg)   10/06/21 225 lb (102.1 kg)     Body mass index is 46.77 kg/m².     CBC:   Lab Results   Component Value Date/Time    WBC 10.6 10/03/2022 04:48 AM    RBC 2.89 10/03/2022 04:48 AM    HGB 9.2 10/03/2022 04:48 AM    HCT 28.4 10/03/2022 04:48 AM    MCV 98.1 10/03/2022 04:48 AM    RDW 22.0 10/03/2022 04:48 AM     10/03/2022 04:48 AM       CMP:   Lab Results   Component Value Date/Time     10/03/2022 04:48 AM    K 4.8 10/03/2022 04:48 AM    K 4.6 09/27/2022 05:58 AM     10/03/2022 04:48 AM    CO2 21 10/03/2022 04:48 AM    BUN 52 10/03/2022 04:48 AM    CREATININE 1.0 10/03/2022 04:48 AM    GFRAA >60 10/03/2022 04:48 AM    GFRAA >60 07/10/2012 02:05 PM    AGRATIO 1.1 09/24/2022 07:05 PM    LABGLOM 53 10/03/2022 04:48 AM    GLUCOSE 295 10/03/2022 04:48 AM    PROT 7.4 09/26/2022 04:34 AM    PROT 7.3 07/10/2012 02:05 PM    CALCIUM 9.6 10/03/2022 04:48 AM    BILITOT 0.4 09/24/2022 07:05 PM    ALKPHOS 88 09/24/2022 07:05 PM    AST 15 09/24/2022 07:05 PM    ALT 11 09/24/2022 07:05 PM       POC Tests:   Recent Labs     10/03/22  0725   POCGLU 299*       Coags:   Lab Results   Component Value Date/Time    PROTIME 23.6 09/29/2022 11:37 AM    INR 2.10 09/29/2022 11:37 AM    APTT 39.7 02/13/2017 12:07 PM       HCG (If Applicable): No results found for: PREGTESTUR, PREGSERUM, HCG, HCGQUANT     ABGs: No results found for: PHART, PO2ART, AMT4CYB, DZN6ZRG, BEART, N1IRFMMK     Type & Screen (If Applicable):  No results found for: LABABO, LABRH    Drug/Infectious Status (If Applicable):  No results found for: HIV, HEPCAB    COVID-19 Screening (If Applicable): No results found for: COVID19        Anesthesia Evaluation  Patient summary reviewed no history of anesthetic complications:   Airway: Mallampati: III  TM distance: <3 FB   Neck ROM: limited  Mouth opening: > = 3 FB   Dental:    (+) edentulous      Pulmonary:   (+) shortness of breath:      (-) sleep apnea                           Cardiovascular:    (+) hypertension:, CAD:, CHF:,                   Neuro/Psych:               GI/Hepatic/Renal:   (+) GERD:,           Endo/Other:    (+) Diabetes, . Abdominal:   (+) obese,           Vascular: Other Findings:           Anesthesia Plan      MAC     ASA 4       Induction: intravenous. Anesthetic plan and risks discussed with patient. Use of blood products discussed with patient whom consented to blood products. Plan discussed with CRNA.                     Destinee Pepe MD 10/3/2022

## 2022-10-03 NOTE — PROGRESS NOTES
Pt transferred to room 3304 at this time. A&O with no signs of distress. Report given to [facility]. V/u and denies questions or further needs at this time.

## 2022-10-03 NOTE — PROGRESS NOTES
Pt stable and able to be transferred from PACU to room 3304. A&O , VSS, with no complaints at this time. 3A called and notified that pt is being transferred out of PACU and to room.

## 2022-10-03 NOTE — PROGRESS NOTES
[facility]   Phone: [phone]    Physical Therapy    [x] Initial Evaluation            [] Daily Treatment Note         [] Discharge Summary      Patient: Robert Gibbons   : 1940   MRN: [id number]   Date of Service:  10/3/2022  Admitting Diagnosis: Dyspnea on exertion  Current Admission Summary: Pt admitted on , info per ortho consult on  \"82 y.o. female who presented to [facility] ED yesterday for multiple areas of pain including her right rib cage, both shoulders, and her left wrist.  There is no history of injury or surgery to the left wrist.  She does have a history of osteoarthritis and has a history of bilateral total knee arthroplasty. She is right-hand dominant. She has atrial fibrillation and is on Xarelto. She also has congestive heart failure and reports shortness of breath. Family is at bedside and helps with interpretation. The patient primarily speaks Pashto but also knows some [place]. \" Cardiology is also following. Past Medical History:  has a past medical history of 17 Left knee repeat repair of median parapatellar arthrotomy with augmentation, Arthritis, Atrial fibrillation ([address] CAD (coronary artery disease), Cataract, Chronic diastolic congestive heart failure ([address] Diabetes mellitus ([address] GERD (gastroesophageal reflux disease), Hyperlipidemia, Hypertension, HYPOTHRYROIDISM, Non-English speaking patient, Sleep apnea, Thyroid disease, and UTI. Past Surgical History:  has a past surgical history that includes joint replacement (Bilateral, [address]); Cardiac surgery (); Cholecystectomy, laparoscopic (5/15/14); Revision total knee arthroplasty (Right, 2016); Total knee arthroplasty; knee surgery (Left, 2017); CT BIOPSY BONE MARROW (2022); Upper gastrointestinal endoscopy (N/A, 10/3/2022); and Upper gastrointestinal endoscopy (N/A, 10/3/2022).     Discharge Recommendations: Wally ACOSTA Roxana scored a 14/24 on the AM-PAC short mobility form. Current research shows that an AM-PAC score of 17 or less is typically not associated with a discharge to the patient's home setting. Based on the patient's AM-PAC score and their current functional mobility deficits, it is recommended that the patient have 3-5 sessions per week of Physical Therapy at d/c to increase the patient's independence. Please see assessment section for further patient specific details. If pt discharges home she will need 24/7 assist, HHPT/OT (S3), and a BSC for safety as the patient is unable to ambulate >3 ft, and must walk further than that to get to her bathroom. If patient discharges prior to next session this note will serve as a discharge summary. Please see below for the latest assessment towards goals. DME Required For Discharge: DME to be determined at next level of care    Precautions/Restrictions: high fall risk, modified diet, Comment: dys soft and bite sized  Weight Bearing Restrictions: no restrictions  Required Braces/Orthotics: no braces required  Positional Restrictions:no positional restrictions    Pre-Admission Information   Lives With: alone    Type of Home: house  Home Layout: one level, able to live on main level  Home Access: level entry  Bathroom Layout: tub/shower unit  Bathroom Equipment: grab bars in shower, shower chair, hand held shower head  Toilet Height: elevated height (one just elevated, and one with safety rails)  Home Equipment: rolling walker, manual wheelchair, alert button, .   Comment: has an electric scooter that is too tall for her that she wants exchanged  Transfer Assistance: Independent without use of device  Ambulation Assistance:modified independent with use of walker     W/C for community distances and she frequently sits in her w/c or wheels that down the hallway instead  ADL Assistance: requires assistance with bathing, requires assistance with dressing  IADL Assistance: requires assistance with meal prep, requires assistance with laundry, requires assistance with cleaning, requires assistance with shopping, requires assistance for medication management  Aid comes 7 days/week, 3-4 hours - states they are there for some in the morning and come back again in the evening. Pt reports family has cameras around the house so her children can keep and eye on her. Active :        [x] Yes  [] No  Hand Dominance: [] Left  [x] Right  Recent Falls: 1 fall in last 6 months, slid out of her w/c    Examination   Vision:   Vision Gross Assessment: Impaired and Vision Corrective Device: wears glasses for reading  Hearing:   hard of hearing, left hearing aid, right hearing aid  Observation:   General Observation:  mild Redness to dorsum of (L) hand, but no swelling noted  Purewick in place        Subjective  General: Pt semi-reclined in bed upon therapist arrival. Pt agreeable to PT. Pt speaks fair English but has difficulty understanding at times, declined use of  at this time. States she wants to go home \"in a couple of days\" when she gets stronger. Pain: 0/10  Pain Interventions: not applicable       Functional Mobility  Bed Mobility  Supine to Sit: stand by assistance  Sit to Supine: moderate assistance  Scooting: stand by assistance  Comments: HOB elevated, increased time, use of rail    Transfers  Sit to stand transfer: contact guard assistance, moderate assistance, CGA from bed, pulling up from walker despite cues, increased time to achieve upright posture.  Mod A from chair  Stand to sit transfer: minimal assistance  Comments:   Ambulation  Surface:level surface  Assistive Device: rolling walker  Assistance: contact guard assistance  Distance: 3 ft x 2 (bed<>chair)  Gait Mechanics: Very forward flexed posture, decreased step length (B), (L) knee hyperextension, no (L) knee flexion, decreased (L) stance time  Comments:  patient declined further ambulation due to report of dizziness. States she is dizzy due to her \"surgery\" in am. Patient had EGD. Stair Mobility  Stair mobility not completed on this date. Comments: Pt does not have stairs at home. Wheelchair Mobility:  No w/c mobility completed on this date. Comments:  Balance  Static Sitting Balance: good: independent with functional balance in unsupported position  Dynamic Sitting Balance: fair (+): maintains balance at SBA/supervision without use of UE support  Static Standing Balance: fair (-): maintains balance at CGA with use of UE support  Dynamic Standing Balance: fair (-): maintains balance at CGA with use of UE support  Comments: Pt sat EOB x 4-6 minutes for gown changing. Other Therapeutic Interventions    Functional Outcomes  AM-PAC Inpatient Mobility Raw Score : 14              Cognition  WFL  Comments:    Orientation:    alert and oriented x 4  Command Following:   [facility]    Education  Barriers To Learning: language - speaks fair English, declining  this date  Patient Education: patient educated on goals, PT role and benefits, plan of care, weight-bearing education, HEP, general safety, functional mobility training, disease specific education, transfer training, discharge recommendations   Learning Assessment:  patient verbalizes understanding, would benefit from continued reinforcement    Assessment  Activity Tolerance: Limited by fatigue and dizziness. Declining ambulation attempt. Impairments Requiring Therapeutic Intervention: decreased functional mobility, decreased strength, decreased endurance, decreased balance, increased pain, decreased posture  Prognosis: fair  Clinical Assessment: She depends heavily on her RW normally for support and can only tolerate ~30 sec of standing with use of RW at this time. The patient is from home alone and only has and aid for a few hours in the morning and in the evening. She reports family checks on her but is unable to provide 24/7 assistance.  At this time the patient is unable to ambulate household distances needed for everyday living and is at a high risk of falling with transfers and ADLs. Recommending continued skilled PT to safely progress tolerance to activity and independence with functional mobility back to baseline. Presents at increased risk of falls with home discharge. Safety Interventions: patient left in bed, bed alarm in place, call light within reach, gait belt, patient at risk for falls, and nurse notified    Plan  Frequency: 3-5 x/per week  Current Treatment Recommendations: strengthening, balance training, functional mobility training, transfer training, gait training, endurance training, manual therapy - soft tissue massage, manual therapy - joint manipulation, modalities, patient/caregiver education, pain management, home exercise program, safety education, equipment evaluation/education, and positioning    Goals  Patient Goals: Go home  Short Term Goals:  Time Frame: Within 6 visits  Patient will complete bed mobility at supervision from flat bed  Patient will complete sit<>stand transfers at supervision   Patient will complete bed <>chair transfers at supervision with use of rolling walker  Patient will ambulate 10 ft with use of rolling walker at supervision  Patient to maintain standing at supervision for 5 minutes with (B) UE support on RW  No goals met this treatment.       Therapy Session Time      Individual Group Co-treatment   Time In 8805       Time Out 1504       Minutes 39         Timed Code Treatment Minutes:  39 Minutes  Total Treatment Minutes:  44       Electronically Signed By: Felisha Holguin PT      Thanks, Felisha Holguin PT, DPT [id number]

## 2022-10-03 NOTE — PROGRESS NOTES
Pt arrived from endo to PACU bay 1. Report received from endo staff. Pt asleep, unresponsive at time of arrival, spontaneous respirations noted, vitals as charted.

## 2022-10-03 NOTE — FLOWSHEET NOTE
10/02/22 2000   Assessment   Charting Type Shift assessment   Psychosocial   Psychosocial (WDL) X   Patient Behaviors Anxious;Demanding (comment)   Neurological   Neuro (WDL) X   Level of Consciousness 0   Orientation Level Oriented X4   Cognition Follows commands   Speech Clear   L Hand  Weak   RUE Sensation  Numbness;Tingling   LUE Sensation  Pain   RLE Sensation  Numbness;Tingling   LLE Sensation  Numbness;Tingling   [place] Coma Scale   Eye Opening 4   Best Verbal Response 5   Best Motor Response 6   [place] Coma Scale Score 15   HEENT (Head, Ears, Eyes, Nose, & Throat)   HEENT (WDL) X   Right Eye Glasses   Left Eye Glasses   Right Ear Hearing aid   Left Ear Hearing aid   Teeth Dentures upper;Missing teeth   Respiratory   Respiratory (WDL) X   Respiratory Pattern Regular   Respiratory Depth Normal   Respiratory Quality/Effort Unlabored;Dyspnea with exertion   Chest Assessment Chest expansion symmetrical;Trachea midline   L Breath Sounds Clear;Diminished   R Breath Sounds Clear;Diminished   Breath Sounds   Right Upper Lobe Clear   Right Middle Lobe Clear   Right Lower Lobe Diminished   Left Upper Lobe Clear   Left Lower Lobe Diminished   Cardiac   Cardiac (WDL) X   Cardiac Regularity Irregularly Irregular   Heart Sounds No adventitious heart sounds   Cardiac Rhythm Sinus brian;BBB   Cardiac Symptoms Peripheral edema   Rhythm Interpretation   Heart Rate 58   Cardiac Monitor   Cardiac/Telemetry Monitor On Portable telemetry pack applied   Telemetry Box Number [id number]   Alarm Audible Yes   Telemetry Monitor Alarm Parameters 120/50   Gastrointestinal   Abdominal (WDL) WDL   Abdomen Inspection Soft;Rounded;Rotund   RUQ Bowel Sounds Active   LUQ Bowel Sounds Active   RLQ Bowel Sounds Active   LLQ Bowel Sounds Active   Tenderness Soft; No guarding;Nontender; No rebound   Genitourinary   Genitourinary (WDL) X  (purewick, incontinent)   Suprapubic Tenderness No   Dysuria (Pain/Burning w/Urination) Yes   Urine Assessment Urinary Status External catheter   Urinary Incontinence Present   Urine Color Yellow/straw   Urine Appearance Clear   Urine Odor No odor   Peripheral Vascular   Peripheral Vascular (WDL) X   Edema Right lower extremity; Left lower extremity   RLE Edema Non-pitting   LLE Edema Non-pitting   Skin Integumentary    Skin Integumentary (WDL) X   Skin Color Pale;Ecchymosis (comment)   Skin Condition/Temp Dry;Swollen/edematous   Musculoskeletal   Musculoskeletal (WDL) X   RUE Weakness   LUE Weakness;Limited movement   RL Extremity Weakness   LL Extremity Weakness

## 2022-10-03 NOTE — CARE COORDINATION
Discharge Planning Note:    CM attempted to review IMM letter w/pt who asked CM to come back when dtr is present. SANDY VU. Electronically signed by Alfred Hooks RN on 10/3/2022 at 12:40 PM      Update:    CM spoke with dtr [facility] via phone to review IMM notice, all questions answered. CM offered to mail a copy pf the IMM letter, [facility] requested copy be placed in pt room. CM VU    CM notified dtr [facility] of anticipated DC to [facility]. [facility] would prefer for pt to return home at  but needs to know that she can walk some with her walker. CM reviewed chart and identified last PT/OT eval on 9/27/22. Dtr feels pt is feeling much better and will improve with therapy. CM agreed to request updated Pt/OT evals to determine if pt is able to ambulate with her walker. CM messaged Fiorella Meadows in therapy to request evals. CM updated Ruth and Dr. Rogerio Leyva.      Electronically signed by Alfred Hooks RN on 10/3/2022 at 1:06 PM

## 2022-10-04 LAB
ANION GAP SERPL CALCULATED.3IONS-SCNC: 13 MMOL/L (ref 3–16)
BUN BLDV-MCNC: 55 MG/DL (ref 7–20)
CALCIUM SERPL-MCNC: 9.2 MG/DL (ref 8.3–10.6)
CHLORIDE BLD-SCNC: 104 MMOL/L (ref 99–110)
CO2: 23 MMOL/L (ref 21–32)
CREAT SERPL-MCNC: 1.1 MG/DL (ref 0.6–1.2)
GFR AFRICAN AMERICAN: 57
GFR NON-AFRICAN AMERICAN: 47
GLUCOSE BLD-MCNC: 236 MG/DL (ref 70–99)
GLUCOSE BLD-MCNC: 246 MG/DL (ref 70–99)
GLUCOSE BLD-MCNC: 259 MG/DL (ref 70–99)
GLUCOSE BLD-MCNC: 324 MG/DL (ref 70–99)
GLUCOSE BLD-MCNC: 384 MG/DL (ref 70–99)
HCT VFR BLD CALC: 27.2 % (ref 36–48)
HEMOGLOBIN: 8.9 G/DL (ref 12–16)
MCH RBC QN AUTO: 32 PG (ref 26–34)
MCHC RBC AUTO-ENTMCNC: 32.8 G/DL (ref 31–36)
MCV RBC AUTO: 97.5 FL (ref 80–100)
PDW BLD-RTO: 21.7 % (ref 12.4–15.4)
PERFORMED ON: ABNORMAL
PLATELET # BLD: 104 K/UL (ref 135–450)
PMV BLD AUTO: 8.8 FL (ref 5–10.5)
POTASSIUM SERPL-SCNC: 4.7 MMOL/L (ref 3.5–5.1)
PRO-BNP: 206 PG/ML (ref 0–449)
RBC # BLD: 2.79 M/UL (ref 4–5.2)
SODIUM BLD-SCNC: 140 MMOL/L (ref 136–145)
WBC # BLD: 8.9 K/UL (ref 4–11)

## 2022-10-04 PROCEDURE — 97530 THERAPEUTIC ACTIVITIES: CPT

## 2022-10-04 PROCEDURE — 80048 BASIC METABOLIC PNL TOTAL CA: CPT

## 2022-10-04 PROCEDURE — 6370000000 HC RX 637 (ALT 250 FOR IP): Performed by: FAMILY MEDICINE

## 2022-10-04 PROCEDURE — 6370000000 HC RX 637 (ALT 250 FOR IP): Performed by: INTERNAL MEDICINE

## 2022-10-04 PROCEDURE — 1200000000 HC SEMI PRIVATE

## 2022-10-04 PROCEDURE — 6370000000 HC RX 637 (ALT 250 FOR IP): Performed by: NURSE PRACTITIONER

## 2022-10-04 PROCEDURE — 6370000000 HC RX 637 (ALT 250 FOR IP): Performed by: PHYSICIAN ASSISTANT

## 2022-10-04 PROCEDURE — 83880 ASSAY OF NATRIURETIC PEPTIDE: CPT

## 2022-10-04 PROCEDURE — 85027 COMPLETE CBC AUTOMATED: CPT

## 2022-10-04 PROCEDURE — 2580000003 HC RX 258: Performed by: PHYSICIAN ASSISTANT

## 2022-10-04 PROCEDURE — 36415 COLL VENOUS BLD VENIPUNCTURE: CPT

## 2022-10-04 PROCEDURE — 97116 GAIT TRAINING THERAPY: CPT

## 2022-10-04 PROCEDURE — 97535 SELF CARE MNGMENT TRAINING: CPT

## 2022-10-04 RX ORDER — INSULIN GLARGINE 100 [IU]/ML
48 INJECTION, SOLUTION SUBCUTANEOUS DAILY
Qty: 5 ADJUSTABLE DOSE PRE-FILLED PEN SYRINGE | Refills: 3 | Status: ON HOLD | OUTPATIENT
Start: 2022-10-04

## 2022-10-04 RX ORDER — INSULIN GLARGINE 100 [IU]/ML
52 INJECTION, SOLUTION SUBCUTANEOUS DAILY
Status: DISCONTINUED | OUTPATIENT
Start: 2022-10-05 | End: 2022-10-05

## 2022-10-04 RX ORDER — FUROSEMIDE 20 MG/1
20 TABLET ORAL DAILY
Qty: 60 TABLET | Refills: 3 | Status: ON HOLD | OUTPATIENT
Start: 2022-10-05 | End: 2022-10-24 | Stop reason: SDUPTHER

## 2022-10-04 RX ORDER — PREDNISONE 20 MG/1
40 TABLET ORAL DAILY
Qty: 2 TABLET | Refills: 0 | Status: SHIPPED | OUTPATIENT
Start: 2022-10-05 | End: 2022-10-06

## 2022-10-04 RX ORDER — INSULIN LISPRO 100 [IU]/ML
20 INJECTION, SOLUTION INTRAVENOUS; SUBCUTANEOUS
Status: DISCONTINUED | OUTPATIENT
Start: 2022-10-04 | End: 2022-10-07 | Stop reason: HOSPADM

## 2022-10-04 RX ADMIN — TIZANIDINE 2 MG: 4 TABLET ORAL at 09:14

## 2022-10-04 RX ADMIN — INSULIN LISPRO 17 UNITS: 100 INJECTION, SOLUTION INTRAVENOUS; SUBCUTANEOUS at 09:27

## 2022-10-04 RX ADMIN — METOPROLOL TARTRATE 12.5 MG: 25 TABLET, FILM COATED ORAL at 09:18

## 2022-10-04 RX ADMIN — ACETAMINOPHEN 1000 MG: 500 TABLET ORAL at 16:10

## 2022-10-04 RX ADMIN — MICONAZOLE NITRATE: 20 POWDER TOPICAL at 09:04

## 2022-10-04 RX ADMIN — Medication 10 ML: at 20:26

## 2022-10-04 RX ADMIN — CETIRIZINE HYDROCHLORIDE 5 MG: 10 TABLET, FILM COATED ORAL at 09:17

## 2022-10-04 RX ADMIN — EMPAGLIFLOZIN 10 MG: 10 TABLET, FILM COATED ORAL at 09:19

## 2022-10-04 RX ADMIN — TIZANIDINE 2 MG: 4 TABLET ORAL at 16:10

## 2022-10-04 RX ADMIN — ACETAMINOPHEN 1000 MG: 500 TABLET ORAL at 20:23

## 2022-10-04 RX ADMIN — RIVAROXABAN 20 MG: 20 TABLET, FILM COATED ORAL at 18:08

## 2022-10-04 RX ADMIN — PANTOPRAZOLE SODIUM 40 MG: 40 TABLET, DELAYED RELEASE ORAL at 05:59

## 2022-10-04 RX ADMIN — PREDNISONE 40 MG: 20 TABLET ORAL at 09:17

## 2022-10-04 RX ADMIN — ACETAMINOPHEN 1000 MG: 500 TABLET ORAL at 06:00

## 2022-10-04 RX ADMIN — LEVOTHYROXINE SODIUM 150 MCG: 0.15 TABLET ORAL at 06:00

## 2022-10-04 RX ADMIN — OXYBUTYNIN CHLORIDE 15 MG: 5 TABLET, EXTENDED RELEASE ORAL at 09:15

## 2022-10-04 RX ADMIN — ISOSORBIDE MONONITRATE 30 MG: 30 TABLET, EXTENDED RELEASE ORAL at 09:16

## 2022-10-04 RX ADMIN — DICLOFENAC SODIUM 2 G: 10 GEL TOPICAL at 20:27

## 2022-10-04 RX ADMIN — SACUBITRIL AND VALSARTAN 0.5 TABLET: 24; 26 TABLET, FILM COATED ORAL at 10:39

## 2022-10-04 RX ADMIN — Medication 10 ML: at 09:32

## 2022-10-04 RX ADMIN — GABAPENTIN 300 MG: 300 CAPSULE ORAL at 09:18

## 2022-10-04 RX ADMIN — OXYCODONE 5 MG: 5 TABLET ORAL at 10:38

## 2022-10-04 RX ADMIN — DICLOFENAC SODIUM 2 G: 10 GEL TOPICAL at 09:23

## 2022-10-04 RX ADMIN — ATORVASTATIN CALCIUM 80 MG: 80 TABLET, FILM COATED ORAL at 20:25

## 2022-10-04 RX ADMIN — INSULIN LISPRO 17 UNITS: 100 INJECTION, SOLUTION INTRAVENOUS; SUBCUTANEOUS at 12:06

## 2022-10-04 RX ADMIN — INSULIN GLARGINE 48 UNITS: 100 INJECTION, SOLUTION SUBCUTANEOUS at 09:27

## 2022-10-04 RX ADMIN — INSULIN LISPRO 8 UNITS: 100 INJECTION, SOLUTION INTRAVENOUS; SUBCUTANEOUS at 18:09

## 2022-10-04 RX ADMIN — INSULIN LISPRO 4 UNITS: 100 INJECTION, SOLUTION INTRAVENOUS; SUBCUTANEOUS at 20:28

## 2022-10-04 RX ADMIN — GABAPENTIN 600 MG: 300 CAPSULE ORAL at 20:26

## 2022-10-04 RX ADMIN — TIZANIDINE 2 MG: 4 TABLET ORAL at 20:25

## 2022-10-04 RX ADMIN — FUROSEMIDE 20 MG: 20 TABLET ORAL at 09:16

## 2022-10-04 RX ADMIN — INSULIN LISPRO 20 UNITS: 100 INJECTION, SOLUTION INTRAVENOUS; SUBCUTANEOUS at 18:09

## 2022-10-04 RX ADMIN — INSULIN LISPRO 12 UNITS: 100 INJECTION, SOLUTION INTRAVENOUS; SUBCUTANEOUS at 12:07

## 2022-10-04 ASSESSMENT — PAIN DESCRIPTION - ORIENTATION
ORIENTATION: LEFT

## 2022-10-04 ASSESSMENT — PAIN SCALES - GENERAL
PAINLEVEL_OUTOF10: 7
PAINLEVEL_OUTOF10: 8
PAINLEVEL_OUTOF10: 0
PAINLEVEL_OUTOF10: 8
PAINLEVEL_OUTOF10: 7
PAINLEVEL_OUTOF10: 0
PAINLEVEL_OUTOF10: 7

## 2022-10-04 ASSESSMENT — PAIN DESCRIPTION - LOCATION
LOCATION: HAND
LOCATION: WRIST;FINGER (COMMENT WHICH ONE)

## 2022-10-04 ASSESSMENT — PAIN DESCRIPTION - DESCRIPTORS
DESCRIPTORS: ACHING;SORE
DESCRIPTORS: SORE
DESCRIPTORS: ACHING;SORE
DESCRIPTORS: SORE

## 2022-10-04 NOTE — PROGRESS NOTES
[facility]   Phone: [phone]    Physical Therapy    [] Initial Evaluation            [x] Daily Treatment Note         [] Discharge Summary      Patient: Audelia Jane   : 1940   MRN: [id number]   Date of Service:  10/4/2022  Admitting Diagnosis: Dyspnea on exertion  Current Admission Summary: Pt admitted on , info per ortho consult on  \"82 y.o. female who presented to [facility] ED yesterday for multiple areas of pain including her right rib cage, both shoulders, and her left wrist.  There is no history of injury or surgery to the left wrist.  She does have a history of osteoarthritis and has a history of bilateral total knee arthroplasty. She is right-hand dominant. She has atrial fibrillation and is on Xarelto. She also has congestive heart failure and reports shortness of breath. Family is at bedside and helps with interpretation. The patient primarily speaks [place] but also knows some [place]. \" Cardiology is also following. Past Medical History:  has a past medical history of 17 Left knee repeat repair of median parapatellar arthrotomy with augmentation, Arthritis, Atrial fibrillation ([address] CAD (coronary artery disease), Cataract, Chronic diastolic congestive heart failure ([address] Diabetes mellitus ([address] GERD (gastroesophageal reflux disease), Hyperlipidemia, Hypertension, HYPOTHRYROIDISM, Non-English speaking patient, Sleep apnea, Thyroid disease, and UTI. Past Surgical History:  has a past surgical history that includes joint replacement (Bilateral, [address]); Cardiac surgery (); Cholecystectomy, laparoscopic (5/15/14); Revision total knee arthroplasty (Right, 2016); Total knee arthroplasty; knee surgery (Left, 2017); CT BIOPSY BONE MARROW (2022); Upper gastrointestinal endoscopy (N/A, 10/3/2022); and Upper gastrointestinal endoscopy (N/A, 10/3/2022).     Discharge Recommendations: Wally ACOSTA Roxana scored a 13/24 on the AM-PAC short mobility form. Current research shows that an AM-PAC score of 17 or less is typically not associated with a discharge to the patient's home setting. Based on the patient's AM-PAC score and their current functional mobility deficits, it is recommended that the patient have 3-5 sessions per week of Physical Therapy at d/c to increase the patient's independence. Please see assessment section for further patient specific details. If pt discharges home she will need 24/7 assist, HHPT/OT (S3), and a BSC for safety as the patient is unable to ambulate >12 ft, and must walk further than that to get to her bathroom. If patient discharges prior to next session this note will serve as a discharge summary. Please see below for the latest assessment towards goals. DME Required For Discharge: DME to be determined at next level of care    Precautions/Restrictions: high fall risk, modified diet, Comment: dysphagia soft and bite sized  Weight Bearing Restrictions: no restrictions  Required Braces/Orthotics: no braces required  Positional Restrictions:no positional restrictions    Pre-Admission Information   Lives With: alone    Type of Home: house  Home Layout: one level, able to live on main level  Home Access: level entry  Bathroom Layout: tub/shower unit  Bathroom Equipment: grab bars in shower, shower chair, hand held shower head  Toilet Height: elevated height (one just elevated, and one with safety rails)  Home Equipment: rolling walker, manual wheelchair, alert button, .   Comment: has an electric scooter that is too tall for her that she wants exchanged  Transfer Assistance: Independent without use of device  Ambulation Assistance:modified independent with use of walker     W/C for community distances and she frequently sits in her w/c or wheels that down the hallway instead  ADL Assistance: requires assistance with bathing, requires assistance with dressing  IADL Assistance: requires assistance with meal prep, requires assistance with laundry, requires assistance with cleaning, requires assistance with shopping, requires assistance for medication management  Aid comes 7 days/week, 3-4 hours - states they are there for some in the morning and come back again in the evening. Pt reports family has cameras around the house so her children can keep and eye on her. Active :        [x] Yes  [] No  Hand Dominance: [] Left  [x] Right  Recent Falls: 1 fall in last 6 months, slid out of her w/c      Subjective  General: Pt semi-reclined in bed upon therapist arrival. Pt agreeable to PT/OT co-tx. Pt speaks fair English but has difficulty understanding at times, declined use of  at this time. Daughter present to assist with translation. Daughter wanting pt up 2-3 times a day to walk now that pt's shoulder pain is better - believes she can go home if she gets daily therapy. Pain: 0/10  Pain Interventions: not applicable       Functional Mobility  Bed Mobility  Supine to Sit: stand by assistance  Scooting: stand by assistance  Comments: HOB elevated, increased time, use of rail    Transfers  Sit to stand transfer: contact guard assistance, minimal assistance, moderate assistance  Stand to sit transfer: contact guard assistance, minimal assistance  Comments: Pt pulling up from walker despite cues, increased time to achieve upright posture for all sit>stands. Sit>stand CGA from EOB, Min A from [facility] on 1st trial, Mod A from [facility] on 2nd trial. Stand>sit CGA to [facility] 1st trial, then Mod A at [facility] and recliner.   Ambulation  Surface:level surface  Assistive Device: rolling walker  Assistance: 2 person assistance with Min A   Distance: 11 ft + 12 + 11 ft  Gait Mechanics: Very forward flexed posture, decreased step length (B), (L) knee hyperextension, no (L) knee flexion, decreased (L) stance time, heavy use of (B) UE support on RW  Comments: Pt donned (L) knee and ankle brace prior to ambulation. Moderate-severe THOMASON noted with ambulation. Pt requires assistance for balance and with walker management. The patient gets anxious as she fatigues and requires seated rest break on BSC x2 during ambulation during room. Stair Mobility  Stair mobility not completed on this date. Comments: Pt does not have stairs at home. Wheelchair Mobility:  No w/c mobility completed on this date. Comments:  Balance  Static Sitting Balance: good: independent with functional balance in unsupported position  Dynamic Sitting Balance: fair (+): maintains balance at SBA/supervision without use of UE support  Static Standing Balance: fair (-): maintains balance at CGA with use of UE support  Dynamic Standing Balance: fair (-): maintains balance at CGA with use of UE support  Comments: Heavy use of UE support on RW for all standing    Other Therapeutic Interventions  See OT note for assist with ADLs    Functional Outcomes  AM-PAC Inpatient Mobility Raw Score : 13              Cognition  WFL  Comments:  Pt with increased anxiety as she fatigues, decreased safety awareness and problem solving with fatigue  Orientation:    alert and oriented x 4  Command Following:   [facility]    Education  Barriers To Learning: language - speaks fair English, declining  this date  Patient Education: patient educated on goals, PT role and benefits, plan of care, weight-bearing education, general safety, functional mobility training, family education, disease specific education, transfer training, discharge recommendations   Learning Assessment:  patient verbalizes understanding, would benefit from continued reinforcement    Assessment  Activity Tolerance: Limited by fatigue and dizziness. Declining ambulation attempt. Moderate-severe THOMASON with activity, SpO2 >90% on RA throughout.   Impairments Requiring Therapeutic Intervention: decreased functional mobility, decreased strength, decreased endurance, decreased balance, increased pain, decreased posture  Prognosis: fair  Clinical Assessment: She depends heavily on her RW normally for support and can only tolerate ambulating ~11-12 ft at a time, before she fatigues and requires assistance to sit. Her anxiety increases as she fatigues and limites her problem solving and safety awareness. The patient is from home alone and only has and aid for a few hours in the morning and in the evening. At this time the patient is unable to ambulate household distances needed for everyday living and is at a high risk of falling with transfers and ADLs. Recommending continued skilled PT to safely progress tolerance to activity and independence with functional mobility back to baseline. Presents at increased risk of falls with home discharge. Safety Interventions: patient left in chair, chair alarm in place, call light within reach, gait belt, patient at risk for falls, nurse notified, and family/caregiver present    Plan  Frequency: 3-5 x/per week  Current Treatment Recommendations: strengthening, balance training, functional mobility training, transfer training, gait training, endurance training, manual therapy - soft tissue massage, manual therapy - joint manipulation, modalities, patient/caregiver education, pain management, home exercise program, safety education, equipment evaluation/education, and positioning    Goals  Patient Goals: Go home  Short Term Goals:  Time Frame: Within 6 visits  Patient will complete bed mobility at supervision from flat bed  Patient will complete sit<>stand transfers at supervision   Patient will complete bed <>chair transfers at supervision with use of rolling walker  Patient will ambulate 10 ft with use of rolling walker at supervision  Patient to maintain standing at supervision for 5 minutes with (B) UE support on RW  No goals met this treatment.       Therapy Session Time      Individual Group Co-treatment   Time In     1413   Time Out     1454   Minutes     41     Timed Code Treatment Minutes:  41 Minutes  Total Treatment Minutes:  41 minutes       Electronically Signed By: Kaleb Hicks, PT      Darlene Soriano, PT, DPT #[id number]

## 2022-10-04 NOTE — CARE COORDINATION
Discharge Planning Note:    SANDY met with dtr Wafa and pt bedside to discuss PT evals from last-evening. Per notes, pt was able to walk 3 feet with RW before becoming week and c/o dizzy. Aiden Pina prefers that pt return home at DC rather than SNF, but dtr would like pt to remain in hospital a few more days. Dtr feels that pt will regain strength to return home and not need SNF. SANDY explained to pt and dtr that le longer pt remains in the bed the weaker she will become. CM explained that the therapy department in-patient is for evals and recommendations, not for daily treatment sessions. Dtr expressed desire for nurses and staff to get pt up w/RW and ambulate in the room. Dtr also expressed that pt has had x6 knee replacements and relies on the strength of her arms, shoulders and hands and at this melissa, pt's left thumb has been hurting and swollen from arthritis. Per dtr, she spoke with ortho yesterday and was under the impression that they planned to give pt a steroid injection in the thumb knuckle. Dtr feels that if pt gets the injection it will allow pt to ambulate better with walker. SANDY agreed to discuss with Valeria for a clear picture regarding steroid injection. Aiden Pina also asking questions regarding the right to appeal and desire to do so \"to buy some time. \"  SANDY explained that she has the right as explained to her yesterday and identified where on the IMM is the number for Abel Neves that pt/dtr must call. SANDY spoke to Valeria and updated regarding ortho need,  Marquez Rodriguez to reach out to ortho for clarification. SANDY also discussed with Katya who is familiar with family and taking over dc planning for this patient.      Electronically signed by Miguelito Langley RN on 10/4/2022 at 12:45 PM

## 2022-10-04 NOTE — PLAN OF CARE
Problem: Discharge Planning  Goal: Discharge to home or other facility with appropriate resources  Outcome: Progressing     Problem: Pain  Goal: Verbalizes/displays adequate comfort level or baseline comfort level  Outcome: Progressing     Problem: Safety - Adult  Goal: Free from fall injury  Outcome: Progressing     Problem: Nutrition Deficit:  Goal: Optimize nutritional status  Outcome: Progressing     Problem: Skin/Tissue Integrity  Goal: Absence of new skin breakdown  Description: 1. Monitor for areas of redness and/or skin breakdown  2. Assess vascular access sites hourly  3. Every 4-6 hours minimum:  Change oxygen saturation probe site  4. Every 4-6 hours:  If on nasal continuous positive airway pressure, respiratory therapy assess nares and determine need for appliance change or resting period.   Outcome: Progressing     Problem: Chronic Conditions and Co-morbidities  Goal: Patient's chronic conditions and co-morbidity symptoms are monitored and maintained or improved  Outcome: Progressing

## 2022-10-04 NOTE — CARE COORDINATION
KAREN informed pt was ready for discharge today. Spoke with pt and dtr, Elvi Client, in the room. Dtr reported that pt does not want to go to SNF anymore. She wants to discharge home. Dtr stated she asked to see if Ortho would do something about pt's thumb which is \"frozen. \"  Dtr states the thumb may be the reason she is not walking far d/t unable to properly  the walker. Dtr reported they want to discharge home but don't feel pt is ready. Dtr stated she and patient decided to exercise their right to appeal the discharge. Dtr already had IMM form and Laura AFreeze phone number to call. SW asked dtr to call. She did and had to leave a message with pt's information. Plan will be home with [facility] once appeal is completed. Patient is already active w/[facility] (formally known as [facility]). She is only active w/RN services. Will need new orders including therapy services. Discharge Plan:  Patient is appealing. Appeal called in today. Plan is home w/continuing [facility].     Electronically signed by MADELINE Hernandez LSW on 10/4/2022 at 3:33 PM

## 2022-10-04 NOTE — DISCHARGE INSTR - COC
Continuity of Care Form    Patient Name: Lam Hoffman   :  1940  MRN:  [id number]    Admit date:  2022  Discharge date:  10/7/2022    Code Status Order: Full Code   Advance Directives:   Advance Care Flowsheet Documentation       Date/Time Healthcare Directive Type of Healthcare Directive Copy in [address] Agent's Name Healthcare Agent's Phone Number    10/03/22 4949 No, patient does not have an advance directive for healthcare treatment -- -- -- -- --            Admitting Physician:  Javon Locke MD  PCP: Beverley Zhou APRN - CNP    Discharging Nurse: Oswaldo Reaves [facility] Unit/Room#: [id number]-5980/1378-43  Discharging Unit Phone Number: [phone]    Emergency Contact:   Extended Emergency Contact Information  Primary Emergency Contact: [address] [facility], [address] Chika Medina  [address]  Phone: [phone]  Relation: Child  Secondary Emergency Contact: Chrissy Priest [facility] [address]  Phone: [phone]  Mobile Phone: [phone]  Relation: Child    Past Surgical History:  Past Surgical History:   Procedure Laterality Date    CARDIAC SURGERY      CABG X 4 VESSELS    CHOLECYSTECTOMY, LAPAROSCOPIC  5/15/14    with IOC    CT BONE MARROW BIOPSY  2022    CT BONE MARROW BIOPSY 2022 [facility] CT SCAN    JOINT REPLACEMENT Bilateral  [address]    knee    KNEE SURGERY Left 2017    left knee poly exchange revision     REVISION TOTAL KNEE ARTHROPLASTY Right 2016    RIGHT TOTAL KNEE REVISION WITH POLY EXCHANGE    TOTAL KNEE ARTHROPLASTY      partical/ left     UPPER GASTROINTESTINAL ENDOSCOPY N/A 10/3/2022    EGD BIOPSY performed by Matilda Cooper MD at [facility] [address] N/A 10/3/2022    EGD DILATION BALLOON performed by Matilda Cooper MD at [address] [facility] ENDOSCOPY       Immunization History:   Immunization History   Administered Date(s) Administered    Influenza Vaccine, unspecified formulation 09/01/2016    Influenza Virus Vaccine 10/22/2011    Pneumococcal Polysaccharide ([id number]) 02/11/2012       Active Problems:  Patient Active Problem List   Diagnosis Code    DM (diabetes mellitus) ([facility] E11.9    Coronary artery disease involving native coronary artery of native heart without angina pectoris I25.10    PAF (paroxysmal atrial fibrillation) ([facility]) I48.0    Peripheral vascular disease ([facility]) I73.9    CARLOS (obstructive sleep apnea) G47.33    SOB (shortness of breath) R06.02    HIGH CHOLESTEROL     HTN (hypertension) I10    Dizziness R42    Headache R51.9    Debility R53.81    11/22/16 Right knee polyethylene exchange M25.561, M25.562    Acute pain of right shoulder M25.511    Mixed hyperlipidemia E78.2    Morbid obesity with BMI of 40.0-44.9, adult ([facility]) E66.01, Z68.41    Morbid obesity with BMI of 45.0-49.9, adult ([facility]) E66.01, Z68.42    Diabetes type 2, controlled ([facility]) E11.9    Carotid artery disease without cerebral infarction [facility]) I77.9    2/28/17 Revision with polyethylene exchange left total knee arthroplasty M23.52    4/11/17 Left knee repeat repair of median parapatellar arthrotomy with augmentation S76.111A    Rotator cuff tendonitis, right M75.81    Cervical radicular pain M54.12    Acute on chronic diastolic heart failure ([facility]) I50.33    Dyspnea on exertion R06.09    Thrombocytopenia ([facility]) D69.6    Anemia D64.9    Wrist arthritis M19.039    Elevated troponin R77.8    Chronic atrial fibrillation ([facility]) I48.20    Primary osteoarthritis of first carpometacarpal joint of left hand M18.12       Isolation/Infection:   Isolation            No Isolation          Patient Infection Status       None to display            Nurse Assessment:  Last Vital Signs: /61   Pulse 54   Temp 97.6 °F (36.4 °C) (Oral)   Resp 16   Ht 5' (1.524 m)   Wt 242 lb 4.8 oz (109.9 kg)   LMP  (LMP Unknown)   SpO2 95%   BMI 47.32 kg/m²     Last documented pain score (0-10 scale): Pain Level: 8  Last Weight:   Wt Readings from Last 1 Encounters:   10/04/22 242 lb 4.8 oz (109.9 kg)     Mental Status:  oriented and alert    IV Access:  - None    Nursing Mobility/ADLs:  Walking   Assisted  Transfer  Assisted  Bathing  Assisted  Dressing  Assisted  Toileting  [address] [facility]  Independent  Med Delivery   whole and 2 at a time    Wound Care Documentation and Therapy:  Incision 11/22/16 Knee Right (Active)   Number of days: 2142       Incision 02/28/17 Knee Left (Active)   Number of days: 2044       Incision 04/11/17 Knee Left (Active)   Number of days: 2002       Puncture 09/29/22 Back (Active)   Drainage Amount Scant 09/29/22 1853   Drainage Description Sanguinous 09/29/22 1853   Odor None 09/29/22 1853   Dressing/Treatment Gauze dressing/dressing sponge 09/29/22 1853   Dressing Changed Changed/New 09/29/22 1853   Dressing Status New dressing applied 09/29/22 1853   Number of days: 5        Elimination:  Continence: Bowel: Yes  Bladder: Yes  Urinary Catheter: None   Colostomy/Ileostomy/Ileal Conduit: No       Date of Last BM: 10/6/22    Intake/Output Summary (Last 24 hours) at 10/4/2022 1534  Last data filed at 10/4/2022 1042  Gross per 24 hour   Intake 840 ml   Output 2000 ml   Net -1160 ml     I/O last 3 completed shifts: In: 1080 [P.O.:1080]  Out: 3300 [Urine:3300]    Safety Concerns:     History of Falls (last 30 days) and At Risk for Falls    Impairments/Disabilities:      None    Nutrition Therapy:  Current Nutrition Therapy:   - Oral Diet:  Dysphagia 3 advanced    Routes of Feeding: Oral  Liquids: Thin Liquids  Daily Fluid Restriction: yes - amount 64 oz/day  Last Modified Barium Swallow with Video (Video Swallowing Test): not done    Treatments at the Time of Hospital Discharge:   Respiratory Treatments: n/a  Oxygen Therapy:  is not on home oxygen therapy.   Ventilator:    - No ventilator support    Rehab Therapies: Physical Therapy, Occupational Therapy, and Speech/Language Therapy  Weight Bearing Status/Restrictions: No weight bearing restrictions  Other Medical Equipment (for information only, NOT a DME order):  wheelchair, walker, bedside commode, and braces Left wrist/Left knee/Left ankle  Other Treatments: n/a    Patient's personal belongings (please select all that are sent with patient):  Dentures upper    RN SIGNATURE:  Electronically signed by Marquez Moore RN on 10/7/22 at 9:17 AM EDT    CASE MANAGEMENT/SOCIAL WORK SECTION    Inpatient Status Date: 09/24/22    Readmission Risk Assessment Score:  Readmission Risk              Risk of Unplanned Readmission:  29           Discharging to Facility/ [address] Priscila Mayen [address] [facility]  Phone: [phone]  Fax:  [phone] (xrRPa) [phone] Ifeanyi Valdez        / signature: Electronically signed by Maxine Judd RN on 10/6/2022 at 2:36 PM      PHYSICIAN SECTION    Prognosis: Good    Condition at Discharge: Stable    Rehab Potential (if transferring to Rehab): Good    Recommended Labs or Other Treatments After Discharge: check BMP weekly x 2     Physician Certification: I certify the above information and transfer of Britney May  is necessary for the continuing treatment of the diagnosis listed and that she requires [facility] for greater 30 days.      Update Admission H&P: No change in H&P    PHYSICIAN SIGNATURE:  Electronically signed by Camila Justin MD on 10/4/22 at 3:59 PM EDT

## 2022-10-04 NOTE — CARE COORDINATION
As of 7:30pm this evening, case management has not received notification that appeal process was started. SW attempted to call Sheba Adams myself, but was given a message that they had an \"extreme high call volume\" and to leave a message. Voice message stated not to leave multiple voice messages as this can delay the process. SW spoke with dtr Arleen Mancia and asked her to call again tomorrow morning at 9am to make sure they received her voicemail.     Electronically signed by MADELINE Woody LSW on 10/4/2022 at 7:35 PM

## 2022-10-04 NOTE — PROGRESS NOTES
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 Explained to the patient's daughter that the only curative therapy for MDS is a bone marrow transplant which the patient would not qualify for given her advanced age and co-morbidities. No inpatient oncological intervention. Follow up outpatient with Dr. Michaela Leroy to discuss palliative therapies.     Kalyan Griffith MD  Oncology hematology Care

## 2022-10-04 NOTE — PROGRESS NOTES
[facility] [address]   Phone: [phone]    Occupational Therapy    [] Initial Evaluation            [x] Daily Treatment Note         [] Discharge Summary      Patient: Verdie Alpers   : 1940   MRN: [id number]   Date of Service:  10/4/2022    Admitting Diagnosis:  Dyspnea on exertion  Current Admission Summary: Verdie Alpers is a 78 y.o. female who presents to the emergency department today for evaluation for wrist pain, and right rib pain. The patient states that she started with pain to her right ribs, both of her shoulders, and her left wrist.  Patient states that her pain started last night. Patient denies falling or injuring herself in any way. The patient states that she does have a history of arthritis and has had this pain in the past.  Patient denies any numbness, tingling or weakness. The patient history of atrial fibrillation, is anticoagulated on Xarelto, history of CHF, hypertension, and hyperlipidemia. The patient states that over the past couple of days she has noticed some shortness of breath but only reports that if she walks too much. Patient does not believe that she has fluid on her lungs. She denies any weight gain she has no chest pain or chest tightness. She denies any fever or chills. No cough or congestion. She has no abdominal pain, nausea, vomiting or diarrhea. She denies any urinary symptoms, patient otherwise has no other complaint     Past Medical History:  has a past medical history of 15 Left knee repeat repair of median parapatellar arthrotomy with augmentation, Arthritis, Atrial fibrillation ([address] CAD (coronary artery disease), Cataract, Chronic diastolic congestive heart failure ([address] Diabetes mellitus ([address] GERD (gastroesophageal reflux disease), Hyperlipidemia, Hypertension, HYPOTHRYROIDISM, Non-English speaking patient, Sleep apnea, Thyroid disease, and UTI.   Past Surgical History:  has a past surgical history that includes joint replacement (Bilateral, [address]); Cardiac surgery (2004); Cholecystectomy, laparoscopic (5/15/14); Revision total knee arthroplasty (Right, 11/22/2016); Total knee arthroplasty; knee surgery (Left, 02/28/2017); CT BIOPSY BONE MARROW (9/29/2022); Upper gastrointestinal endoscopy (N/A, 10/3/2022); and Upper gastrointestinal endoscopy (N/A, 10/3/2022). Discharge Recommendations: Britney May scored a 12/24 on the AM-PAC ADL Inpatient form. Current research shows that an AM-PAC score of 17 or less is typically not associated with a discharge to the patient's home setting. Based on the patient's AM-PAC score and their current ADL deficits, it is recommended that the patient have 3-5 sessions per week of Occupational Therapy at d/c to increase the patient's independence. Please see assessment section for further patient specific details. Pt and pt's daughter expressing desire for pt to return home. Discharge home is not recommended at this time due to safety concerns and level of assist currently required for ADL and functional mobility participation. If pt returns home, 24-hour assist and BSC will be needed. If patient discharges prior to next session this note will serve as a discharge summary. Please see below for the latest assessment towards goals.       DME Required For Discharge: DME to be determined at next level of care    Precautions/Restrictions: high fall risk  Weight Bearing Restrictions: no restrictions  [] Right Upper Extremity  [] Left Upper Extremity [] Right Lower Extremity  [] Left Lower Extremity     Required Braces/Orthotics: no braces required   [] Right  [] Left  Positional Restrictions:no positional restrictions    Pre-Admission Information   Lives With: alone                     Type of Home: house  Home Layout: one level, able to live on main level  Home Access: level entry  Bathroom Layout: tub/shower unit  [employer]: grab bars in shower, shower chair, hand held shower head  Toilet Height: elevated height (one just elevated, and one with safety rails)  Home Equipment: rolling walker, manual wheelchair, alert button, . Comment: has an electric scooter that is too tall for her that she wants exchanged  Transfer Assistance: Independent without use of device, but does get assistance getting in & out of shower  Ambulation Assistance:modified independent with use of walker                                      W/C for community distances and she frequently sits in her w/c or wheels that down the hallway instead  ADL Assistance: requires assistance with bathing, requires assistance with dressing  IADL Assistance: requires assistance with meal prep, requires assistance with laundry, requires assistance with cleaning, requires assistance with shopping, requires assistance for medication management              Aide comes 7 days/week, 3-4 hours. Family has cameras around the house in order to observe pt. Active :        [x] Yes                 [] No  Hand Dominance: [] Left                 [x] Right  Current Employment: unemployed  Hobbies: Kj is very important to the pt. Recent Falls: 1 fall in last 6 months, slid out of her w/c        Subjective  General: Pt supine in bed upon arrival; agreeable to tx. Daughter present upon entry with pt's other daughter arriving during session. Pt declined use of ; intermittently spoke in [place], but daughter also interpreted. Pain: Patient does not rate upon questioning, but reports some continued pain in L thumb   Pain Interventions: repositioned         Activities of Daily Living  Basic Activities of Daily Living  Lower Extremity Dressing: dependent  Dressing Comments: brief change  Toileting Comments: incontinent of urine during session  General Comments: pt declined additional ADLs  Instrumental Activities of Daily Living  No IADL completed on this date.     Functional Mobility  Bed Mobility  Supine to Sit: stand by assistance  Scooting: stand by assistance  Comments: increased time to complete, HOB elevated  Transfers  Sit to stand transfer:contact guard assistance, minimal assistance, moderate assistance  Stand to sit transfer: contact guard assistance, minimal assistance  Comments: CGA initial STS EOB, min A first STS from [facility], mod A second STS from [facility]  Functional Mobility:  Sitting Balance: stand by assistance. Standing Balance Comment: ranged CGA static stance without fatigue > min A x2 during functional mobility  Functional Mobility: .  2 person assistance with min A x2   Functional Mobility Activity: 11+12+11 ft with rest breaks seated on BSC between attempts  Functional Mobility Device Use: rolling walker  Functional Mobility Comment: forward flexed posture with pt reporting fear of falling; L knee hyperextension with L knee and ankle braces in place, heavy UE reliance     Other Therapeutic Interventions:       Functional Outcomes  AM-PAC Inpatient Daily Activity Raw Score: 12    Cognition  Comments: possible decreased insight into deficits, difficult to fully assess cognition due to language barrier  Orientation:    alert and oriented x 4  Command Following:   [facility]     Education  Barriers To Learning: language and emotional  Patient Education: patient educated on OT role and benefits, plan of care, HEP, transfer training, discharge recommendations  Learning Assessment:  patient will require reinforcement due to fear of falling and possible decreased insight into deficits    Assessment  Activity Tolerance: Pt limited by pain and fear of falling, but had improved standing tolerance on this date.    Impairments Requiring Therapeutic Intervention: decreased functional mobility, decreased ADL status, decreased ROM, decreased strength, decreased endurance, decreased fine motor control, increased pain  Prognosis: good  Clinical Assessment: Pt is below her baseline level of occupational function, based on the above deficits associated with THOMASON and pain. Pt would not be safe to discharge home at this time without 24-hour assist. Pt would benefit from continued skilled acute OT services to address these deficits and maximize her safety and participation in ADLs and functional mobility. Safety Interventions: patient left in chair, chair alarm in place, call light within reach, gait belt, patient at risk for falls, and nurse notified    Plan  Frequency: 3-5 x/per week  Current Treatment Recommendations: functional mobility training, transfer training, endurance training, patient/caregiver education, ADL/self-care training, home exercise program, safety education, and positioning    Goals  Patient Goals:  To return home   Short Term Goals:  Time Frame: Discharge  Patient will complete upper body ADL at moderate assistance   Patient will complete lower body ADL at maximum assistance   Patient will complete toileting at maximum assistance   Patient will complete grooming at minimal assistance   Patient will complete functional transfers at stand by assistance, Comment w/RW   Patient will complete functional mobility at stand by assistance, Comment RW   Patient will be independent with HEP for L UE AROM  Patient will tolerate standing 2-3 min for ADLs, functional mobility    Continue goals 10/4/22    Therapy Session Time     Individual Group Co-treatment   Time In    1414   Time Out    1455   Minutes    41        Timed Code Treatment Minutes:  Timed Code Treatment Minutes: 41 Minutes  Total Treatment Minutes:  41       Electronically Signed By: REINA Carlson/DIANN PIERCE/ALEYDA ([id number])

## 2022-10-04 NOTE — PROGRESS NOTES
Hospitalist Progress Note      PCP: MATEUSZ Murphy - CNP    Date of Admission: 9/24/2022    Chief Complaint: Wrist pain      Subjective:     Sinhala  utilized  Patient reports feeling a little better  Complains of some generalized aches  No nausea vomiting  No chest pain  Wants to remain in the hospital for couple of days as she feels she will get stronger      Medications:  Reviewed    Intake/Output Summary (Last 24 hours) at 10/4/2022 1602  Last data filed at 10/4/2022 1042  Gross per 24 hour   Intake 840 ml   Output 2000 ml   Net -1160 ml         Exam:    /61   Pulse 54   Temp 97.6 °F (36.4 °C) (Oral)   Resp 16   Ht 5' (1.524 m)   Wt 242 lb 4.8 oz (109.9 kg)   LMP  (LMP Unknown)   SpO2 95%   BMI 47.32 kg/m²     Gen/overall appearance: Not in acute distress. Alert. Head: Normocephalic, atraumatic  Eyes: EOMI, no scleral icterus  CVS: regular rate and rhythm, Normal S1S2  Pulm: Diminished, Clear to auscultation bilaterally. No crackles/wheezes  Extremities: no L thumb erythema  Neuro: No gross focal deficits noted  Skin: Warm, dry    Labs:   Recent Labs     10/02/22  0455 10/03/22  0448 10/04/22  0521   WBC 8.7 10.6 8.9   HGB 9.3* 9.2* 8.9*   HCT 28.2* 28.4* 27.2*   PLT 91* 102* 104*       Recent Labs     10/02/22  0455 10/03/22  0448 10/04/22  0521    140 140   K 5.2* 4.8 4.7    104 104   CO2 24 21 23   BUN 44* 52* 55*   CREATININE 1.1 1.0 1.1   CALCIUM 9.8 9.6 9.2       No results for input(s): AST, ALT, BILIDIR, BILITOT, ALKPHOS in the last 72 hours. No results for input(s): INR in the last 72 hours. No results for input(s): Michael Hughes in the last 72 hours.       Assessment/Plan:    Active Hospital Problems    Diagnosis Date Noted    Primary osteoarthritis of first carpometacarpal joint of left hand [M18.12] 10/01/2022     Priority: Medium    Elevated troponin [R77.8] 09/27/2022     Priority: Medium    Chronic atrial fibrillation ([facility] [I48.20] 09/27/2022     Priority: Medium    Thrombocytopenia ([facility] [D69.6] 09/25/2022     Priority: Medium    Anemia [D64.9] 09/25/2022     Priority: Medium    Wrist arthritis [M19.039] 09/25/2022     Priority: Medium    Dyspnea on exertion [R06.09] 09/24/2022     Priority: Medium    Acute on chronic diastolic heart failure ([facility] [I50.33] 07/27/2022     Priority: Medium    Mixed hyperlipidemia [E78.2] 05/02/2014    PAF (paroxysmal atrial fibrillation) ([facility] [I48.0] 10/05/2011    Coronary artery disease involving native coronary artery of native heart without angina pectoris [I25.10] 10/05/2011    DM (diabetes mellitus) ([facility] [E11.9] 10/05/2011    Peripheral vascular disease ([facility] [I73.9] 10/05/2011       Acute on chronic diastolic Congestive Heart Failure. Clinically improved  ARUN. Suspect lasix induced. 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   Improving  - Cont scheduled tizanidine and tylenol, prn oxycodone, PT OT     Dysuria  - improved  - s/p diflucan 150mg x1  - UCx neg  - s/p phenazopyridine 100mg TID for 3 doses     CAD status post CABG  - cont statin, BB, not on asp as already on xarelto     Paroxysmal atrial fibrillation  - cont BB, xarelto for [facility]    Hyperglycemia. Suspect CS induced. Hx of DM  - sliding scale to high  - titrate insulin regimen as tolerated    Dysphagia. Suspect 2/2 stenosis vs web  - ST following with diet modification  - planned for EGD mon  - GI following    PMH of morbid obesity, htn, hld    Overall patient medically stable  Had a lengthy discussion with the patient and her daughter who was at the bedside  Offered skilled nursing versus home health  Patient strongly feels that if she stays here for a couple of days she will get better  Explained that at this point she is medically stable  They want to appeal discharge with the hope of being able to stay for a couple of days and then possibly go home    DVT Prophylaxis: xarelto  Diet: ADULT DIET;  Dysphagia - Soft and Bite Sized  Code Status: Full Code    Dispo:  Home vs SNF    Chandrakant Muhammad MD

## 2022-10-05 LAB
ANION GAP SERPL CALCULATED.3IONS-SCNC: 11 MMOL/L (ref 3–16)
BUN BLDV-MCNC: 57 MG/DL (ref 7–20)
CALCIUM SERPL-MCNC: 9.5 MG/DL (ref 8.3–10.6)
CHLORIDE BLD-SCNC: 105 MMOL/L (ref 99–110)
CO2: 27 MMOL/L (ref 21–32)
CREAT SERPL-MCNC: 1 MG/DL (ref 0.6–1.2)
GFR AFRICAN AMERICAN: >60
GFR NON-AFRICAN AMERICAN: 53
GLUCOSE BLD-MCNC: 133 MG/DL (ref 70–99)
GLUCOSE BLD-MCNC: 135 MG/DL (ref 70–99)
GLUCOSE BLD-MCNC: 210 MG/DL (ref 70–99)
GLUCOSE BLD-MCNC: 234 MG/DL (ref 70–99)
GLUCOSE BLD-MCNC: 255 MG/DL (ref 70–99)
HCT VFR BLD CALC: 26.9 % (ref 36–48)
HEMOGLOBIN: 8.9 G/DL (ref 12–16)
MCH RBC QN AUTO: 32.1 PG (ref 26–34)
MCHC RBC AUTO-ENTMCNC: 33 G/DL (ref 31–36)
MCV RBC AUTO: 97.2 FL (ref 80–100)
PDW BLD-RTO: 21.4 % (ref 12.4–15.4)
PERFORMED ON: ABNORMAL
PLATELET # BLD: 100 K/UL (ref 135–450)
PMV BLD AUTO: 8.9 FL (ref 5–10.5)
POTASSIUM SERPL-SCNC: 4.4 MMOL/L (ref 3.5–5.1)
RBC # BLD: 2.77 M/UL (ref 4–5.2)
SODIUM BLD-SCNC: 143 MMOL/L (ref 136–145)
WBC # BLD: 8.3 K/UL (ref 4–11)

## 2022-10-05 PROCEDURE — 80048 BASIC METABOLIC PNL TOTAL CA: CPT

## 2022-10-05 PROCEDURE — 1200000000 HC SEMI PRIVATE

## 2022-10-05 PROCEDURE — 2580000003 HC RX 258: Performed by: PHYSICIAN ASSISTANT

## 2022-10-05 PROCEDURE — 6370000000 HC RX 637 (ALT 250 FOR IP): Performed by: INTERNAL MEDICINE

## 2022-10-05 PROCEDURE — 36415 COLL VENOUS BLD VENIPUNCTURE: CPT

## 2022-10-05 PROCEDURE — 97530 THERAPEUTIC ACTIVITIES: CPT

## 2022-10-05 PROCEDURE — 97116 GAIT TRAINING THERAPY: CPT

## 2022-10-05 PROCEDURE — 6370000000 HC RX 637 (ALT 250 FOR IP): Performed by: FAMILY MEDICINE

## 2022-10-05 PROCEDURE — 6370000000 HC RX 637 (ALT 250 FOR IP): Performed by: NURSE PRACTITIONER

## 2022-10-05 PROCEDURE — 85027 COMPLETE CBC AUTOMATED: CPT

## 2022-10-05 PROCEDURE — 6370000000 HC RX 637 (ALT 250 FOR IP): Performed by: PHYSICIAN ASSISTANT

## 2022-10-05 RX ORDER — INSULIN GLARGINE 100 [IU]/ML
57 INJECTION, SOLUTION SUBCUTANEOUS DAILY
Status: DISCONTINUED | OUTPATIENT
Start: 2022-10-05 | End: 2022-10-07 | Stop reason: HOSPADM

## 2022-10-05 RX ADMIN — DICLOFENAC SODIUM 2 G: 10 GEL TOPICAL at 09:39

## 2022-10-05 RX ADMIN — ATORVASTATIN CALCIUM 80 MG: 80 TABLET, FILM COATED ORAL at 20:27

## 2022-10-05 RX ADMIN — DICLOFENAC SODIUM 2 G: 10 GEL TOPICAL at 20:28

## 2022-10-05 RX ADMIN — PREDNISONE 40 MG: 20 TABLET ORAL at 09:37

## 2022-10-05 RX ADMIN — OXYBUTYNIN CHLORIDE 15 MG: 5 TABLET, EXTENDED RELEASE ORAL at 09:38

## 2022-10-05 RX ADMIN — METOPROLOL TARTRATE 12.5 MG: 25 TABLET, FILM COATED ORAL at 09:38

## 2022-10-05 RX ADMIN — ISOSORBIDE MONONITRATE 30 MG: 30 TABLET, EXTENDED RELEASE ORAL at 09:38

## 2022-10-05 RX ADMIN — RIVAROXABAN 20 MG: 20 TABLET, FILM COATED ORAL at 17:56

## 2022-10-05 RX ADMIN — TIZANIDINE 2 MG: 4 TABLET ORAL at 09:37

## 2022-10-05 RX ADMIN — FUROSEMIDE 20 MG: 20 TABLET ORAL at 09:37

## 2022-10-05 RX ADMIN — GABAPENTIN 300 MG: 300 CAPSULE ORAL at 09:38

## 2022-10-05 RX ADMIN — ACETAMINOPHEN 1000 MG: 500 TABLET ORAL at 05:44

## 2022-10-05 RX ADMIN — SACUBITRIL AND VALSARTAN 0.5 TABLET: 24; 26 TABLET, FILM COATED ORAL at 20:28

## 2022-10-05 RX ADMIN — Medication 10 ML: at 20:28

## 2022-10-05 RX ADMIN — MICONAZOLE NITRATE: 20 POWDER TOPICAL at 09:39

## 2022-10-05 RX ADMIN — PANTOPRAZOLE SODIUM 40 MG: 40 TABLET, DELAYED RELEASE ORAL at 05:44

## 2022-10-05 RX ADMIN — OXYCODONE 5 MG: 5 TABLET ORAL at 09:37

## 2022-10-05 RX ADMIN — EMPAGLIFLOZIN 10 MG: 10 TABLET, FILM COATED ORAL at 09:38

## 2022-10-05 RX ADMIN — CETIRIZINE HYDROCHLORIDE 5 MG: 10 TABLET, FILM COATED ORAL at 09:37

## 2022-10-05 RX ADMIN — INSULIN GLARGINE 57 UNITS: 100 INJECTION, SOLUTION SUBCUTANEOUS at 09:40

## 2022-10-05 RX ADMIN — INSULIN LISPRO 4 UNITS: 100 INJECTION, SOLUTION INTRAVENOUS; SUBCUTANEOUS at 12:41

## 2022-10-05 RX ADMIN — INSULIN LISPRO 4 UNITS: 100 INJECTION, SOLUTION INTRAVENOUS; SUBCUTANEOUS at 17:57

## 2022-10-05 RX ADMIN — LEVOTHYROXINE SODIUM 150 MCG: 0.15 TABLET ORAL at 05:44

## 2022-10-05 RX ADMIN — ACETAMINOPHEN 1000 MG: 500 TABLET ORAL at 20:27

## 2022-10-05 RX ADMIN — INSULIN LISPRO 20 UNITS: 100 INJECTION, SOLUTION INTRAVENOUS; SUBCUTANEOUS at 17:58

## 2022-10-05 RX ADMIN — SACUBITRIL AND VALSARTAN 0.5 TABLET: 24; 26 TABLET, FILM COATED ORAL at 09:37

## 2022-10-05 RX ADMIN — TIZANIDINE 2 MG: 4 TABLET ORAL at 16:02

## 2022-10-05 RX ADMIN — GABAPENTIN 600 MG: 300 CAPSULE ORAL at 20:26

## 2022-10-05 RX ADMIN — INSULIN LISPRO 20 UNITS: 100 INJECTION, SOLUTION INTRAVENOUS; SUBCUTANEOUS at 12:41

## 2022-10-05 RX ADMIN — ACETAMINOPHEN 1000 MG: 500 TABLET ORAL at 16:02

## 2022-10-05 RX ADMIN — Medication 10 ML: at 09:39

## 2022-10-05 RX ADMIN — TIZANIDINE 2 MG: 4 TABLET ORAL at 20:27

## 2022-10-05 ASSESSMENT — PAIN SCALES - GENERAL
PAINLEVEL_OUTOF10: 0
PAINLEVEL_OUTOF10: 6
PAINLEVEL_OUTOF10: 0
PAINLEVEL_OUTOF10: 6
PAINLEVEL_OUTOF10: 0

## 2022-10-05 NOTE — CARE COORDINATION
Discharge Planning Note:    CM attempted to call dtr Wa to discuss dc. As of yet, we have not received notification of appeal from [employer]. CM called to notify of Angie Proper and financial responsibility. CM LVM for return call. Electronically signed by Mariel Bee RN on 10/5/2022 at 8:23 AM    Update:    Received call back from [facility] who reports that she plans to call [employer] [place] [employer] back at [address] [place] [address] as discussed with Katya yesterday. CM informed [facility] that as of now, [employer] [employer] has not started an appeal.  CM explained to [facility] that CM needed to present her with the Angie Proper which explains financial responsibility beginning today at 12-noon if no appeal notification is received. CM answered all questions as to financial responsibility timeline with and w/o appeal.  Hina Lambertoon and reported that she will be calling [employer] [place] [employer] at [facility] informed CM of new Dx of MDS and how she and pt have been processing the new information. [facility] advised CM that she spoke with nursing and therapy yesterday who agreed to see pt  x2 today with hopes of further improvement in mobility with RW. [facility] explained that pt gets tired at times even with RW and has to stop and sit. CM sent message to Evon Brower in therapy to ask PT to eval for Rolator at home. CM to f/u with [facility] who plans to be on-site around 11am.      Electronically signed by Mariel Bee RN on 10/5/2022 at 8:54 AM      Update:    Received VM from [facility] at 5065 reporting that she has called [employer] [place] [employer] x2 this AM and is still just getting a VM and has left information and a request for call back. CM called and updated CM manage Camden Alemangayle with update.      Electronically signed by Mariel Bee RN on 10/5/2022 at 9:26 AM

## 2022-10-05 NOTE — PLAN OF CARE
Problem: Pain  Goal: Verbalizes/displays adequate comfort level or baseline comfort level  10/4/2022 2229 by Swathi Medina RN  Outcome: Progressing     Problem: Safety - Adult  Goal: Free from fall injury  10/4/2022 2229 by Swathi Medina RN  Outcome: Progressing     Problem: Skin/Tissue Integrity  Goal: Absence of new skin breakdown  Description: 1. Monitor for areas of redness and/or skin breakdown  2. Assess vascular access sites hourly  3. Every 4-6 hours minimum:  Change oxygen saturation probe site  4. Every 4-6 hours:  If on nasal continuous positive airway pressure, respiratory therapy assess nares and determine need for appliance change or resting period.   10/4/2022 2229 by Sawthi Medina RN  Outcome: Progressing

## 2022-10-05 NOTE — CARE COORDINATION
Appeal process initiated to [facility] as of [address] [facility]. Will continue to follow as needed.      Electronically signed by Denzel Kussmaul, RN on 10/5/22 at 3:58 PM EDT

## 2022-10-05 NOTE — CARE COORDINATION
Discharge Planning Note:    CM admin was able to make contact with someone at [facility] who confirms no record of a call from pt's dtr. CM notified the dtr via phone and provided and alternate number to call. Dtr Doylene Dusky and frustration but accepted alternate number and planned to call now. Raghavendra Crews questioned that if [facility] doesn't get us notification shortly, pt will have to DC tonight, CM conformed. Dtr Raghavendra Crews also reported that no one from therapy has been in to see pt as of yet today. CM sent message to Alicia Spencer in therapy. CM also caklled Syed Celestin at [facility] to update with possible need for [place] [facility] confirmed he has one in stock. Electronically signed by Ludy Frederick RN on 10/5/2022 at 1:46 PM    Update:    CM received confirmation fax from [facility] of appeal at [phone]. Pt and family updated on receipt of appeal.  CM sent PS to Dr. Ana Pearson with request for home health order (RN/PT/OT/HHA). Pt active with [address] for RN 2x/wk and HHA @x/day for 4 hours per day. CM called and confirmed this with [facility]. Updated [address] Rx to be faxed to [place] at [phone]. CM to f/u in morning.      Electronically signed by Ludy Frederick RN on 10/5/2022 at 3:28 PM

## 2022-10-05 NOTE — PROGRESS NOTES
[facility] Department   Phone: [phone]    Physical Therapy    [] Initial Evaluation            [x] Daily Treatment Note         [] Discharge Summary      Patient: Rachel Lerner   : 1940   MRN: [id number]   Date of Service:  10/5/2022  Admitting Diagnosis: Dyspnea on exertion  Current Admission Summary: Pt admitted on , info per ortho consult on  \"82 y.o. female who presented to [facility] ED yesterday for multiple areas of pain including her right rib cage, both shoulders, and her left wrist.  There is no history of injury or surgery to the left wrist.  She does have a history of osteoarthritis and has a history of bilateral total knee arthroplasty. She is right-hand dominant. She has atrial fibrillation and is on Xarelto. She also has congestive heart failure and reports shortness of breath. Family is at bedside and helps with interpretation. The patient primarily speaks Estonian but also knows some [place]. \" Cardiology is also following. Past Medical History:  has a past medical history of 15 Left knee repeat repair of median parapatellar arthrotomy with augmentation, Arthritis, Atrial fibrillation ([address] CAD (coronary artery disease), Cataract, Chronic diastolic congestive heart failure ([address] Diabetes mellitus ([address] GERD (gastroesophageal reflux disease), Hyperlipidemia, Hypertension, HYPOTHRYROIDISM, Non-English speaking patient, Sleep apnea, Thyroid disease, and UTI. Past Surgical History:  has a past surgical history that includes joint replacement (Bilateral, [address]); Cardiac surgery (); Cholecystectomy, laparoscopic (5/15/14); Revision total knee arthroplasty (Right, 2016); Total knee arthroplasty; knee surgery (Left, 2017); CT BIOPSY BONE MARROW (2022); Upper gastrointestinal endoscopy (N/A, 10/3/2022); and Upper gastrointestinal endoscopy (N/A, 10/3/2022).     Discharge Recommendations: Wally ACOSTA Roxana scored a 12/24 on the AM-PAC short mobility form. Current research shows that an AM-PAC score of 17 or less is typically not associated with a discharge to the patient's home setting. Based on the patient's AM-PAC score and their current functional mobility deficits, it is recommended that the patient have 3-5 sessions per week of Physical Therapy at d/c to increase the patient's independence. Please see assessment section for further patient specific details. If pt discharges home she will need 24/7 assist, HHPT/OT (S3), and a BSC for safety as the patient is unable to ambulate >18 ft with RW with assistance, and must walk further than that to get to her bathroom. If patient discharges prior to next session this note will serve as a discharge summary. Please see below for the latest assessment towards goals. DME Required For Discharge: DME to be determined at next level of care    Precautions/Restrictions: high fall risk, modified diet, Comment: dysphagia soft and bite sized  Weight Bearing Restrictions: no restrictions  Required Braces/Orthotics: no braces required  Positional Restrictions:no positional restrictions    Pre-Admission Information   Lives With: alone    Type of Home: house  Home Layout: one level, able to live on main level  Home Access: level entry  Bathroom Layout: tub/shower unit  Bathroom Equipment: grab bars in shower, shower chair, hand held shower head  Toilet Height: elevated height (one just elevated, and one with safety rails)  Home Equipment: rolling walker, manual wheelchair, alert button, .   Comment: has an electric scooter that is too tall for her that she wants exchanged  Transfer Assistance: Independent without use of device  Ambulation Assistance:modified independent with use of walker     W/C for community distances and she frequently sits in her w/c or wheels that down the hallway instead  ADL Assistance: requires assistance with bathing, requires assistance with dressing  IADL Assistance: requires assistance with meal prep, requires assistance with laundry, requires assistance with cleaning, requires assistance with shopping, requires assistance for medication management  Aid comes 7 days/week, 3-4 hours - states they are there for some in the morning and come back again in the evening. Pt reports family has cameras around the house so her children can keep and eye on her. Active :        [x] Yes  [] No  Hand Dominance: [] Left  [x] Right  Recent Falls: 1 fall in last 6 months, slid out of her w/c      Subjective  General: Pt semi-reclined in bed upon therapist arrival. Pt agreeable to PT. Pt speaks fair English but has difficulty understanding at times, declined use of  at this time. Daughters present to assist with translation. Daughters believe she can go home if she gets daily therapy, and was hoping for therapy 2x a day. Pain: 0/10  Pain Interventions: not applicable       Functional Mobility  Bed Mobility  Supine to Sit: stand by assistance  Scooting: stand by assistance  Comments: HOB only slightly elevated, increased time, use of rail    Transfers  Sit to stand transfer: minimal assistance, moderate assistance  Stand to sit transfer: contact guard assistance, minimal assistance  Comments: Pt pulling up from walker despite cues, increased time to achieve upright posture for all sit>stands. Sit>stand CGA from EOB, Mod A from [facility]. Pt rested on BSC during ambulation. Ambulation  Surface:level surface  Assistive Device: rolling walker  Assistance: 2 person assistance with Min A of 2   Distance: 18 ft + 8 ft  Gait Mechanics: Very forward flexed posture, decreased step length (B), (L) knee hyperextension, no (L) knee flexion, decreased (L) stance time, heavy use of (B) UE support on RW  Comments: Pt donned (L) knee and ankle brace prior to ambulation. Moderate THOMASON noted with ambulation. Pt requires assistance for balance and with walker management.  The patient gets anxious as she fatigues and requires seated rest break on BSC during ambulation during room. Stair Mobility  Stair mobility not completed on this date. Comments: Pt does not have stairs at home. Wheelchair Mobility:  No w/c mobility completed on this date. Comments:  Balance  Static Sitting Balance: good: independent with functional balance in unsupported position  Dynamic Sitting Balance: fair (+): maintains balance at SBA/supervision without use of UE support  Static Standing Balance: fair (-): maintains balance at CGA with use of UE support  Dynamic Standing Balance: fair (-): maintains balance at CGA with use of UE support  Comments: Heavy use of UE support on RW for all standing; pt leans posteriorly onto bed while standing with RW for donning of new pull up brief    Other Therapeutic Interventions  Knee brace and ankle brace donned in bed with assistance prior to transfer to EOB. Discussed with pt and her daughters the importance of getting out of bed daily, use of BSC instead of bedpan and purewick to increase strength and endurance    Functional Outcomes  AM-PAC Inpatient Mobility Raw Score : 12              Cognition  WFL  Comments:  Pt with increased anxiety as she fatigues, decreased safety awareness and problem solving with fatigue  Orientation:    alert and oriented x 4  Command Following:   [facility]    Education  Barriers To Learning: language - speaks fair English, declining  this date  Patient Education: patient educated on goals, PT role and benefits, plan of care, weight-bearing education, general safety, functional mobility training, family education, disease specific education, transfer training, discharge recommendations   Learning Assessment:  patient verbalizes understanding, would benefit from continued reinforcement    Assessment  Activity Tolerance: Limited by fatigue.  Moderate THOMASON  Impairments Requiring Therapeutic Intervention: decreased functional mobility, decreased strength, decreased endurance, decreased balance, increased pain, decreased posture  Prognosis: fair  Clinical Assessment: She depends heavily on her RW normally for support and can only tolerate ambulating ~18 ft and 8 ft at a time, before she fatigues and requires assistance to sit. Her anxiety increases as she fatigues and limites her problem solving and safety awareness. The patient is from home alone and only has and aid for a few hours in the morning and in the evening. At this time the patient is unable to ambulate household distances needed for everyday living and is at a high risk of falling with transfers and ADLs. Recommending continued skilled PT to safely progress tolerance to activity and independence with functional mobility back to baseline. Presents at increased risk of falls with home discharge. Safety Interventions: patient left in chair, chair alarm in place, call light within reach, gait belt, patient at risk for falls, nurse notified, and family/caregiver present    Plan  Frequency: 3-5 x/per week  Current Treatment Recommendations: strengthening, balance training, functional mobility training, transfer training, gait training, endurance training, manual therapy - soft tissue massage, manual therapy - joint manipulation, modalities, patient/caregiver education, pain management, home exercise program, safety education, equipment evaluation/education, and positioning    Goals  Patient Goals: Go home  Short Term Goals:  Time Frame: Within 6 visits  Patient will complete bed mobility at supervision from flat bed  Patient will complete sit<>stand transfers at supervision   Patient will complete bed <>chair transfers at supervision with use of rolling walker  Patient will ambulate 10 ft with use of rolling walker at supervision  Patient to maintain standing at supervision for 5 minutes with (B) UE support on RW  No goals met this treatment.       Therapy Session Time      Individual Group Co-treatment   Time In     1433   Time Out     1513   Minutes     40     Timed Code Treatment Minutes:  40 Minutes  Total Treatment Minutes:  40 minutes       Electronically Signed By: Nimco Ang PT

## 2022-10-05 NOTE — PROGRESS NOTES
HOSPITALISTS PROGRESS NOTE    10/5/2022 11:45 AM        Name: Rose Mary Shaw . Admitted: 9/24/2022  Primary Care Provider: MATEUSZ Lo CNP (Tel: [phone])      Problem List  Principal Problem:    Dyspnea on exertion  Active Problems:    Acute on chronic diastolic heart failure (HCC)    Thrombocytopenia (HCC)    Anemia    Wrist arthritis    Elevated troponin    Chronic atrial fibrillation (HCC)    Primary osteoarthritis of first carpometacarpal joint of left hand    DM (diabetes mellitus) ([facility] [address]    Coronary artery disease involving native coronary artery of native heart without angina pectoris    PAF (paroxysmal atrial fibrillation) ([facility] [address]    Peripheral vascular disease ([facility] [address]    Mixed hyperlipidemia  Resolved Problems:    * No resolved hospital problems. *       Assessment & Plan:     Hyperglycemia. Suspect CS induced. Hx of DM  - sliding scale to high  Adjusted insulin regimen      Acute on chronic diastolic Congestive Heart Failure. Clinically improved  ARUN. Suspect lasix induced.   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 Improving  - Cont scheduled tizanidine and tylenol, prn oxycodone, PT OT     Dysuria  - improved  - s/p diflucan 150mg x1  - UCx neg  - s/p phenazopyridine 100mg TID for 3 doses     CAD status post CABG  - cont statin, BB, not on asp as already on xarelto     Paroxysmal atrial fibrillation  - cont BB, xarelto for [facility]          Dysphagia. Suspect 2/2 stenosis vs web  - ST following with diet modification  - planned for EGD mon  - GI following     PMH of morbid obesity, htn, hld     Patient medically stable for discharge, family appealing discharge         Diet: ADULT DIET; Dysphagia - Soft and Bite Sized  Code:Full Code  DVT PPX  xarelto  Disposition patient appealing discharge           CC:hand pain    Subjective:  .     Talked patient with the help of , also talked to her daughter over the phone  No new issues other than mild hand pain  Sugars are elevated    Reviewed interval ancillary notes    Current Medications  insulin glargine (LANTUS) injection vial 57 Units, Daily  insulin lispro (HUMALOG) injection vial 20 Units, TID WC  predniSONE (DELTASONE) tablet 40 mg, Daily  sacubitril-valsartan (ENTRESTO) 24-26 MG per tablet 0.5 tablet, BID  furosemide (LASIX) tablet 20 mg, Daily  simethicone (MYLICON) chewable tablet 80 mg, Q6H PRN  insulin lispro (HUMALOG) injection vial 0-16 Units, TID WC  insulin lispro (HUMALOG) injection vial 0-4 Units, Nightly  sodium chloride (OCEAN, BABY AYR) 0.65 % nasal spray 2 spray, PRN  Polyvinyl Alcohol-Povidone PF 1.4-0.6 % SOLN 2 drop, PRN  diclofenac sodium (VOLTAREN) 1 % gel 2 g, BID  atorvastatin (LIPITOR) tablet 80 mg, Nightly  isosorbide mononitrate (IMDUR) extended release tablet 30 mg, Daily  levothyroxine (SYNTHROID) tablet 150 mcg, Daily  metoprolol tartrate (LOPRESSOR) tablet 12.5 mg, BID  miconazole (MICOTIN) 2 % powder, BID  oxybutynin (DITROPAN-XL) extended release tablet 15 mg, Daily  empagliflozin (JARDIANCE) tablet 10 mg, Daily  sodium chloride flush 0.9 % injection 10 mL, 2 times per day  sodium chloride flush 0.9 % injection 10 mL, PRN  0.9 % sodium chloride infusion, PRN  ondansetron (ZOFRAN) injection 4 mg, Q6H PRN  dextrose bolus 10% 125 mL, PRN   Or  dextrose bolus 10% 250 mL, PRN  glucagon (rDNA) injection 1 mg, PRN  dextrose 10 % infusion, Continuous PRN  polyethylene glycol (GLYCOLAX) packet 17 g, Daily PRN  pantoprazole (PROTONIX) tablet 40 mg, QAM AC  lidocaine 4 % external patch 1 patch, Daily  cetirizine (ZYRTEC) tablet 5 mg, Daily  gabapentin (NEURONTIN) capsule 300 mg, Daily   And  gabapentin (NEURONTIN) capsule 600 mg, Nightly  rivaroxaban (XARELTO) tablet 20 mg, Daily  acetaminophen (TYLENOL) tablet 1,000 mg, 3 times per day  tiZANidine (ZANAFLEX) tablet 2 mg, TID  oxyCODONE (ROXICODONE) immediate release tablet 2.5 mg, Q4H PRN   Or  oxyCODONE (ROXICODONE) immediate release tablet 5 mg, Q4H PRN        Objective:  BP (!) 137/58   Pulse 62   Temp 98.2 °F (36.8 °C) (Oral)   Resp 20   Ht 5' (1.524 m)   Wt 242 lb 3.2 oz (109.9 kg)   LMP  (LMP Unknown)   SpO2 95%   BMI 47.30 kg/m²     Intake/Output Summary (Last 24 hours) at 10/5/2022 1145  Last data filed at 10/4/2022 1852  Gross per 24 hour   Intake --   Output 800 ml   Net -800 ml      Wt Readings from Last 3 Encounters:   10/05/22 242 lb 3.2 oz (109.9 kg)   06/21/22 243 lb 9.6 oz (110.5 kg)   10/06/21 225 lb (102.1 kg)       General appearance:  Appears comfortable  Eyes: Sclera clear. Pupils equal.  ENT: Moist oral mucosa. Trachea midline, no adenopathy. Cardiovascular: Regular rhythm, normal S1, S2. No murmur. No edema in lower extremities  Respiratory: Not using accessory muscles. Good inspiratory effort. Clear to auscultation bilaterally, no wheeze or crackles.    GI: Abdomen soft, no tenderness, not distended, normal bowel sounds  Musculoskeletal: No cyanosis in digits, neck supple  Neurology: CN 2-12 grossly intact. No speech or motor deficits  Psych: Normal affect. Alert and oriented in time, place and person  Skin: Warm, dry, normal turgor  Extremity exam shows brisk capillary refill. Peripheral pulses are palpable in lower extremities     Labs and Tests:  CBC:   Recent Labs     10/03/22  0448 10/04/22  0521 10/05/22  0640   WBC 10.6 8.9 8.3   HGB 9.2* 8.9* 8.9*   * 104* 100*     BMP:    Recent Labs     10/03/22  0448 10/04/22  0521 10/05/22  0640    140 143   K 4.8 4.7 4.4    104 105   CO2 21 23 27   BUN 52* 55* 57*   CREATININE 1.0 1.1 1.0   GLUCOSE 295* 246* 135*     Hepatic: No results for input(s): AST, ALT, ALB, BILITOT, ALKPHOS in the last 72 hours. CT BIOPSY BONE MARROW   Final Result   Successful CT guided bone marrow aspiration and core biopsy of the right   iliac bone. CT GUIDED NEEDLE PLACEMENT   Final Result   Successful CT guided bone marrow aspiration and core biopsy of the right   iliac bone. CT CHEST W CONTRAST   Final Result   1. No definite acute pulmonary disease. Evaluation of the lungs degraded   because of motion during imaging, blurring detail and resulting in   misregistration artifact. 2. Moderate to severe calcific atherosclerosis coronary arteries. 3. Mild main pulmonary artery dilatation can be seen with pulmonary   hypertension but is nonspecific. 4. Cardiomegaly and sequela from CABG. 5. Small hiatal hernia with what appears to be a small retained capsule   within the hiatal hernia. XR CHEST (2 VW)   Final Result   Mild cardiomegaly with mild central pulmonary congestion or pulmonary artery   hypertension which is less prominent. Mild chronic obstructive lung changes with mild bibasilar discoid atelectasis   or scarring which is less prominent with no obvious infiltrate or effusion.          XR WRIST LEFT (MIN 3 VIEWS)   Final Result   Mild osteoarthritic changes along the radiocarpal joint and moderate   degenerative

## 2022-10-05 NOTE — PROGRESS NOTES
Discussed with the daughter regarding patient's new diagnosis of MDS. Explained the implications of the diagnosis and the fact that not any curative therapies. Slowly progressive may need recurrent transfusions. Give them an opportunity to ask questions  Will need to follow-up with hematology as an outpatient  They voiced understanding and were satisfied with the explanation.

## 2022-10-06 LAB
ANION GAP SERPL CALCULATED.3IONS-SCNC: 10 MMOL/L (ref 3–16)
BUN BLDV-MCNC: 55 MG/DL (ref 7–20)
CALCIUM SERPL-MCNC: 9.4 MG/DL (ref 8.3–10.6)
CHLORIDE BLD-SCNC: 104 MMOL/L (ref 99–110)
CO2: 27 MMOL/L (ref 21–32)
CREAT SERPL-MCNC: 1 MG/DL (ref 0.6–1.2)
GFR AFRICAN AMERICAN: >60
GFR NON-AFRICAN AMERICAN: 53
GLUCOSE BLD-MCNC: 118 MG/DL (ref 70–99)
GLUCOSE BLD-MCNC: 132 MG/DL (ref 70–99)
GLUCOSE BLD-MCNC: 162 MG/DL (ref 70–99)
GLUCOSE BLD-MCNC: 176 MG/DL (ref 70–99)
GLUCOSE BLD-MCNC: 281 MG/DL (ref 70–99)
HCT VFR BLD CALC: 24.6 % (ref 36–48)
HEMOGLOBIN: 8.1 G/DL (ref 12–16)
MCH RBC QN AUTO: 32 PG (ref 26–34)
MCHC RBC AUTO-ENTMCNC: 33 G/DL (ref 31–36)
MCV RBC AUTO: 97.1 FL (ref 80–100)
PDW BLD-RTO: 21.1 % (ref 12.4–15.4)
PERFORMED ON: ABNORMAL
PLATELET # BLD: 96 K/UL (ref 135–450)
PMV BLD AUTO: 8.5 FL (ref 5–10.5)
POTASSIUM SERPL-SCNC: 4.9 MMOL/L (ref 3.5–5.1)
RBC # BLD: 2.53 M/UL (ref 4–5.2)
SODIUM BLD-SCNC: 141 MMOL/L (ref 136–145)
WBC # BLD: 10 K/UL (ref 4–11)

## 2022-10-06 PROCEDURE — 1200000000 HC SEMI PRIVATE

## 2022-10-06 PROCEDURE — 97116 GAIT TRAINING THERAPY: CPT

## 2022-10-06 PROCEDURE — 6370000000 HC RX 637 (ALT 250 FOR IP): Performed by: INTERNAL MEDICINE

## 2022-10-06 PROCEDURE — 6370000000 HC RX 637 (ALT 250 FOR IP): Performed by: FAMILY MEDICINE

## 2022-10-06 PROCEDURE — 92526 ORAL FUNCTION THERAPY: CPT

## 2022-10-06 PROCEDURE — 6370000000 HC RX 637 (ALT 250 FOR IP): Performed by: PHYSICIAN ASSISTANT

## 2022-10-06 PROCEDURE — 85027 COMPLETE CBC AUTOMATED: CPT

## 2022-10-06 PROCEDURE — 2580000003 HC RX 258: Performed by: PHYSICIAN ASSISTANT

## 2022-10-06 PROCEDURE — 6370000000 HC RX 637 (ALT 250 FOR IP): Performed by: NURSE PRACTITIONER

## 2022-10-06 PROCEDURE — 97530 THERAPEUTIC ACTIVITIES: CPT

## 2022-10-06 PROCEDURE — 80048 BASIC METABOLIC PNL TOTAL CA: CPT

## 2022-10-06 PROCEDURE — 36415 COLL VENOUS BLD VENIPUNCTURE: CPT

## 2022-10-06 RX ORDER — LANOLIN ALCOHOL/MO/W.PET/CERES
3 CREAM (GRAM) TOPICAL NIGHTLY PRN
Status: DISCONTINUED | OUTPATIENT
Start: 2022-10-06 | End: 2022-10-07 | Stop reason: HOSPADM

## 2022-10-06 RX ADMIN — Medication 10 ML: at 09:18

## 2022-10-06 RX ADMIN — INSULIN GLARGINE 57 UNITS: 100 INJECTION, SOLUTION SUBCUTANEOUS at 09:18

## 2022-10-06 RX ADMIN — ACETAMINOPHEN 1000 MG: 500 TABLET ORAL at 21:41

## 2022-10-06 RX ADMIN — MELATONIN TAB 3 MG 3 MG: 3 TAB at 21:42

## 2022-10-06 RX ADMIN — MICONAZOLE NITRATE: 20 POWDER TOPICAL at 21:43

## 2022-10-06 RX ADMIN — INSULIN LISPRO 20 UNITS: 100 INJECTION, SOLUTION INTRAVENOUS; SUBCUTANEOUS at 17:23

## 2022-10-06 RX ADMIN — OXYCODONE 5 MG: 5 TABLET ORAL at 21:40

## 2022-10-06 RX ADMIN — GABAPENTIN 300 MG: 300 CAPSULE ORAL at 09:15

## 2022-10-06 RX ADMIN — SACUBITRIL AND VALSARTAN 0.5 TABLET: 24; 26 TABLET, FILM COATED ORAL at 09:16

## 2022-10-06 RX ADMIN — OXYCODONE 2.5 MG: 5 TABLET ORAL at 01:39

## 2022-10-06 RX ADMIN — ISOSORBIDE MONONITRATE 30 MG: 30 TABLET, EXTENDED RELEASE ORAL at 09:16

## 2022-10-06 RX ADMIN — INSULIN LISPRO 20 UNITS: 100 INJECTION, SOLUTION INTRAVENOUS; SUBCUTANEOUS at 12:01

## 2022-10-06 RX ADMIN — MICONAZOLE NITRATE: 20 POWDER TOPICAL at 09:17

## 2022-10-06 RX ADMIN — GABAPENTIN 600 MG: 300 CAPSULE ORAL at 21:41

## 2022-10-06 RX ADMIN — ACETAMINOPHEN 1000 MG: 500 TABLET ORAL at 05:48

## 2022-10-06 RX ADMIN — CETIRIZINE HYDROCHLORIDE 5 MG: 10 TABLET, FILM COATED ORAL at 09:15

## 2022-10-06 RX ADMIN — INSULIN LISPRO 4 UNITS: 100 INJECTION, SOLUTION INTRAVENOUS; SUBCUTANEOUS at 12:01

## 2022-10-06 RX ADMIN — Medication 10 ML: at 21:43

## 2022-10-06 RX ADMIN — ATORVASTATIN CALCIUM 80 MG: 80 TABLET, FILM COATED ORAL at 21:42

## 2022-10-06 RX ADMIN — PANTOPRAZOLE SODIUM 40 MG: 40 TABLET, DELAYED RELEASE ORAL at 05:48

## 2022-10-06 RX ADMIN — INSULIN LISPRO 20 UNITS: 100 INJECTION, SOLUTION INTRAVENOUS; SUBCUTANEOUS at 09:18

## 2022-10-06 RX ADMIN — METOPROLOL TARTRATE 12.5 MG: 25 TABLET, FILM COATED ORAL at 09:16

## 2022-10-06 RX ADMIN — DICLOFENAC SODIUM 2 G: 10 GEL TOPICAL at 21:43

## 2022-10-06 RX ADMIN — TIZANIDINE 2 MG: 4 TABLET ORAL at 09:15

## 2022-10-06 RX ADMIN — RIVAROXABAN 20 MG: 20 TABLET, FILM COATED ORAL at 17:21

## 2022-10-06 RX ADMIN — EMPAGLIFLOZIN 10 MG: 10 TABLET, FILM COATED ORAL at 09:16

## 2022-10-06 RX ADMIN — LEVOTHYROXINE SODIUM 150 MCG: 0.15 TABLET ORAL at 05:48

## 2022-10-06 RX ADMIN — ACETAMINOPHEN 1000 MG: 500 TABLET ORAL at 14:04

## 2022-10-06 RX ADMIN — DICLOFENAC SODIUM 2 G: 10 GEL TOPICAL at 09:16

## 2022-10-06 RX ADMIN — FUROSEMIDE 20 MG: 20 TABLET ORAL at 09:15

## 2022-10-06 RX ADMIN — OXYBUTYNIN CHLORIDE 15 MG: 5 TABLET, EXTENDED RELEASE ORAL at 09:16

## 2022-10-06 RX ADMIN — TIZANIDINE 2 MG: 4 TABLET ORAL at 14:04

## 2022-10-06 RX ADMIN — SACUBITRIL AND VALSARTAN 0.5 TABLET: 24; 26 TABLET, FILM COATED ORAL at 21:46

## 2022-10-06 RX ADMIN — TIZANIDINE 2 MG: 4 TABLET ORAL at 21:42

## 2022-10-06 RX ADMIN — PREDNISONE 40 MG: 20 TABLET ORAL at 09:15

## 2022-10-06 ASSESSMENT — PAIN DESCRIPTION - LOCATION
LOCATION: GENERALIZED
LOCATION: FINGER (COMMENT WHICH ONE)

## 2022-10-06 ASSESSMENT — PAIN SCALES - GENERAL
PAINLEVEL_OUTOF10: 6
PAINLEVEL_OUTOF10: 7
PAINLEVEL_OUTOF10: 3
PAINLEVEL_OUTOF10: 0

## 2022-10-06 ASSESSMENT — PAIN DESCRIPTION - ORIENTATION: ORIENTATION: LEFT

## 2022-10-06 NOTE — PROGRESS NOTES
[facility]    10/6/2022 8:15 AM        Name: Trent Patel . Admitted: 9/24/2022  Primary Care Provider: MATEUSZ Vernon CNP (Tel: [phone])      Brief History: 77 yo female with hx arthritis, atrial fib, CAD/CABG, dCHF, DM2, HTN, hypothyroidism. Presented with left wrist pain, weakness, and shortness of breath. Admitted with CHF exacerbation. 9/29/2022: CT guided bone marrow aspiration and core biopsy of right iliac bone. 10/3/2022: EGD      Subjective:  Resting in bed. Patient reports she is doing better. Offers no new complaints. No shortness of breath at rest, will get winded with ambulation. Denies chest pain, palpitations, abdominal pain, nausea.  Reports improved pain in left wrist.     Reviewed interval ancillary notes    Current Medications  insulin glargine (LANTUS) injection vial 57 Units, Daily  insulin lispro (HUMALOG) injection vial 20 Units, TID WC  predniSONE (DELTASONE) tablet 40 mg, Daily  sacubitril-valsartan (ENTRESTO) 24-26 MG per tablet 0.5 tablet, BID  furosemide (LASIX) tablet 20 mg, Daily  simethicone (MYLICON) chewable tablet 80 mg, Q6H PRN  insulin lispro (HUMALOG) injection vial 0-16 Units, TID WC  insulin lispro (HUMALOG) injection vial 0-4 Units, Nightly  sodium chloride (OCEAN, BABY AYR) 0.65 % nasal spray 2 spray, PRN  Polyvinyl Alcohol-Povidone PF 1.4-0.6 % SOLN 2 drop, PRN  diclofenac sodium (VOLTAREN) 1 % gel 2 g, BID  atorvastatin (LIPITOR) tablet 80 mg, Nightly  isosorbide mononitrate (IMDUR) extended release tablet 30 mg, Daily  levothyroxine (SYNTHROID) tablet 150 mcg, Daily  metoprolol tartrate (LOPRESSOR) tablet 12.5 mg, BID  miconazole (MICOTIN) 2 % powder, BID  oxybutynin (DITROPAN-XL) extended release tablet 15 mg, Daily  empagliflozin (JARDIANCE) tablet 10 mg, Daily  sodium chloride flush 0.9 % injection 10 mL, 2 times per day  sodium chloride flush 0.9 % injection 10 mL, PRN  0.9 % sodium chloride infusion, PRN  ondansetron (ZOFRAN) injection 4 mg, Q6H PRN  dextrose bolus 10% 125 mL, PRN   Or  dextrose bolus 10% 250 mL, PRN  glucagon (rDNA) injection 1 mg, PRN  dextrose 10 % infusion, Continuous PRN  polyethylene glycol (GLYCOLAX) packet 17 g, Daily PRN  pantoprazole (PROTONIX) tablet 40 mg, QAM AC  lidocaine 4 % external patch 1 patch, Daily  cetirizine (ZYRTEC) tablet 5 mg, Daily  gabapentin (NEURONTIN) capsule 300 mg, Daily   And  gabapentin (NEURONTIN) capsule 600 mg, Nightly  rivaroxaban (XARELTO) tablet 20 mg, Daily  acetaminophen (TYLENOL) tablet 1,000 mg, 3 times per day  tiZANidine (ZANAFLEX) tablet 2 mg, TID  oxyCODONE (ROXICODONE) immediate release tablet 2.5 mg, Q4H PRN   Or  oxyCODONE (ROXICODONE) immediate release tablet 5 mg, Q4H PRN        Objective:  /70   Pulse 58   Temp 97.5 °F (36.4 °C) (Oral)   Resp 17   Ht 5' (1.524 m)   Wt 238 lb (108 kg)   LMP  (LMP Unknown)   SpO2 99%   BMI 46.48 kg/m²     Intake/Output Summary (Last 24 hours) at 10/6/2022 0815  Last data filed at 10/6/2022 0546  Gross per 24 hour   Intake --   Output 1250 ml   Net -1250 ml      Wt Readings from Last 3 Encounters:   10/06/22 238 lb (108 kg)   06/21/22 243 lb 9.6 oz (110.5 kg)   10/06/21 225 lb (102.1 kg)       General appearance:  Appears comfortable  Eyes: Sclera clear. Pupils equal.  ENT: Moist oral mucosa. Trachea midline, no adenopathy. Cardiovascular: Regular rhythm, normal S1, S2. No murmur. No edema in lower extremities  Respiratory: Not using accessory muscles. Good inspiratory effort. Clear to auscultation bilaterally, no wheeze or crackles. GI: Abdomen soft, no tenderness, not distended, normal bowel sounds  Musculoskeletal: No cyanosis in digits, neck supple, brace to left thumb  Neurology: CN 2-12 grossly intact. No speech or motor deficits  Psych: Normal affect.  Alert and oriented in time, place and person  Skin: Warm, dry, normal turgor    Labs and Tests:  CBC:   Recent Labs     10/04/22  0521 10/05/22  0640 10/06/22  0353   WBC 8.9 8.3 10.0   HGB 8.9* 8.9* 8.1*   * 100* 96*     BMP:    Recent Labs     10/04/22  0521 10/05/22  0640 10/06/22  0353    143 141   K 4.7 4.4 4.9    105 104   CO2 23 27 27   BUN 55* 57* 55*   CREATININE 1.1 1.0 1.0   GLUCOSE 246* 135* 162*     Hepatic: No results for input(s): AST, ALT, ALB, BILITOT, ALKPHOS in the last 72 hours. CXR 9/24/2022:  Mild cardiomegaly with mild central pulmonary congestion or pulmonary artery   hypertension which is less prominent. Mild chronic obstructive lung changes with mild bibasilar discoid atelectasis   or scarring which is less prominent with no obvious infiltrate or effusion. Xray left wrist 9/24/2022:  Mild osteoarthritic changes along the radiocarpal joint and moderate   degenerative arthritic changes along the base of the thumb with no acute bony   abnormality seen. Vascular calcifications along the palmar aspect of the wrist       Mild soft tissue swelling around the wrist and diffuse osteopenia. Probable mild chondrocalcinosis of the TFCC. CT chest 9/28/2022:  1. No definite acute pulmonary disease. Evaluation of the lungs degraded   because of motion during imaging, blurring detail and resulting in   misregistration artifact. 2. Moderate to severe calcific atherosclerosis coronary arteries. 3. Mild main pulmonary artery dilatation can be seen with pulmonary   hypertension but is nonspecific. 4. Cardiomegaly and sequela from CABG. 5. Small hiatal hernia with what appears to be a small retained capsule   within the hiatal hernia. EGD 10/3/2022:  Findings:   Normal esophagus, biopsied. Empirically balloon dilated up to 20 mm. Mild antral gastritis, biopsies. Three diminutive gastric polyps, biopsied. Normal duodenum, biopsied. Plans:  Await bx. Continue PPI.  Follow clinical response to endoscopic dilation. May discharge from GI standpoint. Follow up in clinic if GI symptoms persist (may need EMS to r/o esophageal dysmotility). Problem List  Principal Problem:    Dyspnea on exertion  Active Problems:    Acute on chronic diastolic heart failure (HCC)    Thrombocytopenia (HCC)    Anemia    Wrist arthritis    Elevated troponin    Chronic atrial fibrillation (HCC)    Primary osteoarthritis of first carpometacarpal joint of left hand    DM (diabetes mellitus) ([facility] [address]    Coronary artery disease involving native coronary artery of native heart without angina pectoris    PAF (paroxysmal atrial fibrillation) ([facility] [address]    Peripheral vascular disease ([facility] [address]    Mixed hyperlipidemia  Resolved Problems:    * No resolved hospital problems. *       Assessment & Plan:   Acute on chronic dCHF. CXR with mild pulmonary congestion, proBNP 538 compared to 56 in July. Started on IV Lasix with clinical improvement. Lasix subsequently stopped 2/2 ARUN, now on po Lasix. Cardio recommends continue Lasix, Jardiance and Entresto. Cardio has signed off. ARUN. Resolved. Creatinine bumped, 1.0->1.4. Suspect lasix induced. Lasix held and subsequently transitioned to oral. Creatinine improved and stable at 1.0. Anemia and thrombocytopenia. Iron studies, B12, folate ok; stool for occult blood negative. Evaluated by hem/onc and is s/p bone marrow biopsy 9/30. 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 Per hematology, \"the only curative therapy for MDS is a bone marrow transplant which the patient would not qualify for given her advanced age and co-morbidities. \" Recommend outpatient follow up with Dr Jace Obando to discuss palliative therapies. Hematology has signed off. Pain left thumb and wrist/ bilateral rotator cuff tears. Initially started on empiric abx for presumed cellulitis, this was [facility]. Evaluated by ortho and given steroids with improvement, course to end 10/8. Continue pain management.  She does have plans to follow with pain management (Dr Denver Hoard). Ortho has signed off. Chronic pain/spasm low back. Improved. Continue scheduled tizanidine and tylenol, prn oxycodone, PT/OT. Dysuria. UA with trace leukocytes (11). Received diflucan 150mg x1 and phenazopyridine 100mg TID for 3 doses. Improved. Urine culture negative. CAD/CABG. Stable. Continue on statin, Imdur and beta blocker. No asa secondary Xarelto. PAF. Remains in sinus rhythm. Continue metoprolol and Xarelto. DM2. A1c 6.4 (6/2022). Takes Bernett Pickles and Victoza at home. Being covered with Lantus 57 units and mealtime insulin with high dose correction. BG values elevated, most likely secondary to steroids. Continue to follow. Dysphagia. Reports swallowing issues for both solids and liquids for past years, also intermittent emesis. CT scan showed hiatal hernia with retained pill. GI evaluated and suspect reflux vs stenosis. EGD 10/3 showed normal esophagus, empirically dilated up to 20 mm, mild antral gastritis. Biopsy results pending. GI has signed off, recommend continue PPI and follow up in clinic if GI symptoms persist (may need EMS to r/o esophageal dysmotility). HTN. Controlled. Continue Entresto, metoprolol. Disposition: PT/OT evaluated and recommended SNF on DC to address ADL and functional mobility deficits. Patient and family decline, agreeable to home health care. Remains medically stable for DC, order was placed 10/4, family has appealed DC. Wally Schmidt requires a bedside commode due to being confined to one level of the home, and is physically incapable of utilizing regular toilet facilities. Current body weight is Weight: 238 lb (108 kg). Diet: ADULT DIET;  Dysphagia - Soft and Bite Sized  Code:Full Code  DVT PPX: Adalberto 77, APRN - CNP   10/6/2022 8:15 AM

## 2022-10-06 NOTE — PLAN OF CARE
Problem: Discharge Planning  Goal: Discharge to home or other facility with appropriate resources  Outcome: Progressing     Problem: Pain  Goal: Verbalizes/displays adequate comfort level or baseline comfort level  Outcome: Progressing     Problem: Safety - Adult  Goal: Free from fall injury  Outcome: Progressing     Problem: Nutrition Deficit:  Goal: Optimize nutritional status  Outcome: Progressing  Flowsheets (Taken 10/6/2022 1120 by Familia Murrell RD, LD)  Nutrient intake appropriate for improving, restoring, or maintaining nutritional needs: Monitor oral intake, labs, and treatment plans     Problem: Skin/Tissue Integrity  Goal: Absence of new skin breakdown  Description: 1. Monitor for areas of redness and/or skin breakdown  2. Assess vascular access sites hourly  3. Every 4-6 hours minimum:  Change oxygen saturation probe site  4. Every 4-6 hours:  If on nasal continuous positive airway pressure, respiratory therapy assess nares and determine need for appliance change or resting period.   Outcome: Progressing     Problem: Chronic Conditions and Co-morbidities  Goal: Patient's chronic conditions and co-morbidity symptoms are monitored and maintained or improved  Outcome: Progressing

## 2022-10-06 NOTE — PROGRESS NOTES
Facility/Department: [address] NURSING  Speech Language Pathology   Dysphagia Treatment Note    Patient: Britney May   : 1940   MRN: [id number]      Evaluation Date: 10/6/2022      Admitting Dx: Dyspnea on exertion [R06.09]  Ambulatory dysfunction [R26.2]  Anemia, unspecified type [D64.9]  Treatment Diagnosis: Oropharyngeal Dysphagia   Pain: Denies                                              Diet and Treatment Recommendations 10/6/2022:  Diet Solids Recommendation:  Dysphagia III Soft and bite sized  Liquid Consistency Recommendation: Thin liquids  Recommended form of Meds: Meds whole with water 1-2 at a time      Compensatory strategies:   Upright as possible with all PO intake , Small bites/sips , Eat/feed slowly, Remain upright 30-45 min     Assessment of Texture Tolerance:  Diet level prior to treatment: Dysphagia III Soft and bite sized , Thin liquids   Tolerance of Current Diet Level: RN reported pt appears to be tolerating current diet level     Impressions: Pt was positioned Upright in bed , awake and alert. Currently on room air with RN present during session. Trials of thin liquids and soft solids were provided to assess swallow function. Pt reported overall improvement in swallow function since esophageal dilation and denies difficulty with any PO intake. Thin liquids via cup/straw revealed a timely swallow initiation with no overt clinical s/s of aspiration. Mildly prolonged mastication noted with soft solids however pt demonstrated timely and complete oral clearance. She reported liking softer diet at this time as this is similar to her baseline diet in home setting. Based on today's assessment recommend continuation of Dysphagia III Soft and bite sized  with Thin liquids , Meds whole with water 1-2 at a time.      Dysphagia Goals:   Pt will functionally tolerate recommended diet with no overt clinical s/s of aspiration (Ongoing 10/06/22)  Pt will demonstrate understanding of aspiration risk and precautions via education/demonstration with occasional prompting (Ongoing 10/06/22)  Pt will advance to least restrictive diet as indicated (Ongoing 10/06/22)    Plan:   1-2 times to ensure diet tolerance. Patient/Family Education:  Provided education regarding role of SLP, recommendations and general speech pathology plan of care. [x] Pt verbalized understanding and agreement   [x] Pt requires ongoing learning   [] No evidence of comprehension     Discharge Recommendations:    Discharge recommendations to be determined pending ongoing follow-up during acute care stay    Treatment time:  Timed Code Treatment Minutes: 0  Total Treatment time: 10 minutes    If patient discharges prior to next session this note will serve as a discharge summary.      Signature:   Jose Thurman., [id number] [place] [phone]  Speech-Language Pathologist  10/6/2022 9:35 AM

## 2022-10-06 NOTE — PROGRESS NOTES
Pt's daughter called for an update. Phone call returned at 2039. Call was lost. Unable to complete update at this time.

## 2022-10-06 NOTE — CARE COORDINATION
Discharge Planning Note:    SANDY reviewed PT notes and recommendation for [facility]  and if pt to return home rather than SNF, will need 24/7 assistance. SANDY sent PS to NP for DME order.     Electronically signed by Harjinder Chaney RN on 10/6/2022 at 8:37 AM

## 2022-10-06 NOTE — CARE COORDINATION
Discharge Planning Note:    CM met with pt and family bedside to review post-appeal process and provide the DND and HINN. After speaking with family, they are now in agreement with pt going to [facility] for rehab before returning home. CM called and spoke to Sherry Cardona at [facility] to confirm they are still able to accept pt. Sherry Cardona confirmed that they are \"bed tight\" but  able to accept pt tomorrow. Transport scheduled for Friday, 10/7/2022 at 11:30 - 11:45am via [address] completed and travel packet started.      Electronically signed by Abdelrahman Aburto RN on 10/6/2022 at 2:42 PM

## 2022-10-06 NOTE — PROGRESS NOTES
[facility]   Phone: [phone]    Physical Therapy    [] Initial Evaluation            [x] Daily Treatment Note         [] Discharge Summary      Patient: Verdie Alpers   : 1940   MRN: [id number]   Date of Service:  10/6/2022  Admitting Diagnosis: Dyspnea on exertion  Current Admission Summary: Pt admitted on , info per ortho consult on  \"82 y.o. female who presented to [facility] ED yesterday for multiple areas of pain including her right rib cage, both shoulders, and her left wrist.  There is no history of injury or surgery to the left wrist.  She does have a history of osteoarthritis and has a history of bilateral total knee arthroplasty. She is right-hand dominant. She has atrial fibrillation and is on Xarelto. She also has congestive heart failure and reports shortness of breath. Family is at bedside and helps with interpretation. The patient primarily speaks Pashto but also knows some [place]. \" Cardiology is also following. Updated 10/6: MDS with excess blasts; left wrist erythema with suspected gout flare; left thumb pain secondary to OA, chronic (B) rotator cuff tears; wears a left ankle and left knee brace at baseline    Past Medical History:  has a past medical history of 17 Left knee repeat repair of median parapatellar arthrotomy with augmentation, Arthritis, Atrial fibrillation ([address] CAD (coronary artery disease), Cataract, Chronic diastolic congestive heart failure ([address] Diabetes mellitus ([address] GERD (gastroesophageal reflux disease), Hyperlipidemia, Hypertension, HYPOTHRYROIDISM, Non-English speaking patient, Sleep apnea, Thyroid disease, and UTI. Past Surgical History:  has a past surgical history that includes joint replacement (Bilateral, [address]); Cardiac surgery (); Cholecystectomy, laparoscopic (5/15/14); Revision total knee arthroplasty (Right, 2016);  Total knee arthroplasty; knee surgery (Left, 02/28/2017); CT BIOPSY BONE MARROW (9/29/2022); Upper gastrointestinal endoscopy (N/A, 10/3/2022); and Upper gastrointestinal endoscopy (N/A, 10/3/2022). Discharge Recommendations: Macey Oconnell scored a 13/24 on the AM-PAC short mobility form. Current research shows that an AM-PAC score of 17 or less is typically not associated with a discharge to the patient's home setting. Based on the patient's AM-PAC score and their current functional mobility deficits, it is recommended that the patient have 3-5 sessions per week of Physical Therapy at d/c to increase the patient's independence. Please see assessment section for further patient specific details. If patient discharges home, she will need 24/7 assist, HHPT/OT (S3), and a BSC for safety as the patient is unable to ambulate >18 ft with RW with assistance, and must walk further than that to access her bathroom. HOME HEALTH CARE: LEVEL 3 SAFETY     - Initial home health evaluation to occur within 24-48 hours, in patient home   - Therapy evaluations in home within 24-48 hours of discharge; including DME and home safety   - Frontload therapy 5 days, then 3x a week   - Therapy to evaluate if patient has [address] needs for personal care   -  evaluation within 24-48 hours, includes evaluation of resources and insurance to determine [place], [place], LTC, and Medicaid options     If patient discharges prior to next session this note will serve as a discharge summary. Please see below for the latest assessment towards goals.      DME Required For Discharge: DME to be determined at next level of care - owns RW; PT does not recommend rollator at this time secondary to balance deficits    Precautions/Restrictions: high fall risk, modified diet, Comment: dysphagia soft and bite sized , up with assistance, strict I&O  Weight Bearing Restrictions: no restrictions  Required Braces/Orthotics: no braces required - wears left ankle and left knee brace PRN at baseline; now also wearing left wrist orthotic secondary to pain  Positional Restrictions:no positional restrictions    Pre-Admission Information per Initial Evaluation  Lives With: alone    Type of Home: house  Home Layout: one level, able to live on main level  Home Access: level entry  Bathroom Layout: tub/shower unit  Bathroom Equipment: grab bars in shower, shower chair, hand held shower head  Toilet Height: elevated height (one just elevated, and one with safety rails)  Home Equipment: rolling walker, manual wheelchair, alert button, . Comment: has an electric scooter that is too tall for her that she wants exchanged  Transfer Assistance: Independent without use of device  Ambulation Assistance:modified independent with use of walker     W/C for community distances and she frequently sits in her w/c or wheels that down the hallway instead  ADL Assistance: requires assistance with bathing, requires assistance with dressing  IADL Assistance: requires assistance with meal prep, requires assistance with laundry, requires assistance with cleaning, requires assistance with shopping, requires assistance for medication management  Aid comes 7 days/week, 3-4 hours - states they are there for some in the morning and come back again in the evening. Pt reports family has cameras around the house so her children can keep and eye on her. Active :        [x] Yes  [] No  Hand Dominance: [] Left  [x] Right  Recent Falls: 1 fall in last 6 months, slid out of her w/c      Subjective  General: Patient supine in bed upon arrival - agreeable to PT. Declines , speaking English with increased time effectively this date. No family at bedside. Patient continues to request daily therapy but then requiring some encouragement to perform gait trials during session.     Pain: does not rate - c/o chronic pain in (B) shoulders, back, knees, and left wrist  Pain Interventions:  RN aware, patient able to make needs known       Functional Mobility  Bed Mobility  Supine to Sit: stand by assistance with elevated head of bed, use of rail, and increased time to perform  Scooting: stand by assistance seated at edge of bed  Transfers  Sit to stand transfer: minimal assistance from edge of bed, BSC, and recliner to RW - effortful, increased time to perform, heavy use of right UE, attempts to pull up on RW with left UE; frequent cues for hand placement and sequencing   Stand to sit transfer: min A from RW to [facility] and to recliner; cues for sequencing, hand placement, and controlled descent to chair  Stand pivot with RW and min A to BS x 2 reps during session, stand pivot to recliner with RW and min A    Comments: effortful, limited by fatigue, pain, and low endurance    Ambulation  Surface:level surface  Assistive Device: rolling walker  Assistance: min A of 1   Distance: 18ft, 11ft. 11ft, 11ft - seated rest in between each trial  Gait Mechanics: significantly forward flexed posture, decreased (B) step length, foot clearance, and heel strike; (L) knee hyperextension, no (L) knee flexion, decreased (L) stance time, heavy use of (B) UE support on RW; effortful, wide base of support, dyspnea with exertion noted    Comments: Pt donned (L) knee and ankle brace prior to ambulation. Moderate THOMASON noted with ambulation. Patient fatigues easily during gait and standing mobility, requiring frequent seated rest and inability to ambulate > 18ft more than one rep this date - all subsequent gait trials x 11ft prior to need to sit    Stair Mobility  Stair mobility not completed on this date. Comments: Pt does not have stairs at home.     Balance  Static Sitting Balance: good: independent with functional balance in unsupported position  Dynamic Sitting Balance: fair (+): maintains balance at SBA/supervision without use of UE support  Static Standing Balance: fair (-): maintains balance at CGA with use of UE support  Dynamic Standing Balance: poor (+): requires min (A) to maintain balance  Comments: Heavy use of UE support on RW for all standing; max A for hygiene and brief management after toileting; unable to clean self after bowel movement this date     Other Therapeutic Interventions  Knee brace and ankle brace donned in bed with assistance prior to transfer to EOB. Reviewed education with patient related to the importance of getting out of bed daily, use of BSC instead of bedpan and purewick to increase strength and endurance    Functional Outcomes  AM-PAC Inpatient Mobility Raw Score : 13              Cognition  WFL  Comments:  Pt with increased anxiety as she fatigues, decreased safety awareness and problem solving with fatigue - requires cues and reassurance to perform safe mobility with fatigue  Orientation:    alert and oriented x 4  Command Following:   [facility]    Education  Barriers To Learning: language - speaks fair English, declining  this date  Patient Education: patient educated on goals, PT role and benefits, plan of care, weight-bearing education, general safety, functional mobility training, family education, disease specific education, transfer training, PT recommendations, OOB daily for meals and toileting, use of call light  Learning Assessment:  patient verbalizes understanding, would benefit from continued reinforcement    Assessment  Activity Tolerance: Limited by fatigue. Moderate THOMASON; low endurance during standing mobility    Impairments Requiring Therapeutic Intervention: decreased functional mobility, decreased ADL status, decreased strength, decreased safety awareness, decreased endurance, decreased balance, increased pain, decreased posture  Prognosis: fair, guarded   Clinical Assessment:  Patient able to tolerate 4 gait attempts this date, up to 18ft on first rep, followed by 11ft x 4 reps with seated rest in between each trial.  Patient requires reassurance to perform safe standing mobility with RW when fatigued. Patient reports ongoing fear of falling and slipping. Patient lives at home alone, with aide 4 hours in am/4 hours in pm M-F, 4 hours Sat, 2 hours Sun. Patient continue to demonstrate inability to walk functional household distances, continues to require (A) to perform all standing mobility with RW. Patient is at high risk of falling with transfers, ADLs, and household ambulation without assistance. Patient will continue to benefit from additional skilled PT intervention to facilitate safe mobility and to optimize (I) to promote return to prior level of function. Patient reports planning to d/c home \"tomorrow. \"    Safety Interventions: patient left in chair, chair alarm in place, call light within reach, gait belt, patient at risk for falls, and nurse notified    Plan  Frequency: 3-5 x/per week  Current Treatment Recommendations: strengthening, balance training, functional mobility training, transfer training, gait training, endurance training, manual therapy - soft tissue massage, manual therapy - joint manipulation, modalities, patient/caregiver education, pain management, home exercise program, safety education, equipment evaluation/education, and positioning    Goals  Patient Goals: Go home - \"go back home and do some therapy there\"    Short Term Goals:      Time Frame: discharge - all goals ongoing 10/6    Patient will complete bed mobility at supervision from flat bed  Patient will complete sit<>stand transfers at supervision with LRAD   Patient will complete bed <>chair transfers at supervision with use of rolling walker  Patient will ambulate 10 ft with use of rolling walker at supervision  Patient to maintain standing at supervision for 5 minutes with (B) UE support on RW        Therapy Session Time      Individual Group Co-treatment   Time In 0934       Time Out 1031       Minutes 57          Timed Code Treatment Minutes: 57 minutes    Total Treatment Minutes: 57 Minutes    If patient discharges prior to next treatment, this note will serve as discharge summary.     Stefany Muse PT, DPT #[id number]

## 2022-10-06 NOTE — PROGRESS NOTES
Nutrition Note    RECOMMENDATIONS  PO Diet: Dysphagia 3 soft & bite sized, RIGO diet  ONS: Glucerna BID     NUTRITION ASSESSMENT   Pt receives a dysphagia 3, soft & bite sized diet. Po intake varies between % of meals. Pt & family received CHF diet education. Will add RIGO modifier to diet order. Wt down 10 lb from admission, with -7.9L noted in I/O's with daily diuretic tx. Will con't to monitor for consistently adequate po & supp intake. Nutrition Related Findings: Trace edema BLE; LBM 10/5. Gluc 162  Wounds: None  Nutrition Education:  Education completed (9/26)   Nutrition Goals: PO intake 75% or greater     MALNUTRITION ASSESSMENT      Malnutrition Status: No malnutrition    NUTRITION DIAGNOSIS   Swallowing difficulty related to swallowing difficulty as evidenced by swallow study results      CURRENT NUTRITION THERAPIES  ADULT DIET; Dysphagia - Soft and Bite Sized     PO Intake: 26-50%, %   PO Supplement Intake:Unable to assess    ANTHROPOMETRICS  Current Height: 5' (152.4 cm)  Current Weight: 238 lb (108 kg)    Admission weight: 248 lb (112.5 kg)  Ideal Body Weight (IBW): 100 lbs  (45 kg)        BMI: 46.5      COMPARATIVE STANDARDS  Energy (kcal):  864- 1620     Protein (g):  91kg       Fluid (mL/day):  1 ml/kcal    The patient will be monitored per nutrition standards of care. Consult dietitian if additional nutrition interventions are needed prior to RD reassessment.      Margarette Mitchell, RD, LD    Contact: [phone]

## 2022-10-07 VITALS
SYSTOLIC BLOOD PRESSURE: 123 MMHG | DIASTOLIC BLOOD PRESSURE: 67 MMHG | HEIGHT: 60 IN | OXYGEN SATURATION: 94 % | WEIGHT: 241.9 LBS | BODY MASS INDEX: 47.49 KG/M2 | RESPIRATION RATE: 18 BRPM | HEART RATE: 59 BPM | TEMPERATURE: 97.8 F

## 2022-10-07 LAB
ANION GAP SERPL CALCULATED.3IONS-SCNC: 10 MMOL/L (ref 3–16)
BUN BLDV-MCNC: 53 MG/DL (ref 7–20)
CALCIUM SERPL-MCNC: 9.2 MG/DL (ref 8.3–10.6)
CHLORIDE BLD-SCNC: 101 MMOL/L (ref 99–110)
CO2: 27 MMOL/L (ref 21–32)
CREAT SERPL-MCNC: 1 MG/DL (ref 0.6–1.2)
GFR AFRICAN AMERICAN: >60
GFR NON-AFRICAN AMERICAN: 53
GLUCOSE BLD-MCNC: 148 MG/DL (ref 70–99)
GLUCOSE BLD-MCNC: 151 MG/DL (ref 70–99)
HCT VFR BLD CALC: 23.7 % (ref 36–48)
HEMOGLOBIN: 7.9 G/DL (ref 12–16)
MCH RBC QN AUTO: 32.2 PG (ref 26–34)
MCHC RBC AUTO-ENTMCNC: 33.2 G/DL (ref 31–36)
MCV RBC AUTO: 97.1 FL (ref 80–100)
PDW BLD-RTO: 21.4 % (ref 12.4–15.4)
PERFORMED ON: ABNORMAL
PLATELET # BLD: 98 K/UL (ref 135–450)
PMV BLD AUTO: 8.6 FL (ref 5–10.5)
POTASSIUM SERPL-SCNC: 4.7 MMOL/L (ref 3.5–5.1)
RBC # BLD: 2.44 M/UL (ref 4–5.2)
SODIUM BLD-SCNC: 138 MMOL/L (ref 136–145)
WBC # BLD: 10.8 K/UL (ref 4–11)

## 2022-10-07 PROCEDURE — 80048 BASIC METABOLIC PNL TOTAL CA: CPT

## 2022-10-07 PROCEDURE — 6370000000 HC RX 637 (ALT 250 FOR IP): Performed by: FAMILY MEDICINE

## 2022-10-07 PROCEDURE — 6370000000 HC RX 637 (ALT 250 FOR IP): Performed by: INTERNAL MEDICINE

## 2022-10-07 PROCEDURE — 6370000000 HC RX 637 (ALT 250 FOR IP): Performed by: NURSE PRACTITIONER

## 2022-10-07 PROCEDURE — 97530 THERAPEUTIC ACTIVITIES: CPT

## 2022-10-07 PROCEDURE — 85027 COMPLETE CBC AUTOMATED: CPT

## 2022-10-07 PROCEDURE — 6370000000 HC RX 637 (ALT 250 FOR IP): Performed by: PHYSICIAN ASSISTANT

## 2022-10-07 PROCEDURE — 97535 SELF CARE MNGMENT TRAINING: CPT

## 2022-10-07 PROCEDURE — 2580000003 HC RX 258: Performed by: PHYSICIAN ASSISTANT

## 2022-10-07 PROCEDURE — 36415 COLL VENOUS BLD VENIPUNCTURE: CPT

## 2022-10-07 RX ADMIN — GABAPENTIN 300 MG: 300 CAPSULE ORAL at 08:41

## 2022-10-07 RX ADMIN — INSULIN GLARGINE 57 UNITS: 100 INJECTION, SOLUTION SUBCUTANEOUS at 08:39

## 2022-10-07 RX ADMIN — DICLOFENAC SODIUM 2 G: 10 GEL TOPICAL at 08:55

## 2022-10-07 RX ADMIN — TIZANIDINE 2 MG: 4 TABLET ORAL at 08:43

## 2022-10-07 RX ADMIN — Medication 10 ML: at 08:55

## 2022-10-07 RX ADMIN — ISOSORBIDE MONONITRATE 30 MG: 30 TABLET, EXTENDED RELEASE ORAL at 08:43

## 2022-10-07 RX ADMIN — LEVOTHYROXINE SODIUM 150 MCG: 0.15 TABLET ORAL at 06:30

## 2022-10-07 RX ADMIN — PREDNISONE 40 MG: 20 TABLET ORAL at 08:42

## 2022-10-07 RX ADMIN — OXYBUTYNIN CHLORIDE 15 MG: 5 TABLET, EXTENDED RELEASE ORAL at 08:42

## 2022-10-07 RX ADMIN — INSULIN LISPRO 20 UNITS: 100 INJECTION, SOLUTION INTRAVENOUS; SUBCUTANEOUS at 08:40

## 2022-10-07 RX ADMIN — PANTOPRAZOLE SODIUM 40 MG: 40 TABLET, DELAYED RELEASE ORAL at 06:29

## 2022-10-07 RX ADMIN — SACUBITRIL AND VALSARTAN 0.5 TABLET: 24; 26 TABLET, FILM COATED ORAL at 08:42

## 2022-10-07 RX ADMIN — MICONAZOLE NITRATE: 20 POWDER TOPICAL at 08:40

## 2022-10-07 RX ADMIN — ACETAMINOPHEN 1000 MG: 500 TABLET ORAL at 06:29

## 2022-10-07 RX ADMIN — EMPAGLIFLOZIN 10 MG: 10 TABLET, FILM COATED ORAL at 08:43

## 2022-10-07 RX ADMIN — FUROSEMIDE 20 MG: 20 TABLET ORAL at 08:42

## 2022-10-07 RX ADMIN — CETIRIZINE HYDROCHLORIDE 5 MG: 10 TABLET, FILM COATED ORAL at 08:41

## 2022-10-07 ASSESSMENT — PAIN DESCRIPTION - LOCATION
LOCATION: WRIST
LOCATION: WRIST

## 2022-10-07 ASSESSMENT — PAIN DESCRIPTION - ORIENTATION
ORIENTATION: LEFT
ORIENTATION: LEFT

## 2022-10-07 ASSESSMENT — PAIN DESCRIPTION - DESCRIPTORS: DESCRIPTORS: ACHING

## 2022-10-07 ASSESSMENT — PAIN SCALES - GENERAL: PAINLEVEL_OUTOF10: 5

## 2022-10-07 NOTE — PROGRESS NOTES
[facility]   Phone: [phone]    Occupational Therapy    [] Initial Evaluation            [x] Daily Treatment Note         [] Discharge Summary      Patient: Marv Whitmore   : 1940   MRN: [id number]   Date of Service:  10/7/2022    Admitting Diagnosis:  Dyspnea on exertion  Current Admission Summary: Marv Whitmore is a 78 y.o. female who presents to the emergency department today for evaluation for wrist pain, and right rib pain. The patient states that she started with pain to her right ribs, both of her shoulders, and her left wrist.  Patient states that her pain started last night. Patient denies falling or injuring herself in any way. The patient states that she does have a history of arthritis and has had this pain in the past.  Patient denies any numbness, tingling or weakness. The patient history of atrial fibrillation, is anticoagulated on Xarelto, history of CHF, hypertension, and hyperlipidemia. The patient states that over the past couple of days she has noticed some shortness of breath but only reports that if she walks too much. Patient does not believe that she has fluid on her lungs. She denies any weight gain she has no chest pain or chest tightness. She denies any fever or chills. No cough or congestion. She has no abdominal pain, nausea, vomiting or diarrhea. She denies any urinary symptoms, patient otherwise has no other complaint     Past Medical History:  has a past medical history of 17 Left knee repeat repair of median parapatellar arthrotomy with augmentation, Arthritis, Atrial fibrillation ([address] CAD (coronary artery disease), Cataract, Chronic diastolic congestive heart failure ([address] Diabetes mellitus ([address] GERD (gastroesophageal reflux disease), Hyperlipidemia, Hypertension, HYPOTHRYROIDISM, Non-English speaking patient, Sleep apnea, Thyroid disease, and UTI.   Past Surgical History:  has a past surgical history that includes joint replacement (Bilateral, [address]); Cardiac surgery (2004); Cholecystectomy, laparoscopic (5/15/14); Revision total knee arthroplasty (Right, 11/22/2016); Total knee arthroplasty; knee surgery (Left, 02/28/2017); CT BIOPSY BONE MARROW (9/29/2022); Upper gastrointestinal endoscopy (N/A, 10/3/2022); and Upper gastrointestinal endoscopy (N/A, 10/3/2022). Discharge Recommendations: Trent Patel scored a 12/24 on the AM-PAC ADL Inpatient form. Current research shows that an AM-PAC score of 17 or less is typically not associated with a discharge to the patient's home setting. Based on the patient's AM-PAC score and their current ADL deficits, it is recommended that the patient have 3-5 sessions per week of Occupational Therapy at d/c to increase the patient's independence. Please see assessment section for further patient specific details. Pt and pt's daughter expressing desire for pt to return home. Discharge home is not recommended at this time due to safety concerns and level of assist currently required for ADL and functional mobility participation. If pt returns home, 24-hour assist and BSC will be needed. If patient discharges prior to next session this note will serve as a discharge summary. Please see below for the latest assessment towards goals.       DME Required For Discharge: DME to be determined at next level of care    Precautions/Restrictions: high fall risk  Weight Bearing Restrictions: no restrictions  [] Right Upper Extremity  [] Left Upper Extremity [] Right Lower Extremity  [] Left Lower Extremity     Required Braces/Orthotics: no braces required   [] Right  [] Left  Positional Restrictions:no positional restrictions    Pre-Admission Information   Lives With: alone                     Type of Home: house  Home Layout: one level, able to live on main level  Home Access: level entry  Bathroom Layout: tub/shower unit  [employer]: grab bars in shower, shower chair, hand held shower head  Toilet Height: elevated height (one just elevated, and one with safety rails)  Home Equipment: rolling walker, manual wheelchair, alert button, . Comment: has an electric scooter that is too tall for her that she wants exchanged  Transfer Assistance: Independent without use of device, but does get assistance getting in & out of shower  Ambulation Assistance:modified independent with use of walker                                      W/C for community distances and she frequently sits in her w/c or wheels that down the hallway instead  ADL Assistance: requires assistance with bathing, requires assistance with dressing  IADL Assistance: requires assistance with meal prep, requires assistance with laundry, requires assistance with cleaning, requires assistance with shopping, requires assistance for medication management              Aide comes 7 days/week, 3-4 hours. Family has cameras around the house in order to observe pt. Active :        [x] Yes                 [] No  Hand Dominance: [] Left                 [x] Right  Current Employment: unemployed  Hobbies: [employer] is very important to the pt. Recent Falls: 1 fall in last 6 months, slid out of her w/c        Subjective  General: Pt seated in room chair upon arrival, daughter present throughout and intermittently interpreting. Pt denies pn and is pleansant and agreeable to OT/PT cotx session. Anticipated d/c within the hr, requesting to return to bed. Pain: 0/10,   Pain Interventions: not applicable      Activities of Daily Living  Basic Activities of Daily Living  Lower Extremity Dressing: dependent  Dressing Comments: brief change, roll R><L in bed  Toileting: dependent. Toileting Comments: incontinent of urine during session, DEP for pericare supine in bed. Requesting additional pads to be placed inside brief and Purewick replaced until ready for transport.    General Comments: pt declined additional ADLs    Instrumental Activities of Daily Living  No IADL completed on this date. Functional Mobility  Bed Mobility  Sit to Supine: dependent assistance  Rolling Left: minimal assistance, moderate assistance  Rolling Right: minimal assistance, moderate assistance  Scooting: dependent assistance  Comments: Roll L><R x4 for pericare/brief mgmt, increased time to complete, assist required for trunk support and B LE  Transfers  Sit to stand transfer:2 person assistance with ModA of 2   Stand to sit transfer: 2 person assistance with ModA of 2   Stand pivot transfer: 2 person assistance with ModA of 2   Comments: Pt with increased fatigue this date, use of RW room chair>EOB    Functional Mobility:  Sitting Balance: stand by assistance. Standing Balance: moderate assistance. Standing Balance Comment: brief stance w/RW for transfer to EOB  No functional mobility completed on this date secondary to pts increased fatigue this date.     Other Therapeutic Interventions:     Functional Outcomes  AM-PAC Inpatient Daily Activity Raw Score: 12    Cognition  Comments: possible decreased insight into deficits, difficult to fully assess cognition due to language barrier  Orientation:    alert and oriented x 4  Command Following:   [facility]     Education  Barriers To Learning: language and emotional  Patient Education: patient educated on OT role and benefits, plan of care, HEP, transfer training, discharge recommendations  Learning Assessment:  patient will require reinforcement due to fear of falling and possible decreased insight into deficits    Assessment  Activity Tolerance: Fair, limited by fatigue this date  Impairments Requiring Therapeutic Intervention: decreased functional mobility, decreased ADL status, decreased ROM, decreased strength, decreased endurance, decreased fine motor control, increased pain  Prognosis: good  Clinical Assessment: Pt is below her baseline level of occupational function, based on the above deficits associated with THOMASON and pain. Pt would not be safe to discharge home at this time without 24-hour assist. Pt would benefit from continued skilled acute OT services to address these deficits and maximize her safety and participation in ADLs and functional mobility. Safety Interventions: patient left in bed, bed alarm in place, call light within reach, gait belt, patient at risk for falls, nurse notified, and family/caregiver present    Plan  Frequency: 3-5 x/per week  Current Treatment Recommendations: functional mobility training, transfer training, endurance training, patient/caregiver education, ADL/self-care training, home exercise program, safety education, and positioning    Goals  Patient Goals: To return home   Short Term Goals:  Time Frame: Discharge  Patient will complete upper body ADL at moderate assistance   Patient will complete lower body ADL at maximum assistance   Patient will complete toileting at maximum assistance   Patient will complete grooming at minimal assistance   Patient will complete functional transfers at stand by assistance, Comment w/RW   Patient will complete functional mobility at stand by assistance, Comment RW   Patient will be independent with HEP for L UE AROM  Patient will tolerate standing 2-3 min for ADLs, functional mobility    Continue goals 10/07/22    Therapy Session Time     Individual Group Co-treatment   Time In    1039   Time Out    1102   Minutes    23        Timed Code Treatment Minutes: 23 Minutes    Total Treatment Minutes:  23 Minutes     Electronically Signed By: MATTHEW [id number]    After reviewing treatment documentation, I am in agreement with this session.     SHIN Mahajan OTR/L [id number]

## 2022-10-07 NOTE — PLAN OF CARE
Problem: Discharge Planning  Goal: Discharge to home or other facility with appropriate resources  10/7/2022 0045 by Kaushik Wilks RN  Outcome: Progressing    Problem: Pain  Goal: Verbalizes/displays adequate comfort level or baseline comfort level  10/7/2022 0045 by Kaushik Wilks RN  Outcome: Progressing     Problem: Safety - Adult  Goal: Free from fall injury  10/7/2022 0045 by Kaushik Wilks RN  Outcome: Progressing    Problem: Nutrition Deficit:  Goal: Optimize nutritional status  10/7/2022 0045 by Kaushik Wilks RN  Outcome: Progressing     Problem: Skin/Tissue Integrity  Goal: Absence of new skin breakdown  Description: 1. Monitor for areas of redness and/or skin breakdown  2. Assess vascular access sites hourly  3. Every 4-6 hours minimum:  Change oxygen saturation probe site  4. Every 4-6 hours:  If on nasal continuous positive airway pressure, respiratory therapy assess nares and determine need for appliance change or resting period.   10/7/2022 0045 by Kaushik Wilks RN  Outcome: Progressing     Problem: Chronic Conditions and Co-morbidities  Goal: Patient's chronic conditions and co-morbidity symptoms are monitored and maintained or improved  10/7/2022 0045 by Kaushik Wilks RN  Outcome: Progressing

## 2022-10-07 NOTE — PROGRESS NOTES
Data- discharge order received, pt and daughter (appointed legal authority) verbalized agreement to discharge, disposition to [facility] [address] reviewed and signed by physician. Action- AVS prepared, JAMEL completed/ reported faxed by case management/. Discharge instruction summary: Diet- low sodium soft/bite sized, Activity- as tolerated, immunizations reviewed , medications prescriptions to be filled at receiving facility except for the controlled prescriptions to be sent: n/a, Transfer code status: Full Code, LDAs to remain with discharge: n/a. DME used: n/a. Response- Bedside RN to call report to receiving facility. Pt belongings gathered, peripheral IV and cardiac monitoring removed. Disposition to Discharged via ambulance to skilled nursing by EMS transportation, no complications reported. 1. WEIGHT: Admit Weight: 248 lb 6.4 oz (112.7 kg) (09/25/22 0159)        Today  Weight: 241 lb 14.4 oz (109.7 kg) (10/07/22 0700)       2.  O2 SAT.: SpO2: 94 % (10/07/22 0835)

## 2022-10-07 NOTE — CARE COORDINATION
Discharge note:      CM/SW has been notified of discharge. Patient noted to have the following needs at discharge. CM/SW has coordinated the following services: Skilled rehab      Discharge Destination:     [facility]  [address]  Phone: [phone]  Fax:  [phone] ([facility])           [phone] [facility])      Transportation: [employer]  ([phone])  Discharge Time: 11:30am  AVS faxed and agency notified: Yes  The following prescriptions sent with patient: no narcotics  Nurse to call report to facility  Family advised of discharge and in agreement. yes  HENS completed. Yes        All CM/SW needs met, will sign off.     Electronically signed by Abdelrahman Aburto RN on 10/7/2022 at 11:40 AM

## 2022-10-07 NOTE — PROGRESS NOTES
[facility] [facility]   Phone: [phone]    Physical Therapy    [] Initial Evaluation            [x] Daily Treatment Note         [] Discharge Summary      Patient: Rose Mary Shaw   : 1940   MRN: [id number]   Date of Service:  10/7/2022  Admitting Diagnosis: Dyspnea on exertion  Current Admission Summary: Pt admitted on , info per ortho consult on  \"82 y.o. female who presented to [facility] ED yesterday for multiple areas of pain including her right rib cage, both shoulders, and her left wrist.  There is no history of injury or surgery to the left wrist.  She does have a history of osteoarthritis and has a history of bilateral total knee arthroplasty. She is right-hand dominant. She has atrial fibrillation and is on Xarelto. She also has congestive heart failure and reports shortness of breath. Family is at bedside and helps with interpretation. The patient primarily speaks Indonesian but also knows some [place]. \" Cardiology is also following. Updated 10/6: MDS with excess blasts; left wrist erythema with suspected gout flare; left thumb pain secondary to OA, chronic (B) rotator cuff tears; wears a left ankle and left knee brace at baseline    Past Medical History:  has a past medical history of 15 Left knee repeat repair of median parapatellar arthrotomy with augmentation, Arthritis, Atrial fibrillation ([address] CAD (coronary artery disease), Cataract, Chronic diastolic congestive heart failure ([address] Diabetes mellitus ([address] GERD (gastroesophageal reflux disease), Hyperlipidemia, Hypertension, HYPOTHRYROIDISM, Non-English speaking patient, Sleep apnea, Thyroid disease, and UTI. Past Surgical History:  has a past surgical history that includes joint replacement (Bilateral, [address]); Cardiac surgery (); Cholecystectomy, laparoscopic (5/15/14); Revision total knee arthroplasty (Right, 2016);  Total knee arthroplasty; knee surgery (Left, 02/28/2017); CT BIOPSY BONE MARROW (9/29/2022); Upper gastrointestinal endoscopy (N/A, 10/3/2022); and Upper gastrointestinal endoscopy (N/A, 10/3/2022). Discharge Recommendations: Rachel Lerner scored a 10/24 on the AM-PAC short mobility form. Current research shows that an AM-PAC score of 17 or less is typically not associated with a discharge to the patient's home setting. Based on the patient's AM-PAC score and their current functional mobility deficits, it is recommended that the patient have 3-5 sessions per week of Physical Therapy at d/c to increase the patient's independence. Please see assessment section for further patient specific details. If patient discharges home, she will need 24/7 assist, HHPT/OT (S3), and a BSC for safety as the patient is unable to ambulate >18 ft with RW with assistance, and must walk further than that to access her bathroom. HOME HEALTH CARE: LEVEL 3 SAFETY     - Initial home health evaluation to occur within 24-48 hours, in patient home   - Therapy evaluations in home within 24-48 hours of discharge; including DME and home safety   - Frontload therapy 5 days, then 3x a week   - Therapy to evaluate if patient has [address] needs for personal care   -  evaluation within 24-48 hours, includes evaluation of resources and insurance to determine [place], [place], LTC, and Medicaid options     If patient discharges prior to next session this note will serve as a discharge summary. Please see below for the latest assessment towards goals.      DME Required For Discharge: DME to be determined at next level of care - owns RW; PT does not recommend rollator at this time secondary to balance deficits    Precautions/Restrictions: high fall risk, modified diet, Comment: dysphagia soft and bite sized , up with assistance, strict I&O  Weight Bearing Restrictions: no restrictions  Required Braces/Orthotics: no braces required - wears left ankle and left knee brace PRN at baseline; now also wearing left wrist orthotic secondary to pain  Positional Restrictions:no positional restrictions    Pre-Admission Information per Initial Evaluation  Lives With: alone    Type of Home: house  Home Layout: one level, able to live on main level  Home Access: level entry  Bathroom Layout: tub/shower unit  Bathroom Equipment: grab bars in shower, shower chair, hand held shower head  Toilet Height: elevated height (one just elevated, and one with safety rails)  Home Equipment: rolling walker, manual wheelchair, alert button, . Comment: has an electric scooter that is too tall for her that she wants exchanged  Transfer Assistance: Independent without use of device  Ambulation Assistance:modified independent with use of walker     W/C for community distances and she frequently sits in her w/c or wheels that down the hallway instead  ADL Assistance: requires assistance with bathing, requires assistance with dressing  IADL Assistance: requires assistance with meal prep, requires assistance with laundry, requires assistance with cleaning, requires assistance with shopping, requires assistance for medication management  Aid comes 7 days/week, 3-4 hours - states they are there for some in the morning and come back again in the evening. Pt reports family has cameras around the house so her children can keep and eye on her. Active :        [x] Yes  [] No  Hand Dominance: [] Left  [x] Right  Recent Falls: 1 fall in last 6 months, slid out of her w/c      Subjective  General: Patient supine in bed upon arrival - agreeable to PT. Declines , speaking English with increased time effectively this date. No family at bedside. Patient continues to request daily therapy.     Pain: does not rate - c/o chronic pain in (B) shoulders, back, knees, and left wrist  Pain Interventions:  RN aware, patient able to make needs known       Functional Mobility  Bed Mobility  Supine to Sit: stand by assistance with elevated head of bed, use of rail, and increased time to perform  Scooting: stand by assistance seated at edge of bed  Transfers  Sit to stand transfer: mod assistance from edge of bed, BSC, and recliner to RW - effortful, increased time to perform, heavy use of right UE, attempts to pull up on RW with left UE; frequent cues for hand placement and sequencing   Stand to sit transfer: min A from RW to [facility]; cues for sequencing, hand placement, and controlled descent to chair  Stand pivot with RW and mod-max A to [facility] and then to recliner chair pulled up next to [facility] due to pt being unable to stand upright and ambulate back to chair; pt reports being very tired and lightheaded    Comments: effortful, limited by fatigue, pain, and low endurance    Ambulation  Surface:level surface  Assistive Device: rolling walker  Assistance: min A-mod A of 1   Distance: 6 ft chair to [facility]; pt then unable to amb further due to fatigue; difficulty standing   Gait Mechanics: significantly forward flexed posture, decreased (B) step length, foot clearance, and heel strike; (L) knee hyperextension, no (L) knee flexion, decreased (L) stance time, heavy use of (B) UE support on RW; effortful, wide base of support, dyspnea with exertion noted    Comments: Pt donned (L) knee and ankle brace prior to ambulation. Stair Mobility  Stair mobility not completed on this date. Comments: Pt does not have stairs at home.     Balance  Static Sitting Balance: good: independent with functional balance in unsupported position  Dynamic Sitting Balance: fair (+): maintains balance at SBA/supervision without use of UE support  Static Standing Balance: poor: requires mod (A) to maintain balance  Dynamic Standing Balance: poor (-): requires max (A) to maintain balance  Comments: Heavy use of UE support on RW for all standing; max A for hygiene and brief management after toileting; unable to clean self after bowel movement this date  *Pt was more fatigued today and required increased assistance today with all activity; pt sat EOB with SBA to prepare to stand with RW; unable to stand long enough or to widen RAMSEY for PT to assist with pulling up a clean brief; PT assisted pt bed to chair with RW with min-mod A; pt sat and rested, then stood for PT to pull up brief prior to short amb to [facility] as pt needed to have a bowel movement; pt sat on BSC, and when finished pt stood with mod A with RW for <30 sec, dependent for hygeine; pt needed to sit prior to brief being pulled up; recliner chair was pulled up  next to [facility], pt assisted to stand with RW and quickly pivot with mod-max A with RW to chair; pt reports being dizzy, LE were elevated and pt reclined; Vitals: QD=515/53, HR 68, SpO2=97%; pt left reclined in chair; RN made aware      Second Session  Pt returned with OT later morning to transfer pt back to bed to bed ready for discharge at 11:30. Sit to stand with RW with mod A of 2, chair to bed with RW with mod A of 2 with flexed posture, heavily leaning onto RW, taking small steps to bed; stand to sit with mod A of 2, sit to supine max-dep of 2; pt then rolled bilaterally 4x with min-mod A for pericare and placement of clean brief with extra pads for absorbency during transport to SNF per pt request; pt scooted up in bed, Dependent of 2; purewick placed, call light in reach, nursing made aware, alarm on, pt's daughter in room; no further questions from pt and daughter. Dinora Heart, PT    Other Therapeutic Interventions  Knee brace and ankle brace donned in bed with assistance prior to transfer to EOB.  Reviewed education with patient related to the importance of getting out of bed daily, use of BSC instead of bedpan and purewick to increase strength and endurance    Functional Outcomes  AM-PAC Inpatient Mobility Raw Score : 10              Cognition  WFL  Comments:  Pt with increased anxiety as she fatigues, decreased safety awareness and problem solving with fatigue - requires cues and reassurance to perform safe mobility with fatigue  Orientation:    alert and oriented x 4  Command Following:   [facility]    Education  Barriers To Learning: language - speaks fair English, declining  this date  Patient Education: patient educated on goals, PT role and benefits, plan of care, weight-bearing education, general safety, functional mobility training, family education, disease specific education, transfer training, PT recommendations, OOB daily for meals and toileting, use of call light  Learning Assessment:  patient verbalizes understanding, would benefit from continued reinforcement    Assessment  Activity Tolerance: Limited by fatigue. Moderate THOMASON; low endurance during standing mobility    Impairments Requiring Therapeutic Intervention: decreased functional mobility, decreased ADL status, decreased strength, decreased safety awareness, decreased endurance, decreased balance, increased pain, decreased posture  Prognosis: fair, guarded   Clinical Assessment:  Patient is more fatigued today and required increased assistance. Pt was able to transfer with RW with mod A, and amb 6 ft with RW with min-mod A. Patient reports ongoing fear of falling and slipping. Patient lives at home alone, with aide 4 hours in am/4 hours in pm M-F, 4 hours Sat, 2 hours Sun. Patient continue to demonstrate inability to walk functional household distances, continues to require (A) to perform all standing mobility with RW. Patient is at high risk of falling with transfers, ADLs, and household ambulation without assistance. Patient will continue to benefit from additional skilled PT intervention to facilitate safe mobility and to optimize (I) to promote return to prior level of function. Patient reports planning to d/c home \"tomorrow. \"    Safety Interventions: patient left in chair, chair alarm in place, call light within reach, gait belt, patient at risk for falls, and nurse notified    Plan  Frequency: 3-5 x/per week  Current Treatment Recommendations: strengthening, balance training, functional mobility training, transfer training, gait training, endurance training, manual therapy - soft tissue massage, manual therapy - joint manipulation, modalities, patient/caregiver education, pain management, home exercise program, safety education, equipment evaluation/education, and positioning    Goals  Patient Goals: Go home - \"go back home and do some therapy there\"    Short Term Goals:      Time Frame: discharge - all goals ongoing 10/7    Patient will complete bed mobility at supervision from flat bed  Patient will complete sit<>stand transfers at supervision with LRAD   Patient will complete bed <>chair transfers at supervision with use of rolling walker  Patient will ambulate 10 ft with use of rolling walker at supervision  Patient to maintain standing at supervision for 5 minutes with (B) UE support on RW        Therapy Session Time      Individual Group Co-treatment   Time In 0905       Time Out 1009       Minutes 64          Second Session Therapy Time:   Individual Concurrent Group Co-treatment   Time In 1039         Time Out 1102         Minutes 23           Timed Code Treatment Minutes:  87    Total Treatment Minutes:  87      Amanda Collins, PT

## 2022-10-07 NOTE — DISCHARGE SUMMARY
[address] [facility] DISCHARGE SUMMARY    Patient Demographics    Patient. Lisa Jamison  Date of Birth. 1940  MRN. [id number]     Primary care provider. MATEUSZ Leonardo CNP  (Tel: [phone])    Admit date: 9/24/2022    Discharge date (blank if same as Note Date): DC order placed 10/4. Delay secondary to DC appeal and then SNF placement. Note Date: 10/7/2022     Reason for Hospitalization. Chief Complaint   Patient presents with    Wrist Pain     [place] # [id number] -Patient brought in by squad from home with left wrist, right rib pain. No injury. Extremity Weakness         Significant Findings. Principal Problem:    Dyspnea on exertion  Active Problems:    Acute on chronic diastolic heart failure (HCC)    Thrombocytopenia (HCC)    Anemia    Wrist arthritis    Elevated troponin    Chronic atrial fibrillation (HCC)    Primary osteoarthritis of first carpometacarpal joint of left hand    DM (diabetes mellitus) ([address]    Coronary artery disease involving native coronary artery of native heart without angina pectoris    PAF (paroxysmal atrial fibrillation) ([address]    Peripheral vascular disease ([address]    Mixed hyperlipidemia  Resolved Problems:    * No resolved hospital problems. *       Problems and results from this hospitalization that need follow up. GI follow up in clinic if GI symptoms persist (may need EMS to r/o esophageal dysmotility). MDS. Follow up with hematology (Dr Erickson Smith) as outpatient to discuss palliative therapies. Significant test results and incidental findings. CXR 9/24/2022:  Mild cardiomegaly with mild central pulmonary congestion or pulmonary artery   hypertension which is less prominent. Mild chronic obstructive lung changes with mild bibasilar discoid atelectasis   or scarring which is less prominent with no obvious infiltrate or effusion.       Xray left wrist 9/24/2022:  Mild osteoarthritic changes along the radiocarpal joint and moderate   degenerative arthritic changes along the base of the thumb with no acute bony   abnormality seen. Vascular calcifications along the palmar aspect of the wrist       Mild soft tissue swelling around the wrist and diffuse osteopenia. Probable mild chondrocalcinosis of the TFCC. CT chest 9/28/2022:  1. No definite acute pulmonary disease. Evaluation of the lungs degraded   because of motion during imaging, blurring detail and resulting in   misregistration artifact. 2. Moderate to severe calcific atherosclerosis coronary arteries. 3. Mild main pulmonary artery dilatation can be seen with pulmonary   hypertension but is nonspecific. 4. Cardiomegaly and sequela from CABG. 5. Small hiatal hernia with what appears to be a small retained capsule   within the hiatal hernia. Surgical pathology:  FINAL DIAGNOSIS:     A. Duodenum, biopsies:   - Benign small bowel mucosa with normal villous architecture. - Negative for duodenitis, changes of celiac disease, dysplasia, or   malignancy. B.  Stomach, biopsies:   - Mild-moderate chronic gastritis (Helicobacter pylori gastritis). - Negative for dysplasia or malignancy. - Immunohistochemical stain for Helicobacter pylori is positive. C.  Gastric polyp, excision:   - Hyperplastic polyp.   - Negative for dysplasia or malignancy. D.  Distal esophagus, biopsies:   - Benign squamous and gastric-type mucosa. - Negative for eosinophilic esophagitis, intestinal metaplasia,   dysplasia, or malignancy. E.  Proximal esophagus, biopsies:   - Benign squamous mucosa. - Negative for eosinophilic esophagitis, intestinal metaplasia,   dysplasia, or malignancy. Bone Marrow biopsy 9/29:  FINAL DIAGNOSIS:     Bone marrow (aspirate smears, clot section and biopsy) and peripheral   blood:   - MDS with the excess blasts-2 (MDS-EB-2). See comment.    - Peripheral blood showing moderate anisocytosis and abundant pseudo   Pelger-Huet cells. Comment: There is dysplasia involving at least myeloid and megakaryocytic   lineages. Myeloblasts comprises approximately ~10% of bone marrow   nucleated cells by immunostain for CD34 estimation. 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 Correlation with   cytogenetic and FISH (MDS panel ordered) studies is recommended. Invasive procedures and treatments. EGD 10/3/2022:  Findings:   Normal esophagus, biopsied. Empirically balloon dilated up to 20 mm. Mild antral gastritis, biopsies. Three diminutive gastric polyps, biopsied. Normal duodenum, biopsied. Plans:  Await bx. Continue PPI. Follow clinical response to endoscopic dilation. May discharge from GI standpoint. Follow up in clinic if GI symptoms persist (may need EMS to r/o esophageal dysmotility). [facility] Course. 79 yo female with hx arthritis, atrial fib, CAD/CABG, dCHF, DM2, HTN, hypothyroidism. Presented with left wrist pain, weakness, and shortness of breath. Admitted with CHF exacerbation. Acute on chronic dCHF. CXR with mild pulmonary congestion, proBNP 538 compared to 56 in July. Started on IV Lasix with clinical improvement. Lasix subsequently stopped 2/2 ARUN, now on po Lasix. Continue Lasix, Jardiance and Entresto. ARUN. Resolved. Creatinine bumped, 1.0->1.4. Suspect lasix induced. Lasix held and subsequently transitioned to oral. Creatinine improved and stable at 1.0. Anemia and thrombocytopenia. Iron studies, B12, folate ok; stool for occult blood negative. Evaluated by hem/onc and is s/p bone marrow biopsy 9/30. 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 Per hematology, \"the only curative therapy for MDS is a bone marrow transplant which the patient would not qualify for given her advanced age and co-morbidities. \" Recommend outpatient follow up with Dr Дмитрий Centeno to discuss palliative therapies.  Hematology has signed off. Pain left thumb and wrist/ bilateral rotator cuff tears. Initially started on empiric abx for presumed cellulitis, this was [facility]. Evaluated by ortho and given steroids with improvement, course to end 10/8. Continue pain management. She does have plans to follow with pain management (Dr Hardeep Haney). Ortho has signed off. Chronic pain/spasm low back. Improved. Continued scheduled tizanidine and tylenol, prn oxycodone, PT/OT. Dysuria. UA with trace leukocytes (11). Received diflucan 150mg x1 and phenazopyridine 100mg TID for 3 doses. Improved. Urine culture negative. CAD/CABG. Stable. Continued on statin, Imdur and beta blocker. No asa secondary Xarelto. PAF. Remains in sinus rhythm. Continued metoprolol and Xarelto. DM2. A1c 6.4 (6/2022). Takes Aldona Keep and Victoza at home. Covered with Lantus and mealtime insulin with high dose correction. Resumed home regimen on DC. Dysphagia. Reports swallowing issues for both solids and liquids for past years, also intermittent emesis. CT scan showed hiatal hernia with retained pill. GI evaluated and suspect reflux vs stenosis. EGD 10/3 showed normal esophagus, empirically dilated up to 20 mm, mild antral gastritis. Biopsy results as above. GI recommend continue PPI and follow up in clinic if GI symptoms persist (may need EMS to r/o esophageal dysmotility). HTN. Controlled. Continue Entresto, metoprolol. DC order placed 10/4 and was appealed. PT/OT recommended SNF on DC and initially this was declined but agreeable to home health care. Subsequently, once appeal denied, patient and family agreeable to SNF placement. Reviewed DC plans, follow up and medications with patient and daughter. Expresses understanding. Questions answered. Consults.   IP CONSULT TO HEM/ONC  IP CONSULT TO HEART FAILURE NURSE/COORDINATOR  IP CONSULT TO DIETITIAN  IP CONSULT TO CARDIOLOGY  IP CONSULT TO SOCIAL WORK  IP CONSULT TO ORTHOPEDIC SURGERY  IP CONSULT TO GI  IP CONSULT TO ORTHOPEDIC SURGERY  IP CONSULT TO HOME CARE NEEDS    Physical examination on discharge day. /67   Pulse 59   Temp 97.8 °F (36.6 °C) (Oral)   Resp 18   Ht 5' (1.524 m)   Wt 241 lb 14.4 oz (109.7 kg)   LMP  (LMP Unknown)   SpO2 94%   BMI 47.24 kg/m²   General appearance. Alert. Looks comfortable. HEENT. Sclera clear. Moist mucus membranes. Cardiovascular. Regular rate and rhythm, normal S1, S2. No murmur. Respiratory. Not using accessory muscles. Clear to auscultation bilaterally, no wheeze. Gastrointestinal. Abdomen soft, non-tender, not distended, normal bowel sounds  Neurology. Facial symmetry. No speech deficits. Moving all extremities equally. Extremities. No edema in lower extremities. Skin. Warm, dry, normal turgor    Condition at time of discharge stable    Medication instructions provided to patient at discharge. Medication List        START taking these medications      empagliflozin 10 MG tablet  Commonly known as: JARDIANCE  Take 1 tablet by mouth daily  Replaces: dapagliflozin 10 MG tablet  Notes to patient: Use: to lower blood sugar in patients with high blood sugar (diabetes)  Side effects: low blood sugar            CHANGE how you take these medications      Luis Hart 100 UNIT/ML injection pen  Generic drug: insulin glargine  Inject 48 Units into the skin daily  What changed:   how much to take  when to take this  Notes to patient: Use: long lasting insulin used to help keep blood sugars regular, lower blood sugars  Side effects: Low blood sugars, weight gain     furosemide 20 MG tablet  Commonly known as: LASIX  Take 1 tablet by mouth daily  What changed: when to take this  Notes to patient: Use: is a diuretic used to reduce extra fluid in body (edema) associated with heart failure, liver  or kidney disease.   Side effects: can lower your blood pressure, dizziness, headache, muscle cramps, dehydration-dry mouth, and low potassium levels     lidocaine 5 %  Commonly known as: LIDODERM  Place 1 patch onto the skin daily 12 hours on, 12 hours off. What changed:   how much to take  additional instructions  Notes to patient: It can treat pain. sacubitril-valsartan 24-26 MG per tablet  Commonly known as: ENTRESTO  Take 0.5 tablets by mouth 2 times daily  What changed: how much to take  Notes to patient: Use: is and ARNI used to help with Chronic heart failure or low ejection fracture, help reduce the risk of sudden cardiac deaths  Side effects: hypotension, cough, dizziness, renal impairment    DO NOT TAKE WITH OTHER ACE INHIBITORS             CONTINUE taking these medications      acetaminophen-codeine 300-30 MG per tablet  Commonly known as: TYLENOL #3  Notes to patient: Use: fever, pain  Side effect: kidney injury, skin rash     atorvastatin 80 MG tablet  Commonly known as: LIPITOR  Take 1 tablet by mouth daily  Notes to patient: Use: statin used to treat cholesterol and triglyceride levels, may reduce the risk of stroke or heart attacks  Side effects: muscle weakness, fatigue, cold like symptoms, urinary tract infection, nausea, loss of appetite and indigestion. benzonatate 100 MG capsule  Commonly known as: TESSALON  Notes to patient: Use: relief of nonproductive cough  Side effects: headache, nausea, constipation     calcium carbonate 500 MG Tabs tablet  Commonly known as: OSCAL  Notes to patient: Use: as a dietary supplement to treat low calcium levels or osteopetrosis  Side effects: constipation, up set stomach, increased thirst, weakness and headache     gabapentin 300 MG capsule  Commonly known as: NEURONTIN  Notes to patient: Use: as an anticonvulsant and nerve pain medication to treat seizures as well as causes of nerve pain such as shingles  Side effects: abnormal eye movement, clumsiness, constipation, drowsiness, dry mouth and upset stomach.       hydrocortisone 2.5 % rectal cream  Commonly known as: ANUSOL-HC  Notes to patient: Use: to treat skin irritation or rash  Side effects: stinging, dryness, burning, tingling     insulin aspart 100 UNIT/ML injection vial  Commonly known as: Kathryn Mobley  Notes to patient: Use: Short acting insulin used to lower blood sugars with meals  Side effects: low blood sugars, nausea dizziness     Insulin Pen Needle 32G X 6 MM Misc     levothyroxine 175 MCG tablet  Commonly known as: SYNTHROID  Notes to patient: Use: is a hormone used to treat hypothyroidism or enlarged thyroid gland and thyroid cancer  Side effects: increased appetite, weight loss, heat sensitivity, excessive sweating, headaches, hyperactivity, nervousness, anxiety, irritability, mood swings, tremors, muscle weakness, loose stools and stomach cramps     Liraglutide 18 MG/3ML Sopn SC injection  Commonly known as: Karen Pugh  Notes to patient: Use: for diabetes  Side effects: hypoglycemia, rash     loratadine 10 MG capsule  Commonly known as: CLARITIN  Notes to patient: Use: allergies, allergic rhinitis, itching  Side effects: fatigue, dry mouth     magnesium 30 MG tablet  Notes to patient: Use:Dietary Supplement  Side effects: Nausea, diarrhea     metoprolol tartrate 25 MG tablet  Commonly known as: LOPRESSOR  TAKE ONE-HALF TABLET BY MOUTH TWICE A DAY  Notes to patient: Use: Beta Blocker used to treat high blood pressure, chest pain (angina) and heart failure. May low risk of death after having a heart attacks  Side effects: Tiredness, dry mouth, loose stools, indigestion, dizziness & blurred vision.      nystatin 807627 UNIT/GM powder  Commonly known as: MYCOSTATIN  Notes to patient: Use: is an antifungal used topically to help with skin excoriation/irritation associated with fungal infection (yeast)  Side effects: itching, rash, burning or redness to topical site     Omega 3 1000 MG Caps  Notes to patient: Use: is a polyunsaturated fatty acid used to lower cholesterol and triglycerides   Side effects: changes in taste/breath, upset stomach/gas, loose stools and in high dose can cause chest pain. oxybutynin 15 MG extended release tablet  Commonly known as: DITROPAN XL  Notes to patient: It can treat overactive bladder. OYSTER SHELL CALCIUM + D PO     pantoprazole 40 MG tablet  Commonly known as: PROTONIX  Notes to patient: Use: Prevention and treatment of gastric ulcers  Side Effects: Headache, fatigue, constipation, dry  mouth     phenazopyridine 100 MG tablet  Commonly known as: PYRIDIUM  Notes to patient: It can relieve symptoms caused by urinary tract infections. rivaroxaban 20 MG Tabs tablet  Commonly known as: XARELTO  Take 1 tablet by mouth daily  Notes to patient: Use: Blood thinner used to prevent blood clots  Side effects: increased bleeding and bruising, headache, fatigue & weakness     senna 8.6 MG tablet  Commonly known as: SENOKOT  Notes to patient: It can treat constipation. tiZANidine 2 MG tablet  Commonly known as: Jayce Haydee  Notes to patient: Use: is a muscle relaxant to treat muscle spasms  Side effects: muscle weakness, low blood pressure, dizziness, fatigue, constipation and dry mouth     triamcinolone 0.1 % cream  Commonly known as: KENALOG  Notes to patient: Use: arthritis of the knee or many health problems like allergy signs, asthma, adrenal gland problems, blood problems, skin rashes, or swelling problems  Side effects: trouble sleeping, weight gain, sweating, hair thinning     vitamin D 1.25 MG (96102 UT) Caps capsule  Commonly known as: ERGOCALCIFEROL  Notes to patient: Use: Dietary supplement, helps absorb calcium better.   Side effects: upset stomach, nausea            STOP taking these medications      Biotin 5000 MCG Tabs     cefaclor 250 MG capsule  Commonly known as: CECLOR     dapagliflozin 10 MG tablet  Commonly known as: Farxiga  Replaced by: empagliflozin 10 MG tablet     isosorbide mononitrate 30 MG extended release tablet  Commonly known as: IMDUR     lisinopril 2.5 MG tablet  Commonly known as: PRINIVIL;ZESTRIL            ASK your doctor about these medications      predniSONE 20 MG tablet  Commonly known as: DELTASONE  Take 2 tablets by mouth daily for 1 day  Ask about: Should I take this medication? Notes to patient: Steroid, decreases inflammation  Side effects: increased appetite, nervousness               Where to Get Your Medications        You can get these medications from any pharmacy    Bring a paper prescription for each of these medications  Basaglar KwikPen 100 UNIT/ML injection pen  empagliflozin 10 MG tablet  furosemide 20 MG tablet  predniSONE 20 MG tablet  sacubitril-valsartan 24-26 MG per tablet         Discharge recommendations given to patient. Follow Up. CHF clinic in 1 week   Disposition. SNF  Activity. activity as tolerated  Diet: ADULT DIET; Dysphagia - Soft and Bite Sized      Spent > 30 minutes in discharge process.     Signed:  MATEUSZ Mcnulty CNP     10/7/2022 11:23 AM

## 2022-10-07 NOTE — CARE COORDINATION
[facility] received a referral to this patient for a commode chair. Commode has been delivered to the patients room. Thank you for the referral.  Electronically signed by Sukhdeep Ralph on 10/7/2022 at 10:41 AM  Cell ph# [phone]    NOTE: After 5:00 pm, Weekends, Holidays: Call [facility] [employer] at [phone] to coordinate delivery of home medical equipment.

## 2022-10-09 LAB
Lab: NORMAL
REPORT: NORMAL
THIS TEST SENT TO: NORMAL

## 2022-10-10 ENCOUNTER — APPOINTMENT (OUTPATIENT)
Dept: GENERAL RADIOLOGY | Age: 82
DRG: 177 | End: 2022-10-10
Payer: MEDICARE

## 2022-10-10 ENCOUNTER — HOSPITAL ENCOUNTER (INPATIENT)
Age: 82
LOS: 10 days | Discharge: HOME HEALTH | DRG: 177 | End: 2022-10-26
Attending: EMERGENCY MEDICINE | Admitting: INTERNAL MEDICINE
Payer: MEDICARE

## 2022-10-10 ENCOUNTER — APPOINTMENT (OUTPATIENT)
Dept: CT IMAGING | Age: 82
DRG: 177 | End: 2022-10-10
Payer: MEDICARE

## 2022-10-10 DIAGNOSIS — I50.9 CHRONIC CONGESTIVE HEART FAILURE, UNSPECIFIED HEART FAILURE TYPE (HCC): ICD-10-CM

## 2022-10-10 DIAGNOSIS — M25.519 SHOULDER PAIN, UNSPECIFIED CHRONICITY, UNSPECIFIED LATERALITY: ICD-10-CM

## 2022-10-10 DIAGNOSIS — Z86.79 HISTORY OF CORONARY ARTERY DISEASE: ICD-10-CM

## 2022-10-10 DIAGNOSIS — R07.9 ACUTE CHEST PAIN: Primary | ICD-10-CM

## 2022-10-10 LAB
A/G RATIO: 1.1 (ref 1.1–2.2)
ALBUMIN SERPL-MCNC: 3.3 G/DL (ref 3.4–5)
ALP BLD-CCNC: 75 U/L (ref 40–129)
ALT SERPL-CCNC: 11 U/L (ref 10–40)
ANION GAP SERPL CALCULATED.3IONS-SCNC: 12 MMOL/L (ref 3–16)
ANISOCYTOSIS: ABNORMAL
AST SERPL-CCNC: 15 U/L (ref 15–37)
BASOPHILS ABSOLUTE: 0 K/UL (ref 0–0.2)
BASOPHILS RELATIVE PERCENT: 0 %
BILIRUB SERPL-MCNC: 0.4 MG/DL (ref 0–1)
BUN BLDV-MCNC: 31 MG/DL (ref 7–20)
CALCIUM SERPL-MCNC: 9.1 MG/DL (ref 8.3–10.6)
CHLORIDE BLD-SCNC: 99 MMOL/L (ref 99–110)
CO2: 27 MMOL/L (ref 21–32)
CREAT SERPL-MCNC: 0.8 MG/DL (ref 0.6–1.2)
EKG ATRIAL RATE: 90 BPM
EKG DIAGNOSIS: NORMAL
EKG Q-T INTERVAL: 452 MS
EKG QRS DURATION: 172 MS
EKG QTC CALCULATION (BAZETT): 515 MS
EKG R AXIS: 257 DEGREES
EKG T AXIS: 39 DEGREES
EKG VENTRICULAR RATE: 78 BPM
EOSINOPHILS ABSOLUTE: 0 K/UL (ref 0–0.6)
EOSINOPHILS RELATIVE PERCENT: 0 %
GFR AFRICAN AMERICAN: >60
GFR NON-AFRICAN AMERICAN: >60
GLUCOSE BLD-MCNC: 151 MG/DL (ref 70–99)
GLUCOSE BLD-MCNC: 86 MG/DL (ref 70–99)
GLUCOSE BLD-MCNC: 96 MG/DL (ref 70–99)
HCT VFR BLD CALC: 23.8 % (ref 36–48)
HEMOGLOBIN: 7.9 G/DL (ref 12–16)
LYMPHOCYTES ABSOLUTE: 3.2 K/UL (ref 1–5.1)
LYMPHOCYTES RELATIVE PERCENT: 38 %
MCH RBC QN AUTO: 32.5 PG (ref 26–34)
MCHC RBC AUTO-ENTMCNC: 33.1 G/DL (ref 31–36)
MCV RBC AUTO: 98.2 FL (ref 80–100)
MONOCYTES ABSOLUTE: 1.4 K/UL (ref 0–1.3)
MONOCYTES RELATIVE PERCENT: 17 %
NEUTROPHILS ABSOLUTE: 3.8 K/UL (ref 1.7–7.7)
NEUTROPHILS RELATIVE PERCENT: 45 %
OVALOCYTES: ABNORMAL
PDW BLD-RTO: 21.5 % (ref 12.4–15.4)
PERFORMED ON: NORMAL
PERFORMED ON: NORMAL
PLATELET # BLD: 91 K/UL (ref 135–450)
PLATELET SLIDE REVIEW: ABNORMAL
PMV BLD AUTO: 8.8 FL (ref 5–10.5)
POLYCHROMASIA: ABNORMAL
POTASSIUM REFLEX MAGNESIUM: 4.1 MMOL/L (ref 3.5–5.1)
PRO-BNP: 277 PG/ML (ref 0–449)
RBC # BLD: 2.43 M/UL (ref 4–5.2)
SLIDE REVIEW: ABNORMAL
SODIUM BLD-SCNC: 138 MMOL/L (ref 136–145)
TOTAL CK: 55 U/L (ref 26–192)
TOTAL PROTEIN: 6.4 G/DL (ref 6.4–8.2)
TROPONIN: 0.04 NG/ML
TROPONIN: 0.04 NG/ML
WBC # BLD: 8.5 K/UL (ref 4–11)

## 2022-10-10 PROCEDURE — 71045 X-RAY EXAM CHEST 1 VIEW: CPT

## 2022-10-10 PROCEDURE — APPNB45 APP NON BILLABLE 31-45 MINUTES: Performed by: NURSE PRACTITIONER

## 2022-10-10 PROCEDURE — G0378 HOSPITAL OBSERVATION PER HR: HCPCS

## 2022-10-10 PROCEDURE — 1200000000 HC SEMI PRIVATE

## 2022-10-10 PROCEDURE — 6360000002 HC RX W HCPCS: Performed by: EMERGENCY MEDICINE

## 2022-10-10 PROCEDURE — 6370000000 HC RX 637 (ALT 250 FOR IP): Performed by: INTERNAL MEDICINE

## 2022-10-10 PROCEDURE — 96375 TX/PRO/DX INJ NEW DRUG ADDON: CPT

## 2022-10-10 PROCEDURE — 82550 ASSAY OF CK (CPK): CPT

## 2022-10-10 PROCEDURE — 36415 COLL VENOUS BLD VENIPUNCTURE: CPT

## 2022-10-10 PROCEDURE — 6370000000 HC RX 637 (ALT 250 FOR IP): Performed by: EMERGENCY MEDICINE

## 2022-10-10 PROCEDURE — 94760 N-INVAS EAR/PLS OXIMETRY 1: CPT

## 2022-10-10 PROCEDURE — 85025 COMPLETE CBC W/AUTO DIFF WBC: CPT

## 2022-10-10 PROCEDURE — 83880 ASSAY OF NATRIURETIC PEPTIDE: CPT

## 2022-10-10 PROCEDURE — 93010 ELECTROCARDIOGRAM REPORT: CPT | Performed by: INTERNAL MEDICINE

## 2022-10-10 PROCEDURE — 2580000003 HC RX 258: Performed by: INTERNAL MEDICINE

## 2022-10-10 PROCEDURE — 96374 THER/PROPH/DIAG INJ IV PUSH: CPT

## 2022-10-10 PROCEDURE — 93005 ELECTROCARDIOGRAM TRACING: CPT | Performed by: EMERGENCY MEDICINE

## 2022-10-10 PROCEDURE — 2700000000 HC OXYGEN THERAPY PER DAY

## 2022-10-10 PROCEDURE — 71260 CT THORAX DX C+: CPT | Performed by: EMERGENCY MEDICINE

## 2022-10-10 PROCEDURE — 84484 ASSAY OF TROPONIN QUANT: CPT

## 2022-10-10 PROCEDURE — 6370000000 HC RX 637 (ALT 250 FOR IP): Performed by: NURSE PRACTITIONER

## 2022-10-10 PROCEDURE — 80053 COMPREHEN METABOLIC PANEL: CPT

## 2022-10-10 PROCEDURE — 99285 EMERGENCY DEPT VISIT HI MDM: CPT

## 2022-10-10 PROCEDURE — 6360000004 HC RX CONTRAST MEDICATION: Performed by: EMERGENCY MEDICINE

## 2022-10-10 RX ORDER — INSULIN LISPRO 100 [IU]/ML
15 INJECTION, SOLUTION INTRAVENOUS; SUBCUTANEOUS
Status: DISCONTINUED | OUTPATIENT
Start: 2022-10-10 | End: 2022-10-16

## 2022-10-10 RX ORDER — HYDROCODONE BITARTRATE AND ACETAMINOPHEN 5; 325 MG/1; MG/1
1 TABLET ORAL ONCE
Status: COMPLETED | OUTPATIENT
Start: 2022-10-10 | End: 2022-10-10

## 2022-10-10 RX ORDER — SODIUM CHLORIDE 9 MG/ML
INJECTION, SOLUTION INTRAVENOUS PRN
Status: DISCONTINUED | OUTPATIENT
Start: 2022-10-10 | End: 2022-10-26 | Stop reason: HOSPADM

## 2022-10-10 RX ORDER — ACETAMINOPHEN 650 MG/1
650 SUPPOSITORY RECTAL EVERY 6 HOURS PRN
Status: DISCONTINUED | OUTPATIENT
Start: 2022-10-10 | End: 2022-10-26 | Stop reason: HOSPADM

## 2022-10-10 RX ORDER — LEVOTHYROXINE SODIUM 0.15 MG/1
150 TABLET ORAL DAILY
Status: DISCONTINUED | OUTPATIENT
Start: 2022-10-11 | End: 2022-10-26 | Stop reason: HOSPADM

## 2022-10-10 RX ORDER — MORPHINE SULFATE 4 MG/ML
4 INJECTION, SOLUTION INTRAMUSCULAR; INTRAVENOUS ONCE
Status: COMPLETED | OUTPATIENT
Start: 2022-10-10 | End: 2022-10-10

## 2022-10-10 RX ORDER — ONDANSETRON 4 MG/1
4 TABLET, ORALLY DISINTEGRATING ORAL EVERY 8 HOURS PRN
Status: DISCONTINUED | OUTPATIENT
Start: 2022-10-10 | End: 2022-10-26 | Stop reason: HOSPADM

## 2022-10-10 RX ORDER — ATORVASTATIN CALCIUM 80 MG/1
80 TABLET, FILM COATED ORAL NIGHTLY
Status: DISCONTINUED | OUTPATIENT
Start: 2022-10-10 | End: 2022-10-26 | Stop reason: HOSPADM

## 2022-10-10 RX ORDER — HYDROCODONE BITARTRATE AND ACETAMINOPHEN 5; 325 MG/1; MG/1
1 TABLET ORAL EVERY 6 HOURS PRN
Status: DISCONTINUED | OUTPATIENT
Start: 2022-10-10 | End: 2022-10-12 | Stop reason: SDUPTHER

## 2022-10-10 RX ORDER — SODIUM CHLORIDE 0.9 % (FLUSH) 0.9 %
5-40 SYRINGE (ML) INJECTION PRN
Status: DISCONTINUED | OUTPATIENT
Start: 2022-10-10 | End: 2022-10-26 | Stop reason: HOSPADM

## 2022-10-10 RX ORDER — POLYETHYLENE GLYCOL 3350 17 G/17G
17 POWDER, FOR SOLUTION ORAL DAILY PRN
Status: DISCONTINUED | OUTPATIENT
Start: 2022-10-10 | End: 2022-10-26 | Stop reason: HOSPADM

## 2022-10-10 RX ORDER — ONDANSETRON 2 MG/ML
4 INJECTION INTRAMUSCULAR; INTRAVENOUS ONCE
Status: COMPLETED | OUTPATIENT
Start: 2022-10-10 | End: 2022-10-10

## 2022-10-10 RX ORDER — INSULIN GLARGINE 100 [IU]/ML
48 INJECTION, SOLUTION SUBCUTANEOUS DAILY
Status: DISCONTINUED | OUTPATIENT
Start: 2022-10-11 | End: 2022-10-16

## 2022-10-10 RX ORDER — FUROSEMIDE 20 MG/1
20 TABLET ORAL DAILY
Status: DISCONTINUED | OUTPATIENT
Start: 2022-10-11 | End: 2022-10-16

## 2022-10-10 RX ORDER — DEXTROSE MONOHYDRATE 100 MG/ML
INJECTION, SOLUTION INTRAVENOUS CONTINUOUS PRN
Status: DISCONTINUED | OUTPATIENT
Start: 2022-10-10 | End: 2022-10-26 | Stop reason: HOSPADM

## 2022-10-10 RX ORDER — CYCLOBENZAPRINE HCL 10 MG
5 TABLET ORAL ONCE
Status: COMPLETED | OUTPATIENT
Start: 2022-10-10 | End: 2022-10-10

## 2022-10-10 RX ORDER — ONDANSETRON 2 MG/ML
4 INJECTION INTRAMUSCULAR; INTRAVENOUS EVERY 6 HOURS PRN
Status: DISCONTINUED | OUTPATIENT
Start: 2022-10-10 | End: 2022-10-26 | Stop reason: HOSPADM

## 2022-10-10 RX ORDER — SODIUM CHLORIDE 0.9 % (FLUSH) 0.9 %
5-40 SYRINGE (ML) INJECTION EVERY 12 HOURS SCHEDULED
Status: DISCONTINUED | OUTPATIENT
Start: 2022-10-10 | End: 2022-10-26 | Stop reason: HOSPADM

## 2022-10-10 RX ORDER — HYDROCORTISONE 25 MG/G
CREAM TOPICAL 2 TIMES DAILY
Status: DISCONTINUED | OUTPATIENT
Start: 2022-10-10 | End: 2022-10-26 | Stop reason: HOSPADM

## 2022-10-10 RX ORDER — ACETAMINOPHEN 325 MG/1
650 TABLET ORAL EVERY 6 HOURS PRN
Status: DISCONTINUED | OUTPATIENT
Start: 2022-10-10 | End: 2022-10-26 | Stop reason: HOSPADM

## 2022-10-10 RX ADMIN — CYCLOBENZAPRINE 5 MG: 10 TABLET, FILM COATED ORAL at 16:08

## 2022-10-10 RX ADMIN — ONDANSETRON 4 MG: 2 INJECTION INTRAMUSCULAR; INTRAVENOUS at 12:41

## 2022-10-10 RX ADMIN — HYDROCODONE BITARTRATE AND ACETAMINOPHEN 1 TABLET: 5; 325 TABLET ORAL at 23:18

## 2022-10-10 RX ADMIN — ATORVASTATIN CALCIUM 80 MG: 80 TABLET, FILM COATED ORAL at 21:16

## 2022-10-10 RX ADMIN — ACETAMINOPHEN 650 MG: 325 TABLET ORAL at 21:16

## 2022-10-10 RX ADMIN — HYDROCORTISONE: 25 CREAM TOPICAL at 21:35

## 2022-10-10 RX ADMIN — SACUBITRIL AND VALSARTAN 0.5 TABLET: 24; 26 TABLET, FILM COATED ORAL at 21:16

## 2022-10-10 RX ADMIN — HYDROCODONE BITARTRATE AND ACETAMINOPHEN 1 TABLET: 5; 325 TABLET ORAL at 14:17

## 2022-10-10 RX ADMIN — MORPHINE SULFATE 4 MG: 4 INJECTION, SOLUTION INTRAMUSCULAR; INTRAVENOUS at 12:41

## 2022-10-10 RX ADMIN — IOPAMIDOL 75 ML: 755 INJECTION, SOLUTION INTRAVENOUS at 12:10

## 2022-10-10 RX ADMIN — METOPROLOL TARTRATE 25 MG: 25 TABLET, FILM COATED ORAL at 21:16

## 2022-10-10 RX ADMIN — Medication 10 ML: at 21:35

## 2022-10-10 ASSESSMENT — PAIN DESCRIPTION - ORIENTATION
ORIENTATION: LEFT
ORIENTATION_2: LEFT

## 2022-10-10 ASSESSMENT — PAIN DESCRIPTION - DESCRIPTORS
DESCRIPTORS: ACHING
DESCRIPTORS_2: SHARP
DESCRIPTORS: ACHING

## 2022-10-10 ASSESSMENT — PAIN SCALES - GENERAL
PAINLEVEL_OUTOF10: 7
PAINLEVEL_OUTOF10: 7
PAINLEVEL_OUTOF10: 5
PAINLEVEL_OUTOF10: 10
PAINLEVEL_OUTOF10: 8
PAINLEVEL_OUTOF10: 7

## 2022-10-10 ASSESSMENT — PAIN DESCRIPTION - ONSET: ONSET: ON-GOING

## 2022-10-10 ASSESSMENT — LIFESTYLE VARIABLES
HOW MANY STANDARD DRINKS CONTAINING ALCOHOL DO YOU HAVE ON A TYPICAL DAY: PATIENT DOES NOT DRINK
HOW OFTEN DO YOU HAVE A DRINK CONTAINING ALCOHOL: NEVER

## 2022-10-10 ASSESSMENT — PAIN DESCRIPTION - PAIN TYPE: TYPE: ACUTE PAIN

## 2022-10-10 ASSESSMENT — PAIN DESCRIPTION - INTENSITY: RATING_2: 7

## 2022-10-10 ASSESSMENT — PAIN - FUNCTIONAL ASSESSMENT
PAIN_FUNCTIONAL_ASSESSMENT: NONE - DENIES PAIN
PAIN_FUNCTIONAL_ASSESSMENT: 0-10

## 2022-10-10 ASSESSMENT — PAIN DESCRIPTION - FREQUENCY: FREQUENCY: CONTINUOUS

## 2022-10-10 ASSESSMENT — PAIN DESCRIPTION - LOCATION
LOCATION_2: SHOULDER
LOCATION: SHOULDER
LOCATION: WRIST
LOCATION: ARM

## 2022-10-10 NOTE — ED NOTES
Purewick placed per pt request, family at bedside     Ezequiel Nobles, [address] [facility]  10/10/22 [id number]

## 2022-10-10 NOTE — CONSULTS
[id number] [facility] Orthopedic Surgery  Consult Note    Patient: Carlos Schmidt  Admit Date: 10/10/2022  Requesting Physician: Tessa Anaya MD  Room: ED-0020/20    Chief complaint: Neck pain extending into the left shoulder and upper arm and chest    HPI: Carlos Schmidt is a 78 y.o. female who presented to [facility] ER today from her SNF complaining of severe chest shoulder and neck pain. She denies any recent trauma. She was just discharged to a SNF 3 days ago. She has been having left-sided shoulder wrist and thumb pain during that admission and was treated with oral steroids as well as a CMC brace. She tells me that the nursing aides have been lifting her by putting her arms under her shoulders which seems to be aggravating her shoulder pain. She has known bilateral shoulder rotator cuff arthropathy. She has tried injections in the past with limited benefit. She does have peripheral neuropathy and reports her numbness/tingling of her right greater than left hands are at baseline. She describes moderate pain extending from base of her neck into her left shoulder and upper arm rated 6/10. She is much more comfortable since receiving morphine. 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 Imaging review of the left shoulder via chest CT this admission demonstrated: No acute fracture or malalignment, there is cystic change of the greater tuberosity consistent with prior rotator cuff disease. Moderate AC joint arthritis noted. Multilevel cervical degenerative disc disease noted. Patient came in from SNF where she had just discharged to on 10/7 and uses a walker to ambulate.       Medical History:  Past Medical History:   Diagnosis Date    4/11/17 Left knee repeat repair of median parapatellar arthrotomy with augmentation 4/12/2017    Arthritis     Atrial fibrillation (HCC)     CAD (coronary artery disease)     Cataract     Chronic diastolic congestive heart failure ([address] 7/27/2022 Diabetes mellitus ([address]     GERD (gastroesophageal reflux disease)     Hyperlipidemia     Hypertension     HYPOTHRYROIDISM     Non-English speaking patient     SPEAKS Bengali. SHE SPEAKS A LITTLE ENGLISH    Sleep apnea     unspecified    Thyroid disease     UTI      Past Surgical History:   Procedure Laterality Date    CARDIAC SURGERY  2004    CABG X 4 VESSELS    CHOLECYSTECTOMY, LAPAROSCOPIC  5/15/14    with IOC    CT BONE MARROW BIOPSY  9/29/2022    CT BONE MARROW BIOPSY 9/29/2022 [facility] CT SCAN    JOINT REPLACEMENT Bilateral 2000 AND 1996 And 1998    knee    KNEE SURGERY Left 02/28/2017    left knee poly exchange revision     REVISION TOTAL KNEE ARTHROPLASTY Right 11/22/2016    RIGHT TOTAL KNEE REVISION WITH POLY EXCHANGE    TOTAL KNEE ARTHROPLASTY      partical/ left     UPPER GASTROINTESTINAL ENDOSCOPY N/A 10/3/2022    EGD BIOPSY performed by Reymundo Ovalle MD at [facility] N/A 10/3/2022    EGD DILATION BALLOON performed by Reymundo Ovalle MD at [address], [address] History:    reports that she has never smoked. She has never used smokeless tobacco.    Family History:        Problem Relation Age of Onset    Arthritis Other     Diabetes Other     Heart Disease Other     Hypertension Other     High Cholesterol Brother        Medications:  ALL MEDICATIONS HAVE BEEN REVIEWED:  Scheduled:   cyclobenzaprine  5 mg Oral Once     Continuous:  PRN:    Allergies: Allergies   Allergen Reactions    Aspirin Other (See Comments)     GI upset    Ibuprofen Other (See Comments)     GI upset    Macrobid [Nitrofurantoin Monohydrate Macrocrystals] Other (See Comments)     COLD, SICK    Adhesive Tape Rash    Lexiscan [Regadenoson] Rash     Happened twice with Lexiscan    Penicillins Rash       Review of Systems:  Constitutional: Negative for fever, chills, fatigue. Skin:  Negative for pruritis, rash  Eyes: Negative for photophobia and visual disturbance.    ENT:  Negative for rhinorrhea, epistaxis, sore throat  Respiratory:  Negative for cough and shortness of breath. Cardiovascular: Negative for chest pain. Gastrointestinal: Negative for nausea, vomiting, diarrhea. Genitourinary: Negative for dysuria and difficulty urinating. Neurological: Negative for confusion, dysarthria, tremors, seizures. Psychiatric:  Negative for depression or anxiety  Musculoskeletal:  Positive for neck and left shoulder/arm pain    Objective:  Vitals:    10/10/22 1330   BP: 117/68   Pulse: 88   Resp: 12   Temp:    SpO2: 99%      Physical Examination:  GENERAL: No apparent distress, well-nourished  SKIN:  Warm and dry  EYES: Nonicteric. ENT: Mucous membranes moist  HEAD: Normocephalic, atraumatic  RESPIRATORY: Resp easy and unlabored  CARDIOVASCULAR: Regular rate and rhythm  GI: Abdomen soft, nontender  NEURO: Awake and alert. No speech defect  PSYCHIATRIC: Appropriate affect; not agitated  MUSCULOSKELETAL: Left shoulder  Inspection: On exam there are no ulcerations, rashes or lesions about the left shoulder. There is pain to palpation of the left shoulder including AC joint, long head of the biceps, and subacromial space. She also has diffuse tenderness to palpation about the cervical spine with decreased range of motion secondary pain. She has negative Spurling's sign. She has negative drop arm test on the left. She can flex and extend the left elbow. She can gently internally and externally rotate the left shoulder with some pain. She refuses to abduct or forward flex the shoulder due to pain. CMC brace in place on the left. Motor: Intact left wrist flexion and extension as well as flexion extension of all digits of the left hand. Sensation: Grossly intact to light touch throughout the left upper extremity in all nerve distributions. Vascular: 2+ left radial pulse.     Labs reviewed:  Recent Labs     10/10/22  1120   WBC 8.5   HGB 7.9*   HCT 23.8*   PLT 91*     Recent Labs 10/10/22  1120      K 4.1   CL 99   CO2 27   BUN 31*   CREATININE 0.8   GLUCOSE 151*   CALCIUM 9.1     No results for input(s): INR, PROTIME in the last 72 hours. Lab Results   Component Value Date    COLORU Yellow 09/24/2022    CLARITYU CLOUDY (A) 09/24/2022    PHUR 5.5 09/24/2022    GLUCOSEU 500 (A) 09/24/2022    BLOODU Negative 09/24/2022    LEUKOCYTESUR TRACE (A) 09/24/2022    BILIRUBINUR Negative 09/24/2022    UROBILINOGEN 0.2 09/24/2022    RBCUA 1 09/24/2022    WBCUA 11 (H) 09/24/2022    BACTERIA None Seen 09/24/2022       Imaging:  CT CHEST PULMONARY EMBOLISM W CONTRAST   Final Result   No evidence of pulmonary embolism or acute pulmonary abnormality. XR CHEST PORTABLE   Final Result   No radiographic evidence of acute pulmonary disease. IMPRESSION:  Cervical DDD  Bilateral rotator cuff arthropathy  Left CMC arthritis  Active Problems:    * No active hospital problems. *  Resolved Problems:    * No resolved hospital problems. *      RECOMMENDATIONS:  Recommend conservative management. She needs to keep her outpatient appt with Dr. Vincent Morales for pain management on Thursday of this week. She has not received much benefit from injections in the past so no plans for further injections at this point.  - WBAT  - PT/OT  - Pain control: Scheduled Tylenol, Flexeril  - DVT prophylaxis: per primary team  - Dispo: Per primary team; we will sign off, please call with questions. Patient denies tobacco use.     MATEUSZ Gomez - CNP  10/10/2022  2:32 PM

## 2022-10-10 NOTE — ED NOTES
Pt O2 decreased to 84% after morphine was given. O2 applied at 3l via nc. 02 increased to 98%.      Leo Noss, RN  10/10/22 1318

## 2022-10-10 NOTE — ACP (ADVANCE CARE PLANNING)
Advanced Care Planning Note. Purpose of Encounter: Advanced care planning in light of hospitalization  Parties In Attendance: Patient,    Decisional Capacity: Yes  Subjective: Patient  understand that this conversation is to address long term care goal  Objective: Patient admitted to hospital with shoulder pain hx of paroxysmal A. fib hypertension  Goals of Care Determination: Patient would pursue CPR and Intubation if required.    Code Status: full code  Time spent on Advanced care Plannin minutes  Advanced Care Planning Documents: Patient would want aRdha Iqbal contacted and joint decision between the family    Em Corey MD  10/10/2022 2:54 PM

## 2022-10-10 NOTE — PROGRESS NOTES
1645: Patient to floor from ER. Admission assessment completed, VSS, alert and oriented. Call light within reach. The care plan and education has been reviewed and mutually agreed upon with the patient.

## 2022-10-10 NOTE — H&P
HOSPITALISTS HISTORY AND PHYSICAL    10/10/2022 2:46 PM    Patient Information:  Ervin Bain is a 78 y.o. female [id number]  PCP:  MATEUSZ Stinson CNP (Tel: [phone] )    Chief complaint:    Chief Complaint   Patient presents with    Chest Pain     Pt transported from [facility] from [facility]. PT c/o chest pain radiating to left shoulder. Symptoms started 2 days ago. Pt stated she was seen in [facility] and was inpatient for 2 weeks for left shoulder complications. History of Present Illness:  Sasha Beasley is a 78 y.o. female who is complaining of left shoulder pain that has been getting worse for the last 2 weeks worse when she moves her shoulder 10 out of 10 sharp pain patient denies any midsternal chest pain no nausea vomiting no shortness of breath no exertional shortness of breath. No fevers or chills          REVIEW OF SYSTEMS:   Constitutional: Negative for fever,chills or night sweats  ENT: Negative for rhinorrhea, epistaxis, hoarseness, sore throat. Respiratory: Negative for shortness of breath,wheezing  Cardiovascular: Negative for chest pain, palpitations   Gastrointestinal: Negative for nausea, vomiting, diarrhea  Genitourinary: Negative for polyuria, dysuria   Hematologic/Lymphatic: Negative for bleeding tendency, easy bruising  Musculoskeletal: see above  Neurologic: Negative for confusion,dysarthria. Skin: Negative for itching,rash  Psychiatric: Negative for depression,anxiety, agitation. Endocrine: Negative for polydipsia,polyuria,heat /cold intolerance.     Past Medical History:   has a past medical history of 4/11/17 Left knee repeat repair of median parapatellar arthrotomy with augmentation, Arthritis, Atrial fibrillation ([address] CAD (coronary artery disease), Cataract, Chronic diastolic congestive heart failure ([address] Diabetes mellitus ([address] GERD (gastroesophageal reflux disease), Hyperlipidemia, Hypertension, HYPOTHRYROIDISM, Non-English speaking patient, Sleep apnea, Thyroid disease, and UTI. Past Surgical History:   has a past surgical history that includes joint replacement (Bilateral, [address]); Cardiac surgery (2004); Cholecystectomy, laparoscopic (5/15/14); Revision total knee arthroplasty (Right, 11/22/2016); Total knee arthroplasty; knee surgery (Left, 02/28/2017); CT BIOPSY BONE MARROW (9/29/2022); Upper gastrointestinal endoscopy (N/A, 10/3/2022); and Upper gastrointestinal endoscopy (N/A, 10/3/2022). Medications:  No current facility-administered medications on file prior to encounter. Current Outpatient Medications on File Prior to Encounter   Medication Sig Dispense Refill    empagliflozin (JARDIANCE) 10 MG tablet Take 1 tablet by mouth daily 30 tablet 3    insulin glargine (BASAGLAR KWIKPEN) 100 UNIT/ML injection pen Inject 48 Units into the skin daily 5 Adjustable Dose Pre-filled Pen Syringe 3    sacubitril-valsartan (ENTRESTO) 24-26 MG per tablet Take 0.5 tablets by mouth 2 times daily 60 tablet 1    furosemide (LASIX) 20 MG tablet Take 1 tablet by mouth daily 60 tablet 3    benzonatate (TESSALON) 100 MG capsule Take 100 mg by mouth 3 times daily as needed for Cough      pantoprazole (PROTONIX) 40 MG tablet Take 40 mg by mouth every morning (before breakfast)      calcium carbonate (OSCAL) 500 MG TABS tablet Take 500 mg by mouth daily      gabapentin (NEURONTIN) 300 MG capsule Take 1 tablet in am, 2 tablets in pm. 90 capsule 3    acetaminophen-codeine (TYLENOL #3) 300-30 MG per tablet Take 1 tablet by mouth every 6 hours as needed for Pain.  0    triamcinolone (KENALOG) 0.1 % cream Apply topically 2 times daily as needed Apply topically 2 times daily.       senna (SENOKOT) 8.6 MG tablet Take 1 tablet by mouth 2 times daily      insulin aspart (NOVOLOG) 100 UNIT/ML injection vial Inject 15 Units into the skin 3 times daily      phenazopyridine (PYRIDIUM) 100 MG tablet Take 100 mg by mouth 2 times daily      magnesium 30 MG tablet Take 40 mg by mouth daily      nystatin (MYCOSTATIN) 537574 UNIT/GM powder Apply topically 2 times daily      tiZANidine (ZANAFLEX) 2 MG tablet Take 2 mg by mouth every 8 hours as needed      metoprolol tartrate (LOPRESSOR) 25 MG tablet TAKE ONE-HALF TABLET BY MOUTH TWICE A DAY 90 tablet 4    atorvastatin (LIPITOR) 80 MG tablet Take 1 tablet by mouth daily 90 tablet 4    Insulin Pen Needle 32G X 6 MM MISC by Does not apply route 3 times daily with meals      hydrocortisone (ANUSOL-HC) 2.5 % rectal cream Place rectally 2 times daily Place rectally 2 times daily. rivaroxaban (XARELTO) 20 MG TABS tablet Take 1 tablet by mouth daily 90 tablet 3    vitamin D (ERGOCALCIFEROL) 79910 UNITS CAPS capsule Take 50,000 Units by mouth once a week      Omega 3 1000 MG CAPS Take 1,000 mg by mouth daily      Liraglutide (VICTOZA) 18 MG/3ML SOPN SC injection Inject 1.2 mg into the skin daily      Calcium Carb-Cholecalciferol (OYSTER SHELL CALCIUM + D PO) Take by mouth daily  (Patient not taking: Reported on 9/25/2022)      lidocaine (LIDODERM) 5 % Place 1 patch onto the skin daily 12 hours on, 12 hours off. 30 patch 0    oxybutynin (DITROPAN XL) 15 MG CR tablet Take 15 mg by mouth daily. Loratadine 10 MG CAPS Take by mouth daily       levothyroxine (SYNTHROID) 175 MCG tablet Take 150 mcg by mouth daily. Allergies: Allergies   Allergen Reactions    Aspirin Other (See Comments)     GI upset    Ibuprofen Other (See Comments)     GI upset    Macrobid [Nitrofurantoin Monohydrate Macrocrystals] Other (See Comments)     COLD, SICK    Adhesive Tape Rash    Lexiscan [Regadenoson] Rash     Happened twice with Lexiscan    Penicillins Rash        Social History:  Patient Lives at home   reports that she has never smoked.  She has never used smokeless tobacco. She reports that she does not drink alcohol and does not use drugs. Family History:  family history includes Arthritis in an other family member; Diabetes in an other family member; Heart Disease in an other family member; High Cholesterol in her brother; Hypertension in an other family member. ,     Physical Exam:  /68   Pulse 88   Temp 98.5 °F (36.9 °C) (Oral)   Resp 12   Ht 5' (1.524 m)   Wt 241 lb (109.3 kg)   LMP  (LMP Unknown)   SpO2 99%   BMI 47.07 kg/m²     General appearance:  Appears comfortable. AAOx3  HEENT: atraumatic, Pupils equal, muscous membranes moist, no masses appreciated  Cardiovascular: Regular rate and rhythm no murmurs appreciated  Respiratory: CTAB no wheezing  Gastrointestinal: Abdomen soft, non-tender, BS+  EXT: no edema  MSK: left sholder tender to palpation   Neurology: no gross focal deficts  Psychiatry: Appropriate affect. Not agitated  Skin: Warm, dry, no rashes appreciated    Labs:  CBC:   Lab Results   Component Value Date/Time    WBC 8.5 10/10/2022 11:20 AM    RBC 2.43 10/10/2022 11:20 AM    HGB 7.9 10/10/2022 11:20 AM    HCT 23.8 10/10/2022 11:20 AM    MCV 98.2 10/10/2022 11:20 AM    MCH 32.5 10/10/2022 11:20 AM    MCHC 33.1 10/10/2022 11:20 AM    RDW 21.5 10/10/2022 11:20 AM    PLT 91 10/10/2022 11:20 AM    MPV 8.8 10/10/2022 11:20 AM     BMP:    Lab Results   Component Value Date/Time     10/10/2022 11:20 AM    K 4.1 10/10/2022 11:20 AM    CL 99 10/10/2022 11:20 AM    CO2 27 10/10/2022 11:20 AM    BUN 31 10/10/2022 11:20 AM    CREATININE 0.8 10/10/2022 11:20 AM    CALCIUM 9.1 10/10/2022 11:20 AM    GFRAA >60 10/10/2022 11:20 AM    GFRAA >60 07/10/2012 02:05 PM    LABGLOM >60 10/10/2022 11:20 AM    GLUCOSE 151 10/10/2022 11:20 AM     CT CHEST PULMONARY EMBOLISM W CONTRAST   Final Result   No evidence of pulmonary embolism or acute pulmonary abnormality. XR CHEST PORTABLE   Final Result   No radiographic evidence of acute pulmonary disease.              Recent imaging reviewed    Problem List  Principal Problem:    Shoulder pain  Resolved Problems:    * No resolved hospital problems. *        Assessment/Plan:   Left progressive shoulder pain  - consutl ortho  - flexril and iv moprhine monitor for response    Elevated troponin: at baseline repeat troponin    Htn home med    PAF: home meds xarelto    Dm2 home insulin      DVT prophylaxis xarelto  Code status full code      Please note that some part of this chart was generated using Dragon dictation software. Although every effort was made to ensure the accuracy of this automated transcription, some errors in transcription may have occurred inadvertently. If you may need any clarification, please do not hesitate to contact me through [facility].        Gloria Seip, MD    10/10/2022 2:46 PM

## 2022-10-10 NOTE — ED PROVIDER NOTES
EMERGENCY DEPARTMENT PROVIDER NOTE    Patient Identification  Pt Name: Robert Gibbons  MRN: [id number]  Rosettagfmary 1940  Date of evaluation: 10/10/2022  Provider: Alfa Perez DO  PCP: MATEUSZ Garcia - ROHITH    Chief Complaint  Chest Pain (Pt transported from [facility] from [facility]. PT c/o chest pain radiating to left shoulder. Symptoms started 2 days ago. Pt stated she was seen in [facility] and was inpatient for 2 weeks for left shoulder complications.)      HPI  (History provided by patient)  This is a 78 y.o. female with pertinent past medical history of CHF, DM, CAD who was brought in by EMS transportation for chest pain. Patient states pain began about 2 days ago, acutely worsened about 6 hours prior to arrival.  Pain is sharp and aching, worsened with touch and movement, nothing seems to make it better. Radiates into her left shoulder. 10 out of 10 in severity. Patient reports history of pain in her shoulders from arthritis, was recently admitted to this hospital for similar symptoms however she reports the character of her chest pain today is new and worse than usual.  She denies any fevers, chills, shortness of breath or abdominal pain. Of note, patient's primary language is [place]. She is however able to converse in [place] quite well. Patient's daughters are present at bedside assisting with additional translation. Patient declined use of  phone when offered and preferred to speak in [place] with assistance of her daughters. .     ROS    Const:  No fevers, no chills, no generalized weakness  Skin:  No rash, no lesions  Eyes:  No visual changes, no blurry or double vision, no pain  ENT:  No sore throat, no difficulty swallowing, no ear pain, no sinus pain or congestion  Card:  +chest pain, no palpitations, no edema  Resp:  No shortness of breath, no cough, no wheezing  Abd:  No abdominal pain, no nausea, no vomiting, no diarrhea  Genitourinary:  No dysuria, no hematuria  MSK:  +joint pain, +myalgia  Neuro:  No focal weakness, no headache, no paresthesia    All other systems reviewed and negative unless otherwise noted in HPI      I have reviewed the following nursing documentation:  Allergies: Aspirin, Ibuprofen, Macrobid [nitrofurantoin monohydrate macrocrystals], Adhesive tape, Lexiscan [regadenoson], and Penicillins    Past medical history:   Past Medical History:   Diagnosis Date    4/11/17 Left knee repeat repair of median parapatellar arthrotomy with augmentation 4/12/2017    Arthritis     Atrial fibrillation (HCC)     CAD (coronary artery disease)     Cataract     Chronic diastolic congestive heart failure ([facility] [address] 7/27/2022    Diabetes mellitus ([facility] [address]     GERD (gastroesophageal reflux disease)     Hyperlipidemia     Hypertension     HYPOTHRYROIDISM     Non-English speaking patient     SPEAKS Thai. SHE SPEAKS A LITTLE ENGLISH    Sleep apnea     unspecified    Thyroid disease     UTI      Past surgical history:   Past Surgical History:   Procedure Laterality Date    CARDIAC SURGERY  2004    CABG X 4 VESSELS    CHOLECYSTECTOMY, LAPAROSCOPIC  5/15/14    with IOC    CT BONE MARROW BIOPSY  9/29/2022    CT BONE MARROW BIOPSY 9/29/2022 [facility] CT SCAN    JOINT REPLACEMENT Bilateral 2000 AND 1996 And 1998    knee    KNEE SURGERY Left 02/28/2017    left knee poly exchange revision     REVISION TOTAL KNEE ARTHROPLASTY Right 11/22/2016    RIGHT TOTAL KNEE REVISION WITH POLY EXCHANGE    TOTAL KNEE ARTHROPLASTY      partical/ left     UPPER GASTROINTESTINAL ENDOSCOPY N/A 10/3/2022    EGD BIOPSY performed by Allan Howe MD at [facility] N/A 10/3/2022    EGD DILATION BALLOON performed by Allan Howe MD at [facility] medications:   Previous Medications    ACETAMINOPHEN-CODEINE (TYLENOL #3) 300-30 MG PER TABLET    Take 1 tablet by mouth every 6 hours as needed for Pain.     ATORVASTATIN (LIPITOR) 80 MG TABLET    Take 1 tablet by mouth daily    BENZONATATE (TESSALON) 100 MG CAPSULE    Take 100 mg by mouth 3 times daily as needed for Cough    CALCIUM CARB-CHOLECALCIFEROL (OYSTER SHELL CALCIUM + D PO)    Take by mouth daily     CALCIUM CARBONATE (OSCAL) 500 MG TABS TABLET    Take 500 mg by mouth daily    EMPAGLIFLOZIN (JARDIANCE) 10 MG TABLET    Take 1 tablet by mouth daily    FUROSEMIDE (LASIX) 20 MG TABLET    Take 1 tablet by mouth daily    GABAPENTIN (NEURONTIN) 300 MG CAPSULE    Take 1 tablet in am, 2 tablets in pm.    HYDROCORTISONE (ANUSOL-HC) 2.5 % RECTAL CREAM    Place rectally 2 times daily Place rectally 2 times daily. INSULIN ASPART (NOVOLOG) 100 UNIT/ML INJECTION VIAL    Inject 15 Units into the skin 3 times daily    INSULIN GLARGINE (BASAGLAR KWIKPEN) 100 UNIT/ML INJECTION PEN    Inject 48 Units into the skin daily    INSULIN PEN NEEDLE 32G X 6 MM MISC    by Does not apply route 3 times daily with meals    LEVOTHYROXINE (SYNTHROID) 175 MCG TABLET    Take 150 mcg by mouth daily. LIDOCAINE (LIDODERM) 5 %    Place 1 patch onto the skin daily 12 hours on, 12 hours off. LIRAGLUTIDE (VICTOZA) 18 MG/3ML SOPN SC INJECTION    Inject 1.2 mg into the skin daily    LORATADINE 10 MG CAPS    Take by mouth daily     MAGNESIUM 30 MG TABLET    Take 40 mg by mouth daily    METOPROLOL TARTRATE (LOPRESSOR) 25 MG TABLET    TAKE ONE-HALF TABLET BY MOUTH TWICE A DAY    NYSTATIN (MYCOSTATIN) 938173 UNIT/GM POWDER    Apply topically 2 times daily    OMEGA 3 1000 MG CAPS    Take 1,000 mg by mouth daily    OXYBUTYNIN (DITROPAN XL) 15 MG CR TABLET    Take 15 mg by mouth daily.     PANTOPRAZOLE (PROTONIX) 40 MG TABLET    Take 40 mg by mouth every morning (before breakfast)    PHENAZOPYRIDINE (PYRIDIUM) 100 MG TABLET    Take 100 mg by mouth 2 times daily    RIVAROXABAN (XARELTO) 20 MG TABS TABLET    Take 1 tablet by mouth daily    SACUBITRIL-VALSARTAN (ENTRESTO) 24-26 MG PER TABLET    Take 0.5 tablets by mouth 2 times daily    SENNA (SENOKOT) 8.6 MG TABLET    Take 1 tablet by mouth 2 times daily    TIZANIDINE (ZANAFLEX) 2 MG TABLET    Take 2 mg by mouth every 8 hours as needed    TRIAMCINOLONE (KENALOG) 0.1 % CREAM    Apply topically 2 times daily as needed Apply topically 2 times daily. VITAMIN D (ERGOCALCIFEROL) 08871 UNITS CAPS CAPSULE    Take 50,000 Units by mouth once a week       Social history:  reports that she has never smoked. She has never used smokeless tobacco. She reports that she does not drink alcohol and does not use drugs. Family history:    Family History   Problem Relation Age of Onset    Arthritis Other     Diabetes Other     Heart Disease Other     Hypertension Other     High Cholesterol Brother          Exam  ED Triage Vitals [10/10/22 1102]   BP Temp Temp Source Heart Rate Resp SpO2 Height Weight   (!) 116/55 98.5 °F (36.9 °C) Oral 76 22 98 % 5' (1.524 m) 241 lb (109.3 kg)     Nursing note and vitals reviewed. Constitutional: Well developed, well nourished. Non-toxic in appearance. HENT:      Head: Normocephalic and atraumatic. Ears: External ears normal.      Nose: Nose normal.     Mouth: Membrane mucosa moist and pink. Eyes: Anicteric sclera. No discharge. Neck: Supple. Trachea midline. Cardiovascular: RRR; no murmurs, rubs, or gallops. Radial 2+ and symmetric. Pulmonary/Chest: Effort normal. No respiratory distress. CTAB. No stridor. No wheezes. No rales. Exquisite tenderness palpation overlying left anterior chest wall and shoulder. No crepitus or overlying skin changes. Abdominal: Soft. No distension. Nontender to deep palpation all quadrants. Musculoskeletal: Moves all extremities. No gross deformity. Neurological: Alert and orientedx4. Face symmetric. Speech is clear. 5 out of 5 motor and sensation grossly intact bilateral upper extremities. Skin: Warm and dry. No rash. Psychiatric: Normal mood and affect.  Behavior is normal.    Procedures      EKG    EKG was reviewed by emergency department physician in the absence of a cardiologist    Wide complex regular rhythm, rate 78, left axis deviation, wide QRS intervals, normal Qtc, no specific ST elevations or depressions, TWI V2, impression wide-complex regular rhythm likely sinus rhythm with RBBB, no STEMI, morphology similar to comparison 9/30/2022      Radiology  CT CHEST PULMONARY EMBOLISM W CONTRAST   Final Result   No evidence of pulmonary embolism or acute pulmonary abnormality. XR CHEST PORTABLE   Final Result   No radiographic evidence of acute pulmonary disease.              Labs  Results for orders placed or performed during the hospital encounter of 10/10/22   CBC with Auto Differential   Result Value Ref Range    WBC 8.5 4.0 - 11.0 K/uL    RBC 2.43 (L) 4.00 - 5.20 M/uL    Hemoglobin 7.9 (L) 12.0 - 16.0 g/dL    Hematocrit 23.8 (L) 36.0 - 48.0 %    MCV 98.2 80.0 - 100.0 fL    MCH 32.5 26.0 - 34.0 pg    MCHC 33.1 31.0 - 36.0 g/dL    RDW 21.5 (H) 12.4 - 15.4 %    Platelets 91 (L) 957 - 450 K/uL    MPV 8.8 5.0 - 10.5 fL    PLATELET SLIDE REVIEW Decreased     SLIDE REVIEW see below     Neutrophils % 45.0 %    Lymphocytes % 38.0 %    Monocytes % 17.0 %    Eosinophils % 0.0 %    Basophils % 0.0 %    Neutrophils Absolute 3.8 1.7 - 7.7 K/uL    Lymphocytes Absolute 3.2 1.0 - 5.1 K/uL    Monocytes Absolute 1.4 (H) 0.0 - 1.3 K/uL    Eosinophils Absolute 0.0 0.0 - 0.6 K/uL    Basophils Absolute 0.0 0.0 - 0.2 K/uL    Anisocytosis 1+ (A)     Polychromasia 1+ (A)     Ovalocytes Occasional (A)    CMP w/ Reflex to MG   Result Value Ref Range    Sodium 138 136 - 145 mmol/L    Potassium reflex Magnesium 4.1 3.5 - 5.1 mmol/L    Chloride 99 99 - 110 mmol/L    CO2 27 21 - 32 mmol/L    Anion Gap 12 3 - 16    Glucose 151 (H) 70 - 99 mg/dL    BUN 31 (H) 7 - 20 mg/dL    Creatinine 0.8 0.6 - 1.2 mg/dL    GFR Non-African American >60 >60    GFR African American >60 >60    Calcium 9.1 8.3 - 10.6 mg/dL    Total Protein 6.4 6.4 - 8.2 g/dL    Albumin 3.3 (L) 3.4 - 5.0 g/dL Albumin/Globulin Ratio 1.1 1.1 - 2.2    Total Bilirubin 0.4 0.0 - 1.0 mg/dL    Alkaline Phosphatase 75 40 - 129 U/L    ALT 11 10 - 40 U/L    AST 15 15 - 37 U/L   Troponin   Result Value Ref Range    Troponin 0.04 (H) <0.01 ng/mL   Brain Natriuretic Peptide   Result Value Ref Range    Pro- 0 - 449 pg/mL       Screenings   Susannah Coma Scale  Eye Opening: Spontaneous  Best Verbal Response: Oriented  Best Motor Response: Obeys commands  Susannah Coma Scale Score: 15       Is this patient to be included in the SEP-1 Core Measure due to severe sepsis or septic shock? No   Exclusion criteria - the patient is NOT to be included for SEP-1 Core Measure due to: Infection is not suspected      MDM and ED Course    Patient afebrile and nontoxic. No distress. EKG is no STEMI, initial troponin is minimally elevated however flat at patient's baseline, ACS is not immediately evident. No malignant dysrhythmia. Presentation not suggestive of aortic dissection and my clinical suspicion is very low. Given patient's recent prolonged hospitalization with instrumentation (bone biopsy) did proceed with CTA chest which showed no evidence of pulmonary embolism. Also no pneumothorax, significant vascular congestion or infiltrate. Laboratory work-up is reassuring, no acute endorgan dysfunction. Clinically patient is without evidence of CHF exacerbation and BNP is normal.  No other findings of infection, patient is not septic. I suspect patient's pain is most likely musculoskeletal in origin, however she has numerous risk factors including known CAD with ongoing chest pain. Case discussed with internal medicine team and will plan for admission for further evaluation and care and rule out of ACS. Patient remained alert, hemodynamically stable and in no distress over her emergency department course. I Dr. Jose Wolf am the primary clinician of record. Final Impression  1. Acute chest pain    2.  History of coronary artery disease    3. Chronic congestive heart failure, unspecified heart failure type (HCC)        Blood pressure 117/68, pulse 88, temperature 98.5 °F (36.9 °C), temperature source Oral, resp. rate 12, height 5' (1.524 m), weight 241 lb (109.3 kg), SpO2 99 %, not currently breastfeeding. Disposition:  DISPOSITION Decision To Admit 10/10/2022 01:41:23 PM      Patient Referrals:  No follow-up provider specified. Discharge Medications:  New Prescriptions    No medications on file       Discontinued Medications:  Discontinued Medications    No medications on file       This chart was generated using the [address] [place] [address] dictation system. I created this record but it may contain dictation errors given the limitations of this technology.     Singh Rodriguez DO (electronically signed)  Attending Emergency Physician       Singh Rodriguez DO  10/10/22 5631

## 2022-10-11 ENCOUNTER — TELEPHONE (OUTPATIENT)
Dept: CARDIOLOGY CLINIC | Age: 82
End: 2022-10-11

## 2022-10-11 LAB
ANION GAP SERPL CALCULATED.3IONS-SCNC: 10 MMOL/L (ref 3–16)
ANISOCYTOSIS: ABNORMAL
BASOPHILS ABSOLUTE: 0 K/UL (ref 0–0.2)
BASOPHILS RELATIVE PERCENT: 0 %
BUN BLDV-MCNC: 30 MG/DL (ref 7–20)
CALCIUM SERPL-MCNC: 8.6 MG/DL (ref 8.3–10.6)
CHLORIDE BLD-SCNC: 99 MMOL/L (ref 99–110)
CO2: 28 MMOL/L (ref 21–32)
CREAT SERPL-MCNC: 1.2 MG/DL (ref 0.6–1.2)
EOSINOPHILS ABSOLUTE: 0 K/UL (ref 0–0.6)
EOSINOPHILS RELATIVE PERCENT: 0 %
GFR AFRICAN AMERICAN: 52
GFR NON-AFRICAN AMERICAN: 43
GLUCOSE BLD-MCNC: 101 MG/DL (ref 70–99)
GLUCOSE BLD-MCNC: 116 MG/DL (ref 70–99)
GLUCOSE BLD-MCNC: 118 MG/DL (ref 70–99)
GLUCOSE BLD-MCNC: 164 MG/DL (ref 70–99)
GLUCOSE BLD-MCNC: 186 MG/DL (ref 70–99)
HCT VFR BLD CALC: 22.8 % (ref 36–48)
HEMOGLOBIN: 7.5 G/DL (ref 12–16)
LYMPHOCYTES ABSOLUTE: 3.9 K/UL (ref 1–5.1)
LYMPHOCYTES RELATIVE PERCENT: 39 %
MCH RBC QN AUTO: 31.9 PG (ref 26–34)
MCHC RBC AUTO-ENTMCNC: 32.7 G/DL (ref 31–36)
MCV RBC AUTO: 97.6 FL (ref 80–100)
MONOCYTES ABSOLUTE: 2.2 K/UL (ref 0–1.3)
MONOCYTES RELATIVE PERCENT: 22 %
NEUTROPHILS ABSOLUTE: 3.9 K/UL (ref 1.7–7.7)
NEUTROPHILS RELATIVE PERCENT: 39 %
OVALOCYTES: ABNORMAL
PDW BLD-RTO: 21.7 % (ref 12.4–15.4)
PERFORMED ON: ABNORMAL
PLATELET # BLD: 87 K/UL (ref 135–450)
PLATELET SLIDE REVIEW: ABNORMAL
PMV BLD AUTO: 8.9 FL (ref 5–10.5)
POTASSIUM REFLEX MAGNESIUM: 4.5 MMOL/L (ref 3.5–5.1)
RBC # BLD: 2.34 M/UL (ref 4–5.2)
SLIDE REVIEW: ABNORMAL
SODIUM BLD-SCNC: 137 MMOL/L (ref 136–145)
TROPONIN: 0.05 NG/ML
WBC # BLD: 10 K/UL (ref 4–11)

## 2022-10-11 PROCEDURE — 6370000000 HC RX 637 (ALT 250 FOR IP): Performed by: NURSE PRACTITIONER

## 2022-10-11 PROCEDURE — 97166 OT EVAL MOD COMPLEX 45 MIN: CPT

## 2022-10-11 PROCEDURE — 2580000003 HC RX 258: Performed by: INTERNAL MEDICINE

## 2022-10-11 PROCEDURE — 94760 N-INVAS EAR/PLS OXIMETRY 1: CPT

## 2022-10-11 PROCEDURE — G0378 HOSPITAL OBSERVATION PER HR: HCPCS

## 2022-10-11 PROCEDURE — 97530 THERAPEUTIC ACTIVITIES: CPT

## 2022-10-11 PROCEDURE — 96361 HYDRATE IV INFUSION ADD-ON: CPT

## 2022-10-11 PROCEDURE — 6370000000 HC RX 637 (ALT 250 FOR IP): Performed by: INTERNAL MEDICINE

## 2022-10-11 PROCEDURE — 85025 COMPLETE CBC W/AUTO DIFF WBC: CPT

## 2022-10-11 PROCEDURE — 80048 BASIC METABOLIC PNL TOTAL CA: CPT

## 2022-10-11 PROCEDURE — 2700000000 HC OXYGEN THERAPY PER DAY

## 2022-10-11 PROCEDURE — 84484 ASSAY OF TROPONIN QUANT: CPT

## 2022-10-11 PROCEDURE — 36415 COLL VENOUS BLD VENIPUNCTURE: CPT

## 2022-10-11 PROCEDURE — 97162 PT EVAL MOD COMPLEX 30 MIN: CPT

## 2022-10-11 RX ORDER — FUROSEMIDE 20 MG/1
TABLET ORAL
Qty: 60 TABLET | Refills: 10 | OUTPATIENT
Start: 2022-10-11

## 2022-10-11 RX ORDER — ACETAMINOPHEN 325 MG/1
650 TABLET ORAL 3 TIMES DAILY
Status: DISCONTINUED | OUTPATIENT
Start: 2022-10-11 | End: 2022-10-26 | Stop reason: HOSPADM

## 2022-10-11 RX ORDER — NAPROXEN 500 MG/1
500 TABLET ORAL 2 TIMES DAILY WITH MEALS
Qty: 60 TABLET | Refills: 0 | Status: ON HOLD | OUTPATIENT
Start: 2022-10-11 | End: 2022-11-04 | Stop reason: HOSPADM

## 2022-10-11 RX ORDER — HYDROCODONE BITARTRATE AND ACETAMINOPHEN 5; 325 MG/1; MG/1
1 TABLET ORAL EVERY 6 HOURS PRN
Qty: 12 TABLET | Refills: 0 | Status: SHIPPED | OUTPATIENT
Start: 2022-10-11 | End: 2022-10-14

## 2022-10-11 RX ORDER — CYCLOBENZAPRINE HCL 10 MG
5 TABLET ORAL 3 TIMES DAILY PRN
Status: DISCONTINUED | OUTPATIENT
Start: 2022-10-11 | End: 2022-10-16

## 2022-10-11 RX ORDER — 0.9 % SODIUM CHLORIDE 0.9 %
500 INTRAVENOUS SOLUTION INTRAVENOUS ONCE
Status: COMPLETED | OUTPATIENT
Start: 2022-10-11 | End: 2022-10-11

## 2022-10-11 RX ADMIN — Medication 10 ML: at 09:43

## 2022-10-11 RX ADMIN — ACETAMINOPHEN 650 MG: 325 TABLET ORAL at 11:18

## 2022-10-11 RX ADMIN — ACETAMINOPHEN 650 MG: 325 TABLET ORAL at 03:41

## 2022-10-11 RX ADMIN — INSULIN GLARGINE 48 UNITS: 100 INJECTION, SOLUTION SUBCUTANEOUS at 09:31

## 2022-10-11 RX ADMIN — CYCLOBENZAPRINE 5 MG: 10 TABLET, FILM COATED ORAL at 15:27

## 2022-10-11 RX ADMIN — RIVAROXABAN 15 MG: 15 TABLET, FILM COATED ORAL at 18:16

## 2022-10-11 RX ADMIN — POLYETHYLENE GLYCOL 3350 17 G: 17 POWDER, FOR SOLUTION ORAL at 11:18

## 2022-10-11 RX ADMIN — ATORVASTATIN CALCIUM 80 MG: 80 TABLET, FILM COATED ORAL at 20:23

## 2022-10-11 RX ADMIN — HYDROCODONE BITARTRATE AND ACETAMINOPHEN 1 TABLET: 5; 325 TABLET ORAL at 09:40

## 2022-10-11 RX ADMIN — SODIUM CHLORIDE 500 ML: 9 INJECTION, SOLUTION INTRAVENOUS at 17:03

## 2022-10-11 RX ADMIN — METOPROLOL TARTRATE 25 MG: 25 TABLET, FILM COATED ORAL at 20:24

## 2022-10-11 RX ADMIN — ACETAMINOPHEN 650 MG: 325 TABLET ORAL at 15:29

## 2022-10-11 RX ADMIN — HYDROCODONE BITARTRATE AND ACETAMINOPHEN 1 TABLET: 5; 325 TABLET ORAL at 20:22

## 2022-10-11 RX ADMIN — LEVOTHYROXINE SODIUM 150 MCG: 150 TABLET ORAL at 09:40

## 2022-10-11 RX ADMIN — HYDROCORTISONE: 25 CREAM TOPICAL at 20:25

## 2022-10-11 RX ADMIN — SACUBITRIL AND VALSARTAN 0.5 TABLET: 24; 26 TABLET, FILM COATED ORAL at 20:22

## 2022-10-11 RX ADMIN — INSULIN LISPRO 15 UNITS: 100 INJECTION, SOLUTION INTRAVENOUS; SUBCUTANEOUS at 11:51

## 2022-10-11 ASSESSMENT — PAIN DESCRIPTION - LOCATION
LOCATION: SHOULDER

## 2022-10-11 ASSESSMENT — PAIN SCALES - GENERAL
PAINLEVEL_OUTOF10: 9
PAINLEVEL_OUTOF10: 7
PAINLEVEL_OUTOF10: 9
PAINLEVEL_OUTOF10: 8
PAINLEVEL_OUTOF10: 9

## 2022-10-11 ASSESSMENT — PAIN DESCRIPTION - ONSET: ONSET: ON-GOING

## 2022-10-11 ASSESSMENT — PAIN DESCRIPTION - DESCRIPTORS
DESCRIPTORS: ACHING;DISCOMFORT
DESCRIPTORS: ACHING
DESCRIPTORS: ACHING;DISCOMFORT
DESCRIPTORS: ACHING

## 2022-10-11 ASSESSMENT — PAIN DESCRIPTION - FREQUENCY: FREQUENCY: CONTINUOUS

## 2022-10-11 ASSESSMENT — PAIN DESCRIPTION - ORIENTATION
ORIENTATION: LEFT

## 2022-10-11 ASSESSMENT — PAIN DESCRIPTION - PAIN TYPE
TYPE: ACUTE PAIN;CHRONIC PAIN
TYPE: ACUTE PAIN

## 2022-10-11 ASSESSMENT — PAIN - FUNCTIONAL ASSESSMENT: PAIN_FUNCTIONAL_ASSESSMENT: PREVENTS OR INTERFERES SOME ACTIVE ACTIVITIES AND ADLS

## 2022-10-11 NOTE — CARE COORDINATION
Discharge Planning Note:    Talked with Gucci Coelho:    - Patient is from [facility] and is able to return upon discharge. Met with the Patient:    - Patient speaks English    - Patient is in agreement with returning to [facility].    - No pre-cert required    Will continue to follow.     BRIAN Ortiz RN    [facility]  Phone: [phone]

## 2022-10-11 NOTE — PROGRESS NOTES
[facility] [address]   Phone: [phone]    Occupational Therapy    [x] Initial Evaluation            [] Daily Treatment Note         [] Discharge Summary      Patient: Bety Blackmon   : 1940   MRN: [id number]   Date of Service:  10/11/2022    Admitting Diagnosis:  Shoulder pain  Current Admission Summary:  Bety Blackmon is a 78 y.o. female who presented to [facility] ER today from her SNF complaining of severe chest shoulder and neck pain. She denies any recent trauma. She was just discharged to a SNF 3 days ago. She has been having left-sided shoulder wrist and thumb pain during that admission and was treated with oral steroids as well as a CMC brace. She tells me that the nursing aides have been lifting her by putting her arms under her shoulders which seems to be aggravating her shoulder pain. She has known bilateral shoulder rotator cuff arthropathy. She has tried injections in the past with limited benefit. She does have peripheral neuropathy and reports her numbness/tingling of her right greater than left hands are at baseline. She describes moderate pain extending from base of her neck into her left shoulder and upper arm rated 6/10. She is much more comfortable since receiving morphine. 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 Imaging review of the left shoulder via chest CT this admission demonstrated: No acute fracture or malalignment, there is cystic change of the greater tuberosity consistent with prior rotator cuff disease. Moderate AC joint arthritis noted. Multilevel cervical degenerative disc disease noted.   Past Medical History:  has a past medical history of 15 Left knee repeat repair of median parapatellar arthrotomy with augmentation, Arthritis, Atrial fibrillation ([address] CAD (coronary artery disease), Cataract, Chronic diastolic congestive heart failure ([address] Diabetes mellitus ([address] GERD (gastroesophageal reflux disease), Hyperlipidemia, Hypertension, HYPOTHRYROIDISM, Non-English speaking patient, Sleep apnea, Thyroid disease, and UTI. Past Surgical History:  has a past surgical history that includes joint replacement (Bilateral, [address]); Cardiac surgery (2004); Cholecystectomy, laparoscopic (5/15/14); Revision total knee arthroplasty (Right, 11/22/2016); Total knee arthroplasty; knee surgery (Left, 02/28/2017); CT BIOPSY BONE MARROW (9/29/2022); Upper gastrointestinal endoscopy (N/A, 10/3/2022); and Upper gastrointestinal endoscopy (N/A, 10/3/2022). Discharge Recommendations: Maximiliano Wright scored a 8/24 on the AM-PAC ADL Inpatient form. Current research shows that an AM-PAC score of 17 or less is typically not associated with a discharge to the patient's home setting. Based on the patient's AM-PAC score and their current ADL deficits, it is recommended that the patient have 3-5 sessions per week of Occupational Therapy at d/c to increase the patient's independence. Please see assessment section for further patient specific details. If patient discharges prior to next session this note will serve as a discharge summary. Please see below for the latest assessment towards goals.        DME Required For Discharge: DME to be determined at next level of care, DME to be determined pending patient progress    Precautions/Restrictions: high fall risk  Weight Bearing Restrictions: no restrictions  [] Right Upper Extremity  [] Left Upper Extremity [] Right Lower Extremity  [] Left Lower Extremity     Required Braces/Orthotics: no braces required   [] Right  [] Left  Positional Restrictions:no positional restrictions    Pre-Admission Information   Lives With: alone                     Type of Home: house  Home Layout: one level  Home Access: ramped entry  Bathroom Layout: walker accessible, walk in shower  Bathroom Equipment: grab bars in shower, shower chair  Toilet Height: standard height  Home Equipment: rolling walker, rollator - 4 wheeled walker, manual wheelchair, electric wheelchair  Transfer Assistance: modified independent with use of RW  Ambulation Assistance:modified independent with use of manual w/c, RW for short distances   ADL Assistance: requires assistance with bathing, requires assistance with dressing  IADL Assistance: independent with homemaking tasks home health aide 4 hours/day   Active :        [] Yes                 [x] No  Hand Dominance: [] Left                 [x] Right  Current Employment: retired. Occupation:    Hobbies:   Recent Falls: 1 slip out of wheelchair   *cameras present at home for children to monitor    Examination   Vision:   Vision Gross Assessment: Impaired  Hearing:   WFL  Perception:   WFL  Posture: Forward head, rounded shoulders  Sensation:   reports numbness and tingling in (R) UE, (B) LE and reduced light touch to (R) UE, (B) LE    ROM:   (L) Shoulder: 20 degrees (supine)     (R) Shoulder: 150 degrees supine  Strength:   (L) Shoulder: +2     (R) Shoulder: -3    Decision Making: medium complexity  Clinical Presentation: evolving      Subjective  General: Pt supine in bed upon entry. Family present in room. Pt agreeable to PT/OT evaluations. Increased time required for pt/family education regarding disease process and therapeutic potential. Pt c/o substantial pain in L UE originating from chest/neck, and radiating down to hand. Pain: Pain rating taken based on observed faces and behaviors  Pain Interventions: pain medication in place prior to arrival, RN notified, and biofreeze applied        Activities of Daily Living  Basic Activities of Daily Living  General Comments: Pt unable to participate in ADLs secondary to pain. Instrumental Activities of Daily Living  No IADL completed on this date.     Functional Mobility  Bed Mobility  Supine to Sit: 2 person assistance with dependent   Sit to Supine: 2 person assistance with dependent Comments:Pt c/o significant pain in L UE in during supine>sit. Transfers  No transfers completed on this date secondary to Pt unable to tolerate supported sitting . Comments: Pt heavily guards LUE   Functional Mobility:  Sitting Balance: 2 person assistance with Dependent . Sitting Balance Comment: Pt sat EOB for 2 min before requesting to return to bed. Limited by pain. Other Therapeutic Interventions  UE assessments for function and pain warrant further evaluation. Signs and symptoms inconsistent with adhesive capsulitis but may be concerning for muscle tear (brachial or worsening rotator cuff tear) and/or nerve impingement. Pt may benefit from additional imaging for rule out. Functional Outcomes  AM-PAC Inpatient Daily Activity Raw Score: 8    Cognition  WFL  Orientation:    alert and oriented x 4  Command Following:   [facility]     Education  Barriers To Learning: language  Patient Education: patient educated on goals, OT role and benefits, plan of care, transfer training, discharge recommendations  --increased time required in order to address all pt/family questions regarding rehab potential and POC. Learning Assessment:  patient verbalizes understanding, would benefit from continued reinforcement    Assessment  Activity Tolerance: Primarily limited by pain  Impairments Requiring Therapeutic Intervention: decreased functional mobility, decreased ADL status, decreased ROM, decreased strength, decreased endurance, decreased balance, increased pain  Prognosis: fair  Clinical Assessment: Pt presenting below her functional baseline with the above deficits associated with neck and shoulder pain. Pt with bilateral rotator cuff tears in conjunction with generalized/severe OA of spine, shoulders, and knees. Pt was ambulating only 6-8ft to her manual WC and commode with Rolator and/or RW at [facility].  While she lives alone, she has HHA multiple hours and weeks in conjunction with family assist. At this time, pt is dependent with all mobility and ADLs primarily associated with LUE, neck and chest pain. Pt would benefit from continued OT in order to maximize safety and independence with all ADLs (at highest practicable level).    Safety Interventions: patient left in bed, call light within reach, nurse notified, and family/caregiver present    Plan  Frequency: 3-5 x/per week  Current Treatment Recommendations: strengthening, ROM, balance training, functional mobility training, transfer training, gait training, endurance training, manual therapy - soft tissue massage, ADL/self-care training, pain management, home exercise program, safety education, and positioning    Goals  Patient Goals: Return to home   Short Term Goals:  Time Frame: Discharge  Patient will complete upper body ADL while sitting EOB for 2-4 min with Max A  Patient will complete toileting Max A x2 at [address] Time     Individual Group Co-treatment   Time In    8527 1749   Time Out    1517   Minutes    86         Timed Code Treatment Minutes: 71 Minutes  Total Treatment Minutes:  86 minutes       Electronically Signed By: TRISTON Sarmiento OTR/L  [id number]

## 2022-10-11 NOTE — PROGRESS NOTES
Hospitalist Progress Note      PCP: Tri Larios APRN - CNP    Date of Admission: 10/10/2022    Chief Complaint: L shoulder pain      Subjective:   L shoulder pain improved with opiates   Does have continued generalized weakness and fatigue. Medications:  Reviewed    Infusion Medications    sodium chloride      dextrose       Scheduled Medications    rivaroxaban  15 mg Oral Dinner    acetaminophen  650 mg Oral TID    sodium chloride  500 mL IntraVENous Once    atorvastatin  80 mg Oral Nightly    furosemide  20 mg Oral Daily    hydrocortisone   Rectal BID    insulin lispro  15 Units SubCUTAneous TID WC    insulin glargine  48 Units SubCUTAneous Daily    levothyroxine  150 mcg Oral Daily    metoprolol tartrate  25 mg Oral BID    sacubitril-valsartan  0.5 tablet Oral BID    sodium chloride flush  5-40 mL IntraVENous 2 times per day     PRN Meds: cyclobenzaprine, sodium chloride flush, sodium chloride, ondansetron **OR** ondansetron, polyethylene glycol, acetaminophen **OR** acetaminophen, glucose, dextrose bolus **OR** dextrose bolus, glucagon (rDNA), dextrose, HYDROcodone 5 mg - acetaminophen      Intake/Output Summary (Last 24 hours) at 10/11/2022 1725  Last data filed at 10/11/2022 1017  Gross per 24 hour   Intake 240 ml   Output 2400 ml   Net -2160 ml       Exam:    BP (!) 99/45   Pulse 73   Temp 99.3 °F (37.4 °C) (Oral)   Resp 16   Ht 5' (1.524 m)   Wt 243 lb (110.2 kg)   LMP  (LMP Unknown)   SpO2 95%   BMI 47.46 kg/m²     Gen/overall appearance: Not in acute distress. Alert. Head: Normocephalic, atraumatic  Eyes: EOMI, no scleral icterus  CVS: regular rate and rhythm, Normal S1S2  Pulm: Clear to auscultation bilaterally. No crackles/wheezes  Extremities: No edema.  No erythema or warmth  Neuro: No gross focal deficits noted  Skin: Warm, dry    Labs:   Recent Labs     10/10/22  1120 10/11/22  0509   WBC 8.5 10.0   HGB 7.9* 7.5*   HCT 23.8* 22.8*   PLT 91* 87*     Recent Labs 10/10/22  1120 10/11/22  0509    137   K 4.1 4.5   CL 99 99   CO2 27 28   BUN 31* 30*   CREATININE 0.8 1.2   CALCIUM 9.1 8.6     Recent Labs     10/10/22  1120   AST 15   ALT 11   BILITOT 0.4   ALKPHOS 75     No results for input(s): INR in the last 72 hours. Recent Labs     10/10/22  1120 10/10/22  1613 10/11/22  0509   CKTOTAL  --  55  --    TROPONINI 0.04* 0.04* 0.05*       Assessment/Plan:    Active Hospital Problems    Diagnosis Date Noted    Shoulder pain [M25.519] 10/10/2022     Priority: Medium       Acute on chronic L shoulder pain  Hx of chronic bilat rotator cuff tears  Severe chronic bilateral low back muscle spasms  - pain management  - trial of naproxen  - ortho following  - PTOT  - palliative eval     Recently diagnosed MDS  - trend CBC  - transfuse as needed  - close outpt follow up with hematology     PMH CAD s/p CABG, compensated chronic dCHF, PAF, morbid obesity, htn, hld    DVT Prophylaxis: xarelto  Diet: ADULT DIET;  Regular; 5 carb choices (75 gm/meal)  Code Status: Full Code      Risa Harden MD

## 2022-10-11 NOTE — PROGRESS NOTES
AM assessment completed, pt A&Ox4, in bed, c/o pain 9/10, denies any nausea/vomiting at this time. BP med held per MD due low BP of 99/57. PRN and other scheduled med administered per [facility]. POC and education reviewed with pt, all needs met at this time, call light in reach, will continue to monitor.

## 2022-10-11 NOTE — PROGRESS NOTES
[facility] [address]   Phone: [phone]    Physical Therapy    [x] Initial Evaluation            [] Daily Treatment Note         [] Discharge Summary      Patient: Magnolia Eisenmenger   : 1940   MRN: [id number]   Date of Service:  10/11/2022  Admitting Diagnosis: Shoulder pain  Current Admission Summary: Magnolia Eisenmenger is a 78 y.o. female who presented to [facility] ER today from her SNF complaining of severe chest shoulder and neck pain. She denies any recent trauma. She was just discharged to a SNF 3 days ago. She has been having left-sided shoulder wrist and thumb pain during that admission and was treated with oral steroids as well as a CMC brace. She tells me that the nursing aides have been lifting her by putting her arms under her shoulders which seems to be aggravating her shoulder pain. She has known bilateral shoulder rotator cuff arthropathy. She has tried injections in the past with limited benefit. She does have peripheral neuropathy and reports her numbness/tingling of her right greater than left hands are at baseline. She describes moderate pain extending from base of her neck into her left shoulder and upper arm rated 6/10. She is much more comfortable since receiving morphine. 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 Imaging review of the left shoulder via chest CT this admission demonstrated: No acute fracture or malalignment, there is cystic change of the greater tuberosity consistent with prior rotator cuff disease. Moderate AC joint arthritis noted. Multilevel cervical degenerative disc disease noted. Patient came in from SNF where she had just discharged to on 10/7 and uses a walker to ambulate.     Past Medical History:  has a past medical history of 15 Left knee repeat repair of median parapatellar arthrotomy with augmentation, Arthritis, Atrial fibrillation ([address] CAD (coronary artery disease), Cataract, Chronic diastolic congestive heart failure ([facility] [address] Diabetes mellitus ([facility] [address] GERD (gastroesophageal reflux disease), Hyperlipidemia, Hypertension, HYPOTHRYROIDISM, Non-English speaking patient, Sleep apnea, Thyroid disease, and UTI. Past Surgical History:  has a past surgical history that includes joint replacement (Bilateral, [address]); Cardiac surgery (2004); Cholecystectomy, laparoscopic (5/15/14); Revision total knee arthroplasty (Right, 11/22/2016); Total knee arthroplasty; knee surgery (Left, 02/28/2017); CT BIOPSY BONE MARROW (9/29/2022); Upper gastrointestinal endoscopy (N/A, 10/3/2022); and Upper gastrointestinal endoscopy (N/A, 10/3/2022). Discharge Recommendations: Shan Youssef scored a 6/24 on the AM-PAC short mobility form. Current research shows that an AM-PAC score of 17 or less is typically not associated with a discharge to the patient's home setting. Based on the patient's AM-PAC score and their current functional mobility deficits, it is recommended that the patient have 3-5 sessions per week of Physical Therapy at d/c to increase the patient's independence. Please see assessment section for further patient specific details. If patient discharges prior to next session this note will serve as a discharge summary. Please see below for the latest assessment towards goals.     DME Required For Discharge: no DME required at discharge  Precautions/Restrictions: high fall risk  Weight Bearing Restrictions: no restrictions  [] Right Upper Extremity  [] Left Upper Extremity [] Right Lower Extremity  [] Left Lower Extremity     Required Braces/Orthotics: no braces required   [] Right  [] Left  Positional Restrictions:no positional restrictions    Pre-Admission Information   Lives With: alone    Type of Home: house  Home Layout: one level  Home Access: ramped entry  Bathroom Layout: walker accessible, walk in shower  Bathroom Equipment: grab bars in shower, shower chair  Toilet Height: standard height  Home Equipment: rolling walker, rollator - 4 wheeled walker, manual wheelchair, electric wheelchair  Transfer Assistance: modified independent with use of RW  Ambulation Assistance:modified independent with use of manual w/c, RW for short distances   ADL Assistance: requires assistance with bathing, requires assistance with dressing  IADL Assistance: independent with homemaking tasks home health aide 4 hours/day   Active :        [] Yes  [x] No  Hand Dominance: [] Left  [x] Right  Current Employment: retired. Recent Falls: 1 slip out of wheelchair   *cameras present at home for children to monitor    Examination   Vision:   Vision Gross Assessment: WFL  Hearing:   [facility]  Observation:   General Observation:  pt on 2L O2, brace on L wrist  Posture: Forward head, rounded shoulders   Sensation:   reports numbness and tingling in (R) UE, (B) LE    ROM:   Assessment limited due to limited mobility due to pain. Observed hip and knee extension to neutral, knee flexion to 90. (B) ankle ROM WFL. Strength:   Formal MMT held secondary to limited mobility due to pain. Decision Making: medium complexity  Clinical Presentation: evolving      Subjective  General: Pt in bed upon arrival with family present in room, interpreting as needed.  Agreeable to PT/OT co-eval.   Pain: Pain rating taken based on observed faces and behaviors -- pain in L shoulder   Pain Interventions: pain medication in place prior to arrival and biofreeze applied       Functional Mobility  Bed Mobility  Supine to Sit: 2 person assistance with total assist    Sit to Supine: 2 person assistance with total assist    Rolling Left: attempted with two-person assist, unable to complete due to pain   Comments: pt in lots of pain with mobility; with sup <> sit, pt crying out in pain, upset and requesting to return to supine in bed   Transfers  No transfers completed on this date secondary to unable to tolerate due to pain.  Comments:  Ambulation  Ambulation not tested on this date secondary to unable to tolerate due to pain . Distance:   Gait Mechanics:   Comments:    Stair Mobility  Stair mobility not completed on this date. Comments:  Wheelchair Mobility:  No w/c mobility completed on this date. Comments:  Balance  Static Sitting Balance: poor: requires mod (A) to maintain balance  Comments:    Other Therapeutic Interventions  LUE examination, with severe ROM limitations and pain at L thumb and with shoulder flexion. Education provided to patient and family about role of PT and working within pt's comfortable limits. Bio-Freeze applied to (L) shoulder. Functional Outcomes  AM-PAC Inpatient Mobility Raw Score : 6              Cognition  Overall Cognitive Status: WFL   Orientation:    alert and oriented x 4  Command Following:   WFL --sometimes requiring increased time or repetition due to language barrier     Education  Barriers To Learning: language  Patient Education: patient educated on goals, PT role and benefits, plan of care, precautions, functional mobility training, family education, discharge recommendations  Learning Assessment:  patient verbalizes understanding, would benefit from continued reinforcement    Assessment  Activity Tolerance: limited by pain   Impairments Requiring Therapeutic Intervention: decreased functional mobility, decreased ROM, decreased strength, decreased endurance, decreased balance  Prognosis: fair  Clinical Assessment: Pt presenting to PT/OT eval with chest and shoulder pain. Presenting with deficits in strength, ROM, functional mobility. Mobility was highly limited this date due to pain, with pt requiring 2-person total assist for bed mobility, with pt crying out in pain and immediately requesting to lay back in bed. At baseline, pt lives alone at home and is mod I with mobility with use of manual w/c and RW; has an aide who assists her with ADLs and IADLs.  Pt would benefit from skilled therapy services to address deficits and facilitate safe mobility. Pt may also benefit from shoulder imaging due to intensity and acuity of pt's L shoulder pain. Safety Interventions: patient left in bed, bed alarm in place, call light within reach, patient at risk for falls, and family/caregiver present    Plan  Frequency: 3-5 x/per week  Current Treatment Recommendations: strengthening, ROM, balance training, functional mobility training, transfer training, gait training, endurance training, neuromuscular re-education, patient/caregiver education, home management training, and safety education    Goals  Patient Goals: to decrease pain   Short Term Goals:  Time Frame: discharge   Patient will complete rolling at minimal assistance   Patient will complete sit <> supine at minimal assistance   Pt will tolerate 5 minutes of sitting EOB with SBA. Therapy Session Time      Individual Group Co-treatment   Time In      9822 [id number]   Time Out      1517   Minutes      86     *units divided between PT and OT due to pt in observation status     Timed Code Treatment Minutes:   71  Total Treatment Minutes:  86 minutes        Electronically Signed By: Portia Cali SPT   Therapist was present, directed the patient's care, made skilled judgement, and was responsible for assessment and treatment of the patient.   Co-signed and supervised by: Gina León PT, DPT [id number]

## 2022-10-11 NOTE — PROGRESS NOTES
Pt had an episode of hemoptysis and epistaxis. Pt showed me a handful of tissues covered in blood and clots, minimal blood loss. As of now, there is no more blood.  Will continue to monitor

## 2022-10-11 NOTE — CARE COORDINATION
Discharge Planning Note:    The family requested for this  to come and talk with them concerning the patient. The family expressed some concerns at [facility] and would like a referral placed with [facility] The patient is an agreement. A referral was placed with:    - [facility] - DENIED, No bed available    Will continue to follow.     JACKELYN ReidN RN    [facility]  Phone: [phone]

## 2022-10-11 NOTE — TELEPHONE ENCOUNTER
Daughter states pt has been admitted to [facility] and has canceled her appt tomorrow 10/12

## 2022-10-11 NOTE — DISCHARGE INSTR - COC
Continuity of Care Form    Patient Name: Sasha Beasley   :  1940  MRN:  [id number]    Admit date:  10/10/2022  Discharge date:10/26/22      Code Status Order: DNR-CCA   Advance Directives:     Admitting Physician:  Javier Armstrong MD  PCP: No primary care provider on file.     Discharging Nurse: [address] Unit/Room#: 9QW-6046/7196-63  Discharging Unit Phone Number: [phone]    Emergency Contact:   Extended Emergency Contact Information  Primary Emergency Contact: [address] [facility], [address] Phone: [phone]  Relation: Child  Secondary Emergency Contact: [address] Phone: [phone]  Mobile Phone: [phone]  Relation: Child    Past Surgical History:  Past Surgical History:   Procedure Laterality Date    CARDIAC SURGERY      CABG X 4 VESSELS    CHOLECYSTECTOMY, LAPAROSCOPIC  5/15/14    with IOC    CT BONE MARROW BIOPSY  2022    CT BONE MARROW BIOPSY 2022 [facility] CT SCAN    JOINT REPLACEMENT Bilateral  [address]    knee    KNEE SURGERY Left 2017    left knee poly exchange revision     REVISION TOTAL KNEE ARTHROPLASTY Right 2016    RIGHT TOTAL KNEE REVISION WITH POLY EXCHANGE    TOTAL KNEE ARTHROPLASTY      partical/ left     UPPER GASTROINTESTINAL ENDOSCOPY N/A 10/3/2022    EGD BIOPSY performed by Veronika Leos MD at [facility] N/A 10/3/2022    EGD DILATION BALLOON performed by Veronika Leos MD at [address] [facility] ENDOSCOPY       Immunization History:   Immunization History   Administered Date(s) Administered    Influenza Vaccine, unspecified formulation 2016    Influenza Virus Vaccine 10/22/2011    Pneumococcal Polysaccharide ([id number]) 2012       Active Problems:  Patient Active Problem List   Diagnosis Code    DM (diabetes mellitus) ([facility] [address] E11.9    Coronary artery disease involving native coronary artery of native heart without angina pectoris I25.10    PAF (paroxysmal atrial fibrillation) ([facility]) I48.0    Peripheral vascular disease ([facility]) I73.9    CARLOS (obstructive sleep apnea) G47.33    SOB (shortness of breath) R06.02    Acute renal failure ([facility]) N17.9    HIGH CHOLESTEROL     HTN (hypertension) I10    Dizziness R42    Headache R51.9    Debility R53.81    11/22/16 Right knee polyethylene exchange M25.561, M25.562    Acute pain of right shoulder M25.511    Mixed hyperlipidemia E78.2    Acute chest pain R07.9    Morbid obesity with BMI of 40.0-44.9, adult ([facility]) E66.01, Z68.41    Morbid obesity with BMI of 45.0-49.9, adult ([facility]) E66.01, Z68.42    Type 2 diabetes mellitus ([facility]) E11.9    Carotid artery disease without cerebral infarction [facility]) I77.9    2/28/17 Revision with polyethylene exchange left total knee arthroplasty M23.52    4/11/17 Left knee repeat repair of median parapatellar arthrotomy with augmentation S76.111A    Rotator cuff tendonitis, right M75.81    Cervical radicular pain L96.05    Acute diastolic heart failure ([facility]) I50.31    Dyspnea on exertion R06.09    Severe thrombocytopenia ([facility]) D69.6    Severe anemia D64.9    Wrist arthritis M19.039    Elevated troponin R77.8    Chronic atrial fibrillation ([facility]) I48.20    Primary osteoarthritis of first carpometacarpal joint of left hand M18.12    Shoulder pain M25.519    Hypervolemia E87.70    MDS (myelodysplastic syndrome) ([facility]) D46.9    Chronic systolic heart failure ([facility]) I50.22    Arterial hypotension I95.9    History of coronary artery disease Z86.79    Pulmonary vascular congestion R09.89    Acute hypoxemic respiratory failure ([facility]) J96.01    Aspiration pneumonia of left lower lobe due to gastric secretions ([facility]) J69.0       Isolation/Infection:   Isolation            No Isolation          Patient Infection Status       None to display            Nurse Assessment:  Last Vital Signs: BP (!) 141/70   Pulse 88   Temp 97.9 °F (36.6 °C) (Oral)   Resp 14   Ht 5' (1.524 m)   Wt 244 lb 12.8 oz (111 kg)   LMP  (LMP Unknown)   SpO2 94%   BMI 47.81 kg/m²     Last documented pain score (0-10 scale): Pain Level: 8  Last Weight:   Wt Readings from Last 1 Encounters:   10/26/22 244 lb 12.8 oz (111 kg)     Mental Status:  oriented, alert, coherent, thought processes intact, and able to concentrate and follow conversation    IV Access:  - None    Nursing Mobility/ADLs:  Walking   Dependent  Transfer  Dependent  Bathing  Dependent  Dressing  Dependent  Toileting  Assisted  Feeding  Assisted  Med Admin  Assisted  Med Delivery   whole    Wound Care Documentation and Therapy:  Incision 11/22/16 Knee Right (Active)   Number of days: 2164       Incision 02/28/17 Knee Left (Active)   Number of days: 2066       Incision 04/11/17 Knee Left (Active)   Number of days: 2024       Puncture 09/29/22 Back (Active)   Wound Assessment Erythema 10/16/22 1057   Bety-wound Assessment Ecchymosis 10/16/22 1057   Closure None 10/16/22 1057   Culture Taken No 10/16/22 1057   Drainage Amount None 10/16/22 1057   Drainage Description Sanguinous 09/29/22 1853   Odor None 10/16/22 1057   Dressing/Treatment Foam 10/16/22 1057   Dressing Changed Changed/New 09/29/22 1853   Dressing Status Clean, dry & intact 10/16/22 1057   Number of days: 26       Wound 10/16/22 Coccyx Inner (Active)   Wound Etiology Skin Tear 10/26/22 0955   Dressing Status Clean;Dry; Intact 10/26/22 0955   Wound Cleansed Soap and water 10/24/22 1936   Dressing/Treatment Foam 10/26/22 0955   Wound Assessment Pink/red 10/24/22 1936   Drainage Amount None 10/24/22 1936   Drainage Description Serosanguinous 10/24/22 0210   Odor None 10/24/22 1936   Bety-wound Assessment Blanchable erythema;Fragile 10/24/22 1936   Margins Undefined edges 10/24/22 1936   Wound Thickness Description not for Pressure Injury Partial thickness 10/24/22 1936   Number of days: 10        Elimination:  Continence:    Bowel: Yes  Bladder: No  Urinary Catheter: None Colostomy/Ileostomy/Ileal Conduit: N/A       Date of Last BM: 10/22/2022    Intake/Output Summary (Last 24 hours) at 10/26/2022 1131  Last data filed at 10/25/2022 2057  Gross per 24 hour   Intake 20 ml   Output 1400 ml   Net -1380 ml     I/O last 3 completed shifts: In: 20 [I.V.:20]  Out: 2000 [Urine:2000]    Safety Concerns: At Risk for Falls    Impairments/Disabilities:      Language Barrier - Speaks Faroese, understands English    Nutrition Therapy:  Current Nutrition Therapy:   - Oral Diet:  General/ LOW Sodium    Routes of Feeding: Oral  Liquids: Thin Liquids  Daily Fluid Restriction: no  Last Modified Barium Swallow with Video (Video Swallowing Test): not done    Treatments at the Time of Hospital Discharge:   Respiratory Treatments: n/a  Oxygen Therapy:  is not on home oxygen therapy.   Ventilator:    - No ventilator support    Rehab Therapies: Physical Therapy  Weight Bearing Status/Restrictions: No weight bearing restrictions  Other Medical Equipment (for information only, NOT a DME order):  walker  Other Treatments:    _x_ Daily Weights- Baseline Wt:242 lb   Call MD if:   3 pound weight gain or loss in one day OR 5 pound weight gain in one week       _x_ Med List attached:   Hold Coreg/Metoprolol if HR less than 45 or patient symptomatic*   Hold ACE/ARB if SBP less than 85 or patient symptomatic*   Do not hold Spironolactone (aldactone) for hypotension/bradycardia   Call MD for questions: CHF Clinic: [phone]    _x_Follow up appointment with cardiology: date/time: 11/4 with Dr. Paola Lopez          Patient's personal belongings (please select all that are sent with patient):  Glasses    RN SIGNATURE:  Electronically signed by Danelle Douglas RN on 10/26/22 at 10:24 AM EDT    CASE MANAGEMENT/SOCIAL WORK SECTION    Inpatient Status Date: 10/10/2022    Readmission Risk Assessment Score:  Readmission Risk              Risk of Unplanned Readmission:  30           Discharging to Facility/ Agency   Name: [facility]  Address: [address]  Phone: [phone]  Fax: [phone]      / signature: Electronically signed by Elidia Paige RN on 10/13/22 at 12:19 PM EDT    PHYSICIAN SECTION    Prognosis: Fair    Condition at Discharge: Stable    Rehab Potential (if transferring to Rehab): Fair    Recommended Labs or Other Treatments After Discharge: PTOT  Palliative care to follow at SNF please to continue [address] and code status conversations  Check cbc q3 days x 2 ensure stable plt and hemoglobin    Physician Certification: I certify the above information and transfer of Macey Oconnell  is necessary for the continuing treatment of the diagnosis listed and that she requires Kaz Tang for less 30 days.      Update Admission H&P: No change in H&P    PHYSICIAN SIGNATURE:  Electronically signed by Chilo Byrne MD on 10/12/22 at 2:16 PM EDT

## 2022-10-12 LAB
ANION GAP SERPL CALCULATED.3IONS-SCNC: 11 MMOL/L (ref 3–16)
BUN BLDV-MCNC: 31 MG/DL (ref 7–20)
CALCIUM SERPL-MCNC: 8.8 MG/DL (ref 8.3–10.6)
CHLORIDE BLD-SCNC: 96 MMOL/L (ref 99–110)
CO2: 25 MMOL/L (ref 21–32)
CREAT SERPL-MCNC: 1.1 MG/DL (ref 0.6–1.2)
GFR AFRICAN AMERICAN: 57
GFR NON-AFRICAN AMERICAN: 47
GLUCOSE BLD-MCNC: 215 MG/DL (ref 70–99)
GLUCOSE BLD-MCNC: 216 MG/DL (ref 70–99)
GLUCOSE BLD-MCNC: 229 MG/DL (ref 70–99)
GLUCOSE BLD-MCNC: 272 MG/DL (ref 70–99)
GLUCOSE BLD-MCNC: 294 MG/DL (ref 70–99)
HCT VFR BLD CALC: 22.3 % (ref 36–48)
HEMOGLOBIN: 7.3 G/DL (ref 12–16)
MCH RBC QN AUTO: 32.1 PG (ref 26–34)
MCHC RBC AUTO-ENTMCNC: 32.6 G/DL (ref 31–36)
MCV RBC AUTO: 98.5 FL (ref 80–100)
PDW BLD-RTO: 21.7 % (ref 12.4–15.4)
PERFORMED ON: ABNORMAL
PLATELET # BLD: 93 K/UL (ref 135–450)
PMV BLD AUTO: 9.1 FL (ref 5–10.5)
POTASSIUM SERPL-SCNC: 4.5 MMOL/L (ref 3.5–5.1)
RBC # BLD: 2.26 M/UL (ref 4–5.2)
SODIUM BLD-SCNC: 132 MMOL/L (ref 136–145)
WBC # BLD: 11.4 K/UL (ref 4–11)

## 2022-10-12 PROCEDURE — 94760 N-INVAS EAR/PLS OXIMETRY 1: CPT

## 2022-10-12 PROCEDURE — 6370000000 HC RX 637 (ALT 250 FOR IP): Performed by: INTERNAL MEDICINE

## 2022-10-12 PROCEDURE — 6370000000 HC RX 637 (ALT 250 FOR IP): Performed by: NURSE PRACTITIONER

## 2022-10-12 PROCEDURE — 80048 BASIC METABOLIC PNL TOTAL CA: CPT

## 2022-10-12 PROCEDURE — 2580000003 HC RX 258: Performed by: INTERNAL MEDICINE

## 2022-10-12 PROCEDURE — 85027 COMPLETE CBC AUTOMATED: CPT

## 2022-10-12 PROCEDURE — 2700000000 HC OXYGEN THERAPY PER DAY

## 2022-10-12 PROCEDURE — 36415 COLL VENOUS BLD VENIPUNCTURE: CPT

## 2022-10-12 PROCEDURE — G0378 HOSPITAL OBSERVATION PER HR: HCPCS

## 2022-10-12 RX ORDER — HYDROCODONE BITARTRATE AND ACETAMINOPHEN 5; 325 MG/1; MG/1
1 TABLET ORAL EVERY 4 HOURS PRN
Status: DISCONTINUED | OUTPATIENT
Start: 2022-10-12 | End: 2022-10-16

## 2022-10-12 RX ORDER — NAPROXEN 250 MG/1
500 TABLET ORAL 2 TIMES DAILY WITH MEALS
Status: DISCONTINUED | OUTPATIENT
Start: 2022-10-12 | End: 2022-10-18

## 2022-10-12 RX ADMIN — NAPROXEN 500 MG: 250 TABLET ORAL at 12:33

## 2022-10-12 RX ADMIN — INSULIN GLARGINE 48 UNITS: 100 INJECTION, SOLUTION SUBCUTANEOUS at 08:03

## 2022-10-12 RX ADMIN — NAPROXEN 500 MG: 250 TABLET ORAL at 16:58

## 2022-10-12 RX ADMIN — METOPROLOL TARTRATE 25 MG: 25 TABLET, FILM COATED ORAL at 21:07

## 2022-10-12 RX ADMIN — ACETAMINOPHEN 650 MG: 325 TABLET ORAL at 15:59

## 2022-10-12 RX ADMIN — ACETAMINOPHEN 650 MG: 325 TABLET ORAL at 07:59

## 2022-10-12 RX ADMIN — Medication 10 ML: at 09:57

## 2022-10-12 RX ADMIN — RIVAROXABAN 15 MG: 15 TABLET, FILM COATED ORAL at 16:57

## 2022-10-12 RX ADMIN — HYDROCORTISONE: 25 CREAM TOPICAL at 21:06

## 2022-10-12 RX ADMIN — INSULIN LISPRO 15 UNITS: 100 INJECTION, SOLUTION INTRAVENOUS; SUBCUTANEOUS at 16:57

## 2022-10-12 RX ADMIN — SACUBITRIL AND VALSARTAN 0.5 TABLET: 24; 26 TABLET, FILM COATED ORAL at 21:07

## 2022-10-12 RX ADMIN — INSULIN LISPRO 15 UNITS: 100 INJECTION, SOLUTION INTRAVENOUS; SUBCUTANEOUS at 08:02

## 2022-10-12 RX ADMIN — INSULIN LISPRO 15 UNITS: 100 INJECTION, SOLUTION INTRAVENOUS; SUBCUTANEOUS at 12:34

## 2022-10-12 RX ADMIN — LEVOTHYROXINE SODIUM 150 MCG: 150 TABLET ORAL at 07:59

## 2022-10-12 RX ADMIN — HYDROCODONE BITARTRATE AND ACETAMINOPHEN 1 TABLET: 5; 325 TABLET ORAL at 21:07

## 2022-10-12 RX ADMIN — HYDROCODONE BITARTRATE AND ACETAMINOPHEN 1 TABLET: 5; 325 TABLET ORAL at 08:01

## 2022-10-12 RX ADMIN — ACETAMINOPHEN 650 MG: 325 TABLET ORAL at 21:07

## 2022-10-12 RX ADMIN — ATORVASTATIN CALCIUM 80 MG: 80 TABLET, FILM COATED ORAL at 21:07

## 2022-10-12 ASSESSMENT — PAIN DESCRIPTION - DESCRIPTORS
DESCRIPTORS: ACHING;DISCOMFORT
DESCRIPTORS: ACHING

## 2022-10-12 ASSESSMENT — PAIN DESCRIPTION - ORIENTATION
ORIENTATION: LEFT

## 2022-10-12 ASSESSMENT — PAIN SCALES - GENERAL
PAINLEVEL_OUTOF10: 8
PAINLEVEL_OUTOF10: 7
PAINLEVEL_OUTOF10: 9
PAINLEVEL_OUTOF10: 7
PAINLEVEL_OUTOF10: 5
PAINLEVEL_OUTOF10: 9

## 2022-10-12 ASSESSMENT — PAIN DESCRIPTION - LOCATION
LOCATION: SHOULDER
LOCATION: ARM
LOCATION: SHOULDER

## 2022-10-12 ASSESSMENT — PAIN - FUNCTIONAL ASSESSMENT
PAIN_FUNCTIONAL_ASSESSMENT: PREVENTS OR INTERFERES SOME ACTIVE ACTIVITIES AND ADLS
PAIN_FUNCTIONAL_ASSESSMENT: PREVENTS OR INTERFERES SOME ACTIVE ACTIVITIES AND ADLS

## 2022-10-12 ASSESSMENT — PAIN DESCRIPTION - ONSET: ONSET: ON-GOING

## 2022-10-12 ASSESSMENT — PAIN DESCRIPTION - FREQUENCY: FREQUENCY: CONTINUOUS

## 2022-10-12 ASSESSMENT — ENCOUNTER SYMPTOMS: SHORTNESS OF BREATH: 1

## 2022-10-12 ASSESSMENT — PAIN DESCRIPTION - PAIN TYPE: TYPE: ACUTE PAIN

## 2022-10-12 NOTE — PROGRESS NOTES
AM assessment completed, pt A&Ox4, little anxious, in bed, c/o pain 9/10. BP meds held per order as bp is low. Other med administered per [facility], POC and education reviewed with pt. All needs met at this time, call light in reach, will continue to monitor.

## 2022-10-12 NOTE — CONSULTS
PALLIATIVE MEDICINE CONSULTATION     Patient name:Wally Andersen   CTV:[id number]    :1940  Room/Bed:[facility]4460/4460-01   LOS: 0 days         Date of consult:10/12/2022    Consult Information  Palliative Medicine Consult performed by: MATEUSZ Deng CNP, CNP    Inpatient consult to Palliative Care  Consult performed by: MATEUSZ Deng CNP  Consult ordered by: Brooklyn Siddiqui MD  Reason for consult: GOC and code status             ASSESSMENT/RECOMMENDATIONS     78 y.o. female with Shoulder pain and debility due to arthritis      Symptom Management:  Shoulder pain- pt reports that pain is improved since admission managed by primary team  Debility- Joint inflammation and decreased ROM managed by primary team   Goals of Care- talked to pt and daughter at bedside today they are OK with pt returning to [facility] for rehab they are hopeful that she will improve but the plan is for LTC they are realistic that pt will not return to independent state. We discussed code status they want all aggressive care will remain FULL code. Will add palliative care to [address] [place] [address] for follow up. Patient/Family Goals of Care :    talked to pt and daughter at bedside today they are OK with pt returning to [facility] for rehab they are hopeful that she will improve but the plan is for LTC they are realistic that pt will not return to independent state. We discussed code status they want all aggressive care will remain FULL code. Will add palliative care to [address] [place] [address] for follow up. Disposition/Discharge Plan:   pending    Advance Directives:  Surrogate Decision Maker: Wafa-daughter  Code status:  Full Code    Case discussed with: patient, floor RN, Daughter [facility]  Thank you for allowing us to participate in the care of this patient.       HISTORY     CC: Shoulder pain  HPI: The patient is a 78 y.o. female with past medical history of CHF, DM, CAD who was brought in by EMS transportation for chest pain.     Palliative Medicine SymptomScreening/ROS:    Review of Systems   Constitutional:  Positive for activity change, appetite change and fatigue. HENT:  Positive for hearing loss. Respiratory:  Positive for shortness of breath. Musculoskeletal:  Positive for arthralgias, gait problem, joint swelling and myalgias. Skin:  Positive for pallor. Neurological:  Positive for weakness. All other systems reviewed and are negative. Palliative Performance Scale:     [x] 60%  Amb reduced; Sig dz. Can't do hobbies/housework; Intake normal or reduced, Occasional assist; LOC full/confusion   [] 50%  Mainly sit/lie; Extensive disease. Mainly assist, Intake normal or reduced; Occasional assist; LOC full/confusion   [] 40%  Mainly in bed; Extensive disease; Mainly assist; Intake normal or reduced; Occasional assist; LOC full/confusion   [] 30%  Bed bound, Extensive disease; Total care; Intake reduced; LOC full/confusion   [] 20%  Bed bound; Extensive disease; Total care; Intake minimal; Drowsy/coma   [] 10%  Bed bound; Extensive disease; Total care; Mouth care only; Drowsy/coma   []  0%   Death       Home med list and hospital medications reviewed in chart as of 10/12/2022     EXAM     Vitals:    10/12/22 0715   BP: 112/74   Pulse: 72   Resp: 16   Temp: 98.3 °F (36.8 °C)   SpO2: 95%       Physical Exam  Constitutional:       Appearance: She is ill-appearing. HENT:      Head: Normocephalic and atraumatic. Nose: No congestion. Mouth/Throat:      Mouth: Mucous membranes are dry. Eyes:      Pupils: Pupils are equal, round, and reactive to light. Cardiovascular:      Rate and Rhythm: Normal rate. Heart sounds: No murmur heard. No friction rub. No gallop. Pulmonary:      Effort: No respiratory distress. Abdominal:      Palpations: Abdomen is soft. Musculoskeletal:         General: Swelling and tenderness present. Cervical back: Normal range of motion.       Right lower leg: Edema present. Left lower leg: Edema present. Comments: Joint swelling and pain   Skin:     General: Skin is warm and dry. Coloration: Skin is pale. Neurological:      General: No focal deficit present. Mental Status: She is alert.               Current labs in the epic chart reviewed as of 10/12/2022   Review of previous notes, admits, labs, radiology and testing relevant to this consult done in this chart today 10/12/2022      Total time: 70 minutes  >50% of time spent counseling patient at bedside or POA/family member if applicable , reviewing information and discussing care, coordinating with care team  Signed By: Electronically signed by MATEUSZ Bo CNP on 10/12/2022 at 8:52 AM  Palliative Medicine   [phone]    October 12, 2022

## 2022-10-13 ENCOUNTER — APPOINTMENT (OUTPATIENT)
Dept: CT IMAGING | Age: 82
DRG: 177 | End: 2022-10-13
Payer: MEDICARE

## 2022-10-13 LAB
ABO/RH: NORMAL
ANION GAP SERPL CALCULATED.3IONS-SCNC: 10 MMOL/L (ref 3–16)
ANION GAP SERPL CALCULATED.3IONS-SCNC: 9 MMOL/L (ref 3–16)
ANISOCYTOSIS: ABNORMAL
ANTIBODY SCREEN: NORMAL
ATYPICAL LYMPHOCYTE RELATIVE PERCENT: 3 % (ref 0–6)
BANDED NEUTROPHILS RELATIVE PERCENT: 8 % (ref 0–7)
BASOPHILS ABSOLUTE: 0 K/UL (ref 0–0.2)
BASOPHILS RELATIVE PERCENT: 0 %
BUN BLDV-MCNC: 39 MG/DL (ref 7–20)
BUN BLDV-MCNC: 47 MG/DL (ref 7–20)
CALCIUM SERPL-MCNC: 8.2 MG/DL (ref 8.3–10.6)
CALCIUM SERPL-MCNC: 8.7 MG/DL (ref 8.3–10.6)
CHLORIDE BLD-SCNC: 96 MMOL/L (ref 99–110)
CHLORIDE BLD-SCNC: 96 MMOL/L (ref 99–110)
CO2: 24 MMOL/L (ref 21–32)
CO2: 26 MMOL/L (ref 21–32)
CREAT SERPL-MCNC: 1.3 MG/DL (ref 0.6–1.2)
CREAT SERPL-MCNC: 1.6 MG/DL (ref 0.6–1.2)
EOSINOPHILS ABSOLUTE: 0 K/UL (ref 0–0.6)
EOSINOPHILS RELATIVE PERCENT: 0 %
GFR AFRICAN AMERICAN: 37
GFR AFRICAN AMERICAN: 47
GFR NON-AFRICAN AMERICAN: 31
GFR NON-AFRICAN AMERICAN: 39
GLUCOSE BLD-MCNC: 174 MG/DL (ref 70–99)
GLUCOSE BLD-MCNC: 174 MG/DL (ref 70–99)
GLUCOSE BLD-MCNC: 193 MG/DL (ref 70–99)
GLUCOSE BLD-MCNC: 206 MG/DL (ref 70–99)
GLUCOSE BLD-MCNC: 229 MG/DL (ref 70–99)
GLUCOSE BLD-MCNC: 249 MG/DL (ref 70–99)
GLUCOSE BLD-MCNC: 249 MG/DL (ref 70–99)
HCT VFR BLD CALC: 19.5 % (ref 36–48)
HCT VFR BLD CALC: 21.8 % (ref 36–48)
HEMATOLOGY PATH CONSULT: ABNORMAL
HEMOGLOBIN: 6.4 G/DL (ref 12–16)
HEMOGLOBIN: 7.3 G/DL (ref 12–16)
LACTIC ACID: 2.1 MMOL/L (ref 0.4–2)
LYMPHOCYTES ABSOLUTE: 5.5 K/UL (ref 1–5.1)
LYMPHOCYTES RELATIVE PERCENT: 45 %
MCH RBC QN AUTO: 32.3 PG (ref 26–34)
MCH RBC QN AUTO: 32.9 PG (ref 26–34)
MCHC RBC AUTO-ENTMCNC: 33.1 G/DL (ref 31–36)
MCHC RBC AUTO-ENTMCNC: 33.5 G/DL (ref 31–36)
MCV RBC AUTO: 97.7 FL (ref 80–100)
MCV RBC AUTO: 98.4 FL (ref 80–100)
MICROCYTES: ABNORMAL
MONOCYTES ABSOLUTE: 1.6 K/UL (ref 0–1.3)
MONOCYTES RELATIVE PERCENT: 14 %
MONONUCLEAR UNIDENTIFIED CELLS: 2 %
NEUTROPHILS ABSOLUTE: 4.1 K/UL (ref 1.7–7.7)
NEUTROPHILS RELATIVE PERCENT: 26 %
PDW BLD-RTO: 22 % (ref 12.4–15.4)
PDW BLD-RTO: 22.2 % (ref 12.4–15.4)
PERFORMED ON: ABNORMAL
PLATELET # BLD: 57 K/UL (ref 135–450)
PLATELET # BLD: 68 K/UL (ref 135–450)
PLATELET SLIDE REVIEW: ABNORMAL
PMV BLD AUTO: 9 FL (ref 5–10.5)
PMV BLD AUTO: 9.2 FL (ref 5–10.5)
POTASSIUM SERPL-SCNC: 4.1 MMOL/L (ref 3.5–5.1)
POTASSIUM SERPL-SCNC: 4.5 MMOL/L (ref 3.5–5.1)
PROMYELOCYTES PERCENT: 2 %
RBC # BLD: 1.99 M/UL (ref 4–5.2)
RBC # BLD: 2.21 M/UL (ref 4–5.2)
SLIDE REVIEW: ABNORMAL
SMUDGE CELLS: PRESENT
SODIUM BLD-SCNC: 130 MMOL/L (ref 136–145)
SODIUM BLD-SCNC: 131 MMOL/L (ref 136–145)
WBC # BLD: 10.6 K/UL (ref 4–11)
WBC # BLD: 11.4 K/UL (ref 4–11)

## 2022-10-13 PROCEDURE — 80048 BASIC METABOLIC PNL TOTAL CA: CPT

## 2022-10-13 PROCEDURE — 83605 ASSAY OF LACTIC ACID: CPT

## 2022-10-13 PROCEDURE — 86900 BLOOD TYPING SEROLOGIC ABO: CPT

## 2022-10-13 PROCEDURE — 2700000000 HC OXYGEN THERAPY PER DAY

## 2022-10-13 PROCEDURE — P9016 RBC LEUKOCYTES REDUCED: HCPCS

## 2022-10-13 PROCEDURE — 85025 COMPLETE CBC W/AUTO DIFF WBC: CPT

## 2022-10-13 PROCEDURE — 86901 BLOOD TYPING SEROLOGIC RH(D): CPT

## 2022-10-13 PROCEDURE — 2580000003 HC RX 258: Performed by: INTERNAL MEDICINE

## 2022-10-13 PROCEDURE — 6370000000 HC RX 637 (ALT 250 FOR IP): Performed by: NURSE PRACTITIONER

## 2022-10-13 PROCEDURE — 96361 HYDRATE IV INFUSION ADD-ON: CPT

## 2022-10-13 PROCEDURE — 86850 RBC ANTIBODY SCREEN: CPT

## 2022-10-13 PROCEDURE — 6370000000 HC RX 637 (ALT 250 FOR IP): Performed by: INTERNAL MEDICINE

## 2022-10-13 PROCEDURE — 94760 N-INVAS EAR/PLS OXIMETRY 1: CPT

## 2022-10-13 PROCEDURE — 70450 CT HEAD/BRAIN W/O DYE: CPT

## 2022-10-13 PROCEDURE — 85027 COMPLETE CBC AUTOMATED: CPT

## 2022-10-13 PROCEDURE — G0378 HOSPITAL OBSERVATION PER HR: HCPCS

## 2022-10-13 PROCEDURE — 36415 COLL VENOUS BLD VENIPUNCTURE: CPT

## 2022-10-13 PROCEDURE — 86923 COMPATIBILITY TEST ELECTRIC: CPT

## 2022-10-13 PROCEDURE — 93005 ELECTROCARDIOGRAM TRACING: CPT | Performed by: INTERNAL MEDICINE

## 2022-10-13 RX ORDER — 0.9 % SODIUM CHLORIDE 0.9 %
500 INTRAVENOUS SOLUTION INTRAVENOUS ONCE
Status: COMPLETED | OUTPATIENT
Start: 2022-10-13 | End: 2022-10-13

## 2022-10-13 RX ORDER — SODIUM CHLORIDE 9 MG/ML
INJECTION, SOLUTION INTRAVENOUS PRN
Status: DISCONTINUED | OUTPATIENT
Start: 2022-10-13 | End: 2022-10-26 | Stop reason: HOSPADM

## 2022-10-13 RX ORDER — SODIUM CHLORIDE 9 MG/ML
INJECTION, SOLUTION INTRAVENOUS CONTINUOUS
Status: DISCONTINUED | OUTPATIENT
Start: 2022-10-13 | End: 2022-10-15

## 2022-10-13 RX ORDER — 0.9 % SODIUM CHLORIDE 0.9 %
250 INTRAVENOUS SOLUTION INTRAVENOUS ONCE
Status: COMPLETED | OUTPATIENT
Start: 2022-10-13 | End: 2022-10-13

## 2022-10-13 RX ADMIN — SACUBITRIL AND VALSARTAN 0.5 TABLET: 24; 26 TABLET, FILM COATED ORAL at 08:39

## 2022-10-13 RX ADMIN — METOPROLOL TARTRATE 25 MG: 25 TABLET, FILM COATED ORAL at 08:40

## 2022-10-13 RX ADMIN — SACUBITRIL AND VALSARTAN 0.5 TABLET: 24; 26 TABLET, FILM COATED ORAL at 22:13

## 2022-10-13 RX ADMIN — SODIUM CHLORIDE: 9 INJECTION, SOLUTION INTRAVENOUS at 22:03

## 2022-10-13 RX ADMIN — LEVOTHYROXINE SODIUM 150 MCG: 150 TABLET ORAL at 08:40

## 2022-10-13 RX ADMIN — SODIUM CHLORIDE 250 ML: 9 INJECTION, SOLUTION INTRAVENOUS at 14:24

## 2022-10-13 RX ADMIN — NAPROXEN 500 MG: 250 TABLET ORAL at 08:40

## 2022-10-13 RX ADMIN — RIVAROXABAN 15 MG: 15 TABLET, FILM COATED ORAL at 18:12

## 2022-10-13 RX ADMIN — INSULIN GLARGINE 48 UNITS: 100 INJECTION, SOLUTION SUBCUTANEOUS at 08:37

## 2022-10-13 RX ADMIN — INSULIN LISPRO 15 UNITS: 100 INJECTION, SOLUTION INTRAVENOUS; SUBCUTANEOUS at 18:12

## 2022-10-13 RX ADMIN — ACETAMINOPHEN 650 MG: 325 TABLET ORAL at 15:20

## 2022-10-13 RX ADMIN — INSULIN LISPRO 15 UNITS: 100 INJECTION, SOLUTION INTRAVENOUS; SUBCUTANEOUS at 11:52

## 2022-10-13 RX ADMIN — ACETAMINOPHEN 650 MG: 325 TABLET ORAL at 21:32

## 2022-10-13 RX ADMIN — HYDROCORTISONE: 25 CREAM TOPICAL at 08:44

## 2022-10-13 RX ADMIN — INSULIN LISPRO 15 UNITS: 100 INJECTION, SOLUTION INTRAVENOUS; SUBCUTANEOUS at 08:36

## 2022-10-13 RX ADMIN — FUROSEMIDE 20 MG: 20 TABLET ORAL at 08:39

## 2022-10-13 RX ADMIN — ACETAMINOPHEN 650 MG: 325 TABLET ORAL at 08:41

## 2022-10-13 RX ADMIN — ATORVASTATIN CALCIUM 80 MG: 80 TABLET, FILM COATED ORAL at 22:13

## 2022-10-13 RX ADMIN — NAPROXEN 500 MG: 250 TABLET ORAL at 18:12

## 2022-10-13 RX ADMIN — Medication 10 ML: at 08:46

## 2022-10-13 RX ADMIN — SODIUM CHLORIDE 500 ML: 9 INJECTION, SOLUTION INTRAVENOUS at 19:53

## 2022-10-13 ASSESSMENT — PAIN DESCRIPTION - ORIENTATION: ORIENTATION: LEFT

## 2022-10-13 ASSESSMENT — PAIN SCALES - GENERAL
PAINLEVEL_OUTOF10: 7
PAINLEVEL_OUTOF10: 0
PAINLEVEL_OUTOF10: 3
PAINLEVEL_OUTOF10: 0

## 2022-10-13 ASSESSMENT — PAIN DESCRIPTION - DESCRIPTORS: DESCRIPTORS: ACHING

## 2022-10-13 ASSESSMENT — PAIN DESCRIPTION - LOCATION: LOCATION: ARM

## 2022-10-13 NOTE — PROGRESS NOTES
Did the bladder scan and Pt retaining 365 ml of urine. Her output for today was only 200ml. Notified MD, waiting on the response.

## 2022-10-13 NOTE — CARE COORDINATION
Discharge Plan:     Patient discharged to:    [address]    SW/DC Planner faxed, [address] and RUSTY to: [phone]    Narcotic Prescriptions faxed were: yes    RN: will call report to: [phone] with: [address] [phone]     time: 1400    Family advised of discharge?: yes    HENS Submitted?:  N/A    All discharge needs met per case management.     JACKELYN PeralesN RN    [facility]  Phone: [phone]

## 2022-10-13 NOTE — PROGRESS NOTES
Hospitalist Progress Note      PCP: Eliza Birch, APRN - CNP    Date of Admission: 10/10/2022    Chief Complaint: L shoulder pain      Subjective:   L shoulder pain persistent  Some diffuse bilat UE swelling  Denies new acute complaints    Notified per nursing for dizziness in pm  BP with questionable hypotension; repeat unremarkable. Medications:  Reviewed    Infusion Medications    sodium chloride      dextrose       Scheduled Medications    naproxen  500 mg Oral BID WC    rivaroxaban  15 mg Oral Dinner    acetaminophen  650 mg Oral TID    atorvastatin  80 mg Oral Nightly    [Held by provider] furosemide  20 mg Oral Daily    hydrocortisone   Rectal BID    insulin lispro  15 Units SubCUTAneous TID     insulin glargine  48 Units SubCUTAneous Daily    levothyroxine  150 mcg Oral Daily    metoprolol tartrate  25 mg Oral BID    sacubitril-valsartan  0.5 tablet Oral BID    sodium chloride flush  5-40 mL IntraVENous 2 times per day     PRN Meds: HYDROcodone 5 mg - acetaminophen, cyclobenzaprine, sodium chloride flush, sodium chloride, ondansetron **OR** ondansetron, polyethylene glycol, acetaminophen **OR** acetaminophen, glucose, dextrose bolus **OR** dextrose bolus, glucagon (rDNA), dextrose      Intake/Output Summary (Last 24 hours) at 10/13/2022 1630  Last data filed at 10/13/2022 1339  Gross per 24 hour   Intake 220 ml   Output 2200 ml   Net -1980 ml         Exam:    BP (!) 87/41   Pulse 63   Temp 97.9 °F (36.6 °C) (Oral)   Resp 16   Ht 5' (1.524 m)   Wt 246 lb (111.6 kg)   LMP  (LMP Unknown)   SpO2 96%   BMI 48.04 kg/m²     Gen/overall appearance: Not in acute distress. Alert. Head: Normocephalic, atraumatic  Eyes: EOMI, no scleral icterus  CVS: regular rate and rhythm, Normal S1S2  Pulm: Clear to auscultation bilaterally. No crackles/wheezes  Extremities: No edema.  No erythema or warmth  Neuro: No gross focal deficits noted  Skin: Warm, dry    Labs:   Recent Labs     10/11/22  3529 10/12/22  0904 10/13/22  0535   WBC 10.0 11.4* 10.6   HGB 7.5* 7.3* 7.3*   HCT 22.8* 22.3* 21.8*   PLT 87* 93* 68*       Recent Labs     10/11/22  0509 10/12/22  0903 10/13/22  0535    132* 131*   K 4.5 4.5 4.5   CL 99 96* 96*   CO2 28 25 26   BUN 30* 31* 39*   CREATININE 1.2 1.1 1.3*   CALCIUM 8.6 8.8 8.7       No results for input(s): AST, ALT, BILIDIR, BILITOT, ALKPHOS in the last 72 hours. No results for input(s): INR in the last 72 hours. Recent Labs     10/11/22  0509   TROPONINI 0.05*         Assessment/Plan:    Active Hospital Problems    Diagnosis Date Noted    Shoulder pain [M25.519] 10/10/2022     Priority: Medium       Acute on chronic L shoulder pain  Hx of chronic bilat rotator cuff tears  Severe chronic bilateral low back muscle spasms  - pain management  - trial of naproxen and opiates  - ortho following  - PTOT  - palliative following  - close outpt follow up with pain management     Recently diagnosed MDS  - trend CBC  - transfuse as needed  - close outpt follow up with hematology    Dizziness. Questionable hypotension  - hold lasix for now  - IVF bolus 250cc  - orthostatics  - EKG  - CT head     PMH CAD s/p CABG, compensated chronic dCHF, PAF, morbid obesity, htn, hld    DVT Prophylaxis: xarelto  Diet: ADULT DIET;  Regular; 5 carb choices (75 gm/meal)  Code Status: Full Code      Nunu Arevalo MD

## 2022-10-13 NOTE — PROGRESS NOTES
Pt reported feeling dizzy and lightheaded. MD notified. Orthostatic BP obtained. EKG done.  Pt down for CT scan

## 2022-10-14 LAB
ANION GAP SERPL CALCULATED.3IONS-SCNC: 10 MMOL/L (ref 3–16)
BLOOD BANK DISPENSE STATUS: NORMAL
BLOOD BANK PRODUCT CODE: NORMAL
BLOOD SMEAR REVIEW: NORMAL
BPU ID: NORMAL
BUN BLDV-MCNC: 45 MG/DL (ref 7–20)
CALCIUM SERPL-MCNC: 8.1 MG/DL (ref 8.3–10.6)
CHLORIDE BLD-SCNC: 99 MMOL/L (ref 99–110)
CO2: 24 MMOL/L (ref 21–32)
CREAT SERPL-MCNC: 1.3 MG/DL (ref 0.6–1.2)
DESCRIPTION BLOOD BANK: NORMAL
EKG ATRIAL RATE: 69 BPM
EKG DIAGNOSIS: NORMAL
EKG P AXIS: 12 DEGREES
EKG P-R INTERVAL: 162 MS
EKG Q-T INTERVAL: 464 MS
EKG QRS DURATION: 180 MS
EKG QTC CALCULATION (BAZETT): 497 MS
EKG R AXIS: 270 DEGREES
EKG T AXIS: 54 DEGREES
EKG VENTRICULAR RATE: 69 BPM
GFR AFRICAN AMERICAN: 47
GFR NON-AFRICAN AMERICAN: 39
GLUCOSE BLD-MCNC: 179 MG/DL (ref 70–99)
GLUCOSE BLD-MCNC: 188 MG/DL (ref 70–99)
GLUCOSE BLD-MCNC: 199 MG/DL (ref 70–99)
GLUCOSE BLD-MCNC: 211 MG/DL (ref 70–99)
GLUCOSE BLD-MCNC: 278 MG/DL (ref 70–99)
HCT VFR BLD CALC: 22.6 % (ref 36–48)
HEMATOLOGY PATH CONSULT: NORMAL
HEMOGLOBIN: 7.4 G/DL (ref 12–16)
LACTATE DEHYDROGENASE: 491 U/L (ref 100–190)
MCH RBC QN AUTO: 31.3 PG (ref 26–34)
MCHC RBC AUTO-ENTMCNC: 32.7 G/DL (ref 31–36)
MCV RBC AUTO: 95.8 FL (ref 80–100)
PDW BLD-RTO: 20.9 % (ref 12.4–15.4)
PERFORMED ON: ABNORMAL
PLATELET # BLD: 53 K/UL (ref 135–450)
PMV BLD AUTO: 8.9 FL (ref 5–10.5)
POTASSIUM SERPL-SCNC: 3.9 MMOL/L (ref 3.5–5.1)
RBC # BLD: 2.36 M/UL (ref 4–5.2)
SODIUM BLD-SCNC: 133 MMOL/L (ref 136–145)
WBC # BLD: 12.1 K/UL (ref 4–11)

## 2022-10-14 PROCEDURE — 6370000000 HC RX 637 (ALT 250 FOR IP): Performed by: NURSE PRACTITIONER

## 2022-10-14 PROCEDURE — 2580000003 HC RX 258: Performed by: INTERNAL MEDICINE

## 2022-10-14 PROCEDURE — 6370000000 HC RX 637 (ALT 250 FOR IP): Performed by: INTERNAL MEDICINE

## 2022-10-14 PROCEDURE — 83615 LACTATE (LD) (LDH) ENZYME: CPT

## 2022-10-14 PROCEDURE — 51702 INSERT TEMP BLADDER CATH: CPT

## 2022-10-14 PROCEDURE — 80048 BASIC METABOLIC PNL TOTAL CA: CPT

## 2022-10-14 PROCEDURE — 85027 COMPLETE CBC AUTOMATED: CPT

## 2022-10-14 PROCEDURE — 93010 ELECTROCARDIOGRAM REPORT: CPT | Performed by: INTERNAL MEDICINE

## 2022-10-14 PROCEDURE — 88184 FLOWCYTOMETRY/ TC 1 MARKER: CPT

## 2022-10-14 PROCEDURE — 96361 HYDRATE IV INFUSION ADD-ON: CPT

## 2022-10-14 PROCEDURE — 36430 TRANSFUSION BLD/BLD COMPNT: CPT

## 2022-10-14 PROCEDURE — G0378 HOSPITAL OBSERVATION PER HR: HCPCS

## 2022-10-14 PROCEDURE — 36415 COLL VENOUS BLD VENIPUNCTURE: CPT

## 2022-10-14 PROCEDURE — 88185 FLOWCYTOMETRY/TC ADD-ON: CPT

## 2022-10-14 RX ORDER — 0.9 % SODIUM CHLORIDE 0.9 %
250 INTRAVENOUS SOLUTION INTRAVENOUS ONCE
Status: COMPLETED | OUTPATIENT
Start: 2022-10-14 | End: 2022-10-14

## 2022-10-14 RX ORDER — SIMETHICONE 80 MG
80 TABLET,CHEWABLE ORAL 4 TIMES DAILY PRN
Status: DISCONTINUED | OUTPATIENT
Start: 2022-10-14 | End: 2022-10-16

## 2022-10-14 RX ADMIN — ATORVASTATIN CALCIUM 80 MG: 80 TABLET, FILM COATED ORAL at 19:53

## 2022-10-14 RX ADMIN — INSULIN GLARGINE 48 UNITS: 100 INJECTION, SOLUTION SUBCUTANEOUS at 09:23

## 2022-10-14 RX ADMIN — RIVAROXABAN 15 MG: 15 TABLET, FILM COATED ORAL at 16:43

## 2022-10-14 RX ADMIN — INSULIN LISPRO 15 UNITS: 100 INJECTION, SOLUTION INTRAVENOUS; SUBCUTANEOUS at 16:40

## 2022-10-14 RX ADMIN — CYCLOBENZAPRINE 5 MG: 10 TABLET, FILM COATED ORAL at 02:34

## 2022-10-14 RX ADMIN — ACETAMINOPHEN 650 MG: 325 TABLET ORAL at 15:35

## 2022-10-14 RX ADMIN — NAPROXEN 500 MG: 250 TABLET ORAL at 09:22

## 2022-10-14 RX ADMIN — HYDROCORTISONE: 25 CREAM TOPICAL at 09:23

## 2022-10-14 RX ADMIN — ACETAMINOPHEN 650 MG: 325 TABLET ORAL at 09:15

## 2022-10-14 RX ADMIN — Medication 10 ML: at 09:16

## 2022-10-14 RX ADMIN — HYDROCODONE BITARTRATE AND ACETAMINOPHEN 1 TABLET: 5; 325 TABLET ORAL at 04:27

## 2022-10-14 RX ADMIN — SODIUM CHLORIDE 250 ML: 9 INJECTION, SOLUTION INTRAVENOUS at 09:38

## 2022-10-14 RX ADMIN — ACETAMINOPHEN 650 MG: 325 TABLET ORAL at 19:52

## 2022-10-14 RX ADMIN — INSULIN LISPRO 15 UNITS: 100 INJECTION, SOLUTION INTRAVENOUS; SUBCUTANEOUS at 09:23

## 2022-10-14 RX ADMIN — SODIUM CHLORIDE: 9 INJECTION, SOLUTION INTRAVENOUS at 19:13

## 2022-10-14 RX ADMIN — NAPROXEN 500 MG: 250 TABLET ORAL at 16:40

## 2022-10-14 RX ADMIN — SACUBITRIL AND VALSARTAN 0.5 TABLET: 24; 26 TABLET, FILM COATED ORAL at 09:22

## 2022-10-14 RX ADMIN — LEVOTHYROXINE SODIUM 150 MCG: 150 TABLET ORAL at 09:22

## 2022-10-14 RX ADMIN — HYDROCODONE BITARTRATE AND ACETAMINOPHEN 1 TABLET: 5; 325 TABLET ORAL at 19:11

## 2022-10-14 RX ADMIN — SIMETHICONE 80 MG: 80 TABLET, CHEWABLE ORAL at 18:11

## 2022-10-14 RX ADMIN — INSULIN LISPRO 15 UNITS: 100 INJECTION, SOLUTION INTRAVENOUS; SUBCUTANEOUS at 12:16

## 2022-10-14 RX ADMIN — SODIUM CHLORIDE: 9 INJECTION, SOLUTION INTRAVENOUS at 03:14

## 2022-10-14 ASSESSMENT — PAIN DESCRIPTION - LOCATION
LOCATION: PERINEUM;OTHER (COMMENT)
LOCATION: PERINEUM
LOCATION: SHOULDER
LOCATION: LEG

## 2022-10-14 ASSESSMENT — PAIN SCALES - GENERAL
PAINLEVEL_OUTOF10: 8
PAINLEVEL_OUTOF10: 5
PAINLEVEL_OUTOF10: 6
PAINLEVEL_OUTOF10: 7
PAINLEVEL_OUTOF10: 5

## 2022-10-14 ASSESSMENT — PAIN DESCRIPTION - DESCRIPTORS
DESCRIPTORS: ACHING;DISCOMFORT
DESCRIPTORS: BURNING
DESCRIPTORS: BURNING
DESCRIPTORS: ACHING

## 2022-10-14 ASSESSMENT — PAIN DESCRIPTION - FREQUENCY: FREQUENCY: CONTINUOUS

## 2022-10-14 ASSESSMENT — PAIN DESCRIPTION - PAIN TYPE: TYPE: ACUTE PAIN

## 2022-10-14 ASSESSMENT — PAIN DESCRIPTION - ORIENTATION
ORIENTATION: LEFT
ORIENTATION: LEFT
ORIENTATION: RIGHT;LEFT

## 2022-10-14 ASSESSMENT — PAIN DESCRIPTION - ONSET: ONSET: ON-GOING

## 2022-10-14 NOTE — PROGRESS NOTES
Hospitalist Progress Note      PCP: Thea Miller, APRN - CNP    Date of Admission: 10/10/2022    Chief Complaint: L shoulder pain      Subjective:   L shoulder pain persistent; no significant change  Denies new acute complaints    Hypotensive overnight with dizziness, lightheadedness and blurry vision. Now improved s/p IVF hydration\  BP meds held  Mild Cr elevation, but trending down    Medications:  Reviewed    Infusion Medications    sodium chloride 100 mL/hr at 10/14/22 0314    sodium chloride      sodium chloride      dextrose       Scheduled Medications    [Held by provider] metoprolol tartrate  12.5 mg Oral BID    naproxen  500 mg Oral BID WC    rivaroxaban  15 mg Oral Dinner    acetaminophen  650 mg Oral TID    atorvastatin  80 mg Oral Nightly    [Held by provider] furosemide  20 mg Oral Daily    hydrocortisone   Rectal BID    insulin lispro  15 Units SubCUTAneous TID     insulin glargine  48 Units SubCUTAneous Daily    levothyroxine  150 mcg Oral Daily    [Held by provider] sacubitril-valsartan  0.5 tablet Oral BID    sodium chloride flush  5-40 mL IntraVENous 2 times per day     PRN Meds: sodium chloride, HYDROcodone 5 mg - acetaminophen, cyclobenzaprine, sodium chloride flush, sodium chloride, ondansetron **OR** ondansetron, polyethylene glycol, acetaminophen **OR** acetaminophen, glucose, dextrose bolus **OR** dextrose bolus, glucagon (rDNA), dextrose      Intake/Output Summary (Last 24 hours) at 10/14/2022 1521  Last data filed at 10/14/2022 1110  Gross per 24 hour   Intake 573.33 ml   Output 2100 ml   Net -1526.67 ml         Exam:    BP (!) 107/53   Pulse 83   Temp 98.3 °F (36.8 °C) (Oral)   Resp 16   Ht 5' (1.524 m)   Wt 245 lb (111.1 kg)   LMP  (LMP Unknown)   SpO2 97%   BMI 47.85 kg/m²     Gen/overall appearance: Not in acute distress. Alert.   Head: Normocephalic, atraumatic  Eyes: EOMI, no scleral icterus  CVS: regular rate and rhythm, Normal S1S2  Pulm: Clear to auscultation bilaterally. No crackles/wheezes  Extremities: No edema. No erythema or warmth  Neuro: No gross focal deficits noted  Skin: Warm, dry    Labs:   Recent Labs     10/13/22  0535 10/13/22  2201 10/14/22  0503   WBC 10.6 11.4* 12.1*   HGB 7.3* 6.4* 7.4*   HCT 21.8* 19.5* 22.6*   PLT 68* 57* 53*       Recent Labs     10/13/22  0535 10/13/22  2200 10/14/22  0503   * 130* 133*   K 4.5 4.1 3.9   CL 96* 96* 99   CO2 26 24 24   BUN 39* 47* 45*   CREATININE 1.3* 1.6* 1.3*   CALCIUM 8.7 8.2* 8.1*       No results for input(s): AST, ALT, BILIDIR, BILITOT, ALKPHOS in the last 72 hours. No results for input(s): INR in the last 72 hours. No results for input(s): Abbi Forget in the last 72 hours. Assessment/Plan:    Active Hospital Problems    Diagnosis Date Noted    Shoulder pain [M25.519] 10/10/2022     Priority: Medium       Acute on chronic L shoulder pain  Hx of chronic bilat rotator cuff tears  Severe chronic bilateral low back muscle spasms  - pain management  - trial of naproxen and opiates  - ortho following  - PTOT  - palliative following  - close outpt follow up with pain management     Recently diagnosed MDS  - trend CBC  - transfuse as needed  - close outpt follow up with hematology    Hypotension with dizziness and blurry vision; improved  - hold lasix   - IVF hydration  - IVF bolus as needed  - CT head non-acute    Acute on chronic anemia. Underlying MDS. Suspect some component of hypotension. No gross foci of bleeding  - trend H&H  - transfuse as needed  - FOBT     PMH CAD s/p CABG, compensated chronic dCHF, PAF, morbid obesity, htn, hld    DVT Prophylaxis: xarelto  Diet: ADULT DIET;  Regular; 5 carb choices (75 gm/meal)  Code Status: Full Code      Aviva Parry MD

## 2022-10-14 NOTE — PLAN OF CARE
Progressing  10/14/2022 0216 by Priscella Cranker, RN  Outcome: Progressing  Flowsheets (Taken 10/12/2022 2006 by Juan Carlos Dias RN)  Care Plan - Patient's Chronic Conditions and Co-Morbidity Symptoms are Monitored and Maintained or Improved: Monitor and assess patient's chronic conditions and comorbid symptoms for stability, deterioration, or improvement

## 2022-10-14 NOTE — PROGRESS NOTES
Pt. Complaints of gas, requesting Gas -X. Pt with second scant loose clear mucous stool. Daughters at bedside. Pt. With call light in reach. Encouraged PO dinner but complaints of gas stopping her from eating.

## 2022-10-14 NOTE — PROGRESS NOTES
Shift assessment complete. BP soft. Medications given per mar. Tolerated well. Pt ate 100% of breakfast. Message out to doc about BP 90/42. Bolus started. Pt is very anxious. Reassured the pt we will monitor her closely. Kuhn care completed. Tylenol given for pain. On 0.5L O2 pt O2 98%. Will try to wean off today. The care plan and education has been reviewed and mutually agreed upon with the patient. Safety precautions in place.        Flori Reid RN

## 2022-10-14 NOTE — PROGRESS NOTES
Pt. With second scant loose clear mucous stool today. Not enough for a stool sample. Mostly gas. Pt. Requests Gas - X. Poor po due to gassiness.

## 2022-10-14 NOTE — CONSULTS
Oncology Hematology Care   CONSULT NOTE    10/14/2022 2:35 PM    Patient Information: Arminda Sousa   Date of Admit:  10/10/2022  Primary Care Physician:  MATEUSZ Cardenas - CNP  Requesting Physician:  Harjinder Biswas MD    Reason for consult:   Evaluation and recommendations for MDS, anemia. Chief complaint:    Chief Complaint   Patient presents with    Chest Pain     Pt transported [facility]. PT c/o chest pain radiating to left shoulder. Symptoms started 2 days ago. Pt stated she was seen in [facility] and was inpatient for 2 weeks for left shoulder complications. History of Present Illness:  Arminda Sousa is a 78 y.o. female on Harjinder Biswas MD service who was admitted on 10/10/2022 for left shoulder pain. She was recently hospitalized and discharged on 10/7/22 to SNF for rehab. During that hospitalization, we were consulted for anemia and thrombocytopenia. The patient ultimately underwent a Bmbx that was consistent with MDS. She was discharged with scheduled outpatient follow-up next week to further discuss plan of care. She presented to the ER from nursing home with increased left shoulder pain d/t known bilateral shoulder rotator cuff arthropathy. During admission, her Hgb has trended down to lowest of 6.4 requiring transfusion with WBC trending up to 12.1 today. Past Medical History:     has a past medical history of 4/11/17 Left knee repeat repair of median parapatellar arthrotomy with augmentation, Arthritis, Atrial fibrillation ([address] CAD (coronary artery disease), Cataract, Chronic diastolic congestive heart failure ([address] Diabetes mellitus ([address] GERD (gastroesophageal reflux disease), Hyperlipidemia, Hypertension, HYPOTHRYROIDISM, Non-English speaking patient, Sleep apnea, Thyroid disease, and UTI.      Past Surgical History:    Past Surgical History:   Procedure Laterality Date    CARDIAC SURGERY  2004    CABG X 4 VESSELS CHOLECYSTECTOMY, LAPAROSCOPIC  5/15/14    with [address] [facility]    CT BONE MARROW BIOPSY  9/29/2022    CT BONE MARROW BIOPSY 9/29/2022 [facility] CT SCAN    JOINT REPLACEMENT Bilateral 2000 AND 1996 And 1998    knee    KNEE SURGERY Left 02/28/2017    left knee poly exchange revision     REVISION TOTAL KNEE ARTHROPLASTY Right 11/22/2016    RIGHT TOTAL KNEE REVISION WITH POLY EXCHANGE    TOTAL KNEE ARTHROPLASTY      partical/ left     UPPER GASTROINTESTINAL ENDOSCOPY N/A 10/3/2022    EGD BIOPSY performed by Nory Zimmerman MD at [facility] N/A 10/3/2022    EGD DILATION BALLOON performed by Nory Zimmerman MD at [address] [facility] ENDOSCOPY        Current Medications:     [Held by provider] metoprolol tartrate  12.5 mg Oral BID    naproxen  500 mg Oral BID WC    rivaroxaban  15 mg Oral Dinner    acetaminophen  650 mg Oral TID    atorvastatin  80 mg Oral Nightly    [Held by provider] furosemide  20 mg Oral Daily    hydrocortisone   Rectal BID    insulin lispro  15 Units SubCUTAneous TID WC    insulin glargine  48 Units SubCUTAneous Daily    levothyroxine  150 mcg Oral Daily    [Held by provider] sacubitril-valsartan  0.5 tablet Oral BID    sodium chloride flush  5-40 mL IntraVENous 2 times per day       Allergies: Allergies   Allergen Reactions    Aspirin Other (See Comments)     GI upset    Ibuprofen Other (See Comments)     GI upset    Macrobid [Nitrofurantoin Monohydrate Macrocrystals] Other (See Comments)     COLD, SICK    Adhesive Tape Rash    Lexiscan [Regadenoson] Rash     Happened twice with Lexiscan    Penicillins Rash        Social History:    reports that she has never smoked. She has never used smokeless tobacco. She reports that she does not drink alcohol and does not use drugs.      Family History:     family history includes Arthritis in an other family member; Diabetes in an other family member; Heart Disease in an other family member; High Cholesterol in her brother; Hypertension in an other family member. ROS:      Per HPI; otherwise 10 point ROS is negative    PHYSICAL EXAM:    Vitals:  Vitals:    10/14/22 1057   BP: (!) 107/53   Pulse: 83   Resp: 16   Temp: 98.3 °F (36.8 °C)   SpO2: 97%        Intake/Output Summary (Last 24 hours) at 10/14/2022 1456  Last data filed at 10/14/2022 1110  Gross per 24 hour   Intake 573.33 ml   Output 2100 ml   Net -1526.67 ml      Wt Readings from Last 3 Encounters:   10/14/22 245 lb (111.1 kg)   10/07/22 241 lb 14.4 oz (109.7 kg)   06/21/22 243 lb 9.6 oz (110.5 kg)        General appearance: Appears comfortable. Eyes: Sclera clear, pupils equal  ENT: Moist mucus membranes, no thrush  Neck: Trachea midline, symmetrical  Cardiovascular: Regular rhythm, normal S1, S2. No murmur, gallop, rub. Respiratory: Clear to auscultation bilaterally. No wheeze. Gastrointestinal: Abdomen soft, not tender, not distended, normal bowel sounds  Musculoskeletal: No cyanosis in digits, warm extremities  Neurologic: Cranial nerves grossly intact, no motor or speech deficits. Psychiatric: Normal affect. Alert and oriented to time, place and person.   Skin: Warm, dry, normal turgor, no rash    DATA:  CBC:   Lab Results   Component Value Date/Time    WBC 12.1 10/14/2022 05:03 AM    RBC 2.36 10/14/2022 05:03 AM    HGB 7.4 10/14/2022 05:03 AM    HCT 22.6 10/14/2022 05:03 AM    MCV 95.8 10/14/2022 05:03 AM    MCH 31.3 10/14/2022 05:03 AM    MCHC 32.7 10/14/2022 05:03 AM    RDW 20.9 10/14/2022 05:03 AM    PLT 53 10/14/2022 05:03 AM    MPV 8.9 10/14/2022 05:03 AM     BMP:  Lab Results   Component Value Date/Time     10/14/2022 05:03 AM    K 3.9 10/14/2022 05:03 AM    K 4.5 10/11/2022 05:09 AM    CL 99 10/14/2022 05:03 AM    CO2 24 10/14/2022 05:03 AM    BUN 45 10/14/2022 05:03 AM    CREATININE 1.3 10/14/2022 05:03 AM    CALCIUM 8.1 10/14/2022 05:03 AM    GFRAA 47 10/14/2022 05:03 AM    GFRAA >60 07/10/2012 02:05 PM    LABGLOM 39 10/14/2022 05:03 AM    GLUCOSE 188 10/14/2022 05:03 AM Magnesium:   Lab Results   Component Value Date/Time    MG 2.40 09/29/2022 09:22 AM    MG 2.40 09/28/2022 07:02 AM    MG 2.30 09/27/2022 05:58 AM     LIVER PROFILE: No results for input(s): AST, ALT, LIPASE, BILIDIR, BILITOT, ALKPHOS in the last 72 hours. Invalid input(s): AMYLASE,  ALB  PT/INR:    Lab Results   Component Value Date/Time    PROTIME 23.6 09/29/2022 11:37 AM    PROTIME 12.1 02/13/2017 12:07 PM    PROTIME 12.1 11/22/2016 06:35 AM    INR 2.10 09/29/2022 11:37 AM    INR 1.06 02/13/2017 12:07 PM    INR 1.06 11/22/2016 06:35 AM       IMPRESSION/RECOMMENDATIONS:    Principal Problem:    Shoulder pain  Resolved Problems:    * No resolved hospital problems. *       66-year-old female who has:    MDS  - bone marrow biopsy done on 9/29/2022 consistent with MDS with excess blasts. - was planned to follow-up as outpatient to discuss treatment options. 2. Leukocytosis, anemia, and thrombocytopenia:  - Secondary to MDS  - Hgb 7.4 today, s/p pRbcs transfusion on 10/13  - platelet count 03C today, no evidence of bleeding  - transfuse to keep Hgb above 7 and platelets above 47C or above 50k if active bleeding.  - Patient now with mild leukocytosis with WBC 12.1 today. Will check LDH and flow cytometry. 3. Acute on chronic shoulder pain  - hx of chronic bilateral rotator cuff tears  - has been evaluated by ortho  - PT/OT      This plan was discussed with the patient and he/she verbalized understanding. Thank you for allowing us to participate in the care of this patient. MATEUSZ Neely CNP  Oncology Hematology [facility] [phone]     The patient known to me from previous hospitalization. Has newly diagnosed myelodysplastic syndrome. She was supposed to come to the office for discussion of treatment. She has anemia and thrombocytopenia. Blasts 10% on bone marrow  Cytogenetics are pending. FISH panel has shown 5 q-    Patient is readmitted on 10/10/2022 with chest pain.   Hemoglobin was 7.9 at the time of admission. Hemoglobin decreased to 6.4 on 10/13/2022. There is no external bleeding. WBC count has increased to 12.1. Platelets are 53. Peripheral smear did not show any obvious blasts. Check LDH  Check flow cytometry to see if she is transforming into acute leukemia or not. Had lengthy discussion with the patient's daughters by the bedside. Discussed about myelodysplastic syndrome. Told them that we need to rule out acute leukemia given current situation. Peripheral blood flow cytometry might give us the answer. It might take couple of days before we get results back on flow cytometry. I suggested if this turns out to be acute leukemia, best supportive care would be better. Aggressive multiagent chemotherapy would not be an option given her age and comorbid conditions. Hypo methylating agents may be considered but not treatment of choice  If this is still myelodysplastic syndrome, will consider hypo methylating agent or lenalidomide (if cytogenetics do not show unfavorable results) would be an option. Told them that it will take at least 3 to 4 months to achieve the response to the treatments. During that time she will have to be supported with transfusions with blood products if needed. The treatment would be ongoing. All the questions were answered to their satisfaction  Discussed with ALEXYS.     Merlene Hart MD

## 2022-10-14 NOTE — PROGRESS NOTES
ordered marin catheter d/t inability to void. Bladder scan revealed greater than 700mls in bladder.

## 2022-10-14 NOTE — PLAN OF CARE
Problem: Discharge Planning  Goal: Discharge to home or other facility with appropriate resources  Outcome: Progressing  Flowsheets (Taken 10/12/2022 2006 by Gregor Pierce RN)  Discharge to home or other facility with appropriate resources: Identify barriers to discharge with patient and caregiver     Problem: Pain  Goal: Verbalizes/displays adequate comfort level or baseline comfort level  Outcome: Progressing  Flowsheets (Taken 10/14/2022 0216)  Verbalizes/displays adequate comfort level or baseline comfort level:   Encourage patient to monitor pain and request assistance   Assess pain using appropriate pain scale     Problem: Skin/Tissue Integrity  Goal: Absence of new skin breakdown  Description: 1. Monitor for areas of redness and/or skin breakdown  2. Assess vascular access sites hourly  3. Every 4-6 hours minimum:  Change oxygen saturation probe site  4. Every 4-6 hours:  If on nasal continuous positive airway pressure, respiratory therapy assess nares and determine need for appliance change or resting period.   Outcome: Progressing     Problem: Safety - Adult  Goal: Free from fall injury  Outcome: Progressing     Problem: Chronic Conditions and Co-morbidities  Goal: Patient's chronic conditions and co-morbidity symptoms are monitored and maintained or improved  Outcome: Progressing  Flowsheets (Taken 10/12/2022 2006 by Gregor Pierce, RN)  Care Plan - Patient's Chronic Conditions and Co-Morbidity Symptoms are Monitored and Maintained or Improved: Monitor and assess patient's chronic conditions and comorbid symptoms for stability, deterioration, or improvement

## 2022-10-14 NOTE — PROGRESS NOTES
Pt. does not like marin catheter. She prefers a pure wick. Marin replaced for urinary retention of 700 ml. Pt. With approximately 2L UOP this day. [facility] and discuss plan of care. Family at bedside now visiting.

## 2022-10-15 LAB
ANION GAP SERPL CALCULATED.3IONS-SCNC: 11 MMOL/L (ref 3–16)
BUN BLDV-MCNC: 32 MG/DL (ref 7–20)
CALCIUM SERPL-MCNC: 8.1 MG/DL (ref 8.3–10.6)
CHLORIDE BLD-SCNC: 108 MMOL/L (ref 99–110)
CO2: 22 MMOL/L (ref 21–32)
CREAT SERPL-MCNC: 0.8 MG/DL (ref 0.6–1.2)
GFR AFRICAN AMERICAN: >60
GFR NON-AFRICAN AMERICAN: >60
GLUCOSE BLD-MCNC: 159 MG/DL (ref 70–99)
GLUCOSE BLD-MCNC: 163 MG/DL (ref 70–99)
GLUCOSE BLD-MCNC: 197 MG/DL (ref 70–99)
GLUCOSE BLD-MCNC: 213 MG/DL (ref 70–99)
HCT VFR BLD CALC: 21.5 % (ref 36–48)
HCT VFR BLD CALC: 22.2 % (ref 36–48)
HEMOGLOBIN: 7.1 G/DL (ref 12–16)
HEMOGLOBIN: 7.3 G/DL (ref 12–16)
MCH RBC QN AUTO: 31.8 PG (ref 26–34)
MCHC RBC AUTO-ENTMCNC: 33 G/DL (ref 31–36)
MCV RBC AUTO: 96.3 FL (ref 80–100)
OCCULT BLOOD DIAGNOSTIC: ABNORMAL
PDW BLD-RTO: 21.5 % (ref 12.4–15.4)
PERFORMED ON: ABNORMAL
PLATELET # BLD: 43 K/UL (ref 135–450)
PMV BLD AUTO: 9.2 FL (ref 5–10.5)
POTASSIUM SERPL-SCNC: 3.6 MMOL/L (ref 3.5–5.1)
RBC # BLD: 2.23 M/UL (ref 4–5.2)
SODIUM BLD-SCNC: 141 MMOL/L (ref 136–145)
WBC # BLD: 12 K/UL (ref 4–11)

## 2022-10-15 PROCEDURE — 85027 COMPLETE CBC AUTOMATED: CPT

## 2022-10-15 PROCEDURE — 36415 COLL VENOUS BLD VENIPUNCTURE: CPT

## 2022-10-15 PROCEDURE — 6370000000 HC RX 637 (ALT 250 FOR IP): Performed by: NURSE PRACTITIONER

## 2022-10-15 PROCEDURE — 6370000000 HC RX 637 (ALT 250 FOR IP): Performed by: INTERNAL MEDICINE

## 2022-10-15 PROCEDURE — 2580000003 HC RX 258: Performed by: INTERNAL MEDICINE

## 2022-10-15 PROCEDURE — 85014 HEMATOCRIT: CPT

## 2022-10-15 PROCEDURE — 85018 HEMOGLOBIN: CPT

## 2022-10-15 PROCEDURE — 80048 BASIC METABOLIC PNL TOTAL CA: CPT

## 2022-10-15 PROCEDURE — 82270 OCCULT BLOOD FECES: CPT

## 2022-10-15 PROCEDURE — G0378 HOSPITAL OBSERVATION PER HR: HCPCS

## 2022-10-15 RX ORDER — GABAPENTIN 300 MG/1
300 CAPSULE ORAL
Status: DISCONTINUED | OUTPATIENT
Start: 2022-10-16 | End: 2022-10-16

## 2022-10-15 RX ORDER — GABAPENTIN 300 MG/1
600 CAPSULE ORAL NIGHTLY
Status: DISCONTINUED | OUTPATIENT
Start: 2022-10-15 | End: 2022-10-16

## 2022-10-15 RX ORDER — CETIRIZINE HYDROCHLORIDE 10 MG/1
5 TABLET ORAL DAILY
Status: DISCONTINUED | OUTPATIENT
Start: 2022-10-15 | End: 2022-10-16

## 2022-10-15 RX ORDER — BENZONATATE 100 MG/1
100 CAPSULE ORAL 3 TIMES DAILY PRN
Status: DISCONTINUED | OUTPATIENT
Start: 2022-10-15 | End: 2022-10-26 | Stop reason: HOSPADM

## 2022-10-15 RX ORDER — LIDOCAINE 4 G/G
1 PATCH TOPICAL DAILY
Status: DISCONTINUED | OUTPATIENT
Start: 2022-10-15 | End: 2022-10-26 | Stop reason: HOSPADM

## 2022-10-15 RX ADMIN — Medication 10 ML: at 07:41

## 2022-10-15 RX ADMIN — NAPROXEN 500 MG: 250 TABLET ORAL at 09:04

## 2022-10-15 RX ADMIN — HYDROCORTISONE: 25 CREAM TOPICAL at 09:02

## 2022-10-15 RX ADMIN — HYDROCORTISONE: 25 CREAM TOPICAL at 21:03

## 2022-10-15 RX ADMIN — ACETAMINOPHEN 650 MG: 325 TABLET ORAL at 09:04

## 2022-10-15 RX ADMIN — NAPROXEN 500 MG: 250 TABLET ORAL at 18:30

## 2022-10-15 RX ADMIN — RIVAROXABAN 15 MG: 15 TABLET, FILM COATED ORAL at 18:30

## 2022-10-15 RX ADMIN — INSULIN LISPRO 15 UNITS: 100 INJECTION, SOLUTION INTRAVENOUS; SUBCUTANEOUS at 18:30

## 2022-10-15 RX ADMIN — ATORVASTATIN CALCIUM 80 MG: 80 TABLET, FILM COATED ORAL at 20:58

## 2022-10-15 RX ADMIN — Medication 10 ML: at 21:02

## 2022-10-15 RX ADMIN — BENZONATATE 100 MG: 100 CAPSULE ORAL at 20:58

## 2022-10-15 RX ADMIN — SIMETHICONE 80 MG: 80 TABLET, CHEWABLE ORAL at 09:49

## 2022-10-15 RX ADMIN — CETIRIZINE HYDROCHLORIDE 5 MG: 10 TABLET, FILM COATED ORAL at 20:59

## 2022-10-15 RX ADMIN — ACETAMINOPHEN 650 MG: 325 TABLET ORAL at 15:34

## 2022-10-15 RX ADMIN — INSULIN LISPRO 15 UNITS: 100 INJECTION, SOLUTION INTRAVENOUS; SUBCUTANEOUS at 09:12

## 2022-10-15 RX ADMIN — LEVOTHYROXINE SODIUM 150 MCG: 150 TABLET ORAL at 09:04

## 2022-10-15 RX ADMIN — INSULIN LISPRO 15 UNITS: 100 INJECTION, SOLUTION INTRAVENOUS; SUBCUTANEOUS at 12:49

## 2022-10-15 RX ADMIN — INSULIN GLARGINE 48 UNITS: 100 INJECTION, SOLUTION SUBCUTANEOUS at 09:12

## 2022-10-15 RX ADMIN — ACETAMINOPHEN 650 MG: 325 TABLET ORAL at 20:58

## 2022-10-15 RX ADMIN — GABAPENTIN 600 MG: 300 CAPSULE ORAL at 20:58

## 2022-10-15 NOTE — PROGRESS NOTES
Shift assessment complete. VSS. Medication given per mar and tolerated well. Pt is very anxious about the nose bleed she had early this morning. Spoke with daughter about how she is doing this am. Message out to doc about rechecking HGB. Needs encouragement to get up out of bed. Educated pt on the incentive spirometer. The care plan and education has been reviewed and mutually agreed upon with the patient. Safety precautions in place. 0940 - Pt states she is having some cramping and gas. Simethicone given. 1230 - Lidocaine patch ordered for back. 1300 - Pt coughing up mucus with a red tint. Doc aware.        Kelley Henao RN

## 2022-10-15 NOTE — PLAN OF CARE
2140 by Priscella Cranker, RN  Outcome: Progressing  Flowsheets (Taken 10/14/2022 1001 by Elif Smith, RN)  Absence of Physical Injury: Implement safety measures based on patient assessment     Problem: Chronic Conditions and Co-morbidities  Goal: Patient's chronic conditions and co-morbidity symptoms are monitored and maintained or improved  10/15/2022 0925 by Elif Smith RN  Outcome: Progressing  10/14/2022 2140 by Priscella Cranker, RN  Outcome: Progressing  Flowsheets (Taken 10/12/2022 2006 by Juan Carlos Dias RN)  Care Plan - Patient's Chronic Conditions and Co-Morbidity Symptoms are Monitored and Maintained or Improved: Monitor and assess patient's chronic conditions and comorbid symptoms for stability, deterioration, or improvement

## 2022-10-15 NOTE — PROGRESS NOTES
Hospitalist Progress Note      PCP: Marilyn Saucedo, APRN - CNP    Date of Admission: 10/10/2022    Chief Complaint: L shoulder pain      Subjective:   BP improved overnight with IVF hydration  Denies dizziness / lightheadedness  Cr back to baseline    Medications:  Reviewed    Infusion Medications    sodium chloride      sodium chloride      dextrose       Scheduled Medications    lidocaine  1 patch TransDERmal Daily    [Held by provider] metoprolol tartrate  12.5 mg Oral BID    naproxen  500 mg Oral BID WC    rivaroxaban  15 mg Oral Dinner    acetaminophen  650 mg Oral TID    atorvastatin  80 mg Oral Nightly    [Held by provider] furosemide  20 mg Oral Daily    hydrocortisone   Rectal BID    insulin lispro  15 Units SubCUTAneous TID     insulin glargine  48 Units SubCUTAneous Daily    levothyroxine  150 mcg Oral Daily    [Held by provider] sacubitril-valsartan  0.5 tablet Oral BID    sodium chloride flush  5-40 mL IntraVENous 2 times per day     PRN Meds: simethicone, sodium chloride, HYDROcodone 5 mg - acetaminophen, cyclobenzaprine, sodium chloride flush, sodium chloride, ondansetron **OR** ondansetron, polyethylene glycol, acetaminophen **OR** acetaminophen, glucose, dextrose bolus **OR** dextrose bolus, glucagon (rDNA), dextrose      Intake/Output Summary (Last 24 hours) at 10/15/2022 1636  Last data filed at 10/15/2022 1055  Gross per 24 hour   Intake 240 ml   Output 1900 ml   Net -1660 ml         Exam:    /68   Pulse 88   Temp 98.3 °F (36.8 °C) (Oral)   Resp 16   Ht 5' (1.524 m)   Wt 244 lb 8 oz (110.9 kg)   LMP  (LMP Unknown)   SpO2 93%   BMI 47.75 kg/m²     Gen/overall appearance: Not in acute distress. Alert. Head: Normocephalic, atraumatic  Eyes: EOMI, no scleral icterus  CVS: regular rate and rhythm, Normal S1S2  Pulm: Clear to auscultation bilaterally. No crackles/wheezes  Extremities: No edema.  No erythema or warmth  Neuro: No gross focal deficits noted  Skin: Warm, dry    Labs:   Recent Labs     10/13/22  2201 10/14/22  0503 10/15/22  0540   WBC 11.4* 12.1* 12.0*   HGB 6.4* 7.4* 7.1*   HCT 19.5* 22.6* 21.5*   PLT 57* 53* 43*       Recent Labs     10/13/22  2200 10/14/22  0503 10/15/22  0540   * 133* 141   K 4.1 3.9 3.6   CL 96* 99 108   CO2 24 24 22   BUN 47* 45* 32*   CREATININE 1.6* 1.3* 0.8   CALCIUM 8.2* 8.1* 8.1*       No results for input(s): AST, ALT, BILIDIR, BILITOT, ALKPHOS in the last 72 hours. No results for input(s): INR in the last 72 hours. No results for input(s): [employer] in the last 72 hours. Assessment/Plan:    Active Hospital Problems    Diagnosis Date Noted    Shoulder pain [M25.519] 10/10/2022     Priority: Medium       Acute on chronic L shoulder pain  Hx of chronic bilat rotator cuff tears  Severe chronic bilateral low back muscle spasms  Diffuse OA  - pain management  - trial of naproxen and opiates  - ortho following  - PTOT  - palliative following  - close outpt follow up with pain management     Recently diagnosed MDS  - trend CBC  - transfuse as needed  - close outpt follow up with hematology    Acute on chronic anemia. Underlying MDS. Suspect some component of hemodilution. No gross foci of bleeding  - trend H&H  - transfuse as needed    Hypotension with dizziness and blurry vision; resolved  Mild dehydration with elevated Cr  - hold lasix   - s/p IVF hydration; now d/daja  - CT head non-acute     PMH CAD s/p CABG, compensated chronic dCHF, PAF, morbid obesity, htn, hld    DVT Prophylaxis: xarelto  Diet: ADULT DIET;  Regular; 5 carb choices (75 gm/meal)  Code Status: Full Code      Oliva Faith MD

## 2022-10-15 NOTE — PLAN OF CARE
Problem: Discharge Planning  Goal: Discharge to home or other facility with appropriate resources  10/14/2022 2140 by David White RN  Outcome: Progressing  Flowsheets (Taken 10/12/2022 2006 by Yossi De La Garza RN)  Discharge to home or other facility with appropriate resources: Identify barriers to discharge with patient and caregiver     Problem: Pain  Goal: Verbalizes/displays adequate comfort level or baseline comfort level  10/14/2022 2140 by David White RN  Outcome: Progressing  Flowsheets (Taken 10/14/2022 0216)  Verbalizes/displays adequate comfort level or baseline comfort level:   Encourage patient to monitor pain and request assistance   Assess pain using appropriate pain scale     Problem: Skin/Tissue Integrity  Goal: Absence of new skin breakdown  Description: 1. Monitor for areas of redness and/or skin breakdown  2. Assess vascular access sites hourly  3. Every 4-6 hours minimum:  Change oxygen saturation probe site  4. Every 4-6 hours:  If on nasal continuous positive airway pressure, respiratory therapy assess nares and determine need for appliance change or resting period.   10/14/2022 2140 by David White RN  Outcome: Progressing     Problem: Safety - Adult  Goal: Free from fall injury  10/14/2022 2140 by David White RN  Outcome: Progressing  Flowsheets (Taken 10/14/2022 2140)  Free From Fall Injury:   Instruct family/caregiver on patient safety   Based on caregiver fall risk screen, instruct family/caregiver to ask for assistance with transferring infant if caregiver noted to have fall risk factors

## 2022-10-16 ENCOUNTER — APPOINTMENT (OUTPATIENT)
Dept: GENERAL RADIOLOGY | Age: 82
DRG: 177 | End: 2022-10-16
Payer: MEDICARE

## 2022-10-16 LAB
ANION GAP SERPL CALCULATED.3IONS-SCNC: 9 MMOL/L (ref 3–16)
BASE EXCESS ARTERIAL: -1.1 MMOL/L (ref -3–3)
BUN BLDV-MCNC: 33 MG/DL (ref 7–20)
CALCIUM SERPL-MCNC: 8.6 MG/DL (ref 8.3–10.6)
CARBOXYHEMOGLOBIN ARTERIAL: 1.5 % (ref 0–1.5)
CHLORIDE BLD-SCNC: 108 MMOL/L (ref 99–110)
CO2: 23 MMOL/L (ref 21–32)
CREAT SERPL-MCNC: 1.2 MG/DL (ref 0.6–1.2)
GFR AFRICAN AMERICAN: 52
GFR NON-AFRICAN AMERICAN: 43
GLUCOSE BLD-MCNC: 119 MG/DL (ref 70–99)
GLUCOSE BLD-MCNC: 131 MG/DL (ref 70–99)
GLUCOSE BLD-MCNC: 132 MG/DL (ref 70–99)
GLUCOSE BLD-MCNC: 153 MG/DL (ref 70–99)
GLUCOSE BLD-MCNC: 79 MG/DL (ref 70–99)
HCO3 ARTERIAL: 23.5 MMOL/L (ref 21–29)
HCT VFR BLD CALC: 21.4 % (ref 36–48)
HEMOGLOBIN, ART, EXTENDED: 7 G/DL (ref 12–16)
HEMOGLOBIN: 7.1 G/DL (ref 12–16)
MCH RBC QN AUTO: 31.5 PG (ref 26–34)
MCHC RBC AUTO-ENTMCNC: 33.1 G/DL (ref 31–36)
MCV RBC AUTO: 95.2 FL (ref 80–100)
METHEMOGLOBIN ARTERIAL: 0.7 %
O2 SAT, ARTERIAL: 98.3 %
O2 THERAPY: ABNORMAL
PCO2 ARTERIAL: 37.1 MMHG (ref 35–45)
PDW BLD-RTO: 22.1 % (ref 12.4–15.4)
PERFORMED ON: ABNORMAL
PERFORMED ON: NORMAL
PH ARTERIAL: 7.41 (ref 7.35–7.45)
PLATELET # BLD: 31 K/UL (ref 135–450)
PMV BLD AUTO: 8.9 FL (ref 5–10.5)
PO2 ARTERIAL: 93.5 MMHG (ref 75–108)
POTASSIUM SERPL-SCNC: 3.7 MMOL/L (ref 3.5–5.1)
PRO-BNP: 3483 PG/ML (ref 0–449)
PROCALCITONIN: 2.2 NG/ML (ref 0–0.15)
RBC # BLD: 2.25 M/UL (ref 4–5.2)
REASON FOR REJECTION: NORMAL
REJECTED TEST: NORMAL
SODIUM BLD-SCNC: 140 MMOL/L (ref 136–145)
TCO2 ARTERIAL: 55.1 MMOL/L
WBC # BLD: 11.9 K/UL (ref 4–11)

## 2022-10-16 PROCEDURE — 6360000002 HC RX W HCPCS: Performed by: INTERNAL MEDICINE

## 2022-10-16 PROCEDURE — 80048 BASIC METABOLIC PNL TOTAL CA: CPT

## 2022-10-16 PROCEDURE — 84145 PROCALCITONIN (PCT): CPT

## 2022-10-16 PROCEDURE — 87040 BLOOD CULTURE FOR BACTERIA: CPT

## 2022-10-16 PROCEDURE — 6370000000 HC RX 637 (ALT 250 FOR IP): Performed by: NURSE PRACTITIONER

## 2022-10-16 PROCEDURE — 6370000000 HC RX 637 (ALT 250 FOR IP): Performed by: INTERNAL MEDICINE

## 2022-10-16 PROCEDURE — 85027 COMPLETE CBC AUTOMATED: CPT

## 2022-10-16 PROCEDURE — 36600 WITHDRAWAL OF ARTERIAL BLOOD: CPT

## 2022-10-16 PROCEDURE — 96376 TX/PRO/DX INJ SAME DRUG ADON: CPT

## 2022-10-16 PROCEDURE — 2580000003 HC RX 258: Performed by: INTERNAL MEDICINE

## 2022-10-16 PROCEDURE — 71045 X-RAY EXAM CHEST 1 VIEW: CPT

## 2022-10-16 PROCEDURE — 82803 BLOOD GASES ANY COMBINATION: CPT

## 2022-10-16 PROCEDURE — 94760 N-INVAS EAR/PLS OXIMETRY 1: CPT

## 2022-10-16 PROCEDURE — 82668 ASSAY OF ERYTHROPOIETIN: CPT

## 2022-10-16 PROCEDURE — 94640 AIRWAY INHALATION TREATMENT: CPT

## 2022-10-16 PROCEDURE — 83880 ASSAY OF NATRIURETIC PEPTIDE: CPT

## 2022-10-16 PROCEDURE — 2140000000 HC CCU INTERMEDIATE R&B

## 2022-10-16 PROCEDURE — 96375 TX/PRO/DX INJ NEW DRUG ADDON: CPT

## 2022-10-16 PROCEDURE — 2700000000 HC OXYGEN THERAPY PER DAY

## 2022-10-16 PROCEDURE — 36415 COLL VENOUS BLD VENIPUNCTURE: CPT

## 2022-10-16 RX ORDER — METHYLPREDNISOLONE SODIUM SUCCINATE 40 MG/ML
40 INJECTION, POWDER, LYOPHILIZED, FOR SOLUTION INTRAMUSCULAR; INTRAVENOUS DAILY
Status: DISCONTINUED | OUTPATIENT
Start: 2022-10-16 | End: 2022-10-21

## 2022-10-16 RX ORDER — INSULIN GLARGINE 100 [IU]/ML
16 INJECTION, SOLUTION SUBCUTANEOUS DAILY
Status: DISCONTINUED | OUTPATIENT
Start: 2022-10-17 | End: 2022-10-18

## 2022-10-16 RX ORDER — FUROSEMIDE 10 MG/ML
40 INJECTION INTRAMUSCULAR; INTRAVENOUS DAILY
Status: DISCONTINUED | OUTPATIENT
Start: 2022-10-17 | End: 2022-10-17

## 2022-10-16 RX ORDER — FUROSEMIDE 10 MG/ML
40 INJECTION INTRAMUSCULAR; INTRAVENOUS ONCE
Status: COMPLETED | OUTPATIENT
Start: 2022-10-16 | End: 2022-10-16

## 2022-10-16 RX ORDER — FUROSEMIDE 10 MG/ML
40 INJECTION INTRAMUSCULAR; INTRAVENOUS 2 TIMES DAILY
Status: DISCONTINUED | OUTPATIENT
Start: 2022-10-16 | End: 2022-10-16

## 2022-10-16 RX ORDER — IPRATROPIUM BROMIDE AND ALBUTEROL SULFATE 2.5; .5 MG/3ML; MG/3ML
1 SOLUTION RESPIRATORY (INHALATION) 4 TIMES DAILY
Status: DISCONTINUED | OUTPATIENT
Start: 2022-10-17 | End: 2022-10-23

## 2022-10-16 RX ORDER — FUROSEMIDE 10 MG/ML
40 INJECTION INTRAMUSCULAR; INTRAVENOUS DAILY
Status: DISCONTINUED | OUTPATIENT
Start: 2022-10-16 | End: 2022-10-16

## 2022-10-16 RX ORDER — FUROSEMIDE 10 MG/ML
40 INJECTION INTRAMUSCULAR; INTRAVENOUS ONCE
Status: DISCONTINUED | OUTPATIENT
Start: 2022-10-16 | End: 2022-10-16

## 2022-10-16 RX ORDER — LIDOCAINE HYDROCHLORIDE 20 MG/ML
15 SOLUTION OROPHARYNGEAL
Status: DISCONTINUED | OUTPATIENT
Start: 2022-10-16 | End: 2022-10-26 | Stop reason: HOSPADM

## 2022-10-16 RX ORDER — IPRATROPIUM BROMIDE AND ALBUTEROL SULFATE 2.5; .5 MG/3ML; MG/3ML
1 SOLUTION RESPIRATORY (INHALATION) EVERY 4 HOURS PRN
Status: DISCONTINUED | OUTPATIENT
Start: 2022-10-16 | End: 2022-10-26 | Stop reason: HOSPADM

## 2022-10-16 RX ADMIN — Medication 10 ML: at 08:31

## 2022-10-16 RX ADMIN — IPRATROPIUM BROMIDE AND ALBUTEROL SULFATE 1 AMPULE: .5; 2.5 SOLUTION RESPIRATORY (INHALATION) at 22:04

## 2022-10-16 RX ADMIN — GABAPENTIN 300 MG: 300 CAPSULE ORAL at 14:19

## 2022-10-16 RX ADMIN — HYDROCORTISONE: 25 CREAM TOPICAL at 09:00

## 2022-10-16 RX ADMIN — INSULIN LISPRO 15 UNITS: 100 INJECTION, SOLUTION INTRAVENOUS; SUBCUTANEOUS at 08:38

## 2022-10-16 RX ADMIN — NAPROXEN 500 MG: 250 TABLET ORAL at 08:31

## 2022-10-16 RX ADMIN — CEFEPIME 2000 MG: 2 INJECTION, POWDER, FOR SOLUTION INTRAVENOUS at 23:18

## 2022-10-16 RX ADMIN — INSULIN LISPRO 15 UNITS: 100 INJECTION, SOLUTION INTRAVENOUS; SUBCUTANEOUS at 12:09

## 2022-10-16 RX ADMIN — LIDOCAINE HYDROCHLORIDE 15 ML: 20 SOLUTION ORAL at 21:45

## 2022-10-16 RX ADMIN — BENZONATATE 100 MG: 100 CAPSULE ORAL at 06:10

## 2022-10-16 RX ADMIN — INSULIN GLARGINE 48 UNITS: 100 INJECTION, SOLUTION SUBCUTANEOUS at 08:38

## 2022-10-16 RX ADMIN — GABAPENTIN 300 MG: 300 CAPSULE ORAL at 08:30

## 2022-10-16 RX ADMIN — METHYLPREDNISOLONE SODIUM SUCCINATE 40 MG: 40 INJECTION, POWDER, FOR SOLUTION INTRAMUSCULAR; INTRAVENOUS at 21:27

## 2022-10-16 RX ADMIN — FUROSEMIDE 40 MG: 10 INJECTION, SOLUTION INTRAMUSCULAR; INTRAVENOUS at 13:04

## 2022-10-16 RX ADMIN — ACETAMINOPHEN 650 MG: 325 TABLET ORAL at 14:19

## 2022-10-16 RX ADMIN — SODIUM CHLORIDE 5 ML/HR: 9 INJECTION, SOLUTION INTRAVENOUS at 21:34

## 2022-10-16 RX ADMIN — ACETAMINOPHEN 650 MG: 325 TABLET ORAL at 08:30

## 2022-10-16 RX ADMIN — Medication 10 ML: at 21:27

## 2022-10-16 RX ADMIN — AZITHROMYCIN MONOHYDRATE 500 MG: 500 INJECTION, POWDER, LYOPHILIZED, FOR SOLUTION INTRAVENOUS at 21:36

## 2022-10-16 RX ADMIN — LEVOTHYROXINE SODIUM 150 MCG: 150 TABLET ORAL at 08:31

## 2022-10-16 RX ADMIN — HYDROCORTISONE: 25 CREAM TOPICAL at 23:21

## 2022-10-16 RX ADMIN — FUROSEMIDE 40 MG: 10 INJECTION, SOLUTION INTRAMUSCULAR; INTRAVENOUS at 16:55

## 2022-10-16 RX ADMIN — FUROSEMIDE 40 MG: 10 INJECTION, SOLUTION INTRAMUSCULAR; INTRAVENOUS at 04:37

## 2022-10-16 ASSESSMENT — PAIN SCALES - GENERAL: PAINLEVEL_OUTOF10: 0

## 2022-10-16 NOTE — PROGRESS NOTES
Hospitalist Progress Note      PCP: MATEUSZ Cruz - CNP    Date of Admission: 10/10/2022    Chief Complaint: L shoulder pain      Subjective:   Pt with dyspnea last night. S/p CXR with edema and given IV lasix overnight. Complains of persistent dyspnea. On 1.5L O2 NC. Sats 97% on eval      Medications:  Reviewed    Infusion Medications    sodium chloride      sodium chloride      dextrose       Scheduled Medications    furosemide  40 mg IntraVENous BID    lidocaine  1 patch TransDERmal Daily    cetirizine  5 mg Oral Daily    gabapentin  300 mg Oral 2 times per day    gabapentin  600 mg Oral Nightly    [Held by provider] metoprolol tartrate  12.5 mg Oral BID    [Held by provider] naproxen  500 mg Oral BID WC    rivaroxaban  15 mg Oral Dinner    acetaminophen  650 mg Oral TID    atorvastatin  80 mg Oral Nightly    hydrocortisone   Rectal BID    insulin lispro  15 Units SubCUTAneous TID WC    insulin glargine  48 Units SubCUTAneous Daily    levothyroxine  150 mcg Oral Daily    [Held by provider] sacubitril-valsartan  0.5 tablet Oral BID    sodium chloride flush  5-40 mL IntraVENous 2 times per day     PRN Meds: benzonatate, simethicone, sodium chloride, HYDROcodone 5 mg - acetaminophen, cyclobenzaprine, sodium chloride flush, sodium chloride, ondansetron **OR** ondansetron, polyethylene glycol, acetaminophen **OR** acetaminophen, glucose, dextrose bolus **OR** dextrose bolus, glucagon (rDNA), dextrose      Intake/Output Summary (Last 24 hours) at 10/16/2022 1519  Last data filed at 10/16/2022 1508  Gross per 24 hour   Intake 330 ml   Output 1175 ml   Net -845 ml         Exam:    /60   Pulse 98   Temp 99.1 °F (37.3 °C) (Oral)   Resp 20   Ht 5' (1.524 m)   Wt 249 lb (112.9 kg)   LMP  (LMP Unknown)   SpO2 97%   BMI 48.63 kg/m²     Gen/overall appearance: Not in acute distress. Alert.   Head: Normocephalic, atraumatic  Eyes: EOMI, no scleral icterus  CVS: regular rate and rhythm, Normal S1S2  Pulm: Coarse breath sounds. No wheezes  Extremities: No edema. No erythema or warmth  Neuro: No gross focal deficits noted  Skin: Warm, dry    Labs:   Recent Labs     10/14/22  0503 10/15/22  0540 10/15/22  1906 10/16/22  0600   WBC 12.1* 12.0*  --  11.9*   HGB 7.4* 7.1* 7.3* 7.1*   HCT 22.6* 21.5* 22.2* 21.4*   PLT 53* 43*  --  31*       Recent Labs     10/14/22  0503 10/15/22  0540 10/16/22  0600   * 141 140   K 3.9 3.6 3.7   CL 99 108 108   CO2 24 22 23   BUN 45* 32* 33*   CREATININE 1.3* 0.8 1.2   CALCIUM 8.1* 8.1* 8.6       No results for input(s): AST, ALT, BILIDIR, BILITOT, ALKPHOS in the last 72 hours. No results for input(s): INR in the last 72 hours. No results for input(s): [place] in the last 72 hours. Assessment/Plan:    Active Hospital Problems    Diagnosis Date Noted    Shoulder pain [M25.519] 10/10/2022     Priority: Medium       Acute on chronic L shoulder pain  Hx of chronic bilat rotator cuff tears  Severe chronic bilateral low back muscle spasms  Diffuse OA  - pain management  - trial of naproxen and opiates  - ortho following  - PTOT  - palliative following  - close outpt follow up with pain management     Recently diagnosed MDS  - trend CBC  - transfuse as needed  - close outpt follow up with hematology    Acute on chronic anemia. Underlying MDS. Suspect some component of hemodilution.   No gross foci of bleeding  - trend H&H  - transfuse as needed for hgb <7    Hypotension with dizziness and blurry vision; resolved  Mild dehydration with elevated Cr  - s/p IVF hydration; now d/daja  - CT head non-acute    Acute hypoxic respiratory failure  Diastolic CHF exacerbation  - IV lasix  - Strict ins/outs  - CHF protocol  - Respiratory care  - O2 per protocol  - serial CXR and ABGs as needed  - repeat ECHO  - monitor fluid status closely given quick fluid overload vs hypotension     PMH CAD s/p CABG, PAF, morbid obesity, htn, hld    DVT Prophylaxis: xarelto  Diet: ADULT DIET; Regular; 5 carb choices (75 gm/meal)  Code Status: Full Code      Fawad Nascimento MD

## 2022-10-16 NOTE — PROGRESS NOTES
Assessment completed, see doc flowsheets. Pt is A&O X4. Lung sounds are clear in R side, fine crackles on LLL. Pt coughing up tinged blood with mucous. No sign of nose bleed. C/o pain in bilat rib sides d/t coughing. VSS. Tele on. Medication given per [facility]. Patient has no needs at this time. Call light within in reach, will continue to monitor.

## 2022-10-16 NOTE — PROGRESS NOTES
disintegrating tablet 4 mg, 4 mg, Oral, Q8H PRN **OR** ondansetron (ZOFRAN) injection 4 mg, 4 mg, IntraVENous, Q6H PRN  polyethylene glycol (GLYCOLAX) packet 17 g, 17 g, Oral, Daily PRN  acetaminophen (TYLENOL) tablet 650 mg, 650 mg, Oral, Q6H PRN **OR** acetaminophen (TYLENOL) suppository 650 mg, 650 mg, Rectal, Q6H PRN  glucose-vitamin C chewable tablet 4 tablet, 4 tablet, Oral, PRN  dextrose bolus 10% 125 mL, 125 mL, IntraVENous, PRN **OR** dextrose bolus 10% 250 mL, 250 mL, IntraVENous, PRN  glucagon (rDNA) injection 1 mg, 1 mg, SubCUTAneous, PRN  dextrose 10 % infusion, , IntraVENous, Continuous PRN  Physical    VITALS:  /73   Pulse 96   Temp 98.4 °F (36.9 °C) (Axillary)   Resp 20   Ht 5' (1.524 m)   Wt 249 lb (112.9 kg)   LMP  (LMP Unknown)   SpO2 97%   BMI 48.63 kg/m²   TEMPERATURE:  Current - Temp: 98.4 °F (36.9 °C); Max - Temp  Av.3 °F (36.8 °C)  Min: 97.8 °F (36.6 °C)  Max: 99.2 °F (37.3 °C)  PULSE OXIMETRY RANGE: SpO2  Av.1 %  Min: 91 %  Max: 97 %  24HR INTAKE/OUTPUT:    Intake/Output Summary (Last 24 hours) at 10/16/2022 0841  Last data filed at 10/16/2022 0455  Gross per 24 hour   Intake 90 ml   Output 1450 ml   Net -1360 ml       CONSTITUTIONAL:  awake, alert, cooperative, no apparent distress, HEENT oral pharynx , no scleral icterus  HEMATOLOGIC/LYMPHATICS:  no cervical lymphadenopathy, no supraclavicular lymphadenopathy, no axillary lymphadenopathy and no inguinal lymphadenopathy  LUNGS:  wheezing   CARDIOVASCULAR:  , regular rate and rhythm, normal S1 and S2, no S3 or S4, and no murmur noted  ABDOMEN:  slight distended   MUSCULOSKELETAL:  There is no redness, warmth, or swelling of the joints. EXTREMETIES: no edema in one leg  NEUROLOGIC:  Awake, alert, oriented to name, place and time. Cranial nerves II-XII are grossly intact.   SKIN:  no bruising or bleeding      Data      Recent Labs     10/14/22  0503 10/15/22  0540 10/15/22  1906 10/16/22  0600   WBC 12.1* 12.0*  -- 11.9*   HGB 7.4* 7.1* 7.3* 7.1*   HCT 22.6* 21.5* 22.2* 21.4*   PLT 53* 43*  --  31*   MCV 95.8 96.3  --  95.2        Recent Labs     10/14/22  0503 10/15/22  0540 10/16/22  0600   * 141 140   K 3.9 3.6 3.7   CL 99 108 108   CO2 24 22 23   BUN 45* 32* 33*   CREATININE 1.3* 0.8 1.2     No results for input(s): AST, ALT, ALB, BILIDIR, BILITOT, ALKPHOS in the last 72 hours. Magnesium:    Lab Results   Component Value Date/Time    MG 2.40 09/29/2022 09:22 AM    MG 2.40 09/28/2022 07:02 AM    MG 2.30 09/27/2022 05:58 AM       Imaging XR CHEST (2 VW)    Result Date: 9/24/2022  EXAMINATION: TWO XRAY VIEWS OF THE CHEST 9/24/2022 6:35 pm COMPARISON: 06/16/2022 HISTORY: ORDERING SYSTEM PROVIDED HISTORY: Chest Discomfort TECHNOLOGIST PROVIDED HISTORY: Reason for exam:->Chest Discomfort Reason for Exam: Wrist Pain ([facility] # [id number] -Patient brought in by squad from home with left wrist, right rib pain. No injury. ) FINDINGS: The heart is mildly enlarged and less prominent the pulmonary vessels are engorged centrally and less prominent. The lungs are hyperinflated. No consolidation or effusion is seen. The bones are intact. There are postop changes along the mediastinum and sternum which is unchanged. There are mild subsegmental linear densities scattered along the lung bases which is less prominent. Mild cardiomegaly with mild central pulmonary congestion or pulmonary artery hypertension which is less prominent. Mild chronic obstructive lung changes with mild bibasilar discoid atelectasis or scarring which is less prominent with no obvious infiltrate or effusion. XR WRIST LEFT (MIN 3 VIEWS)    Result Date: 9/24/2022  EXAMINATION: 3 XRAY VIEWS OF THE LEFT WRIST 9/24/2022 6:35 pm COMPARISON: None. HISTORY: ORDERING SYSTEM PROVIDED HISTORY: pain TECHNOLOGIST PROVIDED HISTORY: Reason for exam:->pain FINDINGS: There is mild narrowing of the radiocarpal joint. No acute fracture or dislocation is seen.   There is moderate narrowing along the base of the thumb with prominent osteophytes throughout the joint. There is some linear calcifications just distal to the ulna which probably within the TFCC. There are vascular calcifications along the palmar aspect of the wrist.  There is mild soft tissue swelling along the dorsum of the wrist.  The bones are osteopenic. No obvious acute fracture or dislocation can be seen. Mild osteoarthritic changes along the radiocarpal joint and moderate degenerative arthritic changes along the base of the thumb with no acute bony abnormality seen. Vascular calcifications along the palmar aspect of the wrist Mild soft tissue swelling around the wrist and diffuse osteopenia. Probable mild chondrocalcinosis of the TFCC. CT HEAD WO CONTRAST    Result Date: 10/14/2022  EXAMINATION: CT OF THE HEAD WITHOUT CONTRAST  10/13/2022 2:52 pm TECHNIQUE: CT of the head was performed without the administration of intravenous contrast. Automated exposure control, iterative reconstruction, and/or weight based adjustment of the mA/kV was utilized to reduce the radiation dose to as low as reasonably achievable. COMPARISON: 02/11/2012 HISTORY: ORDERING SYSTEM PROVIDED HISTORY: dizziness TECHNOLOGIST PROVIDED HISTORY: Reason for exam:->dizziness Has a \"code stroke\" or \"stroke alert\" been called? ->No Reason for Exam: dizziness FINDINGS: BRAIN/VENTRICLES: The cerebral and cerebellar parenchyma demonstrate volume loss, with a frontal lobe predominance. Scattered low-attenuation areas are noted in the periventricular white matter, compatible with chronic microvascular white matter ischemic disease. There are no areas of hemorrhage, mass, or midline shift. No abnormal extra-axial fluid collections. The ventricles are prominent in size, likely related to involutional change. Gray-white differentiation is maintained without evidence of acute infarct. ORBITS: Lens implants from prior cataract surgery are noted. The orbits are otherwise unremarkable. SINUSES: There is scattered mild paranasal sinus disease. Thickened secretions are noted in the left sphenoid sinus. The mastoid air cells are clear. SOFT TISSUES/SKULL:  The calvarium is intact. No appreciable scalp soft tissue swelling. 1. No acute intracranial abnormality. 2. Cerebral and cerebellar parenchymal volume loss with chronic microvascular white matter ischemic disease. CT CHEST W CONTRAST    Result Date: 9/28/2022  EXAMINATION: CT OF THE CHEST WITH CONTRAST 9/28/2022 1:02 pm TECHNIQUE: CT of the chest was performed with the administration of intravenous contrast. Multiplanar reformatted images are provided for review. Automated exposure control, iterative reconstruction, and/or weight based adjustment of the mA/kV was utilized to reduce the radiation dose to as low as reasonably achievable. COMPARISON: Chest CT 05/10/2014 HISTORY: ORDERING SYSTEM PROVIDED HISTORY: left chest pain FINDINGS: Technical: Evaluation of the lungs degraded because of motion during imaging, blurring detail and resulting in misregistration artifact. Mediastinum: Mild main pulmonary artery dilatation 31 mm (axial series 2, image 51). Ascending thoracic aorta 37 mm and descending thoracic aorta 25 mm. Status post CABG. Cardiomegaly. The great vessels appear unremarkable with exception of calcific atherosclerotic disease. Moderate to severe calcific atherosclerosis coronary arteries. No pericardial effusion. Posterior mediastinal structures appear unremarkable. No mediastinal or hilar adenopathy. Small hiatal hernia with what appears to be a small retained capsule within the hiatal hernia. Lungs/pleura: No focal pulmonary infiltrate evident. No soft tissue or ground-glass pulmonary nodule is seen. No inspissated secretions or endobronchial lesion evident. No pleural effusion or pneumothorax. Upper Abdomen: Unremarkable appearance.  Soft Tissues/Bones: No acute superficial soft tissue or osseous structure abnormality evident. Old healed fractures left ribs 4, 5 and 6.     1. No definite acute pulmonary disease. Evaluation of the lungs degraded because of motion during imaging, blurring detail and resulting in misregistration artifact. 2. Moderate to severe calcific atherosclerosis coronary arteries. 3. Mild main pulmonary artery dilatation can be seen with pulmonary hypertension but is nonspecific. 4. Cardiomegaly and sequela from CABG. 5. Small hiatal hernia with what appears to be a small retained capsule within the hiatal hernia. CT GUIDED NEEDLE PLACEMENT    Result Date: 9/29/2022  PROCEDURE: CT GUIDED BONE MARROW ASPIRATION AND CORE NEEDLE BONE BIOPSY OF THE RIGHT ILIAC BONE. MODERATE CONSCIOUS SEDATION 9/29/2022 HISTORY: ORDERING SYSTEM PROVIDED HISTORY: anemia, thrombocytopenia TECHNOLOGIST PROVIDED HISTORY: Reason for exam:->anemia, thrombocytopenia Reason for Exam: anemia, thrombocytopenia SEDATION: 2 mgversed and 50 mcg fentanyl were titrated intravenously for moderate sedation monitored under my direction. Total intraservice time of sedation was 5 minutes. The patient's vital signs were monitored throughout the procedure and recorded in the patient's medical record by the nurse. TECHNIQUE: Informed consent was obtained following a detailed explanation of the procedure including risks, benefits, and alternatives. Universal protocol was followed. Sterile gowns, masks, hats and gloves utilized for maximal sterile barrier. Axial images were obtained through the iliac bones using CT guidance and a suitable skin site was prepped and draped in sterile fashion. Local anesthesia was achieved with lidocaine. An 11 gauge 422 Group bone marrow biopsy needle was advanced into the right iliac bone and approximately 15 mL of bone marrow aspirate was obtained. A single core biopsy specimen was obtained and the patient tolerated the procedure well.  Estimated blood loss: Less than 5 cc Automated exposure control, iterative reconstruction, and/or weight based adjustment of the mA/kV was utilized to reduce the radiation dose to as low as reasonably achievable. DLP: 71.66 mGy-cm     Successful CT guided bone marrow aspiration and core biopsy of the right iliac bone. XR CHEST PORTABLE    Result Date: 10/16/2022  EXAMINATION: ONE XRAY VIEW OF THE CHEST 10/16/2022 2:15 am COMPARISON: 10/10/2022 HISTORY: ORDERING SYSTEM PROVIDED HISTORY: crackles in lungs TECHNOLOGIST PROVIDED HISTORY: Reason for exam:->crackles in lungs Reason for Exam: Crackles in lungs FINDINGS: Overlying sternotomy wires. Similar-appearing prominence of the cardiac silhouette with associated vascular congestion. No focal consolidation. Costophrenic angles are sharp. No evidence of pneumothorax. No acute osseous abnormalities. 1. Similar-appearing prominence of the cardiac silhouette with associated vascular congestion. 2. No focal consolidation. XR CHEST PORTABLE    Result Date: 10/10/2022  EXAMINATION: ONE XRAY VIEW OF THE CHEST 10/10/2022 11:15 am COMPARISON: Chest x-ray dated 09/24/2022. HISTORY: ORDERING SYSTEM PROVIDED HISTORY: left chest pain TECHNOLOGIST PROVIDED HISTORY: Reason for exam:->left chest pain Reason for Exam: Chest Pain (Pt transported from [facility] from [facility]. PT c/o chest pain radiating to left shoulder. Symptoms started 2 days ago. Pt stated she was seen in [facility] and was inpatient for 2 weeks for left shoulder complications.) FINDINGS: HEART/MEDIASTINUM: The cardiomediastinal silhouette is stable. PLEURA/LUNGS: There are no focal consolidations or pleural effusions. There is no appreciable pneumothorax. Linear atelectasis versus scarring noted in the periphery of the left mid lung. BONES/SOFT TISSUE: No acute abnormality. Median sternotomy wires again noted. No radiographic evidence of acute pulmonary disease.      CT CHEST PULMONARY EMBOLISM W CONTRAST    Result Date: 10/10/2022  EXAMINATION: CTA OF THE CHEST 10/10/2022 11:59 am TECHNIQUE: CTA of the chest was performed after the administration of intravenous contrast.  Multiplanar reformatted images are provided for review. MIP images are provided for review. Automated exposure control, iterative reconstruction, and/or weight based adjustment of the mA/kV was utilized to reduce the radiation dose to as low as reasonably achievable. COMPARISON: None. HISTORY: ORDERING SYSTEM PROVIDED HISTORY: left sided chest pain, shortness of breath TECHNOLOGIST PROVIDED HISTORY: Reason for exam:->left sided chest pain, shortness of breath Decision Support Exception - unselect if not a suspected or confirmed emergency medical condition->Emergency Medical Condition (MA) Reason for Exam: left sided chest pain, shortness of breath FINDINGS: Pulmonary Arteries: Pulmonary arteries are adequately opacified for evaluation. No evidence of intraluminal filling defect to suggest pulmonary embolism. Main pulmonary artery is normal in caliber. Mediastinum: No evidence of mediastinal lymphadenopathy. The heart and pericardium demonstrate no acute abnormality. There is no acute abnormality of the thoracic aorta. Lungs/pleura: There is minimal bibasilar atelectasis/scarring. The lungs are without acute process. No focal consolidation or pulmonary edema. No evidence of pleural effusion or pneumothorax. Upper Abdomen: Limited images of the upper abdomen are unremarkable. Soft Tissues/Bones: No acute bone or soft tissue abnormality. No evidence of pulmonary embolism or acute pulmonary abnormality. CT BIOPSY BONE MARROW    Result Date: 9/29/2022  PROCEDURE: CT GUIDED BONE MARROW ASPIRATION AND CORE NEEDLE BONE BIOPSY OF THE RIGHT ILIAC BONE.  MODERATE CONSCIOUS SEDATION 9/29/2022 HISTORY: ORDERING SYSTEM PROVIDED HISTORY: anemia, thrombocytopenia TECHNOLOGIST PROVIDED HISTORY: Reason for exam:->anemia, thrombocytopenia Reason for Exam: anemia, thrombocytopenia SEDATION: 2 mgversed and 50 mcg fentanyl were titrated intravenously for moderate sedation monitored under my direction. Total intraservice time of sedation was 5 minutes. The patient's vital signs were monitored throughout the procedure and recorded in the patient's medical record by the nurse. TECHNIQUE: Informed consent was obtained following a detailed explanation of the procedure including risks, benefits, and alternatives. Universal protocol was followed. Sterile gowns, masks, hats and gloves utilized for maximal sterile barrier. Axial images were obtained through the iliac bones using CT guidance and a suitable skin site was prepped and draped in sterile fashion. Local anesthesia was achieved with lidocaine. An 11 gauge [employer] bone marrow biopsy needle was advanced into the right iliac bone and approximately 15 mL of bone marrow aspirate was obtained. A single core biopsy specimen was obtained and the patient tolerated the procedure well. Estimated blood loss: Less than 5 cc Automated exposure control, iterative reconstruction, and/or weight based adjustment of the mA/kV was utilized to reduce the radiation dose to as low as reasonably achievable. DLP: 71.66 mGy-cm     Successful CT guided bone marrow aspiration and core biopsy of the right iliac bone.      EGD    Result Date: 10/3/2022  No dictation       Problem List  Patient Active Problem List   Diagnosis    DM (diabetes mellitus) ([facility] [address]    Coronary artery disease involving native coronary artery of native heart without angina pectoris    PAF (paroxysmal atrial fibrillation) (HCC)    Peripheral vascular disease (HCC)    CARLOS (obstructive sleep apnea)    SOB (shortness of breath)    HIGH CHOLESTEROL    HTN (hypertension)    Dizziness    Headache    Debility    11/22/16 Right knee polyethylene exchange    Acute pain of right shoulder    Mixed hyperlipidemia    Morbid obesity with BMI of 40.0-44.9, adult ([facility] [address]    Morbid obesity with BMI of 45.0-49.9, adult [facility])    Diabetes type 2, controlled ([facility] [address]    Carotid artery disease without cerebral infarction (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    - hgb above 7 today   PLts high enough for no need of transfusion   Leukocytosis is stable   I will check epo level -to see if candidate for procrit butd she probably wont be  She has likely aggressive dx-  BIg decisions on if tx will be done -hard to eval pts qol inpt but she seems quite weak     -   Dulce Dawson MD, MD

## 2022-10-16 NOTE — PROGRESS NOTES
Pt transferred to [facility]-2 see flow sheet see mar report given to guido REARDON all belongings with family

## 2022-10-17 PROBLEM — E87.70 HYPERVOLEMIA: Status: ACTIVE | Noted: 2022-10-17

## 2022-10-17 PROBLEM — I50.22 SYSTOLIC CHF, CHRONIC (HCC): Status: ACTIVE | Noted: 2022-10-17

## 2022-10-17 PROBLEM — C94.6 MYELODYSPLASTIC DISEASE (HCC): Status: ACTIVE | Noted: 2022-10-17

## 2022-10-17 PROBLEM — I95.9 ARTERIAL HYPOTENSION: Status: ACTIVE | Noted: 2022-10-17

## 2022-10-17 LAB
ABO/RH: NORMAL
ANION GAP SERPL CALCULATED.3IONS-SCNC: 14 MMOL/L (ref 3–16)
ANTIBODY SCREEN: NORMAL
BACTERIA: ABNORMAL /HPF
BILIRUBIN URINE: NEGATIVE
BLOOD BANK DISPENSE STATUS: NORMAL
BLOOD BANK DISPENSE STATUS: NORMAL
BLOOD BANK PRODUCT CODE: NORMAL
BLOOD BANK PRODUCT CODE: NORMAL
BLOOD, URINE: ABNORMAL
BPU ID: NORMAL
BPU ID: NORMAL
BUN BLDV-MCNC: 42 MG/DL (ref 7–20)
CALCIUM SERPL-MCNC: 7.6 MG/DL (ref 8.3–10.6)
CHLORIDE BLD-SCNC: 104 MMOL/L (ref 99–110)
CLARITY: ABNORMAL
CO2: 20 MMOL/L (ref 21–32)
COLOR: YELLOW
COMMENT UA: ABNORMAL
CREAT SERPL-MCNC: 1.9 MG/DL (ref 0.6–1.2)
DESCRIPTION BLOOD BANK: NORMAL
DESCRIPTION BLOOD BANK: NORMAL
EPITHELIAL CELLS, UA: 5 /HPF (ref 0–5)
ERYTHROPOIETIN: 720 MU/ML (ref 4–27)
GFR AFRICAN AMERICAN: 30
GFR NON-AFRICAN AMERICAN: 25
GLUCOSE BLD-MCNC: 241 MG/DL (ref 70–99)
GLUCOSE URINE: 250 MG/DL
HCT VFR BLD CALC: 20.8 % (ref 36–48)
HEMOGLOBIN: 6.7 G/DL (ref 12–16)
HYALINE CASTS: 13 /LPF (ref 0–8)
KETONES, URINE: ABNORMAL MG/DL
LEUKOCYTE ESTERASE, URINE: ABNORMAL
LV EF: 48 %
LVEF MODALITY: NORMAL
MCH RBC QN AUTO: 31 PG (ref 26–34)
MCHC RBC AUTO-ENTMCNC: 32.3 G/DL (ref 31–36)
MCV RBC AUTO: 96 FL (ref 80–100)
MICROSCOPIC EXAMINATION: YES
NITRITE, URINE: NEGATIVE
PDW BLD-RTO: 22.5 % (ref 12.4–15.4)
PH UA: 5 (ref 5–8)
PLATELET # BLD: 25 K/UL (ref 135–450)
PMV BLD AUTO: 8.8 FL (ref 5–10.5)
POTASSIUM SERPL-SCNC: 5 MMOL/L (ref 3.5–5.1)
PROTEIN UA: 100 MG/DL
RBC # BLD: 2.17 M/UL (ref 4–5.2)
RBC UA: 275 /HPF (ref 0–4)
SODIUM BLD-SCNC: 138 MMOL/L (ref 136–145)
SPECIFIC GRAVITY UA: 1.02 (ref 1–1.03)
URINE REFLEX TO CULTURE: YES
URINE TYPE: ABNORMAL
UROBILINOGEN, URINE: 0.2 E.U./DL
VANCOMYCIN RANDOM: 20.3 UG/ML
WBC # BLD: 20.1 K/UL (ref 4–11)
WBC UA: 19 /HPF (ref 0–5)

## 2022-10-17 PROCEDURE — 6370000000 HC RX 637 (ALT 250 FOR IP): Performed by: INTERNAL MEDICINE

## 2022-10-17 PROCEDURE — 2500000003 HC RX 250 WO HCPCS: Performed by: INTERNAL MEDICINE

## 2022-10-17 PROCEDURE — 6360000002 HC RX W HCPCS: Performed by: INTERNAL MEDICINE

## 2022-10-17 PROCEDURE — C8929 TTE W OR WO FOL WCON,DOPPLER: HCPCS

## 2022-10-17 PROCEDURE — 87086 URINE CULTURE/COLONY COUNT: CPT

## 2022-10-17 PROCEDURE — 2700000000 HC OXYGEN THERAPY PER DAY

## 2022-10-17 PROCEDURE — 99223 1ST HOSP IP/OBS HIGH 75: CPT | Performed by: INTERNAL MEDICINE

## 2022-10-17 PROCEDURE — 86850 RBC ANTIBODY SCREEN: CPT

## 2022-10-17 PROCEDURE — 81001 URINALYSIS AUTO W/SCOPE: CPT

## 2022-10-17 PROCEDURE — 87641 MR-STAPH DNA AMP PROBE: CPT

## 2022-10-17 PROCEDURE — 2580000003 HC RX 258: Performed by: INTERNAL MEDICINE

## 2022-10-17 PROCEDURE — 94761 N-INVAS EAR/PLS OXIMETRY MLT: CPT

## 2022-10-17 PROCEDURE — 92526 ORAL FUNCTION THERAPY: CPT

## 2022-10-17 PROCEDURE — 85027 COMPLETE CBC AUTOMATED: CPT

## 2022-10-17 PROCEDURE — 36430 TRANSFUSION BLD/BLD COMPNT: CPT

## 2022-10-17 PROCEDURE — P9035 PLATELET PHERES LEUKOREDUCED: HCPCS

## 2022-10-17 PROCEDURE — 86900 BLOOD TYPING SEROLOGIC ABO: CPT

## 2022-10-17 PROCEDURE — 80048 BASIC METABOLIC PNL TOTAL CA: CPT

## 2022-10-17 PROCEDURE — 86901 BLOOD TYPING SEROLOGIC RH(D): CPT

## 2022-10-17 PROCEDURE — 6370000000 HC RX 637 (ALT 250 FOR IP): Performed by: NURSE PRACTITIONER

## 2022-10-17 PROCEDURE — P9016 RBC LEUKOCYTES REDUCED: HCPCS

## 2022-10-17 PROCEDURE — 80202 ASSAY OF VANCOMYCIN: CPT

## 2022-10-17 PROCEDURE — 94640 AIRWAY INHALATION TREATMENT: CPT

## 2022-10-17 PROCEDURE — 99291 CRITICAL CARE FIRST HOUR: CPT | Performed by: INTERNAL MEDICINE

## 2022-10-17 PROCEDURE — 2140000000 HC CCU INTERMEDIATE R&B

## 2022-10-17 PROCEDURE — 92610 EVALUATE SWALLOWING FUNCTION: CPT

## 2022-10-17 PROCEDURE — 86923 COMPATIBILITY TEST ELECTRIC: CPT

## 2022-10-17 RX ORDER — METRONIDAZOLE 500 MG/100ML
500 INJECTION, SOLUTION INTRAVENOUS EVERY 8 HOURS
Status: DISPENSED | OUTPATIENT
Start: 2022-10-17 | End: 2022-10-25

## 2022-10-17 RX ORDER — TRAZODONE HYDROCHLORIDE 50 MG/1
50 TABLET ORAL ONCE
Status: COMPLETED | OUTPATIENT
Start: 2022-10-17 | End: 2022-10-17

## 2022-10-17 RX ORDER — SODIUM CHLORIDE 9 MG/ML
INJECTION, SOLUTION INTRAVENOUS
Status: DISPENSED
Start: 2022-10-17 | End: 2022-10-18

## 2022-10-17 RX ORDER — FENTANYL CITRATE 50 UG/ML
12.5 INJECTION, SOLUTION INTRAMUSCULAR; INTRAVENOUS ONCE
Status: COMPLETED | OUTPATIENT
Start: 2022-10-17 | End: 2022-10-17

## 2022-10-17 RX ORDER — FUROSEMIDE 10 MG/ML
20 INJECTION INTRAMUSCULAR; INTRAVENOUS DAILY
Status: DISCONTINUED | OUTPATIENT
Start: 2022-10-18 | End: 2022-10-19

## 2022-10-17 RX ORDER — SODIUM CHLORIDE 9 MG/ML
INJECTION, SOLUTION INTRAVENOUS PRN
Status: DISCONTINUED | OUTPATIENT
Start: 2022-10-17 | End: 2022-10-26 | Stop reason: HOSPADM

## 2022-10-17 RX ADMIN — IPRATROPIUM BROMIDE AND ALBUTEROL SULFATE 1 AMPULE: 2.5; .5 SOLUTION RESPIRATORY (INHALATION) at 19:25

## 2022-10-17 RX ADMIN — METHYLPREDNISOLONE SODIUM SUCCINATE 40 MG: 40 INJECTION, POWDER, FOR SOLUTION INTRAMUSCULAR; INTRAVENOUS at 12:15

## 2022-10-17 RX ADMIN — HYDROCORTISONE: 25 CREAM TOPICAL at 21:34

## 2022-10-17 RX ADMIN — IPRATROPIUM BROMIDE AND ALBUTEROL SULFATE 1 AMPULE: 2.5; .5 SOLUTION RESPIRATORY (INHALATION) at 08:26

## 2022-10-17 RX ADMIN — ACETAMINOPHEN 650 MG: 325 TABLET ORAL at 21:33

## 2022-10-17 RX ADMIN — LIDOCAINE HYDROCHLORIDE 15 ML: 20 SOLUTION ORAL at 01:50

## 2022-10-17 RX ADMIN — IPRATROPIUM BROMIDE AND ALBUTEROL SULFATE 1 AMPULE: 2.5; .5 SOLUTION RESPIRATORY (INHALATION) at 12:23

## 2022-10-17 RX ADMIN — INSULIN GLARGINE 16 UNITS: 100 INJECTION, SOLUTION SUBCUTANEOUS at 12:15

## 2022-10-17 RX ADMIN — ATORVASTATIN CALCIUM 80 MG: 80 TABLET, FILM COATED ORAL at 21:34

## 2022-10-17 RX ADMIN — FENTANYL CITRATE 12.5 MCG: 0.05 INJECTION, SOLUTION INTRAMUSCULAR; INTRAVENOUS at 04:20

## 2022-10-17 RX ADMIN — VANCOMYCIN HYDROCHLORIDE 1750 MG: 5 INJECTION, POWDER, LYOPHILIZED, FOR SOLUTION INTRAVENOUS at 00:07

## 2022-10-17 RX ADMIN — METRONIDAZOLE 500 MG: 500 INJECTION, SOLUTION INTRAVENOUS at 21:49

## 2022-10-17 RX ADMIN — Medication 10 ML: at 21:34

## 2022-10-17 RX ADMIN — IPRATROPIUM BROMIDE AND ALBUTEROL SULFATE 1 AMPULE: 2.5; .5 SOLUTION RESPIRATORY (INHALATION) at 16:02

## 2022-10-17 RX ADMIN — CEFEPIME 1000 MG: 1 INJECTION, POWDER, FOR SOLUTION INTRAMUSCULAR; INTRAVENOUS at 12:15

## 2022-10-17 RX ADMIN — CEFEPIME 1000 MG: 1 INJECTION, POWDER, FOR SOLUTION INTRAMUSCULAR; INTRAVENOUS at 22:51

## 2022-10-17 RX ADMIN — TRAZODONE HYDROCHLORIDE 50 MG: 50 TABLET ORAL at 21:34

## 2022-10-17 RX ADMIN — IPRATROPIUM BROMIDE AND ALBUTEROL SULFATE 1 AMPULE: .5; 2.5 SOLUTION RESPIRATORY (INHALATION) at 02:29

## 2022-10-17 ASSESSMENT — PAIN DESCRIPTION - ORIENTATION
ORIENTATION: LEFT;RIGHT
ORIENTATION: MID

## 2022-10-17 ASSESSMENT — PAIN DESCRIPTION - PAIN TYPE: TYPE: NEUROPATHIC PAIN;CHRONIC PAIN

## 2022-10-17 ASSESSMENT — PAIN DESCRIPTION - LOCATION
LOCATION: FOOT;HAND
LOCATION: BACK

## 2022-10-17 ASSESSMENT — PAIN SCALES - GENERAL
PAINLEVEL_OUTOF10: 0
PAINLEVEL_OUTOF10: 10
PAINLEVEL_OUTOF10: 0
PAINLEVEL_OUTOF10: 10

## 2022-10-17 ASSESSMENT — PAIN DESCRIPTION - DESCRIPTORS
DESCRIPTORS: BURNING
DESCRIPTORS: ACHING

## 2022-10-17 NOTE — CONSULTS
MD Gilson Ferguson MD Clemon House, MD                  Office: [phone]                      Fax: [phone]             [address] [place] [address]                   NEPHROLOGY INITIAL CONSULT NOTE:     PATIENT NAME: Obinna Silver  : 1940  MRN: [id number]  REASON FOR CONSULT: For evaluation and management of Acute Kidney Injury . (My recommendations will be communicated by way of shared medical record.)  I was informed by the hospitalist directly on 10-17 late evening. RECOMMENDATIONS:   -avoid aggressive diuretics,   - hold RAAS blockade, due to hypotension and ARUN  -Goal hemoglobin above 7, will help with hypotension  - Work-up for infection with leukocytosis and hypotension  - check , urine lytes,   -  marin inserted  -Close follow-up with hematology, cardiology,  - no need for dialysis, at higher risk for decompensation, needing closer monitoring. D/C plan from renal stand point:  -Unclear with acute illness currently and expect prolonged hospitalization  -Family is discussing with palliative team    D/W patient, son, daughter - who translated, team CVU nurse       IMPRESSION:       Admitted on:  10/10/2022 10:51 AM   For:  Shoulder pain [M25.519]  Acute chest pain [R07.9]  History of coronary artery disease [Z86.79]  Chronic congestive heart failure, unspecified heart failure type ([address] [I50.9]           ARUN (on no h/o CKD:):   - BL Scr-0.8 as of 1 admission on 10--> peaked at 1.9 within 48 hours during admission  - Etiology of ARUN -presumed ATN in the setting of hemoglobin 6.7, hypotension, concern for infection with leukocytosis + CT angio on 10-,   -With clinical multiple other culprits for ARUN I do not suspect any other etiology so no need for serology work-up for now  - UA : results reviewed: 250 glucose, trace ketones, high specific gravity large blood, 100 protein, trace leukocytes, few hyaline cast, with RBCs almost 200, with WBCs.   - Renal imaging: none recently   -In 48 hours no improvement with culprit for ARUN, and then cath kidney imaging      Associated problems:  : HTN : no need for tight control   : Na: WNL    - Azotemia: pre-renal  - Electrolytes: K: WNL  - Acid-Base: acidosis -  non-AGMA  - Anemia: Likely due to myelodysplastic syndrome    Other major problems: Management per primary and other consulting teams. Morbid obesity  Obstructive sleep apnea on CPAP  Coronary artery disease, history of CABG    Diagnosis of myelodysplastic syndrome after bone marrow biopsy in September 2022    Chronic systolic heart failure-EF around 45-50%    Hospital Problems             Last Modified POA    * (Principal) Shoulder pain 10/10/2022 Yes         : other supportive care :   - Check daily renal function panel with electrolytes-phosphorus  - Strict monitoring of I/Os, daily weight  - Renal feeds/diet  - Current medications reviewed. - Nephrotoxic medications have been discontinued. - Dose adjusted and appropriate. - Dose meds for eGFR <15 mL/min/1.73m2 during ARUN    - Avoid heavy opioids due to renal failure - may use very low dose dilaudid / fentanyl with close monitoring of CNS and respiratory depression. Please refer to the orders. High Complexity. Multiple complex problems. Discussed with patient 's  treatment team-   Thank you for allowing me to participate in this patient's care. Please do not hesitate to contact me anytime. We will follow along with you. Trupti Stewart MD,  [facility] [id number] [place] [address] [place]: [phone] or Via [employer]  Fax: [phone]        =======================================================================================   =======================================================================================      CHIEF COMPLAINT:   Chief Complaint   Patient presents with    Chest Pain     Pt transported from [facility] from [facility].  PT c/o chest pain radiating to left shoulder. Symptoms started 2 days ago. Pt stated she was seen in [facility] and was inpatient for 2 weeks for left shoulder complications. History Obtained From:  patient + treatment team + Electronic Medical Records    HPI: Ms. Shawn Orellana is a 78 y.o. female with significant past medical history as below:   Past Medical History:   Diagnosis Date    4/11/17 Left knee repeat repair of median parapatellar arthrotomy with augmentation 4/12/2017    Arthritis     Atrial fibrillation (HCC)     CAD (coronary artery disease)     Cataract     Chronic diastolic congestive heart failure ([address] 7/27/2022    Diabetes mellitus ([address]     GERD (gastroesophageal reflux disease)     Hyperlipidemia     Hypertension     HYPOTHRYROIDISM     Non-English speaking patient     SPEAKS Yi. SHE SPEAKS A LITTLE ENGLISH    Sleep apnea     unspecified    Thyroid disease     UTI       Presents with Chest Pain (Pt transported from [facility] from [facility]. PT c/o chest pain radiating to left shoulder. Symptoms started 2 days ago. Pt stated she was seen in [facility] and was inpatient for 2 weeks for left shoulder complications.)    Admitted with Shoulder pain [M25.519]  Acute chest pain [R07.9]  History of coronary artery disease [Z86.79]  Chronic congestive heart failure, unspecified heart failure type ([address] [I50.9]   Found to have elevated creatinine, worsening, so we are called for that. No current active complaints. Patient denied chest pain / dizziness/lightheadedness/syncope  No active SOB   Mild leg edema. *  Regarding: ARUN   Duration: acute   Location: kidneys  Severity: Severe   Timing: continous  Context (ex: related to condition):  , refer to my assessment / impression. Modifying factors (ex: medications, interventions):  , refer to my plan / recommendation. Associated signs & symptoms (ex: edema, SOB): As mentioned above in CC and HPI      Past medical, Surgical, Social, Family medical history reviewed by me.      PAST MEDICAL HISTORY:   Past Medical History:   Diagnosis Date    4/11/17 Left knee repeat repair of median parapatellar arthrotomy with augmentation 4/12/2017    Arthritis     Atrial fibrillation (HCC)     CAD (coronary artery disease)     Cataract     Chronic diastolic congestive heart failure ([facility] 7/27/2022    Diabetes mellitus ([facility]     GERD (gastroesophageal reflux disease)     Hyperlipidemia     Hypertension     HYPOTHRYROIDISM     Non-English speaking patient     SPEAKS Romanian. SHE SPEAKS A LITTLE ENGLISH    Sleep apnea     unspecified    Thyroid disease     UTI      PAST SURGICAL HISTORY:   Past Surgical History:   Procedure Laterality Date    CARDIAC SURGERY  2004    CABG X 4 VESSELS    CHOLECYSTECTOMY, LAPAROSCOPIC  5/15/14    with IOC    CT BONE MARROW BIOPSY  9/29/2022    CT BONE MARROW BIOPSY 9/29/2022 [facility] CT SCAN    JOINT REPLACEMENT Bilateral 2000 AND 1996 And 1998    knee    KNEE SURGERY Left 02/28/2017    left knee poly exchange revision     REVISION TOTAL KNEE ARTHROPLASTY Right 11/22/2016    RIGHT TOTAL KNEE REVISION WITH POLY EXCHANGE    TOTAL KNEE ARTHROPLASTY      partical/ left     UPPER GASTROINTESTINAL ENDOSCOPY N/A 10/3/2022    EGD BIOPSY performed by Noris Belle MD at [facility] N/A 10/3/2022    EGD DILATION BALLOON performed by Noris Belle MD at [address]:   Family History   Problem Relation Age of Onset    Arthritis Other     Diabetes Other     Heart Disease Other     Hypertension Other     High Cholesterol Brother      SOCIAL HISTORY:   Social History     Socioeconomic History    Marital status:      Spouse name: None    Number of children: 7    Years of education: None    Highest education level: None   Occupational History    Occupation: Retired   Tobacco Use    Smoking status: Never    Smokeless tobacco: Never   Vaping Use    Vaping Use: Never used   Substance and Sexual Activity    Alcohol use: No    Drug use:  No MEDICATIONS: reviewed by me. Medications Prior to Admission:  No current facility-administered medications on file prior to encounter. Current Outpatient Medications on File Prior to Encounter   Medication Sig Dispense Refill    empagliflozin (JARDIANCE) 10 MG tablet Take 1 tablet by mouth daily 30 tablet 3    insulin glargine (BASAGLAR KWIKPEN) 100 UNIT/ML injection pen Inject 48 Units into the skin daily 5 Adjustable Dose Pre-filled Pen Syringe 3    sacubitril-valsartan (ENTRESTO) 24-26 MG per tablet Take 0.5 tablets by mouth 2 times daily 60 tablet 1    furosemide (LASIX) 20 MG tablet Take 1 tablet by mouth daily 60 tablet 3    pantoprazole (PROTONIX) 40 MG tablet Take 40 mg by mouth every morning (before breakfast)      calcium carbonate (OSCAL) 500 MG TABS tablet Take 500 mg by mouth daily      gabapentin (NEURONTIN) 300 MG capsule Take 1 tablet in am, 2 tablets in pm. 90 capsule 3    senna (SENOKOT) 8.6 MG tablet Take 1 tablet by mouth 2 times daily      insulin aspart (NOVOLOG) 100 UNIT/ML injection vial Inject 15 Units into the skin 3 times daily      magnesium 30 MG tablet Take 40 mg by mouth daily      tiZANidine (ZANAFLEX) 2 MG tablet Take 2 mg by mouth every 8 hours as needed      metoprolol tartrate (LOPRESSOR) 25 MG tablet TAKE ONE-HALF TABLET BY MOUTH TWICE A DAY 90 tablet 4    atorvastatin (LIPITOR) 80 MG tablet Take 1 tablet by mouth daily 90 tablet 4    Insulin Pen Needle 32G X 6 MM MISC by Does not apply route 3 times daily with meals      hydrocortisone (ANUSOL-HC) 2.5 % rectal cream Place rectally 2 times daily Place rectally 2 times daily.       rivaroxaban (XARELTO) 20 MG TABS tablet Take 1 tablet by mouth daily 90 tablet 3    vitamin D (ERGOCALCIFEROL) 49519 UNITS CAPS capsule Take 50,000 Units by mouth once a week      Omega 3 1000 MG CAPS Take 1,000 mg by mouth daily      Liraglutide (VICTOZA) 18 MG/3ML SOPN SC injection Inject 1.2 mg into the skin daily      lidocaine (LIDODERM) 5 % Place 1 patch onto the skin daily 12 hours on, 12 hours off. 30 patch 0    oxybutynin (DITROPAN XL) 15 MG CR tablet Take 15 mg by mouth daily. Loratadine 10 MG CAPS Take by mouth daily       levothyroxine (SYNTHROID) 175 MCG tablet Take 150 mcg by mouth daily.            Current Facility-Administered Medications:     cefepime (MAXIPIME) 1000 mg IVPB minibag, 1,000 mg, IntraVENous, Q12H, Chilango Yancey MD, Stopped at 10/17/22 1245    0.9 % sodium chloride infusion, , IntraVENous, PRN, Chilango Yancey MD    sodium chloride 0.9 % infusion, , , ,     metronidazole (FLAGYL) 500 mg in 0.9% NaCl 100 mL IVPB premix, 500 mg, IntraVENous, Q8H, Mariza Walter MD    0.9 % sodium chloride infusion, , IntraVENous, PRN, Nereida Baxter, APRN - CNP    methylPREDNISolone sodium (SOLU-MEDROL) injection 40 mg, 40 mg, IntraVENous, Daily, Ha Firend MD, 40 mg at 10/17/22 1215    ipratropium-albuterol (DUONEB) nebulizer solution 1 ampule, 1 ampule, Inhalation, Q4H PRN, Anupam Friend MD, 1 ampule at 10/17/22 0229    lidocaine viscous hcl (XYLOCAINE) 2 % solution 15 mL, 15 mL, Mouth/Throat, Q3H PRN, Ha Friend MD, 15 mL at 10/17/22 0150    insulin glargine (LANTUS) injection vial 16 Units, 16 Units, SubCUTAneous, Daily, Ha Friend MD, 16 Units at 10/17/22 1215    ipratropium-albuterol (DUONEB) nebulizer solution 1 ampule, 1 ampule, Inhalation, 4x daily, Chilango Yancey MD, 1 ampule at 10/17/22 1223    [Held by provider] furosemide (LASIX) injection 40 mg, 40 mg, IntraVENous, Daily, Ha Friend MD    lidocaine 4 % external patch 1 patch, 1 patch, TransDERmal, Daily, Chilango Yancey MD, 1 patch at 10/17/22 1215    benzonatate (TESSALON) capsule 100 mg, 100 mg, Oral, TID PRN, Chet Quinonez, APRN - CNP, 100 mg at 10/16/22 0610    [Held by provider] metoprolol tartrate (LOPRESSOR) tablet 12.5 mg, 12.5 mg, Oral, BID, Lela Seymour MD    0.9 % sodium chloride infusion, , IntraVENous, PRN, Denisse Geller MD    [Held by provider] naproxen (NAPROSYN) tablet 500 mg, 500 mg, Oral, BID WC, Zandra Summers MD, 500 mg at 10/16/22 0831    [Held by provider] rivaroxaban (XARELTO) tablet 15 mg, 15 mg, Oral, Dinner, Zandra Summers MD, 15 mg at 10/15/22 1830    acetaminophen (TYLENOL) tablet 650 mg, 650 mg, Oral, TID, Seema Altamirano, APRN - CNP, 650 mg at 10/16/22 1419    atorvastatin (LIPITOR) tablet 80 mg, 80 mg, Oral, Nightly, Zac Hay MD, 80 mg at 10/15/22 2058    hydrocortisone (ANUSOL-HC) 2.5 % rectal cream, , Rectal, BID, Zac Hay MD, Given at 10/16/22 2321    levothyroxine (SYNTHROID) tablet 150 mcg, 150 mcg, Oral, Daily, Zac Hay MD, 150 mcg at 10/16/22 0831    [Held by provider] sacubitril-valsartan (ENTRESTO) 24-26 MG per tablet 0.5 tablet, 0.5 tablet, Oral, BID, Zac Hay MD, 0.5 tablet at 10/14/22 9696    sodium chloride flush 0.9 % injection 5-40 mL, 5-40 mL, IntraVENous, 2 times per day, Zac Hay MD, 10 mL at 10/16/22 2127    sodium chloride flush 0.9 % injection 5-40 mL, 5-40 mL, IntraVENous, PRN, Zac Hay MD    0.9 % sodium chloride infusion, , IntraVENous, PRN, Zac Hay MD, Stopped at 10/17/22 1335    ondansetron (ZOFRAN-ODT) disintegrating tablet 4 mg, 4 mg, Oral, Q8H PRN **OR** ondansetron (ZOFRAN) injection 4 mg, 4 mg, IntraVENous, Q6H PRN, Zac Hay MD    polyethylene glycol (GLYCOLAX) packet 17 g, 17 g, Oral, Daily PRN, Zac Hay MD, 17 g at 10/11/22 1118    acetaminophen (TYLENOL) tablet 650 mg, 650 mg, Oral, Q6H PRN, 650 mg at 10/12/22 2107 **OR** acetaminophen (TYLENOL) suppository 650 mg, 650 mg, Rectal, Q6H PRN, Zac Hay MD    dextrose bolus 10% 125 mL, 125 mL, IntraVENous, PRN **OR** dextrose bolus 10% 250 mL, 250 mL, IntraVENous, PRN, Zac Hay MD    glucagon (rDNA) injection 1 mg, 1 mg, SubCUTAneous, PRN, Zac Hay MD    dextrose 10 % infusion, , IntraVENous, Continuous PRN, Shubham Cerrato MD      Allergies reviewed by me: Aspirin, Ibuprofen, Macrobid [nitrofurantoin monohydrate macrocrystals], Adhesive tape, Lexiscan [regadenoson], and Penicillins    REVIEW OF SYSTEMS:  As mentioned in chief complaint and HPI , Subjective   All other 10-point review of systems: negative.            =======================================================================================     PHYSICAL EXAM:  Recent vital signs and recent I/Os reviewed by me.      Wt Readings from Last 3 Encounters:   10/17/22 252 lb 10.4 oz (114.6 kg)   10/07/22 241 lb 14.4 oz (109.7 kg)   06/21/22 243 lb 9.6 oz (110.5 kg)     BP Readings from Last 3 Encounters:   10/17/22 (!) 112/58   10/07/22 123/67   07/27/22 (!) 112/54     Patient Vitals for the past 24 hrs:   BP Temp Temp src Pulse Resp SpO2 Weight   10/17/22 1700 (!) 112/58 99 °F (37.2 °C) Temporal 94 22 96 % --   10/17/22 1600 81/66 -- -- 87 21 -- --   10/17/22 1500 (!) 106/51 98.8 °F (37.1 °C) -- 93 20 95 % --   10/17/22 1400 (!) 110/44 98.8 °F (37.1 °C) -- 94 26 -- --   10/17/22 1345 (!) 99/41 98.8 °F (37.1 °C) -- 94 27 91 % --   10/17/22 1330 (!) 103/50 99 °F (37.2 °C) -- 95 24 95 % --   10/17/22 1300 (!) 103/50 -- -- 95 25 -- --   10/17/22 1223 -- -- -- 91 22 -- --   10/17/22 1100 (!) 114/51 -- -- 95 24 -- --   10/17/22 1000 (!) 117/46 99.8 °F (37.7 °C) Temporal 93 26 -- --   10/17/22 0826 -- -- -- 88 20 -- --   10/17/22 0800 122/87 98.4 °F (36.9 °C) Temporal 89 25 -- --   10/17/22 0700 (!) 109/53 -- -- 94 (!) 42 96 % --   10/17/22 0600 (!) 107/55 -- -- 96 29 95 % --   10/17/22 0500 (!) 100/52 -- -- 97 26 94 % --   10/17/22 0450 -- -- -- -- 30 -- --   10/17/22 0400 (!) 102/52 -- -- (!) 104 25 95 % --   10/17/22 0320 (!) 94/55 98.3 °F (36.8 °C) Temporal (!) 105 21 93 % 252 lb 10.4 oz (114.6 kg)   10/17/22 0229 -- -- -- (!) 105 30 95 % --   10/17/22 0200 (!) 98/50 -- -- (!) 107 (!) 38 97 % --   10/17/22 0100 (!) 100/48 -- -- (!) 107 23 95 % --   10/17/22 0000 (!) 94/40 98 °F (36.7 °C) Temporal (!) 109 (!) 35 93 % --   10/16/22 2300 105/76 -- -- (!) 112 22 90 % --   10/16/22 2204 -- -- -- (!) 112 23 91 % --   10/16/22 2200 (!) 124/52 -- -- (!) 111 23 91 % --   10/16/22 2100 (!) 109/49 -- -- (!) 111 24 96 % --   10/16/22 2000 (!) 80/56 -- -- (!) 106 20 95 % --   10/16/22 1900 (!) 100/59 -- -- (!) 106 22 94 % --   10/16/22 1815 (!) 88/49 -- -- (!) 102 20 96 % --       Intake/Output Summary (Last 24 hours) at 10/17/2022 1807  Last data filed at 10/17/2022 1700  Gross per 24 hour   Intake 1180.18 ml   Output 425 ml   Net 755.18 ml            General: Awake, Alert, obese   HENT: Atraumatic, normocephalic   Eyes: Normal conjunctiva, Non-incteral sclera. Neck: Supple, JVD not visible. CVS:  Heart sounds are normal. No loud murmur. RS: Normal respiratory effort, Breat sound: diminished at bases. Abd: Soft , bowel sounds are normal, no distension and no tenderness . Skin: No rash , some bruises,   CNS: Awake Oriented , No focal.   Extremities/MSK:  Edema, no cyanosis.           =======================================================================================     DATA:  Diagnostic tests reviewed by me for today's visit:   (AS NEEDED FOR MY EVALUATION AND MANAGEMENT).        Recent Labs     10/15/22  0540 10/15/22  1906 10/16/22  0600 10/17/22  0546   WBC 12.0*  --  11.9* 20.1*   HCT 21.5* 22.2* 21.4* 20.8*   PLT 43*  --  31* 25*     Iron Saturation:  No components found for: PERCENTFE  FERRITIN:    Lab Results   Component Value Date/Time    FERRITIN 1,025.0 09/26/2022 04:34 AM     IRON:    Lab Results   Component Value Date/Time    IRON 46 09/25/2022 05:02 AM     TIBC:    Lab Results   Component Value Date/Time    TIBC 230 09/25/2022 05:02 AM       Recent Labs     10/15/22  0540 10/16/22  0600 10/17/22  0546    140 138   K 3.6 3.7 5.0    108 104   CO2 22 23 20*   BUN 32* 33* 42*   CREATININE 0.8 1.2 1.9*     Recent Labs 10/15/22  0540 10/16/22  0600 10/17/22  0546   CALCIUM 8.1* 8.6 7.6*     No results for input(s): PH, PCO2, PO2 in the last 72 hours.     Invalid input(s): Leafy Valeriy    ABG:  No results found for: PH, PCO2, PO2, HCO3, BE, THGB, TCO2, O2SAT  VBG:  No results found for: PHVEN, OZP2ZOZ, BEVEN, A7WWIJJS    LDH:    Lab Results   Component Value Date/Time     10/14/2022 04:50 PM     Uric Acid:    Lab Results   Component Value Date/Time    LABURIC 6.8 09/26/2022 04:34 AM       PT/INR:    Lab Results   Component Value Date/Time    PROTIME 23.6 09/29/2022 11:37 AM    INR 2.10 09/29/2022 11:37 AM     Warfarin PT/INR:  No components found for: PTPATWAR, PTINRWAR  PTT:    Lab Results   Component Value Date/Time    APTT 39.7 02/13/2017 12:07 PM   [APTT}  Last 3 Troponin:    Lab Results   Component Value Date/Time    TROPONINI 0.05 10/11/2022 05:09 AM    TROPONINI 0.04 10/10/2022 04:13 PM    TROPONINI 0.04 10/10/2022 11:20 AM       U/A:    Lab Results   Component Value Date/Time    COLORU Yellow 09/24/2022 11:45 PM    PROTEINU TRACE 09/24/2022 11:45 PM    PHUR 5.5 09/24/2022 11:45 PM    WBCUA 11 09/24/2022 11:45 PM    RBCUA 1 09/24/2022 11:45 PM    MUCUS 1+ 05/16/2014 11:50 PM    BACTERIA None Seen 09/24/2022 11:45 PM    CLARITYU CLOUDY 09/24/2022 11:45 PM    SPECGRAV 1.015 09/24/2022 11:45 PM    LEUKOCYTESUR TRACE 09/24/2022 11:45 PM    UROBILINOGEN 0.2 09/24/2022 11:45 PM    BILIRUBINUR Negative 09/24/2022 11:45 PM    BILIRUBINUR NEGATIVE 03/09/2012 06:51 AM    BLOODU Negative 09/24/2022 11:45 PM    GLUCOSEU 500 09/24/2022 11:45 PM    GLUCOSEU NEGATIVE 03/09/2012 06:51 AM     Microalbumen/Creatinine ratio:  No components found for: RUCREAT  24 Hour Urine for Protein:  No components found for: RAWUPRO, UHRS3, [id number], UTV3  24 Hour Urine for Creatinine Clearance:  No components found for: CREAT4, UHRS10, UTV10  Urine Toxicology:  No components found for: Joan Sickle, ICOCAINE, IMARTHC, María Elena Cline    HgBA1c:    Lab Results   Component Value Date/Time    LABA1C 6.4 06/16/2022 09:29 PM     RPR:  No results found for: RPR  HIV:  No results found for: HIV  NICOLASA:  No results found for: ANATITER, NICOLASA  RF:  No results found for: RF  DSDNA:  No components found for: DNA  AMYLASE:    Lab Results   Component Value Date/Time    AMYLASE 50 05/09/2014 07:45 PM     LIPASE:    Lab Results   Component Value Date/Time    LIPASE 37.0 05/09/2014 07:45 PM     Fibrinogen Level:  No components found for: FIB       BELOW MENTIONED RADIOLOGY STUDY RESULTS BY ME (AS NEEDED FOR MY EVALUATION AND MANAGEMENT). XR CHEST (2 VW)    Result Date: 9/24/2022  EXAMINATION: TWO XRAY VIEWS OF THE CHEST 9/24/2022 6:35 pm COMPARISON: 06/16/2022 HISTORY: ORDERING SYSTEM PROVIDED HISTORY: Chest Discomfort TECHNOLOGIST PROVIDED HISTORY: Reason for exam:->Chest Discomfort Reason for Exam: Wrist Pain ([facility] # [id number] -Patient brought in by squad from home with left wrist, right rib pain. No injury. ) FINDINGS: The heart is mildly enlarged and less prominent the pulmonary vessels are engorged centrally and less prominent. The lungs are hyperinflated. No consolidation or effusion is seen. The bones are intact. There are postop changes along the mediastinum and sternum which is unchanged. There are mild subsegmental linear densities scattered along the lung bases which is less prominent. Mild cardiomegaly with mild central pulmonary congestion or pulmonary artery hypertension which is less prominent. Mild chronic obstructive lung changes with mild bibasilar discoid atelectasis or scarring which is less prominent with no obvious infiltrate or effusion. XR WRIST LEFT (MIN 3 VIEWS)    Result Date: 9/24/2022  EXAMINATION: 3 XRAY VIEWS OF THE LEFT WRIST 9/24/2022 6:35 pm COMPARISON: None.  HISTORY: ORDERING SYSTEM PROVIDED HISTORY: pain TECHNOLOGIST PROVIDED HISTORY: Reason for exam:->pain FINDINGS: There is mild narrowing of the radiocarpal joint. No acute fracture or dislocation is seen. There is moderate narrowing along the base of the thumb with prominent osteophytes throughout the joint. There is some linear calcifications just distal to the ulna which probably within the TFCC. There are vascular calcifications along the palmar aspect of the wrist.  There is mild soft tissue swelling along the dorsum of the wrist.  The bones are osteopenic. No obvious acute fracture or dislocation can be seen. Mild osteoarthritic changes along the radiocarpal joint and moderate degenerative arthritic changes along the base of the thumb with no acute bony abnormality seen. Vascular calcifications along the palmar aspect of the wrist Mild soft tissue swelling around the wrist and diffuse osteopenia. Probable mild chondrocalcinosis of the TFCC. CT HEAD WO CONTRAST    Result Date: 10/14/2022  EXAMINATION: CT OF THE HEAD WITHOUT CONTRAST  10/13/2022 2:52 pm TECHNIQUE: CT of the head was performed without the administration of intravenous contrast. Automated exposure control, iterative reconstruction, and/or weight based adjustment of the mA/kV was utilized to reduce the radiation dose to as low as reasonably achievable. COMPARISON: 02/11/2012 HISTORY: ORDERING SYSTEM PROVIDED HISTORY: dizziness TECHNOLOGIST PROVIDED HISTORY: Reason for exam:->dizziness Has a \"code stroke\" or \"stroke alert\" been called? ->No Reason for Exam: dizziness FINDINGS: BRAIN/VENTRICLES: The cerebral and cerebellar parenchyma demonstrate volume loss, with a frontal lobe predominance. Scattered low-attenuation areas are noted in the periventricular white matter, compatible with chronic microvascular white matter ischemic disease. There are no areas of hemorrhage, mass, or midline shift. No abnormal extra-axial fluid collections. The ventricles are prominent in size, likely related to involutional change.  Gray-white differentiation is maintained without evidence of acute infarct. ORBITS: Lens implants from prior cataract surgery are noted. The orbits are otherwise unremarkable. SINUSES: There is scattered mild paranasal sinus disease. Thickened secretions are noted in the left sphenoid sinus. The mastoid air cells are clear. SOFT TISSUES/SKULL:  The calvarium is intact. No appreciable scalp soft tissue swelling. 1. No acute intracranial abnormality. 2. Cerebral and cerebellar parenchymal volume loss with chronic microvascular white matter ischemic disease. CT CHEST W CONTRAST    Result Date: 9/28/2022  EXAMINATION: CT OF THE CHEST WITH CONTRAST 9/28/2022 1:02 pm TECHNIQUE: CT of the chest was performed with the administration of intravenous contrast. Multiplanar reformatted images are provided for review. Automated exposure control, iterative reconstruction, and/or weight based adjustment of the mA/kV was utilized to reduce the radiation dose to as low as reasonably achievable. COMPARISON: Chest CT 05/10/2014 HISTORY: ORDERING SYSTEM PROVIDED HISTORY: left chest pain FINDINGS: Technical: Evaluation of the lungs degraded because of motion during imaging, blurring detail and resulting in misregistration artifact. Mediastinum: Mild main pulmonary artery dilatation 31 mm (axial series 2, image 51). Ascending thoracic aorta 37 mm and descending thoracic aorta 25 mm. Status post CABG. Cardiomegaly. The great vessels appear unremarkable with exception of calcific atherosclerotic disease. Moderate to severe calcific atherosclerosis coronary arteries. No pericardial effusion. Posterior mediastinal structures appear unremarkable. No mediastinal or hilar adenopathy. Small hiatal hernia with what appears to be a small retained capsule within the hiatal hernia. Lungs/pleura: No focal pulmonary infiltrate evident. No soft tissue or ground-glass pulmonary nodule is seen. No inspissated secretions or endobronchial lesion evident. No pleural effusion or pneumothorax. Upper Abdomen: Unremarkable appearance. Soft Tissues/Bones: No acute superficial soft tissue or osseous structure abnormality evident. Old healed fractures left ribs 4, 5 and 6.     1. No definite acute pulmonary disease. Evaluation of the lungs degraded because of motion during imaging, blurring detail and resulting in misregistration artifact. 2. Moderate to severe calcific atherosclerosis coronary arteries. 3. Mild main pulmonary artery dilatation can be seen with pulmonary hypertension but is nonspecific. 4. Cardiomegaly and sequela from CABG. 5. Small hiatal hernia with what appears to be a small retained capsule within the hiatal hernia. CT GUIDED NEEDLE PLACEMENT    Result Date: 9/29/2022  PROCEDURE: CT GUIDED BONE MARROW ASPIRATION AND CORE NEEDLE BONE BIOPSY OF THE RIGHT ILIAC BONE. MODERATE CONSCIOUS SEDATION 9/29/2022 HISTORY: ORDERING SYSTEM PROVIDED HISTORY: anemia, thrombocytopenia TECHNOLOGIST PROVIDED HISTORY: Reason for exam:->anemia, thrombocytopenia Reason for Exam: anemia, thrombocytopenia SEDATION: 2 mgversed and 50 mcg fentanyl were titrated intravenously for moderate sedation monitored under my direction. Total intraservice time of sedation was 5 minutes. The patient's vital signs were monitored throughout the procedure and recorded in the patient's medical record by the nurse. TECHNIQUE: Informed consent was obtained following a detailed explanation of the procedure including risks, benefits, and alternatives. Universal protocol was followed. Sterile gowns, masks, hats and gloves utilized for maximal sterile barrier. Axial images were obtained through the iliac bones using CT guidance and a suitable skin site was prepped and draped in sterile fashion. Local anesthesia was achieved with lidocaine. An 11 gauge [employer] bone marrow biopsy needle was advanced into the right iliac bone and approximately 15 mL of bone marrow aspirate was obtained.   A single core biopsy specimen was obtained and the patient tolerated the procedure well. Estimated blood loss: Less than 5 cc Automated exposure control, iterative reconstruction, and/or weight based adjustment of the mA/kV was utilized to reduce the radiation dose to as low as reasonably achievable. DLP: 71.66 mGy-cm     Successful CT guided bone marrow aspiration and core biopsy of the right iliac bone. XR CHEST PORTABLE    Result Date: 10/16/2022  EXAMINATION: ONE XRAY VIEW OF THE CHEST 10/16/2022 12:57 pm COMPARISON: None. HISTORY: Acute shortness of breath. FINDINGS: Evaluation is suboptimal secondary to body habitus and portable technique. Patient is also slightly rotated. Mild cardiomegaly. Post CABG changes. No definite pulmonary edema. Diffuse haziness of the left lung. Right lung is relatively clear. No pneumothorax. Mild cardiomegaly. Diffuse haziness of the left lung be due to atypical infection or edema. XR CHEST PORTABLE    Result Date: 10/16/2022  EXAMINATION: ONE XRAY VIEW OF THE CHEST 10/16/2022 2:15 am COMPARISON: 10/10/2022 HISTORY: ORDERING SYSTEM PROVIDED HISTORY: crackles in lungs TECHNOLOGIST PROVIDED HISTORY: Reason for exam:->crackles in lungs Reason for Exam: Crackles in lungs FINDINGS: Overlying sternotomy wires. Similar-appearing prominence of the cardiac silhouette with associated vascular congestion. No focal consolidation. Costophrenic angles are sharp. No evidence of pneumothorax. No acute osseous abnormalities. 1. Similar-appearing prominence of the cardiac silhouette with associated vascular congestion. 2. No focal consolidation. XR CHEST PORTABLE    Result Date: 10/10/2022  EXAMINATION: ONE XRAY VIEW OF THE CHEST 10/10/2022 11:15 am COMPARISON: Chest x-ray dated 09/24/2022. HISTORY: ORDERING SYSTEM PROVIDED HISTORY: left chest pain TECHNOLOGIST PROVIDED HISTORY: Reason for exam:->left chest pain Reason for Exam: Chest Pain (Pt transported from [facility] from [facility].  PT c/o chest pain radiating to left shoulder. Symptoms started 2 days ago. Pt stated she was seen in [facility] and was inpatient for 2 weeks for left shoulder complications.) FINDINGS: HEART/MEDIASTINUM: The cardiomediastinal silhouette is stable. PLEURA/LUNGS: There are no focal consolidations or pleural effusions. There is no appreciable pneumothorax. Linear atelectasis versus scarring noted in the periphery of the left mid lung. BONES/SOFT TISSUE: No acute abnormality. Median sternotomy wires again noted. No radiographic evidence of acute pulmonary disease. CT CHEST PULMONARY EMBOLISM W CONTRAST    Result Date: 10/10/2022  EXAMINATION: CTA OF THE CHEST 10/10/2022 11:59 am TECHNIQUE: CTA of the chest was performed after the administration of intravenous contrast.  Multiplanar reformatted images are provided for review. MIP images are provided for review. Automated exposure control, iterative reconstruction, and/or weight based adjustment of the mA/kV was utilized to reduce the radiation dose to as low as reasonably achievable. COMPARISON: None. HISTORY: ORDERING SYSTEM PROVIDED HISTORY: left sided chest pain, shortness of breath TECHNOLOGIST PROVIDED HISTORY: Reason for exam:->left sided chest pain, shortness of breath Decision Support Exception - unselect if not a suspected or confirmed emergency medical condition->Emergency Medical Condition (MA) Reason for Exam: left sided chest pain, shortness of breath FINDINGS: Pulmonary Arteries: Pulmonary arteries are adequately opacified for evaluation. No evidence of intraluminal filling defect to suggest pulmonary embolism. Main pulmonary artery is normal in caliber. Mediastinum: No evidence of mediastinal lymphadenopathy. The heart and pericardium demonstrate no acute abnormality. There is no acute abnormality of the thoracic aorta. Lungs/pleura: There is minimal bibasilar atelectasis/scarring. The lungs are without acute process.   No focal consolidation or pulmonary edema. No evidence of pleural effusion or pneumothorax. Upper Abdomen: Limited images of the upper abdomen are unremarkable. Soft Tissues/Bones: No acute bone or soft tissue abnormality. No evidence of pulmonary embolism or acute pulmonary abnormality. CT BIOPSY BONE MARROW    Result Date: 9/29/2022  PROCEDURE: CT GUIDED BONE MARROW ASPIRATION AND CORE NEEDLE BONE BIOPSY OF THE RIGHT ILIAC BONE. MODERATE CONSCIOUS SEDATION 9/29/2022 HISTORY: ORDERING SYSTEM PROVIDED HISTORY: anemia, thrombocytopenia TECHNOLOGIST PROVIDED HISTORY: Reason for exam:->anemia, thrombocytopenia Reason for Exam: anemia, thrombocytopenia SEDATION: 2 mgversed and 50 mcg fentanyl were titrated intravenously for moderate sedation monitored under my direction. Total intraservice time of sedation was 5 minutes. The patient's vital signs were monitored throughout the procedure and recorded in the patient's medical record by the nurse. TECHNIQUE: Informed consent was obtained following a detailed explanation of the procedure including risks, benefits, and alternatives. Universal protocol was followed. Sterile gowns, masks, hats and gloves utilized for maximal sterile barrier. Axial images were obtained through the iliac bones using CT guidance and a suitable skin site was prepped and draped in sterile fashion. Local anesthesia was achieved with lidocaine. An 11 gauge [employer] bone marrow biopsy needle was advanced into the right iliac bone and approximately 15 mL of bone marrow aspirate was obtained. A single core biopsy specimen was obtained and the patient tolerated the procedure well. Estimated blood loss: Less than 5 cc Automated exposure control, iterative reconstruction, and/or weight based adjustment of the mA/kV was utilized to reduce the radiation dose to as low as reasonably achievable. DLP: 71.66 mGy-cm     Successful CT guided bone marrow aspiration and core biopsy of the right iliac bone.      EGD    Result Date: 10/3/2022  No dictation             This report was transcribed using voice recognition software, mainly. So please excuse brevity and/or typos. Every effort was made to ensure accuracy, however, inadvertent computerized transcription errors may be present. Please contact us, if any questions or clarifications are needed.

## 2022-10-17 NOTE — PROGRESS NOTES
Hospitalist Progress Note      PCP: Charmaine Crespo, APRN - CNP    Date of Admission: 10/10/2022    Chief Complaint: L shoulder pain      Subjective:   Hypotensive overnight  Cr elevated up to 1.9 in am.  On 2L O2 NC. Complains of generalized weakness and dyspnea. Medications:  Reviewed    Infusion Medications    sodium chloride      sodium chloride      sodium chloride      sodium chloride 5 mL/hr (10/16/22 2134)    dextrose       Scheduled Medications    cefepime  1,000 mg IntraVENous Q12H    methylPREDNISolone  40 mg IntraVENous Daily    azithromycin  500 mg IntraVENous Q24H    insulin glargine  16 Units SubCUTAneous Daily    ipratropium-albuterol  1 ampule Inhalation 4x daily    [Held by provider] furosemide  40 mg IntraVENous Daily    lidocaine  1 patch TransDERmal Daily    [Held by provider] metoprolol tartrate  12.5 mg Oral BID    [Held by provider] naproxen  500 mg Oral BID WC    [Held by provider] rivaroxaban  15 mg Oral Dinner    acetaminophen  650 mg Oral TID    atorvastatin  80 mg Oral Nightly    hydrocortisone   Rectal BID    levothyroxine  150 mcg Oral Daily    [Held by provider] sacubitril-valsartan  0.5 tablet Oral BID    sodium chloride flush  5-40 mL IntraVENous 2 times per day     PRN Meds: sodium chloride, ipratropium-albuterol, lidocaine viscous hcl, benzonatate, sodium chloride, sodium chloride flush, sodium chloride, ondansetron **OR** ondansetron, polyethylene glycol, acetaminophen **OR** acetaminophen, dextrose bolus **OR** dextrose bolus, glucagon (rDNA), dextrose      Intake/Output Summary (Last 24 hours) at 10/17/2022 1339  Last data filed at 10/16/2022 2200  Gross per 24 hour   Intake 0 ml   Output 250 ml   Net -250 ml         Exam:    BP (!) 103/50   Pulse 95   Temp 99 °F (37.2 °C)   Resp 24   Ht 5' (1.524 m)   Wt 252 lb 10.4 oz (114.6 kg)   LMP  (LMP Unknown)   SpO2 95%   BMI 49.34 kg/m²     Gen/overall appearance: Not in acute distress. Alert.   Head: Normocephalic, atraumatic  Eyes: EOMI, no scleral icterus  CVS: regular rate and rhythm, Normal S1S2  Pulm: Diminished. No wheezes or crackles  Extremities: trace edema. No erythema or warmth  Neuro: No gross focal deficits noted  Skin: Warm, dry    Labs:   Recent Labs     10/15/22  0540 10/15/22  1906 10/16/22  0600 10/17/22  0546   WBC 12.0*  --  11.9* 20.1*   HGB 7.1* 7.3* 7.1* 6.7*   HCT 21.5* 22.2* 21.4* 20.8*   PLT 43*  --  31* 25*       Recent Labs     10/15/22  0540 10/16/22  0600 10/17/22  0546    140 138   K 3.6 3.7 5.0    108 104   CO2 22 23 20*   BUN 32* 33* 42*   CREATININE 0.8 1.2 1.9*   CALCIUM 8.1* 8.6 7.6*       No results for input(s): AST, ALT, BILIDIR, BILITOT, ALKPHOS in the last 72 hours. No results for input(s): INR in the last 72 hours. No results for input(s): [place] in the last 72 hours. Assessment/Plan:    Active Hospital Problems    Diagnosis Date Noted    Shoulder pain [M25.519] 10/10/2022     Priority: Medium       Acute hypoxic respiratory failure  Diastolic CHF exacerbation  Questionable aspiration PNA  - s/p IV lasix; now held 2/2 ARUN  - Strict ins/outs  - CHF protocol  - Respiratory care  - O2 per protocol  - serial CXR and ABGs as needed  - repeat ECHO  - started on empiric abx  - monitor fluid status closely given quick fluid overload vs hypotension    ARUN 2/2 prerenal azotemia from diuresis  Hypotension   - lasix now on hold  - CT head non-acute 10/13    Acute on chronic anemia. Underlying MDS. Suspect some component of hemodilution.   FOBT+, but does have epistaxis  - xarelto held  - trend H&H  - transfuse as needed for hgb <7  - hematology following  - consider GI eval if hgb drop or gross bleeding    Acute on chronic L shoulder pain  Hx of chronic bilat rotator cuff tears  Severe chronic bilateral low back muscle spasms  Diffuse OA  - pain management  - trial of naproxen and opiates  - ortho following  - PTOT  - palliative following  - close outpt follow up with pain management     Recently diagnosed MDS  - trend CBC  - transfuse as needed  - close outpt follow up with hematology     PMH CAD s/p CABG, PAF, morbid obesity, htn, hld    DVT Prophylaxis: scds  Diet: ADULT DIET;  Regular; 5 carb choices (75 gm/meal)  Code Status: Full Code    Dispo:  ICU care      Risa Harden MD

## 2022-10-17 NOTE — PROGRESS NOTES
Dr Grayson Lucero to [facility] to speak with family and evaluate pt. See orders.

## 2022-10-17 NOTE — PROGRESS NOTES
10/16/22 2209   RT Protocol   History Pulmonary Disease 0   Respiratory pattern 2   Breath sounds 6   Cough 0   Bronchodilator Assessment Score 8

## 2022-10-17 NOTE — CARE COORDINATION
CM spoke to Soham Burgos with palliative care this am and she will see pt and family.     Malu Slaughter RN, BSN  [phone]

## 2022-10-17 NOTE — RT PROTOCOL NOTE
RT Inhaler-Nebulizer Bronchodilator Protocol Note    There is a bronchodilator order in the chart from a provider indicating to follow the RT Bronchodilator Protocol and there is an Initiate RT Inhaler-Nebulizer Bronchodilator Protocol order as well (see protocol at bottom of note). CXR Findings:  XR CHEST PORTABLE    Result Date: 10/16/2022  Mild cardiomegaly. Diffuse haziness of the left lung be due to atypical infection or edema. XR CHEST PORTABLE    Result Date: 10/16/2022  1. Similar-appearing prominence of the cardiac silhouette with associated vascular congestion. 2. No focal consolidation. The findings from the last RT Protocol Assessment were as follows:   History Pulmonary Disease: None or smoker <15 pack years  Respiratory Pattern: Dyspnea on exertion or RR 21-25 bpm  Breath Sounds: Inspiratory and expiratory or bilateral wheezing and/or rhonchi  Cough: Strong, spontaneous, non-productive  Indication for Bronchodilator Therapy:    Bronchodilator Assessment Score: 8    Aerosolized bronchodilator medication orders have been revised according to the RT Inhaler-Nebulizer Bronchodilator Protocol below. Respiratory Therapist to perform RT Therapy Protocol Assessment initially then follow the protocol. Repeat RT Therapy Protocol Assessment PRN for score 0-3 or on second treatment, BID, and PRN for scores above 3. No Indications - adjust the frequency to every 6 hours PRN wheezing or bronchospasm, if no treatments needed after 48 hours then discontinue using Per Protocol order mode. If indication present, adjust the RT bronchodilator orders based on the Bronchodilator Assessment Score as indicated below.   Use Inhaler orders unless patient has one or more of the following: on home nebulizer, not able to hold breath for 10 seconds, is not alert and oriented, cannot activate and use MDI correctly, or respiratory rate 25 breaths per minute or more, then use the equivalent nebulizer order(s) with same Frequency and PRN reasons based on the score. If a patient is on this medication at home then do not decrease Frequency below that used at home. 0-3 - enter or revise RT bronchodilator order(s) to equivalent RT Bronchodilator order with Frequency of every 4 hours PRN for wheezing or increased work of breathing using Per Protocol order mode. 4-6 - enter or revise RT Bronchodilator order(s) to two equivalent RT bronchodilator orders with one order with BID Frequency and one order with Frequency of every 4 hours PRN wheezing or increased work of breathing using Per Protocol order mode. 7-10 - enter or revise RT Bronchodilator order(s) to two equivalent RT bronchodilator orders with one order with TID Frequency and one order with Frequency of every 4 hours PRN wheezing or increased work of breathing using Per Protocol order mode. 11-13 - enter or revise RT Bronchodilator order(s) to one equivalent RT bronchodilator order with QID Frequency and an Albuterol order with Frequency of every 4 hours PRN wheezing or increased work of breathing using Per Protocol order mode. Greater than 13 - enter or revise RT Bronchodilator order(s) to one equivalent RT bronchodilator order with every 4 hours Frequency and an Albuterol order with Frequency of every 2 hours PRN wheezing or increased work of breathing using Per Protocol order mode. RT to enter RT Home Evaluation for COPD & MDI Assessment order using Per Protocol order mode.     Electronically signed by Fortunato Angeles RCP on 10/16/2022 at 10:10 PM

## 2022-10-17 NOTE — PROGRESS NOTES
Blood culturesx2 drawn at this time, antibiotics started, respiratory called to bedside to give PRN breathing treatment

## 2022-10-17 NOTE — PROGRESS NOTES
MD Marielos Cuevas MD Mayer Ferri, MD                Office: [phone]                      Fax: [phone]               [email or web address]                   Nephrology consult received from Dr Diane Por . Brief Summary Note -- full consult report will follow. Chart reviewed. Will see patient in am tomorrow. Thank you for allowing me to participate in this patient's care. Please do not hesitate to contact us anytime. We will follow along with you.      Eladio Emerson MD  [facility] [address] [place] [address]   [phone] or [employer]    =====================================================

## 2022-10-17 NOTE — PROGRESS NOTES
Facility/Department: [facility]  Initial Assessment  DYSPHAGIA BEDSIDE SWALLOW EVALUATION     Patient: Robert Gibbons   : 1940   MRN: [id number]      Evaluation Date: 10/17/2022   Admitting Diagnosis: Shoulder pain [M25.519]  Acute chest pain [R07.9]  History of coronary artery disease [Z86.79]  Chronic congestive heart failure, unspecified heart failure type ([address] [I50.9]  Pain: Did not state                                                       H&P:  Robert Gibbons is a 78 y.o. female who is complaining of left shoulder pain that has been getting worse for the last 2 weeks worse when she moves her shoulder 10 out of 10 sharp pain patient denies any midsternal chest pain no nausea vomiting no shortness of breath no exertional shortness of breath. No fevers or chills    Imaging:  Chest X-ray:  10/16/22  Impression   Mild cardiomegaly. Diffuse haziness of the left lung be due to atypical   infection or edema. Head CT: 10/13/22  Impression   1. No acute intracranial abnormality. 2. Cerebral and cerebellar parenchymal volume loss with chronic microvascular   white matter ischemic disease. History/Prior Level of Function:   Living Status: Pt lives at home with daughter. Prior Dysphagia History: Per chart review, pt recently seen by SLP services 22- 10/6/22 with recommendations for Dysphagia III/Soft and bite-sized diet with thin liquids, med whole with water 1-2 at a time. At that time, pt with c/o of constriction while swallowing with direction to mid chest. GI was then seen by GI and underwent esophageal dilation which appeared to resolve symptoms at that time. Reason for referral: SLP evaluation orders received due to suspected aspiration event     Dysphagia Impressions/Diagnosis: Oropharyngeal Dysphagia   Pt repositioned upright in bed, currently on 2L O2 via nasal cannula with family and RN present during evaluation. O2 sats at 95% and RR 16-21/min.  Family provided translation for effective communication. Pt initially with fluctuating alertness but able to remain alert for PO trials. Pt demonstrated difficulty with following commands despite max verbal and visual cues therefore oral motor exam was limited. Suspect decreased lingual and labial ROM/coordination. Minimal trials of ice chips, thin liquids via tsp/cup and puree provided to assess swallow function. Clinical symptoms of oral holding, suspected pharyngeal pooling, delayed swallow initiation and decreased laryngeal elevation noted across trials. An immediate episode of coughing noted with single ice chip. Congested, audible breathing with elevated RR (27-36/min) noted with thin liquids. No overt clinical s/s of aspiration assessed with 1/2 tsp of puree. Based on assessment, pt does not demonstrate functional or safe ability to tolerate PO at this time. Risk factors include respiratory status, deconditioning, need for feed assistance and concern for prior episode of aspiration. Recommend downgrade to NPO with allowance of necessary meds crushed in puree when pt is upright 90 degrees and fully alert. Extensive education provided to 4 family members present during evaluation. Family expressed concern of pt going without PO for the next day and that she has asking for something to eat/drink. SLP educated on indication for NPO status at this time including risk factors listed above and assurance that it is currently safer for pt to remain NPO vs continuing intake for nutrition due to significant risk for aspiration. Pt and family agreeable to recommendations and verbalized understanding. Recommended Diet and Intervention 10/17/2022:  Diet Solids Recommendation:  NPO  Liquid Consistency Recommendation:    Recommended form of Meds: Meds crushed as able in puree (only necessary meds)         Compensatory Swallowing Strategies:   To be determined     SHORT TERM DYSPHAGIA GOALS/PLAN OF CARE: Speech therapy for dysphagia tx 3-5 times per week during acute care stay. Pt will functionally tolerate ongoing assessment of swallow function with diet to be determined as indicated   Pt will advance to least restrictive diet as indicated     Dysphagia Therapeutic Intervention:   To be determined     Discharge Recommendations: Discharge recommendations to be determined pending ongoing follow-up during acute care stay    Patient Positioning: Upright in bed     Current Diet Level (prior to evaluation): NPO       Respiratory Status:   []Room Air   [x]O2 via nasal cannula: 2L   []Other:    Dentition:  []Adequate  [x]Dentures: upper only   []Missing Many Teeth  []Edentulous  []Other:    Baseline Vocal Quality:  [x]Normal  []Dysphonic   []Aphonic   []Hoarse  []Wet  []Weak  []Other:    Volitional Cough:  Elicited: Congested     Volitional Swallow:   []Absent   []Delayed     []Adequate     []Required use of drink     Oral Mechanism Exam:  []WFL []Mild   [x] Moderate  []Severe  []To be assessed  Impaired:   []Left side      []Right side    [x]Labial ROM/Coordination    []Labial Symmetry   [x]Lingual ROM/Coordination   []Lingual Symmetry  []Gag  []Other:     Oral Phase: []WFL []Mild   [x] Moderate  []Severe  []To be assessed   []Impaired/Prolonged Mastication:   [x]Oral Holding:   []Spillage Left:   []Spillage Right:  []Pocketing Left:   []Pocketing Right:   []Decreased Anterior to Posterior Transit:   [x]Suspected Premature Bolus Loss:   []Lingual/Palatal Residue:   []Other:     Pharyngeal Phase: []WFL []Mild   [x] Moderate  []Severe  []To be assessed   [x]Delayed Swallow:   [x]Suspected Pharyngeal Pooling:   [x]Decreased Laryngeal Elevation:   []Absent Swallow:  [x]Wet Vocal Quality: congested   []Throat Clearing-Immediate:   []Throat Clearing-Delayed:   [x]Cough-Immediate: ice chips x1   []Cough-Delayed:  []Change in Vital Signs: increased RR, decline in O2 sats with thin liquids and ice chips   []Suspected Delayed Pharyngeal Clearing:  []Other:     Eating Assistance:  []Independent  []Setup or clean-up assistance   [x] Supervision or touching assistance   [] Partial or moderate assistance   [] Substantial or maximal assistance  [] Dependent     EDUCATION:   Provided education regarding role of SLP, results of assessment, recommendations and general speech pathology plan of care. [x] Pt verbalized understanding and agreement   [x] Pt requires ongoing learning   [] No evidence of comprehension     If patient discharges prior to next visit, this note will serve as discharge.      Treatment time:  Timed Code Treatment Minutes: 0 min  Total Treatment time: 26 min    Electronically signed by:    Jarret Akins M.A., [address] [place] [address]  Speech-Language Pathologist

## 2022-10-17 NOTE — PROGRESS NOTES
Oncology and Hematology Care   Progress Note      10/17/2022 1:45 PM        Name: Joon Moran . Admitted: 10/10/2022    SUBJECTIVE:  Patient is asleep. Several family members at bedside. On supplemental oxygen. Has been having epistaxis.      Reviewed interval ancillary notes    Current Medications  cefepime (MAXIPIME) 1000 mg IVPB minibag, Q12H  0.9 % sodium chloride infusion, PRN  sodium chloride 0.9 % infusion,   metronidazole (FLAGYL) 500 mg in 0.9% NaCl 100 mL IVPB premix, Q8H  methylPREDNISolone sodium (SOLU-MEDROL) injection 40 mg, Daily  ipratropium-albuterol (DUONEB) nebulizer solution 1 ampule, Q4H PRN  lidocaine viscous hcl (XYLOCAINE) 2 % solution 15 mL, Q3H PRN  insulin glargine (LANTUS) injection vial 16 Units, Daily  ipratropium-albuterol (DUONEB) nebulizer solution 1 ampule, 4x daily  [Held by provider] furosemide (LASIX) injection 40 mg, Daily  lidocaine 4 % external patch 1 patch, Daily  benzonatate (TESSALON) capsule 100 mg, TID PRN  [Held by provider] metoprolol tartrate (LOPRESSOR) tablet 12.5 mg, BID  0.9 % sodium chloride infusion, PRN  [Held by provider] naproxen (NAPROSYN) tablet 500 mg, BID WC  [Held by provider] rivaroxaban (XARELTO) tablet 15 mg, Dinner  acetaminophen (TYLENOL) tablet 650 mg, TID  atorvastatin (LIPITOR) tablet 80 mg, Nightly  hydrocortisone (ANUSOL-HC) 2.5 % rectal cream, BID  levothyroxine (SYNTHROID) tablet 150 mcg, Daily  [Held by provider] sacubitril-valsartan (ENTRESTO) 24-26 MG per tablet 0.5 tablet, BID  sodium chloride flush 0.9 % injection 5-40 mL, 2 times per day  sodium chloride flush 0.9 % injection 5-40 mL, PRN  0.9 % sodium chloride infusion, PRN  ondansetron (ZOFRAN-ODT) disintegrating tablet 4 mg, Q8H PRN   Or  ondansetron (ZOFRAN) injection 4 mg, Q6H PRN  polyethylene glycol (GLYCOLAX) packet 17 g, Daily PRN  acetaminophen (TYLENOL) tablet 650 mg, Q6H PRN   Or  acetaminophen (TYLENOL) suppository 650 mg, Q6H PRN  dextrose bolus 10% 125 mL, PRN   Or  dextrose bolus 10% 250 mL, PRN  glucagon (rDNA) injection 1 mg, PRN  dextrose 10 % infusion, Continuous PRN        Objective:  BP (!) 103/50   Pulse 95   Temp 99 °F (37.2 °C)   Resp 24   Ht 5' (1.524 m)   Wt 252 lb 10.4 oz (114.6 kg)   LMP  (LMP Unknown)   SpO2 95%   BMI 49.34 kg/m²     Intake/Output Summary (Last 24 hours) at 10/17/2022 1345  Last data filed at 10/16/2022 2200  Gross per 24 hour   Intake 0 ml   Output 250 ml   Net -250 ml      Wt Readings from Last 3 Encounters:   10/17/22 252 lb 10.4 oz (114.6 kg)   10/07/22 241 lb 14.4 oz (109.7 kg)   06/21/22 243 lb 9.6 oz (110.5 kg)          CONSTITUTIONAL:  awake, alert, cooperative, no apparent distress, HEENT oral pharynx , no scleral icterus  HEMATOLOGIC/LYMPHATICS:  no cervical lymphadenopathy, no supraclavicular lymphadenopathy, no axillary lymphadenopathy and no inguinal lymphadenopathy  LUNGS:  wheezing   CARDIOVASCULAR:  , regular rate and rhythm, normal S1 and S2, no S3 or S4, and no murmur noted  ABDOMEN:  slight distended   MUSCULOSKELETAL:  There is no redness, warmth, or swelling of the joints. EXTREMETIES: no edema in one leg  NEUROLOGIC:  Awake, alert, oriented to name, place and time. Cranial nerves II-XII are grossly intact. SKIN:  no bruising or bleeding       Labs and Tests:  CBC:   Recent Labs     10/15/22  0540 10/15/22  1906 10/16/22  0600 10/17/22  0546   WBC 12.0*  --  11.9* 20.1*   HGB 7.1* 7.3* 7.1* 6.7*   PLT 43*  --  31* 25*     BMP:    Recent Labs     10/15/22  0540 10/16/22  0600 10/17/22  0546    140 138   K 3.6 3.7 5.0    108 104   CO2 22 23 20*   BUN 32* 33* 42*   CREATININE 0.8 1.2 1.9*   GLUCOSE 159* 131* 241*     Hepatic: No results for input(s): AST, ALT, ALB, BILITOT, ALKPHOS in the last 72 hours. ASSESSMENT AND PLAN    Principal Problem:    Shoulder pain  Resolved Problems:    * No resolved hospital problems.  *      75-year-old female who is admitted with has ARUN, acute hypoxic respiratory failure, diastolic CHF exacerbation and questionable aspiration PNA. She has the following hematologic issues:     1. MDS  - bone marrow biopsy done on 9/29/2022 consistent with MDS. Blasts 10% on bone marrow, FISH panel has shown 5 q-, Cytogenetics are pending.  -Peripheral blood flow cytometry is similar to the bone marrow, no evidence of acute leukemia.   -Will consider hypo methylating agent or lenalidomide (if cytogenetics do not show unfavorable results) would be an option. It will take at least 3 to 4 months to achieve the response to the treatments. During that time she will have to be supported with transfusions with blood products if needed. The treatment would be ongoing. 2. Leukocytosis, anemia, and thrombocytopenia:  - Secondary to MDS  - Hgb 6.7, received PRBC transfusion today. - EPO level is pending.   - platelet count 10K today. Will give 1 unit platelets due to epistaxis and +fecal occult. - Hold anticoagulation if platelet count <83U. - transfuse to keep Hgb above 7 and platelets above 49C or above 50k if active bleeding.  - Patient now with leukocytosis with WBC 20.1 today. LDH is 491. [employer], [address] [place] [address]  [phone]     Events noted. Flow cytometry peripheral blood 10/14/2022 did not show any evidence of transformation to acute leukemia. Patient has multiple other medical issues-cardiomyopathy, respiratory failure, etc.  Had a lengthy discussion with the patient's family members. Considering her age, comorbid conditions and myelodysplastic syndrome ( MDS EB - 2 ), they should consider best supportive care/hospice care. The patient's family will think and make a decision.     Tracey Anne MD

## 2022-10-17 NOTE — PROGRESS NOTES
Pt family with request for XF to UC. Contacted transfer center and declined, given this is a pt preference lateral transfer.

## 2022-10-17 NOTE — PROGRESS NOTES
Clinical Pharmacy Note: Pharmacy to Dose Vancomycin    Bety Blackmon is a 78 y.o. female started on Vancomycin for CAP; consult received from Dr. Vickie Boone to manage therapy. Also receiving the following antibiotics: cefepime, azithromycin. Goal AUC: 400-600 mg/L*hr  Goal Trough Level: 10-20 mcg/mL    Assessment/Plan:  A 1750 mg loading dose was given on 10/17/22 at 0007  Initiate vancomycin 750 mg IV every 24 hours. Bayesian modeling predicts an AUC of 473 mg/L*hr and a trough of 14.7 mcg/mL at steady state concentration. A vancomycin random level has been ordered for 10/17 at 0600  Changes in regimen will be determined based on culture results, renal function, and clinical response. Pharmacy will continue to monitor and adjust regimen as necessary. Allergies:  Aspirin, Ibuprofen, Macrobid [nitrofurantoin monohydrate macrocrystals], Adhesive tape, Lexiscan [regadenoson], and Penicillins     Recent Labs     10/15/22  0540 10/16/22  0600   CREATININE 0.8 1.2       Recent Labs     10/15/22  0540 10/16/22  0600   WBC 12.0* 11.9*       Ht Readings from Last 1 Encounters:   10/10/22 5' (1.524 m)        Wt Readings from Last 1 Encounters:   10/16/22 246 lb 14.6 oz (112 kg)         Estimated Creatinine Clearance: 40 mL/min (based on SCr of 1.2 mg/dL).       Thank you for the consult,    Tila Cruz, Y[facility], [facility]

## 2022-10-17 NOTE — CONSULTS
Palliative Care:        Chest Pain (Pt transported from [facility] from [facility]. PT c/o chest pain radiating to left shoulder. Symptoms started 2 days ago. Pt stated she was seen in [facility] and was inpatient for 2 weeks for left shoulder complications.)    H&P: 78 y.o. female who is complaining of left shoulder pain that has been getting worse for the last 2 weeks worse when she moves her shoulder 10 out of 10 sharp pain patient denies any midsternal chest pain no nausea vomiting no shortness of breath no exertional shortness of breath. No fevers or chills. Admit: 10/10/2022  Consult: 10/17/2022    Past Medical History:   has a past medical history of 4/11/17 Left knee repeat repair of median parapatellar arthrotomy with augmentation, Arthritis, Atrial fibrillation ([address] CAD (coronary artery disease), Cataract, Chronic diastolic congestive heart failure ([address] Diabetes mellitus ([address] GERD (gastroesophageal reflux disease), Hyperlipidemia, Hypertension, HYPOTHRYROIDISM, Non-English speaking patient, Sleep apnea, Thyroid disease, and UTI. Past Surgical History:   has a past surgical history that includes joint replacement (Bilateral, [address]); Cardiac surgery (2004); Cholecystectomy, laparoscopic (5/15/14); Revision total knee arthroplasty (Right, 11/22/2016); Total knee arthroplasty; knee surgery (Left, 02/28/2017); CT BIOPSY BONE MARROW (9/29/2022); Upper gastrointestinal endoscopy (N/A, 10/3/2022); and Upper gastrointestinal endoscopy (N/A, 10/3/2022).     Advance Directives:        Advance Care Planning   The patient has the following advanced directives on file:  Advance Directives       Power of [address] Will ACP-Advance Directive ACP-Power of     Not on File Not on File Not on File Not on File       The Patient has the following current code status:    Code Status: Full Code    Extended Emergency Contact Information  Primary Emergency Contact: Toby Patel [place], [address] Phone: [phone]  Relation: Child  Secondary Emergency Contact: Rafaela Mancilla of [address] [facility] Phone: [phone]  Mobile Phone: [phone]  Relation: Child    Problem Severity: Pain/Other Symptoms:        Weight 252#  Body mass index is 49.34 kg/m². NC 2L - concern for protecting her airway    Bed Mobility/Toileting/Transfer:        No PT/OT notes - this admission pt too weak and tired    Performance Status:        Palliative Performance Scale:  100% []Full Normal activity & work No evidence of disease  90%   [] Full Normal activity & work Some evidence of disease  80%   [] Full Normal activity with Effort Some evidence of disease  70%   [] Reduced Unable Normal Job/Work Significant disease Full Normal or reduced  60%   [] Ambulation reduced; Significant disease; Can't do hobbies/housework; intake normal   or reduced; occasional assist; LOC full/confusion  50%   [] Mainly sit/lie; Extensive disease; Can't do any work; Considerable assist; intake normal  Or reduced; LOC full/confusion  40%   [] Mainly in bed; Extensive disease; Mainly assist; intake normal or reduced; occasional assist; LOC full/confusion  30%   [x] Bed Bound; Extensive disease; Total care; intake reduced; LOC full/confusion  20%   [] Bed Bound; Extensive disease; Total care; intake minimal; Drowsy/coma  10%   [] Bed Bound; Extensive disease; Total care; Mouth care only; Drowsy/coma    PPS 30%    Symptom Assessment: Appetite/Nausea/Bowels/Fatigue:          Intake/Output Summary (Last 24 hours) at 10/17/2022 1618  Last data filed at 10/17/2022 1537  Gross per 24 hour   Intake 917.68 ml   Output 425 ml   Net 492.68 ml     Diet NPO    Social History:   reports that she has never smoked. She has never used smokeless tobacco. She reports that she does not drink alcohol and does not use drugs.     Family History:  family history includes Arthritis in an other family member; Diabetes in an other family member; Heart Disease in an other family member; High Cholesterol in her brother; Hypertension in an other family member. Psychological/Spiritual:             Family Discussion:        All MD/RN notes and personal interaction indicate that pt is not currently capable of independent decision making. Spoke with several family members at bedside - then moved to the waiting room with several more family members participating. Discussed pt's current status, POC, and prognosis. Discussed family's hope of recovery from this illness and the possibility that pt may not recover. Discussed team's uncertainty regarding pt's ability to protect her own airway and the resultant need for intubation given pt's current FULL CODE status. Most all family present wanted to update patient to \"DO NOT INTUBATE\" but were not willing to commit to that decision pending the arrival and agreement of one last son (who should arrive to bedside from [place] by 1800 this date). They were informed of the ability to notify bedside RN if family wishes to update code status later this evening - they agreed. Also, tentative plan to meet with pt's children and their spouses mid-day on Tuesday, 10/18/2022, and Palliative RN provided contact information regarding timing of family meeting. RN informed - no updates at this time. Will follow up and support patient/family as time allows or circumstances dictate. Please reach out via [email or web address], [email or web address], or email [email or web address] if pt condition changes significantly or if family requests support. Thank you.     Electronically signed by Luis E Rodriguez RN, BSN on 10/17/2022 at 5:21 PM

## 2022-10-17 NOTE — PROGRESS NOTES
Pt responsive to voice, able to follow commands, oriented x4, translating per daughter. VSS. Sp02 WNL on 2L NC. Pt has labored breathing at rest. Expiratory wheezes noted. Lasix given at 1655, amrin output 75. Family at [facility].  called and informed of abnormal findings.

## 2022-10-17 NOTE — CONSULTS
[address]  Advanced CHF/Pulmonary Hypertension   Cardiac Evaluation      Wally Clement  YOB: 1940    REquesting PHysician:  Dr. Nitish Bolaños      Chief Complaint   Patient presents with    Chest Pain     Pt transported from [facility] from [facility]. PT c/o chest pain radiating to left shoulder. Symptoms started 2 days ago. Pt stated she was seen in [facility] and was inpatient for 2 weeks for left shoulder complications. History of Present Illness:  Luis Leyva is an 79 yo female with a PMH significant for morbid obesity, CARLOS on CPAP, CAD s/p CABG, hypertension, recent diagnosis of myelodysplastic syndrome after bone marrow biopsy September 2022, diabetes type 2. She originally presented a week or more ago with shoulder pain, chest pain, and hemoptysis. She was noted to have anemia and thrombocytopenia (both have worsened throughout this admission). Periodically throughout the admission, she has been hypotensive and given IV fluids. This has resulted in respiratory distress. Lasix then given with worsening renal function. She was transferred to ICU for overall worsening status. She is not able to clear secretions in the back of her throat. She is having bleeding from her nose and oral mucosa. She is tachypneic and is using accessory muscles of respiration. Chest Xray is suggestive of increased pulmonary vascular congestion. Creatinine 1.9, worsening. She has a history of chronic systolic HF. She was on good medical therapy when admitted but HF meds have been held/stopped due to low blood pressure. During this admission and over a short period of time, her Hgb has trended down to lowest of 6.4 (was 12 about 8 weeks ago). We are consulted for her pulmonary vascular congestion. It is obvious that her fluid overload is due to lower oncotic pressure from anemia, low platelets that has led to lower BP and HF meds have been held.  Her echo today shows minimally reduce EF at 45-50% that could be related to fluid overload/not being on HF therapy. Labs:  Sodium 138  K 5.0  BUN/cre 42/1.9  H/H 6.7/20.8  Wbc 20K  Platelet 25  BNP 6882     Echo:  10/17/22:   Summary   Technically difficult exam due to body habitus. Left ventricular cavity size is dilated with normal left ventricular wall   thickness. Overall left ventricular systolic function appears mildly reduced with an   ejection fraction that is visually estimated to be 45-50%. Abnormal (paradoxical) septal motion is present. Definity contrast agent administered to better visualize endocardial border   and wall motion. Normal diastolic filling pattern for age. Avg E/e' estimated to be 13.8. The right ventricle is mildly enlarged with reduced function. The left atrium is mildly dilated. Mild tricuspid regurgitation with an RVSP estimated to be 29 mmHg.     Meds prior to admission:  Oletha Nest 24/26 1/2 tab bid  Lasix 20 qd  Metoprolol 25 bid  Lipitor  xarelto    Allergies   Allergen Reactions    Aspirin Other (See Comments)     GI upset    Ibuprofen Other (See Comments)     GI upset    Macrobid [Nitrofurantoin Monohydrate Macrocrystals] Other (See Comments)     COLD, SICK    Adhesive Tape Rash    Lexiscan [Regadenoson] Rash     Happened twice with Lexiscan    Penicillins Rash     Current Facility-Administered Medications   Medication Dose Route Frequency Provider Last Rate Last Admin    cefepime (MAXIPIME) 1000 mg IVPB minibag  1,000 mg IntraVENous Q12H Cornel Lam MD   Stopped at 10/17/22 1245    0.9 % sodium chloride infusion   IntraVENous PRN Cornel Lam MD        sodium chloride 0.9 % infusion             metronidazole (FLAGYL) 500 mg in 0.9% NaCl 100 mL IVPB premix  500 mg IntraVENous Q8H Malia Meyers MD        methylPREDNISolone sodium (SOLU-MEDROL) injection 40 mg  40 mg IntraVENous Daily Ha Friend MD   40 mg at 10/17/22 1215    ipratropium-albuterol (Ivory Platamelia) nebulizer solution 1 ampule  1 ampule Inhalation Q4H PRN Yesenia Alvarez MD   1 ampule at 10/17/22 0229    lidocaine viscous hcl (XYLOCAINE) 2 % solution 15 mL  15 mL Mouth/Throat Q3H PRN Ha Friend MD   15 mL at 10/17/22 0150    insulin glargine (LANTUS) injection vial 16 Units  16 Units SubCUTAneous Daily Yesenia Alvarez MD   16 Units at 10/17/22 1215    ipratropium-albuterol (DUONEB) nebulizer solution 1 ampule  1 ampule Inhalation 4x daily Chilango Yancey MD   1 ampule at 10/17/22 1223    [Held by provider] furosemide (LASIX) injection 40 mg  40 mg IntraVENous Daily Ha Friend MD        lidocaine 4 % external patch 1 patch  1 patch TransDERmal Daily Chilango Yancey MD   1 patch at 10/17/22 1215    benzonatate (TESSALON) capsule 100 mg  100 mg Oral TID PRN MATEUSZ Gifford CNP   100 mg at 10/16/22 0610    [Held by provider] metoprolol tartrate (LOPRESSOR) tablet 12.5 mg  12.5 mg Oral BID Lela Seymour MD        0.9 % sodium chloride infusion   IntraVENous PRN Martha Mclain MD        [Held by provider] naproxen (NAPROSYN) tablet 500 mg  500 mg Oral BID WC Chilango Yancey MD   500 mg at 10/16/22 0831    [Held by provider] rivaroxaban (XARELTO) tablet 15 mg  15 mg Oral Dinner Chilango Yancey MD   15 mg at 10/15/22 1830    acetaminophen (TYLENOL) tablet 650 mg  650 mg Oral TID MATEUSZ Herrera CNP   650 mg at 10/16/22 1419    atorvastatin (LIPITOR) tablet 80 mg  80 mg Oral Nightly Lela Seymour MD   80 mg at 10/15/22 2058    hydrocortisone (ANUSOL-HC) 2.5 % rectal cream   Rectal BID Lela Seymour MD   Given at 10/16/22 2321    levothyroxine (SYNTHROID) tablet 150 mcg  150 mcg Oral Daily Lela Seymour MD   150 mcg at 10/16/22 0831    [Held by provider] sacubitril-valsartan (ENTRESTO) 24-26 MG per tablet 0.5 tablet  0.5 tablet Oral BID Lela Seymour MD   0.5 tablet at 10/14/22 9550    sodium chloride flush 0.9 % injection 5-40 mL  5-40 mL IntraVENous 2 times per day Chris Marr MD   10 mL at 10/16/22 2127    sodium chloride flush 0.9 % injection 5-40 mL  5-40 mL IntraVENous PRN Chris Marr MD        0.9 % sodium chloride infusion   IntraVENous PRN Chris Marr MD 5 mL/hr at 10/16/22 2134 5 mL/hr at 10/16/22 2134    ondansetron (ZOFRAN-ODT) disintegrating tablet 4 mg  4 mg Oral Q8H PRN Mario Alberto Palencia MD        Or    ondansetron (ZOFRAN) injection 4 mg  4 mg IntraVENous Q6H PRN Chris Marr MD        polyethylene glycol (GLYCOLAX) packet 17 g  17 g Oral Daily PRN Chris Marr MD   17 g at 10/11/22 1118    acetaminophen (TYLENOL) tablet 650 mg  650 mg Oral Q6H PRN Chris Marr MD   650 mg at 10/12/22 2107    Or    acetaminophen (TYLENOL) suppository 650 mg  650 mg Rectal Q6H PRN Chris Marr MD        dextrose bolus 10% 125 mL  125 mL IntraVENous PRN Chris Marr MD        Or    dextrose bolus 10% 250 mL  250 mL IntraVENous PRN Chris Marr MD        glucagon (rDNA) injection 1 mg  1 mg SubCUTAneous PRN Chris Marr MD        dextrose 10 % infusion   IntraVENous Continuous PRN Chris Marr MD           Past Medical History:   Diagnosis Date    4/11/17 Left knee repeat repair of median parapatellar arthrotomy with augmentation 4/12/2017    Arthritis     Atrial fibrillation (HCC)     CAD (coronary artery disease)     Cataract     Chronic diastolic congestive heart failure ([address] 7/27/2022    Diabetes mellitus ([facility]     GERD (gastroesophageal reflux disease)     Hyperlipidemia     Hypertension     HYPOTHRYROIDISM     Non-English speaking patient     SPEAKS Irish.   SHE SPEAKS A LITTLE ENGLISH    Sleep apnea     unspecified    Thyroid disease     UTI      Past Surgical History:   Procedure Laterality Date    CARDIAC SURGERY  2004    CABG X 4 VESSELS    CHOLECYSTECTOMY, LAPAROSCOPIC  5/15/14    with [id number]    CT BONE MARROW BIOPSY  9/29/2022    CT BONE MARROW BIOPSY 9/29/2022 [facility] CT SCAN    JOINT REPLACEMENT Bilateral [address]    knee    KNEE SURGERY Left 02/28/2017    left knee poly exchange revision     REVISION TOTAL KNEE ARTHROPLASTY Right 11/22/2016    RIGHT TOTAL KNEE REVISION WITH POLY EXCHANGE    TOTAL KNEE ARTHROPLASTY      partical/ left     UPPER GASTROINTESTINAL ENDOSCOPY N/A 10/3/2022    EGD BIOPSY performed by Linsey Rosado MD at [facility] N/A 10/3/2022    EGD DILATION BALLOON performed by Linsey Rosado MD at [address] [place] [address]     Family History   Problem Relation Age of Onset    Arthritis Other     Diabetes Other     Heart Disease Other     Hypertension Other     High Cholesterol Brother      Social History     Socioeconomic History    Marital status:      Spouse name: Not on file    Number of children: 7    Years of education: Not on file    Highest education level: Not on file   Occupational History    Occupation: Retired   Tobacco Use    Smoking status: Never    Smokeless tobacco: Never   Vaping Use    Vaping Use: Never used   Substance and Sexual Activity    Alcohol use: No    Drug use: No    Sexual activity: Not on file   Other Topics Concern    Not on file   Social History Narrative    Not on file     Social Determinants of Health     Financial Resource Strain: Not on file   Food Insecurity: Not on file   Transportation Needs: Not on file   Physical Activity: Not on file   Stress: Not on file   Social Connections: Not on file   Intimate Partner Violence: Not on file   Housing Stability: Not on file       Review of Systems:   Constitutional: there has been no unanticipated weight loss. There's been no change in energy level, sleep pattern, or activity level. Eyes: No visual changes or diplopia. No scleral icterus. ENT: No Headaches, hearing loss or vertigo. No mouth sores or sore throat. Cardiovascular: Reviewed in HPI  Respiratory: No cough or wheezing, no sputum production. No hematemesis.     Gastrointestinal: No abdominal pain, appetite loss, blood in stools. No change in bowel or bladder habits. Genitourinary: No dysuria, trouble voiding, or hematuria. Musculoskeletal:  No gait disturbance, weakness or joint complaints. Integumentary: No rash or pruritis. Neurological: No headache, diplopia, change in muscle strength, numbness or tingling. No change in gait, balance, coordination, mood, affect, memory, mentation, behavior. Psychiatric: No anxiety, no depression. Endocrine: No malaise, fatigue or temperature intolerance. No excessive thirst, fluid intake, or urination. No tremor. Hematologic/Lymphatic: No abnormal bruising or bleeding, blood clots or swollen lymph nodes. Allergic/Immunologic: No nasal congestion or hives. Physical Examination:    Vitals:    10/17/22 1300 10/17/22 1330 10/17/22 1345 10/17/22 1400   BP: (!) 103/50 (!) 103/50 (!) 99/41 (!) 110/44   Pulse: 95 95 94 94   Resp: 25 24 27 26   Temp:  99 °F (37.2 °C) 98.8 °F (37.1 °C)    TempSrc:       SpO2:  95% 91%    Weight:       Height:         Body mass index is 49.34 kg/m². Wt Readings from Last 3 Encounters:   10/17/22 252 lb 10.4 oz (114.6 kg)   10/07/22 241 lb 14.4 oz (109.7 kg)   06/21/22 243 lb 9.6 oz (110.5 kg)     BP Readings from Last 3 Encounters:   10/17/22 (!) 110/44   10/07/22 123/67   07/27/22 (!) 112/54     Constitutional and General Appearance:   Acutely and chronically ill appearing, very pale appearing  HEENT:  NC/AT, bleeding from nostrils  KERA  No problems with hearing  Skin:  Warm, dry  Respiratory:  Normal excursion and expansion without use of accessory muscles  Resp Auscultation: Normal breath sounds without dullness  Cardiovascular:   The apical impulses not displaced  Heart tones are crisp and normal  Cervical veins are not engorged  The carotid upstroke is normal in amplitude and contour without delay or bruit  JVP less than 8 cm H2O  RRR with nl S1 and S2 without m,r,g  Peripheral pulses are symmetrical and full  There is no clubbing, cyanosis of the extremities. 2+ bilateral edema  Femoral Arteries: 2+ and equal  Pedal Pulses: 2+ and equal   Neck:  No thyromegaly  Abdomen:  No masses or tenderness  Liver/Spleen: No Abnormalities Noted  Neurological/Psychiatric:  Alert and oriented in all spheres  Moves all extremities well  Exhibits normal gait balance and coordination  No abnormalities of mood, affect, memory, mentation, or behavior are noted    Labs were reviewed including labs from other hospital systems through [address] [place] [address]. Cardiac testing was reviewed including echos, nuclear scans, cardiac catheterization, including from other hospital systems through [address] [place] [address]. Assessment:    1. Chronic HF, now with volume overload due to lower oncotic pressure   2. History of coronary artery disease    3 Severe anemia   4. Severe thrombocytopenia    5. Myelodysplastic syndrome  6. Pulmonary vascular congestion  7. Hypotension from combination of all of above  8. Acute renal failure due to all of above    Plan:   I would be concerned with infection given her elevated WBC count and hypotension, will leave this to the primary team  Low blood pressure being contributed to by drop in oncotic pressure from anemia, thrombocytopenia  Off HF meds due to hypotension  Give lasix 20 mg IV qd  Due to acute renal failure, will hold on restarting ACEI/ARB, or ARNI. Doubt that inotrope would help given that LVEF is only minimally reduced  Would not add beta blocker at this time, as she needs the higher heart rate with this degree of hypoxia/fluid overload  Agree with transfusion of platelets and PRBCs. This will help with diuresis  Agree with antibiotics for possible pneumonia/aspiration  Agree that she would likely not be able to be extubated if intubated. Overall, this suggests very poor prognosis,  reviewing how quickly platelets and Hgb have dropped with family.   We reviewed that many of the meds/treatments we would like to give are limited by low blood pressure/renal insufficiency. Family seems to understand the gravity of the situation. Palliative care is involved. Due to the high probability of clinically significant life threating deterioration of the patient's condition that required my urgent intervention, a total critical care time 35 minutes was used. This time excludes any time that may have been spent performing procedures. This includes but not limited to vital sign monitoring, telemetry monitoring, continuous pulse oximety, IV medication, clinical response to the IV medications, documentation time , consultation time, interpretation of lab data, review of nursing notes and old record review. I appreciate the opportunity of cooperating in the care of this patient.     Geo Stevenson M.D., [facility] [place]

## 2022-10-17 NOTE — CONSULTS
PULMONARY AND CRITICAL CARE MEDICINE CONSULT NOTE      Name: Rose Mary Shaw  Sex: female  : 1940  MRN: [id number]  Admission Date: 10/10/2022  Admission Diagnosis: Shoulder pain [M25.519]  Acute chest pain [R07.9]  History of coronary artery disease [Z86.79]  Chronic congestive heart failure, unspecified heart failure type [facility]) [I50.9]  Date of ICU admission: 10/16/2022    HPI: Patient is a 78 y.o. female with past medical history significant for morbid obesity, CARLOS on CPAP, CAD s/p CABG, hypertension, recent diagnosis of myelodysplastic syndrome after bone marrow biopsy in 2022, diabetes mellitus type 2 who presented to the hospital with complaints of worsening shoulder pain, chest pain and hemoptysis. Patient was noted to have bicytopenia [anemia and thrombocytopenia]. During the hospital stay, patient was noted to be hypotensive for which she was given IV fluids. The IV fluids were held when patient was noted to have respiratory distress and she was initiated on Lasix. Lasix was then held when patient developed acute kidney injury. Patient was transferred to ICU for overall worsening status. Today patient is lethargic but she wakes up to answer some questions. She is not able to clear secretions in the back of her throat. She has bleeding from the mucous membranes including nose and oral mucosa. Dried blood is noted in the oral mucosa. Patient is not able to clear her secretions. She is tachypneic and is using accessory muscles of respiration. Chest x-ray suggestive of increased pulmonary vascular congestion. Creatinine of 1.9, worsening. Elevated procalcitonin. 14 point ROS could not be obtained due to patient factors. Patient is lethargic.       Past Medical History:   Diagnosis Date    17 Left knee repeat repair of median parapatellar arthrotomy with augmentation 2017    Arthritis     Atrial fibrillation (HCC)     CAD (coronary artery disease)     Cataract Relation Age of Onset    Arthritis Other     Diabetes Other     Heart Disease Other     Hypertension Other     High Cholesterol Brother      Allergies   Allergen Reactions    Aspirin Other (See Comments)     GI upset    Ibuprofen Other (See Comments)     GI upset    Macrobid [Nitrofurantoin Monohydrate Macrocrystals] Other (See Comments)     COLD, SICK    Adhesive Tape Rash    Lexiscan [Regadenoson] Rash     Happened twice with Lexiscan    Penicillins Rash       MEDICATIONS:     Scheduled Meds:     cefepime  1,000 mg IntraVENous Q12H    methylPREDNISolone  40 mg IntraVENous Daily    azithromycin  500 mg IntraVENous Q24H    insulin glargine  16 Units SubCUTAneous Daily    ipratropium-albuterol  1 ampule Inhalation 4x daily    [Held by provider] furosemide  40 mg IntraVENous Daily    lidocaine  1 patch TransDERmal Daily    [Held by provider] metoprolol tartrate  12.5 mg Oral BID    [Held by provider] naproxen  500 mg Oral BID WC    [Held by provider] rivaroxaban  15 mg Oral Dinner    acetaminophen  650 mg Oral TID    atorvastatin  80 mg Oral Nightly    hydrocortisone   Rectal BID    levothyroxine  150 mcg Oral Daily    [Held by provider] sacubitril-valsartan  0.5 tablet Oral BID    sodium chloride flush  5-40 mL IntraVENous 2 times per day     Current Infusions:    sodium chloride      sodium chloride      sodium chloride      sodium chloride 5 mL/hr (10/16/22 2134)    dextrose         PRN meds:  sodium chloride, ipratropium-albuterol, lidocaine viscous hcl, benzonatate, sodium chloride, sodium chloride flush, sodium chloride, ondansetron **OR** ondansetron, polyethylene glycol, acetaminophen **OR** acetaminophen, dextrose bolus **OR** dextrose bolus, glucagon (rDNA), dextrose    PHYSICAL EXAM:    BP (!) 114/51   Pulse 91   Temp (P) 99.8 °F (37.7 °C) (Temporal)   Resp 22   Ht 5' (1.524 m)   Wt 252 lb 10.4 oz (114.6 kg)   LMP  (LMP Unknown)   SpO2 96%   BMI 49.34 kg/m²  I/O last 3 completed shifts:   In: 330 [P.O.:330]  Out: 1250 [Urine:1250] No intake/output data recorded. Intake/Output Summary (Last 24 hours) at 10/17/2022 1328  Last data filed at 10/16/2022 2200  Gross per 24 hour   Intake 0 ml   Output 250 ml   Net -250 ml         CONSTITUTIONAL: She is a 78y.o.-year-old who appears her stated age. She is in no acute distress. Morbidly obese  CARDIOVASCULAR: S1 S2 RRR. Without murmer  RESPIRATORY & CHEST: Lungs are clear to auscultation and percussion. No wheezing, no crackles. Good air movement  GASTROINTESTINAL & ABDOMEN: Soft, nontender, positive bowel sounds in all quadrants, non-distended, without hepatosplenomegaly. GENITOURINARY: Deferred. MUSCULOSKELETAL: No tenderness to palpation of the axial skeleton. There is no clubbing. No cyanosis. No edema of the lower extremities. SKIN OF BODY: No rash or jaundice. PSYCHIATRIC EVALUATION: Could not be assessed. HEMATOLOGIC/LYMPHATIC/ IMMUNOLOGIC: No palpable lymphadenopathy. NEUROLOGIC: Lethargic but wakes up to answer questions. Groslly non-focal. Motor strength is 5+/5 in all muscle groups. The patient has a normal sensorium globally.      LABS:    Recent Labs     10/15/22  0540 10/15/22  1906 10/16/22  0600 10/17/22  0546   WBC 12.0*  --  11.9* 20.1*   RBC 2.23*  --  2.25* 2.17*   HGB 7.1* 7.3* 7.1* 6.7*   HCT 21.5* 22.2* 21.4* 20.8*   MCH 31.8  --  31.5 31.0   MCHC 33.0  --  33.1 32.3   RDW 21.5*  --  22.1* 22.5*   PLT 43*  --  31* 25*   MPV 9.2  --  8.9 8.8     Recent Labs     10/15/22  0540 10/16/22  0600 10/17/22  0546    140 138   K 3.6 3.7 5.0    108 104   ANIONGAP 11 9 14   CO2 22 23 20*   BUN 32* 33* 42*   CREATININE 0.8 1.2 1.9*   CALCIUM 8.1* 8.6 7.6*   GLUCOSE 159* 131* 241*     Recent Labs     10/16/22  1305   PHART 7.409   LAU8LBS 37.1   PO2ART 93.5   JIA0BAE 23.5   I7IAVALC 98.3   BEART -1.1       IMPRESSION:  Acute respiratory distress  Likely aspiration pneumonia  Acute kidney injury  Congestive heart failure, EF 45 to 50%  Myelodysplastic syndrome  Anemia and thrombocytopenia  Morbid obesity  CARLOS on CPAP  CAD s/p CABG      PLAN:  Was transferred to ICU for worsening respiratory status. Even though that the patient is requiring 2 L/min FiO2, she looks to be in distress and is tachypneic. Patient is using accessory muscles of respiration. She is also lethargic. Patient is having difficulty clearing secretions and is gurgling. Worsening leukocytosis with elevated procalcitonin. I believe the patient is aspirating and has developed aspiration pneumonia. Agree with cefepime. I will add Flagyl for anaerobic coverage. Chest x-ray shows increased pulmonary vascular congestion. Patient was noted to have elevated proBNP yesterday, 3483 for which she was given Lasix. Lasix is now held because of worsening creatinine, 1.9. Patient's blood pressure is also borderline  ABG is with adequate gas exchange. Patient is not hypercapnic. I explained to the patient's family that if patient is aspirating and not able to clear his secretions, then she might need intubation and mechanical ventilation. 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 Family is meeting with palliative care today to address further goals of care. In the meantime, I would recommend to use BiPAP for 1 to 2 hours intermittently each time. Do not use BiPAP for prolonged hours as it can cause drying of secretions in the back of the throat leading to further worsening. Critical Care Time: 1 Hour  Total critical care time caring for this patient with life threatening illness, including direct patient contact, management of life support systems, review of data including imaging and labs, discussions with other team members and physicians excluding procedures.       Electronically signed by Taco Peraza MD on 10/17/22 at 1:28 PM EDT     This note was completed using [employer] recognition software. Grammatical errors, random word insertions, pronoun errors and incomplete sentences are occasional consequences of this technology due to software limitations. If there are questions or concerns about the content of this note of information contained within the body of this dictation, they should be addressed with the provider for clarification.

## 2022-10-17 NOTE — PROGRESS NOTES
Physical/Occupational Therapy  Wally Schmidt  Attempted PT/OT treatment this date. RN requesting therapy attempt tomorrow as pt is currently receiving blood transfusion. Will re-attempt treatment session tomorrow as schedule allows and as pt becomes medically appropriate.    Alexandro Shipley PT, DPT [id number]   Obey Doran., OTR/L, [id number]

## 2022-10-17 NOTE — PROGRESS NOTES
Clinical Pharmacy Note: Pharmacy to Dose Vancomycin    Vancomycin Day: 1  Indication: CAP  Current Dose: 750 mg IV every 24 hours  Dosing Method: Bayesian Modeling    Random: 20.3    Recent Labs     10/16/22  0600 10/17/22  0546   BUN 33* 42*       Recent Labs     10/16/22  0600 10/17/22  0546   CREATININE 1.2 1.9*       Recent Labs     10/16/22  0600 10/17/22  0546   WBC 11.9* 20.1*         Intake/Output Summary (Last 24 hours) at 10/17/2022 0727  Last data filed at 10/16/2022 2200  Gross per 24 hour   Intake 240 ml   Output 550 ml   Net -310 ml         Ht Readings from Last 1 Encounters:   10/10/22 5' (1.524 m)        Wt Readings from Last 1 Encounters:   10/17/22 252 lb 10.4 oz (114.6 kg)         Body mass index is 49.34 kg/m². Estimated Creatinine Clearance: 25 mL/min (A) (based on SCr of 1.9 mg/dL (H)). Assessment/Plan:  Vancomycin level is supratherapeutic given large jump in Scr from admission from 1.2 to 1.9. Will change dosing to dose by levels. Give 500 mg IV once at midnight tonight. A vancomycin random level has been ordered on 10/18 at 0600 for follow-up. Changes in regimen will be determined based on culture results, renal function, and clinical response. Pharmacy will continue to monitor and adjust regimen as necessary.     Thank you for the consult,    Magdalena Harris, PharmD, [address] [facility] Pharmacist  [id number]

## 2022-10-18 PROBLEM — D46.9 MYELODYSPLASTIC SYNDROME (HCC): Status: ACTIVE | Noted: 2022-10-17

## 2022-10-18 PROBLEM — Z86.79 HISTORY OF CORONARY ARTERY DISEASE: Status: ACTIVE | Noted: 2022-10-18

## 2022-10-18 PROBLEM — J69.0 ASPIRATION PNEUMONIA OF LEFT LOWER LOBE DUE TO GASTRIC SECRETIONS (HCC): Status: ACTIVE | Noted: 2022-10-18

## 2022-10-18 PROBLEM — J96.01 ACUTE HYPOXEMIC RESPIRATORY FAILURE (HCC): Status: ACTIVE | Noted: 2022-10-18

## 2022-10-18 PROBLEM — R09.89 PULMONARY VASCULAR CONGESTION: Status: ACTIVE | Noted: 2022-10-18

## 2022-10-18 LAB
ANION GAP SERPL CALCULATED.3IONS-SCNC: 15 MMOL/L (ref 3–16)
ANION GAP SERPL CALCULATED.3IONS-SCNC: 15 MMOL/L (ref 3–16)
BUN BLDV-MCNC: 70 MG/DL (ref 7–20)
BUN BLDV-MCNC: 76 MG/DL (ref 7–20)
CALCIUM SERPL-MCNC: 8.4 MG/DL (ref 8.3–10.6)
CALCIUM SERPL-MCNC: 8.6 MG/DL (ref 8.3–10.6)
CHLORIDE BLD-SCNC: 107 MMOL/L (ref 99–110)
CHLORIDE BLD-SCNC: 109 MMOL/L (ref 99–110)
CO2: 19 MMOL/L (ref 21–32)
CO2: 20 MMOL/L (ref 21–32)
CREAT SERPL-MCNC: 1.8 MG/DL (ref 0.6–1.2)
CREAT SERPL-MCNC: 1.9 MG/DL (ref 0.6–1.2)
GFR SERPL CREATININE-BSD FRML MDRD: 26 ML/MIN/{1.73_M2}
GFR SERPL CREATININE-BSD FRML MDRD: 27 ML/MIN/{1.73_M2}
GLUCOSE BLD-MCNC: 109 MG/DL (ref 70–99)
GLUCOSE BLD-MCNC: 129 MG/DL (ref 70–99)
GLUCOSE BLD-MCNC: 177 MG/DL (ref 70–99)
GLUCOSE BLD-MCNC: 230 MG/DL (ref 70–99)
GLUCOSE BLD-MCNC: 297 MG/DL (ref 70–99)
GLUCOSE BLD-MCNC: 372 MG/DL (ref 70–99)
GLUCOSE BLD-MCNC: 402 MG/DL (ref 70–99)
GLUCOSE BLD-MCNC: 466 MG/DL (ref 70–99)
GLUCOSE BLD-MCNC: 485 MG/DL (ref 70–99)
GLUCOSE BLD-MCNC: 490 MG/DL (ref 70–99)
GLUCOSE BLD-MCNC: 499 MG/DL (ref 70–99)
GLUCOSE BLD-MCNC: 500 MG/DL (ref 70–99)
GLUCOSE BLD-MCNC: 508 MG/DL (ref 70–99)
HCT VFR BLD CALC: 21.3 % (ref 36–48)
HEMOGLOBIN: 7 G/DL (ref 12–16)
MAGNESIUM: 2.1 MG/DL (ref 1.8–2.4)
MCH RBC QN AUTO: 31.2 PG (ref 26–34)
MCHC RBC AUTO-ENTMCNC: 32.7 G/DL (ref 31–36)
MCV RBC AUTO: 95.6 FL (ref 80–100)
MRSA SCREEN RT-PCR: NORMAL
PDW BLD-RTO: 21.6 % (ref 12.4–15.4)
PERFORMED ON: ABNORMAL
PLATELET # BLD: 70 K/UL (ref 135–450)
PMV BLD AUTO: 10.4 FL (ref 5–10.5)
POTASSIUM REFLEX MAGNESIUM: 3.9 MMOL/L (ref 3.5–5.1)
POTASSIUM SERPL-SCNC: 4.4 MMOL/L (ref 3.5–5.1)
RBC # BLD: 2.23 M/UL (ref 4–5.2)
SODIUM BLD-SCNC: 141 MMOL/L (ref 136–145)
SODIUM BLD-SCNC: 144 MMOL/L (ref 136–145)
URINE CULTURE, ROUTINE: NORMAL
WBC # BLD: 17.8 K/UL (ref 4–11)

## 2022-10-18 PROCEDURE — 6370000000 HC RX 637 (ALT 250 FOR IP): Performed by: INTERNAL MEDICINE

## 2022-10-18 PROCEDURE — 2580000003 HC RX 258: Performed by: INTERNAL MEDICINE

## 2022-10-18 PROCEDURE — 87070 CULTURE OTHR SPECIMN AEROBIC: CPT

## 2022-10-18 PROCEDURE — 2000000000 HC ICU R&B

## 2022-10-18 PROCEDURE — 87205 SMEAR GRAM STAIN: CPT

## 2022-10-18 PROCEDURE — 99233 SBSQ HOSP IP/OBS HIGH 50: CPT | Performed by: INTERNAL MEDICINE

## 2022-10-18 PROCEDURE — 2500000003 HC RX 250 WO HCPCS: Performed by: CLINICAL NURSE SPECIALIST

## 2022-10-18 PROCEDURE — 6360000002 HC RX W HCPCS: Performed by: INTERNAL MEDICINE

## 2022-10-18 PROCEDURE — 85027 COMPLETE CBC AUTOMATED: CPT

## 2022-10-18 PROCEDURE — 94761 N-INVAS EAR/PLS OXIMETRY MLT: CPT

## 2022-10-18 PROCEDURE — 83036 HEMOGLOBIN GLYCOSYLATED A1C: CPT

## 2022-10-18 PROCEDURE — 80048 BASIC METABOLIC PNL TOTAL CA: CPT

## 2022-10-18 PROCEDURE — 36415 COLL VENOUS BLD VENIPUNCTURE: CPT

## 2022-10-18 PROCEDURE — 2700000000 HC OXYGEN THERAPY PER DAY

## 2022-10-18 PROCEDURE — 94640 AIRWAY INHALATION TREATMENT: CPT

## 2022-10-18 PROCEDURE — 99233 SBSQ HOSP IP/OBS HIGH 50: CPT | Performed by: CLINICAL NURSE SPECIALIST

## 2022-10-18 PROCEDURE — 99223 1ST HOSP IP/OBS HIGH 75: CPT | Performed by: INTERNAL MEDICINE

## 2022-10-18 PROCEDURE — 2500000003 HC RX 250 WO HCPCS: Performed by: INTERNAL MEDICINE

## 2022-10-18 PROCEDURE — 93005 ELECTROCARDIOGRAM TRACING: CPT | Performed by: INTERNAL MEDICINE

## 2022-10-18 PROCEDURE — 83735 ASSAY OF MAGNESIUM: CPT

## 2022-10-18 RX ORDER — INSULIN LISPRO 100 [IU]/ML
0-4 INJECTION, SOLUTION INTRAVENOUS; SUBCUTANEOUS NIGHTLY
Status: DISCONTINUED | OUTPATIENT
Start: 2022-10-18 | End: 2022-10-18

## 2022-10-18 RX ORDER — INSULIN LISPRO 100 [IU]/ML
0-8 INJECTION, SOLUTION INTRAVENOUS; SUBCUTANEOUS
Status: DISCONTINUED | OUTPATIENT
Start: 2022-10-18 | End: 2022-10-18

## 2022-10-18 RX ORDER — DEXTROSE MONOHYDRATE 100 MG/ML
INJECTION, SOLUTION INTRAVENOUS CONTINUOUS PRN
Status: DISCONTINUED | OUTPATIENT
Start: 2022-10-18 | End: 2022-10-26 | Stop reason: HOSPADM

## 2022-10-18 RX ORDER — METOPROLOL TARTRATE 5 MG/5ML
5 INJECTION INTRAVENOUS ONCE
Status: COMPLETED | OUTPATIENT
Start: 2022-10-18 | End: 2022-10-18

## 2022-10-18 RX ORDER — LANOLIN ALCOHOL/MO/W.PET/CERES
6 CREAM (GRAM) TOPICAL NIGHTLY PRN
Status: DISCONTINUED | OUTPATIENT
Start: 2022-10-18 | End: 2022-10-26 | Stop reason: HOSPADM

## 2022-10-18 RX ADMIN — FUROSEMIDE 20 MG: 10 INJECTION, SOLUTION INTRAMUSCULAR; INTRAVENOUS at 08:18

## 2022-10-18 RX ADMIN — HYDROCORTISONE: 25 CREAM TOPICAL at 08:19

## 2022-10-18 RX ADMIN — METHYLPREDNISOLONE SODIUM SUCCINATE 40 MG: 40 INJECTION, POWDER, FOR SOLUTION INTRAMUSCULAR; INTRAVENOUS at 08:18

## 2022-10-18 RX ADMIN — HYDROCORTISONE: 25 CREAM TOPICAL at 20:40

## 2022-10-18 RX ADMIN — ATORVASTATIN CALCIUM 80 MG: 80 TABLET, FILM COATED ORAL at 20:38

## 2022-10-18 RX ADMIN — CEFEPIME 1000 MG: 1 INJECTION, POWDER, FOR SOLUTION INTRAMUSCULAR; INTRAVENOUS at 23:35

## 2022-10-18 RX ADMIN — IPRATROPIUM BROMIDE AND ALBUTEROL SULFATE 1 AMPULE: 2.5; .5 SOLUTION RESPIRATORY (INHALATION) at 21:22

## 2022-10-18 RX ADMIN — IPRATROPIUM BROMIDE AND ALBUTEROL SULFATE 1 AMPULE: 2.5; .5 SOLUTION RESPIRATORY (INHALATION) at 08:07

## 2022-10-18 RX ADMIN — METRONIDAZOLE 500 MG: 500 INJECTION, SOLUTION INTRAVENOUS at 22:30

## 2022-10-18 RX ADMIN — METRONIDAZOLE 500 MG: 500 INJECTION, SOLUTION INTRAVENOUS at 05:31

## 2022-10-18 RX ADMIN — MELATONIN TAB 3 MG 6 MG: 3 TAB at 20:38

## 2022-10-18 RX ADMIN — IPRATROPIUM BROMIDE AND ALBUTEROL SULFATE 1 AMPULE: 2.5; .5 SOLUTION RESPIRATORY (INHALATION) at 12:40

## 2022-10-18 RX ADMIN — ACETAMINOPHEN 650 MG: 325 TABLET ORAL at 18:02

## 2022-10-18 RX ADMIN — METOPROLOL TARTRATE 12.5 MG: 25 TABLET, FILM COATED ORAL at 20:38

## 2022-10-18 RX ADMIN — CEFEPIME 1000 MG: 1 INJECTION, POWDER, FOR SOLUTION INTRAMUSCULAR; INTRAVENOUS at 11:00

## 2022-10-18 RX ADMIN — METOPROLOL TARTRATE 5 MG: 5 INJECTION, SOLUTION INTRAVENOUS at 15:33

## 2022-10-18 RX ADMIN — Medication 10 ML: at 08:17

## 2022-10-18 RX ADMIN — SODIUM CHLORIDE 16.6 UNITS/HR: 9 INJECTION, SOLUTION INTRAVENOUS at 19:33

## 2022-10-18 RX ADMIN — SODIUM CHLORIDE 13.44 UNITS/HR: 9 INJECTION, SOLUTION INTRAVENOUS at 14:12

## 2022-10-18 RX ADMIN — INSULIN GLARGINE 16 UNITS: 100 INJECTION, SOLUTION SUBCUTANEOUS at 08:14

## 2022-10-18 RX ADMIN — Medication 10 ML: at 20:46

## 2022-10-18 RX ADMIN — INSULIN LISPRO 8 UNITS: 100 INJECTION, SOLUTION INTRAVENOUS; SUBCUTANEOUS at 11:06

## 2022-10-18 NOTE — PROGRESS NOTES
Nutrition Note    RECOMMENDATIONS  PO Diet: Diet per SLP    NUTRITION ASSESSMENT   Pt triggered for LOS assessment. Recent diagnosis of MDS. NPO per SLP evaluation yesterday. No documented intakes throughout admission while on 5 carb choice diet prior to NPO status. Wt hx in EMR indicates no significant wt change. RD will continue to monitor for po diet to be resumed. Nutrition Related Findings: -6.0L. Lytes obtained today WNL. HbA1c of 6.4% on 6/16. Glucose 485 today, receiving methylprednisolone. LBM 10/18, BS+. Trace pitting BUE; +1 pitting BLE edema. Wounds: Skin Tears  Nutrition Education:  Education not indicated   Nutrition Goals: Initiate PO diet, by next RD assessment     MALNUTRITION ASSESSMENT   Acute Illness  Malnutrition Status: At risk for malnutrition (Comment)    NUTRITION DIAGNOSIS   Inadequate oral intake related to impaired respiratory function, swallowing difficulty, cognitive or neurological impairment as evidenced by NPO or clear liquid status due to medical condition    CURRENT NUTRITION THERAPIES  Diet NPO     PO Intake: NPO   PO Supplement Intake:NPO    ANTHROPOMETRICS  Current Height: 5' (152.4 cm)  Current Weight: 246 lb 14.6 oz (112 kg)    Admission weight: 243 lb (110.2 kg) (bed wt)  Ideal Body Weight (IBW): 100 lbs  (45 kg)        BMI: 48    COMPARATIVE STANDARDS  Energy (kcal):  8007-4064     Protein (g):  55-91       Fluid (mL/day):  8958-0174    The patient will be monitored per nutrition standards of care. Consult dietitian if additional nutrition interventions are needed prior to RD reassessment.      Laila Junior MS, RD, LD    Contact: [phone]

## 2022-10-18 NOTE — PROGRESS NOTES
Speech Language Pathology  Attempt     Larry Wilson   1940     Attempted to see pt for follow up dysphagia therapy. RN reported pt is trying to rest and pt's family plans to have a plan of care meeting this afternoon. Will hold Per RN discretion and re attempt as pt is able to participate. Pt remains NPO due to dysphagia. Cyndi Ordoñez M.A.  CCC-SLP KAVIN [id number]  Speech-Language Pathologist   10/18/2022 11:50 AM

## 2022-10-18 NOTE — PROGRESS NOTES
[address]   Daily Progress Note      Admit Date:  10/10/2022    HPI:    Ms. Bhaskar Lawson is an 78year old female with history of morbid obesity, CARLOS on cpap, CAD status post CABG. 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 She has been hypotensive and given IV fluids and this resulted in respiratory distress. IV lasix given. She was moved to the ICU. Tachypneic and xray with pulmonary vascular congestion. LVEF is 45-50%    Subjective:  Patient is being seen for pulmonary vascular congestion . There were no acute overnight cardiac events. She is on 2 L of oxygen (via her mouth due to clot in nare) and has a wet cough, concerns about aspiration. She was sleeping and family is to meet with palliative care today (several children). She denies of any chest pain or increased shortness of breath. BP is improved but still soft  Bun 70 creat 1.8 Hgb 7.0    Objective:   BP (!) 102/53   Pulse 95   Temp 98.2 °F (36.8 °C) (Temporal)   Resp 28   Ht 5' (1.524 m)   Wt 246 lb 14.6 oz (112 kg)   LMP  (LMP Unknown)   SpO2 94%   BMI 48.22 kg/m²     Intake/Output Summary (Last 24 hours) at 10/18/2022 1127  Last data filed at 10/18/2022 1110  Gross per 24 hour   Intake 1742.26 ml   Output 1210 ml   Net 532.26 ml          Physical Exam:  General:  Awake, alert, oriented in NAD  Skin:  Warm and dry. No unusual bruising or rash  Neck:  Supple. No JVD or carotid bruit appreciated  Chest:  Normal effort.   Scattered rhonchi  Cardiovascular:  RRR, S1/S2, no murmur/gallop/rub  Abdomen:  Soft, nontender, +bowel sounds, obese  Extremities:  No edema  Neurological: No focal deficits  Psychological: Normal mood and affect      Medications:    insulin lispro  0-8 Units SubCUTAneous TID WC    insulin lispro  0-4 Units SubCUTAneous Nightly    cefepime  1,000 mg IntraVENous Q12H    metroNIDAZOLE  500 mg IntraVENous Q8H    furosemide  20 mg IntraVENous Daily    methylPREDNISolone  40 mg IntraVENous Daily    insulin glargine  16 Units SubCUTAneous Daily    ipratropium-albuterol  1 ampule Inhalation 4x daily    lidocaine  1 patch TransDERmal Daily    [Held by provider] metoprolol tartrate  12.5 mg Oral BID    [Held by provider] naproxen  500 mg Oral BID WC    [Held by provider] rivaroxaban  15 mg Oral Dinner    acetaminophen  650 mg Oral TID    atorvastatin  80 mg Oral Nightly    hydrocortisone   Rectal BID    levothyroxine  150 mcg Oral Daily    [Held by provider] sacubitril-valsartan  0.5 tablet Oral BID    sodium chloride flush  5-40 mL IntraVENous 2 times per day      sodium chloride      sodium chloride      sodium chloride      sodium chloride 10 mL/hr at 10/18/22 0416    dextrose         Lab Data:  CBC:   Recent Labs     10/16/22  0600 10/17/22  0546 10/18/22  0437   WBC 11.9* 20.1* 17.8*   HGB 7.1* 6.7* 7.0*   PLT 31* 25* 70*     BMP:    Recent Labs     10/16/22  0600 10/17/22  0546 10/18/22  0437    138 141   K 3.7 5.0 4.4   CO2 23 20* 19*   BUN 33* 42* 70*   CREATININE 1.2 1.9* 1.8*     INR:  No results for input(s): INR in the last 72 hours. BNP:    Recent Labs     10/16/22  1220   PROBNP 3,483*         Diagnostics:  Echo:  10/17/22:   Summary   Technically difficult exam due to body habitus. Left ventricular cavity size is dilated with normal left ventricular wall   thickness. Overall left ventricular systolic function appears mildly reduced with an   ejection fraction that is visually estimated to be 45-50%. Abnormal (paradoxical) septal motion is present. Definity contrast agent administered to better visualize endocardial border   and wall motion. Normal diastolic filling pattern for age. Avg E/e' estimated to be 13.8. The right ventricle is mildly enlarged with reduced function. The left atrium is mildly dilated.    Mild tricuspid regurgitation with an RVSP estimated to be 29 mmHg. Assessment:    1. Chronic systolic heart failure, off meds due to hypotension  2. CAD  3. Severe anemia  4. Severe thrombocytopenia  5. Myelodysplastic syndrome  6. Pulmonary vascular congestion  7. Hypotension  8. Acute renal failure        Plan:  Continue Lasix 20 mg IV daily  No BB as she needs a higher heart rate with degree of hypoxia/fluid overload  Nephrology and oncology following  If she required intubation, she most likely would not be able to be extubated    Prognosis is poor. Family to meet with palliative care today    NYHA IV    Discussed with patient who is agreeable with plan of care. Thank you for allowing me to participate in the care of your patient.     Kacie Nogueira, APRN - CNS, CNS

## 2022-10-18 NOTE — PROGRESS NOTES
RN offered to place BIPAP for patient and patient refused. Family at bedside stated that the Bipap felt like it was choking the patient.

## 2022-10-18 NOTE — PROGRESS NOTES
P Pulmonary and Critical Care    Follow Up Note    Subjective:   CHIEF COMPLAINT / HPI:   Chief Complaint   Patient presents with    Chest Pain     Pt transported from [facility] from [facility]. PT c/o chest pain radiating to left shoulder. Symptoms started 2 days ago. Pt stated she was seen in [facility] and was inpatient for 2 weeks for left shoulder complications. Interval history: Patient with recent diagnosis of myelodysplastic syndrome in September 2022Had developed hypotension and hypoxemia requiring transfer to ICU. She does remain alert. However, she tires easily. She has been using 2 L/min nasal cannula oxygen supplement. She started having some nosebleeds and has transferred the oxygen to her mouth. Past Medical History:    Reviewed; no changes    Social History:    Reviewed; no changes    REVIEW OF SYSTEMS:    CONSTITUTIONAL:  negative for fevers and chills  RESPIRATORY:  See HPI  CARDIOVASCULAR:  negative for chest pain, palpitations, edema  GASTROINTESTINAL:  negative for nausea, vomiting, diarrhea, constipation and abdominal pain    Objective:   PHYSICAL EXAM:        VITALS:  BP (!) 119/55   Pulse 91   Temp 98.2 °F (36.8 °C) (Temporal)   Resp 24   Ht 5' (1.524 m)   Wt 246 lb 14.6 oz (112 kg)   LMP  (LMP Unknown)   SpO2 91%   BMI 48.22 kg/m²  on 2L NC    24HR INTAKE/OUTPUT:    Intake/Output Summary (Last 24 hours) at 10/18/2022 1531  Last data filed at 10/18/2022 1110  Gross per 24 hour   Intake 1742.26 ml   Output 960 ml   Net 782.26 ml       CONSTITUTIONAL:  awake, alert,  no apparent distress, and appears stated age  LUNGS:  No increased work of breathing and clear to auscultation, no crackles or wheezes  CARDIOVASCULAR: S1 and S2 and no JVD  ABDOMEN:  normal bowel sounds, non-distended and non-tender to palpation  EXT: No edema, no calf tenderness. Pulses are present bilaterally.   NEUROLOGIC:  Mental Status Exam:  Level of Alertness:   awake  Orientation:   person, place, time. Non focal  SKIN:  normal skin color, texture, turgor, no redness, warmth, or swelling at IV sites    DATA:    CBC:  Recent Labs     10/16/22  0600 10/17/22  0546 10/18/22  0437   WBC 11.9* 20.1* 17.8*   RBC 2.25* 2.17* 2.23*   HGB 7.1* 6.7* 7.0*   HCT 21.4* 20.8* 21.3*   PLT 31* 25* 70*   MCV 95.2 96.0 95.6   MCH 31.5 31.0 31.2   MCHC 33.1 32.3 32.7   RDW 22.1* 22.5* 21.6*      BMP:  Recent Labs     10/16/22  0600 10/17/22  0546 10/18/22  0437    138 141   K 3.7 5.0 4.4    104 107   CO2 23 20* 19*   BUN 33* 42* 70*   CREATININE 1.2 1.9* 1.8*   CALCIUM 8.6 7.6* 8.4   GLUCOSE 131* 241* 490*      ABG:  Recent Labs     10/16/22  1305   PHART 7.409   MMJ2YPT 37.1   PO2ART 93.5   MBG6SRO 23.5   X1KLOAWT 98.3   BEART -1.1     Procalcitonin  Recent Labs     10/16/22  1220   PROCAL 2.20*           Radiology Review:  Pertinent images / reports were reviewed as a part of this visit. Assessment:     Acute hypoxemic respiratory failure  Multifocal pneumonia  ARUN  HFrEF  Myelodysplastic syndrome    Plan:     Patient does have chest x-ray from 10/16 and my interpretation is mild CHF with more focal haziness at the left lung base which could be reflective of pneumonia. Procalcitonin is elevated at 2.2. She also has a leukocytosis. There is no guiding culture data. She is on cefepime and Flagyl over concern for possible aspiration pneumonia. She does have newly diagnosed myelodysplastic syndrome and has required transfusion of PRBC and platelets. Hematology oncology is following. Her prognosis is poor. She does have acute on chronic kidney disease. Nephrology is following. Creatinine seems to have leveled out around 1.8. Family was at the bedside during my visit. They are meeting with palliative care later today to decide goals of therapy.   They are asking about antianxiety medication and this would be reasonable especially if we are considering comfort care to be her primary goal    Discussed with

## 2022-10-18 NOTE — PROGRESS NOTES
Bedside handoff report received and patient assessment completed. Pt alert, oriented x4. Anxious and diaphoretic. C/O burning epigastric pain and 10/10 pain to bilateral hands and feet- MD notified, tylenol given as ordered. 1x dose trazadone ordered per family request for sleeping aid. Meds given crushed in AS and tolerated well. Plt transfusion completed. Additional IV access obtained for ATB's. Low grade fever 99.7 per temp sensing marin. SR/ST per monitor. Respirations tachypneic, labored and diaphragmatic. Lung sounds with coarse crackles bilaterally. SaO2 maintained on 2 lpm NC. Scabbed areas noted to back of throat, clots noted in bilateral nares. Multiple family members at bedside interactive in care, concerns voiced and addressed. POC discussed in length and all questions addressed. Will cont to monitor.

## 2022-10-18 NOTE — PROGRESS NOTES
Physical/Occupational Therapy  Wally Raulito Prashanth  PT/OT treatment attempted. Prior to approach, discussion with RN, Kayley Heaton, regarding pt's appropriateness for therapy services. RN requesting therapy HOLD at this time, pending consult with family and palliative care regarding decisions for POC. RN reports family feeling overwhelmed with pt's current medical issues and reports may benefit from allowing pt to rest.  PT/OT will follow-up with pt as schedule allows. Thank you,  Moody Rainey, PT, DPT, [id number]  [facility].  [address] [place] [address]

## 2022-10-18 NOTE — PROGRESS NOTES
Palliative Care:     Spoke with all pt's children, several of the children's spouses, and a few grandchildren in [place] [id number] conference room for approx 100 minutes. Discussed pt's current status, POC, and prognosis. Discussed potential interventions that may be needed based on pt's current diagnoses and opinions from Mds - intubation, cardiac/respiratory resuscitation, etc.     Most family on board with comfort measures and taking pt home with family. Some family remain hopeful of improvement for pt to regain strength and ability to eat and participate in family conversations if nothing else. Discussed that pt has had decline in several systems and that getting one system in balance is often triggering another system to fail. Discussed pt's discharge options - everything from home with hospice (which doesn't provide 24/7 bedside care) to back to a facility (which does provide continuous care but not the care and compassion the pt deserves). Discussed pt's code status - FULL - along with options and implications of those choices. Family agreeable to pursue aggressive interventions up until the point of cardiac or respiratory arrest - DNR-CCA. Family on the fence about intubation - most understand that, if intubated, pt would not likely be able to be extubated (thus not return home, even with hospice). At this time, pt may be intubated for airway protection if necessary, though attempts to reach any next of kin for medical decision making should be made and may result in a change of that intervention at that time. RN, MD informed. Orders updated. Will follow up and support patient/family as time allows or circumstances dictate. Please reach out via [phone], [email or web address], or email [email or web address] if pt condition changes significantly or if family requests support. Thank you.     Electronically signed by Prosper Aanya RN, BSN on 10/18/2022 at 5:22 PM

## 2022-10-18 NOTE — CONSULTS
[address]   Electrophysiology Consultation   Date: 10/18/2022  Reason for Consultation: Atrial fibrillation   Consult Requesting Physician: Megan Abernathy MD   Chief Complaint   Patient presents with    Chest Pain     Pt transported from [facility] from [facility]. PT c/o chest pain radiating to left shoulder. Symptoms started 2 days ago. Pt stated she was seen in [facility] and was inpatient for 2 weeks for left shoulder complications. CC: Chest pain   HPI: Robert Gibbons is a 78 y.o. female with history of CAD, CABG 2002, DM, HTN and PAF on anticoagulation, recent diagnosis of myelodysplastic syndrome in September 2022,  has been admitted with chest pain, and hemoptysis, shortness of breath, respiratory distress, ARUN, aspiration and pulmonary congestion and fluid overload. She was also found to be severely thrombocytopenic, anemic and high WBC of 20K. She has been admitted to CVU. She has developed atrial fibrillation and EP has been consulted. She appears to remains asymptomatic with her atrial fibrillation. She has history of PAF and was on Xarelto which has been held. She was also on metoprolol prior to admission which has been held. She received a dose of IV metoprolol and her heart rate is better controlled. Assessment:   Atrial fibrillation with RVR  RBBB   ARUN  Anemia  Thrombocytopenia  Aspiration pneumonia  Sepsis  Myelodysplastic syndrome  Respiratory failure    Plan:   Beta blocker therapy has been held since admission. Resume Metoprolol  Not a candidate for anticoagulation due to severe anemia and thrombocytopenia  Can use Digoxin short term if rate is not controlled with BB  Discussed amiodarone therapy but family concerned about side effects. Treatment of underlying condition, sepsis, pneumonia, respiratory distress, anemia, etc will help rate control. Lenient heart rate control is acceptable. Wide complex tachycardia is Afib with baseline RBBB. No VT noted. Continue treatment for volume overload. She is not a candidate for any EP intervention. No further recommendations. Heart failure and general cardiology follow up. Discussed with nursing staff. Discussed with family members. Active Hospital Problems    Diagnosis Date Noted    History of coronary artery disease [Z86.79] 10/18/2022     Priority: Medium    Pulmonary vascular congestion [R09.89] 10/18/2022     Priority: Medium    Acute hypoxemic respiratory failure ([address] [J96.01] 10/18/2022     Priority: Medium    Aspiration pneumonia of left lower lobe due to gastric secretions ([address] [J69.0] 10/18/2022     Priority: Medium    Hypervolemia [E87.70] 10/17/2022     Priority: Medium    Myelodysplastic syndrome ([address] [D46.9] 10/17/2022     Priority: Medium    Chronic systolic heart failure ([address] [I50.22] 10/17/2022     Priority: Medium    Arterial hypotension [I95.9] 10/17/2022     Priority: Medium    Shoulder pain [M25.519] 10/10/2022     Priority: Medium    Severe anemia [D64.9] 09/25/2022     Priority: Medium    Severe thrombocytopenia ([address] [D69.6] 09/25/2022     Priority: Medium    Acute renal failure ([address] [N17.9] 02/09/2012       Diagnostic studies:     ECG: Atrial fibrillation with RVR, RBBB, LAFB  Myoview: 8/2020: There is some bowel artifact and breast attenuation that limits study. There is no clear ischemia or scar seen. LV function is normal with EF=58%     Echo: 10/17/2022:   Left ventricular cavity size is dilated with normal left ventricular wall   thickness. Overall left ventricular systolic function appears mildly reduced with an   ejection fraction that is visually estimated to be 45-50%. Abnormal (paradoxical) septal motion is present. Definity contrast agent administered to better visualize endocardial border   and wall motion. Normal diastolic filling pattern for age. Avg E/e' estimated to be 13.8. The right ventricle is mildly enlarged with reduced function.    The left atrium is mildly dilated. Mild tricuspid regurgitation with an RVSP estimated to be 29 mmHg. I independently reviewed the cardiac diagnostic studies, ECG and relevant imaging studies. Lab Results   Component Value Date    LVEF 55 2022     Lab Results   Component Value Date    TSH 0.13 (L) 07/10/2012       Physical Examination:  Vitals:    10/18/22 1800   BP: (!) 96/57   Pulse: (!) 104   Resp: 25   Temp:    SpO2: 96%      In: 562.1 [I.V.:49.8; Blood:362.5]  Out: 1210    Wt Readings from Last 3 Encounters:   10/18/22 246 lb 14.6 oz (112 kg)   10/07/22 241 lb 14.4 oz (109.7 kg)   22 243 lb 9.6 oz (110.5 kg)     Temp  Av.7 °F (37.1 °C)  Min: 98 °F (36.7 °C)  Max: 99.3 °F (37.4 °C)  Pulse  Av.1  Min: 88  Max: 108  BP  Min: 96/57  Max: 145/54  SpO2  Av.8 %  Min: 90 %  Max: 98 %    Intake/Output Summary (Last 24 hours) at 10/18/2022 1934  Last data filed at 10/18/2022 1700  Gross per 24 hour   Intake 562.08 ml   Output 1210 ml   Net -647.92 ml         I independently reviewed all cardiac tracing from cardiac telemetry. Constitutional: Sleepy, Moderate distress. Appears ill   Head: Normocephalic and atraumatic. Mouth/Throat: Oropharynx is dry. Eyes: Conjunctivae pale  Neck: Neck supple. No JVD present. Cardiovascular: Tachycardic rate, Irregular rhythm, S1&S2. Pulmonary/Chest: Decreased respiratory sounds. Abdominal: Soft. No tenderness. Musculoskeletal: No tenderness. +edema    Neurological: Sleepy in bed. Moves all ext.      Scheduled Meds:   cefepime  1,000 mg IntraVENous Q12H    metroNIDAZOLE  500 mg IntraVENous Q8H    furosemide  20 mg IntraVENous Daily    methylPREDNISolone  40 mg IntraVENous Daily    ipratropium-albuterol  1 ampule Inhalation 4x daily    lidocaine  1 patch TransDERmal Daily    metoprolol tartrate  12.5 mg Oral BID    acetaminophen  650 mg Oral TID    atorvastatin  80 mg Oral Nightly    hydrocortisone   Rectal BID    levothyroxine  150 mcg Oral Daily    sodium chloride flush  5-40 mL IntraVENous 2 times per day     Continuous Infusions:   insulin 16.6 Units/hr (10/18/22 1933)    dextrose      sodium chloride      sodium chloride      sodium chloride      sodium chloride 10 mL/hr at 10/18/22 0416    dextrose       PRN Meds:.glucose, dextrose bolus **OR** dextrose bolus, glucagon (rDNA), dextrose, sodium chloride, sodium chloride, ipratropium-albuterol, lidocaine viscous hcl, benzonatate, sodium chloride, sodium chloride flush, sodium chloride, ondansetron **OR** ondansetron, polyethylene glycol, acetaminophen **OR** acetaminophen, dextrose bolus **OR** dextrose bolus, glucagon (rDNA), dextrose     Review of System:    [x] Full ROS obtained and negative except as mentioned in HPI    Prior to Admission medications    Medication Sig Start Date End Date Taking?  Authorizing Provider   naproxen (NAPROSYN) 500 MG tablet Take 1 tablet by mouth 2 times daily (with meals) 10/11/22 11/10/22 Yes Noe Erazo MD   empagliflozin (JARDIANCE) 10 MG tablet Take 1 tablet by mouth daily 10/5/22   Connor Snider MD   insulin glargine [facility]) 100 UNIT/ML injection pen Inject 48 Units into the skin daily 10/4/22   Connor Snider MD   sacubitril-valsartan (ENTRESTO) 24-26 MG per tablet Take 0.5 tablets by mouth 2 times daily 10/4/22   Connor Snider MD   furosemide (LASIX) 20 MG tablet Take 1 tablet by mouth daily 10/5/22   Connor Snider MD   pantoprazole (PROTONIX) 40 MG tablet Take 40 mg by mouth every morning (before breakfast)    Historical Provider, MD   calcium carbonate (OSCAL) 500 MG TABS tablet Take 500 mg by mouth daily    Historical Provider, MD   gabapentin (NEURONTIN) 300 MG capsule Take 1 tablet in am, 2 tablets in pm. 6/20/22   MATEUSZ Rich - CNP   senna (SENOKOT) 8.6 MG tablet Take 1 tablet by mouth 2 times daily    Historical Provider, MD   insulin aspart (NOVOLOG) 100 UNIT/ML injection vial Inject 15 Units into the skin 3 times daily    Historical Provider, MD   magnesium 30 MG tablet Take 40 mg by mouth daily    Historical Provider, MD   tiZANidine (ZANAFLEX) 2 MG tablet Take 2 mg by mouth every 8 hours as needed    Historical Provider, MD   metoprolol tartrate (LOPRESSOR) 25 MG tablet TAKE ONE-HALF TABLET BY MOUTH TWICE A DAY 7/29/20   Lawyer Luz MD   atorvastatin (LIPITOR) 80 MG tablet Take 1 tablet by mouth daily 7/29/20   Lawyer Luz MD   Insulin Pen Needle 32G X 6 MM MISC by Does not apply route 3 times daily with meals    Historical Provider, MD   hydrocortisone (ANUSOL-HC) 2.5 % rectal cream Place rectally 2 times daily Place rectally 2 times daily. Historical Provider, MD   rivaroxaban (XARELTO) 20 MG TABS tablet Take 1 tablet by mouth daily 2/22/17   Lawyer Luz MD   vitamin D (ERGOCALCIFEROL) 91519 UNITS CAPS capsule Take 50,000 Units by mouth once a week    Historical Provider, MD   Omega 3 1000 MG CAPS Take 1,000 mg by mouth daily    Historical Provider, MD   Liraglutide (VICTOZA) 18 MG/3ML SOPN SC injection Inject 1.2 mg into the skin daily    Historical Provider, MD   lidocaine (LIDODERM) 5 % Place 1 patch onto the skin daily 12 hours on, 12 hours off. 7/14/15   Milagros Azar APRN - CNP   oxybutynin (DITROPAN XL) 15 MG CR tablet Take 15 mg by mouth daily. Historical Provider, MD   Loratadine 10 MG CAPS Take by mouth daily     Historical Provider, MD   levothyroxine (SYNTHROID) 175 MCG tablet Take 150 mcg by mouth daily.     Historical Provider, MD       Past Medical History:   Diagnosis Date    4/11/17 Left knee repeat repair of median parapatellar arthrotomy with augmentation 4/12/2017    Arthritis     Aspiration pneumonia of left lower lobe due to gastric secretions ([facility] 10/18/2022    Atrial fibrillation (HCC)     CAD (coronary artery disease)     Cataract     Chronic diastolic congestive heart failure ([facility] 7/27/2022    Diabetes mellitus ([facility]     GERD (gastroesophageal reflux disease) Hyperlipidemia     Hypertension     HYPOTHRYROIDISM     Myelodysplastic disease ([facility] [address] 10/17/2022    Non-English speaking patient     SPEAKS Greenlandic. SHE SPEAKS A LITTLE ENGLISH    Sleep apnea     unspecified    Thyroid disease     UTI         Past Surgical History:   Procedure Laterality Date    CARDIAC SURGERY  2004    CABG X 4 VESSELS    CHOLECYSTECTOMY, LAPAROSCOPIC  5/15/14    with IOC    CT BONE MARROW BIOPSY  9/29/2022    CT BONE MARROW BIOPSY 9/29/2022 [facility] CT SCAN    JOINT REPLACEMENT Bilateral 2000 AND 1996 And 1998    knee    KNEE SURGERY Left 02/28/2017    left knee poly exchange revision     REVISION TOTAL KNEE ARTHROPLASTY Right 11/22/2016    RIGHT TOTAL KNEE REVISION WITH POLY EXCHANGE    TOTAL KNEE ARTHROPLASTY      partical/ left     UPPER GASTROINTESTINAL ENDOSCOPY N/A 10/3/2022    EGD BIOPSY performed by Matias Hernandez MD at [address] [facility] N/A 10/3/2022    EGD DILATION BALLOON performed by Matias Hernandez MD at [address] [facility] ENDOSCOPY       Allergies   Allergen Reactions    Aspirin Other (See Comments)     GI upset    Ibuprofen Other (See Comments)     GI upset    Macrobid [Nitrofurantoin Monohydrate Macrocrystals] Other (See Comments)     COLD, SICK    Adhesive Tape Rash    Lexiscan [Regadenoson] Rash     Happened twice with Lexiscan    Penicillins Rash       Social History:  Reviewed. reports that she has never smoked. She has never used smokeless tobacco. She reports that she does not drink alcohol and does not use drugs. Family History:  Reviewed. Reviewed. No family history of SCD. Relevant and available labs, and cardiovascular diagnostics reviewed. Reviewed.    Recent Labs     10/17/22  0546 10/18/22  0437 10/18/22  1504    141 144   K 5.0 4.4 3.9    107 109   CO2 20* 19* 20*   BUN 42* 70* 76*   CREATININE 1.9* 1.8* 1.9*     Recent Labs     10/16/22  0600 10/17/22  0546 10/18/22  0437   WBC 11.9* 20.1* 17.8*   HGB 7.1* 6.7* 7.0*   HCT 21.4* 20.8* 21.3*   MCV 95.2 96.0 95.6   PLT 31* 25* 70*     Estimated Creatinine Clearance: 25 mL/min (A) (based on SCr of 1.9 mg/dL (H)). Lab Results   Component Value Date/Time    BNP 26 03/05/2012 04:40 AM       I independently reviewed all cardiac tracing from cardiac telemetry. I independently reviewed relevant and available cardiac diagnostic tests ECG, CXR, Echo, Stress test, Device interrogation, Holter, CT scan. Outside medical records via Care everywhere reviewed and summarized in H&P above. Complex medical condition with multiple medical problems affecting prognosis and outcome of EP interventions  Severe exacerbation of underlying medical condition requiring hospitalization and at risk of decompensation. All questions and concerns were addressed to the patient/family. Alternatives to my treatment were discussed. I have discussed the above stated plan and the patient verbalized understanding and agreed with the plan. NOTE: This report was transcribed using voice recognition software. Every effort was made to ensure accuracy, however, inadvertent computerized transcription errors may be present.      Sterling Zapata MD, MPH  [facility]   Office: [phone]  Fax: [phone]

## 2022-10-18 NOTE — CARE COORDINATION
Per palliative note yesterday tentative plan to meet with pt's children and their spouse's mid day today.     Alberto Simms RN, BSN  [phone]

## 2022-10-18 NOTE — PROGRESS NOTES
Hospitalist Progress Note      PCP: Waleska Burton APRN - CNP    Date of Admission: 10/10/2022    Chief Complaint: Shoulder pain       Subjective: Saw the patient along with critical care this morning. Still has shortness of breath. On 2 L oxygen.       Medications:  Reviewed    Infusion Medications    insulin 16.24 Units/hr (10/18/22 1522)    dextrose      sodium chloride      sodium chloride      sodium chloride      sodium chloride 10 mL/hr at 10/18/22 0416    dextrose       Scheduled Medications    cefepime  1,000 mg IntraVENous Q12H    metroNIDAZOLE  500 mg IntraVENous Q8H    furosemide  20 mg IntraVENous Daily    methylPREDNISolone  40 mg IntraVENous Daily    ipratropium-albuterol  1 ampule Inhalation 4x daily    lidocaine  1 patch TransDERmal Daily    [Held by provider] metoprolol tartrate  12.5 mg Oral BID    [Held by provider] naproxen  500 mg Oral BID WC    [Held by provider] rivaroxaban  15 mg Oral Dinner    acetaminophen  650 mg Oral TID    atorvastatin  80 mg Oral Nightly    hydrocortisone   Rectal BID    levothyroxine  150 mcg Oral Daily    [Held by provider] sacubitril-valsartan  0.5 tablet Oral BID    sodium chloride flush  5-40 mL IntraVENous 2 times per day     PRN Meds: glucose, dextrose bolus **OR** dextrose bolus, glucagon (rDNA), dextrose, sodium chloride, sodium chloride, ipratropium-albuterol, lidocaine viscous hcl, benzonatate, sodium chloride, sodium chloride flush, sodium chloride, ondansetron **OR** ondansetron, polyethylene glycol, acetaminophen **OR** acetaminophen, dextrose bolus **OR** dextrose bolus, glucagon (rDNA), dextrose      Intake/Output Summary (Last 24 hours) at 10/18/2022 1632  Last data filed at 10/18/2022 1110  Gross per 24 hour   Intake 824.58 ml   Output 960 ml   Net -135.42 ml       Physical Exam Performed:    BP (!) 119/55   Pulse 91   Temp 98.2 °F (36.8 °C) (Temporal)   Resp 24   Ht 5' (1.524 m)   Wt 246 lb 14.6 oz (112 kg)   LMP  (LMP Unknown) SpO2 91%   BMI 48.22 kg/m²     General appearance: No apparent distress, appears stated age and cooperative. HEENT: Pupils equal, round, and reactive to light. Conjunctivae/corneas clear. Neck: Supple, with full range of motion. No jugular venous distention. Trachea midline. Respiratory: Tachypneic, rhonchi noted on the right side    Cardiovascular: Tachycardic, without murmurs, rubs or gallops. Abdomen: Soft, non-tender, non-distended with normal bowel sounds. Musculoskeletal: No clubbing, cyanosis or edema bilaterally. Full range of motion without deformity. Skin: Skin color, texture, turgor normal.  No rashes or lesions. Neurologic:  Neurovascularly intact without any focal sensory/motor deficits. Psychiatric: Alert and oriented, thought content appropriate, normal insight  Capillary Refill: Brisk, 3 seconds, normal   Peripheral Pulses: +2 palpable, equal bilaterally       Labs:   Recent Labs     10/16/22  0600 10/17/22  0546 10/18/22  0437   WBC 11.9* 20.1* 17.8*   HGB 7.1* 6.7* 7.0*   HCT 21.4* 20.8* 21.3*   PLT 31* 25* 70*     Recent Labs     10/17/22  0546 10/18/22  0437 10/18/22  1504    141 144   K 5.0 4.4 3.9    107 109   CO2 20* 19* 20*   BUN 42* 70* 76*   CREATININE 1.9* 1.8* 1.9*   CALCIUM 7.6* 8.4 8.6     No results for input(s): AST, ALT, BILIDIR, BILITOT, ALKPHOS in the last 72 hours. No results for input(s): INR in the last 72 hours. No results for input(s): Sukhdeep Tiffanie in the last 72 hours. Urinalysis:      Lab Results   Component Value Date/Time    NITRU Negative 10/17/2022 07:24 PM    WBCUA 19 10/17/2022 07:24 PM    BACTERIA None Seen 10/17/2022 07:24 PM    RBCUA 275 10/17/2022 07:24 PM    BLOODU LARGE 10/17/2022 07:24 PM    SPECGRAV 1.020 10/17/2022 07:24 PM    GLUCOSEU 250 10/17/2022 07:24 PM    GLUCOSEU NEGATIVE 03/09/2012 06:51 AM       Radiology:  XR CHEST PORTABLE   Final Result   Mild cardiomegaly.   Diffuse haziness of the left lung be due to atypical infection or edema. XR CHEST PORTABLE   Final Result   1. Similar-appearing prominence of the cardiac silhouette with associated   vascular congestion. 2. No focal consolidation. CT HEAD WO CONTRAST   Final Result   1. No acute intracranial abnormality. 2. Cerebral and cerebellar parenchymal volume loss with chronic microvascular   white matter ischemic disease. CT CHEST PULMONARY EMBOLISM W CONTRAST   Final Result   No evidence of pulmonary embolism or acute pulmonary abnormality. XR CHEST PORTABLE   Final Result   No radiographic evidence of acute pulmonary disease. Assessment/Plan:    Active Hospital Problems    Diagnosis     History of coronary artery disease [Z86.79]      Priority: Medium    Pulmonary vascular congestion [R09.89]      Priority: Medium    Acute hypoxemic respiratory failure (HCC) [J96.01]      Priority: Medium    Aspiration pneumonia of left lower lobe due to gastric secretions ([facility] [address] [J69.0]      Priority: Medium    Hypervolemia [E87.70]      Priority: Medium    Myelodysplastic syndrome ([address] [D46.9]      Priority: Medium    Chronic systolic heart failure ([facility] [address] [I50.22]      Priority: Medium    Arterial hypotension [I95.9]      Priority: Medium    Shoulder pain [M25.519]      Priority: Medium    Severe anemia [D64.9]      Priority: Medium    Severe thrombocytopenia (HCC) [D69.6]      Priority: Medium    Acute renal failure (HCC) [N17.9]      Acute hypoxic respiratory failure: Secondary to pneumonia and CHF exacerbation. On minimal oxygen. Wean as tolerated. Pneumonia: On antibiotics and steroids. Pulmonology on board. CHF: On IV Lasix. diabetes mellitus/severe hyperglycemia: Secondary to steroids. Start on insulin drip. MDS: Has anemia and thrombocytopenia-s/p PRBC and platelet transfusions. Counts improved. Hematology recommendations noted.     ARUN: Creatinine remained stable at 1.9    Coronary artery disease    Tachycardia:?  A. fib with bundle-branch block. Cardiology notified. Metoprolol ordered. Overall poor prognosis given advanced age and MDS, CHF, pneumonia. Palliative care consulted. Family meeting pending this afternoon.       DVT Prophylaxis: SCDs  Diet: Diet NPO  Code Status: Full Code  PT/OT Eval Status: Not now    Dispo -continue ICU care while awaiting palliative care meeting with family      Jarvis Valladares MD

## 2022-10-18 NOTE — PROGRESS NOTES
[address]   Electrophysiology Nurse Practitioner  Pre-Consult Rounding    Date: 10/18/2022  Date of admission: 10/10/2022 10:51 AM  Reason for Admission: Shoulder pain [M25.519]  Acute chest pain [R07.9]  History of coronary artery disease [Z86.79]  Chronic congestive heart failure, unspecified heart failure type ([facility] [address] [I50.9]  Consult Requesting Physician: Benjamín Mosqueda MD     [address] is a 78 y.o. female presented to hospital with admitted with shoulder pain, chest pain and hemoptysis. She has been hypotensive and given IV fluids and this resulted in respiratory distress. IV lasix given. She was moved to the ICU. Tachypneic and xray with pulmonary vascular congestion. LVEF is 45-50%    Has been treated for acute CHF, anemia, thrombocytopenia, acute respiratory distress, ARUN, and myelodysplastic syndrome. Xarelto was held 10/11 due to low platelets down to 25 yesterday and up to 70 today after 1 pack platelets and 1 unit PRBCs yesterday. Today she developed AF with RVR and she was given a one time does of IV metoprolol with improvement in HR. EP is consulted for AF. Past medical history: hypertension, hyperlipidemia, hypothyroidism, CARLOS, CAD, PNA, and atrial fibrillation. Telemetry: AF with RVR    ECG: AF with RVR, RBBB    ECHO:  10/17/2022  Conclusions    Summary   Technically difficult exam due to body habitus. Left ventricular cavity size is dilated with normal left ventricular wall   thickness. Overall left ventricular systolic function appears mildly reduced with an   ejection fraction that is visually estimated to be 45-50%. Abnormal (paradoxical) septal motion is present. Definity contrast agent administered to better visualize endocardial border   and wall motion. Normal diastolic filling pattern for age. Avg E/e' estimated to be 13.8. The right ventricle is mildly enlarged with reduced function. The left atrium is mildly dilated.    Mild tricuspid regurgitation with an RVSP estimated to be 29 mmHg. Stress Test: 8/12/2020   Resting ECG    Normal sinus rhythm. Frequent premature atrial contractions. Right bundle branch block. Nonspecific ST-T wave abnormalities. Lab Results   Component Value Date    LVEF 55 06/17/2022     Lab Results   Component Value Date    TSH 0.13 (L) 07/10/2012       Plan:   PAF with RVR   - Hx of atrial fibrillation   - On Xarelto PTA, however she is not a candidate due to thrombocytopenia and myelodysplastic syndrome at this time. Stop Xarelto, has been held since 10/11/2022   - Likely secondary to acute respiratory illness  Acute HF   - See HF note   - On IV lasix  Acute respiratory Failure   - On oxygen   - drowsy today   - Congested  ARUN   - Nephrology following    - Dr. Evon Stoddard at bedside and has seen and evaluated patient and discussed EP conditions in detail  - Resume low-dose metoprolol  - Digoxin can be used if rates cannot be controlled or BP low    Will discuss the plan for EP attending. Full consult note to follow.

## 2022-10-19 ENCOUNTER — APPOINTMENT (OUTPATIENT)
Dept: GENERAL RADIOLOGY | Age: 82
DRG: 177 | End: 2022-10-19
Payer: MEDICARE

## 2022-10-19 LAB
ANION GAP SERPL CALCULATED.3IONS-SCNC: 12 MMOL/L (ref 3–16)
BUN BLDV-MCNC: 80 MG/DL (ref 7–20)
CALCIUM SERPL-MCNC: 8.7 MG/DL (ref 8.3–10.6)
CHLORIDE BLD-SCNC: 115 MMOL/L (ref 99–110)
CO2: 23 MMOL/L (ref 21–32)
CREAT SERPL-MCNC: 1.7 MG/DL (ref 0.6–1.2)
EKG ATRIAL RATE: 117 BPM
EKG DIAGNOSIS: NORMAL
EKG Q-T INTERVAL: 346 MS
EKG QRS DURATION: 166 MS
EKG QTC CALCULATION (BAZETT): 495 MS
EKG R AXIS: 262 DEGREES
EKG T AXIS: 50 DEGREES
EKG VENTRICULAR RATE: 123 BPM
ESTIMATED AVERAGE GLUCOSE: 168.6 MG/DL
GFR SERPL CREATININE-BSD FRML MDRD: 29 ML/MIN/{1.73_M2}
GLUCOSE BLD-MCNC: 118 MG/DL (ref 70–99)
GLUCOSE BLD-MCNC: 121 MG/DL (ref 70–99)
GLUCOSE BLD-MCNC: 133 MG/DL (ref 70–99)
GLUCOSE BLD-MCNC: 135 MG/DL (ref 70–99)
GLUCOSE BLD-MCNC: 139 MG/DL (ref 70–99)
GLUCOSE BLD-MCNC: 150 MG/DL (ref 70–99)
GLUCOSE BLD-MCNC: 161 MG/DL (ref 70–99)
GLUCOSE BLD-MCNC: 167 MG/DL (ref 70–99)
GLUCOSE BLD-MCNC: 178 MG/DL (ref 70–99)
GLUCOSE BLD-MCNC: 254 MG/DL (ref 70–99)
GLUCOSE BLD-MCNC: 448 MG/DL (ref 70–99)
GLUCOSE BLD-MCNC: 469 MG/DL (ref 70–99)
GLUCOSE BLD-MCNC: 472 MG/DL (ref 70–99)
GLUCOSE BLD-MCNC: 481 MG/DL (ref 70–99)
HBA1C MFR BLD: 7.5 %
HCT VFR BLD CALC: 22.6 % (ref 36–48)
HEMOGLOBIN: 7.4 G/DL (ref 12–16)
MCH RBC QN AUTO: 30.8 PG (ref 26–34)
MCHC RBC AUTO-ENTMCNC: 32.6 G/DL (ref 31–36)
MCV RBC AUTO: 94.4 FL (ref 80–100)
PDW BLD-RTO: 21 % (ref 12.4–15.4)
PERFORMED ON: ABNORMAL
PLATELET # BLD: 43 K/UL (ref 135–450)
PMV BLD AUTO: 10.5 FL (ref 5–10.5)
POTASSIUM SERPL-SCNC: 4.6 MMOL/L (ref 3.5–5.1)
RBC # BLD: 2.4 M/UL (ref 4–5.2)
SODIUM BLD-SCNC: 148 MMOL/L (ref 136–145)
SODIUM BLD-SCNC: 150 MMOL/L (ref 136–145)
WBC # BLD: 13.8 K/UL (ref 4–11)

## 2022-10-19 PROCEDURE — 6370000000 HC RX 637 (ALT 250 FOR IP): Performed by: INTERNAL MEDICINE

## 2022-10-19 PROCEDURE — 71045 X-RAY EXAM CHEST 1 VIEW: CPT

## 2022-10-19 PROCEDURE — 6370000000 HC RX 637 (ALT 250 FOR IP): Performed by: STUDENT IN AN ORGANIZED HEALTH CARE EDUCATION/TRAINING PROGRAM

## 2022-10-19 PROCEDURE — 99233 SBSQ HOSP IP/OBS HIGH 50: CPT | Performed by: INTERNAL MEDICINE

## 2022-10-19 PROCEDURE — 6370000000 HC RX 637 (ALT 250 FOR IP): Performed by: NURSE PRACTITIONER

## 2022-10-19 PROCEDURE — 2500000003 HC RX 250 WO HCPCS: Performed by: INTERNAL MEDICINE

## 2022-10-19 PROCEDURE — 2580000003 HC RX 258: Performed by: INTERNAL MEDICINE

## 2022-10-19 PROCEDURE — 99233 SBSQ HOSP IP/OBS HIGH 50: CPT | Performed by: NURSE PRACTITIONER

## 2022-10-19 PROCEDURE — 2000000000 HC ICU R&B

## 2022-10-19 PROCEDURE — 80048 BASIC METABOLIC PNL TOTAL CA: CPT

## 2022-10-19 PROCEDURE — 6360000002 HC RX W HCPCS: Performed by: INTERNAL MEDICINE

## 2022-10-19 PROCEDURE — 2700000000 HC OXYGEN THERAPY PER DAY

## 2022-10-19 PROCEDURE — 94761 N-INVAS EAR/PLS OXIMETRY MLT: CPT

## 2022-10-19 PROCEDURE — 85027 COMPLETE CBC AUTOMATED: CPT

## 2022-10-19 PROCEDURE — 92526 ORAL FUNCTION THERAPY: CPT

## 2022-10-19 PROCEDURE — 93010 ELECTROCARDIOGRAM REPORT: CPT | Performed by: INTERNAL MEDICINE

## 2022-10-19 PROCEDURE — 84295 ASSAY OF SERUM SODIUM: CPT

## 2022-10-19 PROCEDURE — 94640 AIRWAY INHALATION TREATMENT: CPT

## 2022-10-19 RX ORDER — INSULIN LISPRO 100 [IU]/ML
0-4 INJECTION, SOLUTION INTRAVENOUS; SUBCUTANEOUS
Status: DISCONTINUED | OUTPATIENT
Start: 2022-10-19 | End: 2022-10-20

## 2022-10-19 RX ORDER — INSULIN GLARGINE 100 [IU]/ML
15 INJECTION, SOLUTION SUBCUTANEOUS 2 TIMES DAILY
Status: DISCONTINUED | OUTPATIENT
Start: 2022-10-19 | End: 2022-10-20

## 2022-10-19 RX ORDER — DIGOXIN 125 MCG
125 TABLET ORAL DAILY
Status: DISCONTINUED | OUTPATIENT
Start: 2022-10-19 | End: 2022-10-26 | Stop reason: HOSPADM

## 2022-10-19 RX ORDER — DIPHENHYDRAMINE HYDROCHLORIDE 50 MG/ML
50 INJECTION INTRAMUSCULAR; INTRAVENOUS ONCE
Status: COMPLETED | OUTPATIENT
Start: 2022-10-19 | End: 2022-10-19

## 2022-10-19 RX ORDER — INSULIN GLARGINE 100 [IU]/ML
10 INJECTION, SOLUTION SUBCUTANEOUS DAILY
Status: DISCONTINUED | OUTPATIENT
Start: 2022-10-19 | End: 2022-10-19

## 2022-10-19 RX ORDER — INSULIN LISPRO 100 [IU]/ML
0-4 INJECTION, SOLUTION INTRAVENOUS; SUBCUTANEOUS NIGHTLY
Status: DISCONTINUED | OUTPATIENT
Start: 2022-10-19 | End: 2022-10-20

## 2022-10-19 RX ADMIN — ACETAMINOPHEN 650 MG: 325 TABLET ORAL at 16:29

## 2022-10-19 RX ADMIN — INSULIN GLARGINE 10 UNITS: 100 INJECTION, SOLUTION SUBCUTANEOUS at 12:10

## 2022-10-19 RX ADMIN — METRONIDAZOLE 500 MG: 500 INJECTION, SOLUTION INTRAVENOUS at 06:13

## 2022-10-19 RX ADMIN — INSULIN LISPRO 4 UNITS: 100 INJECTION, SOLUTION INTRAVENOUS; SUBCUTANEOUS at 20:26

## 2022-10-19 RX ADMIN — ATORVASTATIN CALCIUM 80 MG: 80 TABLET, FILM COATED ORAL at 20:09

## 2022-10-19 RX ADMIN — CEFEPIME 1000 MG: 1 INJECTION, POWDER, FOR SOLUTION INTRAMUSCULAR; INTRAVENOUS at 13:12

## 2022-10-19 RX ADMIN — SODIUM CHLORIDE: 9 INJECTION, SOLUTION INTRAVENOUS at 13:06

## 2022-10-19 RX ADMIN — METHYLPREDNISOLONE SODIUM SUCCINATE 40 MG: 40 INJECTION, POWDER, FOR SOLUTION INTRAMUSCULAR; INTRAVENOUS at 09:19

## 2022-10-19 RX ADMIN — IPRATROPIUM BROMIDE AND ALBUTEROL SULFATE 1 AMPULE: 2.5; .5 SOLUTION RESPIRATORY (INHALATION) at 19:46

## 2022-10-19 RX ADMIN — Medication 10 ML: at 09:19

## 2022-10-19 RX ADMIN — HYDROCORTISONE: 25 CREAM TOPICAL at 13:12

## 2022-10-19 RX ADMIN — CEFEPIME 1000 MG: 1 INJECTION, POWDER, FOR SOLUTION INTRAMUSCULAR; INTRAVENOUS at 23:59

## 2022-10-19 RX ADMIN — METOPROLOL TARTRATE 12.5 MG: 25 TABLET, FILM COATED ORAL at 20:08

## 2022-10-19 RX ADMIN — ACETAMINOPHEN 650 MG: 325 TABLET ORAL at 20:09

## 2022-10-19 RX ADMIN — METRONIDAZOLE 500 MG: 500 INJECTION, SOLUTION INTRAVENOUS at 22:00

## 2022-10-19 RX ADMIN — METRONIDAZOLE 500 MG: 500 INJECTION, SOLUTION INTRAVENOUS at 16:27

## 2022-10-19 RX ADMIN — INSULIN LISPRO 2 UNITS: 100 INJECTION, SOLUTION INTRAVENOUS; SUBCUTANEOUS at 12:09

## 2022-10-19 RX ADMIN — ACETAMINOPHEN 650 MG: 325 TABLET ORAL at 10:03

## 2022-10-19 RX ADMIN — INSULIN HUMAN 10 UNITS: 100 INJECTION, SOLUTION PARENTERAL at 21:54

## 2022-10-19 RX ADMIN — METOPROLOL TARTRATE 12.5 MG: 25 TABLET, FILM COATED ORAL at 10:03

## 2022-10-19 RX ADMIN — MELATONIN TAB 3 MG 6 MG: 3 TAB at 20:09

## 2022-10-19 RX ADMIN — DIPHENHYDRAMINE HYDROCHLORIDE 50 MG: 50 INJECTION, SOLUTION INTRAMUSCULAR; INTRAVENOUS at 23:51

## 2022-10-19 RX ADMIN — IPRATROPIUM BROMIDE AND ALBUTEROL SULFATE 1 AMPULE: 2.5; .5 SOLUTION RESPIRATORY (INHALATION) at 08:13

## 2022-10-19 RX ADMIN — LEVOTHYROXINE SODIUM 150 MCG: 150 TABLET ORAL at 10:03

## 2022-10-19 RX ADMIN — DIGOXIN 125 MCG: 125 TABLET ORAL at 12:10

## 2022-10-19 RX ADMIN — IPRATROPIUM BROMIDE AND ALBUTEROL SULFATE 1 AMPULE: 2.5; .5 SOLUTION RESPIRATORY (INHALATION) at 15:36

## 2022-10-19 RX ADMIN — INSULIN LISPRO 4 UNITS: 100 INJECTION, SOLUTION INTRAVENOUS; SUBCUTANEOUS at 17:39

## 2022-10-19 RX ADMIN — HYDROCORTISONE: 25 CREAM TOPICAL at 21:53

## 2022-10-19 RX ADMIN — INSULIN GLARGINE 15 UNITS: 100 INJECTION, SOLUTION SUBCUTANEOUS at 20:26

## 2022-10-19 ASSESSMENT — PAIN SCALES - GENERAL
PAINLEVEL_OUTOF10: 0
PAINLEVEL_OUTOF10: 0
PAINLEVEL_OUTOF10: 6
PAINLEVEL_OUTOF10: 6
PAINLEVEL_OUTOF10: 3
PAINLEVEL_OUTOF10: 0

## 2022-10-19 ASSESSMENT — PAIN DESCRIPTION - ORIENTATION
ORIENTATION: RIGHT
ORIENTATION: RIGHT

## 2022-10-19 ASSESSMENT — PAIN DESCRIPTION - LOCATION: LOCATION: SHOULDER

## 2022-10-19 ASSESSMENT — PAIN DESCRIPTION - DESCRIPTORS
DESCRIPTORS: ACHING
DESCRIPTORS: ACHING

## 2022-10-19 ASSESSMENT — PAIN DESCRIPTION - FREQUENCY: FREQUENCY: CONTINUOUS

## 2022-10-19 ASSESSMENT — PAIN DESCRIPTION - ONSET: ONSET: ON-GOING

## 2022-10-19 ASSESSMENT — PAIN - FUNCTIONAL ASSESSMENT: PAIN_FUNCTIONAL_ASSESSMENT: PREVENTS OR INTERFERES SOME ACTIVE ACTIVITIES AND ADLS

## 2022-10-19 ASSESSMENT — PAIN DESCRIPTION - PAIN TYPE: TYPE: NEUROPATHIC PAIN;CHRONIC PAIN

## 2022-10-19 NOTE — PROGRESS NOTES
MD Jeffrey Lim MD Maryland Rink, MD                  Office: [phone]                      Fax: [phone]             [address] [place] [address]                   NEPHROLOGY INPATIENT PROGRESS NOTE:     PATIENT NAME: Lam Hoffman  : 1940  MRN: [id number]      RECOMMENDATIONS:   -Stop lasix  , as severe hypernatremia, dehydration , higher free water deficits  -Recheck Na -since its acute, should be okay for acute reversal  -avoid aggressive diuretics,     - hold RAAS blockade, due to hypotension and ARUN  -Goal hemoglobin above 7, will help with hypotension  - Work-up for infection with leukocytosis and hypotension  -  marin inserted  -Close follow-up with hematology, cardiology,  - no need for dialysis, at higher risk for decompensation, needing closer monitoring. D/C plan from renal stand point:  -Unclear with acute illness currently and expect prolonged hospitalization  -Family is discussing with palliative team    D/W patient, her daughter - who translated, team CVU nurse also       IMPRESSION:       Admitted on:  10/10/2022 10:51 AM   For:  Shoulder pain [M25.519]  Acute chest pain [R07.9]  History of coronary artery disease [Z86.79]  Chronic congestive heart failure, unspecified heart failure type ([facility] [I50.9]           ARUN (on no h/o CKD:):   - BL Scr-0.8 as of 1 admission on 10--> peaked at 1.9 within 48 hours during admission  - Etiology of ARUN -presumed ATN in the setting of hemoglobin 6.7, hypotension, concern for infection with leukocytosis + CT angio on 10-,   -With clinical multiple other culprits for ARUN I do not suspect any other etiology so no need for serology work-up for now  - UA : results reviewed: 250 glucose, trace ketones, high specific gravity large blood, 100 protein, trace leukocytes, few hyaline cast, with RBCs almost 200, with WBCs.   - Renal imaging: none recently   -In 48 hours no improvement with culprit for ARUN, and then cath kidney imaging      Associated problems:  : HTN : no need for tight control   : Na: Severe hypernatremia, dehydration , higher free water deficits    - Azotemia: pre-renal  - Electrolytes: K: WNL  - Acid-Base: acidosis -  non-AGMA  - Anemia: Likely due to myelodysplastic syndrome    Other major problems: Management per primary and other consulting teams. Morbid obesity  Obstructive sleep apnea on CPAP  Coronary artery disease, history of CABG    Diagnosis of myelodysplastic syndrome after bone marrow biopsy in September 2022    Chronic systolic heart failure-EF around 45-50%    Hospital Problems             Last Modified POA    * (Principal) Acute hypoxemic respiratory failure ([address] 10/19/2022 Yes    Severe thrombocytopenia ([address] 10/18/2022 Yes    Severe anemia 10/17/2022 Yes    Shoulder pain 10/19/2022 Yes    Hypervolemia 10/17/2022 Yes    Myelodysplastic syndrome ([address] 10/18/2022 Yes    Chronic systolic heart failure ([address] 10/18/2022 Yes    Arterial hypotension 10/17/2022 Yes    History of coronary artery disease 10/18/2022 Yes    Pulmonary vascular congestion 10/18/2022 Yes    Aspiration pneumonia of left lower lobe due to gastric secretions ([address] 10/18/2022 Yes    Acute renal failure ([address] 10/17/2022 Yes    Acute chest pain 10/18/2022 Yes    Type 2 diabetes mellitus ([address] 10/19/2022 Yes         : other supportive care :   - Check daily renal function panel with electrolytes-phosphorus  - Strict monitoring of I/Os, daily weight  - Renal feeds/diet  - Current medications reviewed. - Nephrotoxic medications have been discontinued. - Dose adjusted and appropriate. - Dose meds for eGFR <15 mL/min/1.73m2 during ARUN    - Avoid heavy opioids due to renal failure - may use very low dose dilaudid / fentanyl with close monitoring of CNS and respiratory depression. Please refer to the orders. High Complexity. Multiple complex problems.   Discussed with patient 's  treatment team-   Thank you for allowing me to participate in this patient's care. Please do not hesitate to contact me anytime. We will follow along with you. Capo Kathleen MD,  [facility] [address] [place]: [phone] or [address]  Fax: [phone]        =======================================================================================   =======================================================================================  Subjective / interval history:   Patient was seen comfortably sitting up  in the bed ,   Reported no  active complaints,   Renal function stable  Na worsening  While on IV lasix 20 QD. Off of NC to RA       Past medical, Surgical, Social, Family medical history reviewed by me. MEDICATIONS: reviewed by me. Medications Prior to Admission:  No current facility-administered medications on file prior to encounter.      Current Outpatient Medications on File Prior to Encounter   Medication Sig Dispense Refill    empagliflozin (JARDIANCE) 10 MG tablet Take 1 tablet by mouth daily 30 tablet 3    insulin glargine (BASAGLAR KWIKPEN) 100 UNIT/ML injection pen Inject 48 Units into the skin daily 5 Adjustable Dose Pre-filled Pen Syringe 3    sacubitril-valsartan (ENTRESTO) 24-26 MG per tablet Take 0.5 tablets by mouth 2 times daily 60 tablet 1    furosemide (LASIX) 20 MG tablet Take 1 tablet by mouth daily 60 tablet 3    pantoprazole (PROTONIX) 40 MG tablet Take 40 mg by mouth every morning (before breakfast)      calcium carbonate (OSCAL) 500 MG TABS tablet Take 500 mg by mouth daily      gabapentin (NEURONTIN) 300 MG capsule Take 1 tablet in am, 2 tablets in pm. 90 capsule 3    senna (SENOKOT) 8.6 MG tablet Take 1 tablet by mouth 2 times daily      insulin aspart (NOVOLOG) 100 UNIT/ML injection vial Inject 15 Units into the skin 3 times daily      magnesium 30 MG tablet Take 40 mg by mouth daily      tiZANidine (ZANAFLEX) 2 MG tablet Take 2 mg by mouth every 8 hours as needed      metoprolol tartrate (LOPRESSOR) 25 MG tablet TAKE ONE-HALF TABLET BY MOUTH TWICE A DAY 90 tablet 4    atorvastatin (LIPITOR) 80 MG tablet Take 1 tablet by mouth daily 90 tablet 4    Insulin Pen Needle 32G X 6 MM MISC by Does not apply route 3 times daily with meals      hydrocortisone (ANUSOL-HC) 2.5 % rectal cream Place rectally 2 times daily Place rectally 2 times daily. rivaroxaban (XARELTO) 20 MG TABS tablet Take 1 tablet by mouth daily 90 tablet 3    vitamin D (ERGOCALCIFEROL) 03167 UNITS CAPS capsule Take 50,000 Units by mouth once a week      Omega 3 1000 MG CAPS Take 1,000 mg by mouth daily      Liraglutide (VICTOZA) 18 MG/3ML SOPN SC injection Inject 1.2 mg into the skin daily      lidocaine (LIDODERM) 5 % Place 1 patch onto the skin daily 12 hours on, 12 hours off. 30 patch 0    oxybutynin (DITROPAN XL) 15 MG CR tablet Take 15 mg by mouth daily. Loratadine 10 MG CAPS Take by mouth daily       levothyroxine (SYNTHROID) 175 MCG tablet Take 150 mcg by mouth daily.            Current Facility-Administered Medications:     digoxin (LANOXIN) tablet 125 mcg, 125 mcg, Oral, Daily, Brookings Sowmya, APRN - CNP    insulin glargine (LANTUS) injection vial 10 Units, 10 Units, SubCUTAneous, Daily, Christiano Padron DO    insulin lispro (HUMALOG) injection vial 0-4 Units, 0-4 Units, SubCUTAneous, TID WC, Christiano Padron DO    insulin lispro (HUMALOG) injection vial 0-4 Units, 0-4 Units, SubCUTAneous, Nightly, Christiano Padron DO    insulin regular (HUMULIN R;NOVOLIN R) 100 Units in sodium chloride 0.9 % 100 mL infusion, 1-50 Units/hr, IntraVENous, Continuous, Montrell Bradford MD, Last Rate: 2.4 mL/hr at 10/19/22 0724, 2.4 Units/hr at 10/19/22 0724    glucose-vitamin C chewable tablet 4 tablet, 4 tablet, Oral, PRN, Montrell Bradford MD    dextrose bolus 10% 125 mL, 125 mL, IntraVENous, PRN **OR** dextrose bolus 10% 250 mL, 250 mL, IntraVENous, PRN, Montrell Bradford MD    glucagon (rDNA) injection 1 mg, 1 mg, SubCUTAneous, PRN, Vahid Grider MD    dextrose 10 % infusion, , IntraVENous, Continuous PRN, Vahid Grider MD    melatonin tablet 6 mg, 6 mg, Oral, Nightly PRN, Chelle Mccracken DO, 6 mg at 10/18/22 2038    cefepime (MAXIPIME) 1000 mg IVPB minibag, 1,000 mg, IntraVENous, Q12H, Brooklyn Siddiqui MD, Stopped at 10/19/22 0005    0.9 % sodium chloride infusion, , IntraVENous, PRN, Brooklyn Siddiqui MD    metronidazole (FLAGYL) 500 mg in 0.9% NaCl 100 mL IVPB premix, 500 mg, IntraVENous, Q8H, Holger Street MD, Last Rate: 100 mL/hr at 10/19/22 0613, 500 mg at 10/19/22 0613    0.9 % sodium chloride infusion, , IntraVENous, PRN, Graham Gowers, APRN - CNP    methylPREDNISolone sodium (SOLU-MEDROL) injection 40 mg, 40 mg, IntraVENous, Daily, Ha Friend MD, 40 mg at 10/19/22 0919    ipratropium-albuterol (DUONEB) nebulizer solution 1 ampule, 1 ampule, Inhalation, Q4H PRN, Kj Meza MD, 1 ampule at 10/17/22 0229    lidocaine viscous hcl (XYLOCAINE) 2 % solution 15 mL, 15 mL, Mouth/Throat, Q3H PRN, Ha Friend MD, 15 mL at 10/17/22 0150    ipratropium-albuterol (DUONEB) nebulizer solution 1 ampule, 1 ampule, Inhalation, 4x daily, Brooklyn Siddiqui MD, 1 ampule at 10/19/22 0813    lidocaine 4 % external patch 1 patch, 1 patch, TransDERmal, Daily, Brooklyn Siddiqui MD, 1 patch at 10/19/22 0920    benzonatate (TESSALON) capsule 100 mg, 100 mg, Oral, TID PRN, Skyler Geller APRN - CNP, 100 mg at 10/16/22 0610    metoprolol tartrate (LOPRESSOR) tablet 12.5 mg, 12.5 mg, Oral, BID, Aaron Babinski, MD, 12.5 mg at 10/19/22 1003    0.9 % sodium chloride infusion, , IntraVENous, PRN, Yessy Barrios MD    acetaminophen (TYLENOL) tablet 650 mg, 650 mg, Oral, TID, Luis Gaviria, APRN - CNP, 650 mg at 10/19/22 1003    atorvastatin (LIPITOR) tablet 80 mg, 80 mg, Oral, Nightly, Aaron Babinski, MD, 80 mg at 10/18/22 2038    hydrocortisone (ANUSOL-HC) 2.5 % rectal cream, , Rectal, BID, Aaron Babinski, MD, Given at 10/18/22 2040    levothyroxine (SYNTHROID) tablet 150 mcg, 150 mcg, Oral, Daily, Oniel Hurd MD, 150 mcg at 10/19/22 1003    sodium chloride flush 0.9 % injection 5-40 mL, 5-40 mL, IntraVENous, 2 times per day, Oniel Hurd MD, 10 mL at 10/19/22 0919    sodium chloride flush 0.9 % injection 5-40 mL, 5-40 mL, IntraVENous, PRN, Oniel Hurd MD    0.9 % sodium chloride infusion, , IntraVENous, PRN, Oniel Hurd MD, Last Rate: 10 mL/hr at 10/18/22 0416, Rate Verify at 10/18/22 0416    ondansetron (ZOFRAN-ODT) disintegrating tablet 4 mg, 4 mg, Oral, Q8H PRN **OR** ondansetron (ZOFRAN) injection 4 mg, 4 mg, IntraVENous, Q6H PRN, Oniel Hurd MD    polyethylene glycol (GLYCOLAX) packet 17 g, 17 g, Oral, Daily PRN, Oniel Hurd MD, 17 g at 10/11/22 1118    acetaminophen (TYLENOL) tablet 650 mg, 650 mg, Oral, Q6H PRN, 650 mg at 10/18/22 1802 **OR** acetaminophen (TYLENOL) suppository 650 mg, 650 mg, Rectal, Q6H PRN, Oniel Hurd MD    dextrose bolus 10% 125 mL, 125 mL, IntraVENous, PRN **OR** dextrose bolus 10% 250 mL, 250 mL, IntraVENous, PRN, Oniel Hurd MD    glucagon (rDNA) injection 1 mg, 1 mg, SubCUTAneous, PRN, Oniel Hurd MD    dextrose 10 % infusion, , IntraVENous, Continuous PRN, Oniel Hurd MD       REVIEW OF SYSTEMS:  As mentioned in chief complaint and HPI , Subjective             =======================================================================================     PHYSICAL EXAM:  Recent vital signs and recent I/Os reviewed by me.      Wt Readings from Last 3 Encounters:   10/19/22 242 lb 8.1 oz (110 kg)   10/07/22 241 lb 14.4 oz (109.7 kg)   06/21/22 243 lb 9.6 oz (110.5 kg)     BP Readings from Last 3 Encounters:   10/19/22 (!) 97/55   10/07/22 123/67   07/27/22 (!) 112/54     Patient Vitals for the past 24 hrs:   BP Temp Temp src Pulse Resp SpO2 Weight   10/19/22 1003 (!) 97/55 -- -- (!) 111 -- -- --   10/19/22 0900 (!) 82/51 99.1 °F (37.3 °C) Bladder (!) 106 16 97 % --   10/19/22 0816 -- -- -- -- -- 97 % --   10/19/22 0600 (!) 129/112 -- -- (!) 106 -- -- --   10/19/22 0500 103/89 -- -- (!) 107 27 95 % 242 lb 8.1 oz (110 kg)   10/19/22 0400 92/61 99.4 °F (37.4 °C) Temporal 97 18 96 % --   10/19/22 0300 99/79 -- -- (!) 107 18 98 % --   10/19/22 0200 100/61 -- -- 99 17 96 % --   10/19/22 0100 (!) 89/58 -- -- (!) 109 22 95 % --   10/19/22 0000 (!) 94/51 99.2 °F (37.3 °C) Temporal (!) 115 21 94 % --   10/18/22 2300 95/67 -- -- (!) 110 19 95 % --   10/18/22 2200 (!) 83/56 -- -- 100 18 95 % --   10/18/22 2122 -- -- -- (!) 112 18 94 % --   10/18/22 2100 (!) 100/43 -- -- (!) 114 21 93 % --   10/18/22 2000 (!) 89/52 99.5 °F (37.5 °C) Bladder (!) 118 18 92 % --   10/18/22 1900 -- -- -- (!) 114 17 96 % --   10/18/22 1800 (!) 96/57 -- -- (!) 104 25 96 % --   10/18/22 1600 107/67 98 °F (36.7 °C) Temporal (!) 108 21 94 % --   10/18/22 1240 -- -- -- 91 24 -- --   10/18/22 1200 (!) 119/55 -- -- 92 20 91 % --   10/18/22 1109 (!) 102/53 98.2 °F (36.8 °C) Temporal 95 28 -- --       Intake/Output Summary (Last 24 hours) at 10/19/2022 1103  Last data filed at 10/19/2022 0919  Gross per 24 hour   Intake 516.63 ml   Output 805 ml   Net -288.37 ml            General: Awake, Alert, obese   HENT: Atraumatic, normocephalic   Eyes: Normal conjunctiva, Non-incteral sclera. Neck: Supple, JVD not visible. CVS:  Heart sounds are normal. No loud murmur. RS: Normal respiratory effort, Breat sound: diminished at bases. Abd: Soft , bowel sounds are normal, no distension and no tenderness . Skin: No rash , some bruises,   CNS: Awake Oriented , No focal.   Extremities/MSK:  Edema, no cyanosis.           =======================================================================================     DATA:  Diagnostic tests reviewed by me for today's visit:   (AS NEEDED FOR MY EVALUATION AND MANAGEMENT).        Recent Labs     10/17/22  0546 10/18/22  0437 10/19/22  0425   WBC 20.1* 17.8* 13.8*   HCT 20.8* 21.3* 22.6*   PLT 25* 70* 43*     Iron Saturation:  No components found for: PERCENTFE  FERRITIN:    Lab Results   Component Value Date/Time    FERRITIN 1,025.0 09/26/2022 04:34 AM     IRON:    Lab Results   Component Value Date/Time    IRON 46 09/25/2022 05:02 AM     TIBC:    Lab Results   Component Value Date/Time    TIBC 230 09/25/2022 05:02 AM       Recent Labs     10/17/22  0546 10/18/22  0437 10/18/22  1504 10/19/22  0425 10/19/22  0840    141 144 150* 148*   K 5.0 4.4 3.9 4.6  --     107 109 115*  --    CO2 20* 19* 20* 23  --    BUN 42* 70* 76* 80*  --    CREATININE 1.9* 1.8* 1.9* 1.7*  --      Recent Labs     10/17/22  0546 10/18/22  0437 10/18/22  1504 10/19/22  0425   CALCIUM 7.6* 8.4 8.6 8.7   MG  --   --  2.10  --      No results for input(s): PH, PCO2, PO2 in the last 72 hours.     Invalid input(s): Catherine Revering    ABG:  No results found for: PH, PCO2, PO2, HCO3, BE, THGB, TCO2, O2SAT  VBG:  No results found for: PHVEN, DKP8VTH, BEVEN, Q8IXDHAH    LDH:    Lab Results   Component Value Date/Time     10/14/2022 04:50 PM     Uric Acid:    Lab Results   Component Value Date/Time    LABURIC 6.8 09/26/2022 04:34 AM       PT/INR:    Lab Results   Component Value Date/Time    PROTIME 23.6 09/29/2022 11:37 AM    INR 2.10 09/29/2022 11:37 AM     Warfarin PT/INR:  No components found for: PTPATWAR, PTINRWAR  PTT:    Lab Results   Component Value Date/Time    APTT 39.7 02/13/2017 12:07 PM   [APTT}  Last 3 Troponin:    Lab Results   Component Value Date/Time    TROPONINI 0.05 10/11/2022 05:09 AM    TROPONINI 0.04 10/10/2022 04:13 PM    TROPONINI 0.04 10/10/2022 11:20 AM       U/A:    Lab Results   Component Value Date/Time    COLORU Yellow 10/17/2022 07:24 PM    PROTEINU 100 10/17/2022 07:24 PM    PHUR 5.0 10/17/2022 07:24 PM    WBCUA 19 10/17/2022 07:24 PM    RBCUA 275 10/17/2022 07:24 PM    MUCUS 1+ 05/16/2014 11:50 PM    BACTERIA None Seen 10/17/2022 07:24 PM CLARITYU TURBID 10/17/2022 07:24 PM    SPECGRAV 1.020 10/17/2022 07:24 PM    LEUKOCYTESUR TRACE 10/17/2022 07:24 PM    UROBILINOGEN 0.2 10/17/2022 07:24 PM    BILIRUBINUR Negative 10/17/2022 07:24 PM    BILIRUBINUR NEGATIVE 03/09/2012 06:51 AM    BLOODU LARGE 10/17/2022 07:24 PM    GLUCOSEU 250 10/17/2022 07:24 PM    GLUCOSEU NEGATIVE 03/09/2012 06:51 AM     Microalbumen/Creatinine ratio:  No components found for: RUCREAT  24 Hour Urine for Protein:  No components found for: RAWUPRO, UHRS3, THTT01TN, UTV3  24 Hour Urine for Creatinine Clearance:  No components found for: CREAT4, UHRS10, UTV10  Urine Toxicology:  No components found for: Marylu Haagensen, IBENZO, ICOCAINE, IMARTHC, IOPIATES, IPHENCYC    HgBA1c:    Lab Results   Component Value Date/Time    LABA1C 7.5 10/18/2022 04:37 AM     RPR:  No results found for: RPR  HIV:  No results found for: HIV  NICOLASA:  No results found for: ANATITER, NICOLASA  RF:  No results found for: RF  DSDNA:  No components found for: DNA  AMYLASE:    Lab Results   Component Value Date/Time    AMYLASE 50 05/09/2014 07:45 PM     LIPASE:    Lab Results   Component Value Date/Time    LIPASE 37.0 05/09/2014 07:45 PM     Fibrinogen Level:  No components found for: FIB       BELOW MENTIONED RADIOLOGY STUDY RESULTS BY ME (AS NEEDED FOR MY EVALUATION AND MANAGEMENT). XR CHEST (2 VW)    Result Date: 9/24/2022  EXAMINATION: TWO XRAY VIEWS OF THE CHEST 9/24/2022 6:35 pm COMPARISON: 06/16/2022 HISTORY: ORDERING SYSTEM PROVIDED HISTORY: Chest Discomfort TECHNOLOGIST PROVIDED HISTORY: Reason for exam:->Chest Discomfort Reason for Exam: Wrist Pain (French # [id number] -Patient brought in by squad from home with left wrist, right rib pain. No injury. ) FINDINGS: The heart is mildly enlarged and less prominent the pulmonary vessels are engorged centrally and less prominent. The lungs are hyperinflated. No consolidation or effusion is seen. The bones are intact.   There are postop changes along the mediastinum and sternum which is unchanged. There are mild subsegmental linear densities scattered along the lung bases which is less prominent. Mild cardiomegaly with mild central pulmonary congestion or pulmonary artery hypertension which is less prominent. Mild chronic obstructive lung changes with mild bibasilar discoid atelectasis or scarring which is less prominent with no obvious infiltrate or effusion. XR WRIST LEFT (MIN 3 VIEWS)    Result Date: 9/24/2022  EXAMINATION: 3 XRAY VIEWS OF THE LEFT WRIST 9/24/2022 6:35 pm COMPARISON: None. HISTORY: ORDERING SYSTEM PROVIDED HISTORY: pain TECHNOLOGIST PROVIDED HISTORY: Reason for exam:->pain FINDINGS: There is mild narrowing of the radiocarpal joint. No acute fracture or dislocation is seen. There is moderate narrowing along the base of the thumb with prominent osteophytes throughout the joint. There is some linear calcifications just distal to the ulna which probably within the TFCC. There are vascular calcifications along the palmar aspect of the wrist.  There is mild soft tissue swelling along the dorsum of the wrist.  The bones are osteopenic. No obvious acute fracture or dislocation can be seen. Mild osteoarthritic changes along the radiocarpal joint and moderate degenerative arthritic changes along the base of the thumb with no acute bony abnormality seen. Vascular calcifications along the palmar aspect of the wrist Mild soft tissue swelling around the wrist and diffuse osteopenia. Probable mild chondrocalcinosis of the TFCC. CT HEAD WO CONTRAST    Result Date: 10/14/2022  EXAMINATION: CT OF THE HEAD WITHOUT CONTRAST  10/13/2022 2:52 pm TECHNIQUE: CT of the head was performed without the administration of intravenous contrast. Automated exposure control, iterative reconstruction, and/or weight based adjustment of the mA/kV was utilized to reduce the radiation dose to as low as reasonably achievable.  COMPARISON: 02/11/2012 HISTORY: ORDERING SYSTEM PROVIDED HISTORY: dizziness TECHNOLOGIST PROVIDED HISTORY: Reason for exam:->dizziness Has a \"code stroke\" or \"stroke alert\" been called? ->No Reason for Exam: dizziness FINDINGS: BRAIN/VENTRICLES: The cerebral and cerebellar parenchyma demonstrate volume loss, with a frontal lobe predominance. Scattered low-attenuation areas are noted in the periventricular white matter, compatible with chronic microvascular white matter ischemic disease. There are no areas of hemorrhage, mass, or midline shift. No abnormal extra-axial fluid collections. The ventricles are prominent in size, likely related to involutional change. Gray-white differentiation is maintained without evidence of acute infarct. ORBITS: Lens implants from prior cataract surgery are noted. The orbits are otherwise unremarkable. SINUSES: There is scattered mild paranasal sinus disease. Thickened secretions are noted in the left sphenoid sinus. The mastoid air cells are clear. SOFT TISSUES/SKULL:  The calvarium is intact. No appreciable scalp soft tissue swelling. 1. No acute intracranial abnormality. 2. Cerebral and cerebellar parenchymal volume loss with chronic microvascular white matter ischemic disease. CT CHEST W CONTRAST    Result Date: 9/28/2022  EXAMINATION: CT OF THE CHEST WITH CONTRAST 9/28/2022 1:02 pm TECHNIQUE: CT of the chest was performed with the administration of intravenous contrast. Multiplanar reformatted images are provided for review. Automated exposure control, iterative reconstruction, and/or weight based adjustment of the mA/kV was utilized to reduce the radiation dose to as low as reasonably achievable. COMPARISON: Chest CT 05/10/2014 HISTORY: ORDERING SYSTEM PROVIDED HISTORY: left chest pain FINDINGS: Technical: Evaluation of the lungs degraded because of motion during imaging, blurring detail and resulting in misregistration artifact.  Mediastinum: Mild main pulmonary artery dilatation 31 mm (axial series 2, image 51). Ascending thoracic aorta 37 mm and descending thoracic aorta 25 mm. Status post CABG. Cardiomegaly. The great vessels appear unremarkable with exception of calcific atherosclerotic disease. Moderate to severe calcific atherosclerosis coronary arteries. No pericardial effusion. Posterior mediastinal structures appear unremarkable. No mediastinal or hilar adenopathy. Small hiatal hernia with what appears to be a small retained capsule within the hiatal hernia. Lungs/pleura: No focal pulmonary infiltrate evident. No soft tissue or ground-glass pulmonary nodule is seen. No inspissated secretions or endobronchial lesion evident. No pleural effusion or pneumothorax. Upper Abdomen: Unremarkable appearance. Soft Tissues/Bones: No acute superficial soft tissue or osseous structure abnormality evident. Old healed fractures left ribs 4, 5 and 6.     1. No definite acute pulmonary disease. Evaluation of the lungs degraded because of motion during imaging, blurring detail and resulting in misregistration artifact. 2. Moderate to severe calcific atherosclerosis coronary arteries. 3. Mild main pulmonary artery dilatation can be seen with pulmonary hypertension but is nonspecific. 4. Cardiomegaly and sequela from CABG. 5. Small hiatal hernia with what appears to be a small retained capsule within the hiatal hernia. CT GUIDED NEEDLE PLACEMENT    Result Date: 9/29/2022  PROCEDURE: CT GUIDED BONE MARROW ASPIRATION AND CORE NEEDLE BONE BIOPSY OF THE RIGHT ILIAC BONE. MODERATE CONSCIOUS SEDATION 9/29/2022 HISTORY: ORDERING SYSTEM PROVIDED HISTORY: anemia, thrombocytopenia TECHNOLOGIST PROVIDED HISTORY: Reason for exam:->anemia, thrombocytopenia Reason for Exam: anemia, thrombocytopenia SEDATION: 2 mgversed and 50 mcg fentanyl were titrated intravenously for moderate sedation monitored under my direction. Total intraservice time of sedation was 5 minutes.   The patient's vital signs were monitored throughout the procedure and recorded in the patient's medical record by the nurse. TECHNIQUE: Informed consent was obtained following a detailed explanation of the procedure including risks, benefits, and alternatives. Universal protocol was followed. Sterile gowns, masks, hats and gloves utilized for maximal sterile barrier. Axial images were obtained through the iliac bones using CT guidance and a suitable skin site was prepped and draped in sterile fashion. Local anesthesia was achieved with lidocaine. An 11 gauge SYLLETA bone marrow biopsy needle was advanced into the right iliac bone and approximately 15 mL of bone marrow aspirate was obtained. A single core biopsy specimen was obtained and the patient tolerated the procedure well. Estimated blood loss: Less than 5 cc Automated exposure control, iterative reconstruction, and/or weight based adjustment of the mA/kV was utilized to reduce the radiation dose to as low as reasonably achievable. DLP: 71.66 mGy-cm     Successful CT guided bone marrow aspiration and core biopsy of the right iliac bone. XR CHEST PORTABLE    Result Date: 10/16/2022  EXAMINATION: ONE XRAY VIEW OF THE CHEST 10/16/2022 12:57 pm COMPARISON: None. HISTORY: Acute shortness of breath. FINDINGS: Evaluation is suboptimal secondary to body habitus and portable technique. Patient is also slightly rotated. Mild cardiomegaly. Post CABG changes. No definite pulmonary edema. Diffuse haziness of the left lung. Right lung is relatively clear. No pneumothorax. Mild cardiomegaly. Diffuse haziness of the left lung be due to atypical infection or edema. XR CHEST PORTABLE    Result Date: 10/16/2022  EXAMINATION: ONE XRAY VIEW OF THE CHEST 10/16/2022 2:15 am COMPARISON: 10/10/2022 HISTORY: ORDERING SYSTEM PROVIDED HISTORY: crackles in lungs TECHNOLOGIST PROVIDED HISTORY: Reason for exam:->crackles in lungs Reason for Exam: Crackles in lungs FINDINGS: Overlying sternotomy wires. Similar-appearing prominence of the cardiac silhouette with associated vascular congestion. No focal consolidation. Costophrenic angles are sharp. No evidence of pneumothorax. No acute osseous abnormalities. 1. Similar-appearing prominence of the cardiac silhouette with associated vascular congestion. 2. No focal consolidation. XR CHEST PORTABLE    Result Date: 10/10/2022  EXAMINATION: ONE XRAY VIEW OF THE CHEST 10/10/2022 11:15 am COMPARISON: Chest x-ray dated 09/24/2022. HISTORY: ORDERING SYSTEM PROVIDED HISTORY: left chest pain TECHNOLOGIST PROVIDED HISTORY: Reason for exam:->left chest pain Reason for Exam: Chest Pain (Pt transported from [facility] from [facility]. PT c/o chest pain radiating to left shoulder. Symptoms started 2 days ago. Pt stated she was seen in [facility] and was inpatient for 2 weeks for left shoulder complications.) FINDINGS: HEART/MEDIASTINUM: The cardiomediastinal silhouette is stable. PLEURA/LUNGS: There are no focal consolidations or pleural effusions. There is no appreciable pneumothorax. Linear atelectasis versus scarring noted in the periphery of the left mid lung. BONES/SOFT TISSUE: No acute abnormality. Median sternotomy wires again noted. No radiographic evidence of acute pulmonary disease. CT CHEST PULMONARY EMBOLISM W CONTRAST    Result Date: 10/10/2022  EXAMINATION: CTA OF THE CHEST 10/10/2022 11:59 am TECHNIQUE: CTA of the chest was performed after the administration of intravenous contrast.  Multiplanar reformatted images are provided for review. MIP images are provided for review. Automated exposure control, iterative reconstruction, and/or weight based adjustment of the mA/kV was utilized to reduce the radiation dose to as low as reasonably achievable. COMPARISON: None.  HISTORY: ORDERING SYSTEM PROVIDED HISTORY: left sided chest pain, shortness of breath TECHNOLOGIST PROVIDED HISTORY: Reason for exam:->left sided chest pain, shortness of breath Decision Support Exception - unselect if not a suspected or confirmed emergency medical condition->Emergency Medical Condition (MA) Reason for Exam: left sided chest pain, shortness of breath FINDINGS: Pulmonary Arteries: Pulmonary arteries are adequately opacified for evaluation. No evidence of intraluminal filling defect to suggest pulmonary embolism. Main pulmonary artery is normal in caliber. Mediastinum: No evidence of mediastinal lymphadenopathy. The heart and pericardium demonstrate no acute abnormality. There is no acute abnormality of the thoracic aorta. Lungs/pleura: There is minimal bibasilar atelectasis/scarring. The lungs are without acute process. No focal consolidation or pulmonary edema. No evidence of pleural effusion or pneumothorax. Upper Abdomen: Limited images of the upper abdomen are unremarkable. Soft Tissues/Bones: No acute bone or soft tissue abnormality. No evidence of pulmonary embolism or acute pulmonary abnormality. CT BIOPSY BONE MARROW    Result Date: 9/29/2022  PROCEDURE: CT GUIDED BONE MARROW ASPIRATION AND CORE NEEDLE BONE BIOPSY OF THE RIGHT ILIAC BONE. MODERATE CONSCIOUS SEDATION 9/29/2022 HISTORY: ORDERING SYSTEM PROVIDED HISTORY: anemia, thrombocytopenia TECHNOLOGIST PROVIDED HISTORY: Reason for exam:->anemia, thrombocytopenia Reason for Exam: anemia, thrombocytopenia SEDATION: 2 mgversed and 50 mcg fentanyl were titrated intravenously for moderate sedation monitored under my direction. Total intraservice time of sedation was 5 minutes. The patient's vital signs were monitored throughout the procedure and recorded in the patient's medical record by the nurse. TECHNIQUE: Informed consent was obtained following a detailed explanation of the procedure including risks, benefits, and alternatives. Universal protocol was followed. Sterile gowns, masks, hats and gloves utilized for maximal sterile barrier.  Axial images were obtained through the iliac bones using CT guidance and a suitable skin site was prepped and draped in sterile fashion. Local anesthesia was achieved with lidocaine. An 11 gauge [employer] bone marrow biopsy needle was advanced into the right iliac bone and approximately 15 mL of bone marrow aspirate was obtained. A single core biopsy specimen was obtained and the patient tolerated the procedure well. Estimated blood loss: Less than 5 cc Automated exposure control, iterative reconstruction, and/or weight based adjustment of the mA/kV was utilized to reduce the radiation dose to as low as reasonably achievable. DLP: 71.66 mGy-cm     Successful CT guided bone marrow aspiration and core biopsy of the right iliac bone. EGD    Result Date: 10/3/2022  No dictation             This report was transcribed using voice recognition software, mainly. So please excuse brevity and/or typos. Every effort was made to ensure accuracy, however, inadvertent computerized transcription errors may be present. Please contact us, if any questions or clarifications are needed.

## 2022-10-19 NOTE — PROGRESS NOTES
No significant events to note overnight. Pt rested well with PRN melatonin. A&O x4. BP soft but stable. Remains in AFIB with HR 's. Sao2 maintained on 2 lpm NC. Lung sounds with coarse crackles bilaterally. Pt with strong productive cough, expectorating large amts of thick yellow, blood tinged sputum, sample obtained as ordered. Insulin gtt infusing as ordered. UOP marginal, chidi and with sediment, pt c/o burning at urethra. Continent of small loose stool. Bath given, hair washed, linens changed, all questions and POC discussed with pt and pt's daughter at bedside. Will cont to monitor.

## 2022-10-19 NOTE — PROGRESS NOTES
[facility], [place]   Electrophysiology Progress Note     Date: 10/19/2022  Admit Date: 10/10/2022     Reason for consultation: Atrial fibrillation    Chief Complaint:   Chief Complaint   Patient presents with    Chest Pain     Pt transported from [facility] from [facility]. PT c/o chest pain radiating to left shoulder. Symptoms started 2 days ago. Pt stated she was seen in [facility] and was inpatient for 2 weeks for left shoulder complications. History of Present Illness: History obtained from patient and medical record. Berna Gallo is a 78 y.o. female with a past medical history of hypertension, hyperlipidemia, hypothyroidism, CARLOS, CAD, and paroxysmal atrial fibrillation. Pt presented to hospital with admitted with shoulder pain, chest pain and hemoptysis. She has been hypotensive and given IV fluids and this resulted in respiratory distress. IV lasix given. She was moved to the ICU. Tachypneic and xray with pulmonary vascular congestion. LVEF is 45-50%. She had been treated for acute CHF, anemia, thrombocytopenia, acute respiratory distress, ARUN, and myelodysplastic syndrome. On 10/11/22, her Xarelto was held due to low platelet count requiring 1 pack platelets and 1 unit PRBCs. She subsequently developed AF with RVR and she was given a one time does of IV metoprolol with improvement in HR. EP is consulted for AF. Interval Hx: Today, she is being seen for follow up. Her family is at the bedside. Pt remains in atrial fibrillation with mildly tachycardic rates. Pt denies any significant AF symptoms. Her BP remains marginal. They are concerned about pain at her marin catheter site. She has passed her swallow study. Patient seen and examined. Clinical notes reviewed. Telemetry reviewed. No new complaints today. No major events overnight.    Denies having chest pain, palpitations, shortness of breath, orthopnea/PND, cough, or dizziness at the time of this visit. Allergies: Allergies   Allergen Reactions    Aspirin Other (See Comments)     GI upset    Ibuprofen Other (See Comments)     GI upset    Macrobid [Nitrofurantoin Monohydrate Macrocrystals] Other (See Comments)     COLD, SICK    Adhesive Tape Rash    Lexiscan [Regadenoson] Rash     Happened twice with Lexiscan    Penicillins Rash     Home Meds:  Prior to Visit Medications    Medication Sig Taking?  Authorizing Provider   naproxen (NAPROSYN) 500 MG tablet Take 1 tablet by mouth 2 times daily (with meals) Yes Pricila Swann MD   empagliflozin (JARDIANCE) 10 MG tablet Take 1 tablet by mouth daily  Donna Ponce MD   insulin glargine (BASAGLAR KWIKPEN) 100 UNIT/ML injection pen Inject 48 Units into the skin daily  Donna Ponce MD   sacubitril-valsartan (ENTRESTO) 24-26 MG per tablet Take 0.5 tablets by mouth 2 times daily  Donna Ponce MD   furosemide (LASIX) 20 MG tablet Take 1 tablet by mouth daily  Donna Ponce MD   pantoprazole (PROTONIX) 40 MG tablet Take 40 mg by mouth every morning (before breakfast)  Historical Provider, MD   calcium carbonate (OSCAL) 500 MG TABS tablet Take 500 mg by mouth daily  Historical Provider, MD   gabapentin (NEURONTIN) 300 MG capsule Take 1 tablet in am, 2 tablets in pm.  Master Alicea, APRN - CNP   senna (SENOKOT) 8.6 MG tablet Take 1 tablet by mouth 2 times daily  Historical Provider, MD   insulin aspart (NOVOLOG) 100 UNIT/ML injection vial Inject 15 Units into the skin 3 times daily  Historical Provider, MD   magnesium 30 MG tablet Take 40 mg by mouth daily  Historical Provider, MD   tiZANidine (ZANAFLEX) 2 MG tablet Take 2 mg by mouth every 8 hours as needed  Historical Provider, MD   metoprolol tartrate (LOPRESSOR) 25 MG tablet TAKE ONE-HALF TABLET BY MOUTH TWICE A DAY  Lawyer Luz MD   atorvastatin (LIPITOR) 80 MG tablet Take 1 tablet by mouth daily  Lawyer Luz MD   Insulin Pen Needle 32G X 6 MM MISC by Does not apply route 3 times daily with meals Historical Provider, MD   hydrocortisone (ANUSOL-HC) 2.5 % rectal cream Place rectally 2 times daily Place rectally 2 times daily. Historical Provider, MD   rivaroxaban (XARELTO) 20 MG TABS tablet Take 1 tablet by mouth daily  Martha Morales MD   vitamin D (ERGOCALCIFEROL) 21257 UNITS CAPS capsule Take 50,000 Units by mouth once a week  Historical Provider, MD   Omega 3 1000 MG CAPS Take 1,000 mg by mouth daily  Historical Provider, MD   Liraglutide (VICTOZA) 18 MG/3ML SOPN SC injection Inject 1.2 mg into the skin daily  Historical Provider, MD   lidocaine (LIDODERM) 5 % Place 1 patch onto the skin daily 12 hours on, 12 hours off. Milagros Azar, APRN - CNP   oxybutynin (DITROPAN XL) 15 MG CR tablet Take 15 mg by mouth daily. Historical Provider, MD   Loratadine 10 MG CAPS Take by mouth daily   Historical Provider, MD   levothyroxine (SYNTHROID) 175 MCG tablet Take 150 mcg by mouth daily.   Historical Provider, MD      Scheduled Meds:   cefepime  1,000 mg IntraVENous Q12H    metroNIDAZOLE  500 mg IntraVENous Q8H    methylPREDNISolone  40 mg IntraVENous Daily    ipratropium-albuterol  1 ampule Inhalation 4x daily    lidocaine  1 patch TransDERmal Daily    metoprolol tartrate  12.5 mg Oral BID    acetaminophen  650 mg Oral TID    atorvastatin  80 mg Oral Nightly    hydrocortisone   Rectal BID    levothyroxine  150 mcg Oral Daily    sodium chloride flush  5-40 mL IntraVENous 2 times per day     Continuous Infusions:   insulin 2.4 Units/hr (10/19/22 0724)    dextrose      sodium chloride      sodium chloride      sodium chloride      sodium chloride 10 mL/hr at 10/18/22 0416    dextrose       PRN Meds:glucose, dextrose bolus **OR** dextrose bolus, glucagon (rDNA), dextrose, melatonin, sodium chloride, sodium chloride, ipratropium-albuterol, lidocaine viscous hcl, benzonatate, sodium chloride, sodium chloride flush, sodium chloride, ondansetron **OR** ondansetron, polyethylene glycol, acetaminophen **OR** acetaminophen, dextrose bolus **OR** dextrose bolus, glucagon (rDNA), dextrose     Past Medical History:  Past Medical History:   Diagnosis Date    4/11/17 Left knee repeat repair of median parapatellar arthrotomy with augmentation 4/12/2017    Arthritis     Aspiration pneumonia of left lower lobe due to gastric secretions ([facility] 10/18/2022    Atrial fibrillation (HCC)     CAD (coronary artery disease)     Cataract     Chronic diastolic congestive heart failure ([facility] 7/27/2022    Diabetes mellitus ([facility]     GERD (gastroesophageal reflux disease)     Hyperlipidemia     Hypertension     HYPOTHRYROIDISM     Myelodysplastic disease ([facility] 10/17/2022    Non-English speaking patient     SPEAKS English. SHE SPEAKS A LITTLE ENGLISH    Sleep apnea     unspecified    Thyroid disease     UTI       Past Surgical History:    has a past surgical history that includes joint replacement (Bilateral, [address]); Cardiac surgery (2004); Cholecystectomy, laparoscopic (5/15/14); Revision total knee arthroplasty (Right, 11/22/2016); Total knee arthroplasty; knee surgery (Left, 02/28/2017); CT BIOPSY BONE MARROW (9/29/2022); Upper gastrointestinal endoscopy (N/A, 10/3/2022); and Upper gastrointestinal endoscopy (N/A, 10/3/2022). Social History:  Reviewed. reports that she has never smoked. She has never used smokeless tobacco. She reports that she does not drink alcohol and does not use drugs. Family History:  Reviewed. family history includes Arthritis in an other family member; Diabetes in an other family member; Heart Disease in an other family member; High Cholesterol in her brother; Hypertension in an other family member. Review of Systems:  Constitutional: Positive for fatigue and generalized weakness.  Negative for fever, night sweats, chills, weight changes  Skin: Negative for rash, dry skin, pruritus, bruising, bleeding, blood clots, or changes in skin pigment  HEENT: Negative for vision changes, ringing in the ears, sore throat, dysphagia, or swollen lymph nodes  Respiratory: Reviewed in HPI  Cardiovascular: Reviewed in HPI  Gastrointestinal: Negative for abdominal pain, N/V/D, constipation, or black/tarry stools  Genito-Urinary: Positive for marin site pain. Negative for dysuria, incontinence, urgency, or hematuria  Musculoskeletal: Negative for joint swelling, muscle pain, or injuries  Neurological/Psych: Negative for confusion, seizures, headaches, balance issues or TIA-like symptoms. No anxiety, depression, or insomnia    Physical Examination:  Vitals:    10/19/22 0816   BP:    Pulse:    Resp:    Temp:    SpO2: 97%      In: 506.6 [I.V.:211.5]  Out: 1255    Wt Readings from Last 3 Encounters:   10/19/22 242 lb 8.1 oz (110 kg)   10/07/22 241 lb 14.4 oz (109.7 kg)   06/21/22 243 lb 9.6 oz (110.5 kg)       Intake/Output Summary (Last 24 hours) at 10/19/2022 0913  Last data filed at 10/19/2022 0430  Gross per 24 hour   Intake 506.63 ml   Output 1105 ml   Net -598.37 ml       Telemetry: Personally Reviewed  - Atrial fibrillation with RBBB. Rate 90-120s   Constitutional: Cooperative and in no apparent distress, and appears stated age  Skin: Warm and pink; no pallor, cyanosis, bruising, or clubbing  HEENT: Symmetric and normocephalic. PERRL, EOM intact. Conjunctiva pink with clear sclera. Mucus membranes pink and moist. Teeth intact. Thyroid smooth without nodules or goiter. Cardiovascular: Regular rate and irregular rhythm. S1/S2 present without murmurs, rubs, or gallops. Peripheral pulses 2+, capillary refill < 3 seconds. No elevation of JVP. No peripheral edema  Respiratory: Respirations symmetric and unlabored. Lungs clear/diminished to auscultation bilaterally, no wheezing, crackles, or rhonchi. On 2L of oxygen  Gastrointestinal: Abdomen soft and round. Bowel sounds normoactive in all quadrants without tenderness or masses.  + Obese  Musculoskeletal: Bilateral upper and lower extremity strength 5/5 with full ROM  Neurologic/Psych: Awake and orientated to person, place and time. Calm affect, appropriate mood    Pertinent labs, diagnostic, device, and imaging results reviewed as a part of this visit    Labs:    BMP:   Recent Labs     10/18/22  0437 10/18/22  1504 10/19/22  0425 10/19/22  0840    144 150* 148*   K 4.4 3.9 4.6  --     109 115*  --    CO2 19* 20* 23  --    BUN 70* 76* 80*  --    CREATININE 1.8* 1.9* 1.7*  --    MG  --  2.10  --   --      Estimated Creatinine Clearance: 28 mL/min (A) (based on SCr of 1.7 mg/dL (H)). CBC:   Recent Labs     10/17/22  0546 10/18/22  0437 10/19/22  0425   WBC 20.1* 17.8* 13.8*   HGB 6.7* 7.0* 7.4*   HCT 20.8* 21.3* 22.6*   MCV 96.0 95.6 94.4   PLT 25* 70* 43*     Thyroid:   Lab Results   Component Value Date/Time    TSH 0.13 07/10/2012 02:05 PM    B7AVCFV 0.78 07/10/2012 02:05 PM     Lipids:   Lab Results   Component Value Date/Time    CHOL 157 07/13/2012 09:30 AM    HDL 47 07/13/2012 09:30 AM    HDL 44 07/06/2010 09:12 AM    TRIG 247 07/13/2012 09:30 AM     LFTS:   Lab Results   Component Value Date/Time    ALT 11 10/10/2022 11:20 AM    AST 15 10/10/2022 11:20 AM    ALKPHOS 75 10/10/2022 11:20 AM    PROT 6.4 10/10/2022 11:20 AM    PROT 7.3 07/10/2012 02:05 PM    AGRATIO 1.1 10/10/2022 11:20 AM    BILITOT 0.4 10/10/2022 11:20 AM     Cardiac Enzymes:   Lab Results   Component Value Date/Time    CKTOTAL 55 10/10/2022 04:13 PM    TROPONINI 0.05 10/11/2022 05:09 AM    TROPONINI 0.04 10/10/2022 04:13 PM    TROPONINI 0.04 10/10/2022 11:20 AM     Coags:   Lab Results   Component Value Date/Time    PROTIME 23.6 09/29/2022 11:37 AM    INR 2.10 09/29/2022 11:37 AM       ECG: 10/18/22 (Personally Interpreted)   - Atrial fibrillation with RVR and RBBB    ECHO: 10/17/2022   Technically difficult exam due to body habitus. Left ventricular cavity size is dilated with normal left ventricular wall thickness.    Overall left ventricular systolic function appears mildly reduced with an ejection fraction that is visually estimated to be 45-50%. Abnormal (paradoxical) septal motion is present. Definity contrast agent administered to better visualize endocardial border and wall motion. Normal diastolic filling pattern for age. Avg E/e' estimated to be 13.8. The right ventricle is mildly enlarged with reduced function. The left atrium is mildly dilated. Mild tricuspid regurgitation with an RVSP estimated to be 29 mmHg. Stress Test: 8/12/2020   Resting ECG   Normal sinus rhythm. Frequent premature atrial contractions. Right bundle branch block. Nonspecific ST-T wave abnormalities.     Problem List:   Patient Active Problem List    Diagnosis Date Noted    Carotid artery disease without cerebral infarction ([address] 11/09/2016    History of coronary artery disease 10/18/2022    Pulmonary vascular congestion 10/18/2022    Acute hypoxemic respiratory failure ([address] 10/18/2022    Aspiration pneumonia of left lower lobe due to gastric secretions ([address] 10/18/2022    Hypervolemia 10/17/2022    Myelodysplastic syndrome ([address] 10/17/2022    Chronic systolic heart failure ([address] 10/17/2022    Arterial hypotension 10/17/2022    Shoulder pain 10/10/2022    Primary osteoarthritis of first carpometacarpal joint of left hand 10/01/2022    Elevated troponin 09/27/2022    Chronic atrial fibrillation ([address] 09/27/2022    Severe thrombocytopenia ([address] 09/25/2022    Severe anemia 09/25/2022    Wrist arthritis 09/25/2022    Dyspnea on exertion 09/24/2022    Acute on chronic diastolic heart failure ([address] 07/27/2022    Rotator cuff tendonitis, right 06/24/2021    Cervical radicular pain 06/24/2021 4/11/17 Left knee repeat repair of median parapatellar arthrotomy with augmentation 04/12/2017 2/28/17 Revision with polyethylene exchange left total knee arthroplasty 02/28/2017    Morbid obesity with BMI of 45.0-49.9, adult ([address] 07/09/2015    Diabetes type 2, controlled ([address] 07/09/2015    Morbid obesity with BMI of 40.0-44.9, adult ([facility] 05/14/2014    Acute chest pain 05/09/2014    Mixed hyperlipidemia 05/02/2014 11/22/16 Right knee polyethylene exchange 09/30/2013    Acute pain of right shoulder 09/30/2013    Debility 03/06/2012    Dizziness 02/11/2012    Headache 02/11/2012    Acute renal failure ([facility] 02/09/2012    HIGH CHOLESTEROL 02/09/2012    HTN (hypertension) 02/09/2012    DM (diabetes mellitus) ([facility] 10/05/2011    Coronary artery disease involving native coronary artery of native heart without angina pectoris 10/05/2011    PAF (paroxysmal atrial fibrillation) ([facility] 10/05/2011    Peripheral vascular disease ([facility] 10/05/2011    CARLOS (obstructive sleep apnea) 10/05/2011    SOB (shortness of breath) 10/05/2011      Assessment and Plan: 1. Paroxysmal Atrial Fibrillation  - Currently in AF, rate 90-120s   ~ Likely secondary to her acute issues and infection. Modest rate control is reasonable in her acute illness setting. HR <110 at rest is reasonable during acute phase    - Continue metoprolol 12.5 mg BID  - Limited rate control options given BP and CKD. I discussed digoxin vs amiodarone. Family would like to avoid amiodarone due the risks/SE's  ~ Will start digoxin 125 mcg QD. Check dig level in 48 hours. If dig level borderline, will need to reduce to 62.5 mcg daily. Will stop once acute issues resolve as I imagine her HR will be better controlled and I am worried about the risk of dig toxicity    - [id number] score:high; [id number] Vasc score and anticoagulation discussed. High risk for stroke and thromboembolism. Anticoagulation is recommended. Risk of bleeding was discussed. ~ Xarelto on hold due to MDS and thrombocytopenia. Poor candidate for [facility] at this time    - Poor candidate for EP procedures given her age and inability to be anti-coagulated, plus she has poor long term prognosis    2.  Chronic systolic heart failure (NYHA Class III)  - Appears compensated   ~ EF 45% per echo  - Continue with BB  - Lasix on hold due to CKD  ~ ACE-I/ARB contraindicated due to renal insufficiency and risk of hyperkalemia  - Monitor I&O's, Daily weights    3. Acute Respiratory Failure, PNA   - Improving; stable on 2L of oxygen   - On ABX   - IV diuretics on hold   - Encourage up to chair and IS    4. HTN  - Controlled, on low normal side  - Continue current medications  - Encourage adequate oral intake    5. ARUN on CKD   - Stable, near baseline   - Monitor UO and kidney function closely   - Nephrology following    6. MDS   - New dx  - H/H remains low   - No significant bleeding issues    Multiple medical conditions with risk of decompensation. Pt is now a DNR-CCA    All pertinent information and plan of care discussed with the EP physician. All questions and concerns were addressed to the patient. Alternatives to my treatment were discussed. I have discussed the above stated plan with patient and the nurse. The patient verbalized understanding and agreed with the plan. Thank you for allowing to us to participate in the care of Wally Schmidt    The patient was seen for >35 minutes. I reviewed interval history, physical exam, review of data including labs, imaging, development and implementation of treatment plan and coordination of complex care.  More than 50% of the time was devoted to counseling the patient on their diagnoses/treatments, as well as coordination of care with the other care teams    MATEUSZ Ricci-CNP  [address]   Office: [phone]

## 2022-10-19 NOTE — PROGRESS NOTES
Oncology and Hematology Care   Progress Note      10/19/2022 12:14 PM        Name: Hank Quezada . Admitted: 10/10/2022    SUBJECTIVE:  Patient much more alert today. Still has some dried blood in the nose but no other evidence of blood loss.      Reviewed interval ancillary notes    Current Medications  digoxin (LANOXIN) tablet 125 mcg, Daily  insulin glargine (LANTUS) injection vial 10 Units, Daily  insulin lispro (HUMALOG) injection vial 0-4 Units, TID WC  insulin lispro (HUMALOG) injection vial 0-4 Units, Nightly  insulin regular (HUMULIN R;NOVOLIN R) 100 Units in sodium chloride 0.9 % 100 mL infusion, Continuous  glucose-vitamin C chewable tablet 4 tablet, PRN  dextrose bolus 10% 125 mL, PRN   Or  dextrose bolus 10% 250 mL, PRN  glucagon (rDNA) injection 1 mg, PRN  dextrose 10 % infusion, Continuous PRN  melatonin tablet 6 mg, Nightly PRN  cefepime (MAXIPIME) 1000 mg IVPB minibag, Q12H  0.9 % sodium chloride infusion, PRN  metronidazole (FLAGYL) 500 mg in 0.9% NaCl 100 mL IVPB premix, Q8H  0.9 % sodium chloride infusion, PRN  methylPREDNISolone sodium (SOLU-MEDROL) injection 40 mg, Daily  ipratropium-albuterol (DUONEB) nebulizer solution 1 ampule, Q4H PRN  lidocaine viscous hcl (XYLOCAINE) 2 % solution 15 mL, Q3H PRN  ipratropium-albuterol (DUONEB) nebulizer solution 1 ampule, 4x daily  lidocaine 4 % external patch 1 patch, Daily  benzonatate (TESSALON) capsule 100 mg, TID PRN  metoprolol tartrate (LOPRESSOR) tablet 12.5 mg, BID  0.9 % sodium chloride infusion, PRN  acetaminophen (TYLENOL) tablet 650 mg, TID  atorvastatin (LIPITOR) tablet 80 mg, Nightly  hydrocortisone (ANUSOL-HC) 2.5 % rectal cream, BID  levothyroxine (SYNTHROID) tablet 150 mcg, Daily  sodium chloride flush 0.9 % injection 5-40 mL, 2 times per day  sodium chloride flush 0.9 % injection 5-40 mL, PRN  0.9 % sodium chloride infusion, PRN  ondansetron (ZOFRAN-ODT) disintegrating tablet 4 mg, Q8H PRN   Or  ondansetron (ZOFRAN) injection 4 mg, Q6H PRN  polyethylene glycol (GLYCOLAX) packet 17 g, Daily PRN  acetaminophen (TYLENOL) tablet 650 mg, Q6H PRN   Or  acetaminophen (TYLENOL) suppository 650 mg, Q6H PRN  dextrose bolus 10% 125 mL, PRN   Or  dextrose bolus 10% 250 mL, PRN  glucagon (rDNA) injection 1 mg, PRN  dextrose 10 % infusion, Continuous PRN        Objective:  BP 84/62   Pulse 87   Temp (!) 96.1 °F (35.6 °C) (Oral)   Resp 18   Ht 5' (1.524 m)   Wt 242 lb 8.1 oz (110 kg)   LMP  (LMP Unknown)   SpO2 95%   BMI 47.36 kg/m²     Intake/Output Summary (Last 24 hours) at 10/19/2022 1214  Last data filed at 10/19/2022 1207  Gross per 24 hour   Intake 870.54 ml   Output 705 ml   Net 165.54 ml      Wt Readings from Last 3 Encounters:   10/19/22 242 lb 8.1 oz (110 kg)   10/07/22 241 lb 14.4 oz (109.7 kg)   06/21/22 243 lb 9.6 oz (110.5 kg)       General appearance:  Appears comfortable  Eyes: Sclera clear. Pupils equal.  ENT: Moist oral mucosa. Trachea midline, no adenopathy. Cardiovascular: Regular rhythm, normal S1, S2. No murmur. No edema in lower extremities  Respiratory: Not using accessory muscles. Good inspiratory effort. Clear to auscultation bilaterally, no wheeze or crackles. GI: Abdomen soft, no tenderness, not distended  Musculoskeletal: No cyanosis in digits, neck supple  Neurology: CN 2-12 grossly intact. No speech or motor deficits  Psych: Normal affect.  Alert and oriented in time, place and person  Skin: Warm, dry, normal turgor    Labs and Tests:  CBC:   Recent Labs     10/17/22  0546 10/18/22  0437 10/19/22  0425   WBC 20.1* 17.8* 13.8*   HGB 6.7* 7.0* 7.4*   PLT 25* 70* 43*     BMP:    Recent Labs     10/18/22  0437 10/18/22  1504 10/19/22  0425 10/19/22  0840    144 150* 148*   K 4.4 3.9 4.6  --     109 115*  --    CO2 19* 20* 23  --    BUN 70* 76* 80*  --    CREATININE 1.8* 1.9* 1.7*  --    GLUCOSE 490* 500* 135*  --      Hepatic: No results for input(s): AST, ALT, ALB, BILITOT, ALKPHOS in the last 72 hours. ASSESSMENT AND PLAN    Principal Problem:    Acute hypoxemic respiratory failure (HCC)  Active Problems:    Severe thrombocytopenia (HCC)    Severe anemia    Shoulder pain    Hypervolemia    Myelodysplastic syndrome (HCC)    Chronic systolic heart failure (HCC)    Arterial hypotension    History of coronary artery disease    Pulmonary vascular congestion    Aspiration pneumonia of left lower lobe due to gastric secretions (HCC)    Acute renal failure (HCC)    Acute chest pain    Type 2 diabetes mellitus ([address]  Resolved Problems:    * No resolved hospital problems. *         67-year-old female who is admitted with has ARUN, acute hypoxic respiratory failure, diastolic CHF exacerbation and questionable aspiration PNA. She has the following hematologic issues:     1. MDS  - bone marrow biopsy done on 9/29/2022 consistent with MDS. Blasts 10% on bone marrow, FISH panel has shown 5 q-, Cytogenetics are pending.  -Flow cytometry peripheral blood 10/14/2022 did not show any evidence of transformation to acute leukemia.  -Considering her age, comorbid conditions and myelodysplastic syndrome ( MDS EB - 2 ), they should consider best supportive care/hospice care. -Family has met with palliative care and wants to continue aggressive interventions up until the point of cardiac or respiratory arrest.     2. Leukocytosis, anemia, and thrombocytopenia:  - Secondary to MDS  - Hgb 7.4 today, no indication to transfuse today. - platelet count 58V today, monitor for bleeding.    - Hold anticoagulation if platelet count <07Y. - transfuse to keep Hgb above 7 and platelets above 23X or above 50k if active bleeding.  - WBC improved to 13.8 today. Deborah Gomez, [address] [facility]  Oncology Hematology [facility]  [phone]     Agree with above. Continue current care  Transfuse PRBCs if hemoglobin is less than 7.     Hector Jones MD

## 2022-10-19 NOTE — PROGRESS NOTES
She will      Hospitalist Progress Note    Name:  Verdie Alpers    /Age/Sex: 1940  (78 y.o. female)  MRN & CSN:  [id number] & [id number]    PCP: MATEUSZ Valdivia CNP    Date of Admission: 10/10/2022    Patient Status:  Inpatient     Chief Complaint: Shoulder pain    Hospital Course: 69-year-old female with past medical history of hyperlipidemia, hypertension, GERD, type 2 diabetes, CAD, atrial fibrillation. Recently diagnosed with mild dysplastic syndrome. Was transferred to the ICU for worsening respiratory distress on 10/17/2022. There was a question if patient had aspirated given her disposition. Patient has been improving on nasal cannula oxygen. Palliative care following. Subjective: Today is:  Hospital Day: 10.  Patient seen and examined in CVU-/2902. Patient states she feels much better. Denies any shortness of breath or chest pain. In A. fib on the monitor, but denies any palpitations. Wants to drink some fluids. Multiple family members at bedside and updated on plan and disposition.       Medications:  Reviewed    Infusion Medications    insulin Stopped (10/19/22 1205)    dextrose      sodium chloride      sodium chloride      sodium chloride      sodium chloride 10 mL/hr at 10/19/22 1817    dextrose       Scheduled Medications    digoxin  125 mcg Oral Daily    insulin lispro  0-4 Units SubCUTAneous TID WC    insulin lispro  0-4 Units SubCUTAneous Nightly    insulin glargine  15 Units SubCUTAneous BID    cefepime  1,000 mg IntraVENous Q12H    metroNIDAZOLE  500 mg IntraVENous Q8H    methylPREDNISolone  40 mg IntraVENous Daily    ipratropium-albuterol  1 ampule Inhalation 4x daily    lidocaine  1 patch TransDERmal Daily    metoprolol tartrate  12.5 mg Oral BID    acetaminophen  650 mg Oral TID    atorvastatin  80 mg Oral Nightly    hydrocortisone   Rectal BID    levothyroxine  150 mcg Oral Daily    sodium chloride flush  5-40 mL IntraVENous 2 times per day PRN Meds: glucose, dextrose bolus **OR** dextrose bolus, glucagon (rDNA), dextrose, melatonin, sodium chloride, sodium chloride, ipratropium-albuterol, lidocaine viscous hcl, benzonatate, sodium chloride, sodium chloride flush, sodium chloride, ondansetron **OR** ondansetron, polyethylene glycol, acetaminophen **OR** acetaminophen, dextrose bolus **OR** dextrose bolus, glucagon (rDNA), dextrose      Intake/Output Summary (Last 24 hours) at 10/19/2022 1849  Last data filed at 10/19/2022 1817  Gross per 24 hour   Intake 1058.47 ml   Output 555 ml   Net 503.47 ml       Physical Exam Performed:    BP (!) 89/47   Pulse 99   Temp 96.9 °F (36.1 °C) (Temporal)   Resp 19   Ht 5' (1.524 m)   Wt 242 lb 8.1 oz (110 kg)   LMP  (LMP Unknown)   SpO2 95%   BMI 47.36 kg/m²     General appearance: No apparent distress, appears stated age and cooperative. HEENT: Pupils equal, round, and reactive to light. Conjunctivae/corneas clear. Neck: Supple, with full range of motion. No jugular venous distention. Trachea midline. Respiratory:  Normal respiratory effort. Some rhonchi noted bilaterally. Nasal cannula present. Cardiovascular: Regular rate and rhythm with normal S1/S2 without murmurs, rubs or gallops. No peripheral edema. Abdomen: Soft, non-tender, non-distended with normal bowel sounds. : No CVA tenderness. Musculoskeletal: No clubbing or cyanosis. Full range of motion without deformity. Skin: Skin color, texture, turgor normal.  No rashes or lesions. Neurologic:  Neurovascularly intact without any focal sensory/motor deficits.  Cranial nerves: II-XII intact, grossly non-focal.  Psychiatric: Alert and oriented, thought content appropriate, normal insight  Peripheral Pulses: +2 palpable, equal bilaterally       Labs:   Recent Labs     10/17/22  0546 10/18/22  0437 10/19/22  0425   WBC 20.1* 17.8* 13.8*   HGB 6.7* 7.0* 7.4*   HCT 20.8* 21.3* 22.6*   PLT 25* 70* 43*     Recent Labs     10/18/22  0437 10/18/22  1504 10/19/22  0425 10/19/22  0840    144 150* 148*   K 4.4 3.9 4.6  --     109 115*  --    CO2 19* 20* 23  --    BUN 70* 76* 80*  --    CREATININE 1.8* 1.9* 1.7*  --    CALCIUM 8.4 8.6 8.7  --      No results for input(s): AST, ALT, BILIDIR, BILITOT, ALKPHOS in the last 72 hours. No results for input(s): INR in the last 72 hours. No results for input(s): Breanna McConnells in the last 72 hours. Urinalysis:      Lab Results   Component Value Date/Time    NITRU Negative 10/17/2022 07:24 PM    WBCUA 19 10/17/2022 07:24 PM    BACTERIA None Seen 10/17/2022 07:24 PM    RBCUA 275 10/17/2022 07:24 PM    BLOODU LARGE 10/17/2022 07:24 PM    SPECGRAV 1.020 10/17/2022 07:24 PM    GLUCOSEU 250 10/17/2022 07:24 PM    GLUCOSEU NEGATIVE 03/09/2012 06:51 AM       Radiology:  XR CHEST PORTABLE   Final Result   No focal lung consolidation. XR CHEST PORTABLE   Final Result   Mild cardiomegaly. Diffuse haziness of the left lung be due to atypical   infection or edema. XR CHEST PORTABLE   Final Result   1. Similar-appearing prominence of the cardiac silhouette with associated   vascular congestion. 2. No focal consolidation. CT HEAD WO CONTRAST   Final Result   1. No acute intracranial abnormality. 2. Cerebral and cerebellar parenchymal volume loss with chronic microvascular   white matter ischemic disease. CT CHEST PULMONARY EMBOLISM W CONTRAST   Final Result   No evidence of pulmonary embolism or acute pulmonary abnormality. XR CHEST PORTABLE   Final Result   No radiographic evidence of acute pulmonary disease.                  Assessment/Plan:    Active Hospital Problems    Diagnosis     Acute hypoxemic respiratory failure ([facility] [J96.01]      Priority: High    History of coronary artery disease [Z86.79]      Priority: Medium    Pulmonary vascular congestion [R09.89]      Priority: Medium    Aspiration pneumonia of left lower lobe due to gastric secretions ([facility] [J69.0]      Priority: Medium    Hypervolemia [E87.70]      Priority: Medium    Myelodysplastic syndrome ([facility] [D46.9]      Priority: Medium    Chronic systolic heart failure ([facility] [I50.22]      Priority: Medium    Arterial hypotension [I95.9]      Priority: Medium    Shoulder pain [M25.519]      Priority: Medium    Severe anemia [D64.9]      Priority: Medium    Severe thrombocytopenia (HCC) [D69.6]      Priority: Medium    Type 2 diabetes mellitus ([facility] [E11.9]     Acute chest pain [R07.9]     Acute renal failure ([facility] [N17.9]          Hospital Day: 10    This is a 78 y.o. female who presented to [facility] on 10/10/2022 and is being treated for:    Acute hypoxic respiratory failure  -Likely 2/2 pneumonia and CHF  -Wean oxygen as tolerated  -Pulmonology consulted; appreciate recommendations  -Palliative care consulted; appreciate recommendations    Pneumonia  -Continue antibiotics; likely DC in next 24 to 48 hours  -Monitor leukocytosis with CBC  -Await final culture data    MDS  -Continue with transfusions as needed  -Daily CBC  -Hematology consulted; appreciate recommendations    ARUN  -Creatinine peaked at 1.9; baseline 1.0  -Downtrending  -Avoid nephrotoxins    CAD  -Statin    Afib  -Continue metoprolol  -Not a candidate for anticoagulation  -Digoxin per cardiology  -Cardiology consulted; appreciate recommendations    Type 2 diabetes  -Discontinue insulin drip  -SSI  -Lantus twice daily  -Monitor BG while on Solu-Medrol        DVT ppx: Contraindicated  GI ppx: Diet/Tube Feeds  Diet: ADULT DIET; Dysphagia - Pureed; No Drinking Straws  Code Status: DNR-CCA    Disposition:  Placement versus home      PT/OT Eval Status: Ordered      Tanesha Ohara DO  10/19/2022  6:49 PM      Please note that some part of this chart was generated using Dragon dictation software. Although every effort was made to ensure the accuracy of this automated transcription, some errors in transcription may have occurred inadvertently.  If you may need any clarification, please do not hesitate to contact me through EPIC.

## 2022-10-19 NOTE — PLAN OF CARE
Problem: Pain  Goal: Verbalizes/displays adequate comfort level or baseline comfort level  Outcome: Progressing     Problem: Skin/Tissue Integrity  Goal: Absence of new skin breakdown  Description: 1. Monitor for areas of redness and/or skin breakdown  2. Assess vascular access sites hourly  3. Every 4-6 hours minimum:  Change oxygen saturation probe site  4. Every 4-6 hours:  If on nasal continuous positive airway pressure, respiratory therapy assess nares and determine need for appliance change or resting period.   Outcome: Progressing     Problem: Safety - Adult  Goal: Free from fall injury  Outcome: Progressing     Problem: ABCDS Injury Assessment  Goal: Absence of physical injury  Outcome: Progressing     Problem: Chronic Conditions and Co-morbidities  Goal: Patient's chronic conditions and co-morbidity symptoms are monitored and maintained or improved  Outcome: Not Progressing  Flowsheets (Taken 10/18/2022 2000)  Care Plan - Patient's Chronic Conditions and Co-Morbidity Symptoms are Monitored and Maintained or Improved:   Monitor and assess patient's chronic conditions and comorbid symptoms for stability, deterioration, or improvement   Collaborate with multidisciplinary team to address chronic and comorbid conditions and prevent exacerbation or deterioration   Update acute care plan with appropriate goals if chronic or comorbid symptoms are exacerbated and prevent overall improvement and discharge     Problem: Nutrition Deficit:  Goal: Optimize nutritional status  Outcome: Not Progressing

## 2022-10-19 NOTE — CARE COORDINATION
CM reviewed palliative note and family agreeable to pursue aggressive interventions up until the point of cardiac or respiratory arrest- DNR-CCA. D/c plan unclear at this time. CM did call Rigoberto Reveles at [facility] [phone] where pt came from [facility] and she has been following pt for plan also. States if pt needs skilled services she is able to return to facility.       Tran Monaco RN, BSN  [phone]

## 2022-10-19 NOTE — PROGRESS NOTES
[facility] Pulmonary and Critical Care    Follow Up Note    Subjective:   CHIEF COMPLAINT / HPI:   Chief Complaint   Patient presents with    Chest Pain     Pt transported from [facility] from [facility]. PT c/o chest pain radiating to left shoulder. Symptoms started 2 days ago. Pt stated she was seen in [facility] and was inpatient for 2 weeks for left shoulder complications. Interval history: Patient looks better this morning. She was tolerating 1 L/min nasal cannula oxygen supplement. I took her off oxygen while I was in the room she was able to maintain saturations in the mid 90s without respiratory distress. Past Medical History:    Reviewed; no changes    Social History:    Reviewed; no changes    REVIEW OF SYSTEMS:    CONSTITUTIONAL:  negative for fevers and chills  RESPIRATORY:  See HPI  CARDIOVASCULAR:  negative for chest pain, palpitations, edema  GASTROINTESTINAL:  negative for nausea, vomiting, diarrhea, constipation and abdominal pain    Objective:   PHYSICAL EXAM:        VITALS:  BP (!) 91/58   Pulse (!) 103   Temp 99.2 °F (37.3 °C) (Bladder)   Resp 21   Ht 5' (1.524 m)   Wt 242 lb 8.1 oz (110 kg)   LMP  (LMP Unknown)   SpO2 94%   BMI 47.36 kg/m²  on 2L NC    24HR INTAKE/OUTPUT:    Intake/Output Summary (Last 24 hours) at 10/19/2022 1204  Last data filed at 10/19/2022 1115  Gross per 24 hour   Intake 630.54 ml   Output 705 ml   Net -74.46 ml         CONSTITUTIONAL:  awake, alert,  no apparent distress, and appears stated age  LUNGS:  No increased work of breathing and clear to auscultation, no crackles or wheezes  CARDIOVASCULAR: S1 and S2 and no JVD  ABDOMEN:  normal bowel sounds, non-distended and non-tender to palpation  EXT: No edema, no calf tenderness. Pulses are present bilaterally. NEUROLOGIC:  Mental Status Exam:  Level of Alertness:   awake  Orientation:   person, place, time.  Non focal  SKIN:  normal skin color, texture, turgor, no redness, warmth, or swelling at IV sites    DATA:    CBC:  Recent Labs     10/17/22  0546 10/18/22  0437 10/19/22  0425   WBC 20.1* 17.8* 13.8*   RBC 2.17* 2.23* 2.40*   HGB 6.7* 7.0* 7.4*   HCT 20.8* 21.3* 22.6*   PLT 25* 70* 43*   MCV 96.0 95.6 94.4   MCH 31.0 31.2 30.8   MCHC 32.3 32.7 32.6   RDW 22.5* 21.6* 21.0*        BMP:  Recent Labs     10/18/22  0437 10/18/22  1504 10/19/22  0425 10/19/22  0840    144 150* 148*   K 4.4 3.9 4.6  --     109 115*  --    CO2 19* 20* 23  --    BUN 70* 76* 80*  --    CREATININE 1.8* 1.9* 1.7*  --    CALCIUM 8.4 8.6 8.7  --    GLUCOSE 490* 500* 135*  --         ABG:  Recent Labs     10/16/22  1305   PHART 7.409   YDT1XML 37.1   PO2ART 93.5   MKN8GME 23.5   [id number] 98.3   BEART -1.1       Procalcitonin  Recent Labs     10/16/22  1220   PROCAL 2.20*             Radiology Review:  Pertinent images / reports were reviewed as a part of this visit. Assessment:     Acute hypoxemic respiratory failure  Multifocal pneumonia  ARUN  HFrEF  Myelodysplastic syndrome    Plan:     Patient does have chest x-ray from 10/16 and my interpretation is mild CHF with more focal haziness at the left lung base which could be reflective of pneumonia. Procalcitonin is elevated at 2.2. She also has a leukocytosis. Leukocytosis is downtrending there is no guiding culture data. She is on cefepime and Flagyl over concern for possible aspiration pneumonia. She does have newly diagnosed myelodysplastic syndrome and has required transfusion of PRBC and platelets. Hematology oncology is following. Her prognosis is poor. She does have acute on chronic kidney disease. Nephrology is following. Creatinine is now downtrending. Creatinine is 1.7 today. .    She did require transfusion on 10/17. Hemoglobin has remained stable since then. However, platelet count is down to 43,000 today. Because of her myelodysplastic syndrome she may be transfusion dependent.

## 2022-10-19 NOTE — PROGRESS NOTES
Facility/Department: [facility] CVU  Speech Language Pathology   Dysphagia Treatment Note    Patient: Fransico Hernandez   : 1940   MRN: [id number]      Evaluation Date: 10/19/2022      Admitting Dx: Shoulder pain [M25.519]  Acute chest pain [R07.9]  History of coronary artery disease [Z86.79]  Chronic congestive heart failure, unspecified heart failure type ([address] [I50.9]  Treatment Diagnosis: Oropharyngeal Dysphagia   Pain: Denies                                              Diet and Treatment Recommendations 10/19/2022:  Diet Solids Recommendation:  Dysphagia I Puree  Liquid Consistency Recommendation: Thin liquids, No straws  Recommended form of Meds: Meds crushed as able in puree    Downgrade to NPO for overt clinical s/s aspiration or decline in respiratory status    Compensatory strategies:   Effortful Swallows , Upright as possible with all PO intake , No straws , Assist Feed , External Pacing , Small bites/sips , Eat/feed slowly, Remain upright 30-45 min     Assessment of Texture Tolerance:  Diet level prior to treatment: NPO   Tolerance of Current Diet Level: RN reporting pt with improved level of alertness this date    Impressions: Pt was positioned Upright in bed , awake and alert. Family present and providing translation assistance. Currently on  2L O2 via nasal cannula , pt keeping nasal cannula in her mouth due to nosebleeds. Pt removed supplemental O2 during PO intake, RR between 18-22/min and O2 sats between 90-92%. Intermittent baseline congested and productive cough noted. Trials of ice chips, thin liquids, puree, and minced solids were provided to assess swallow function. Pt benefited from feeding assistance due to reported numbness in hand. Oral phase characterized by functional oral acceptance but reduced oral manipulation, disorganized for minced solids. Adequate A-P transit, laryngeal elevation judged to be reduced subjectively with palpation.  Pt with cough x 1 following initial spoon sip thin liquid and with straw sips of thin liquid. No overt clinical s/s aspiration across additional PO intake, including with cup edge sips of thin liquid. RR and O2 sats remained stable throughout PO intake. Education provided to pt and family re: risk factors for dysphagia, aspiration precautions, and use of safe swallowing strategies including small bites/sips and slow rate of intake. Pt demonstrates improvement in level of alertness and swallow function this date. However, pt continues demonstrates increased risk for aspiration due to co morbidities , deconditioning , respiratory status , and inability to self feed . Based on today's assessment recommend Dysphagia I Puree  with Thin liquids, No straws  and aspiration precautions. Consider downgrade to NPO for overt clinical s/s aspiration or decline in respiratory status. Pt may benefit from instrumental swallow evaluation to further assess aspiration risk and develop POC; however, FEES not appropriate at this time due to nosebleeds. Will continue to follow for clinical indication and appropriateness of instrumental swallow study. Dysphagia Goals:   Pt will functionally tolerate ongoing assessment of swallow function with diet to be determined as indicated. (ongoing 10/19/2022)   Pt will advance to least restrictive diet as indicated. (ongoing 10/19/2022)  NEW GOAL:  If clinical s/s of aspiration/penetration continue to be noted, Pt will participate in Modified Barium Swallow Study      Plan:   3-5 times per week during acute care stay. Patient/Family Education:  Provided education regarding role of SLP, recommendations and general speech pathology plan of care.    [x] Pt verbalized understanding and agreement   [x] Pt requires ongoing learning   [] No evidence of comprehension     Discharge Recommendations:    Discharge recommendations to be determined pending ongoing follow-up during acute care stay    Treatment time:  Timed Code Treatment Minutes: 0  Total Treatment time: 28    If patient discharges prior to next session this note will serve as a discharge summary. Signature: Agapito Holder, [facility]  Speech-Language Pathology Graduate Clinician    The speech-language pathologist was present, directed the patient's care, made skilled judgment and was responsible for assessment and treatment.     Hubert Villagomez, [address] [facility]  Speech-Language Pathologist  [phone]

## 2022-10-20 PROBLEM — I50.31 ACUTE DIASTOLIC HEART FAILURE (HCC): Status: ACTIVE | Noted: 2022-07-27

## 2022-10-20 LAB
ANION GAP SERPL CALCULATED.3IONS-SCNC: 11 MMOL/L (ref 3–16)
BLOOD CULTURE, ROUTINE: NORMAL
BUN BLDV-MCNC: 95 MG/DL (ref 7–20)
CALCIUM SERPL-MCNC: 8.7 MG/DL (ref 8.3–10.6)
CHLORIDE BLD-SCNC: 105 MMOL/L (ref 99–110)
CO2: 22 MMOL/L (ref 21–32)
CREAT SERPL-MCNC: 1.5 MG/DL (ref 0.6–1.2)
CULTURE, BLOOD 2: NORMAL
GFR SERPL CREATININE-BSD FRML MDRD: 34 ML/MIN/{1.73_M2}
GLUCOSE BLD-MCNC: 260 MG/DL (ref 70–99)
GLUCOSE BLD-MCNC: 280 MG/DL (ref 70–99)
GLUCOSE BLD-MCNC: 290 MG/DL (ref 70–99)
GLUCOSE BLD-MCNC: 348 MG/DL (ref 70–99)
GLUCOSE BLD-MCNC: 376 MG/DL (ref 70–99)
GLUCOSE BLD-MCNC: 403 MG/DL (ref 70–99)
GLUCOSE BLD-MCNC: 427 MG/DL (ref 70–99)
GLUCOSE BLD-MCNC: 473 MG/DL (ref 70–99)
GLUCOSE BLD-MCNC: 479 MG/DL (ref 70–99)
GLUCOSE BLD-MCNC: 485 MG/DL (ref 70–99)
GLUCOSE BLD-MCNC: 495 MG/DL (ref 70–99)
GLUCOSE BLD-MCNC: 529 MG/DL (ref 70–99)
HCT VFR BLD CALC: 23.4 % (ref 36–48)
HEMOGLOBIN: 7.5 G/DL (ref 12–16)
MCH RBC QN AUTO: 31.5 PG (ref 26–34)
MCHC RBC AUTO-ENTMCNC: 32 G/DL (ref 31–36)
MCV RBC AUTO: 98.5 FL (ref 80–100)
PDW BLD-RTO: 22 % (ref 12.4–15.4)
PERFORMED ON: ABNORMAL
PLATELET # BLD: 31 K/UL (ref 135–450)
PMV BLD AUTO: 10.9 FL (ref 5–10.5)
POTASSIUM SERPL-SCNC: 5 MMOL/L (ref 3.5–5.1)
RBC # BLD: 2.37 M/UL (ref 4–5.2)
SODIUM BLD-SCNC: 138 MMOL/L (ref 136–145)
WBC # BLD: 15 K/UL (ref 4–11)

## 2022-10-20 PROCEDURE — 2580000003 HC RX 258: Performed by: STUDENT IN AN ORGANIZED HEALTH CARE EDUCATION/TRAINING PROGRAM

## 2022-10-20 PROCEDURE — 2000000000 HC ICU R&B

## 2022-10-20 PROCEDURE — 94640 AIRWAY INHALATION TREATMENT: CPT

## 2022-10-20 PROCEDURE — 6370000000 HC RX 637 (ALT 250 FOR IP): Performed by: INTERNAL MEDICINE

## 2022-10-20 PROCEDURE — 2580000003 HC RX 258: Performed by: INTERNAL MEDICINE

## 2022-10-20 PROCEDURE — 99233 SBSQ HOSP IP/OBS HIGH 50: CPT | Performed by: NURSE PRACTITIONER

## 2022-10-20 PROCEDURE — 6360000002 HC RX W HCPCS: Performed by: INTERNAL MEDICINE

## 2022-10-20 PROCEDURE — 6370000000 HC RX 637 (ALT 250 FOR IP): Performed by: NURSE PRACTITIONER

## 2022-10-20 PROCEDURE — 85027 COMPLETE CBC AUTOMATED: CPT

## 2022-10-20 PROCEDURE — 6370000000 HC RX 637 (ALT 250 FOR IP): Performed by: STUDENT IN AN ORGANIZED HEALTH CARE EDUCATION/TRAINING PROGRAM

## 2022-10-20 PROCEDURE — 2500000003 HC RX 250 WO HCPCS: Performed by: INTERNAL MEDICINE

## 2022-10-20 PROCEDURE — 99232 SBSQ HOSP IP/OBS MODERATE 35: CPT | Performed by: INTERNAL MEDICINE

## 2022-10-20 PROCEDURE — 94761 N-INVAS EAR/PLS OXIMETRY MLT: CPT

## 2022-10-20 PROCEDURE — 97530 THERAPEUTIC ACTIVITIES: CPT

## 2022-10-20 PROCEDURE — 92526 ORAL FUNCTION THERAPY: CPT

## 2022-10-20 PROCEDURE — 80048 BASIC METABOLIC PNL TOTAL CA: CPT

## 2022-10-20 RX ORDER — INSULIN LISPRO 100 [IU]/ML
0-16 INJECTION, SOLUTION INTRAVENOUS; SUBCUTANEOUS
Status: DISCONTINUED | OUTPATIENT
Start: 2022-10-20 | End: 2022-10-26 | Stop reason: HOSPADM

## 2022-10-20 RX ORDER — GUAIFENESIN 600 MG/1
600 TABLET, EXTENDED RELEASE ORAL 2 TIMES DAILY PRN
Status: DISCONTINUED | OUTPATIENT
Start: 2022-10-20 | End: 2022-10-26 | Stop reason: HOSPADM

## 2022-10-20 RX ORDER — INSULIN LISPRO 100 [IU]/ML
0-4 INJECTION, SOLUTION INTRAVENOUS; SUBCUTANEOUS NIGHTLY
Status: DISCONTINUED | OUTPATIENT
Start: 2022-10-20 | End: 2022-10-26 | Stop reason: HOSPADM

## 2022-10-20 RX ORDER — INSULIN GLARGINE 100 [IU]/ML
25 INJECTION, SOLUTION SUBCUTANEOUS 2 TIMES DAILY
Status: DISCONTINUED | OUTPATIENT
Start: 2022-10-20 | End: 2022-10-26 | Stop reason: HOSPADM

## 2022-10-20 RX ADMIN — SODIUM CHLORIDE 23.04 UNITS/HR: 9 INJECTION, SOLUTION INTRAVENOUS at 19:11

## 2022-10-20 RX ADMIN — HYDROCORTISONE: 25 CREAM TOPICAL at 20:20

## 2022-10-20 RX ADMIN — DIGOXIN 125 MCG: 125 TABLET ORAL at 08:08

## 2022-10-20 RX ADMIN — Medication 10 ML: at 20:20

## 2022-10-20 RX ADMIN — INSULIN LISPRO 16 UNITS: 100 INJECTION, SOLUTION INTRAVENOUS; SUBCUTANEOUS at 08:23

## 2022-10-20 RX ADMIN — Medication 10 ML: at 08:10

## 2022-10-20 RX ADMIN — ATORVASTATIN CALCIUM 80 MG: 80 TABLET, FILM COATED ORAL at 20:20

## 2022-10-20 RX ADMIN — METOPROLOL TARTRATE 12.5 MG: 25 TABLET, FILM COATED ORAL at 20:20

## 2022-10-20 RX ADMIN — METRONIDAZOLE 500 MG: 500 INJECTION, SOLUTION INTRAVENOUS at 21:48

## 2022-10-20 RX ADMIN — SODIUM CHLORIDE: 9 INJECTION, SOLUTION INTRAVENOUS at 15:25

## 2022-10-20 RX ADMIN — CEFEPIME 1000 MG: 1 INJECTION, POWDER, FOR SOLUTION INTRAMUSCULAR; INTRAVENOUS at 10:50

## 2022-10-20 RX ADMIN — IPRATROPIUM BROMIDE AND ALBUTEROL SULFATE 1 AMPULE: 2.5; .5 SOLUTION RESPIRATORY (INHALATION) at 16:52

## 2022-10-20 RX ADMIN — ACETAMINOPHEN 650 MG: 325 TABLET ORAL at 16:12

## 2022-10-20 RX ADMIN — IPRATROPIUM BROMIDE AND ALBUTEROL SULFATE 1 AMPULE: 2.5; .5 SOLUTION RESPIRATORY (INHALATION) at 08:21

## 2022-10-20 RX ADMIN — LEVOTHYROXINE SODIUM 150 MCG: 150 TABLET ORAL at 08:08

## 2022-10-20 RX ADMIN — SODIUM CHLORIDE 14.07 UNITS/HR: 9 INJECTION, SOLUTION INTRAVENOUS at 13:49

## 2022-10-20 RX ADMIN — IPRATROPIUM BROMIDE AND ALBUTEROL SULFATE 1 AMPULE: 2.5; .5 SOLUTION RESPIRATORY (INHALATION) at 20:42

## 2022-10-20 RX ADMIN — GUAIFENESIN 600 MG: 600 TABLET, EXTENDED RELEASE ORAL at 10:46

## 2022-10-20 RX ADMIN — METRONIDAZOLE 500 MG: 500 INJECTION, SOLUTION INTRAVENOUS at 06:22

## 2022-10-20 RX ADMIN — CEFEPIME 1000 MG: 1 INJECTION, POWDER, FOR SOLUTION INTRAMUSCULAR; INTRAVENOUS at 22:57

## 2022-10-20 RX ADMIN — ACETAMINOPHEN 650 MG: 325 TABLET ORAL at 20:20

## 2022-10-20 RX ADMIN — HYDROCORTISONE: 25 CREAM TOPICAL at 08:08

## 2022-10-20 RX ADMIN — METRONIDAZOLE 500 MG: 500 INJECTION, SOLUTION INTRAVENOUS at 15:26

## 2022-10-20 RX ADMIN — INSULIN LISPRO 16 UNITS: 100 INJECTION, SOLUTION INTRAVENOUS; SUBCUTANEOUS at 12:09

## 2022-10-20 RX ADMIN — ACETAMINOPHEN 650 MG: 325 TABLET ORAL at 08:09

## 2022-10-20 RX ADMIN — IPRATROPIUM BROMIDE AND ALBUTEROL SULFATE 1 AMPULE: 2.5; .5 SOLUTION RESPIRATORY (INHALATION) at 12:41

## 2022-10-20 RX ADMIN — METHYLPREDNISOLONE SODIUM SUCCINATE 40 MG: 40 INJECTION, POWDER, FOR SOLUTION INTRAMUSCULAR; INTRAVENOUS at 08:10

## 2022-10-20 RX ADMIN — INSULIN GLARGINE 25 UNITS: 100 INJECTION, SOLUTION SUBCUTANEOUS at 08:23

## 2022-10-20 RX ADMIN — METOPROLOL TARTRATE 12.5 MG: 25 TABLET, FILM COATED ORAL at 08:08

## 2022-10-20 ASSESSMENT — PAIN DESCRIPTION - ORIENTATION
ORIENTATION: UPPER;MID
ORIENTATION: MID
ORIENTATION: MID

## 2022-10-20 ASSESSMENT — PAIN DESCRIPTION - DESCRIPTORS
DESCRIPTORS: BURNING
DESCRIPTORS: ACHING;BURNING
DESCRIPTORS: ACHING;SORE

## 2022-10-20 ASSESSMENT — PAIN DESCRIPTION - LOCATION
LOCATION: COCCYX
LOCATION: COCCYX
LOCATION: ABDOMEN

## 2022-10-20 ASSESSMENT — PAIN SCALES - GENERAL
PAINLEVEL_OUTOF10: 6
PAINLEVEL_OUTOF10: 2
PAINLEVEL_OUTOF10: 4
PAINLEVEL_OUTOF10: 8
PAINLEVEL_OUTOF10: 0

## 2022-10-20 NOTE — PROGRESS NOTES
Oncology Hematology Care   Progress Note      10/20/2022 9:24 AM        Name: Sasha Beasley . Admitted: 10/10/2022    SUBJECTIVE:  she is doing OK, she c/o cough, no external bleeding, she does have some dried blood in her nose.      Reviewed interval ancillary notes    Current Medications  insulin lispro (HUMALOG) injection vial 0-16 Units, TID WC  insulin lispro (HUMALOG) injection vial 0-4 Units, Nightly  insulin glargine (LANTUS) injection vial 25 Units, BID  digoxin (LANOXIN) tablet 125 mcg, Daily  glucose-vitamin C chewable tablet 4 tablet, PRN  dextrose bolus 10% 125 mL, PRN   Or  dextrose bolus 10% 250 mL, PRN  glucagon (rDNA) injection 1 mg, PRN  dextrose 10 % infusion, Continuous PRN  melatonin tablet 6 mg, Nightly PRN  cefepime (MAXIPIME) 1000 mg IVPB minibag, Q12H  0.9 % sodium chloride infusion, PRN  metronidazole (FLAGYL) 500 mg in 0.9% NaCl 100 mL IVPB premix, Q8H  0.9 % sodium chloride infusion, PRN  methylPREDNISolone sodium (SOLU-MEDROL) injection 40 mg, Daily  ipratropium-albuterol (DUONEB) nebulizer solution 1 ampule, Q4H PRN  lidocaine viscous hcl (XYLOCAINE) 2 % solution 15 mL, Q3H PRN  ipratropium-albuterol (DUONEB) nebulizer solution 1 ampule, 4x daily  lidocaine 4 % external patch 1 patch, Daily  benzonatate (TESSALON) capsule 100 mg, TID PRN  metoprolol tartrate (LOPRESSOR) tablet 12.5 mg, BID  0.9 % sodium chloride infusion, PRN  acetaminophen (TYLENOL) tablet 650 mg, TID  atorvastatin (LIPITOR) tablet 80 mg, Nightly  hydrocortisone (ANUSOL-HC) 2.5 % rectal cream, BID  levothyroxine (SYNTHROID) tablet 150 mcg, Daily  sodium chloride flush 0.9 % injection 5-40 mL, 2 times per day  sodium chloride flush 0.9 % injection 5-40 mL, PRN  0.9 % sodium chloride infusion, PRN  ondansetron (ZOFRAN-ODT) disintegrating tablet 4 mg, Q8H PRN   Or  ondansetron (ZOFRAN) injection 4 mg, Q6H PRN  polyethylene glycol (GLYCOLAX) packet 17 g, Daily PRN  acetaminophen (TYLENOL) tablet 650 mg, Q6H PRN   Or  acetaminophen (TYLENOL) suppository 650 mg, Q6H PRN  dextrose bolus 10% 125 mL, PRN   Or  dextrose bolus 10% 250 mL, PRN  glucagon (rDNA) injection 1 mg, PRN  dextrose 10 % infusion, Continuous PRN        Objective:  BP (!) 97/58   Pulse 87   Temp 97.7 °F (36.5 °C) (Temporal)   Resp 18   Ht 5' (1.524 m)   Wt 242 lb 1 oz (109.8 kg)   LMP  (LMP Unknown)   SpO2 95%   BMI 47.28 kg/m²     Intake/Output Summary (Last 24 hours) at 10/20/2022 0924  Last data filed at 10/20/2022 0809  Gross per 24 hour   Intake 753.01 ml   Output 600 ml   Net 153.01 ml      Wt Readings from Last 3 Encounters:   10/20/22 242 lb 1 oz (109.8 kg)   10/07/22 241 lb 14.4 oz (109.7 kg)   06/21/22 243 lb 9.6 oz (110.5 kg)       General appearance:  Appears comfortable  Eyes: Sclera clear. Pupils equal.  ENT: Moist oral mucosa. Trachea midline, no adenopathy. Cardiovascular: Regular rhythm, normal S1, S2. No murmur. No edema in lower extremities  Respiratory: Not using accessory muscles. Good inspiratory effort. Clear to auscultation bilaterally, no wheeze or crackles. GI: Abdomen soft, no tenderness, not distended  Musculoskeletal: No cyanosis in digits, neck supple  Neurology: CN 2-12 grossly intact. No speech or motor deficits  Psych: Normal affect. Alert and oriented in time, place and person  Skin: Warm, dry, normal turgor    Labs and Tests:  CBC:   Recent Labs     10/18/22  0437 10/19/22  0425 10/20/22  0530   WBC 17.8* 13.8* 15.0*   HGB 7.0* 7.4* 7.5*   PLT 70* 43* 31*     BMP:    Recent Labs     10/18/22  1504 10/19/22  0425 10/19/22  0840 10/20/22  0530    150* 148* 138   K 3.9 4.6  --  5.0    115*  --  105   CO2 20* 23  --  22   BUN 76* 80*  --  95*   CREATININE 1.9* 1.7*  --  1.5*   GLUCOSE 500* 135*  --  495*     Hepatic: No results for input(s): AST, ALT, ALB, BILITOT, ALKPHOS in the last 72 hours.     ASSESSMENT AND PLAN    Principal Problem:    Acute hypoxemic respiratory failure (HCC)  Active Problems:    Severe thrombocytopenia (HCC)    Severe anemia    Shoulder pain    Hypervolemia    Myelodysplastic syndrome (HCC)    Chronic systolic heart failure (HCC)    Arterial hypotension    History of coronary artery disease    Pulmonary vascular congestion    Aspiration pneumonia of left lower lobe due to gastric secretions (HCC)    Acute renal failure (HCC)    Acute chest pain    Type 2 diabetes mellitus ([facility] [address]  Resolved Problems:    * No resolved hospital problems. *      1. MDS  - bone marrow biopsy done on 9/29/2022 consistent with MDS. Blasts 10% on bone marrow, FISH panel has shown 5 q-, Cytogenetics are pending.  -Flow cytometry peripheral blood 10/14/2022 did not show any evidence of transformation to acute leukemia.  -Considering her age, comorbid conditions and myelodysplastic syndrome ( MDS EB - 2 ), they should consider best supportive care/hospice care. -Family has met with palliative care and wants to continue aggressive interventions up until the point of cardiac or respiratory arrest.     2. Leukocytosis, anemia, and thrombocytopenia:  - Secondary to MDS  - Hgb 7.5 today, no indication to transfuse today. - platelet count 83I today, monitor for bleeding.    - Hold anticoagulation if platelet count <59C. - transfuse to keep Hgb above 7 and platelets above 51N or above 50k if active bleeding.  - WBC 15.0 today. - continue to monitor CBC daily    Tristian Ndiaye, [address] [facility]  Oncology Hematology Care     The patient was seen and examined. She looks better. Continue current care. Will consider MDS therapy as outpatient if she gets better.     Selene Puckett MD

## 2022-10-20 NOTE — PROGRESS NOTES
She will      Hospitalist Progress Note    Name:  Obinna Silver    /Age/Sex: 1940  (78 y.o. female)  MRN & CSN:  [id number] & [id number]    PCP: MATEUSZ Mckeon CNP    Date of Admission: 10/10/2022    Patient Status:  Inpatient     Chief Complaint: Shoulder pain    Hospital Course: 41-year-old female with past medical history of hyperlipidemia, hypertension, GERD, type 2 diabetes, CAD, atrial fibrillation. Recently diagnosed with mild dysplastic syndrome. Was transferred to the ICU for worsening respiratory distress on 10/17/2022. There was a question if patient had aspirated given her disposition. Patient has been improving on nasal cannula oxygen. Palliative care following. Subjective: Today is:  Hospital Day: 11.  Patient seen and examined in CVU-/2902. Patient complains of cough. States she is coughing up mucus, but also some blood at times. Granddaughter at bedside and corroborated story. Eating and drinking okay. Plans work with therapy. Does complain of some pain on her buttocks where her bedsores are located.       Medications:  Reviewed    Infusion Medications    insulin (HUMAN R) non-weight based infusion 20.82 Units/hr (10/20/22 1812)    dextrose      sodium chloride      sodium chloride      sodium chloride      sodium chloride 10 mL/hr at 10/20/22 1812    dextrose       Scheduled Medications    [Held by provider] insulin lispro  0-16 Units SubCUTAneous TID WC    [Held by provider] insulin lispro  0-4 Units SubCUTAneous Nightly    [Held by provider] insulin glargine  25 Units SubCUTAneous BID    digoxin  125 mcg Oral Daily    cefepime  1,000 mg IntraVENous Q12H    metroNIDAZOLE  500 mg IntraVENous Q8H    methylPREDNISolone  40 mg IntraVENous Daily    ipratropium-albuterol  1 ampule Inhalation 4x daily    lidocaine  1 patch TransDERmal Daily    metoprolol tartrate  12.5 mg Oral BID    acetaminophen  650 mg Oral TID    atorvastatin  80 mg Oral Nightly hydrocortisone   Rectal BID    levothyroxine  150 mcg Oral Daily    sodium chloride flush  5-40 mL IntraVENous 2 times per day     PRN Meds: guaiFENesin, glucose, dextrose bolus **OR** dextrose bolus, glucagon (rDNA), dextrose, melatonin, sodium chloride, sodium chloride, ipratropium-albuterol, lidocaine viscous hcl, benzonatate, sodium chloride, sodium chloride flush, sodium chloride, ondansetron **OR** ondansetron, polyethylene glycol, acetaminophen **OR** acetaminophen, dextrose bolus **OR** dextrose bolus, glucagon (rDNA), dextrose      Intake/Output Summary (Last 24 hours) at 10/20/2022 1817  Last data filed at 10/20/2022 1812  Gross per 24 hour   Intake 1051.54 ml   Output 550 ml   Net 501.54 ml         Physical Exam Performed:    BP (!) 114/51   Pulse 93   Temp 97.6 °F (36.4 °C) (Temporal)   Resp 18   Ht 5' (1.524 m)   Wt 242 lb 1 oz (109.8 kg)   LMP  (LMP Unknown)   SpO2 96%   BMI 47.28 kg/m²     General appearance: No apparent distress, appears stated age and cooperative. HEENT: Pupils equal, round, and reactive to light. Conjunctivae/corneas clear. Neck: Supple, with full range of motion. No jugular venous distention. Trachea midline. Respiratory:  Normal respiratory effort. Some rhonchi noted bilaterally. Nasal cannula present. Cardiovascular: Regular rate and rhythm with normal S1/S2 without murmurs, rubs or gallops. No peripheral edema. Abdomen: Soft, non-tender, non-distended with normal bowel sounds. : No CVA tenderness. Musculoskeletal: No clubbing or cyanosis. Full range of motion without deformity. Skin: Skin color, texture, turgor normal.  No rashes or lesions. Neurologic:  Neurovascularly intact without any focal sensory/motor deficits.  Cranial nerves: II-XII intact, grossly non-focal.  Psychiatric: Alert and oriented, thought content appropriate, normal insight  Peripheral Pulses: +2 palpable, equal bilaterally       Labs:   Recent Labs     10/18/22  0437 10/19/22  9167 10/20/22  0530   WBC 17.8* 13.8* 15.0*   HGB 7.0* 7.4* 7.5*   HCT 21.3* 22.6* 23.4*   PLT 70* 43* 31*       Recent Labs     10/18/22  1504 10/19/22  0425 10/19/22  0840 10/20/22  0530    150* 148* 138   K 3.9 4.6  --  5.0    115*  --  105   CO2 20* 23  --  22   BUN 76* 80*  --  95*   CREATININE 1.9* 1.7*  --  1.5*   CALCIUM 8.6 8.7  --  8.7       No results for input(s): AST, ALT, BILIDIR, BILITOT, ALKPHOS in the last 72 hours. No results for input(s): INR in the last 72 hours. No results for input(s): Randene Holes in the last 72 hours. Urinalysis:      Lab Results   Component Value Date/Time    NITRU Negative 10/17/2022 07:24 PM    WBCUA 19 10/17/2022 07:24 PM    BACTERIA None Seen 10/17/2022 07:24 PM    RBCUA 275 10/17/2022 07:24 PM    BLOODU LARGE 10/17/2022 07:24 PM    SPECGRAV 1.020 10/17/2022 07:24 PM    GLUCOSEU 250 10/17/2022 07:24 PM    GLUCOSEU NEGATIVE 03/09/2012 06:51 AM       Radiology:  XR CHEST PORTABLE   Final Result   No focal lung consolidation. XR CHEST PORTABLE   Final Result   Mild cardiomegaly. Diffuse haziness of the left lung be due to atypical   infection or edema. XR CHEST PORTABLE   Final Result   1. Similar-appearing prominence of the cardiac silhouette with associated   vascular congestion. 2. No focal consolidation. CT HEAD WO CONTRAST   Final Result   1. No acute intracranial abnormality. 2. Cerebral and cerebellar parenchymal volume loss with chronic microvascular   white matter ischemic disease. CT CHEST PULMONARY EMBOLISM W CONTRAST   Final Result   No evidence of pulmonary embolism or acute pulmonary abnormality. XR CHEST PORTABLE   Final Result   No radiographic evidence of acute pulmonary disease.                  Assessment/Plan:    Active Hospital Problems    Diagnosis     Acute hypoxemic respiratory failure (HCC) [J96.01]      Priority: High    History of coronary artery disease [Z86.79]      Priority: Medium Pulmonary vascular congestion [R09.89]      Priority: Medium    Aspiration pneumonia of left lower lobe due to gastric secretions ([facility] [address] [J69.0]      Priority: Medium    Hypervolemia [E87.70]      Priority: Medium    MDS (myelodysplastic syndrome) (HCC) [D46.9]      Priority: Medium    Chronic systolic heart failure (HCC) [I50.22]      Priority: Medium    Arterial hypotension [I95.9]      Priority: Medium    Shoulder pain [M25.519]      Priority: Medium    Severe anemia [D64.9]      Priority: Medium    Severe thrombocytopenia (HCC) [D69.6]      Priority: Medium    Acute diastolic heart failure (HCC) [I50.31]      Priority: Medium    Type 2 diabetes mellitus ([facility] [address] [E11.9]     Acute chest pain [R07.9]     Acute renal failure ([facility] [address] [N17.9]          Hospital Day: 11    This is a 78 y.o. female who presented to [facility] on 10/10/2022 and is being treated for:    Acute hypoxic respiratory failure  -Likely 2/2 pneumonia and CHF  -Wean oxygen as tolerated  -Mucinex for cough  -Pulmonology consulted; appreciate recommendations  -Palliative care consulted; appreciate recommendations    Pneumonia  -Continue antibiotics; likely DC in next 24 to 48 hours  -Monitor leukocytosis with CBC  -Await final culture data    MDS  -Continue with transfusions as needed  -Daily CBC  -Hematology consulted; appreciate recommendations    ARUN  -Creatinine peaked at 1.9; baseline 1.0  -Downtrending  -Avoid nephrotoxins    CAD  -Statin    Afib  -Continue metoprolol  -Not a candidate for anticoagulation  -Digoxin per cardiology  -Cardiology consulted; appreciate recommendations    Type 2 diabetes  -Restart insulin drip while on IV steroids  -SSI on hold; restart after stopping insulin drip  -Lantus twice daily on hold; restart after stopping insulin drip  -Monitor BG while on Solu-Medrol        DVT ppx: Contraindicated  GI ppx: Diet/Tube Feeds  Diet: ADULT DIET; Dysphagia - Soft and Bite Sized;  No Drinking Straws  Code Status: DNR-CCA    Disposition:  Placement versus home      PT/OT Eval Status: Ordered      Wilfredo Jerry DO  10/20/2022  6:17 PM      Please note that some part of this chart was generated using Dragon dictation software. Although every effort was made to ensure the accuracy of this automated transcription, some errors in transcription may have occurred inadvertently. If you may need any clarification, please do not hesitate to contact me through [facility].

## 2022-10-20 NOTE — PROGRESS NOTES
2015: Dr. Rhonda Kulkarni notified of glucose of 472. Administered ordered nightly dose of 15 units of Lantus and 4 units of Humalog per sliding scale. Instructed to recheck glucose in 1 hour. 2130: recheck glucose of 481, notified Dr. Rhonda Kulkarni. Given orders to administer 10 units of regular insulin and recheck in 2-3 hours. 2330: recheck glucose of 469, notified Dr. Rhonda Kulkarni of result. Awaiting response.     Electronically signed by Doris Curling, RN on 10/20/2022 at 12:22 AM

## 2022-10-20 NOTE — PROGRESS NOTES
Following message sent to Dr. Oscar Campos: Glucose elevated all night in the 400's, they gave her a one time dose of an extra 10units humalog last night and still elevated. This AM she is 473, calls for 15 units of lantus and 4 units of humalog. Do you want to add prandial or increase her sliding scale? Start back on insulin drip?   0142-see new insulin orders

## 2022-10-20 NOTE — PROGRESS NOTES
[facility]   Electrophysiology Progress Note     Date: 10/20/2022  Admit Date: 10/10/2022     Reason for consultation: Atrial fibrillation    Chief Complaint:   Chief Complaint   Patient presents with    Chest Pain     Pt transported from [facility] from [facility]. PT c/o chest pain radiating to left shoulder. Symptoms started 2 days ago. Pt stated she was seen in [facility] and was inpatient for 2 weeks for left shoulder complications. History of Present Illness: History obtained from patient and medical record. Haritha Cruz is a 78 y.o. female with a past medical history of hypertension, hyperlipidemia, hypothyroidism, CARLOS, CAD, and paroxysmal atrial fibrillation. Pt presented to hospital with admitted with shoulder pain, chest pain and hemoptysis. She has been hypotensive and given IV fluids and this resulted in respiratory distress. IV lasix given. She was moved to the ICU. Tachypneic and xray with pulmonary vascular congestion. LVEF is 45-50%. She had been treated for acute CHF, anemia, thrombocytopenia, acute respiratory distress, ARUN, and myelodysplastic syndrome. On 10/11/22, her Xarelto was held due to low platelet count requiring 1 pack platelets and 1 unit PRBCs. She subsequently developed AF with RVR and she was given a one time does of IV metoprolol with improvement in HR. EP is consulted for AF. Interval Hx: Today, she is being seen for EP follow up. Her daughters are at the bedside. She remains in atrial fibrillation, but her rate is much better controlled. Her BP remains soft. Her H/H remains low without any bleeding noted. Patient seen and examined. Clinical notes reviewed. Telemetry reviewed. No new complaints today. No major events overnight. Denies having chest pain, palpitations, shortness of breath, orthopnea/PND, cough, or dizziness at the time of this visit. Allergies:   Allergies   Allergen Reactions    Aspirin Other (See Comments)     GI upset Ibuprofen Other (See Comments)     GI upset    Macrobid [Nitrofurantoin Monohydrate Macrocrystals] Other (See Comments)     COLD, SICK    Adhesive Tape Rash    Lexiscan [Regadenoson] Rash     Happened twice with Lexiscan    Penicillins Rash     Home Meds:  Prior to Visit Medications    Medication Sig Taking?  Authorizing Provider   naproxen (NAPROSYN) 500 MG tablet Take 1 tablet by mouth 2 times daily (with meals) Yes Lucia Lenz MD   empagliflozin (JARDIANCE) 10 MG tablet Take 1 tablet by mouth daily  Meghan Blair MD   insulin glargine (BASAGLAR KWIKPEN) 100 UNIT/ML injection pen Inject 48 Units into the skin daily  Meghan Blair MD   sacubitril-valsartan (ENTRESTO) 24-26 MG per tablet Take 0.5 tablets by mouth 2 times daily  Meghan Blair MD   furosemide (LASIX) 20 MG tablet Take 1 tablet by mouth daily  Meghan Blair MD   pantoprazole (PROTONIX) 40 MG tablet Take 40 mg by mouth every morning (before breakfast)  Historical Provider, MD   calcium carbonate (OSCAL) 500 MG TABS tablet Take 500 mg by mouth daily  Historical Provider, MD   gabapentin (NEURONTIN) 300 MG capsule Take 1 tablet in am, 2 tablets in pm.  Ady Loving, APRN - CNP   senna (SENOKOT) 8.6 MG tablet Take 1 tablet by mouth 2 times daily  Historical Provider, MD   insulin aspart (NOVOLOG) 100 UNIT/ML injection vial Inject 15 Units into the skin 3 times daily  Historical Provider, MD   magnesium 30 MG tablet Take 40 mg by mouth daily  Historical Provider, MD   tiZANidine (ZANAFLEX) 2 MG tablet Take 2 mg by mouth every 8 hours as needed  Historical Provider, MD   metoprolol tartrate (LOPRESSOR) 25 MG tablet TAKE ONE-HALF TABLET BY MOUTH TWICE A DAY  Sunday MD Madhu   atorvastatin (LIPITOR) 80 MG tablet Take 1 tablet by mouth daily  Sunday MD Madhu   Insulin Pen Needle 32G X 6 MM MISC by Does not apply route 3 times daily with meals  Historical Provider, MD   hydrocortisone (ANUSOL-HC) 2.5 % rectal cream Place rectally 2 times daily Place rectally 2 times daily. Historical Provider, MD   rivaroxaban (XARELTO) 20 MG TABS tablet Take 1 tablet by mouth daily  Sherly Kidd MD   vitamin D (ERGOCALCIFEROL) 30130 UNITS CAPS capsule Take 50,000 Units by mouth once a week  Historical Provider, MD   Omega 3 1000 MG CAPS Take 1,000 mg by mouth daily  Historical Provider, MD   Liraglutide (VICTOZA) 18 MG/3ML SOPN SC injection Inject 1.2 mg into the skin daily  Historical Provider, MD   lidocaine (LIDODERM) 5 % Place 1 patch onto the skin daily 12 hours on, 12 hours off. Milagros Azar, APRN - CNP   oxybutynin (DITROPAN XL) 15 MG CR tablet Take 15 mg by mouth daily. Historical Provider, MD   Loratadine 10 MG CAPS Take by mouth daily   Historical Provider, MD   levothyroxine (SYNTHROID) 175 MCG tablet Take 150 mcg by mouth daily.   Historical Provider, MD      Scheduled Meds:   insulin lispro  0-16 Units SubCUTAneous TID WC    insulin lispro  0-4 Units SubCUTAneous Nightly    insulin glargine  25 Units SubCUTAneous BID    digoxin  125 mcg Oral Daily    cefepime  1,000 mg IntraVENous Q12H    metroNIDAZOLE  500 mg IntraVENous Q8H    methylPREDNISolone  40 mg IntraVENous Daily    ipratropium-albuterol  1 ampule Inhalation 4x daily    lidocaine  1 patch TransDERmal Daily    metoprolol tartrate  12.5 mg Oral BID    acetaminophen  650 mg Oral TID    atorvastatin  80 mg Oral Nightly    hydrocortisone   Rectal BID    levothyroxine  150 mcg Oral Daily    sodium chloride flush  5-40 mL IntraVENous 2 times per day     Continuous Infusions:   dextrose      sodium chloride      sodium chloride      sodium chloride      sodium chloride Stopped (10/19/22 2200)    dextrose       PRN Meds:glucose, dextrose bolus **OR** dextrose bolus, glucagon (rDNA), dextrose, melatonin, sodium chloride, sodium chloride, ipratropium-albuterol, lidocaine viscous hcl, benzonatate, sodium chloride, sodium chloride flush, sodium chloride, ondansetron **OR** ondansetron, polyethylene glycol, acetaminophen **OR** acetaminophen, dextrose bolus **OR** dextrose bolus, glucagon (rDNA), dextrose     Past Medical History:  Past Medical History:   Diagnosis Date    4/11/17 Left knee repeat repair of median parapatellar arthrotomy with augmentation 4/12/2017    Arthritis     Aspiration pneumonia of left lower lobe due to gastric secretions ([facility] 10/18/2022    Atrial fibrillation (HCC)     CAD (coronary artery disease)     Cataract     Chronic diastolic congestive heart failure ([facility] 7/27/2022    Diabetes mellitus ([facility]     GERD (gastroesophageal reflux disease)     Hyperlipidemia     Hypertension     HYPOTHRYROIDISM     Myelodysplastic disease ([facility] 10/17/2022    Non-English speaking patient     SPEAKS Greek. SHE SPEAKS A LITTLE ENGLISH    Sleep apnea     unspecified    Thyroid disease     UTI       Past Surgical History:    has a past surgical history that includes joint replacement (Bilateral, [address]); Cardiac surgery (2004); Cholecystectomy, laparoscopic (5/15/14); Revision total knee arthroplasty (Right, 11/22/2016); Total knee arthroplasty; knee surgery (Left, 02/28/2017); CT BIOPSY BONE MARROW (9/29/2022); Upper gastrointestinal endoscopy (N/A, 10/3/2022); and Upper gastrointestinal endoscopy (N/A, 10/3/2022). Social History:  Reviewed. reports that she has never smoked. She has never used smokeless tobacco. She reports that she does not drink alcohol and does not use drugs. Family History:  Reviewed. family history includes Arthritis in an other family member; Diabetes in an other family member; Heart Disease in an other family member; High Cholesterol in her brother; Hypertension in an other family member. Review of Systems:  Constitutional: Positive for fatigue and generalized weakness.  Negative for fever, night sweats, chills, weight changes  Skin: Negative for rash, dry skin, pruritus, bruising, bleeding, blood clots, or changes in skin pigment  HEENT: Negative for vision changes, ringing in the ears, sore throat, dysphagia, or swollen lymph nodes  Respiratory: Reviewed in HPI  Cardiovascular: Reviewed in HPI  Gastrointestinal: Negative for abdominal pain, N/V/D, constipation, or black/tarry stools  Genito-Urinary: Positive for marin site pain. Negative for dysuria, incontinence, urgency, or hematuria  Musculoskeletal: Negative for joint swelling, muscle pain, or injuries  Neurological/Psych: Negative for confusion, seizures, headaches, balance issues or TIA-like symptoms. No anxiety, depression, or insomnia    Physical Examination:  Vitals:    10/20/22 0826   BP:    Pulse: 87   Resp: 18   Temp:    SpO2: 95%      In: 763 [P.O.:240; I.V.:99]  Out: 650    Wt Readings from Last 3 Encounters:   10/20/22 242 lb 1 oz (109.8 kg)   10/07/22 241 lb 14.4 oz (109.7 kg)   06/21/22 243 lb 9.6 oz (110.5 kg)       Intake/Output Summary (Last 24 hours) at 10/20/2022 9029  Last data filed at 10/20/2022 0809  Gross per 24 hour   Intake 753.01 ml   Output 600 ml   Net 153.01 ml       Telemetry: Personally Reviewed  - Atrial fibrillation with RBBB. Rate 80-100s  Constitutional: Cooperative and in no apparent distress, and appears stated age  Skin: Warm and pink; no pallor, cyanosis, bruising, or clubbing  HEENT: Symmetric and normocephalic. PERRL, EOM intact. Conjunctiva pink with clear sclera. Mucus membranes pink and moist. Teeth intact. Thyroid smooth without nodules or goiter. Cardiovascular: Regular rate and irregular rhythm. S1/S2 present without murmurs, rubs, or gallops. Peripheral pulses 2+, capillary refill < 3 seconds. No elevation of JVP. No peripheral edema  Respiratory: Respirations symmetric and unlabored. Lungs clear/diminished to auscultation bilaterally, no wheezing, crackles, or rhonchi. Gastrointestinal: Abdomen soft and round. Bowel sounds normoactive in all quadrants without tenderness or masses.  + Obese  Musculoskeletal: Bilateral upper and lower extremity strength 5/5 with full ROM  Neurologic/Psych: Awake and orientated to person, place and time. Calm affect, appropriate mood    Pertinent labs, diagnostic, device, and imaging results reviewed as a part of this visit    Labs:    BMP:   Recent Labs     10/18/22  1504 10/19/22  0425 10/19/22  0840 10/20/22  0530    150* 148* 138   K 3.9 4.6  --  5.0    115*  --  105   CO2 20* 23  --  22   BUN 76* 80*  --  95*   CREATININE 1.9* 1.7*  --  1.5*   MG 2.10  --   --   --      Estimated Creatinine Clearance: 31 mL/min (A) (based on SCr of 1.5 mg/dL (H)). CBC:   Recent Labs     10/18/22  0437 10/19/22  0425 10/20/22  0530   WBC 17.8* 13.8* 15.0*   HGB 7.0* 7.4* 7.5*   HCT 21.3* 22.6* 23.4*   MCV 95.6 94.4 98.5   PLT 70* 43* 31*     Thyroid:   Lab Results   Component Value Date/Time    TSH 0.13 07/10/2012 02:05 PM    S4HGVSG 0.78 07/10/2012 02:05 PM     Lipids:   Lab Results   Component Value Date/Time    CHOL 157 07/13/2012 09:30 AM    HDL 47 07/13/2012 09:30 AM    HDL 44 07/06/2010 09:12 AM    TRIG 247 07/13/2012 09:30 AM     LFTS:   Lab Results   Component Value Date/Time    ALT 11 10/10/2022 11:20 AM    AST 15 10/10/2022 11:20 AM    ALKPHOS 75 10/10/2022 11:20 AM    PROT 6.4 10/10/2022 11:20 AM    PROT 7.3 07/10/2012 02:05 PM    AGRATIO 1.1 10/10/2022 11:20 AM    BILITOT 0.4 10/10/2022 11:20 AM     Cardiac Enzymes:   Lab Results   Component Value Date/Time    CKTOTAL 55 10/10/2022 04:13 PM    TROPONINI 0.05 10/11/2022 05:09 AM    TROPONINI 0.04 10/10/2022 04:13 PM    TROPONINI 0.04 10/10/2022 11:20 AM     Coags:   Lab Results   Component Value Date/Time    PROTIME 23.6 09/29/2022 11:37 AM    INR 2.10 09/29/2022 11:37 AM       ECG: 10/18/22 (Personally Interpreted)   - Atrial fibrillation with RVR and RBBB    ECHO: 10/17/2022   Technically difficult exam due to body habitus. Left ventricular cavity size is dilated with normal left ventricular wall thickness.    Overall left ventricular systolic function appears mildly reduced with an ejection fraction that is visually estimated to be 45-50%. Abnormal (paradoxical) septal motion is present. Definity contrast agent administered to better visualize endocardial border and wall motion. Normal diastolic filling pattern for age. Avg E/e' estimated to be 13.8. The right ventricle is mildly enlarged with reduced function. The left atrium is mildly dilated. Mild tricuspid regurgitation with an RVSP estimated to be 29 mmHg. Stress Test: 8/12/2020   Resting ECG   Normal sinus rhythm. Frequent premature atrial contractions. Right bundle branch block. Nonspecific ST-T wave abnormalities.     Problem List:   Patient Active Problem List    Diagnosis Date Noted    Carotid artery disease without cerebral infarction ([address] 11/09/2016    History of coronary artery disease 10/18/2022    Pulmonary vascular congestion 10/18/2022    Acute hypoxemic respiratory failure ([address] 10/18/2022    Aspiration pneumonia of left lower lobe due to gastric secretions ([address] 10/18/2022    Hypervolemia 10/17/2022    Myelodysplastic syndrome ([address] 10/17/2022    Chronic systolic heart failure ([address] 10/17/2022    Arterial hypotension 10/17/2022    Shoulder pain 10/10/2022    Primary osteoarthritis of first carpometacarpal joint of left hand 10/01/2022    Elevated troponin 09/27/2022    Chronic atrial fibrillation ([address] 09/27/2022    Severe thrombocytopenia ([address] 09/25/2022    Severe anemia 09/25/2022    Wrist arthritis 09/25/2022    Dyspnea on exertion 09/24/2022    Acute on chronic diastolic heart failure ([address] 07/27/2022    Rotator cuff tendonitis, right 06/24/2021    Cervical radicular pain 06/24/2021 4/11/17 Left knee repeat repair of median parapatellar arthrotomy with augmentation 04/12/2017 2/28/17 Revision with polyethylene exchange left total knee arthroplasty 02/28/2017    Morbid obesity with BMI of 45.0-49.9, adult ([address] 07/09/2015    Type 2 diabetes mellitus ([address] 07/09/2015    Morbid obesity with BMI of 40.0-44.9, adult ([facility] 05/14/2014    Acute chest pain 05/09/2014    Mixed hyperlipidemia 05/02/2014 11/22/16 Right knee polyethylene exchange 09/30/2013    Acute pain of right shoulder 09/30/2013    Debility 03/06/2012    Dizziness 02/11/2012    Headache 02/11/2012    Acute renal failure ([facility] 02/09/2012    HIGH CHOLESTEROL 02/09/2012    HTN (hypertension) 02/09/2012    DM (diabetes mellitus) ([facility] 10/05/2011    Coronary artery disease involving native coronary artery of native heart without angina pectoris 10/05/2011    PAF (paroxysmal atrial fibrillation) ([facility] 10/05/2011    Peripheral vascular disease ([facility] 10/05/2011    CARLOS (obstructive sleep apnea) 10/05/2011    SOB (shortness of breath) 10/05/2011      Assessment and Plan: 1. Paroxysmal Atrial Fibrillation  - Currently in AF, rate 90-120s   ~ Likely secondary to her acute issues and infection. Modest rate control is reasonable in her acute illness setting. HR <110 at rest is reasonable during acute phase. Her HR will likely improve with resolution of her acute issues    - Continue metoprolol 12.5 mg BID  - Limited rate control options given BP and CKD. Family would like to avoid amiodarone due the risks/SE's  ~ Continue digoxin 125 mcg QD. Check dig level today. If dig level borderline, would reduce to 62.5 mcg daily. If high, stop digoxin. Will stop once acute issues resolve as I imagine her HR will be better controlled and I am worried about the risk of dig toxicity    - [id number] score:high; [id number] Vasc score and anticoagulation discussed. High risk for stroke and thromboembolism. Anticoagulation is recommended. Risk of bleeding was discussed. ~ Xarelto on hold due to MDS and thrombocytopenia. Poor candidate for [facility] at this time    - Poor candidate for EP procedures given her age and inability to be anti-coagulated, plus she has poor long term prognosis    2.  Chronic systolic heart failure (NYHA Class III)  - Appears compensated   ~ EF 45% per echo  - Continue with BB  - Lasix on hold due to CKD. Resume when ok with nephro  ~ ACE-I/ARB contraindicated due to renal insufficiency and risk of hyperkalemia  - Monitor I&O's, Daily weights    3. Acute Respiratory Failure, PNA   - Improving; on room air   - On ABX   - IV diuretics on hold   - Encourage up to chair and IS    4. HTN  - Controlled, on low normal side  - I think she would benefit from a unit of blood for hemodynamic stability if ok with nephro  - Encourage adequate oral intake    5. ARUN on CKD   - Stable, near baseline   - Monitor UO and kidney function closely   - Nephrology following    6. MDS   - New dx  - H/H remains low   ~ Recommend a unit of blood if ok with other teams   - No significant bleeding issues    No further EP recommendations. EP will sign off. No need for EP follow up, but will need gen cars/EP follow up. Please call if there are any questions. Thank you for consult. All pertinent information and plan of care discussed with the EP physician. All questions and concerns were addressed to the patient. Alternatives to my treatment were discussed. I have discussed the above stated plan with patient and the nurse. The patient verbalized understanding and agreed with the plan. Thank you for allowing to us to participate in the care of Wally Schmidt    The patient was seen for >35 minutes. I reviewed interval history, physical exam, review of data including labs, imaging, development and implementation of treatment plan and coordination of complex care.  More than 50% of the time was devoted to counseling the patient on their diagnoses/treatments, as well as coordination of care with the other care teams    MATEUSZ Singh-CNP  [facility]   Office: [phone]
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
## 2022-10-20 NOTE — PROGRESS NOTES
[facility] [address]   Phone: [phone]    Occupational Therapy    [] Initial Evaluation            [x] Daily Treatment Note         [] Discharge Summary      Patient: Naomi Nguyen   : 1940   MRN: [id number]   Date of Service:  10/20/2022    Admitting Diagnosis:  Acute hypoxemic respiratory failure [facility])  Current Admission Summary:  Naomi Nguyen is a 78 y.o. female who presented to [facility] ER today from her SNF complaining of severe chest shoulder and neck pain. She denies any recent trauma. She was just discharged to a SNF 3 days ago. She has been having left-sided shoulder wrist and thumb pain during that admission and was treated with oral steroids as well as a CMC brace. She tells me that the nursing aides have been lifting her by putting her arms under her shoulders which seems to be aggravating her shoulder pain. She has known bilateral shoulder rotator cuff arthropathy. She has tried injections in the past with limited benefit. She does have peripheral neuropathy and reports her numbness/tingling of her right greater than left hands are at baseline. She describes moderate pain extending from base of her neck into her left shoulder and upper arm rated 6/10. She is much more comfortable since receiving morphine. 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 Imaging review of the left shoulder via chest CT this admission demonstrated: No acute fracture or malalignment, there is cystic change of the greater tuberosity consistent with prior rotator cuff disease. Moderate AC joint arthritis noted. Multilevel cervical degenerative disc disease noted.   Past Medical History:  has a past medical history of 15 Left knee repeat repair of median parapatellar arthrotomy with augmentation, Arthritis, Aspiration pneumonia of left lower lobe due to gastric secretions [facility]), Atrial fibrillation ([address] CAD (coronary artery disease), Cataract, Chronic diastolic congestive heart failure ([facility] [address] Diabetes mellitus ([facility] [address] GERD (gastroesophageal reflux disease), Hyperlipidemia, Hypertension, HYPOTHRYROIDISM, Myelodysplastic disease ([facility] [address] Non-English speaking patient, Sleep apnea, Thyroid disease, and UTI. Past Surgical History:  has a past surgical history that includes joint replacement (Bilateral, [address]); Cardiac surgery (2004); Cholecystectomy, laparoscopic (5/15/14); Revision total knee arthroplasty (Right, 11/22/2016); Total knee arthroplasty; knee surgery (Left, 02/28/2017); CT BIOPSY BONE MARROW (9/29/2022); Upper gastrointestinal endoscopy (N/A, 10/3/2022); and Upper gastrointestinal endoscopy (N/A, 10/3/2022). Discharge Recommendations: Tabitha Nascimento scored a 8/24 on the AM-PAC ADL Inpatient form. Current research shows that an AM-PAC score of 17 or less is typically not associated with a discharge to the patient's home setting. Based on the patient's AM-PAC score and their current ADL deficits, it is recommended that the patient have 3-5 sessions per week of Occupational Therapy at d/c to increase the patient's independence. Please see assessment section for further patient specific details. If patient discharges prior to next session this note will serve as a discharge summary. Please see below for the latest assessment towards goals.        DME Required For Discharge: DME to be determined at next level of care, DME to be determined pending patient progress    Precautions/Restrictions: high fall risk  Weight Bearing Restrictions: no restrictions  [] Right Upper Extremity  [] Left Upper Extremity [] Right Lower Extremity  [] Left Lower Extremity     Required Braces/Orthotics: no braces required   [] Right  [] Left  Positional Restrictions:no positional restrictions    Pre-Admission Information   Lives With: alone                     Type of Home: house  Home Layout: one level  Home Access: ramped entry   [address]: walker accessible, walk in shower  Bathroom Equipment: grab bars in shower, shower chair  Toilet Height: standard height  Home Equipment: rolling walker, rollator - 4 wheeled walker, manual wheelchair, electric wheelchair  Transfer Assistance: modified independent with use of RW  Ambulation Assistance:modified independent with use of manual w/c, RW for short distances   ADL Assistance: requires assistance with bathing, requires assistance with dressing  IADL Assistance: independent with homemaking tasks home health aide 4 hours/day   Active :        [] Yes                 [x] No  Hand Dominance: [] Left                 [x] Right  Current Employment: retired. Occupation:    Hobbies:   Recent Falls: 1 slip out of wheelchair   *cameras present at home for children to monitor    Examination   Vision:   Vision Gross Assessment: Impaired  Hearing:   WFL  Perception:   WFL  Posture: Forward head, rounded shoulders  Sensation:   reports numbness and tingling in (R) UE, (B) LE and reduced light touch to (R) UE, (B) LE    ROM:   (L) Shoulder: 20 degrees (supine)     (R) Shoulder: 150 degrees supine  Strength:   (L) Shoulder: +2     (R) Shoulder: -3    Decision Making: medium complexity  Clinical Presentation: evolving      Subjective  General: Pt supine in bed upon entry and agreeable for session. RN approval prior to session. Grand-daughter present for translation. Cotx with PT to maximize function and safety. Pain: Pain rating taken based on observed faces and behaviors  Pain Interventions: pain medication in place prior to arrival, RN notified, and pt able to continue with session. Activities of Daily Living  Basic Activities of Daily Living  Toileting: Total Assistance  Comments: PureWick in place  Instrumental Activities of Daily Living  No IADL completed on this date.     Functional Mobility  Bed Mobility  Supine to Sit: 2 person assistance with Maximum Assistance  Sit to Supine: 2 person assistance with Maximum Assistance  Comments:Pt c/o dizziness upon sitting EOB--BP WFL and dizziness resolved with increased time. Transfers  Sit to stand: 2 person assistance with Total Assistance  Stand to sit: 2 person assistance with Total Assistance  Bed to Chair: 2 person assistance with Total Assistance  Comments: 2 person assistance of Total Assistance bed to chair using no AD towards L direction with vc's. Sitting Balance: Minimal Assistance  Sitting Balance Comment: Static sitting EOB   Standing Balance: 2 person assistance with Total Assistance    Functional Outcomes       Cognition  WFL  Orientation:    alert and oriented x 4  Command Following:   [facility]     Education  Barriers To Learning: Language  Patient Education: patient educated on goals, OT role and benefits, plan of care, transfer training, discharge recommendations  --increased time required in order to address all pt/family questions regarding rehab potential and POC. Learning Assessment:  patient verbalizes understanding, would benefit from continued reinforcement    Assessment  Activity Tolerance: Primarily limited by pain  Impairments Requiring Therapeutic Intervention: decreased functional mobility, decreased ADL status, decreased ROM, decreased strength, decreased endurance, decreased balance, increased pain  Prognosis: fair  Clinical Assessment: Pt presents with the above deficits impacting daily occupational performance and would benefit from continued skilled OT services.    Safety Interventions: patient left in chair, chair alarm in place, call light within reach, nurse notified, and family/caregiver present    Plan  Frequency: 3-5 x/per week  Current Treatment Recommendations: strengthening, ROM, balance training, functional mobility training, transfer training, gait training, endurance training, manual therapy - soft tissue massage, ADL/self-care training, pain management, home exercise program, safety education, and positioning    Goals  Patient Goals: Return to home   Short Term Goals:  Time Frame: Discharge  Patient will complete upper body ADL while sitting EOB for 2-4 min with Max A  Patient will complete toileting Max A x2 at [address] MET 10/20/2022. Therapy Session Time     Individual Group Co-treatment   Time In    1108   Time Out    1146   Minutes    38         Timed Code Treatment Minutes: 38 Minutes  Total Treatment Minutes:  38 minutes       Electronically Signed By:Thank you, Claudetta Failing, [address], OTR/L, [id number].

## 2022-10-20 NOTE — PLAN OF CARE
Problem: Discharge Planning  Goal: Discharge to home or other facility with appropriate resources  10/20/2022 1130 by Gila Shane RN  Outcome: Progressing  10/19/2022 2249 by Sarah Murillo RN  Outcome: Progressing  Flowsheets (Taken 10/19/2022 2000)  Discharge to home or other facility with appropriate resources:   Identify barriers to discharge with patient and caregiver   Arrange for needed discharge resources and transportation as appropriate   Identify discharge learning needs (meds, wound car    Plan to discharge to home with [facility] and family help when medically stable,    Problem: Pain  Goal: Verbalizes/displays adequate comfort level or baseline comfort level  10/20/2022 1130 by Gila Shane RN  Outcome: Progressing  10/19/2022 2249 by Sarah Murillo RN  Outcome: Progressing  Flowsheets (Taken 10/19/2022 2000)  Verbalizes/displays adequate comfort level or baseline comfort level:   Encourage patient to monitor pain and request assistance   Assess pain using appropriate pain scale   Administer analgesics based on type and severity of pain and evaluate response   Implement non-pharmacological measures as appropriate and evaluate response   Consider cultural and social influences on pain and pain management   Patient able to report pain, 0-10 scale used, pharmacologic and non pharmacologic  Interventions in use and discussed with patient. Problem: Skin/Tissue Integrity  Goal: Absence of new skin breakdown  Description: 1. Monitor for areas of redness and/or skin breakdown  2. Assess vascular access sites hourly  3. Every 4-6 hours minimum:  Change oxygen saturation probe site  4. Every 4-6 hours:  If on nasal continuous positive airway pressure, respiratory therapy assess nares and determine need for appliance change or resting period.   10/20/2022 1130 by Gila Shane RN  Outcome: Progressing  10/19/2022 2249 by Sarah Murillo RN  Outcome: Progressing  Patient with skin breakdown on coccyx, protective foam barrier in place, patient turned and repositioned at least every two hours, is on pressure relief mattress.      Problem: Safety - Adult  Goal: Free from fall injury  10/20/2022 1130 by Dotty Spring RN  Outcome: Progressing  10/19/2022 2249 by Lencho Bello RN  Outcome: Progressing   Fall precautions in place, hourly rounding, call light and belongings in reach, bed in lowest position, wheels locked in place, side rails up x 2, walkways free of clutter    Problem: ABCDS Injury Assessment  Goal: Absence of physical injury  10/20/2022 1130 by Dotty Spring RN  Outcome: Progressing  10/19/2022 2249 by Lencho Bello RN  Outcome: Progressing     Problem: Chronic Conditions and Co-morbidities  Goal: Patient's chronic conditions and co-morbidity symptoms are monitored and maintained or improved  10/20/2022 1130 by Dotty Spring RN  Outcome: Progressing  10/19/2022 2249 by Lencho Bello RN  Outcome: Progressing  Flowsheets (Taken 10/19/2022 2000)  Care Plan - Patient's Chronic Conditions and Co-Morbidity Symptoms are Monitored and Maintained or Improved:   Monitor and assess patient's chronic conditions and comorbid symptoms for stability, deterioration, or improvement   Collaborate with multidisciplinary team to address chronic and comorbid conditions and prevent exacerbation or deterioration   Update acute care plan with appropriate goals if chronic or comorbid symptoms are exacerbated and prevent overall improvement and discharge     Problem: Chronic Conditions and Co-morbidities  Goal: Patient's chronic conditions and co-morbidity symptoms are monitored and maintained or improved  10/20/2022 1130 by Dotty Spring RN  Outcome: Progressing  10/19/2022 2249 by Lencho Bello RN  Outcome: Progressing  Flowsheets (Taken 10/19/2022 2000)  Care Plan - Patient's Chronic Conditions and Co-Morbidity Symptoms are Monitored and Maintained or Improved:   Monitor and assess patient's chronic conditions and comorbid symptoms for stability, deterioration, or improvement   Collaborate with multidisciplinary team to address chronic and comorbid conditions and prevent exacerbation or deterioration   Update acute care plan with appropriate goals if chronic or comorbid symptoms are exacerbated and prevent overall improvement and discharge     Problem: Nutrition Deficit:  Goal: Optimize nutritional status  10/20/2022 1130 by Vivian Huston RN  Outcome: Progressing  10/19/2022 2249 by Melanie Grissom RN  Outcome: Progressing   Patient tolerating diet and eating well.

## 2022-10-20 NOTE — PROGRESS NOTES
MD Narinder Avalos MD Assunta Perl, MD                  Office: [phone]                      Fax: [phone]             [facility]                   NEPHROLOGY INPATIENT PROGRESS NOTE:     PATIENT NAME: Beatriz Justice  : 1940  MRN: [id number]      RECOMMENDATIONS:   -use PRN lasix only, as w/ recent severe hypernatremia, dehydration , higher free water deficits  -Recheck Na -since its acute, should be okay for acute reversal  -avoid aggressive diuretics,     - hold RAAS blockade, due to hypotension and ARUN  -Goal hemoglobin above 7, will help with hypotension  - Work-up for infection with leukocytosis and hypotension - per primary team   -  marin inserted  -Close follow-up with hematology, cardiology,  - no need for dialysis, at higher risk for decompensation, needing closer monitoring. D/C plan from renal stand point:  -Unclear with acute illness currently and expect prolonged hospitalization  -Family is discussing with palliative team    D/W patient, her granddaughter and nephew- who translated, team CVU nurse also       IMPRESSION:       Admitted on:  10/10/2022 10:51 AM   For:  Shoulder pain [M25.519]  Acute chest pain [R07.9]  History of coronary artery disease [Z86.79]  Chronic congestive heart failure, unspecified heart failure type ([address] [I50.9]           ARUN (on no h/o CKD:):   - BL Scr-0.8 as of 1 admission on 10--> peaked at 1.9 within 48 hours during admission  - Etiology of ARUN -presumed ATN in the setting of hemoglobin 6.7, hypotension, concern for infection with leukocytosis + CT angio on 10-,   -With clinical multiple other culprits for ARUN I do not suspect any other etiology so no need for serology work-up for now  - UA : results reviewed: 250 glucose, trace ketones, high specific gravity large blood, 100 protein, trace leukocytes, few hyaline cast, with RBCs almost 200, with WBCs.   - Renal imaging: none recently   -In 48 hours no improvement with culprit for ARUN, and then cath kidney imaging      Associated problems:  : HTN : no need for tight control   : Na: Severe hypernatremia, dehydration , higher free water deficits    - Azotemia: pre-renal  - Electrolytes: K: WNL  - Acid-Base: acidosis -  non-AGMA  - Anemia: Likely due to myelodysplastic syndrome    Other major problems: Management per primary and other consulting teams. Morbid obesity  Obstructive sleep apnea on CPAP  Coronary artery disease, history of CABG    Diagnosis of myelodysplastic syndrome after bone marrow biopsy in September 2022    Chronic systolic heart failure-EF around 45-50%      Hospital Problems             Last Modified POA    * (Principal) Acute hypoxemic respiratory failure ([address] 10/19/2022 Yes    Severe thrombocytopenia ([address] 10/18/2022 Yes    Severe anemia 10/17/2022 Yes    Shoulder pain 10/19/2022 Yes    Hypervolemia 10/17/2022 Yes    Myelodysplastic syndrome ([address] 10/18/2022 Yes    Chronic systolic heart failure ([address] 10/18/2022 Yes    Arterial hypotension 10/17/2022 Yes    History of coronary artery disease 10/18/2022 Yes    Pulmonary vascular congestion 10/18/2022 Yes    Aspiration pneumonia of left lower lobe due to gastric secretions ([address] 10/18/2022 Yes    Acute renal failure ([address] 10/17/2022 Yes    Acute chest pain 10/18/2022 Yes    Type 2 diabetes mellitus ([address] 10/19/2022 Yes       : other supportive care :   - Check daily renal function panel with electrolytes-phosphorus  - Strict monitoring of I/Os, daily weight  - Renal feeds/diet  - Current medications reviewed. - Nephrotoxic medications have been discontinued. - Dose adjusted and appropriate. - Dose meds for eGFR <15 mL/min/1.73m2 during ARUN    - Avoid heavy opioids due to renal failure - may use very low dose dilaudid / fentanyl with close monitoring of CNS and respiratory depression. Please refer to the orders. High Complexity. Multiple complex problems.   Discussed with patient 's  treatment team-   Thank you for allowing me to participate in this patient's care. Please do not hesitate to contact me anytime. We will follow along with you. Ni Coelho MD,  [facility] [address] [place] [address] [place]: [phone] or [address]  Fax: [phone]        =======================================================================================   =======================================================================================  Subjective / interval history:   Patient was seen comfortably sitting up  in the bed ,   Reported no  active complaints,   Renal function stable  Na worsened While on IV lasix 20 QD.-Now better, with holding Lasix and more her oral hydration  Off of NC to RA       Past medical, Surgical, Social, Family medical history reviewed by me. MEDICATIONS: reviewed by me. Medications Prior to Admission:  No current facility-administered medications on file prior to encounter.      Current Outpatient Medications on File Prior to Encounter   Medication Sig Dispense Refill    empagliflozin (JARDIANCE) 10 MG tablet Take 1 tablet by mouth daily 30 tablet 3    insulin glargine (BASAGLAR KWIKPEN) 100 UNIT/ML injection pen Inject 48 Units into the skin daily 5 Adjustable Dose Pre-filled Pen Syringe 3    sacubitril-valsartan (ENTRESTO) 24-26 MG per tablet Take 0.5 tablets by mouth 2 times daily 60 tablet 1    furosemide (LASIX) 20 MG tablet Take 1 tablet by mouth daily 60 tablet 3    pantoprazole (PROTONIX) 40 MG tablet Take 40 mg by mouth every morning (before breakfast)      calcium carbonate (OSCAL) 500 MG TABS tablet Take 500 mg by mouth daily      gabapentin (NEURONTIN) 300 MG capsule Take 1 tablet in am, 2 tablets in pm. 90 capsule 3    senna (SENOKOT) 8.6 MG tablet Take 1 tablet by mouth 2 times daily      insulin aspart (NOVOLOG) 100 UNIT/ML injection vial Inject 15 Units into the skin 3 times daily      magnesium 30 MG tablet Take 40 mg by mouth daily      tiZANidine (ZANAFLEX) 2 MG tablet Take 2 mg by mouth every 8 hours as needed      metoprolol tartrate (LOPRESSOR) 25 MG tablet TAKE ONE-HALF TABLET BY MOUTH TWICE A DAY 90 tablet 4    atorvastatin (LIPITOR) 80 MG tablet Take 1 tablet by mouth daily 90 tablet 4    Insulin Pen Needle 32G X 6 MM MISC by Does not apply route 3 times daily with meals      hydrocortisone (ANUSOL-HC) 2.5 % rectal cream Place rectally 2 times daily Place rectally 2 times daily. rivaroxaban (XARELTO) 20 MG TABS tablet Take 1 tablet by mouth daily 90 tablet 3    vitamin D (ERGOCALCIFEROL) 11229 UNITS CAPS capsule Take 50,000 Units by mouth once a week      Omega 3 1000 MG CAPS Take 1,000 mg by mouth daily      Liraglutide (VICTOZA) 18 MG/3ML SOPN SC injection Inject 1.2 mg into the skin daily      lidocaine (LIDODERM) 5 % Place 1 patch onto the skin daily 12 hours on, 12 hours off. 30 patch 0    oxybutynin (DITROPAN XL) 15 MG CR tablet Take 15 mg by mouth daily. Loratadine 10 MG CAPS Take by mouth daily       levothyroxine (SYNTHROID) 175 MCG tablet Take 150 mcg by mouth daily.            Current Facility-Administered Medications:     insulin lispro (HUMALOG) injection vial 0-16 Units, 0-16 Units, SubCUTAneous, TID WCAustyn Safe, DO, 16 Units at 10/20/22 0823    insulin lispro (HUMALOG) injection vial 0-4 Units, 0-4 Units, SubCUTAneous, Nightly, Christiano Padron DO    insulin glargine (LANTUS) injection vial 25 Units, 25 Units, SubCUTAneous, BID, Austyn Safe, DO, 25 Units at 10/20/22 9970    guaiFENesin [facility]) extended release tablet 600 mg, 600 mg, Oral, BID PRN, Austyn Safe, DO    digoxin (LANOXIN) tablet 125 mcg, 125 mcg, Oral, Daily, Tiny Sour, APRN - CNP, 125 mcg at 10/20/22 8696    glucose-vitamin C chewable tablet 4 tablet, 4 tablet, Oral, PRN, Vic Wirght MD    dextrose bolus 10% 125 mL, 125 mL, IntraVENous, PRN **OR** dextrose bolus 10% 250 mL, 250 mL, IntraVENous, PRN, Tarun Monroe Marj Ceja MD    glucagon (rDNA) injection 1 mg, 1 mg, SubCUTAneous, PRN, Kathleen Sanchez MD    dextrose 10 % infusion, , IntraVENous, Continuous PRN, Kathleen Sanchez MD    melatonin tablet 6 mg, 6 mg, Oral, Nightly PRN, Chelle Mccracken DO, 6 mg at 10/19/22 2009    cefepime (MAXIPIME) 1000 mg IVPB minibag, 1,000 mg, IntraVENous, Q12H, Lauri Bradley MD, Stopped at 10/20/22 0029    0.9 % sodium chloride infusion, , IntraVENous, PRN, Lauri Bradley MD    metronidazole (FLAGYL) 500 mg in 0.9% NaCl 100 mL IVPB premix, 500 mg, IntraVENous, Q8H, Agustin Kennedy MD, Stopped at 10/20/22 0722    0.9 % sodium chloride infusion, , IntraVENous, PRN, Lucia Villafana, APRN - CNP    methylPREDNISolone sodium (SOLU-MEDROL) injection 40 mg, 40 mg, IntraVENous, Daily, Ha Friend MD, 40 mg at 10/20/22 0810    ipratropium-albuterol (DUONEB) nebulizer solution 1 ampule, 1 ampule, Inhalation, Q4H PRN, Vinita Lawrence MD, 1 ampule at 10/17/22 0229    lidocaine viscous hcl (XYLOCAINE) 2 % solution 15 mL, 15 mL, Mouth/Throat, Q3H PRN, Ha Friend MD, 15 mL at 10/17/22 0150    ipratropium-albuterol (DUONEB) nebulizer solution 1 ampule, 1 ampule, Inhalation, 4x daily, Lauri Bradley MD, 1 ampule at 10/20/22 8076    lidocaine 4 % external patch 1 patch, 1 patch, TransDERmal, Daily, Lauri Bradley MD, 1 patch at 10/20/22 0809    benzonatate (TESSALON) capsule 100 mg, 100 mg, Oral, TID PRN, Linda Leos APRN - CNP, 100 mg at 10/16/22 0610    metoprolol tartrate (LOPRESSOR) tablet 12.5 mg, 12.5 mg, Oral, BID, Berta Dumont MD, 12.5 mg at 10/20/22 0808    0.9 % sodium chloride infusion, , IntraVENous, PRN, Susana Mahoney MD    acetaminophen (TYLENOL) tablet 650 mg, 650 mg, Oral, TID, Milena Martin APRN - CNP, 650 mg at 10/20/22 0809    atorvastatin (LIPITOR) tablet 80 mg, 80 mg, Oral, Nightly, Berta Dumont MD, 80 mg at 10/19/22 2009    hydrocortisone (ANUSOL-HC) 2.5 % rectal cream, , Rectal, BID, Chin Whittaker MD, Given at 10/20/22 3597    levothyroxine (SYNTHROID) tablet 150 mcg, 150 mcg, Oral, Daily, Chin Whittaker MD, 150 mcg at 10/20/22 6512    sodium chloride flush 0.9 % injection 5-40 mL, 5-40 mL, IntraVENous, 2 times per day, Chin Whittaker MD, 10 mL at 10/20/22 0810    sodium chloride flush 0.9 % injection 5-40 mL, 5-40 mL, IntraVENous, PRN, Chin Whittaker MD    0.9 % sodium chloride infusion, , IntraVENous, PRN, Chin Whittaker MD, Stopped at 10/19/22 2200    ondansetron (ZOFRAN-ODT) disintegrating tablet 4 mg, 4 mg, Oral, Q8H PRN **OR** ondansetron (ZOFRAN) injection 4 mg, 4 mg, IntraVENous, Q6H PRN, Chin Whittaker MD    polyethylene glycol (GLYCOLAX) packet 17 g, 17 g, Oral, Daily PRN, Chin Whittaker MD, 17 g at 10/11/22 1118    acetaminophen (TYLENOL) tablet 650 mg, 650 mg, Oral, Q6H PRN, 650 mg at 10/18/22 1802 **OR** acetaminophen (TYLENOL) suppository 650 mg, 650 mg, Rectal, Q6H PRN, Chin Whittaker MD    dextrose bolus 10% 125 mL, 125 mL, IntraVENous, PRN **OR** dextrose bolus 10% 250 mL, 250 mL, IntraVENous, PRN, Chin Whittaker MD    glucagon (rDNA) injection 1 mg, 1 mg, SubCUTAneous, PRN, Chin Whittaker MD    dextrose 10 % infusion, , IntraVENous, Continuous PRN, Chin Whittaker MD       REVIEW OF SYSTEMS:  As mentioned in chief complaint and HPI , Subjective             =======================================================================================     PHYSICAL EXAM:  Recent vital signs and recent I/Os reviewed by me.      Wt Readings from Last 3 Encounters:   10/20/22 242 lb 1 oz (109.8 kg)   10/07/22 241 lb 14.4 oz (109.7 kg)   06/21/22 243 lb 9.6 oz (110.5 kg)     BP Readings from Last 3 Encounters:   10/20/22 (!) 97/58   10/07/22 123/67   07/27/22 (!) 112/54     Patient Vitals for the past 24 hrs:   BP Temp Temp src Pulse Resp SpO2 Weight   10/20/22 0826 -- -- -- 87 18 95 % --   10/20/22 0808 -- -- -- 92 -- -- --   10/20/22 0802 (!) 97/58 97.7 °F (36.5 °C) Temporal 90 16 93 % --   10/20/22 0622 -- -- -- -- -- -- 242 lb 1 oz (109.8 kg)   10/20/22 0400 (!) 102/58 97 °F (36.1 °C) Temporal 97 17 96 % --   10/20/22 0300 120/63 -- -- (!) 102 19 96 % --   10/20/22 0200 114/83 -- -- 84 15 95 % --   10/20/22 0100 105/69 -- -- 92 16 94 % --   10/20/22 0000 98/76 97.2 °F (36.2 °C) Temporal 97 19 97 % --   10/19/22 2300 96/62 -- -- 88 17 91 % --   10/19/22 2200 88/67 -- -- (!) 103 19 96 % --   10/19/22 2100 87/69 -- -- 88 18 -- --   10/19/22 2000 95/61 97 °F (36.1 °C) Temporal 95 19 96 % --   10/19/22 1949 -- -- -- 97 18 95 % --   10/19/22 1900 (!) 111/53 -- -- 97 20 -- --   10/19/22 1800 (!) 89/47 -- -- 99 19 -- --   10/19/22 1700 91/61 -- -- 99 22 -- --   10/19/22 1600 94/63 96.9 °F (36.1 °C) Temporal (!) 109 19 -- --   10/19/22 1539 -- -- -- (!) 101 19 -- --   10/19/22 1500 (!) 84/57 -- -- 96 19 -- --   10/19/22 1400 (!) 88/47 -- -- 95 21 -- --   10/19/22 1315 99/65 -- -- (!) 105 23 -- --   10/19/22 1300 (!) 59/45 -- -- 52 20 -- --   10/19/22 1210 -- -- -- 87 -- -- --   10/19/22 1200 84/62 (!) 96.1 °F (35.6 °C) Oral 86 18 95 % --   10/19/22 1100 (!) 91/58 99.2 °F (37.3 °C) Bladder (!) 103 21 94 % --   10/19/22 1003 (!) 97/55 -- -- (!) 111 -- -- --   10/19/22 1000 (!) 97/55 -- -- (!) 102 15 92 % --         Intake/Output Summary (Last 24 hours) at 10/20/2022 0928  Last data filed at 10/20/2022 0809  Gross per 24 hour   Intake 753.01 ml   Output 600 ml   Net 153.01 ml              General: Awake, Alert, obese   HENT: Atraumatic, normocephalic   Eyes: Normal conjunctiva, Non-incteral sclera. Neck: Supple, JVD not visible. CVS:  Heart sounds are normal. No loud murmur. RS: Normal respiratory effort, Breat sound: diminished at bases. Abd: Soft , bowel sounds are normal, no distension and no tenderness . Skin: No rash , some bruises,   CNS: Awake Oriented , No focal.   Extremities/MSK:  Edema, no cyanosis. =======================================================================================     DATA:  Diagnostic tests reviewed by me for today's visit:   (AS NEEDED FOR MY EVALUATION AND MANAGEMENT). Recent Labs     10/18/22  0437 10/19/22  0425 10/20/22  0530   WBC 17.8* 13.8* 15.0*   HCT 21.3* 22.6* 23.4*   PLT 70* 43* 31*       Iron Saturation:  No components found for: PERCENTFE  FERRITIN:    Lab Results   Component Value Date/Time    FERRITIN 1,025.0 09/26/2022 04:34 AM     IRON:    Lab Results   Component Value Date/Time    IRON 46 09/25/2022 05:02 AM     TIBC:    Lab Results   Component Value Date/Time    TIBC 230 09/25/2022 05:02 AM       Recent Labs     10/18/22  0437 10/18/22  1504 10/19/22  0425 10/19/22  0840 10/20/22  0530    144 150* 148* 138   K 4.4 3.9 4.6  --  5.0    109 115*  --  105   CO2 19* 20* 23  --  22   BUN 70* 76* 80*  --  95*   CREATININE 1.8* 1.9* 1.7*  --  1.5*       Recent Labs     10/18/22  0437 10/18/22  1504 10/19/22  0425 10/20/22  0530   CALCIUM 8.4 8.6 8.7 8.7   MG  --  2.10  --   --        No results for input(s): PH, PCO2, PO2 in the last 72 hours.     Invalid input(s): [facility] [facility]    ABG:  No results found for: PH, PCO2, PO2, HCO3, BE, THGB, TCO2, O2SAT  VBG:  No results found for: PHVEN, QTB6QTM, BEVEN, Q2BCRZKR    LDH:    Lab Results   Component Value Date/Time     10/14/2022 04:50 PM     Uric Acid:    Lab Results   Component Value Date/Time    LABURIC 6.8 09/26/2022 04:34 AM       PT/INR:    Lab Results   Component Value Date/Time    PROTIME 23.6 09/29/2022 11:37 AM    INR 2.10 09/29/2022 11:37 AM     Warfarin PT/INR:  No components found for: PTPATWAR, PTINRWAR  PTT:    Lab Results   Component Value Date/Time    APTT 39.7 02/13/2017 12:07 PM   [APTT}  Last 3 Troponin:    Lab Results   Component Value Date/Time    TROPONINI 0.05 10/11/2022 05:09 AM    TROPONINI 0.04 10/10/2022 04:13 PM    TROPONINI 0.04 10/10/2022 11:20 AM U/A:    Lab Results   Component Value Date/Time    COLORU Yellow 10/17/2022 07:24 PM    PROTEINU 100 10/17/2022 07:24 PM    PHUR 5.0 10/17/2022 07:24 PM    WBCUA 19 10/17/2022 07:24 PM    RBCUA 275 10/17/2022 07:24 PM    MUCUS 1+ 05/16/2014 11:50 PM    BACTERIA None Seen 10/17/2022 07:24 PM    CLARITYU TURBID 10/17/2022 07:24 PM    SPECGRAV 1.020 10/17/2022 07:24 PM    LEUKOCYTESUR TRACE 10/17/2022 07:24 PM    UROBILINOGEN 0.2 10/17/2022 07:24 PM    BILIRUBINUR Negative 10/17/2022 07:24 PM    BILIRUBINUR NEGATIVE 03/09/2012 06:51 AM    BLOODU LARGE 10/17/2022 07:24 PM    GLUCOSEU 250 10/17/2022 07:24 PM    GLUCOSEU NEGATIVE 03/09/2012 06:51 AM     Microalbumen/Creatinine ratio:  No components found for: RUCREAT  24 Hour Urine for Protein:  No components found for: RAWUPRO, UHRS3, GYPR79RU, UTV3  24 Hour Urine for Creatinine Clearance:  No components found for: CREAT4, UHRS10, UTV10  Urine Toxicology:  No components found for: Imagene Willis, IBENZO, ICOCAINE, IMARTHC, IOPIATES, IPHENCYC    HgBA1c:    Lab Results   Component Value Date/Time    LABA1C 7.5 10/18/2022 04:37 AM     RPR:  No results found for: RPR  HIV:  No results found for: HIV  NICOLASA:  No results found for: ANATITER, NICOLASA  RF:  No results found for: RF  DSDNA:  No components found for: DNA  AMYLASE:    Lab Results   Component Value Date/Time    AMYLASE 50 05/09/2014 07:45 PM     LIPASE:    Lab Results   Component Value Date/Time    LIPASE 37.0 05/09/2014 07:45 PM     Fibrinogen Level:  No components found for: FIB       BELOW MENTIONED RADIOLOGY STUDY RESULTS BY ME (AS NEEDED FOR MY EVALUATION AND MANAGEMENT).      XR CHEST (2 VW)    Result Date: 9/24/2022  EXAMINATION: TWO XRAY VIEWS OF THE CHEST 9/24/2022 6:35 pm COMPARISON: 06/16/2022 HISTORY: ORDERING SYSTEM PROVIDED HISTORY: Chest Discomfort TECHNOLOGIST PROVIDED HISTORY: Reason for exam:->Chest Discomfort Reason for Exam: Wrist Pain ([place] # [id number] -Patient brought in by squad from home with left wrist, right rib pain. No injury. ) FINDINGS: The heart is mildly enlarged and less prominent the pulmonary vessels are engorged centrally and less prominent. The lungs are hyperinflated. No consolidation or effusion is seen. The bones are intact. There are postop changes along the mediastinum and sternum which is unchanged. There are mild subsegmental linear densities scattered along the lung bases which is less prominent. Mild cardiomegaly with mild central pulmonary congestion or pulmonary artery hypertension which is less prominent. Mild chronic obstructive lung changes with mild bibasilar discoid atelectasis or scarring which is less prominent with no obvious infiltrate or effusion. XR WRIST LEFT (MIN 3 VIEWS)    Result Date: 9/24/2022  EXAMINATION: 3 XRAY VIEWS OF THE LEFT WRIST 9/24/2022 6:35 pm COMPARISON: None. HISTORY: ORDERING SYSTEM PROVIDED HISTORY: pain TECHNOLOGIST PROVIDED HISTORY: Reason for exam:->pain FINDINGS: There is mild narrowing of the radiocarpal joint. No acute fracture or dislocation is seen. There is moderate narrowing along the base of the thumb with prominent osteophytes throughout the joint. There is some linear calcifications just distal to the ulna which probably within the TFCC. There are vascular calcifications along the palmar aspect of the wrist.  There is mild soft tissue swelling along the dorsum of the wrist.  The bones are osteopenic. No obvious acute fracture or dislocation can be seen. Mild osteoarthritic changes along the radiocarpal joint and moderate degenerative arthritic changes along the base of the thumb with no acute bony abnormality seen. Vascular calcifications along the palmar aspect of the wrist Mild soft tissue swelling around the wrist and diffuse osteopenia. Probable mild chondrocalcinosis of the TFCC.      CT HEAD WO CONTRAST    Result Date: 10/14/2022  EXAMINATION: CT OF THE HEAD WITHOUT CONTRAST  10/13/2022 2:52 pm TECHNIQUE: CT of the head was performed without the administration of intravenous contrast. Automated exposure control, iterative reconstruction, and/or weight based adjustment of the mA/kV was utilized to reduce the radiation dose to as low as reasonably achievable. COMPARISON: 02/11/2012 HISTORY: ORDERING SYSTEM PROVIDED HISTORY: dizziness TECHNOLOGIST PROVIDED HISTORY: Reason for exam:->dizziness Has a \"code stroke\" or \"stroke alert\" been called? ->No Reason for Exam: dizziness FINDINGS: BRAIN/VENTRICLES: The cerebral and cerebellar parenchyma demonstrate volume loss, with a frontal lobe predominance. Scattered low-attenuation areas are noted in the periventricular white matter, compatible with chronic microvascular white matter ischemic disease. There are no areas of hemorrhage, mass, or midline shift. No abnormal extra-axial fluid collections. The ventricles are prominent in size, likely related to involutional change. Gray-white differentiation is maintained without evidence of acute infarct. ORBITS: Lens implants from prior cataract surgery are noted. The orbits are otherwise unremarkable. SINUSES: There is scattered mild paranasal sinus disease. Thickened secretions are noted in the left sphenoid sinus. The mastoid air cells are clear. SOFT TISSUES/SKULL:  The calvarium is intact. No appreciable scalp soft tissue swelling. 1. No acute intracranial abnormality. 2. Cerebral and cerebellar parenchymal volume loss with chronic microvascular white matter ischemic disease. CT CHEST W CONTRAST    Result Date: 9/28/2022  EXAMINATION: CT OF THE CHEST WITH CONTRAST 9/28/2022 1:02 pm TECHNIQUE: CT of the chest was performed with the administration of intravenous contrast. Multiplanar reformatted images are provided for review. Automated exposure control, iterative reconstruction, and/or weight based adjustment of the mA/kV was utilized to reduce the radiation dose to as low as reasonably achievable. COMPARISON: Chest CT 05/10/2014 HISTORY: ORDERING SYSTEM PROVIDED HISTORY: left chest pain FINDINGS: Technical: Evaluation of the lungs degraded because of motion during imaging, blurring detail and resulting in misregistration artifact. Mediastinum: Mild main pulmonary artery dilatation 31 mm (axial series 2, image 51). Ascending thoracic aorta 37 mm and descending thoracic aorta 25 mm. Status post CABG. Cardiomegaly. The great vessels appear unremarkable with exception of calcific atherosclerotic disease. Moderate to severe calcific atherosclerosis coronary arteries. No pericardial effusion. Posterior mediastinal structures appear unremarkable. No mediastinal or hilar adenopathy. Small hiatal hernia with what appears to be a small retained capsule within the hiatal hernia. Lungs/pleura: No focal pulmonary infiltrate evident. No soft tissue or ground-glass pulmonary nodule is seen. No inspissated secretions or endobronchial lesion evident. No pleural effusion or pneumothorax. Upper Abdomen: Unremarkable appearance. Soft Tissues/Bones: No acute superficial soft tissue or osseous structure abnormality evident. Old healed fractures left ribs 4, 5 and 6.     1. No definite acute pulmonary disease. Evaluation of the lungs degraded because of motion during imaging, blurring detail and resulting in misregistration artifact. 2. Moderate to severe calcific atherosclerosis coronary arteries. 3. Mild main pulmonary artery dilatation can be seen with pulmonary hypertension but is nonspecific. 4. Cardiomegaly and sequela from CABG. 5. Small hiatal hernia with what appears to be a small retained capsule within the hiatal hernia. CT GUIDED NEEDLE PLACEMENT    Result Date: 9/29/2022  PROCEDURE: CT GUIDED BONE MARROW ASPIRATION AND CORE NEEDLE BONE BIOPSY OF THE RIGHT ILIAC BONE.  MODERATE CONSCIOUS SEDATION 9/29/2022 HISTORY: ORDERING SYSTEM PROVIDED HISTORY: anemia, thrombocytopenia TECHNOLOGIST PROVIDED HISTORY: Reason for exam:->anemia, thrombocytopenia Reason for Exam: anemia, thrombocytopenia SEDATION: 2 mgversed and 50 mcg fentanyl were titrated intravenously for moderate sedation monitored under my direction. Total intraservice time of sedation was 5 minutes. The patient's vital signs were monitored throughout the procedure and recorded in the patient's medical record by the nurse. TECHNIQUE: Informed consent was obtained following a detailed explanation of the procedure including risks, benefits, and alternatives. Universal protocol was followed. Sterile gowns, masks, hats and gloves utilized for maximal sterile barrier. Axial images were obtained through the iliac bones using CT guidance and a suitable skin site was prepped and draped in sterile fashion. Local anesthesia was achieved with lidocaine. An 11 gauge [place] bone marrow biopsy needle was advanced into the right iliac bone and approximately 15 mL of bone marrow aspirate was obtained. A single core biopsy specimen was obtained and the patient tolerated the procedure well. Estimated blood loss: Less than 5 cc Automated exposure control, iterative reconstruction, and/or weight based adjustment of the mA/kV was utilized to reduce the radiation dose to as low as reasonably achievable. DLP: 71.66 mGy-cm     Successful CT guided bone marrow aspiration and core biopsy of the right iliac bone. XR CHEST PORTABLE    Result Date: 10/16/2022  EXAMINATION: ONE XRAY VIEW OF THE CHEST 10/16/2022 12:57 pm COMPARISON: None. HISTORY: Acute shortness of breath. FINDINGS: Evaluation is suboptimal secondary to body habitus and portable technique. Patient is also slightly rotated. Mild cardiomegaly. Post CABG changes. No definite pulmonary edema. Diffuse haziness of the left lung. Right lung is relatively clear. No pneumothorax. Mild cardiomegaly. Diffuse haziness of the left lung be due to atypical infection or edema.      XR CHEST PORTABLE    Result Date: 10/16/2022  EXAMINATION: ONE XRAY VIEW OF THE CHEST 10/16/2022 2:15 am COMPARISON: 10/10/2022 HISTORY: ORDERING SYSTEM PROVIDED HISTORY: crackles in lungs TECHNOLOGIST PROVIDED HISTORY: Reason for exam:->crackles in lungs Reason for Exam: Crackles in lungs FINDINGS: Overlying sternotomy wires. Similar-appearing prominence of the cardiac silhouette with associated vascular congestion. No focal consolidation. Costophrenic angles are sharp. No evidence of pneumothorax. No acute osseous abnormalities. 1. Similar-appearing prominence of the cardiac silhouette with associated vascular congestion. 2. No focal consolidation. XR CHEST PORTABLE    Result Date: 10/10/2022  EXAMINATION: ONE XRAY VIEW OF THE CHEST 10/10/2022 11:15 am COMPARISON: Chest x-ray dated 09/24/2022. HISTORY: ORDERING SYSTEM PROVIDED HISTORY: left chest pain TECHNOLOGIST PROVIDED HISTORY: Reason for exam:->left chest pain Reason for Exam: Chest Pain (Pt transported from [facility] from [facility]. PT c/o chest pain radiating to left shoulder. Symptoms started 2 days ago. Pt stated she was seen in [facility] and was inpatient for 2 weeks for left shoulder complications.) FINDINGS: HEART/MEDIASTINUM: The cardiomediastinal silhouette is stable. PLEURA/LUNGS: There are no focal consolidations or pleural effusions. There is no appreciable pneumothorax. Linear atelectasis versus scarring noted in the periphery of the left mid lung. BONES/SOFT TISSUE: No acute abnormality. Median sternotomy wires again noted. No radiographic evidence of acute pulmonary disease. CT CHEST PULMONARY EMBOLISM W CONTRAST    Result Date: 10/10/2022  EXAMINATION: CTA OF THE CHEST 10/10/2022 11:59 am TECHNIQUE: CTA of the chest was performed after the administration of intravenous contrast.  Multiplanar reformatted images are provided for review. MIP images are provided for review.  Automated exposure control, iterative reconstruction, and/or weight based adjustment of the mA/kV was utilized to reduce the radiation dose to as low as reasonably achievable. COMPARISON: None. HISTORY: ORDERING SYSTEM PROVIDED HISTORY: left sided chest pain, shortness of breath TECHNOLOGIST PROVIDED HISTORY: Reason for exam:->left sided chest pain, shortness of breath Decision Support Exception - unselect if not a suspected or confirmed emergency medical condition->Emergency Medical Condition (MA) Reason for Exam: left sided chest pain, shortness of breath FINDINGS: Pulmonary Arteries: Pulmonary arteries are adequately opacified for evaluation. No evidence of intraluminal filling defect to suggest pulmonary embolism. Main pulmonary artery is normal in caliber. Mediastinum: No evidence of mediastinal lymphadenopathy. The heart and pericardium demonstrate no acute abnormality. There is no acute abnormality of the thoracic aorta. Lungs/pleura: There is minimal bibasilar atelectasis/scarring. The lungs are without acute process. No focal consolidation or pulmonary edema. No evidence of pleural effusion or pneumothorax. Upper Abdomen: Limited images of the upper abdomen are unremarkable. Soft Tissues/Bones: No acute bone or soft tissue abnormality. No evidence of pulmonary embolism or acute pulmonary abnormality. CT BIOPSY BONE MARROW    Result Date: 9/29/2022  PROCEDURE: CT GUIDED BONE MARROW ASPIRATION AND CORE NEEDLE BONE BIOPSY OF THE RIGHT ILIAC BONE. MODERATE CONSCIOUS SEDATION 9/29/2022 HISTORY: ORDERING SYSTEM PROVIDED HISTORY: anemia, thrombocytopenia TECHNOLOGIST PROVIDED HISTORY: Reason for exam:->anemia, thrombocytopenia Reason for Exam: anemia, thrombocytopenia SEDATION: 2 mgversed and 50 mcg fentanyl were titrated intravenously for moderate sedation monitored under my direction. Total intraservice time of sedation was 5 minutes. The patient's vital signs were monitored throughout the procedure and recorded in the patient's medical record by the nurse.  TECHNIQUE: Informed consent was obtained following a detailed explanation of the procedure including risks, benefits, and alternatives. Universal protocol was followed. Sterile gowns, masks, hats and gloves utilized for maximal sterile barrier. Axial images were obtained through the iliac bones using CT guidance and a suitable skin site was prepped and draped in sterile fashion. Local anesthesia was achieved with lidocaine. An 11 gauge [place] bone marrow biopsy needle was advanced into the right iliac bone and approximately 15 mL of bone marrow aspirate was obtained. A single core biopsy specimen was obtained and the patient tolerated the procedure well. Estimated blood loss: Less than 5 cc Automated exposure control, iterative reconstruction, and/or weight based adjustment of the mA/kV was utilized to reduce the radiation dose to as low as reasonably achievable. DLP: 71.66 mGy-cm     Successful CT guided bone marrow aspiration and core biopsy of the right iliac bone. EGD    Result Date: 10/3/2022  No dictation             This report was transcribed using voice recognition software, mainly. So please excuse brevity and/or typos. Every effort was made to ensure accuracy, however, inadvertent computerized transcription errors may be present. Please contact us, if any questions or clarifications are needed.

## 2022-10-20 NOTE — PROGRESS NOTES
[facility] [address]   Phone: [phone]    Physical Therapy    [] Initial Evaluation            [x] Daily Treatment Note         [] Discharge Summary      Patient: Audelia Jane   : 1940   MRN: [id number]   Date of Service:  10/20/2022  Admitting Diagnosis: Acute hypoxemic respiratory failure [facility])  Current Admission Summary: Audelia Jane is a 78 y.o. female who presented to [facility] ER today from her SNF complaining of severe chest shoulder and neck pain. She denies any recent trauma. She was just discharged to a SNF 3 days ago. She has been having left-sided shoulder wrist and thumb pain during that admission and was treated with oral steroids as well as a CMC brace. She tells me that the nursing aides have been lifting her by putting her arms under her shoulders which seems to be aggravating her shoulder pain. She has known bilateral shoulder rotator cuff arthropathy. She has tried injections in the past with limited benefit. She does have peripheral neuropathy and reports her numbness/tingling of her right greater than left hands are at baseline. She describes moderate pain extending from base of her neck into her left shoulder and upper arm rated 6/10. She is much more comfortable since receiving morphine. 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 Imaging review of the left shoulder via chest CT this admission demonstrated: No acute fracture or malalignment, there is cystic change of the greater tuberosity consistent with prior rotator cuff disease. Moderate AC joint arthritis noted. Multilevel cervical degenerative disc disease noted. Patient came in from SNF where she had just discharged to on 10/7 and uses a walker to ambulate.     Past Medical History:  has a past medical history of 15 Left knee repeat repair of median parapatellar arthrotomy with augmentation, Arthritis, Aspiration pneumonia of left lower lobe due to gastric secretions (HCC), Atrial fibrillation ([address] CAD (coronary artery disease), Cataract, Chronic diastolic congestive heart failure ([address] Diabetes mellitus ([address] GERD (gastroesophageal reflux disease), Hyperlipidemia, Hypertension, HYPOTHRYROIDISM, Myelodysplastic disease ([address] Non-English speaking patient, Sleep apnea, Thyroid disease, and UTI. Past Surgical History:  has a past surgical history that includes joint replacement (Bilateral, [address]); Cardiac surgery (2004); Cholecystectomy, laparoscopic (5/15/14); Revision total knee arthroplasty (Right, 11/22/2016); Total knee arthroplasty; knee surgery (Left, 02/28/2017); CT BIOPSY BONE MARROW (9/29/2022); Upper gastrointestinal endoscopy (N/A, 10/3/2022); and Upper gastrointestinal endoscopy (N/A, 10/3/2022). Discharge Recommendations: Jimmy Kelsey scored a 6/24 on the AM-PAC short mobility form. Current research shows that an AM-PAC score of 17 or less is typically not associated with a discharge to the patient's home setting. Based on the patient's AM-PAC score and their current functional mobility deficits, it is recommended that the patient have 3-5 sessions per week of Physical Therapy at d/c to increase the patient's independence. Please see assessment section for further patient specific details. If patient discharges prior to next session this note will serve as a discharge summary. Please see below for the latest assessment towards goals.     DME Required For Discharge: no DME required at discharge  Precautions/Restrictions: high fall risk  Weight Bearing Restrictions: no restrictions  [] Right Upper Extremity  [] Left Upper Extremity [] Right Lower Extremity  [] Left Lower Extremity     Required Braces/Orthotics: no braces required   [] Right  [] Left  Positional Restrictions:no positional restrictions    Pre-Admission Information   Lives With: alone    Type of Home: house  Home Layout: one level  Home Access: ramped entry  Bathroom Layout: walker accessible, walk in shower  Bathroom Equipment: grab bars in shower, shower chair  Toilet Height: standard height  Home Equipment: rolling walker, rollator - 4 wheeled walker, manual wheelchair, electric wheelchair  Transfer Assistance: modified independent with use of RW  Ambulation Assistance:modified independent with use of manual w/c, RW for short distances   ADL Assistance: requires assistance with bathing, requires assistance with dressing  IADL Assistance: independent with homemaking tasks home health aide 4 hours/day   Active :        [] Yes  [x] No  Hand Dominance: [] Left  [x] Right  Current Employment: retired. Recent Falls: 1 slip out of wheelchair   *cameras present at home for children to monitor    Examination   Vision:   Vision Gross Assessment: WFL  [facility]   [facility]  Observation:   General Observation:  pt on 2L O2, brace on L wrist  Posture: Forward head, rounded shoulders   Sensation:   reports numbness and tingling in (R) UE, (B) LE    ROM:   Assessment limited due to limited mobility due to pain. Observed hip and knee extension to neutral, knee flexion to 90. (B) ankle ROM WFL. Strength:   Formal MMT held secondary to limited mobility due to pain. Decision Making: medium complexity  Clinical Presentation: evolving      Subjective  General: Pt in bed upon arrival with family present in room, interpreting as needed. Agreeable to PT/OT treatment. Pain: Pain rating taken based on observed faces and behaviors -- pain in L shoulder   Pain Interventions: pain medication in place prior to arrival and biofreeze applied       Functional Mobility  Bed Mobility  Supine to Sit: 2 person assistance with Max A x 2   Sit to Supine: 2 person assistance with Max A x 2   Comments: per pt granddaughter pt is having hemorrhoid pain.     Transfers  Sit to stand transfer: 2 person assistance with dep x 2   Stand to sit transfer: 2 person assistance with dep x 2   Stand pivot transfer: 2 person assistance with dep x 2   Comments: Max VC for sequencing or transfer  Ambulation  Ambulation not tested on this date secondary to unable to tolerate due to pain . Distance:   Gait Mechanics:   Comments:    Stair Mobility  Stair mobility not completed on this date. Comments:  Wheelchair Mobility:  No w/c mobility completed on this date. Comments:  Balance  Static Sitting Balance: poor (-): requires max (A) to maintain balance  Static Standing Balance: poor (-): requires max (A) to maintain balance  Comments: Pt requires Min A to Max A to maintain sitting balance EOB    Other Therapeutic Interventions    Functional Outcomes  AM-PAC Inpatient Mobility Raw Score : 6              Cognition  Overall Cognitive Status: WFL   Orientation:    alert and oriented x 4  Command Following:   WFL --sometimes requiring increased time or repetition due to language barrier     Education  Barriers To Learning: language  Patient Education: patient educated on goals, PT role and benefits, plan of care, precautions, functional mobility training, family education, discharge recommendations  Learning Assessment:  patient verbalizes understanding, would benefit from continued reinforcement    Assessment  Activity Tolerance: limited by pain   Impairments Requiring Therapeutic Intervention: decreased functional mobility, decreased ROM, decreased strength, decreased endurance, decreased balance  Prognosis: fair  Clinical Assessment: Pt requires Max A x 2 for bed mobility and is Dependent x 2 for stand pivot transfer. Pt has increased anxiety during while sitting EOB secondary to increased hemorrhoid pain. Pt continues to present below functional baseline and would continue to benefit from skilled PT services o promote return to PLOF.    Safety Interventions: patient left in chair, chair alarm in place, call light within reach, patient at risk for falls, nurse notified, and family/caregiver

## 2022-10-20 NOTE — PLAN OF CARE
Problem: Discharge Planning  Goal: Discharge to home or other facility with appropriate resources  Outcome: Progressing  Flowsheets (Taken 10/19/2022 2000)  Discharge to home or other facility with appropriate resources:   Identify barriers to discharge with patient and caregiver   Arrange for needed discharge resources and transportation as appropriate   Identify discharge learning needs (meds, wound care, etc)     Problem: Pain  Goal: Verbalizes/displays adequate comfort level or baseline comfort level  Outcome: Progressing  Flowsheets (Taken 10/19/2022 2000)  Verbalizes/displays adequate comfort level or baseline comfort level:   Encourage patient to monitor pain and request assistance   Assess pain using appropriate pain scale   Administer analgesics based on type and severity of pain and evaluate response   Implement non-pharmacological measures as appropriate and evaluate response   Consider cultural and social influences on pain and pain management     Problem: Skin/Tissue Integrity  Goal: Absence of new skin breakdown  Description: 1. Monitor for areas of redness and/or skin breakdown  2. Assess vascular access sites hourly  3. Every 4-6 hours minimum:  Change oxygen saturation probe site  4. Every 4-6 hours:  If on nasal continuous positive airway pressure, respiratory therapy assess nares and determine need for appliance change or resting period.   Outcome: Progressing     Problem: Safety - Adult  Goal: Free from fall injury  Outcome: Progressing     Problem: ABCDS Injury Assessment  Goal: Absence of physical injury  Outcome: Progressing     Problem: Chronic Conditions and Co-morbidities  Goal: Patient's chronic conditions and co-morbidity symptoms are monitored and maintained or improved  Outcome: Progressing  Flowsheets (Taken 10/19/2022 2000)  Care Plan - Patient's Chronic Conditions and Co-Morbidity Symptoms are Monitored and Maintained or Improved:   Monitor and assess patient's chronic conditions and comorbid symptoms for stability, deterioration, or improvement   Collaborate with multidisciplinary team to address chronic and comorbid conditions and prevent exacerbation or deterioration   Update acute care plan with appropriate goals if chronic or comorbid symptoms are exacerbated and prevent overall improvement and discharge     Problem: Nutrition Deficit:  Goal: Optimize nutritional status  Outcome: Progressing

## 2022-10-20 NOTE — PROGRESS NOTES
[facility]  Speech Language Pathology   Dysphagia Treatment Note    Patient: Carlos Schmidt   : 1940   MRN: [id number]      Evaluation Date: 10/20/2022      Admitting Dx: Shoulder pain [M25.519]  Acute chest pain [R07.9]  History of coronary artery disease [Z86.79]  Chronic congestive heart failure, unspecified heart failure type ([facility] [I50.9]  Treatment Diagnosis: Oropharyngeal Dysphagia   Pain: Denies                                              Diet and Treatment Recommendations 10/20/2022:  Diet Solids Recommendation:  Dysphagia III Soft and bite sized  Liquid Consistency Recommendation: Thin liquids, No straws  Recommended form of Meds: Meds crushed as able in puree    Downgrade to NPO for overt clinical s/s aspiration or decline in respiratory status    Compensatory strategies:   Effortful Swallows , Upright as possible with all PO intake , No straws , Assist Feed , External Pacing , Small bites/sips , Eat/feed slowly, Remain upright 30-45 min     Assessment of Texture Tolerance:  Diet level prior to treatment: NPO   Tolerance of Current Diet Level: RN reported good intake with no overt difficulty noted     Impressions: Pt was positioned Upright in chair, awake and alert. Family present and providing translation assistance. Currently on room air. Pt with less coughing noted this date. Oral mechanism exam; adequate ROM/coordination, dry lingual surface, edentulous. Pt reported dentures were causing pain due to sore so pt has not been wearing them. Pt was able to self feed advanced solid trials. Daughter assisted with drinks of liquids. Pt demonstrated prolonged but adequate munching mastication. She utilized small sips of thin water to assist with moistening bolus. Effective oral clearing was achieved with limited lingual stasis. Swallow onset appeared minimally delayed.  No immediate overt clinical s/s of aspiration/penetration were assessed however, pt continues demonstrates increased risk for aspiration due to co morbidities , deconditioning , respiratory status , and inability to self feed . Based on today's assessment recommend Dysphagia III Soft and bite sized  with Thin liquids, No straws  and aspiration precautions. Consider downgrade to NPO for overt clinical s/s aspiration or decline in respiratory status. Pt may benefit from instrumental swallow evaluation to further assess aspiration risk and develop POC, will continue to monitor. Dysphagia Goals:   Pt will functionally tolerate ongoing assessment of swallow function with diet to be determined as indicated. (ongoing 10/20/2022)   Pt will advance to least restrictive diet as indicated. (ongoing 10/20/2022)  NEW GOAL:  If clinical s/s of aspiration/penetration continue to be noted, Pt will participate in Modified Barium Swallow Study      Plan:   3-5 times per week during acute care stay. Patient/Family Education:  Provided education regarding role of SLP, recommendations and general speech pathology plan of care. [x] Pt verbalized understanding and agreement   [x] Pt requires ongoing learning   [] No evidence of comprehension     Discharge Recommendations:    Discharge recommendations to be determined pending ongoing follow-up during acute care stay    Treatment time:  Timed Code Treatment Minutes: 0 minutes   Total Treatment time: 25 minutes     If patient discharges prior to next session this note will serve as a discharge summary.        Signature:   Benny Lorenz, [address]  Speech Language Pathologist

## 2022-10-20 NOTE — PROGRESS NOTES
[facility]    Follow Up Note    Subjective:   CHIEF COMPLAINT / HPI:   Chief Complaint   Patient presents with    Chest Pain     Pt transported from [facility] from [facility]. PT c/o chest pain radiating to left shoulder. Symptoms started 2 days ago. Pt stated she was seen in [facility] and was inpatient for 2 weeks for left shoulder complications. Interval history: Patient looks better this morning. She was tolerating 1 L/min nasal cannula oxygen supplement. I took her off oxygen while I was in the room she was able to maintain saturations in the mid 90s without respiratory distress. Past Medical History:    Reviewed; no changes    Social History:    Reviewed; no changes    REVIEW OF SYSTEMS:    CONSTITUTIONAL:  negative for fevers and chills  RESPIRATORY:  See HPI  CARDIOVASCULAR:  negative for chest pain, palpitations, edema  GASTROINTESTINAL:  negative for nausea, vomiting, diarrhea, constipation and abdominal pain    Objective:   PHYSICAL EXAM:        VITALS:  BP (!) 97/58   Pulse 87   Temp 97.7 °F (36.5 °C) (Temporal)   Resp 18   Ht 5' (1.524 m)   Wt 242 lb 1 oz (109.8 kg)   LMP  (LMP Unknown)   SpO2 95%   BMI 47.28 kg/m²  on 2L NC    24HR INTAKE/OUTPUT:    Intake/Output Summary (Last 24 hours) at 10/20/2022 0950  Last data filed at 10/20/2022 0809  Gross per 24 hour   Intake 753.01 ml   Output 600 ml   Net 153.01 ml         CONSTITUTIONAL:  awake, alert,  no apparent distress, and appears stated age  LUNGS:  No increased work of breathing and clear to auscultation, no crackles or wheezes  CARDIOVASCULAR: S1 and S2 and no JVD  ABDOMEN:  normal bowel sounds, non-distended and non-tender to palpation  EXT: No edema, no calf tenderness. Pulses are present bilaterally. NEUROLOGIC:  Mental Status Exam:  Level of Alertness:   awake  Orientation:   person, place, time.  Non focal  SKIN:  normal skin color, texture, turgor, no redness, warmth, or swelling at IV sites    DATA:    CBC:  Recent Labs     10/18/22  0437 10/19/22  0425 10/20/22  0530   WBC 17.8* 13.8* 15.0*   RBC 2.23* 2.40* 2.37*   HGB 7.0* 7.4* 7.5*   HCT 21.3* 22.6* 23.4*   PLT 70* 43* 31*   MCV 95.6 94.4 98.5   MCH 31.2 30.8 31.5   MCHC 32.7 32.6 32.0   RDW 21.6* 21.0* 22.0*        BMP:  Recent Labs     10/18/22  1504 10/19/22  0425 10/19/22  0840 10/20/22  0530    150* 148* 138   K 3.9 4.6  --  5.0    115*  --  105   CO2 20* 23  --  22   BUN 76* 80*  --  95*   CREATININE 1.9* 1.7*  --  1.5*   CALCIUM 8.6 8.7  --  8.7   GLUCOSE 500* 135*  --  495*        ABG:  No results for input(s): PHART, VEX8VZU, PO2ART, BCG4NFQ, [id number], BEART in the last 72 hours. Procalcitonin  No results for input(s): PROCAL in the last 72 hours. Radiology Review:  Pertinent images / reports were reviewed as a part of this visit. Assessment:     Acute hypoxemic respiratory failure  Multifocal pneumonia  ARUN  HFrEF  Myelodysplastic syndrome    Plan:     Patient does have chest x-ray from 10/16 and my interpretation is mild CHF with more focal haziness at the left lung base which could be reflective of pneumonia. Procalcitonin is elevated at 2.2. She also has a leukocytosis. Leukocytosis is downtrending there is no guiding culture data. She is on cefepime and Flagyl over concern for possible aspiration pneumonia. She does have newly diagnosed myelodysplastic syndrome and has required transfusion of PRBC and platelets. Hematology oncology is following. Her prognosis is poor. She does have acute on chronic kidney disease. Nephrology is following. Creatinine is now downtrending. Creatinine is 1.5 today. .    She did require transfusion on 10/17. Hemoglobin has remained stable since then. However, platelet count is down to 43,000 today. Because of her myelodysplastic syndrome she may be transfusion dependent.     I will sign off

## 2022-10-21 LAB
ANION GAP SERPL CALCULATED.3IONS-SCNC: 10 MMOL/L (ref 3–16)
BUN BLDV-MCNC: 78 MG/DL (ref 7–20)
CALCIUM SERPL-MCNC: 8.7 MG/DL (ref 8.3–10.6)
CHLORIDE BLD-SCNC: 112 MMOL/L (ref 99–110)
CO2: 22 MMOL/L (ref 21–32)
CREAT SERPL-MCNC: 1.3 MG/DL (ref 0.6–1.2)
CULTURE, RESPIRATORY: NORMAL
DIGOXIN LEVEL: 0.6 NG/ML (ref 0.8–2)
GFR SERPL CREATININE-BSD FRML MDRD: 40 ML/MIN/{1.73_M2}
GLUCOSE BLD-MCNC: 116 MG/DL (ref 70–99)
GLUCOSE BLD-MCNC: 120 MG/DL (ref 70–99)
GLUCOSE BLD-MCNC: 135 MG/DL (ref 70–99)
GLUCOSE BLD-MCNC: 170 MG/DL (ref 70–99)
GLUCOSE BLD-MCNC: 185 MG/DL (ref 70–99)
GLUCOSE BLD-MCNC: 266 MG/DL (ref 70–99)
GLUCOSE BLD-MCNC: 283 MG/DL (ref 70–99)
GLUCOSE BLD-MCNC: 337 MG/DL (ref 70–99)
GLUCOSE BLD-MCNC: 78 MG/DL (ref 70–99)
GLUCOSE BLD-MCNC: 88 MG/DL (ref 70–99)
GLUCOSE BLD-MCNC: 89 MG/DL (ref 70–99)
GRAM STAIN RESULT: NORMAL
HCT VFR BLD CALC: 22.8 % (ref 36–48)
HEMOGLOBIN: 7.2 G/DL (ref 12–16)
MCH RBC QN AUTO: 30.8 PG (ref 26–34)
MCHC RBC AUTO-ENTMCNC: 31.6 G/DL (ref 31–36)
MCV RBC AUTO: 97.4 FL (ref 80–100)
PDW BLD-RTO: 21.6 % (ref 12.4–15.4)
PERFORMED ON: ABNORMAL
PERFORMED ON: NORMAL
PERFORMED ON: NORMAL
PLATELET # BLD: 27 K/UL (ref 135–450)
PMV BLD AUTO: 11.8 FL (ref 5–10.5)
POTASSIUM SERPL-SCNC: 5.1 MMOL/L (ref 3.5–5.1)
RBC # BLD: 2.35 M/UL (ref 4–5.2)
SODIUM BLD-SCNC: 144 MMOL/L (ref 136–145)
WBC # BLD: 13.3 K/UL (ref 4–11)

## 2022-10-21 PROCEDURE — 6370000000 HC RX 637 (ALT 250 FOR IP): Performed by: INTERNAL MEDICINE

## 2022-10-21 PROCEDURE — 6360000002 HC RX W HCPCS: Performed by: INTERNAL MEDICINE

## 2022-10-21 PROCEDURE — 2500000003 HC RX 250 WO HCPCS: Performed by: INTERNAL MEDICINE

## 2022-10-21 PROCEDURE — 2580000003 HC RX 258: Performed by: INTERNAL MEDICINE

## 2022-10-21 PROCEDURE — 6370000000 HC RX 637 (ALT 250 FOR IP): Performed by: NURSE PRACTITIONER

## 2022-10-21 PROCEDURE — 80048 BASIC METABOLIC PNL TOTAL CA: CPT

## 2022-10-21 PROCEDURE — 94761 N-INVAS EAR/PLS OXIMETRY MLT: CPT

## 2022-10-21 PROCEDURE — 94640 AIRWAY INHALATION TREATMENT: CPT

## 2022-10-21 PROCEDURE — C9113 INJ PANTOPRAZOLE SODIUM, VIA: HCPCS | Performed by: INTERNAL MEDICINE

## 2022-10-21 PROCEDURE — 85027 COMPLETE CBC AUTOMATED: CPT

## 2022-10-21 PROCEDURE — 80162 ASSAY OF DIGOXIN TOTAL: CPT

## 2022-10-21 PROCEDURE — 92526 ORAL FUNCTION THERAPY: CPT

## 2022-10-21 PROCEDURE — 6370000000 HC RX 637 (ALT 250 FOR IP): Performed by: STUDENT IN AN ORGANIZED HEALTH CARE EDUCATION/TRAINING PROGRAM

## 2022-10-21 PROCEDURE — 2060000000 HC ICU INTERMEDIATE R&B

## 2022-10-21 RX ORDER — CALCIUM CARBONATE 200(500)MG
500 TABLET,CHEWABLE ORAL 3 TIMES DAILY PRN
Status: DISCONTINUED | OUTPATIENT
Start: 2022-10-21 | End: 2022-10-26 | Stop reason: HOSPADM

## 2022-10-21 RX ORDER — GABAPENTIN 100 MG/1
100 CAPSULE ORAL 2 TIMES DAILY
Status: DISCONTINUED | OUTPATIENT
Start: 2022-10-21 | End: 2022-10-21

## 2022-10-21 RX ORDER — METHYLPREDNISOLONE SODIUM SUCCINATE 40 MG/ML
20 INJECTION, POWDER, LYOPHILIZED, FOR SOLUTION INTRAMUSCULAR; INTRAVENOUS DAILY
Status: DISCONTINUED | OUTPATIENT
Start: 2022-10-22 | End: 2022-10-26 | Stop reason: HOSPADM

## 2022-10-21 RX ORDER — GABAPENTIN 100 MG/1
100 CAPSULE ORAL 3 TIMES DAILY
Status: DISCONTINUED | OUTPATIENT
Start: 2022-10-21 | End: 2022-10-26 | Stop reason: HOSPADM

## 2022-10-21 RX ORDER — PANTOPRAZOLE SODIUM 40 MG/10ML
40 INJECTION, POWDER, LYOPHILIZED, FOR SOLUTION INTRAVENOUS 2 TIMES DAILY
Status: DISCONTINUED | OUTPATIENT
Start: 2022-10-21 | End: 2022-10-26 | Stop reason: HOSPADM

## 2022-10-21 RX ADMIN — METRONIDAZOLE 500 MG: 500 INJECTION, SOLUTION INTRAVENOUS at 05:23

## 2022-10-21 RX ADMIN — HYDROCORTISONE: 25 CREAM TOPICAL at 09:25

## 2022-10-21 RX ADMIN — INSULIN LISPRO 12 UNITS: 100 INJECTION, SOLUTION INTRAVENOUS; SUBCUTANEOUS at 16:26

## 2022-10-21 RX ADMIN — LEVOTHYROXINE SODIUM 150 MCG: 150 TABLET ORAL at 09:23

## 2022-10-21 RX ADMIN — IPRATROPIUM BROMIDE AND ALBUTEROL SULFATE 1 AMPULE: 2.5; .5 SOLUTION RESPIRATORY (INHALATION) at 09:33

## 2022-10-21 RX ADMIN — CEFEPIME 1000 MG: 1 INJECTION, POWDER, FOR SOLUTION INTRAMUSCULAR; INTRAVENOUS at 23:05

## 2022-10-21 RX ADMIN — ATORVASTATIN CALCIUM 80 MG: 80 TABLET, FILM COATED ORAL at 21:59

## 2022-10-21 RX ADMIN — METOPROLOL TARTRATE 12.5 MG: 25 TABLET, FILM COATED ORAL at 09:23

## 2022-10-21 RX ADMIN — IPRATROPIUM BROMIDE AND ALBUTEROL SULFATE 1 AMPULE: 2.5; .5 SOLUTION RESPIRATORY (INHALATION) at 21:20

## 2022-10-21 RX ADMIN — HYDROCORTISONE: 25 CREAM TOPICAL at 23:06

## 2022-10-21 RX ADMIN — Medication 10 ML: at 09:23

## 2022-10-21 RX ADMIN — INSULIN LISPRO 8 UNITS: 100 INJECTION, SOLUTION INTRAVENOUS; SUBCUTANEOUS at 12:32

## 2022-10-21 RX ADMIN — ACETAMINOPHEN 650 MG: 325 TABLET ORAL at 09:23

## 2022-10-21 RX ADMIN — SODIUM CHLORIDE, PRESERVATIVE FREE 10 ML: 5 INJECTION INTRAVENOUS at 22:19

## 2022-10-21 RX ADMIN — PANTOPRAZOLE SODIUM 40 MG: 40 INJECTION, POWDER, FOR SOLUTION INTRAVENOUS at 12:32

## 2022-10-21 RX ADMIN — DIGOXIN 125 MCG: 125 TABLET ORAL at 09:24

## 2022-10-21 RX ADMIN — ACETAMINOPHEN 650 MG: 325 TABLET ORAL at 14:17

## 2022-10-21 RX ADMIN — METRONIDAZOLE 500 MG: 500 INJECTION, SOLUTION INTRAVENOUS at 22:24

## 2022-10-21 RX ADMIN — METHYLPREDNISOLONE SODIUM SUCCINATE 40 MG: 40 INJECTION, POWDER, FOR SOLUTION INTRAMUSCULAR; INTRAVENOUS at 09:23

## 2022-10-21 RX ADMIN — IPRATROPIUM BROMIDE AND ALBUTEROL SULFATE 1 AMPULE: 2.5; .5 SOLUTION RESPIRATORY (INHALATION) at 12:42

## 2022-10-21 RX ADMIN — METRONIDAZOLE 500 MG: 500 INJECTION, SOLUTION INTRAVENOUS at 14:16

## 2022-10-21 RX ADMIN — ACETAMINOPHEN 650 MG: 325 TABLET ORAL at 21:58

## 2022-10-21 RX ADMIN — METOPROLOL TARTRATE 12.5 MG: 25 TABLET, FILM COATED ORAL at 21:59

## 2022-10-21 RX ADMIN — CEFEPIME 1000 MG: 1 INJECTION, POWDER, FOR SOLUTION INTRAMUSCULAR; INTRAVENOUS at 11:14

## 2022-10-21 RX ADMIN — Medication 10 ML: at 21:58

## 2022-10-21 RX ADMIN — GABAPENTIN 100 MG: 100 CAPSULE ORAL at 21:59

## 2022-10-21 RX ADMIN — PANTOPRAZOLE SODIUM 40 MG: 40 INJECTION, POWDER, FOR SOLUTION INTRAVENOUS at 21:58

## 2022-10-21 ASSESSMENT — PAIN DESCRIPTION - ORIENTATION
ORIENTATION: RIGHT;LEFT
ORIENTATION: RIGHT;LEFT

## 2022-10-21 ASSESSMENT — PAIN SCALES - GENERAL
PAINLEVEL_OUTOF10: 7
PAINLEVEL_OUTOF10: 7
PAINLEVEL_OUTOF10: 1

## 2022-10-21 ASSESSMENT — PAIN DESCRIPTION - LOCATION
LOCATION: LEG
LOCATION: LEG

## 2022-10-21 NOTE — PROGRESS NOTES
Hospitalist Progress Note      PCP: Marilyn Saucedo, APRN - CNP    Date of Admission: 10/10/2022    Chief Complaint: Shoulder pain    Hospital Course:78year-old female with past medical history of hyperlipidemia, hypertension, GERD, type 2 diabetes, CAD, atrial fibrillation. Recently diagnosed with mild dysplastic syndrome. Was transferred to the ICU for worsening respiratory distress on 10/17/2022. There was a question if patient had aspirated given her disposition. Patient has been improving on nasal cannula oxygen. Palliative care following. Subjective: Feeling better overall. On room air. Insulin drip stopped overnight. Complaining of some epigastric abdominal discomfort. Usually takes PPI daily at home. Family at bedside.       Medications:  Reviewed    Infusion Medications    insulin (HUMAN R) non-weight based infusion Stopped (10/21/22 2749)    dextrose      sodium chloride      sodium chloride      sodium chloride      sodium chloride Stopped (10/20/22 9873)    dextrose       Scheduled Medications    pantoprazole  40 mg IntraVENous BID    [START ON 10/22/2022] methylPREDNISolone  20 mg IntraVENous Daily    gabapentin  100 mg Oral BID    insulin lispro  0-16 Units SubCUTAneous TID WC    [Held by provider] insulin lispro  0-4 Units SubCUTAneous Nightly    [Held by provider] insulin glargine  25 Units SubCUTAneous BID    digoxin  125 mcg Oral Daily    cefepime  1,000 mg IntraVENous Q12H    metroNIDAZOLE  500 mg IntraVENous Q8H    ipratropium-albuterol  1 ampule Inhalation 4x daily    lidocaine  1 patch TransDERmal Daily    metoprolol tartrate  12.5 mg Oral BID    acetaminophen  650 mg Oral TID    atorvastatin  80 mg Oral Nightly    hydrocortisone   Rectal BID    levothyroxine  150 mcg Oral Daily    sodium chloride flush  5-40 mL IntraVENous 2 times per day     PRN Meds: calcium carbonate, guaiFENesin, glucose, dextrose bolus **OR** dextrose bolus, glucagon (rDNA), dextrose, melatonin, sodium chloride, sodium chloride, ipratropium-albuterol, lidocaine viscous hcl, benzonatate, sodium chloride, sodium chloride flush, sodium chloride, ondansetron **OR** ondansetron, polyethylene glycol, acetaminophen **OR** acetaminophen, dextrose bolus **OR** dextrose bolus, glucagon (rDNA), dextrose      Intake/Output Summary (Last 24 hours) at 10/21/2022 1058  Last data filed at 10/21/2022 0548  Gross per 24 hour   Intake 1061.72 ml   Output 350 ml   Net 711.72 ml       Physical Exam Performed:    BP (!) 113/59   Pulse (!) 103   Temp 97.2 °F (36.2 °C) (Temporal)   Resp 16   Ht 5' (1.524 m)   Wt 241 lb 10 oz (109.6 kg)   LMP  (LMP Unknown)   SpO2 95%   BMI 47.19 kg/m²     General appearance: No apparent distress, appears stated age and cooperative. HEENT: Pupils equal, round, and reactive to light. Conjunctivae/corneas clear. Neck: Supple, with full range of motion. No jugular venous distention. Trachea midline. Respiratory: Improving air entry. Occasional wheezing bilaterally. Cardiovascular: Regular rate and rhythm with normal S1/S2 without murmurs, rubs or gallops. Abdomen: Soft, mild epigastric tenderness to palpation, non-distended with normal bowel sounds. Musculoskeletal: No clubbing, cyanosis or edema bilaterally. Full range of motion without deformity. Skin: Skin color, texture, turgor normal.  No rashes or lesions. Neurologic:  Neurovascularly intact without any focal sensory/motor deficits.  Cranial nerves: II-XII intact, grossly non-focal.  Psychiatric: Alert and oriented, thought content appropriate, normal insight  Capillary Refill: Brisk, 3 seconds, normal   Peripheral Pulses: +2 palpable, equal bilaterally       Labs:   Recent Labs     10/19/22  0425 10/20/22  0530 10/21/22  0430   WBC 13.8* 15.0* 13.3*   HGB 7.4* 7.5* 7.2*   HCT 22.6* 23.4* 22.8*   PLT 43* 31* 27*     Recent Labs     10/19/22  0425 10/19/22  0840 10/20/22  0530 10/21/22  0430   * 148* 138 144   K 4.6  --  5.0 5.1   *  --  105 112*   CO2 23  --  22 22   BUN 80*  --  95* 78*   CREATININE 1.7*  --  1.5* 1.3*   CALCIUM 8.7  --  8.7 8.7     No results for input(s): AST, ALT, BILIDIR, BILITOT, ALKPHOS in the last 72 hours. No results for input(s): INR in the last 72 hours. No results for input(s): Sukhdeep Tiffanie in the last 72 hours. Urinalysis:      Lab Results   Component Value Date/Time    NITRU Negative 10/17/2022 07:24 PM    WBCUA 19 10/17/2022 07:24 PM    BACTERIA None Seen 10/17/2022 07:24 PM    RBCUA 275 10/17/2022 07:24 PM    BLOODU LARGE 10/17/2022 07:24 PM    SPECGRAV 1.020 10/17/2022 07:24 PM    GLUCOSEU 250 10/17/2022 07:24 PM    GLUCOSEU NEGATIVE 03/09/2012 06:51 AM       Radiology:  XR CHEST PORTABLE   Final Result   No focal lung consolidation. XR CHEST PORTABLE   Final Result   Mild cardiomegaly. Diffuse haziness of the left lung be due to atypical   infection or edema. XR CHEST PORTABLE   Final Result   1. Similar-appearing prominence of the cardiac silhouette with associated   vascular congestion. 2. No focal consolidation. CT HEAD WO CONTRAST   Final Result   1. No acute intracranial abnormality. 2. Cerebral and cerebellar parenchymal volume loss with chronic microvascular   white matter ischemic disease. CT CHEST PULMONARY EMBOLISM W CONTRAST   Final Result   No evidence of pulmonary embolism or acute pulmonary abnormality. XR CHEST PORTABLE   Final Result   No radiographic evidence of acute pulmonary disease.                  Assessment/Plan:    Active Hospital Problems    Diagnosis     History of coronary artery disease [Z86.79]      Priority: Medium    Pulmonary vascular congestion [R09.89]      Priority: Medium    Acute hypoxemic respiratory failure (HCC) [J96.01]      Priority: Medium    Aspiration pneumonia of left lower lobe due to gastric secretions ([address] [J69.0]      Priority: Medium    Hypervolemia [E87.70]      Priority: Medium    MDS (myelodysplastic syndrome) [facility] [D46.9]      Priority: Medium    Chronic systolic heart failure ([facility] [I50.22]      Priority: Medium    Arterial hypotension [I95.9]      Priority: Medium    Shoulder pain [M25.519]      Priority: Medium    Severe anemia [D64.9]      Priority: Medium    Severe thrombocytopenia (HCC) [D69.6]      Priority: Medium    Acute diastolic heart failure (HCC) [I50.31]      Priority: Medium    Type 2 diabetes mellitus (HCC) [E11.9]     Acute chest pain [R07.9]     Acute renal failure (HCC) [N17.9]      Acute hypoxic respiratory failure: Secondary to pneumonia and CHF exacerbation. Resolved. Currently on room air. Pneumonia: Multifocal and aspiration etiology. Continue antibiotics to finish 1 week course. Decrease the dose of steroids. GERD: Start on PPI twice daily. Can use Tums as needed     CHF: Initially diuresed with IV Lasix. Currently Lasix on hold. Creatinine came down to 1.3. ARUN: Resolving. Creatinine down to 1.3. Coronary artery disease: Statin      diabetes mellitus/severe hyperglycemia: Secondary to steroids. Insulin drip stopped. Start on sliding scale insulin. MDS: Has anemia and thrombocytopenia-s/p PRBC and platelet transfusions. Outpatient follow-up advised. A. Fib: Cardiology on board. Continue metoprolol and digoxin. Not on anticoagulation secondary to anemia and thrombocytopenia needing transfusions. DVT Prophylaxis: SCDs  Diet: ADULT DIET; Dysphagia - Soft and Bite Sized; No Drinking Straws  Code Status: DNR-CCA  PT/OT Eval Status: Skilled nursing. Dispo -transfer to PCU today. Hopefully can be discharged to skilled nursing facility in 1 to 2 days.       Amy Jovel MD

## 2022-10-21 NOTE — PROGRESS NOTES
[facility]  Speech Language Pathology   Dysphagia Treatment Note    Patient: Haritha Cruz   : 1940   MRN: [id number]      Evaluation Date: 10/21/2022      Admitting Dx: Shoulder pain [M25.519]  Acute chest pain [R07.9]  History of coronary artery disease [Z86.79]  Chronic congestive heart failure, unspecified heart failure type ([address] [I50.9]  Treatment Diagnosis: Oropharyngeal Dysphagia   Pain: Pt reports pain/burning within her eyes. RN made aware and pt provided warm wash cloth. Diet and Treatment Recommendations 10/21/2022:  Diet Solids Recommendation:  Dysphagia III Soft and bite sized  Liquid Consistency Recommendation: Thin liquids, No straws  Recommended form of Meds: Meds crushed as able in puree    Downgrade to NPO for overt clinical s/s aspiration or decline in respiratory status    Compensatory strategies:   Effortful Swallows , Upright as possible with all PO intake , No straws , Assist Feed , External Pacing , Small bites/sips , Eat/feed slowly, Remain upright 30-45 min     Assessment of Texture Tolerance:  Diet level prior to treatment: Dysphagia III Soft and Bite-Sized with thin liquids, no straws, medications crushed as able   Tolerance of Current Diet Level: RN and pt's family reported good intake and tolerance of the pt's diet and medications in puree with no overt difficulty noted     Impressions: Pt was positioned upright in bed, awake, alert and cooperative. Pt's family was present throughout the session and provides translation assistance. Pt is currently on room air and demonstrated no coughing prior to or during intake. The pt continues to report that her dentures were causing pain so she has not been wearing them. Pt's family provided feeding assistance throughout today's session and reported that the pt was able to eat her soft pancake this morning but she found the sausage to be \"too hard. \" Pt may benefit from Dysphagia II Minced and Moist or Dysphagia I Pureed meats to promote increased intake and reduced difficulty masticating. Pt agreeable to trial a soft solid today (bite-size fruit) but declined advanced trials. The pt demonstrates prolonged munching mastication but had full oral clearance and no overt signs/symptoms of penetration/aspiration. The pt continues to demonstrate increased risk for aspiration due to co morbidities , deconditioning , respiratory status , and inability to self feed . Based on today's assessment recommend continuation of the Dysphagia III Soft and bite sized  with Thin liquids, No straws  and aspiration precautions. Consider downgrade to NPO for overt clinical s/s aspiration or decline in respiratory status. Pt may benefit from instrumental swallow evaluation to further assess aspiration risk and develop POC, will continue to monitor. Pt's family and the pt's chart indicate that the pt may soon discharge to a SNF. SLP completed education with the pt and her family that the pt should continue the soft textured diet upon discharge and would likely continue with speech therapy once admitted to the SNF to further assess her swallow response. Dysphagia Goals:   Pt will functionally tolerate ongoing assessment of swallow function with diet to be determined as indicated. (ongoing 10/21/2022)   Pt will advance to least restrictive diet as indicated. (ongoing 10/21/2022)  NEW GOAL:  If clinical s/s of aspiration/penetration continue to be noted, Pt will participate in Modified Barium Swallow Study (ongoing 10/21/2022)    Plan:   3-5 times per week during acute care stay. Patient/Family Education:  Provided education regarding role of SLP, recommendations and general speech pathology plan of care.    [x] Pt verbalized understanding and agreement   [x] Pt requires ongoing learning   [] No evidence of comprehension     Discharge Recommendations:    Discharge recommendations to be determined pending ongoing follow-up during acute care stay    Treatment time:  Timed Code Treatment Minutes: 0 minutes   Total Treatment time: 12 minutes     If patient discharges prior to next session this note will serve as a discharge summary. Signature:   THOMAS [place] #SP. [id number] [facility]

## 2022-10-21 NOTE — PROGRESS NOTES
Nutrition Note    RECOMMENDATIONS  PO Diet: Diet per SLP    NUTRITION ASSESSMENT   Pt triggered for follow-up. Diet resumed 10/19, currently on dysphagia soft and bite sized. Minimal documented intakes of 1-25, % since last RD visit 10/18. Per Sherry Leija RN, pt is eating pretty well, especially when family is present; may benefit from [address] education as pt consumes lots of sweets. HbA1c of 7.5% on 10/18. Do note, for older adults (age 72 years or greater) with three or more coexisting chronic illnesses serious enough to require medications or lifestyle management, a reasonable goal for A1C is <8.0%. Going to SNF upon discharge. RD will continue to monitor for adequate po intake and provide verbal diet education as appropriate, placed [email or web address] diet information in discharge instructions. Nutrition Related Findings: -5.7L. Cl 112. Glucose >200 yesterday, receiving methylprednisolone. LBM 10/20, BS+. Non-pitting BUE; trace, pitting BLE edema. Wounds: Skin Tears  Nutrition Education:  Education initiated (provide verbal [address] diet edu as appropriate, info placed in discharge instructions)   Nutrition Goals: PO intake 50% or greater, by next RD assessment     MALNUTRITION ASSESSMENT   Acute Illness  Malnutrition Status: No malnutrition    NUTRITION DIAGNOSIS   No nutrition diagnosis at this time related to impaired respiratory function, swallowing difficulty, cognitive or neurological impairment as evidenced by NPO or clear liquid status due to medical condition    CURRENT NUTRITION THERAPIES  ADULT DIET; Dysphagia - Soft and Bite Sized;  No Drinking Straws     PO Intake: Unable to assess (minimal documented intakes)   PO Supplement Intake:None Ordered    ANTHROPOMETRICS  Current Height: 5' (152.4 cm)  Current Weight: 241 lb 10 oz (109.6 kg)    Admission weight: 243 lb (110.2 kg) (bed wt)  Ideal Body Weight (IBW): 100 lbs  (45 kg)        BMI: 47.1    COMPARATIVE STANDARDS  Energy (kcal):  4772-0210     Protein (g): 55-91       Fluid (mL/day):  3221-6735    The patient will be monitored per nutrition standards of care. Consult dietitian if additional nutrition interventions are needed prior to RD reassessment.      Shelly Weller MS, RD, LD    Contact: [phone]

## 2022-10-21 NOTE — CARE COORDINATION
CM called [facility] admissions again [phone] and left  for Lisa to check on referral.    Gisella Rausch RN, BSN  [phone]

## 2022-10-21 NOTE — CARE COORDINATION
CM met with two family members and pt and then had conversation with daughter Tamanna Soto on facetime while in room. Discussed pt may be ready for discharge per notes within the next 24-48 hrs. CM provided medicare snf list to family. They did want pt referred to [facility]. CM sent referral over epic to [facility] and then called and left vm for Cayla Johnson in admissions [phone] referring pt. CM provided [facility] pamphlet to family and pt.        Christina Naik, RN, BSN  [phone]

## 2022-10-21 NOTE — PROGRESS NOTES
[facility]   Progress Note      10/21/2022 2:58 PM        Name: Bety Blackmon . Admitted: 10/10/2022    SUBJECTIVE:        Patient is feeling better.   She might get transferred out of CVU  No fever  No external bleeding    Reviewed interval ancillary notes    Current Medications  pantoprazole (PROTONIX) injection 40 mg, BID  [START ON 10/22/2022] methylPREDNISolone sodium (SOLU-MEDROL) injection 20 mg, Daily  calcium carbonate (TUMS) chewable tablet 500 mg, TID PRN  gabapentin (NEURONTIN) capsule 100 mg, TID  cefepime (MAXIPIME) 1000 mg IVPB minibag, Q12H  insulin lispro (HUMALOG) injection vial 0-16 Units, TID WC  [Held by provider] insulin lispro (HUMALOG) injection vial 0-4 Units, Nightly  [Held by provider] insulin glargine (LANTUS) injection vial 25 Units, BID  guaiFENesin (MUCINEX) extended release tablet 600 mg, BID PRN  insulin regular (HUMULIN R;NOVOLIN R) 100 Units in sodium chloride 0.9 % 100 mL infusion, Continuous  digoxin (LANOXIN) tablet 125 mcg, Daily  glucose-vitamin C chewable tablet 4 tablet, PRN  dextrose bolus 10% 125 mL, PRN   Or  dextrose bolus 10% 250 mL, PRN  glucagon (rDNA) injection 1 mg, PRN  dextrose 10 % infusion, Continuous PRN  melatonin tablet 6 mg, Nightly PRN  0.9 % sodium chloride infusion, PRN  metronidazole (FLAGYL) 500 mg in 0.9% NaCl 100 mL IVPB premix, Q8H  0.9 % sodium chloride infusion, PRN  ipratropium-albuterol (DUONEB) nebulizer solution 1 ampule, Q4H PRN  lidocaine viscous hcl (XYLOCAINE) 2 % solution 15 mL, Q3H PRN  ipratropium-albuterol (DUONEB) nebulizer solution 1 ampule, 4x daily  lidocaine 4 % external patch 1 patch, Daily  benzonatate (TESSALON) capsule 100 mg, TID PRN  metoprolol tartrate (LOPRESSOR) tablet 12.5 mg, BID  0.9 % sodium chloride infusion, PRN  acetaminophen (TYLENOL) tablet 650 mg, TID  atorvastatin (LIPITOR) tablet 80 mg, Nightly  hydrocortisone (ANUSOL-HC) 2.5 % rectal cream, BID  levothyroxine (SYNTHROID) tablet 150 mcg, Daily  sodium chloride flush 0.9 % injection 5-40 mL, 2 times per day  sodium chloride flush 0.9 % injection 5-40 mL, PRN  0.9 % sodium chloride infusion, PRN  ondansetron (ZOFRAN-ODT) disintegrating tablet 4 mg, Q8H PRN   Or  ondansetron (ZOFRAN) injection 4 mg, Q6H PRN  polyethylene glycol (GLYCOLAX) packet 17 g, Daily PRN  acetaminophen (TYLENOL) tablet 650 mg, Q6H PRN   Or  acetaminophen (TYLENOL) suppository 650 mg, Q6H PRN  dextrose bolus 10% 125 mL, PRN   Or  dextrose bolus 10% 250 mL, PRN  glucagon (rDNA) injection 1 mg, PRN  dextrose 10 % infusion, Continuous PRN      Objective:  BP (!) 104/57   Pulse 95   Temp 98.2 °F (36.8 °C) (Temporal)   Resp 17   Ht 5' (1.524 m)   Wt 241 lb 10 oz (109.6 kg)   LMP  (LMP Unknown)   SpO2 95%   BMI 47.19 kg/m²     Intake/Output Summary (Last 24 hours) at 10/21/2022 1458  Last data filed at 10/21/2022 0548  Gross per 24 hour   Intake 651.72 ml   Output 300 ml   Net 351.72 ml        Wt Readings from Last 3 Encounters:   10/21/22 241 lb 10 oz (109.6 kg)   10/07/22 241 lb 14.4 oz (109.7 kg)   06/21/22 243 lb 9.6 oz (110.5 kg)       Conscious alert oriented. Appears comfortable. Pallor +  No neck fullness. Respiratory efforts are normal.    Abdomen is not distended. No leg edema    No focal deficits. Labs and Tests:  CBC:   Recent Labs     10/19/22  0425 10/20/22  0530 10/21/22  0430   WBC 13.8* 15.0* 13.3*   HGB 7.4* 7.5* 7.2*   PLT 43* 31* 27*       BMP:    Recent Labs     10/19/22  0425 10/19/22  0840 10/20/22  0530 10/21/22  0430   * 148* 138 144   K 4.6  --  5.0 5.1   *  --  105 112*   CO2 23  --  22 22   BUN 80*  --  95* 78*   CREATININE 1.7*  --  1.5* 1.3*   GLUCOSE 135*  --  495* 88       Hepatic: No results for input(s): AST, ALT, ALB, BILITOT, ALKPHOS in the last 72 hours.     ASSESSMENT AND PLAN    Principal Problem:    Acute hypoxemic respiratory failure (HCC)  Active Problems:    Acute diastolic heart failure (HCC)    Severe thrombocytopenia (HCC)    Severe anemia    Shoulder pain    Hypervolemia    MDS (myelodysplastic syndrome) (HCC)    Chronic systolic heart failure (HCC)    Arterial hypotension    History of coronary artery disease    Pulmonary vascular congestion    Aspiration pneumonia of left lower lobe due to gastric secretions (HCC)    Acute renal failure (HCC)    Acute chest pain    Type 2 diabetes mellitus ([facility]  Resolved Problems:    * No resolved hospital problems. *      1. MDS  - bone marrow biopsy done on 9/29/2022 consistent with MDS. 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  -Flow cytometry peripheral blood 10/14/2022 did not show any evidence of transformation to acute leukemia.    -Earlier this week, the patient was not doing well due to aspiration pneumonia, respiratory failure. The family was thinking about palliative care.  -Over the last few days, the patient has improved. -The family might consider treatment for myelodysplastic syndrome as outpatient.  -Treatment would include hyper methylating agent or Revlimid     2. Leukocytosis, anemia, and thrombocytopenia:    Anemia and thrombocytopenia due to MDS. Worsening might be due to infection at/or antibiotic use  -Continue to monitor  -Transfuse as needed.    -Leukocytosis is reactive. Flow cytometry on peripheral blood from 10/14/2022 did not show any evidence of transformation    3.  CHF, aspiration pneumonia:    -Management per primary team      Heidi Fontanez MD

## 2022-10-22 LAB
ANION GAP SERPL CALCULATED.3IONS-SCNC: 8 MMOL/L (ref 3–16)
BUN BLDV-MCNC: 74 MG/DL (ref 7–20)
CALCIUM SERPL-MCNC: 8.9 MG/DL (ref 8.3–10.6)
CHLORIDE BLD-SCNC: 109 MMOL/L (ref 99–110)
CO2: 23 MMOL/L (ref 21–32)
CREAT SERPL-MCNC: 1.1 MG/DL (ref 0.6–1.2)
GFR SERPL CREATININE-BSD FRML MDRD: 49 ML/MIN/{1.73_M2}
GLUCOSE BLD-MCNC: 358 MG/DL (ref 70–99)
GLUCOSE BLD-MCNC: 385 MG/DL (ref 70–99)
GLUCOSE BLD-MCNC: 404 MG/DL (ref 70–99)
GLUCOSE BLD-MCNC: 415 MG/DL (ref 70–99)
GLUCOSE BLD-MCNC: 460 MG/DL (ref 70–99)
HCT VFR BLD CALC: 23.6 % (ref 36–48)
HEMOGLOBIN: 7.6 G/DL (ref 12–16)
MCH RBC QN AUTO: 31.2 PG (ref 26–34)
MCHC RBC AUTO-ENTMCNC: 31.9 G/DL (ref 31–36)
MCV RBC AUTO: 97.8 FL (ref 80–100)
PDW BLD-RTO: 22.2 % (ref 12.4–15.4)
PERFORMED ON: ABNORMAL
PLATELET # BLD: 28 K/UL (ref 135–450)
PMV BLD AUTO: 11.2 FL (ref 5–10.5)
POTASSIUM SERPL-SCNC: 5.1 MMOL/L (ref 3.5–5.1)
RBC # BLD: 2.42 M/UL (ref 4–5.2)
SODIUM BLD-SCNC: 140 MMOL/L (ref 136–145)
WBC # BLD: 13.9 K/UL (ref 4–11)

## 2022-10-22 PROCEDURE — 2580000003 HC RX 258: Performed by: INTERNAL MEDICINE

## 2022-10-22 PROCEDURE — C1751 CATH, INF, PER/CENT/MIDLINE: HCPCS

## 2022-10-22 PROCEDURE — 6360000002 HC RX W HCPCS: Performed by: INTERNAL MEDICINE

## 2022-10-22 PROCEDURE — 6370000000 HC RX 637 (ALT 250 FOR IP): Performed by: INTERNAL MEDICINE

## 2022-10-22 PROCEDURE — 36569 INSJ PICC 5 YR+ W/O IMAGING: CPT

## 2022-10-22 PROCEDURE — 2060000000 HC ICU INTERMEDIATE R&B

## 2022-10-22 PROCEDURE — 6370000000 HC RX 637 (ALT 250 FOR IP): Performed by: STUDENT IN AN ORGANIZED HEALTH CARE EDUCATION/TRAINING PROGRAM

## 2022-10-22 PROCEDURE — 80048 BASIC METABOLIC PNL TOTAL CA: CPT

## 2022-10-22 PROCEDURE — 6370000000 HC RX 637 (ALT 250 FOR IP): Performed by: NURSE PRACTITIONER

## 2022-10-22 PROCEDURE — 85027 COMPLETE CBC AUTOMATED: CPT

## 2022-10-22 PROCEDURE — 02HV33Z INSERTION OF INFUSION DEVICE INTO SUPERIOR VENA CAVA, PERCUTANEOUS APPROACH: ICD-10-PCS | Performed by: INTERNAL MEDICINE

## 2022-10-22 PROCEDURE — 2500000003 HC RX 250 WO HCPCS: Performed by: INTERNAL MEDICINE

## 2022-10-22 PROCEDURE — 94761 N-INVAS EAR/PLS OXIMETRY MLT: CPT

## 2022-10-22 PROCEDURE — 36415 COLL VENOUS BLD VENIPUNCTURE: CPT

## 2022-10-22 PROCEDURE — C9113 INJ PANTOPRAZOLE SODIUM, VIA: HCPCS | Performed by: INTERNAL MEDICINE

## 2022-10-22 PROCEDURE — 94640 AIRWAY INHALATION TREATMENT: CPT

## 2022-10-22 RX ORDER — SODIUM CHLORIDE 0.9 % (FLUSH) 0.9 %
5-40 SYRINGE (ML) INJECTION PRN
Status: DISCONTINUED | OUTPATIENT
Start: 2022-10-22 | End: 2022-10-26 | Stop reason: HOSPADM

## 2022-10-22 RX ORDER — LIDOCAINE HYDROCHLORIDE 10 MG/ML
5 INJECTION, SOLUTION EPIDURAL; INFILTRATION; INTRACAUDAL; PERINEURAL ONCE
Status: COMPLETED | OUTPATIENT
Start: 2022-10-22 | End: 2022-10-22

## 2022-10-22 RX ORDER — SODIUM CHLORIDE 9 MG/ML
25 INJECTION, SOLUTION INTRAVENOUS PRN
Status: DISCONTINUED | OUTPATIENT
Start: 2022-10-22 | End: 2022-10-26 | Stop reason: HOSPADM

## 2022-10-22 RX ORDER — INSULIN LISPRO 100 [IU]/ML
6 INJECTION, SOLUTION INTRAVENOUS; SUBCUTANEOUS
Status: DISCONTINUED | OUTPATIENT
Start: 2022-10-22 | End: 2022-10-26 | Stop reason: HOSPADM

## 2022-10-22 RX ORDER — SODIUM CHLORIDE 0.9 % (FLUSH) 0.9 %
5-40 SYRINGE (ML) INJECTION EVERY 12 HOURS SCHEDULED
Status: DISCONTINUED | OUTPATIENT
Start: 2022-10-22 | End: 2022-10-26 | Stop reason: HOSPADM

## 2022-10-22 RX ADMIN — Medication 10 ML: at 21:55

## 2022-10-22 RX ADMIN — SODIUM CHLORIDE, PRESERVATIVE FREE 10 ML: 5 INJECTION INTRAVENOUS at 11:33

## 2022-10-22 RX ADMIN — LIDOCAINE HYDROCHLORIDE 5 ML: 10 INJECTION, SOLUTION EPIDURAL; INFILTRATION; INTRACAUDAL; PERINEURAL at 18:29

## 2022-10-22 RX ADMIN — ATORVASTATIN CALCIUM 80 MG: 80 TABLET, FILM COATED ORAL at 21:55

## 2022-10-22 RX ADMIN — MELATONIN TAB 3 MG 6 MG: 3 TAB at 21:56

## 2022-10-22 RX ADMIN — INSULIN LISPRO 16 UNITS: 100 INJECTION, SOLUTION INTRAVENOUS; SUBCUTANEOUS at 12:04

## 2022-10-22 RX ADMIN — HYDROCORTISONE: 25 CREAM TOPICAL at 21:55

## 2022-10-22 RX ADMIN — Medication 10 ML: at 07:43

## 2022-10-22 RX ADMIN — ACETAMINOPHEN 650 MG: 325 TABLET ORAL at 21:54

## 2022-10-22 RX ADMIN — INSULIN LISPRO 16 UNITS: 100 INJECTION, SOLUTION INTRAVENOUS; SUBCUTANEOUS at 09:36

## 2022-10-22 RX ADMIN — IPRATROPIUM BROMIDE AND ALBUTEROL SULFATE 1 AMPULE: 2.5; .5 SOLUTION RESPIRATORY (INHALATION) at 00:35

## 2022-10-22 RX ADMIN — INSULIN LISPRO 6 UNITS: 100 INJECTION, SOLUTION INTRAVENOUS; SUBCUTANEOUS at 18:23

## 2022-10-22 RX ADMIN — SODIUM CHLORIDE, PRESERVATIVE FREE 10 ML: 5 INJECTION INTRAVENOUS at 06:04

## 2022-10-22 RX ADMIN — IPRATROPIUM BROMIDE AND ALBUTEROL SULFATE 1 AMPULE: 2.5; .5 SOLUTION RESPIRATORY (INHALATION) at 11:57

## 2022-10-22 RX ADMIN — METOPROLOL TARTRATE 12.5 MG: 25 TABLET, FILM COATED ORAL at 07:43

## 2022-10-22 RX ADMIN — METHYLPREDNISOLONE SODIUM SUCCINATE 20 MG: 40 INJECTION, POWDER, FOR SOLUTION INTRAMUSCULAR; INTRAVENOUS at 07:43

## 2022-10-22 RX ADMIN — ACETAMINOPHEN 650 MG: 325 TABLET ORAL at 07:43

## 2022-10-22 RX ADMIN — DIGOXIN 125 MCG: 125 TABLET ORAL at 07:43

## 2022-10-22 RX ADMIN — Medication 10 ML: at 22:06

## 2022-10-22 RX ADMIN — INSULIN LISPRO 6 UNITS: 100 INJECTION, SOLUTION INTRAVENOUS; SUBCUTANEOUS at 12:04

## 2022-10-22 RX ADMIN — ACETAMINOPHEN 650 MG: 325 TABLET ORAL at 15:18

## 2022-10-22 RX ADMIN — METRONIDAZOLE 500 MG: 500 INJECTION, SOLUTION INTRAVENOUS at 19:22

## 2022-10-22 RX ADMIN — PANTOPRAZOLE SODIUM 40 MG: 40 INJECTION, POWDER, FOR SOLUTION INTRAVENOUS at 21:55

## 2022-10-22 RX ADMIN — GABAPENTIN 100 MG: 100 CAPSULE ORAL at 15:18

## 2022-10-22 RX ADMIN — METOPROLOL TARTRATE 12.5 MG: 25 TABLET, FILM COATED ORAL at 21:56

## 2022-10-22 RX ADMIN — IPRATROPIUM BROMIDE AND ALBUTEROL SULFATE 1 AMPULE: 2.5; .5 SOLUTION RESPIRATORY (INHALATION) at 08:03

## 2022-10-22 RX ADMIN — INSULIN GLARGINE 25 UNITS: 100 INJECTION, SOLUTION SUBCUTANEOUS at 12:04

## 2022-10-22 RX ADMIN — GABAPENTIN 100 MG: 100 CAPSULE ORAL at 21:56

## 2022-10-22 RX ADMIN — CEFEPIME 1000 MG: 1 INJECTION, POWDER, FOR SOLUTION INTRAMUSCULAR; INTRAVENOUS at 18:28

## 2022-10-22 RX ADMIN — METRONIDAZOLE 500 MG: 500 INJECTION, SOLUTION INTRAVENOUS at 06:13

## 2022-10-22 RX ADMIN — HYDROCORTISONE: 25 CREAM TOPICAL at 07:44

## 2022-10-22 RX ADMIN — GABAPENTIN 100 MG: 100 CAPSULE ORAL at 07:43

## 2022-10-22 RX ADMIN — INSULIN LISPRO 16 UNITS: 100 INJECTION, SOLUTION INTRAVENOUS; SUBCUTANEOUS at 18:24

## 2022-10-22 RX ADMIN — IPRATROPIUM BROMIDE AND ALBUTEROL SULFATE 1 AMPULE: 2.5; .5 SOLUTION RESPIRATORY (INHALATION) at 15:16

## 2022-10-22 RX ADMIN — PANTOPRAZOLE SODIUM 40 MG: 40 INJECTION, POWDER, FOR SOLUTION INTRAVENOUS at 07:43

## 2022-10-22 RX ADMIN — LEVOTHYROXINE SODIUM 150 MCG: 150 TABLET ORAL at 07:43

## 2022-10-22 RX ADMIN — IPRATROPIUM BROMIDE AND ALBUTEROL SULFATE 1 AMPULE: 2.5; .5 SOLUTION RESPIRATORY (INHALATION) at 20:33

## 2022-10-22 RX ADMIN — INSULIN GLARGINE 25 UNITS: 100 INJECTION, SOLUTION SUBCUTANEOUS at 22:14

## 2022-10-22 ASSESSMENT — PAIN DESCRIPTION - LOCATION: LOCATION: LEG

## 2022-10-22 ASSESSMENT — PAIN DESCRIPTION - ORIENTATION: ORIENTATION: RIGHT;LEFT

## 2022-10-22 ASSESSMENT — PAIN SCALES - GENERAL: PAINLEVEL_OUTOF10: 7

## 2022-10-22 NOTE — PROGRESS NOTES
MD Mena Veliz MD Carmelita Grieve, MD                  Office: [phone]                      Fax: [phone]             [facility]                   NEPHROLOGY INPATIENT PROGRESS NOTE:     PATIENT NAME: Audelia Jane  : 1940  MRN: [id number]      RECOMMENDATIONS:   - Na and crea better  - follow K since upper limits  - bp acceptable  - neg 5.8L since admission  - can restart Entresto  - hold off on Lasix for now while restarting Entresto    -discussed NSAIDs avoidance, as was on Naproxen    - hold RAAS blockade, due to hypotension and ARUN  - follow Hgb    D/C plan from renal stand point:  -renal status stable. Will sign off. Call if needed. Thank you. IMPRESSION:       Admitted on:  10/10/2022 10:51 AM   For:  Shoulder pain [M25.519]  Acute chest pain [R07.9]  History of coronary artery disease [Z86.79]  Chronic congestive heart failure, unspecified heart failure type ([facility] [address] [I50.9]     ARUN (on no h/o CKD:): improved. Crea 1.1.   - BL Scr-0.8 as of 1 admission on 10--> peaked at 1.9 within 48 hours during admission  - Etiology of ARUN -presumed ATN in the setting of hemoglobin 6.7, hypotension, concern for infection with leukocytosis + CT angio on 10-,   -With multiple other culprits clinically for ARUN I do not suspect any other etiology so no need for serology work-up for now  - UA : results reviewed: 250 glucose, trace ketones, high specific gravity large blood, 100 protein, trace leukocytes, few hyaline cast, with RBCs almost 200, with WBCs. - Renal imaging: none recently   -In 48 hours no improvement with culprit for ARUN, and then cath kidney imaging      Associated problems:  : HTN : no need for tight control   : Na: Severe hypernatremia, dehydration , higher free water deficits. Better.      - Azotemia: pre-renal  - Electrolytes: K: WNL  - Acid-Base: acidosis -  non-AGMA  - Anemia: Likely due to myelodysplastic syndrome    Other major problems: Management per primary and other consulting teams. Morbid obesity  Obstructive sleep apnea on CPAP  Coronary artery disease, history of CABG    Diagnosis of myelodysplastic syndrome after bone marrow biopsy in September 2022    Chronic systolic heart failure-EF around 45-50%    Mild Myelodysplasia      Hospital Problems             Last Modified POA    * (Principal) Acute hypoxemic respiratory failure ([address] 10/19/2022 Yes    Acute diastolic heart failure ([address] 10/20/2022 Yes    Severe thrombocytopenia ([address] 10/18/2022 Yes    Severe anemia 10/17/2022 Yes    Shoulder pain 10/19/2022 Yes    Hypervolemia 10/17/2022 Yes    MDS (myelodysplastic syndrome) ([address] 10/20/2022 Yes    Chronic systolic heart failure ([address] 10/18/2022 Yes    Arterial hypotension 10/17/2022 Yes    History of coronary artery disease 10/18/2022 Yes    Pulmonary vascular congestion 10/18/2022 Yes    Aspiration pneumonia of left lower lobe due to gastric secretions ([address] 10/18/2022 Yes    Acute renal failure ([address] 10/17/2022 Yes    Acute chest pain 10/18/2022 Yes    Type 2 diabetes mellitus ([address] 10/19/2022 Yes     :       Seth Galvez MD,  [facility] [address] [place] [address]: [phone] or Via Flowline  Fax: [phone]        =======================================================================================   =======================================================================================  Subjective / interval history:   No SOB  Chronic edema     Past medical, Surgical, Social, Family medical history reviewed by me. MEDICATIONS: reviewed by me. Medications Prior to Admission:  No current facility-administered medications on file prior to encounter.      Current Outpatient Medications on File Prior to Encounter   Medication Sig Dispense Refill    empagliflozin (JARDIANCE) 10 MG tablet Take 1 tablet by mouth daily 30 tablet 3    insulin glargine (BASAGLAR KWIKPEN) 100 UNIT/ML injection pen Inject 48 Units into the skin daily 5 Adjustable Dose Pre-filled Pen Syringe 3    sacubitril-valsartan (ENTRESTO) 24-26 MG per tablet Take 0.5 tablets by mouth 2 times daily 60 tablet 1    furosemide (LASIX) 20 MG tablet Take 1 tablet by mouth daily 60 tablet 3    pantoprazole (PROTONIX) 40 MG tablet Take 40 mg by mouth every morning (before breakfast)      calcium carbonate (OSCAL) 500 MG TABS tablet Take 500 mg by mouth daily      gabapentin (NEURONTIN) 300 MG capsule Take 1 tablet in am, 2 tablets in pm. 90 capsule 3    senna (SENOKOT) 8.6 MG tablet Take 1 tablet by mouth 2 times daily      insulin aspart (NOVOLOG) 100 UNIT/ML injection vial Inject 15 Units into the skin 3 times daily      magnesium 30 MG tablet Take 40 mg by mouth daily      tiZANidine (ZANAFLEX) 2 MG tablet Take 2 mg by mouth every 8 hours as needed      metoprolol tartrate (LOPRESSOR) 25 MG tablet TAKE ONE-HALF TABLET BY MOUTH TWICE A DAY 90 tablet 4    atorvastatin (LIPITOR) 80 MG tablet Take 1 tablet by mouth daily 90 tablet 4    Insulin Pen Needle 32G X 6 MM MISC by Does not apply route 3 times daily with meals      hydrocortisone (ANUSOL-HC) 2.5 % rectal cream Place rectally 2 times daily Place rectally 2 times daily. rivaroxaban (XARELTO) 20 MG TABS tablet Take 1 tablet by mouth daily 90 tablet 3    vitamin D (ERGOCALCIFEROL) 96535 UNITS CAPS capsule Take 50,000 Units by mouth once a week      Omega 3 1000 MG CAPS Take 1,000 mg by mouth daily      Liraglutide (VICTOZA) 18 MG/3ML SOPN SC injection Inject 1.2 mg into the skin daily      lidocaine (LIDODERM) 5 % Place 1 patch onto the skin daily 12 hours on, 12 hours off. 30 patch 0    oxybutynin (DITROPAN XL) 15 MG CR tablet Take 15 mg by mouth daily. Loratadine 10 MG CAPS Take by mouth daily       levothyroxine (SYNTHROID) 175 MCG tablet Take 150 mcg by mouth daily.            Current Facility-Administered Medications:     insulin lispro (HUMALOG) injection vial 6 Units, 6 Units, SubCUTAneous, TID WC, Evon Stoddard MD, 6 Units at 10/22/22 1204    lidocaine PF 1 % injection 5 mL, 5 mL, IntraDERmal, Once, Evon Stoddard MD    sodium chloride flush 0.9 % injection 5-40 mL, 5-40 mL, IntraVENous, 2 times per day, Evon Stoddard MD    sodium chloride flush 0.9 % injection 5-40 mL, 5-40 mL, IntraVENous, PRN, Evon Stoddard MD    0.9 % sodium chloride infusion, 25 mL, IntraVENous, PRN, Evon Stoddard MD    pantoprazole (PROTONIX) injection 40 mg, 40 mg, IntraVENous, BID, Jarvis Valladares MD, 40 mg at 10/22/22 0743    methylPREDNISolone sodium (SOLU-MEDROL) injection 20 mg, 20 mg, IntraVENous, Daily, Jarvis Valladaers MD, 20 mg at 10/22/22 0743    calcium carbonate (TUMS) chewable tablet 500 mg, 500 mg, Oral, TID PRN, Jarvis Valladares MD    gabapentin (NEURONTIN) capsule 100 mg, 100 mg, Oral, TID, Valerie Young MD, 100 mg at 10/22/22 1518    cefepime (MAXIPIME) 1000 mg IVPB minibag, 1,000 mg, IntraVENous, Q12H, Jarvis Valladares MD, Stopped at 10/21/22 2347    insulin lispro (HUMALOG) injection vial 0-16 Units, 0-16 Units, SubCUTAneous, TID , Edmar Solano, DO, 16 Units at 10/22/22 1204    [Held by provider] insulin lispro (HUMALOG) injection vial 0-4 Units, 0-4 Units, SubCUTAneous, Nightly, Christiano Padron DO    insulin glargine (LANTUS) injection vial 25 Units, 25 Units, SubCUTAneous, BID, Edmar Poles, DO, 25 Units at 10/22/22 1204    guaiFENesin (MUCINEX) extended release tablet 600 mg, 600 mg, Oral, BID PRN, Edmar Poles, DO, 600 mg at 10/20/22 1046    digoxin (LANOXIN) tablet 125 mcg, 125 mcg, Oral, Daily, Janice Starkey, APRN - CNP, 125 mcg at 10/22/22 1373    glucose-vitamin C chewable tablet 4 tablet, 4 tablet, Oral, PRN, Jarvis Valladares MD    dextrose bolus 10% 125 mL, 125 mL, IntraVENous, PRN **OR** dextrose bolus 10% 250 mL, 250 mL, IntraVENous, PRN, Jarvis Valladares MD    glucagon (rDNA) injection 1 mg, 1 mg, SubCUTAneous, PRN, Jarvis Valladares, MD    dextrose 10 % infusion, , IntraVENous, Continuous PRN, Kathleen Sanchez MD    melatonin tablet 6 mg, 6 mg, Oral, Nightly PRN, Chelle Mccracken DO, 6 mg at 10/19/22 2009    0.9 % sodium chloride infusion, , IntraVENous, PRN, Lauri Bradley MD    metronidazole (FLAGYL) 500 mg in 0.9% NaCl 100 mL IVPB premix, 500 mg, IntraVENous, Q8H, Kathleen Sanchez MD, Stopped at 10/22/22 0713    0.9 % sodium chloride infusion, , IntraVENous, PRN, Lucia Villafana APRN - CNP    ipratropium-albuterol (DUONEB) nebulizer solution 1 ampule, 1 ampule, Inhalation, Q4H PRN, Vinita Lawrence MD, 1 ampule at 10/17/22 0229    lidocaine viscous hcl (XYLOCAINE) 2 % solution 15 mL, 15 mL, Mouth/Throat, Q3H PRN, Ha Friend MD, 15 mL at 10/17/22 0150    ipratropium-albuterol (DUONEB) nebulizer solution 1 ampule, 1 ampule, Inhalation, 4x daily, Lauri Bradley MD, 1 ampule at 10/22/22 1516    lidocaine 4 % external patch 1 patch, 1 patch, TransDERmal, Daily, Lauri Bradley MD, 1 patch at 10/22/22 0744    benzonatate (TESSALON) capsule 100 mg, 100 mg, Oral, TID PRN, Linda Leos APRN - CNP, 100 mg at 10/16/22 0610    metoprolol tartrate (LOPRESSOR) tablet 12.5 mg, 12.5 mg, Oral, BID, Berta Dumont MD, 12.5 mg at 10/22/22 0743    0.9 % sodium chloride infusion, , IntraVENous, PRN, Susana Mahoney MD    acetaminophen (TYLENOL) tablet 650 mg, 650 mg, Oral, TID, Milena Martin APRN - CNP, 650 mg at 10/22/22 1518    atorvastatin (LIPITOR) tablet 80 mg, 80 mg, Oral, Nightly, Berta Dumont MD, 80 mg at 10/21/22 2159    hydrocortisone (ANUSOL-HC) 2.5 % rectal cream, , Rectal, BID, Berta Dumont MD, Given at 10/22/22 0744    levothyroxine (SYNTHROID) tablet 150 mcg, 150 mcg, Oral, Daily, Berta Dumont MD, 150 mcg at 10/22/22 0743    sodium chloride flush 0.9 % injection 5-40 mL, 5-40 mL, IntraVENous, 2 times per day, Berta Dumont MD, 10 mL at 10/22/22 0743    sodium chloride flush 0.9 % injection 5-40 mL, 5-40 mL, IntraVENous, PRN, Shira Clark MD, 10 mL at 10/22/22 1133    0.9 % sodium chloride infusion, , IntraVENous, PRN, Shira Clark MD, Last Rate: 100 mL/hr at 10/22/22 8128, Restarted at 10/22/22 0612    ondansetron (ZOFRAN-ODT) disintegrating tablet 4 mg, 4 mg, Oral, Q8H PRN **OR** ondansetron (ZOFRAN) injection 4 mg, 4 mg, IntraVENous, Q6H PRN, Shira Clark MD    polyethylene glycol (GLYCOLAX) packet 17 g, 17 g, Oral, Daily PRN, Shira Clark MD, 17 g at 10/11/22 1118    acetaminophen (TYLENOL) tablet 650 mg, 650 mg, Oral, Q6H PRN, 650 mg at 10/21/22 2158 **OR** acetaminophen (TYLENOL) suppository 650 mg, 650 mg, Rectal, Q6H PRN, Shira Clark MD    dextrose bolus 10% 125 mL, 125 mL, IntraVENous, PRN **OR** dextrose bolus 10% 250 mL, 250 mL, IntraVENous, PRN, Shira Clark MD    glucagon (rDNA) injection 1 mg, 1 mg, SubCUTAneous, PRN, Shira Clark MD    dextrose 10 % infusion, , IntraVENous, Continuous PRN, Shira Clark MD       REVIEW OF SYSTEMS:  As mentioned in chief complaint and HPI , Subjective             =======================================================================================     PHYSICAL EXAM:  Recent vital signs and recent I/Os reviewed by me.      Wt Readings from Last 3 Encounters:   10/22/22 254 lb 3.2 oz (115.3 kg)   10/07/22 241 lb 14.4 oz (109.7 kg)   06/21/22 243 lb 9.6 oz (110.5 kg)     BP Readings from Last 3 Encounters:   10/22/22 (!) 143/67   10/07/22 123/67   07/27/22 (!) 112/54     Patient Vitals for the past 24 hrs:   BP Temp Temp src Pulse Resp SpO2 Weight   10/22/22 1517 -- -- -- (!) 104 16 95 % --   10/22/22 1515 (!) 143/67 99 °F (37.2 °C) Oral (!) 101 20 98 % --   10/22/22 1353 -- -- -- 97 -- -- --   10/22/22 1209 -- -- -- (!) 103 -- -- --   10/22/22 1201 -- -- -- 73 16 95 % --   10/22/22 1119 (!) 127/54 98.6 °F (37 °C) Oral 85 18 96 % --   10/22/22 0928 -- -- -- (!) 106 -- -- --   10/22/22 0805 -- -- -- 87 18 97 % --   10/22/22 0499 -- -- -- (!) 105 -- -- --   10/22/22 0659 115/69 98.1 °F (36.7 °C) Oral (!) 112 18 95 % --   10/22/22 0615 -- -- -- -- -- -- 254 lb 3.2 oz (115.3 kg)   10/22/22 0437 115/70 98.4 °F (36.9 °C) Oral (!) 110 20 95 % --   10/22/22 0038 -- -- -- 95 18 96 % --   10/21/22 2307 100/60 98.2 °F (36.8 °C) Oral 98 18 96 % --   10/21/22 2158 123/64 -- -- 99 -- -- --   10/21/22 2120 -- -- -- -- 18 96 % --   10/21/22 1901 (!) 143/54 99.3 °F (37.4 °C) Oral (!) 103 19 97 % --   10/21/22 1811 -- -- -- 87 -- -- --   10/21/22 1625 (!) 106/54 99.3 °F (37.4 °C) Oral 95 18 95 % --       Intake/Output Summary (Last 24 hours) at 10/22/2022 1542  Last data filed at 10/22/2022 1133  Gross per 24 hour   Intake 1210 ml   Output 1200 ml   Net 10 ml       General: Awake, Alert, obese   HENT: Atraumatic, normocephalic   Eyes: Normal conjunctiva, Non-incteral sclera. Neck: Supple, JVD not visible. CVS:  Heart sounds are normal. No loud murmur. RS: Normal respiratory effort, Breat sound: diminished at bases. Abd: Soft , bowel sounds are normal, no distension and no tenderness . Skin: No rash , some bruises,   CNS: Awake Oriented , No focal.   Extremities/MSK:  Edema, no cyanosis.           =======================================================================================     DATA:  Diagnostic tests reviewed by me for today's visit:   (AS NEEDED FOR MY EVALUATION AND MANAGEMENT).        Recent Labs     10/20/22  0530 10/21/22  0430 10/22/22  0450   WBC 15.0* 13.3* 13.9*   HCT 23.4* 22.8* 23.6*   PLT 31* 27* 28*     Iron Saturation:  No components found for: PERCENTFE  FERRITIN:    Lab Results   Component Value Date/Time    FERRITIN 1,025.0 09/26/2022 04:34 AM     IRON:    Lab Results   Component Value Date/Time    IRON 46 09/25/2022 05:02 AM     TIBC:    Lab Results   Component Value Date/Time    TIBC 230 09/25/2022 05:02 AM       Recent Labs     10/20/22  0530 10/21/22  0430 10/22/22  0450    144 140   K 5.0 5.1 5.1   CL 105 112* 109   CO2 22 22 23   BUN 95* 78* 74*   CREATININE 1.5* 1.3* 1.1     Recent Labs     10/20/22  0530 10/21/22  0430 10/22/22  0450   CALCIUM 8.7 8.7 8.9     No results for input(s): PH, PCO2, PO2 in the last 72 hours.     Invalid input(s): Maria Luisa Hum    ABG:  No results found for: PH, PCO2, PO2, HCO3, BE, THGB, TCO2, O2SAT  VBG:  No results found for: PHVEN, PGL5PVS, BEVEN, Y7LSWISR    LDH:    Lab Results   Component Value Date/Time     10/14/2022 04:50 PM     Uric Acid:    Lab Results   Component Value Date/Time    LABURIC 6.8 09/26/2022 04:34 AM       PT/INR:    Lab Results   Component Value Date/Time    PROTIME 23.6 09/29/2022 11:37 AM    INR 2.10 09/29/2022 11:37 AM     Warfarin PT/INR:  No components found for: PTPATWAR, PTINRWAR  PTT:    Lab Results   Component Value Date/Time    APTT 39.7 02/13/2017 12:07 PM   [APTT}  Last 3 Troponin:    Lab Results   Component Value Date/Time    TROPONINI 0.05 10/11/2022 05:09 AM    TROPONINI 0.04 10/10/2022 04:13 PM    TROPONINI 0.04 10/10/2022 11:20 AM       U/A:    Lab Results   Component Value Date/Time    COLORU Yellow 10/17/2022 07:24 PM    PROTEINU 100 10/17/2022 07:24 PM    PHUR 5.0 10/17/2022 07:24 PM    WBCUA 19 10/17/2022 07:24 PM    RBCUA 275 10/17/2022 07:24 PM    MUCUS 1+ 05/16/2014 11:50 PM    BACTERIA None Seen 10/17/2022 07:24 PM    CLARITYU TURBID 10/17/2022 07:24 PM    SPECGRAV 1.020 10/17/2022 07:24 PM    LEUKOCYTESUR TRACE 10/17/2022 07:24 PM    UROBILINOGEN 0.2 10/17/2022 07:24 PM    BILIRUBINUR Negative 10/17/2022 07:24 PM    BILIRUBINUR NEGATIVE 03/09/2012 06:51 AM    BLOODU LARGE 10/17/2022 07:24 PM    GLUCOSEU 250 10/17/2022 07:24 PM    GLUCOSEU NEGATIVE 03/09/2012 06:51 AM     Microalbumen/Creatinine ratio:  No components found for: RUCREAT  24 Hour Urine for Protein:  No components found for: RAWUPRO, UHRS3, ULSB17ZZ, UTV3  24 Hour Urine for Creatinine Clearance:  No components found for: CREAT4, UHRS10, UTV10  Urine Toxicology:  No components found for: Zadie Guard, IBENZO, ICOCAINE, IMARTHC, IOPIATES, IPHENCYC    HgBA1c:    Lab Results   Component Value Date/Time    LABA1C 7.5 10/18/2022 04:37 AM     RPR:  No results found for: RPR  HIV:  No results found for: HIV  NICOLASA:  No results found for: ANATITER, NICOLASA  RF:  No results found for: RF  DSDNA:  No components found for: DNA  AMYLASE:    Lab Results   Component Value Date/Time    AMYLASE 50 05/09/2014 07:45 PM     LIPASE:    Lab Results   Component Value Date/Time    LIPASE 37.0 05/09/2014 07:45 PM     Fibrinogen Level:  No components found for: FIB       BELOW MENTIONED RADIOLOGY STUDY RESULTS BY ME (AS NEEDED FOR MY EVALUATION AND MANAGEMENT). XR CHEST (2 VW)    Result Date: 9/24/2022  EXAMINATION: TWO XRAY VIEWS OF THE CHEST 9/24/2022 6:35 pm COMPARISON: 06/16/2022 HISTORY: ORDERING SYSTEM PROVIDED HISTORY: Chest Discomfort TECHNOLOGIST PROVIDED HISTORY: Reason for exam:->Chest Discomfort Reason for Exam: Wrist Pain (Amharic # [id number] -Patient brought in by squad from home with left wrist, right rib pain. No injury. ) FINDINGS: The heart is mildly enlarged and less prominent the pulmonary vessels are engorged centrally and less prominent. The lungs are hyperinflated. No consolidation or effusion is seen. The bones are intact. There are postop changes along the mediastinum and sternum which is unchanged. There are mild subsegmental linear densities scattered along the lung bases which is less prominent. Mild cardiomegaly with mild central pulmonary congestion or pulmonary artery hypertension which is less prominent. Mild chronic obstructive lung changes with mild bibasilar discoid atelectasis or scarring which is less prominent with no obvious infiltrate or effusion. XR WRIST LEFT (MIN 3 VIEWS)    Result Date: 9/24/2022  EXAMINATION: 3 XRAY VIEWS OF THE LEFT WRIST 9/24/2022 6:35 pm COMPARISON: None.  HISTORY: ORDERING SYSTEM PROVIDED HISTORY: pain TECHNOLOGIST PROVIDED HISTORY: Reason for exam:->pain FINDINGS: There is mild narrowing of the radiocarpal joint. No acute fracture or dislocation is seen. There is moderate narrowing along the base of the thumb with prominent osteophytes throughout the joint. There is some linear calcifications just distal to the ulna which probably within the TFCC. There are vascular calcifications along the palmar aspect of the wrist.  There is mild soft tissue swelling along the dorsum of the wrist.  The bones are osteopenic. No obvious acute fracture or dislocation can be seen. Mild osteoarthritic changes along the radiocarpal joint and moderate degenerative arthritic changes along the base of the thumb with no acute bony abnormality seen. Vascular calcifications along the palmar aspect of the wrist Mild soft tissue swelling around the wrist and diffuse osteopenia. Probable mild chondrocalcinosis of the TFCC. CT HEAD WO CONTRAST    Result Date: 10/14/2022  EXAMINATION: CT OF THE HEAD WITHOUT CONTRAST  10/13/2022 2:52 pm TECHNIQUE: CT of the head was performed without the administration of intravenous contrast. Automated exposure control, iterative reconstruction, and/or weight based adjustment of the mA/kV was utilized to reduce the radiation dose to as low as reasonably achievable. COMPARISON: 02/11/2012 HISTORY: ORDERING SYSTEM PROVIDED HISTORY: dizziness TECHNOLOGIST PROVIDED HISTORY: Reason for exam:->dizziness Has a \"code stroke\" or \"stroke alert\" been called? ->No Reason for Exam: dizziness FINDINGS: BRAIN/VENTRICLES: The cerebral and cerebellar parenchyma demonstrate volume loss, with a frontal lobe predominance. Scattered low-attenuation areas are noted in the periventricular white matter, compatible with chronic microvascular white matter ischemic disease. There are no areas of hemorrhage, mass, or midline shift. No abnormal extra-axial fluid collections.   The ventricles are prominent in size, likely related to involutional change. Gray-white differentiation is maintained without evidence of acute infarct. ORBITS: Lens implants from prior cataract surgery are noted. The orbits are otherwise unremarkable. SINUSES: There is scattered mild paranasal sinus disease. Thickened secretions are noted in the left sphenoid sinus. The mastoid air cells are clear. SOFT TISSUES/SKULL:  The calvarium is intact. No appreciable scalp soft tissue swelling. 1. No acute intracranial abnormality. 2. Cerebral and cerebellar parenchymal volume loss with chronic microvascular white matter ischemic disease. CT CHEST W CONTRAST    Result Date: 9/28/2022  EXAMINATION: CT OF THE CHEST WITH CONTRAST 9/28/2022 1:02 pm TECHNIQUE: CT of the chest was performed with the administration of intravenous contrast. Multiplanar reformatted images are provided for review. Automated exposure control, iterative reconstruction, and/or weight based adjustment of the mA/kV was utilized to reduce the radiation dose to as low as reasonably achievable. COMPARISON: Chest CT 05/10/2014 HISTORY: ORDERING SYSTEM PROVIDED HISTORY: left chest pain FINDINGS: Technical: Evaluation of the lungs degraded because of motion during imaging, blurring detail and resulting in misregistration artifact. Mediastinum: Mild main pulmonary artery dilatation 31 mm (axial series 2, image 51). Ascending thoracic aorta 37 mm and descending thoracic aorta 25 mm. Status post CABG. Cardiomegaly. The great vessels appear unremarkable with exception of calcific atherosclerotic disease. Moderate to severe calcific atherosclerosis coronary arteries. No pericardial effusion. Posterior mediastinal structures appear unremarkable. No mediastinal or hilar adenopathy. Small hiatal hernia with what appears to be a small retained capsule within the hiatal hernia. Lungs/pleura: No focal pulmonary infiltrate evident. No soft tissue or ground-glass pulmonary nodule is seen.   No inspissated secretions or endobronchial lesion evident. No pleural effusion or pneumothorax. Upper Abdomen: Unremarkable appearance. Soft Tissues/Bones: No acute superficial soft tissue or osseous structure abnormality evident. Old healed fractures left ribs 4, 5 and 6.     1. No definite acute pulmonary disease. Evaluation of the lungs degraded because of motion during imaging, blurring detail and resulting in misregistration artifact. 2. Moderate to severe calcific atherosclerosis coronary arteries. 3. Mild main pulmonary artery dilatation can be seen with pulmonary hypertension but is nonspecific. 4. Cardiomegaly and sequela from CABG. 5. Small hiatal hernia with what appears to be a small retained capsule within the hiatal hernia. CT GUIDED NEEDLE PLACEMENT    Result Date: 9/29/2022  PROCEDURE: CT GUIDED BONE MARROW ASPIRATION AND CORE NEEDLE BONE BIOPSY OF THE RIGHT ILIAC BONE. MODERATE CONSCIOUS SEDATION 9/29/2022 HISTORY: ORDERING SYSTEM PROVIDED HISTORY: anemia, thrombocytopenia TECHNOLOGIST PROVIDED HISTORY: Reason for exam:->anemia, thrombocytopenia Reason for Exam: anemia, thrombocytopenia SEDATION: 2 mgversed and 50 mcg fentanyl were titrated intravenously for moderate sedation monitored under my direction. Total intraservice time of sedation was 5 minutes. The patient's vital signs were monitored throughout the procedure and recorded in the patient's medical record by the nurse. TECHNIQUE: Informed consent was obtained following a detailed explanation of the procedure including risks, benefits, and alternatives. Universal protocol was followed. Sterile gowns, masks, hats and gloves utilized for maximal sterile barrier. Axial images were obtained through the iliac bones using CT guidance and a suitable skin site was prepped and draped in sterile fashion. Local anesthesia was achieved with lidocaine.   An 11 gauge AppLayer bone marrow biopsy needle was advanced into the right iliac bone and approximately 15 mL of bone marrow aspirate was obtained. A single core biopsy specimen was obtained and the patient tolerated the procedure well. Estimated blood loss: Less than 5 cc Automated exposure control, iterative reconstruction, and/or weight based adjustment of the mA/kV was utilized to reduce the radiation dose to as low as reasonably achievable. DLP: 71.66 mGy-cm     Successful CT guided bone marrow aspiration and core biopsy of the right iliac bone. XR CHEST PORTABLE    Result Date: 10/16/2022  EXAMINATION: ONE XRAY VIEW OF THE CHEST 10/16/2022 12:57 pm COMPARISON: None. HISTORY: Acute shortness of breath. FINDINGS: Evaluation is suboptimal secondary to body habitus and portable technique. Patient is also slightly rotated. Mild cardiomegaly. Post CABG changes. No definite pulmonary edema. Diffuse haziness of the left lung. Right lung is relatively clear. No pneumothorax. Mild cardiomegaly. Diffuse haziness of the left lung be due to atypical infection or edema. XR CHEST PORTABLE    Result Date: 10/16/2022  EXAMINATION: ONE XRAY VIEW OF THE CHEST 10/16/2022 2:15 am COMPARISON: 10/10/2022 HISTORY: ORDERING SYSTEM PROVIDED HISTORY: crackles in lungs TECHNOLOGIST PROVIDED HISTORY: Reason for exam:->crackles in lungs Reason for Exam: Crackles in lungs FINDINGS: Overlying sternotomy wires. Similar-appearing prominence of the cardiac silhouette with associated vascular congestion. No focal consolidation. Costophrenic angles are sharp. No evidence of pneumothorax. No acute osseous abnormalities. 1. Similar-appearing prominence of the cardiac silhouette with associated vascular congestion. 2. No focal consolidation. XR CHEST PORTABLE    Result Date: 10/10/2022  EXAMINATION: ONE XRAY VIEW OF THE CHEST 10/10/2022 11:15 am COMPARISON: Chest x-ray dated 09/24/2022.  HISTORY: ORDERING SYSTEM PROVIDED HISTORY: left chest pain TECHNOLOGIST PROVIDED HISTORY: Reason for exam:->left chest pain Reason for Exam: Chest Pain (Pt transported from [facility] from [facility]. PT c/o chest pain radiating to left shoulder. Symptoms started 2 days ago. Pt stated she was seen in [facility] and was inpatient for 2 weeks for left shoulder complications.) FINDINGS: HEART/MEDIASTINUM: The cardiomediastinal silhouette is stable. PLEURA/LUNGS: There are no focal consolidations or pleural effusions. There is no appreciable pneumothorax. Linear atelectasis versus scarring noted in the periphery of the left mid lung. BONES/SOFT TISSUE: No acute abnormality. Median sternotomy wires again noted. No radiographic evidence of acute pulmonary disease. CT CHEST PULMONARY EMBOLISM W CONTRAST    Result Date: 10/10/2022  EXAMINATION: CTA OF THE CHEST 10/10/2022 11:59 am TECHNIQUE: CTA of the chest was performed after the administration of intravenous contrast.  Multiplanar reformatted images are provided for review. MIP images are provided for review. Automated exposure control, iterative reconstruction, and/or weight based adjustment of the mA/kV was utilized to reduce the radiation dose to as low as reasonably achievable. COMPARISON: None. HISTORY: ORDERING SYSTEM PROVIDED HISTORY: left sided chest pain, shortness of breath TECHNOLOGIST PROVIDED HISTORY: Reason for exam:->left sided chest pain, shortness of breath Decision Support Exception - unselect if not a suspected or confirmed emergency medical condition->Emergency Medical Condition (MA) Reason for Exam: left sided chest pain, shortness of breath FINDINGS: Pulmonary Arteries: Pulmonary arteries are adequately opacified for evaluation. No evidence of intraluminal filling defect to suggest pulmonary embolism. Main pulmonary artery is normal in caliber. Mediastinum: No evidence of mediastinal lymphadenopathy. The heart and pericardium demonstrate no acute abnormality. There is no acute abnormality of the thoracic aorta. Lungs/pleura:  There is minimal Successful CT guided bone marrow aspiration and core biopsy of the right iliac bone.

## 2022-10-22 NOTE — PROGRESS NOTES
She will      Hospitalist Progress Note    Name:  Marv Whitmore    /Age/Sex: 1940  (78 y.o. female)  MRN & CSN:  [id number] & [id number]    PCP: MATEUSZ Bravo CNP    Date of Admission: 10/10/2022    Patient Status:  Inpatient     Chief Complaint: Shoulder pain    Hospital Course: 43-year-old female with past medical history of hyperlipidemia, hypertension, GERD, type 2 diabetes, CAD, atrial fibrillation. Recently diagnosed with mild dysplastic syndrome. Was transferred to the ICU for worsening respiratory distress on 10/17/2022. There was a question if patient had aspirated given her disposition. Patient has been improving on nasal cannula oxygen. Palliative care following. Subjective: Today is:  Hospital Day: 13.  Patient seen and examined in [id number]-3357/3357-02.      Lying in bed still having cough no nausea vomiting no fevers or    Medications:  Reviewed    Infusion Medications    dextrose      sodium chloride      sodium chloride      sodium chloride      sodium chloride 100 mL/hr at 10/22/22 0612    dextrose       Scheduled Medications    insulin lispro  6 Units SubCUTAneous TID WC    pantoprazole  40 mg IntraVENous BID    methylPREDNISolone  20 mg IntraVENous Daily    gabapentin  100 mg Oral TID    cefepime  1,000 mg IntraVENous Q12H    insulin lispro  0-16 Units SubCUTAneous TID WC    [Held by provider] insulin lispro  0-4 Units SubCUTAneous Nightly    insulin glargine  25 Units SubCUTAneous BID    digoxin  125 mcg Oral Daily    metroNIDAZOLE  500 mg IntraVENous Q8H    ipratropium-albuterol  1 ampule Inhalation 4x daily    lidocaine  1 patch TransDERmal Daily    metoprolol tartrate  12.5 mg Oral BID    acetaminophen  650 mg Oral TID    atorvastatin  80 mg Oral Nightly    hydrocortisone   Rectal BID    levothyroxine  150 mcg Oral Daily    sodium chloride flush  5-40 mL IntraVENous 2 times per day     PRN Meds: calcium carbonate, guaiFENesin, glucose, dextrose bolus **OR** dextrose bolus, glucagon (rDNA), dextrose, melatonin, sodium chloride, sodium chloride, ipratropium-albuterol, lidocaine viscous hcl, benzonatate, sodium chloride, sodium chloride flush, sodium chloride, ondansetron **OR** ondansetron, polyethylene glycol, acetaminophen **OR** acetaminophen, dextrose bolus **OR** dextrose bolus, glucagon (rDNA), dextrose      Intake/Output Summary (Last 24 hours) at 10/22/2022 1327  Last data filed at 10/22/2022 1133  Gross per 24 hour   Intake 1210 ml   Output 1200 ml   Net 10 ml         Physical Exam Performed:    BP (!) 127/54   Pulse (!) 103   Temp 98.6 °F (37 °C) (Oral)   Resp 16   Ht 5' (1.524 m)   Wt 254 lb 3.2 oz (115.3 kg)   LMP  (LMP Unknown)   SpO2 95%   BMI 49.65 kg/m²     General appearance: aaox3  HEENT: Pupils equal, round, and reactive to light. Conjunctivae/corneas clear. Neck: Supple, with full range of motion. No jugular venous distention. Trachea midline. Respiratory:  + rhonchi  Cardiovascular: Regular rate and rhythm with normal S1/S2 without murmur  Abdomen: Soft, non-tender, non-distended with normal bowel sounds. .  Skin: Skin color, texture, turgor normal.  No rashes or lesions. Neurologic:  no gross deficits       Labs:   Recent Labs     10/20/22  0530 10/21/22  0430 10/22/22  0450   WBC 15.0* 13.3* 13.9*   HGB 7.5* 7.2* 7.6*   HCT 23.4* 22.8* 23.6*   PLT 31* 27* 28*       Recent Labs     10/20/22  0530 10/21/22  0430 10/22/22  0450    144 140   K 5.0 5.1 5.1    112* 109   CO2 22 22 23   BUN 95* 78* 74*   CREATININE 1.5* 1.3* 1.1   CALCIUM 8.7 8.7 8.9       No results for input(s): AST, ALT, BILIDIR, BILITOT, ALKPHOS in the last 72 hours. No results for input(s): INR in the last 72 hours. No results for input(s): [place] in the last 72 hours.     Urinalysis:      Lab Results   Component Value Date/Time    NITRU Negative 10/17/2022 07:24 PM    WBCUA 19 10/17/2022 07:24 PM    BACTERIA None Seen 10/17/2022 07:24 PM    RBCUA 275 10/17/2022 07:24 PM    BLOODU LARGE 10/17/2022 07:24 PM    SPECGRAV 1.020 10/17/2022 07:24 PM    GLUCOSEU 250 10/17/2022 07:24 PM    GLUCOSEU NEGATIVE 03/09/2012 06:51 AM       Radiology:  XR CHEST PORTABLE   Final Result   No focal lung consolidation. XR CHEST PORTABLE   Final Result   Mild cardiomegaly. Diffuse haziness of the left lung be due to atypical   infection or edema. XR CHEST PORTABLE   Final Result   1. Similar-appearing prominence of the cardiac silhouette with associated   vascular congestion. 2. No focal consolidation. CT HEAD WO CONTRAST   Final Result   1. No acute intracranial abnormality. 2. Cerebral and cerebellar parenchymal volume loss with chronic microvascular   white matter ischemic disease. CT CHEST PULMONARY EMBOLISM W CONTRAST   Final Result   No evidence of pulmonary embolism or acute pulmonary abnormality. XR CHEST PORTABLE   Final Result   No radiographic evidence of acute pulmonary disease.                  Assessment/Plan:    Active Hospital Problems    Diagnosis     History of coronary artery disease [Z86.79]      Priority: Medium    Pulmonary vascular congestion [R09.89]      Priority: Medium    Acute hypoxemic respiratory failure (HCC) [J96.01]      Priority: Medium    Aspiration pneumonia of left lower lobe due to gastric secretions ([facility] [J69.0]      Priority: Medium    Hypervolemia [E87.70]      Priority: Medium    MDS (myelodysplastic syndrome) ([facility] [facility] [D46.9]      Priority: Medium    Chronic systolic heart failure (HCC) [I50.22]      Priority: Medium    Arterial hypotension [I95.9]      Priority: Medium    Shoulder pain [M25.519]      Priority: Medium    Severe anemia [D64.9]      Priority: Medium    Severe thrombocytopenia (HCC) [D69.6]      Priority: Medium    Acute diastolic heart failure (HCC) [I50.31]      Priority: Medium    Type 2 diabetes mellitus ([address] [E11.9]     Acute chest pain [R07.9]     Acute renal failure ([facility] [N17.9]          Hospital Day: 15    This is a 78 y.o. female who presented to [facility] on 10/10/2022 and is being treated for:    Acute hypoxic respiratory failure  -Likely 2/2 pneumonia and CHF  -Wean oxygen as tolerated  -Mucinex for cough  -Pulmonology consulted; appreciate recommendations  -Palliative care consulted; appreciate recommendations    Pneumonia  - continue atbx    MDS  -Continue with transfusions as needed  -Daily CBC  -Hematology consulted; appreciate recommendations    ARUN  -Creatinine peaked at 1.9; baseline 1.0  -Downtrending  -Avoid nephrotoxins    CAD  -Statin    Afib  -Continue metoprolol  -Not a candidate for anticoagulation  -Digoxin per cardiology  -Cardiology consulted; appreciate recommendations    Type 2 diabetes  - titrate up lantus and lispro        DVT ppx: Contraindicated  GI ppx: Diet/Tube Feeds  Diet: ADULT DIET; Dysphagia - Soft and Bite Sized; No Drinking Straws  Code Status: DNR-CCA    Disposition:  Placement v      PT/OT Eval Status: Ordered      Oniel Hurd MD  10/22/2022  1:27 PM      Please note that some part of this chart was generated using Dragon dictation software. Although every effort was made to ensure the accuracy of this automated transcription, some errors in transcription may have occurred inadvertently. If you may need any clarification, please do not hesitate to contact me through [facility].

## 2022-10-22 NOTE — PROGRESS NOTES
PIV started leaking, unable to place a new one. MD notified, per family and pt request they would like her to switch to oral abx.

## 2022-10-22 NOTE — CARE COORDINATION
Met with patient and her daughter to answer questions. They are agreeable to return to [facility] when medically ready for discharge.

## 2022-10-23 LAB
ANION GAP SERPL CALCULATED.3IONS-SCNC: 8 MMOL/L (ref 3–16)
BUN BLDV-MCNC: 54 MG/DL (ref 7–20)
CALCIUM SERPL-MCNC: 9 MG/DL (ref 8.3–10.6)
CHLORIDE BLD-SCNC: 109 MMOL/L (ref 99–110)
CO2: 26 MMOL/L (ref 21–32)
CREAT SERPL-MCNC: 1 MG/DL (ref 0.6–1.2)
GFR SERPL CREATININE-BSD FRML MDRD: 55 ML/MIN/{1.73_M2}
GLUCOSE BLD-MCNC: 182 MG/DL (ref 70–99)
GLUCOSE BLD-MCNC: 188 MG/DL (ref 70–99)
GLUCOSE BLD-MCNC: 199 MG/DL (ref 70–99)
GLUCOSE BLD-MCNC: 225 MG/DL (ref 70–99)
GLUCOSE BLD-MCNC: 284 MG/DL (ref 70–99)
GLUCOSE BLD-MCNC: 295 MG/DL (ref 70–99)
HCT VFR BLD CALC: 22.3 % (ref 36–48)
HEMOGLOBIN: 7.2 G/DL (ref 12–16)
MCH RBC QN AUTO: 31 PG (ref 26–34)
MCHC RBC AUTO-ENTMCNC: 32.1 G/DL (ref 31–36)
MCV RBC AUTO: 96.4 FL (ref 80–100)
PDW BLD-RTO: 20.7 % (ref 12.4–15.4)
PERFORMED ON: ABNORMAL
PLATELET # BLD: 28 K/UL (ref 135–450)
PMV BLD AUTO: 10.9 FL (ref 5–10.5)
POTASSIUM SERPL-SCNC: 4.8 MMOL/L (ref 3.5–5.1)
RBC # BLD: 2.31 M/UL (ref 4–5.2)
SODIUM BLD-SCNC: 143 MMOL/L (ref 136–145)
WBC # BLD: 13.5 K/UL (ref 4–11)

## 2022-10-23 PROCEDURE — 6370000000 HC RX 637 (ALT 250 FOR IP): Performed by: INTERNAL MEDICINE

## 2022-10-23 PROCEDURE — 36415 COLL VENOUS BLD VENIPUNCTURE: CPT

## 2022-10-23 PROCEDURE — 2580000003 HC RX 258: Performed by: INTERNAL MEDICINE

## 2022-10-23 PROCEDURE — 6370000000 HC RX 637 (ALT 250 FOR IP): Performed by: NURSE PRACTITIONER

## 2022-10-23 PROCEDURE — C9113 INJ PANTOPRAZOLE SODIUM, VIA: HCPCS | Performed by: INTERNAL MEDICINE

## 2022-10-23 PROCEDURE — 85027 COMPLETE CBC AUTOMATED: CPT

## 2022-10-23 PROCEDURE — 94761 N-INVAS EAR/PLS OXIMETRY MLT: CPT

## 2022-10-23 PROCEDURE — 6360000002 HC RX W HCPCS: Performed by: INTERNAL MEDICINE

## 2022-10-23 PROCEDURE — 80048 BASIC METABOLIC PNL TOTAL CA: CPT

## 2022-10-23 PROCEDURE — 6370000000 HC RX 637 (ALT 250 FOR IP): Performed by: STUDENT IN AN ORGANIZED HEALTH CARE EDUCATION/TRAINING PROGRAM

## 2022-10-23 PROCEDURE — 94640 AIRWAY INHALATION TREATMENT: CPT

## 2022-10-23 PROCEDURE — 2500000003 HC RX 250 WO HCPCS: Performed by: INTERNAL MEDICINE

## 2022-10-23 PROCEDURE — 2060000000 HC ICU INTERMEDIATE R&B

## 2022-10-23 RX ORDER — IPRATROPIUM BROMIDE AND ALBUTEROL SULFATE 2.5; .5 MG/3ML; MG/3ML
1 SOLUTION RESPIRATORY (INHALATION) 2 TIMES DAILY
Status: DISCONTINUED | OUTPATIENT
Start: 2022-10-23 | End: 2022-10-25

## 2022-10-23 RX ADMIN — CEFEPIME 1000 MG: 1 INJECTION, POWDER, FOR SOLUTION INTRAMUSCULAR; INTRAVENOUS at 06:27

## 2022-10-23 RX ADMIN — CEFEPIME 1000 MG: 1 INJECTION, POWDER, FOR SOLUTION INTRAMUSCULAR; INTRAVENOUS at 18:09

## 2022-10-23 RX ADMIN — MELATONIN TAB 3 MG 6 MG: 3 TAB at 21:42

## 2022-10-23 RX ADMIN — Medication 10 ML: at 09:26

## 2022-10-23 RX ADMIN — GABAPENTIN 100 MG: 100 CAPSULE ORAL at 14:22

## 2022-10-23 RX ADMIN — LEVOTHYROXINE SODIUM 150 MCG: 150 TABLET ORAL at 09:35

## 2022-10-23 RX ADMIN — SODIUM CHLORIDE, PRESERVATIVE FREE 10 ML: 5 INJECTION INTRAVENOUS at 10:56

## 2022-10-23 RX ADMIN — ACETAMINOPHEN 650 MG: 325 TABLET ORAL at 21:42

## 2022-10-23 RX ADMIN — ACETAMINOPHEN 650 MG: 325 TABLET ORAL at 14:21

## 2022-10-23 RX ADMIN — SODIUM CHLORIDE, PRESERVATIVE FREE 10 ML: 5 INJECTION INTRAVENOUS at 03:18

## 2022-10-23 RX ADMIN — HYDROCORTISONE: 25 CREAM TOPICAL at 09:24

## 2022-10-23 RX ADMIN — ACETAMINOPHEN 650 MG: 325 TABLET ORAL at 09:24

## 2022-10-23 RX ADMIN — INSULIN LISPRO 8 UNITS: 100 INJECTION, SOLUTION INTRAVENOUS; SUBCUTANEOUS at 16:58

## 2022-10-23 RX ADMIN — METRONIDAZOLE 500 MG: 500 INJECTION, SOLUTION INTRAVENOUS at 10:58

## 2022-10-23 RX ADMIN — SODIUM CHLORIDE, PRESERVATIVE FREE 10 ML: 5 INJECTION INTRAVENOUS at 03:19

## 2022-10-23 RX ADMIN — IPRATROPIUM BROMIDE AND ALBUTEROL SULFATE 1 AMPULE: 2.5; .5 SOLUTION RESPIRATORY (INHALATION) at 08:02

## 2022-10-23 RX ADMIN — PANTOPRAZOLE SODIUM 40 MG: 40 INJECTION, POWDER, FOR SOLUTION INTRAVENOUS at 21:43

## 2022-10-23 RX ADMIN — METOPROLOL TARTRATE 12.5 MG: 25 TABLET, FILM COATED ORAL at 09:24

## 2022-10-23 RX ADMIN — INSULIN GLARGINE 25 UNITS: 100 INJECTION, SOLUTION SUBCUTANEOUS at 09:27

## 2022-10-23 RX ADMIN — INSULIN GLARGINE 25 UNITS: 100 INJECTION, SOLUTION SUBCUTANEOUS at 21:49

## 2022-10-23 RX ADMIN — BENZONATATE 100 MG: 100 CAPSULE ORAL at 06:16

## 2022-10-23 RX ADMIN — BENZONATATE 100 MG: 100 CAPSULE ORAL at 16:24

## 2022-10-23 RX ADMIN — IPRATROPIUM BROMIDE AND ALBUTEROL SULFATE 1 AMPULE: 2.5; .5 SOLUTION RESPIRATORY (INHALATION) at 22:02

## 2022-10-23 RX ADMIN — ATORVASTATIN CALCIUM 80 MG: 80 TABLET, FILM COATED ORAL at 21:43

## 2022-10-23 RX ADMIN — SODIUM CHLORIDE 25 ML: 9 INJECTION, SOLUTION INTRAVENOUS at 06:25

## 2022-10-23 RX ADMIN — METOPROLOL TARTRATE 12.5 MG: 25 TABLET, FILM COATED ORAL at 21:43

## 2022-10-23 RX ADMIN — ANTACID TABLETS 500 MG: 500 TABLET, CHEWABLE ORAL at 06:16

## 2022-10-23 RX ADMIN — METRONIDAZOLE 500 MG: 500 INJECTION, SOLUTION INTRAVENOUS at 21:48

## 2022-10-23 RX ADMIN — INSULIN LISPRO 4 UNITS: 100 INJECTION, SOLUTION INTRAVENOUS; SUBCUTANEOUS at 12:19

## 2022-10-23 RX ADMIN — INSULIN LISPRO 6 UNITS: 100 INJECTION, SOLUTION INTRAVENOUS; SUBCUTANEOUS at 12:18

## 2022-10-23 RX ADMIN — DIGOXIN 125 MCG: 125 TABLET ORAL at 09:24

## 2022-10-23 RX ADMIN — GABAPENTIN 100 MG: 100 CAPSULE ORAL at 21:42

## 2022-10-23 RX ADMIN — PANTOPRAZOLE SODIUM 40 MG: 40 INJECTION, POWDER, FOR SOLUTION INTRAVENOUS at 09:23

## 2022-10-23 RX ADMIN — INSULIN LISPRO 6 UNITS: 100 INJECTION, SOLUTION INTRAVENOUS; SUBCUTANEOUS at 16:58

## 2022-10-23 RX ADMIN — METHYLPREDNISOLONE SODIUM SUCCINATE 20 MG: 40 INJECTION, POWDER, FOR SOLUTION INTRAMUSCULAR; INTRAVENOUS at 09:24

## 2022-10-23 RX ADMIN — GUAIFENESIN 600 MG: 600 TABLET, EXTENDED RELEASE ORAL at 21:42

## 2022-10-23 RX ADMIN — HYDROCORTISONE: 25 CREAM TOPICAL at 21:41

## 2022-10-23 RX ADMIN — INSULIN LISPRO 6 UNITS: 100 INJECTION, SOLUTION INTRAVENOUS; SUBCUTANEOUS at 09:27

## 2022-10-23 RX ADMIN — METRONIDAZOLE 500 MG: 500 INJECTION, SOLUTION INTRAVENOUS at 03:28

## 2022-10-23 RX ADMIN — Medication 10 ML: at 09:24

## 2022-10-23 RX ADMIN — GABAPENTIN 100 MG: 100 CAPSULE ORAL at 09:24

## 2022-10-23 RX ADMIN — Medication 10 ML: at 21:40

## 2022-10-23 ASSESSMENT — PAIN SCALES - GENERAL: PAINLEVEL_OUTOF10: 5

## 2022-10-23 ASSESSMENT — PAIN DESCRIPTION - LOCATION: LOCATION: ARM

## 2022-10-23 ASSESSMENT — PAIN DESCRIPTION - ORIENTATION: ORIENTATION: LEFT

## 2022-10-23 NOTE — PROGRESS NOTES
She will      Hospitalist Progress Note    Name:  Marv Whitmore    /Age/Sex: 1940  (78 y.o. female)  MRN & CSN:  [id number] & [id number]    PCP: MATEUSZ Bravo CNP    Date of Admission: 10/10/2022    Patient Status:  Inpatient     Chief Complaint: Shoulder pain    Hospital Course: 18-year-old female with past medical history of hyperlipidemia, hypertension, GERD, type 2 diabetes, CAD, atrial fibrillation. Recently diagnosed with mild dysplastic syndrome. Was transferred to the ICU for worsening respiratory distress on 10/17/2022. There was a question if patient had aspirated given her disposition. Patient has been improving on nasal cannula oxygen. Palliative care following. Subjective: Today is:  Hospital Day: 14.  Patient seen and examined in 3TN-3357/3357-02.    In bed still having cough no nausea vomiting no chest pain fevers or chills    Medications:  Reviewed    Infusion Medications    sodium chloride      dextrose      sodium chloride      sodium chloride      sodium chloride      sodium chloride 25 mL (10/23/22 0625)    dextrose       Scheduled Medications    ipratropium-albuterol  1 ampule Inhalation BID    insulin lispro  6 Units SubCUTAneous TID WC    sodium chloride flush  5-40 mL IntraVENous 2 times per day    pantoprazole  40 mg IntraVENous BID    methylPREDNISolone  20 mg IntraVENous Daily    gabapentin  100 mg Oral TID    cefepime  1,000 mg IntraVENous Q12H    insulin lispro  0-16 Units SubCUTAneous TID WC    [Held by provider] insulin lispro  0-4 Units SubCUTAneous Nightly    insulin glargine  25 Units SubCUTAneous BID    digoxin  125 mcg Oral Daily    metroNIDAZOLE  500 mg IntraVENous Q8H    lidocaine  1 patch TransDERmal Daily    metoprolol tartrate  12.5 mg Oral BID    acetaminophen  650 mg Oral TID    atorvastatin  80 mg Oral Nightly    hydrocortisone   Rectal BID    levothyroxine  150 mcg Oral Daily    sodium chloride flush  5-40 mL IntraVENous 2 times per day PRN Meds: sodium chloride flush, sodium chloride, calcium carbonate, guaiFENesin, glucose, dextrose bolus **OR** dextrose bolus, glucagon (rDNA), dextrose, melatonin, sodium chloride, sodium chloride, ipratropium-albuterol, lidocaine viscous hcl, benzonatate, sodium chloride, sodium chloride flush, sodium chloride, ondansetron **OR** ondansetron, polyethylene glycol, acetaminophen **OR** acetaminophen, dextrose bolus **OR** dextrose bolus, glucagon (rDNA), dextrose      Intake/Output Summary (Last 24 hours) at 10/23/2022 1331  Last data filed at 10/23/2022 1056  Gross per 24 hour   Intake 1122 ml   Output 2200 ml   Net -1078 ml         Physical Exam Performed:    /74   Pulse (!) 102   Temp 98.2 °F (36.8 °C) (Axillary)   Resp 18   Ht 5' (1.524 m)   Wt 244 lb 14.4 oz (111.1 kg)   LMP  (LMP Unknown)   SpO2 95%   BMI 47.83 kg/m²     General appearance: aaox3  HEENT: Pupils equal, round, and reactive to light. Conjunctivae/corneas clear. Neck: Supple, with full range of motion. No jugular venous distention. Trachea midline. Respiratory: no rhonchi or wheezing  Cardiovascular: Regular rate and rhythm with normal S1/S2 without murmur  Abdomen: Soft, non-tender, non-distended with normal bowel sounds. .  Skin: Skin color, texture, turgor normal.  No rashes or lesions. Neurologic:  no gross deficits       Labs:   Recent Labs     10/21/22  0430 10/22/22  0450 10/23/22  0325   WBC 13.3* 13.9* 13.5*   HGB 7.2* 7.6* 7.2*   HCT 22.8* 23.6* 22.3*   PLT 27* 28* 28*       Recent Labs     10/21/22  0430 10/22/22  0450 10/23/22  0325    140 143   K 5.1 5.1 4.8   * 109 109   CO2 22 23 26   BUN 78* 74* 54*   CREATININE 1.3* 1.1 1.0   CALCIUM 8.7 8.9 9.0       No results for input(s): AST, ALT, BILIDIR, BILITOT, ALKPHOS in the last 72 hours. No results for input(s): INR in the last 72 hours. No results for input(s): [place] in the last 72 hours.     Urinalysis:      Lab Results   Component Value Date/Time    NITRU Negative 10/17/2022 07:24 PM    WBCUA 19 10/17/2022 07:24 PM    BACTERIA None Seen 10/17/2022 07:24 PM    RBCUA 275 10/17/2022 07:24 PM    BLOODU LARGE 10/17/2022 07:24 PM    SPECGRAV 1.020 10/17/2022 07:24 PM    GLUCOSEU 250 10/17/2022 07:24 PM    GLUCOSEU NEGATIVE 03/09/2012 06:51 AM       Radiology:  XR CHEST PORTABLE   Final Result   No focal lung consolidation. XR CHEST PORTABLE   Final Result   Mild cardiomegaly. Diffuse haziness of the left lung be due to atypical   infection or edema. XR CHEST PORTABLE   Final Result   1. Similar-appearing prominence of the cardiac silhouette with associated   vascular congestion. 2. No focal consolidation. CT HEAD WO CONTRAST   Final Result   1. No acute intracranial abnormality. 2. Cerebral and cerebellar parenchymal volume loss with chronic microvascular   white matter ischemic disease. CT CHEST PULMONARY EMBOLISM W CONTRAST   Final Result   No evidence of pulmonary embolism or acute pulmonary abnormality. XR CHEST PORTABLE   Final Result   No radiographic evidence of acute pulmonary disease.                  Assessment/Plan:    Active Hospital Problems    Diagnosis     History of coronary artery disease [Z86.79]      Priority: Medium    Pulmonary vascular congestion [R09.89]      Priority: Medium    Acute hypoxemic respiratory failure (HCC) [J96.01]      Priority: Medium    Aspiration pneumonia of left lower lobe due to gastric secretions ([address] [J69.0]      Priority: Medium    Hypervolemia [E87.70]      Priority: Medium    MDS (myelodysplastic syndrome) ([address] [D46.9]      Priority: Medium    Chronic systolic heart failure (HCC) [I50.22]      Priority: Medium    Arterial hypotension [I95.9]      Priority: Medium    Shoulder pain [M25.519]      Priority: Medium    Severe anemia [D64.9]      Priority: Medium    Severe thrombocytopenia (HCC) [D69.6]      Priority: Medium    Acute diastolic [facility] failure ([facility] [I50.31]      Priority: Medium    Type 2 diabetes mellitus ([facility] [E11.9]     Acute chest pain [R07.9]     Acute renal failure ([facility] [N17.9]          Hospital Day: 14    This is a 78 y.o. female who presented to [facility] on 10/10/2022 and is being treated for:    Acute hypoxic respiratory failure  -Likely 2/2 pneumonia and CHF  -Wean oxygen as tolerated  -Mucinex for cough  -Pulmonology consulted; appreciate recommendations  -Palliative care consulted; appreciate recommendations    Pneumonia  - continue atbx    MDS  -Continue with transfusions as needed  -Daily CBC  -Hematology consulted; appreciate recommendations    ARNU  -Creatinine peaked at 1.9; baseline 1.0  -Downtrending  -Avoid nephrotoxins    CAD  -Statin    Afib  -Continue metoprolol  -Not a candidate for anticoagulation  -Digoxin per cardiology  -Cardiology consulted; appreciate recommendations    Type 2 diabetes  - monitor and titrate insulin today        DVT ppx: Contraindicated  GI ppx: Diet/Tube Feeds  Diet: ADULT DIET; Dysphagia - Soft and Bite Sized; No Drinking Straws  Code Status: DNR-CCA          PT/OT Eval Status: Ordered      Efren Mir MD  10/23/2022  1:31 PM      Please note that some part of this chart was generated using Dragon dictation software. Although every effort was made to ensure the accuracy of this automated transcription, some errors in transcription may have occurred inadvertently. If you may need any clarification, please do not hesitate to contact me through [facility].

## 2022-10-23 NOTE — RT PROTOCOL NOTE
RT Nebulizer Bronchodilator Protocol Note    There is a bronchodilator order in the chart from a provider indicating to follow the RT Bronchodilator Protocol and there is an Initiate RT Bronchodilator Protocol order as well (see protocol at bottom of note). CXR Findings:  No results found. The findings from the last RT Protocol Assessment were as follows:  Smoking: None or smoker <15 pack years  Respiratory Pattern: Dyspnea on exertion or RR 21-25 bpm  Breath Sounds: Slightly diminished and/or crackles  Cough: Strong, spontaneous, non-productive  Indication for Bronchodilator Therapy:    Bronchodilator Assessment Score: 4    Aerosolized bronchodilator medication orders have been revised according to the RT Nebulizer Bronchodilator Protocol below. Respiratory Therapist to perform RT Therapy Protocol Assessment initially then follow the protocol. Repeat RT Therapy Protocol Assessment PRN for score 0-3 or on second treatment, BID, and PRN for scores above 3. No Indications - adjust the frequency to every 6 hours PRN wheezing or bronchospasm, if no treatments needed after 48 hours then discontinue using Per Protocol order mode. If indication present, adjust the RT bronchodilator orders based on the Bronchodilator Assessment Score as indicated below. If a patient is on this medication at home then do not decrease Frequency below that used at home. 0-3 - enter or revise RT bronchodilator order(s) to equivalent RT Bronchodilator order with Frequency of every 4 hours PRN for wheezing or increased work of breathing using Per Protocol order mode. 4-6 - enter or revise RT Bronchodilator order(s) to two equivalent RT bronchodilator orders with one order with BID Frequency and one order with Frequency of every 4 hours PRN wheezing or increased work of breathing using Per Protocol order mode.          7-10 - enter or revise RT Bronchodilator order(s) to two equivalent RT bronchodilator orders with one order with TID Frequency and one order with Frequency of every 4 hours PRN wheezing or increased work of breathing using Per Protocol order mode. 11-13 - enter or revise RT Bronchodilator order(s) to one equivalent RT bronchodilator order with QID Frequency and an Albuterol order with Frequency of every 4 hours PRN wheezing or increased work of breathing using Per Protocol order mode. Greater than 13 - enter or revise RT Bronchodilator order(s) to one equivalent RT bronchodilator order with every 4 hours Frequency and an Albuterol order with Frequency of every 2 hours PRN wheezing or increased work of breathing using Per Protocol order mode. RT to enter RT Home Evaluation for COPD & MDI Assessment order using Per Protocol order mode.     Electronically signed by Lenka Villarreal RCP on 10/23/2022 at 1:55 AM

## 2022-10-23 NOTE — PROGRESS NOTES
MIDLINE PLACEMENT:    Upon arrival to place peripheral IV. Pt did not have anything to venipuncture so midline order received and placed. Check chart for issues related to mid line placement, check for consent, and did time out with ALEX[facility]. Pt. Tolerated placement well, no difficulty accessing left basilic vein and verified with blood return and flushed without resistance. Pt did not c/o of any numbness or tingling to extremity. RN educated on midline use and proper maintenance as well as patient. Reported off to ALEX[facility] ok to use line.

## 2022-10-23 NOTE — PROGRESS NOTES
Poct 358, Lantus given per order. No sliding scale insulin ordered. Covering PA notified via perfect serve of result. Message acknowledged and no new orders given. Will recheck pt poct this shift.

## 2022-10-24 LAB
ANION GAP SERPL CALCULATED.3IONS-SCNC: 9 MMOL/L (ref 3–16)
ANISOCYTOSIS: ABNORMAL
BASOPHILS ABSOLUTE: 0 K/UL (ref 0–0.2)
BASOPHILS RELATIVE PERCENT: 0 %
BUN BLDV-MCNC: 36 MG/DL (ref 7–20)
CALCIUM SERPL-MCNC: 8.8 MG/DL (ref 8.3–10.6)
CHLORIDE BLD-SCNC: 113 MMOL/L (ref 99–110)
CO2: 25 MMOL/L (ref 21–32)
CREAT SERPL-MCNC: 0.9 MG/DL (ref 0.6–1.2)
EOSINOPHILS ABSOLUTE: 0 K/UL (ref 0–0.6)
EOSINOPHILS RELATIVE PERCENT: 0 %
GFR SERPL CREATININE-BSD FRML MDRD: >60 ML/MIN/{1.73_M2}
GLUCOSE BLD-MCNC: 130 MG/DL (ref 70–99)
GLUCOSE BLD-MCNC: 150 MG/DL (ref 70–99)
GLUCOSE BLD-MCNC: 162 MG/DL (ref 70–99)
GLUCOSE BLD-MCNC: 171 MG/DL (ref 70–99)
GLUCOSE BLD-MCNC: 229 MG/DL (ref 70–99)
GLUCOSE BLD-MCNC: 233 MG/DL (ref 70–99)
HCT VFR BLD CALC: 23.4 % (ref 36–48)
HEMATOLOGY PATH CONSULT: YES
HEMOGLOBIN: 7.4 G/DL (ref 12–16)
LYMPHOCYTES ABSOLUTE: 8.5 K/UL (ref 1–5.1)
LYMPHOCYTES RELATIVE PERCENT: 62 %
MCH RBC QN AUTO: 30.8 PG (ref 26–34)
MCHC RBC AUTO-ENTMCNC: 31.6 G/DL (ref 31–36)
MCV RBC AUTO: 97.3 FL (ref 80–100)
METAMYELOCYTES RELATIVE PERCENT: 2 %
MONOCYTES ABSOLUTE: 0.4 K/UL (ref 0–1.3)
MONOCYTES RELATIVE PERCENT: 3 %
MONONUCLEAR UNIDENTIFIED CELLS: 2 %
NEUTROPHILS ABSOLUTE: 4.5 K/UL (ref 1.7–7.7)
NEUTROPHILS RELATIVE PERCENT: 29 %
OVALOCYTES: ABNORMAL
PDW BLD-RTO: 21 % (ref 12.4–15.4)
PERFORMED ON: ABNORMAL
PLATELET # BLD: 24 K/UL (ref 135–450)
PLATELET SLIDE REVIEW: ABNORMAL
PMV BLD AUTO: 10.6 FL (ref 5–10.5)
POTASSIUM REFLEX MAGNESIUM: 4.7 MMOL/L (ref 3.5–5.1)
PROMYELOCYTES PERCENT: 2 %
RBC # BLD: 2.41 M/UL (ref 4–5.2)
SLIDE REVIEW: ABNORMAL
SODIUM BLD-SCNC: 147 MMOL/L (ref 136–145)
WBC # BLD: 13.7 K/UL (ref 4–11)

## 2022-10-24 PROCEDURE — 36415 COLL VENOUS BLD VENIPUNCTURE: CPT

## 2022-10-24 PROCEDURE — 6370000000 HC RX 637 (ALT 250 FOR IP): Performed by: INTERNAL MEDICINE

## 2022-10-24 PROCEDURE — 6370000000 HC RX 637 (ALT 250 FOR IP): Performed by: NURSE PRACTITIONER

## 2022-10-24 PROCEDURE — 2060000000 HC ICU INTERMEDIATE R&B

## 2022-10-24 PROCEDURE — 6370000000 HC RX 637 (ALT 250 FOR IP): Performed by: STUDENT IN AN ORGANIZED HEALTH CARE EDUCATION/TRAINING PROGRAM

## 2022-10-24 PROCEDURE — C9113 INJ PANTOPRAZOLE SODIUM, VIA: HCPCS | Performed by: INTERNAL MEDICINE

## 2022-10-24 PROCEDURE — 85025 COMPLETE CBC W/AUTO DIFF WBC: CPT

## 2022-10-24 PROCEDURE — 80048 BASIC METABOLIC PNL TOTAL CA: CPT

## 2022-10-24 PROCEDURE — 6360000002 HC RX W HCPCS: Performed by: INTERNAL MEDICINE

## 2022-10-24 PROCEDURE — 2580000003 HC RX 258: Performed by: INTERNAL MEDICINE

## 2022-10-24 PROCEDURE — 2500000003 HC RX 250 WO HCPCS: Performed by: INTERNAL MEDICINE

## 2022-10-24 PROCEDURE — 94761 N-INVAS EAR/PLS OXIMETRY MLT: CPT

## 2022-10-24 PROCEDURE — 94640 AIRWAY INHALATION TREATMENT: CPT

## 2022-10-24 PROCEDURE — 97530 THERAPEUTIC ACTIVITIES: CPT

## 2022-10-24 RX ORDER — FUROSEMIDE 20 MG/1
20 TABLET ORAL DAILY
Qty: 60 TABLET | Refills: 3 | Status: SHIPPED | OUTPATIENT
Start: 2022-10-24

## 2022-10-24 RX ORDER — DIGOXIN 125 MCG
125 TABLET ORAL DAILY
Qty: 30 TABLET | Refills: 3 | Status: SHIPPED | OUTPATIENT
Start: 2022-10-25

## 2022-10-24 RX ORDER — INSULIN LISPRO 100 [IU]/ML
6 INJECTION, SOLUTION INTRAVENOUS; SUBCUTANEOUS
Qty: 2 EACH | Refills: 0 | Status: SHIPPED | OUTPATIENT
Start: 2022-10-24

## 2022-10-24 RX ORDER — NAPROXEN 250 MG/1
250 TABLET ORAL
Status: DISCONTINUED | OUTPATIENT
Start: 2022-10-24 | End: 2022-10-26 | Stop reason: HOSPADM

## 2022-10-24 RX ORDER — PREDNISONE 20 MG/1
40 TABLET ORAL DAILY
Qty: 10 TABLET | Refills: 0 | Status: SHIPPED | OUTPATIENT
Start: 2022-10-24 | End: 2022-10-29

## 2022-10-24 RX ORDER — GABAPENTIN 100 MG/1
100 CAPSULE ORAL 3 TIMES DAILY
Qty: 90 CAPSULE | Refills: 0 | Status: SHIPPED | OUTPATIENT
Start: 2022-10-24 | End: 2022-11-23

## 2022-10-24 RX ORDER — GUAIFENESIN 600 MG/1
600 TABLET, EXTENDED RELEASE ORAL 2 TIMES DAILY PRN
Qty: 60 TABLET | Refills: 0 | Status: SHIPPED | OUTPATIENT
Start: 2022-10-24

## 2022-10-24 RX ADMIN — ACETAMINOPHEN 650 MG: 325 TABLET ORAL at 08:25

## 2022-10-24 RX ADMIN — GABAPENTIN 100 MG: 100 CAPSULE ORAL at 13:32

## 2022-10-24 RX ADMIN — IPRATROPIUM BROMIDE AND ALBUTEROL SULFATE 1 AMPULE: 2.5; .5 SOLUTION RESPIRATORY (INHALATION) at 11:56

## 2022-10-24 RX ADMIN — INSULIN LISPRO 6 UNITS: 100 INJECTION, SOLUTION INTRAVENOUS; SUBCUTANEOUS at 08:26

## 2022-10-24 RX ADMIN — CEFEPIME 1000 MG: 1 INJECTION, POWDER, FOR SOLUTION INTRAMUSCULAR; INTRAVENOUS at 06:08

## 2022-10-24 RX ADMIN — ACETAMINOPHEN 650 MG: 325 TABLET ORAL at 21:05

## 2022-10-24 RX ADMIN — METRONIDAZOLE 500 MG: 500 INJECTION, SOLUTION INTRAVENOUS at 18:11

## 2022-10-24 RX ADMIN — INSULIN GLARGINE 25 UNITS: 100 INJECTION, SOLUTION SUBCUTANEOUS at 08:26

## 2022-10-24 RX ADMIN — METRONIDAZOLE 500 MG: 500 INJECTION, SOLUTION INTRAVENOUS at 08:38

## 2022-10-24 RX ADMIN — METOPROLOL TARTRATE 12.5 MG: 25 TABLET, FILM COATED ORAL at 21:05

## 2022-10-24 RX ADMIN — Medication 10 ML: at 21:05

## 2022-10-24 RX ADMIN — DIGOXIN 125 MCG: 125 TABLET ORAL at 08:26

## 2022-10-24 RX ADMIN — LEVOTHYROXINE SODIUM 150 MCG: 150 TABLET ORAL at 08:25

## 2022-10-24 RX ADMIN — BENZONATATE 100 MG: 100 CAPSULE ORAL at 12:17

## 2022-10-24 RX ADMIN — IPRATROPIUM BROMIDE AND ALBUTEROL SULFATE 1 AMPULE: 2.5; .5 SOLUTION RESPIRATORY (INHALATION) at 19:38

## 2022-10-24 RX ADMIN — GABAPENTIN 100 MG: 100 CAPSULE ORAL at 21:05

## 2022-10-24 RX ADMIN — BENZONATATE 100 MG: 100 CAPSULE ORAL at 05:58

## 2022-10-24 RX ADMIN — Medication 10 ML: at 21:06

## 2022-10-24 RX ADMIN — METHYLPREDNISOLONE SODIUM SUCCINATE 20 MG: 40 INJECTION, POWDER, FOR SOLUTION INTRAMUSCULAR; INTRAVENOUS at 08:24

## 2022-10-24 RX ADMIN — ACETAMINOPHEN 650 MG: 325 TABLET ORAL at 13:32

## 2022-10-24 RX ADMIN — GUAIFENESIN 600 MG: 600 TABLET, EXTENDED RELEASE ORAL at 08:25

## 2022-10-24 RX ADMIN — INSULIN LISPRO 6 UNITS: 100 INJECTION, SOLUTION INTRAVENOUS; SUBCUTANEOUS at 18:05

## 2022-10-24 RX ADMIN — NAPROXEN 250 MG: 250 TABLET ORAL at 18:04

## 2022-10-24 RX ADMIN — PANTOPRAZOLE SODIUM 40 MG: 40 INJECTION, POWDER, FOR SOLUTION INTRAVENOUS at 08:24

## 2022-10-24 RX ADMIN — INSULIN GLARGINE 25 UNITS: 100 INJECTION, SOLUTION SUBCUTANEOUS at 21:07

## 2022-10-24 RX ADMIN — GABAPENTIN 100 MG: 100 CAPSULE ORAL at 08:25

## 2022-10-24 RX ADMIN — METOPROLOL TARTRATE 12.5 MG: 25 TABLET, FILM COATED ORAL at 08:25

## 2022-10-24 RX ADMIN — PANTOPRAZOLE SODIUM 40 MG: 40 INJECTION, POWDER, FOR SOLUTION INTRAVENOUS at 21:05

## 2022-10-24 RX ADMIN — NAPROXEN 250 MG: 250 TABLET ORAL at 12:17

## 2022-10-24 RX ADMIN — HYDROCORTISONE: 25 CREAM TOPICAL at 21:06

## 2022-10-24 RX ADMIN — INSULIN LISPRO 4 UNITS: 100 INJECTION, SOLUTION INTRAVENOUS; SUBCUTANEOUS at 18:06

## 2022-10-24 RX ADMIN — ATORVASTATIN CALCIUM 80 MG: 80 TABLET, FILM COATED ORAL at 21:05

## 2022-10-24 RX ADMIN — HYDROCORTISONE: 25 CREAM TOPICAL at 08:25

## 2022-10-24 ASSESSMENT — PAIN SCALES - GENERAL
PAINLEVEL_OUTOF10: 3
PAINLEVEL_OUTOF10: 4
PAINLEVEL_OUTOF10: 5

## 2022-10-24 ASSESSMENT — PAIN DESCRIPTION - PAIN TYPE: TYPE: ACUTE PAIN;OTHER (COMMENT)

## 2022-10-24 ASSESSMENT — PAIN DESCRIPTION - LOCATION
LOCATION: GENERALIZED
LOCATION: BACK;BUTTOCKS

## 2022-10-24 ASSESSMENT — PAIN DESCRIPTION - ONSET: ONSET: GRADUAL

## 2022-10-24 ASSESSMENT — PAIN DESCRIPTION - DESCRIPTORS: DESCRIPTORS: ACHING

## 2022-10-24 NOTE — PROGRESS NOTES
[facility]   Progress Note      10/24/2022 8:00 AM        Name: Beatriz Justice . Admitted: 10/10/2022    SUBJECTIVE:        Feels okay. Has cough. Reports bilateral chest pain.   No external bleeding    Reviewed interval ancillary notes    Current Medications  ipratropium-albuterol (DUONEB) nebulizer solution 1 ampule, BID  insulin lispro (HUMALOG) injection vial 6 Units, TID WC  sodium chloride flush 0.9 % injection 5-40 mL, 2 times per day  sodium chloride flush 0.9 % injection 5-40 mL, PRN  0.9 % sodium chloride infusion, PRN  pantoprazole (PROTONIX) injection 40 mg, BID  methylPREDNISolone sodium (SOLU-MEDROL) injection 20 mg, Daily  calcium carbonate (TUMS) chewable tablet 500 mg, TID PRN  gabapentin (NEURONTIN) capsule 100 mg, TID  insulin lispro (HUMALOG) injection vial 0-16 Units, TID WC  [Held by provider] insulin lispro (HUMALOG) injection vial 0-4 Units, Nightly  insulin glargine (LANTUS) injection vial 25 Units, BID  guaiFENesin (MUCINEX) extended release tablet 600 mg, BID PRN  digoxin (LANOXIN) tablet 125 mcg, Daily  glucose-vitamin C chewable tablet 4 tablet, PRN  dextrose bolus 10% 125 mL, PRN   Or  dextrose bolus 10% 250 mL, PRN  glucagon (rDNA) injection 1 mg, PRN  dextrose 10 % infusion, Continuous PRN  melatonin tablet 6 mg, Nightly PRN  0.9 % sodium chloride infusion, PRN  metronidazole (FLAGYL) 500 mg in 0.9% NaCl 100 mL IVPB premix, Q8H  0.9 % sodium chloride infusion, PRN  ipratropium-albuterol (DUONEB) nebulizer solution 1 ampule, Q4H PRN  lidocaine viscous hcl (XYLOCAINE) 2 % solution 15 mL, Q3H PRN  lidocaine 4 % external patch 1 patch, Daily  benzonatate (TESSALON) capsule 100 mg, TID PRN  metoprolol tartrate (LOPRESSOR) tablet 12.5 mg, BID  0.9 % sodium chloride infusion, PRN  acetaminophen (TYLENOL) tablet 650 mg, TID  atorvastatin (LIPITOR) tablet 80 mg, Nightly  hydrocortisone (ANUSOL-HC) 2.5 % rectal cream, BID  levothyroxine (SYNTHROID) tablet 150 mcg, Daily  sodium chloride flush 0.9 % injection 5-40 mL, 2 times per day  sodium chloride flush 0.9 % injection 5-40 mL, PRN  0.9 % sodium chloride infusion, PRN  ondansetron (ZOFRAN-ODT) disintegrating tablet 4 mg, Q8H PRN   Or  ondansetron (ZOFRAN) injection 4 mg, Q6H PRN  polyethylene glycol (GLYCOLAX) packet 17 g, Daily PRN  acetaminophen (TYLENOL) tablet 650 mg, Q6H PRN   Or  acetaminophen (TYLENOL) suppository 650 mg, Q6H PRN  dextrose bolus 10% 125 mL, PRN   Or  dextrose bolus 10% 250 mL, PRN  glucagon (rDNA) injection 1 mg, PRN  dextrose 10 % infusion, Continuous PRN      Objective:  BP (!) 146/78   Pulse 87   Temp 98.6 °F (37 °C) (Oral)   Resp 16   Ht 5' (1.524 m)   Wt 247 lb 4.8 oz (112.2 kg)   LMP  (LMP Unknown)   SpO2 96%   BMI 48.30 kg/m²     Intake/Output Summary (Last 24 hours) at 10/24/2022 0800  Last data filed at 10/24/2022 0555  Gross per 24 hour   Intake 4521 ml   Output 2650 ml   Net 1871 ml        Wt Readings from Last 3 Encounters:   10/24/22 247 lb 4.8 oz (112.2 kg)   10/07/22 241 lb 14.4 oz (109.7 kg)   06/21/22 243 lb 9.6 oz (110.5 kg)       Conscious alert oriented. Appears comfortable. Pallor +  No neck fullness. Respiratory efforts are normal.  Abdomen is not distended. No leg edema  No focal deficits. Labs and Tests:  CBC:   Recent Labs     10/22/22  0450 10/23/22  0325   WBC 13.9* 13.5*   HGB 7.6* 7.2*   PLT 28* 28*       BMP:    Recent Labs     10/22/22  0450 10/23/22  0325 10/24/22  0651    143 147*   K 5.1 4.8 4.7    109 113*   CO2 23 26 25   BUN 74* 54* 36*   CREATININE 1.1 1.0 0.9   GLUCOSE 415* 199* 150*       Hepatic: No results for input(s): AST, ALT, ALB, BILITOT, ALKPHOS in the last 72 hours.     ASSESSMENT AND PLAN    Principal Problem:    Acute hypoxemic respiratory failure (HCC)  Active Problems:    Acute diastolic heart failure (HCC)    Severe thrombocytopenia (HCC)    Severe anemia    Shoulder pain    Hypervolemia    MDS (myelodysplastic syndrome) (HCC)    Chronic systolic heart failure (HCC)    Arterial hypotension    History of coronary artery disease    Pulmonary vascular congestion    Aspiration pneumonia of left lower lobe due to gastric secretions (HCC)    Acute renal failure (HCC)    Acute chest pain    Type 2 diabetes mellitus ([facility] [address]  Resolved Problems:    * No resolved hospital problems. *      1. MDS  - bone marrow biopsy done on 9/29/2022 consistent with MDS. 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  -Flow cytometry peripheral blood 10/14/2022 did not show any evidence of transformation to acute leukemia.    -Earlier this week, the patient was not doing well due to aspiration pneumonia, respiratory failure. The family was thinking about palliative care.  -Over the last few days, the patient has improved. -The family might consider treatment for myelodysplastic syndrome as outpatient.  -Treatment would include hypomethylating agent or Revlimid     2. Leukocytosis, anemia, and thrombocytopenia:    Anemia and thrombocytopenia due to MDS. Worsening might be due to infection at/or antibiotic use  -Continue to monitor  -Transfuse as needed.    -Leukocytosis is reactive. Flow cytometry on peripheral blood from 10/14/2022 did not show any evidence of transformation    3.  CHF, aspiration pneumonia:    -Management per primary team      John Dye MD

## 2022-10-24 NOTE — FLOWSHEET NOTE
Pt resting comfortably. A & O x4. C/o generalized pain that is being managed with prn medications. See below vitals. All LDA's remain C/D/I. Pt refusing breakfast.  Bed remains in lowest and locked position with alarm in place. Call light, bedside table, and all personal belongings are within reach. Will continue to closely monitor.         10/24/22 0815   Vital Signs   Temp 98.2 °F (36.8 °C)   Temp Source Oral   Heart Rate (!) 104   Heart Rate Source Monitor   Resp 18   /65   BP Location Right upper arm   BP Method Automatic   MAP (Calculated) 87.67   Patient Position Semi fowlers   Pain Assessment   Pain Assessment None - Denies Pain   Pain Level 5   Pain Location Generalized   Oxygen Therapy   SpO2 97 %   Pulse Oximeter Device Mode Intermittent   Pulse Oximeter Device Location Finger   O2 Device None (Room air)

## 2022-10-24 NOTE — PROGRESS NOTES
Vitals:    10/24/22 0400   BP: 124/83   Pulse: 85   Resp: 20   Temp: 98.7 °F (37.1 °C)   SpO2: 95%     PRN Tessalon given at this time. Midline is not giving blood return after multiple attempts with flushing  and repositioning. Lab called, instructed to tube pt lab labels to lab and they will have someone come to collect them. No s/s of distress noted, call light within reach.

## 2022-10-24 NOTE — PROGRESS NOTES
[facility]   Phone: [phone]    Occupational Therapy    [] Initial Evaluation            [x] Daily Treatment Note         [] Discharge Summary      Patient: Magnolia Eisenmenger   : 1940   MRN: [id number]   Date of Service:  10/24/2022    Admitting Diagnosis:  Acute hypoxemic respiratory failure [facility])  Current Admission Summary:  Magnolia Eisenmenger is a 78 y.o. female who presented to [facility] ER today from her SNF complaining of severe chest shoulder and neck pain. She denies any recent trauma. She was just discharged to a SNF 3 days ago. She has been having left-sided shoulder wrist and thumb pain during that admission and was treated with oral steroids as well as a CMC brace. She tells me that the nursing aides have been lifting her by putting her arms under her shoulders which seems to be aggravating her shoulder pain. She has known bilateral shoulder rotator cuff arthropathy. She has tried injections in the past with limited benefit. She does have peripheral neuropathy and reports her numbness/tingling of her right greater than left hands are at baseline. She describes moderate pain extending from base of her neck into her left shoulder and upper arm rated 6/10. She is much more comfortable since receiving morphine. 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 Imaging review of the left shoulder via chest CT this admission demonstrated: No acute fracture or malalignment, there is cystic change of the greater tuberosity consistent with prior rotator cuff disease. Moderate AC joint arthritis noted. Multilevel cervical degenerative disc disease noted.   Past Medical History:  has a past medical history of 17 Left knee repeat repair of median parapatellar arthrotomy with augmentation, Arthritis, Aspiration pneumonia of left lower lobe due to gastric secretions [facility]), Atrial fibrillation ([address] CAD (coronary artery disease), Cataract, Chronic diastolic congestive heart failure ([facility] [address] Diabetes mellitus ([facility] [address] GERD (gastroesophageal reflux disease), Hyperlipidemia, Hypertension, HYPOTHRYROIDISM, Myelodysplastic disease ([facility] [address] Non-English speaking patient, Sleep apnea, Thyroid disease, and UTI. Past Surgical History:  has a past surgical history that includes joint replacement (Bilateral, [address]); Cardiac surgery (2004); Cholecystectomy, laparoscopic (5/15/14); Revision total knee arthroplasty (Right, 11/22/2016); Total knee arthroplasty; knee surgery (Left, 02/28/2017); CT BIOPSY BONE MARROW (9/29/2022); Upper gastrointestinal endoscopy (N/A, 10/3/2022); and Upper gastrointestinal endoscopy (N/A, 10/3/2022). Discharge Recommendations: Robert Gibbons scored a 10/24 on the AM-PAC ADL Inpatient form. Current research shows that an AM-PAC score of 17 or less is typically not associated with a discharge to the patient's home setting. Based on the patient's AM-PAC score and their current ADL deficits, it is recommended that the patient have 3-5 sessions per week of Occupational Therapy at d/c to increase the patient's independence. Please see assessment section for further patient specific details. If patient discharges prior to next session this note will serve as a discharge summary. Please see below for the latest assessment towards goals.      DME Required For Discharge: DME to be determined at next level of care    Precautions/Restrictions: high fall risk, up as tolerated, Pain in L shoulder    Pre-Admission Information   Lives With: alone                     Type of Home: house  Home Layout: one level  Home Access: ramped entry  Bathroom Layout: walker accessible, walk in shower  Bathroom Equipment: grab bars in shower, shower chair  Toilet Height: standard height  Home Equipment: rolling walker, rollator - 4 wheeled walker, manual wheelchair, electric wheelchair  Transfer Assistance: modified independent with use of RW  Ambulation Assistance:modified independent with use of manual w/c, RW for short distances   ADL Assistance: requires assistance with bathing, requires assistance with dressing  IADL Assistance: independent with homemaking tasks home health aide 4 hours/day   Active :        [] Yes                 [x] No  Hand Dominance: [] Left                 [x] Right  Current Employment: retired. Occupation:    Recent Falls: 1 slip out of wheelchair   *cameras present at home for children to monitor    Subjective  General: Pt supine in bed upon entry and agreeable for session. RN approval prior to session. Dtr's present throughout for translation. Family members/ visiting pt for prayer/blessing. Cotx with PT to maximize function and safety. Pain: Pain rating taken based on observed faces and behaviors  Pain Interventions: pain medication in place prior to arrival, RN notified, and pt able to continue with session. Activities of Daily Living  Basic Activities of Daily Living  Lower Extremity Dressing: dependent  Dressing Comments: donning socks  Toileting: dependent. Toileting Comments: incontinent of urine in purewick  General Comments: declining all other ADLs  Functional Mobility  Bed Mobility  Supine to Sit: 2 person assistance with max Ax2   Rolling Left: 2 person assistance with maxAx2   Scooting: dependent assistance  Comments: HOB partially elevated  Transfers  Bed / Chair transfer: dependent assistance, maxisky. Bed / Chair comments: pt c/o throughout  Comments:3 unsuccessful attempts  sit>stand using ava singletary (dep with max Ax2-no clearance from bed)   Functional Mobility:  Sitting Balance: supervision, stand by assistance, contact guard assistance.     Sitting Balance Comment: initially CGA progressing to SUP with feet supported, sitting on EOB for ~20 minutes, pt tolerated fairly with c/o pain, one episode of dizziness however [facility]  No functional mobility completed on this date secondary to inability to stand. Functional Outcomes  AM-PAC Inpatient Daily Activity Raw Score: 10    Cognition  Overall Cognitive Status: Impaired  Arousal/Alterness: appropriate responses to stimuli  Following Commands: follows one step commands with repetition, follows one step commands with increased time, inconsistently follows commands  Attention Span: attends with cues to redirect  Insights: decreased awareness of deficits  Initiation: requires cues for all  Sequencing: requires cues for all  Comments: anxious  Orientation:    alert and oriented x 4  Command Following:   impaired     Education  Barriers To Learning: Language/Anxiety  Patient Education: patient educated on goals, OT role and benefits, plan of care, transfer training, discharge recommendations  --increased time required in order to address all pt/family questions regarding rehab potential and POC. Learning Assessment:  patient verbalizes understanding, would benefit from continued reinforcement    Assessment  Activity Tolerance: Primarily limited by pain  Impairments Requiring Therapeutic Intervention: decreased functional mobility, decreased ADL status, decreased ROM, decreased strength, decreased endurance, decreased balance, increased pain  Prognosis: fair  Clinical Assessment: Pt presents with the above deficits impacting daily occupational performance and would benefit from continued skilled OT services.    Safety Interventions: patient left in chair, chair alarm in place, call light within reach, nurse notified, and family/caregiver present    Plan  Frequency: 3-5 x/per week  Current Treatment Recommendations: strengthening, ROM, balance training, functional mobility training, transfer training, gait training, endurance training, manual therapy - soft tissue massage, ADL/self-care training, pain management, home exercise program, safety education, and positioning    Goals  Patient Goals: Return to home   Short Term Goals:  Time Frame: Discharge  Patient will complete upper body ADL while sitting EOB for 2-4 min with Max A  Patient will complete toileting Max A x2 at [address] MET 10/20/2022.     Therapy Session Time     Individual Group Co-treatment   Time In    7498   Time Out    1515   Minutes    38         Timed Code Treatment Minutes: 38 Minutes    Total Treatment Minutes:  38 minutes       Electronically Signed By:Barbara Lindsay OTR/L, [id number]

## 2022-10-24 NOTE — PROGRESS NOTES
[facility]   Phone: [phone]    Physical Therapy    [] Initial Evaluation            [x] Daily Treatment Note         [] Discharge Summary      Patient: Tabitha Nascimento   : 1940   MRN: [id number]   Date of Service:  10/24/2022  Admitting Diagnosis: Acute hypoxemic respiratory failure [facility])  Current Admission Summary: Tabitha Nascimento is a 78 y.o. female who presented to [facility] ER today from her SNF complaining of severe chest shoulder and neck pain. She denies any recent trauma. She was just discharged to a SNF 3 days ago. She has been having left-sided shoulder wrist and thumb pain during that admission and was treated with oral steroids as well as a CMC brace. She tells me that the nursing aides have been lifting her by putting her arms under her shoulders which seems to be aggravating her shoulder pain. She has known bilateral shoulder rotator cuff arthropathy. She has tried injections in the past with limited benefit. She does have peripheral neuropathy and reports her numbness/tingling of her right greater than left hands are at baseline. She describes moderate pain extending from base of her neck into her left shoulder and upper arm rated 6/10. She is much more comfortable since receiving morphine. 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 Imaging review of the left shoulder via chest CT this admission demonstrated: No acute fracture or malalignment, there is cystic change of the greater tuberosity consistent with prior rotator cuff disease. Moderate AC joint arthritis noted. Multilevel cervical degenerative disc disease noted. Patient came in from SNF where she had just discharged to on 10/7 and uses a walker to ambulate.     Past Medical History:  has a past medical history of 17 Left knee repeat repair of median parapatellar arthrotomy with augmentation, Arthritis, Aspiration pneumonia of left lower lobe due to gastric secretions (HCC), Atrial fibrillation ([address] CAD (coronary artery disease), Cataract, Chronic diastolic congestive heart failure ([address] Diabetes mellitus ([address] GERD (gastroesophageal reflux disease), Hyperlipidemia, Hypertension, HYPOTHRYROIDISM, Myelodysplastic disease ([address] Non-English speaking patient, Sleep apnea, Thyroid disease, and UTI. Past Surgical History:  has a past surgical history that includes joint replacement (Bilateral, [address]); Cardiac surgery (2004); Cholecystectomy, laparoscopic (5/15/14); Revision total knee arthroplasty (Right, 11/22/2016); Total knee arthroplasty; knee surgery (Left, 02/28/2017); CT BIOPSY BONE MARROW (9/29/2022); Upper gastrointestinal endoscopy (N/A, 10/3/2022); and Upper gastrointestinal endoscopy (N/A, 10/3/2022). Discharge Recommendations: Don Kyle scored a 8/24 on the AM-PAC short mobility form. Current research shows that an AM-PAC score of 17 or less is typically not associated with a discharge to the patient's home setting. Based on the patient's AM-PAC score and their current functional mobility deficits, it is recommended that the patient have 3-5 sessions per week of Physical Therapy at d/c to increase the patient's independence. Please see assessment section for further patient specific details. If patient discharges prior to next session this note will serve as a discharge summary. Please see below for the latest assessment towards goals.     DME Required For Discharge: no DME required at discharge  Precautions/Restrictions: high fall risk  Weight Bearing Restrictions: no restrictions  [] Right Upper Extremity  [] Left Upper Extremity [] Right Lower Extremity  [] Left Lower Extremity     Required Braces/Orthotics: no braces required   [] Right  [] Left  Positional Restrictions:no positional restrictions    Pre-Admission Information   Lives With: alone    Type of Home: house  Home Layout: one level  Home Access: ramped entry  Bathroom Layout: walker accessible, walk in shower  Bathroom Equipment: grab bars in shower, shower chair  Toilet Height: standard height  Home Equipment: rolling walker, rollator - 4 wheeled walker, manual wheelchair, electric wheelchair  Transfer Assistance: modified independent with use of RW  Ambulation Assistance:modified independent with use of manual w/c, RW for short distances   ADL Assistance: requires assistance with bathing, requires assistance with dressing  IADL Assistance: independent with homemaking tasks home health aide 4 hours/day   Active :        [] Yes  [x] No  Hand Dominance: [] Left  [x] Right  Current Employment: retired. Recent Falls: 1 slip out of wheelchair   *cameras present at home for children to monitor        Subjective  General: Pt in bed upon arrival with family present in room, interpreting as needed. As Pt. Can understand but intermittently needs assist from daughters. Agreeable to PT/OT treatment. Pain: Pain rating taken based on observed faces and behaviors -- pain in L shoulder, R LE  Pain Interventions: pain medication in place prior to arrival       Functional Mobility  Bed Mobility  Supine to Sit: 2 person assistance with Max A x 2   Sit to Supine: 2 person assistance with Max A x 2   Comments: per pt daughter pt is having hemorrhoid pain. Transfers  Sit to stand transfer: stedy utilized requiring Total A , 2 person assistance with dep x 2   Stand to sit transfer: stedy utilized requiring Total A , 2 person assistance with dep x 2   Comments: Transfer in the [facility] was unsuccessful. Pt. Unable to coordinate movement or lift buttocks off the bed, Therefore Utilized Maxi Grant to move Pt. To chair. Family/Pt. Encouraged to sit up 2 hours. RN staff notified to return Pt. To bed in ~ 2 hours via Maxi-Grant. Ambulation  Ambulation not tested on this date secondary to unable to tolerate due to pain and weakness .   Distance: Gait Mechanics:   Comments:    Stair Mobility  Stair mobility not completed on this date. Comments:  Wheelchair Mobility:  No w/c mobility completed on this date. Comments:  Balance  Static Sitting Balance: fair (-): maintains balance at CGA with use of UE support  Dynamic Sitting Balance: fair (-): maintains balance at CGA with use of UE support  Comments: Pt requires CGA progressing to Close Supervision. Pt. Sat x 20 minutes, received prayers from .    Other Therapeutic Interventions  Pt. Sat at the EOB and participated in prayers with  with CGA/Supervision. Pt. Is a bit anxious about falling and at first resistant to move to the chair. Functional Outcomes  AM-PAC Inpatient Mobility Raw Score : 8              Cognition  Overall Cognitive Status: WFL   Orientation:    alert and oriented x 4  Command Following:   WFL --sometimes requiring increased time or repetition due to language barrier     Education  Barriers To Learning: language  Patient Education: patient educated on goals, PT role and benefits, plan of care, precautions, functional mobility training, family education, discharge recommendations  Learning Assessment:  patient verbalizes understanding, would benefit from continued reinforcement    Assessment  Activity Tolerance: limited by pain   Impairments Requiring Therapeutic Intervention: decreased functional mobility, decreased ROM, decreased strength, decreased endurance, decreased balance  Prognosis: fair  Clinical Assessment: Pt. Tolerated sitting on the EOB and moving to the chair with the Maxi-Grant although needed encouragement. Was unable to stand up with the Daphne Donate. Pt continues to present below functional baseline and would continue to benefit from skilled PT services to promote return to PLOF.    Safety Interventions: patient left in chair, chair alarm in place, call light within reach, patient at risk for falls, nurse notified, and family/caregiver present    Plan  Frequency: 3-5 x/per week  Current Treatment Recommendations: strengthening, ROM, balance training, functional mobility training, transfer training, gait training, endurance training, neuromuscular re-education, patient/caregiver education, home management training, and safety education    Goals  Patient Goals: to decrease pain   Short Term Goals:  Time Frame: discharge   Patient will complete rolling at minimal assistance   Patient will complete sit <> supine at minimal assistance   Pt will tolerate 5 minutes of sitting EOB with SBA.        Met 0/3 goals   Therapy Session Time      Individual Group Co-treatment   Time In         Time Out         Minutes             Timed Code Treatment Minutes:   38  Total Treatment Minutes:  38 minutes        Electronically Signed By: Marco Antonio Cortes, [id number]

## 2022-10-24 NOTE — DISCHARGE SUMMARY
acute hypoxic respiratory failure was treated with IV antibiotics. Patient had anemia and thrombocytopenia diagnosed with MDS. Cleared for discharge by hematology. Patient has A. fib continued on metoprolol and digoxin per cards no anticoagulation at this time given MDS and low platelets patient will follow up with cardiology for possible future anticoagulation    Discharge Exam:    BP (!) 147/79   Pulse 85   Temp 98.5 °F (36.9 °C) (Oral)   Resp 16   Ht 5' (1.524 m)   Wt 247 lb 4.8 oz (112.2 kg)   LMP  (LMP Unknown)   SpO2 95%   BMI 48.30 kg/m²   General appearance:  Appears comfortable. AAOx3  HEENT: atraumatic, Pupils equal, muscous membranes moist, no masses appreciated  Cardiovascular: Regular rate and rhythm no murmurs appreciated  Left chest and shoulder tender to palpatoin  Respiratory: CTAB no wheezing  Gastrointestinal: Abdomen soft, non-tender, BS+  EXT: no edema  Neurology: no gross focal deficts  Psychiatry: Appropriate affect. Not agitated  Skin: Warm, dry, no rashes appreciated    Discharge Medications:   Current Discharge Medication List        START taking these medications    Details   digoxin (LANOXIN) 125 MCG tablet Take 1 tablet by mouth daily  Qty: 30 tablet, Refills: 3      guaiFENesin (MUCINEX) 600 MG extended release tablet Take 1 tablet by mouth 2 times daily as needed for Congestion  Qty: 60 tablet, Refills: 0      insulin lispro (HUMALOG) 100 UNIT/ML SOLN injection vial Inject 6 Units into the skin 3 times daily (with meals)  Qty: 2 each, Refills: 0      predniSONE (DELTASONE) 20 MG tablet Take 2 tablets by mouth daily for 5 days  Qty: 10 tablet, Refills: 0      HYDROcodone-acetaminophen (NORCO) 5-325 MG per tablet Take 1 tablet by mouth every 6 hours as needed for Pain for up to 3 days.   Qty: 12 tablet, Refills: 0    Comments: Reduce doses taken as pain becomes manageable  Associated Diagnoses: Shoulder pain, unspecified chronicity, unspecified laterality      naproxen (NAPROSYN) 500 MG tablet Take 1 tablet by mouth 2 times daily (with meals)  Qty: 60 tablet, Refills: 0           Current Discharge Medication List        CONTINUE these medications which have CHANGED    Details   sacubitril-valsartan (ENTRESTO) 24-26 MG per tablet Take 0.5 tablets by mouth 2 times daily  Qty: 60 tablet, Refills: 1      furosemide (LASIX) 20 MG tablet Take 1 tablet by mouth daily  Qty: 60 tablet, Refills: 3      gabapentin (NEURONTIN) 100 MG capsule Take 1 capsule by mouth 3 times daily for 30 days. Qty: 90 capsule, Refills: 0      metoprolol tartrate (LOPRESSOR) 25 MG tablet Take 0.5 tablets by mouth 2 times daily  Qty: 60 tablet, Refills: 3           Current Discharge Medication List        CONTINUE these medications which have NOT CHANGED    Details   empagliflozin (JARDIANCE) 10 MG tablet Take 1 tablet by mouth daily  Qty: 30 tablet, Refills: 3      insulin glargine (BASAGLAR KWIKPEN) 100 UNIT/ML injection pen Inject 48 Units into the skin daily  Qty: 5 Adjustable Dose Pre-filled Pen Syringe, Refills: 3      pantoprazole (PROTONIX) 40 MG tablet Take 40 mg by mouth every morning (before breakfast)      calcium carbonate (OSCAL) 500 MG TABS tablet Take 500 mg by mouth daily      senna (SENOKOT) 8.6 MG tablet Take 1 tablet by mouth 2 times daily      magnesium 30 MG tablet Take 40 mg by mouth daily      tiZANidine (ZANAFLEX) 2 MG tablet Take 2 mg by mouth every 8 hours as needed      atorvastatin (LIPITOR) 80 MG tablet Take 1 tablet by mouth daily  Qty: 90 tablet, Refills: 4    Associated Diagnoses: Mixed hyperlipidemia; Coronary artery disease involving native coronary artery of native heart without angina pectoris      Insulin Pen Needle 32G X 6 MM MISC by Does not apply route 3 times daily with meals      hydrocortisone (ANUSOL-HC) 2.5 % rectal cream Place rectally 2 times daily Place rectally 2 times daily.       vitamin D (ERGOCALCIFEROL) 64680 UNITS CAPS capsule Take 50,000 Units by mouth once a week      Omega 3 1000 MG CAPS Take 1,000 mg by mouth daily      Liraglutide (VICTOZA) 18 MG/3ML SOPN SC injection Inject 1.2 mg into the skin daily      lidocaine (LIDODERM) 5 % Place 1 patch onto the skin daily 12 hours on, 12 hours off. Qty: 30 patch, Refills: 0      oxybutynin (DITROPAN XL) 15 MG CR tablet Take 15 mg by mouth daily. Loratadine 10 MG CAPS Take by mouth daily       levothyroxine (SYNTHROID) 175 MCG tablet Take 150 mcg by mouth daily. Current Discharge Medication List        STOP taking these medications       benzonatate (TESSALON) 100 MG capsule Comments:   Reason for Stopping:         acetaminophen-codeine (TYLENOL #3) 300-30 MG per tablet Comments:   Reason for Stopping:         triamcinolone (KENALOG) 0.1 % cream Comments:   Reason for Stopping:         insulin aspart (NOVOLOG) 100 UNIT/ML injection vial Comments:   Reason for Stopping:         phenazopyridine (PYRIDIUM) 100 MG tablet Comments:   Reason for Stopping:         nystatin (MYCOSTATIN) 629292 UNIT/GM powder Comments:   Reason for Stopping:         rivaroxaban (XARELTO) 20 MG TABS tablet Comments:   Reason for Stopping:         Calcium Carb-Cholecalciferol (OYSTER SHELL CALCIUM + D PO) Comments:   Reason for Stopping:               Labs:  For convenience and continuity at follow-up the following most recent labs are provided:    Lab Results   Component Value Date/Time    WBC 13.7 10/24/2022 06:51 AM    HGB 7.4 10/24/2022 06:51 AM    HCT 23.4 10/24/2022 06:51 AM    MCV 97.3 10/24/2022 06:51 AM    PLT 24 10/24/2022 06:51 AM     10/24/2022 06:51 AM    K 4.7 10/24/2022 06:51 AM     10/24/2022 06:51 AM    CO2 25 10/24/2022 06:51 AM    BUN 36 10/24/2022 06:51 AM    CREATININE 0.9 10/24/2022 06:51 AM    CALCIUM 8.8 10/24/2022 06:51 AM    PHOS 2.9 05/14/2014 05:19 AM    BNP 26 03/05/2012 04:40 AM    ALKPHOS 75 10/10/2022 11:20 AM    ALT 11 10/10/2022 11:20 AM    AST 15 10/10/2022 11:20 AM    BILITOT 0.4 10/10/2022 11:20 AM    LABALBU 3.3 10/10/2022 11:20 AM    LDLCALC 61 07/13/2012 09:30 AM    TRIG 247 07/13/2012 09:30 AM     Lab Results   Component Value Date    INR 2.10 (H) 09/29/2022    INR 1.06 02/13/2017    INR 1.06 11/22/2016       Radiology:  XR CHEST (2 VW)    Result Date: 9/24/2022  EXAMINATION: TWO XRAY VIEWS OF THE CHEST 9/24/2022 6:35 pm COMPARISON: 06/16/2022 HISTORY: ORDERING SYSTEM PROVIDED HISTORY: Chest Discomfort TECHNOLOGIST PROVIDED HISTORY: Reason for exam:->Chest Discomfort Reason for Exam: Wrist Pain (Dive[facility] # [id number] -Patient brought in by squad from home with left wrist, right rib pain. No injury. ) FINDINGS: The heart is mildly enlarged and less prominent the pulmonary vessels are engorged centrally and less prominent. The lungs are hyperinflated. No consolidation or effusion is seen. The bones are intact. There are postop changes along the mediastinum and sternum which is unchanged. There are mild subsegmental linear densities scattered along the lung bases which is less prominent. Mild cardiomegaly with mild central pulmonary congestion or pulmonary artery hypertension which is less prominent. Mild chronic obstructive lung changes with mild bibasilar discoid atelectasis or scarring which is less prominent with no obvious infiltrate or effusion. XR WRIST LEFT (MIN 3 VIEWS)    Result Date: 9/24/2022  EXAMINATION: 3 XRAY VIEWS OF THE LEFT WRIST 9/24/2022 6:35 pm COMPARISON: None. HISTORY: ORDERING SYSTEM PROVIDED HISTORY: pain TECHNOLOGIST PROVIDED HISTORY: Reason for exam:->pain FINDINGS: There is mild narrowing of the radiocarpal joint. No acute fracture or dislocation is seen. There is moderate narrowing along the base of the thumb with prominent osteophytes throughout the joint. There is some linear calcifications just distal to the ulna which probably within the TFCC.   There are vascular calcifications along the palmar aspect of the wrist.  There is mild soft tissue swelling along the dorsum of the wrist.  The bones are osteopenic. No obvious acute fracture or dislocation can be seen. Mild osteoarthritic changes along the radiocarpal joint and moderate degenerative arthritic changes along the base of the thumb with no acute bony abnormality seen. Vascular calcifications along the palmar aspect of the wrist Mild soft tissue swelling around the wrist and diffuse osteopenia. Probable mild chondrocalcinosis of the TFCC. CT HEAD WO CONTRAST    Result Date: 10/14/2022  EXAMINATION: CT OF THE HEAD WITHOUT CONTRAST  10/13/2022 2:52 pm TECHNIQUE: CT of the head was performed without the administration of intravenous contrast. Automated exposure control, iterative reconstruction, and/or weight based adjustment of the mA/kV was utilized to reduce the radiation dose to as low as reasonably achievable. COMPARISON: 02/11/2012 HISTORY: ORDERING SYSTEM PROVIDED HISTORY: dizziness TECHNOLOGIST PROVIDED HISTORY: Reason for exam:->dizziness Has a \"code stroke\" or \"stroke alert\" been called? ->No Reason for Exam: dizziness FINDINGS: BRAIN/VENTRICLES: The cerebral and cerebellar parenchyma demonstrate volume loss, with a frontal lobe predominance. Scattered low-attenuation areas are noted in the periventricular white matter, compatible with chronic microvascular white matter ischemic disease. There are no areas of hemorrhage, mass, or midline shift. No abnormal extra-axial fluid collections. The ventricles are prominent in size, likely related to involutional change. Gray-white differentiation is maintained without evidence of acute infarct. ORBITS: Lens implants from prior cataract surgery are noted. The orbits are otherwise unremarkable. SINUSES: There is scattered mild paranasal sinus disease. Thickened secretions are noted in the left sphenoid sinus. The mastoid air cells are clear. SOFT TISSUES/SKULL:  The calvarium is intact. No appreciable scalp soft tissue swelling.      1. No acute intracranial abnormality. 2. Cerebral and cerebellar parenchymal volume loss with chronic microvascular white matter ischemic disease. CT CHEST W CONTRAST    Result Date: 9/28/2022  EXAMINATION: CT OF THE CHEST WITH CONTRAST 9/28/2022 1:02 pm TECHNIQUE: CT of the chest was performed with the administration of intravenous contrast. Multiplanar reformatted images are provided for review. Automated exposure control, iterative reconstruction, and/or weight based adjustment of the mA/kV was utilized to reduce the radiation dose to as low as reasonably achievable. COMPARISON: Chest CT 05/10/2014 HISTORY: ORDERING SYSTEM PROVIDED HISTORY: left chest pain FINDINGS: Technical: Evaluation of the lungs degraded because of motion during imaging, blurring detail and resulting in misregistration artifact. Mediastinum: Mild main pulmonary artery dilatation 31 mm (axial series 2, image 51). Ascending thoracic aorta 37 mm and descending thoracic aorta 25 mm. Status post CABG. Cardiomegaly. The great vessels appear unremarkable with exception of calcific atherosclerotic disease. Moderate to severe calcific atherosclerosis coronary arteries. No pericardial effusion. Posterior mediastinal structures appear unremarkable. No mediastinal or hilar adenopathy. Small hiatal hernia with what appears to be a small retained capsule within the hiatal hernia. Lungs/pleura: No focal pulmonary infiltrate evident. No soft tissue or ground-glass pulmonary nodule is seen. No inspissated secretions or endobronchial lesion evident. No pleural effusion or pneumothorax. Upper Abdomen: Unremarkable appearance. Soft Tissues/Bones: No acute superficial soft tissue or osseous structure abnormality evident. Old healed fractures left ribs 4, 5 and 6.     1. No definite acute pulmonary disease. Evaluation of the lungs degraded because of motion during imaging, blurring detail and resulting in misregistration artifact.  2. Moderate to severe calcific atherosclerosis coronary arteries. 3. Mild main pulmonary artery dilatation can be seen with pulmonary hypertension but is nonspecific. 4. Cardiomegaly and sequela from CABG. 5. Small hiatal hernia with what appears to be a small retained capsule within the hiatal hernia. CT GUIDED NEEDLE PLACEMENT    Result Date: 9/29/2022  PROCEDURE: CT GUIDED BONE MARROW ASPIRATION AND CORE NEEDLE BONE BIOPSY OF THE RIGHT ILIAC BONE. MODERATE CONSCIOUS SEDATION 9/29/2022 HISTORY: ORDERING SYSTEM PROVIDED HISTORY: anemia, thrombocytopenia TECHNOLOGIST PROVIDED HISTORY: Reason for exam:->anemia, thrombocytopenia Reason for Exam: anemia, thrombocytopenia SEDATION: 2 mgversed and 50 mcg fentanyl were titrated intravenously for moderate sedation monitored under my direction. Total intraservice time of sedation was 5 minutes. The patient's vital signs were monitored throughout the procedure and recorded in the patient's medical record by the nurse. TECHNIQUE: Informed consent was obtained following a detailed explanation of the procedure including risks, benefits, and alternatives. Universal protocol was followed. Sterile gowns, masks, hats and gloves utilized for maximal sterile barrier. Axial images were obtained through the iliac bones using CT guidance and a suitable skin site was prepped and draped in sterile fashion. Local anesthesia was achieved with lidocaine. An 11 gauge [employer] bone marrow biopsy needle was advanced into the right iliac bone and approximately 15 mL of bone marrow aspirate was obtained. A single core biopsy specimen was obtained and the patient tolerated the procedure well. Estimated blood loss: Less than 5 cc Automated exposure control, iterative reconstruction, and/or weight based adjustment of the mA/kV was utilized to reduce the radiation dose to as low as reasonably achievable.  DLP: 71.66 mGy-cm     Successful CT guided bone marrow aspiration and core biopsy of the right iliac exam:->left chest pain Reason for Exam: Chest Pain (Pt transported from [facility] from [facility]. PT c/o chest pain radiating to left shoulder. Symptoms started 2 days ago. Pt stated she was seen in [facility] and was inpatient for 2 weeks for left shoulder complications.) FINDINGS: HEART/MEDIASTINUM: The cardiomediastinal silhouette is stable. PLEURA/LUNGS: There are no focal consolidations or pleural effusions. There is no appreciable pneumothorax. Linear atelectasis versus scarring noted in the periphery of the left mid lung. BONES/SOFT TISSUE: No acute abnormality. Median sternotomy wires again noted. No radiographic evidence of acute pulmonary disease. CT CHEST PULMONARY EMBOLISM W CONTRAST    Result Date: 10/10/2022  EXAMINATION: CTA OF THE CHEST 10/10/2022 11:59 am TECHNIQUE: CTA of the chest was performed after the administration of intravenous contrast.  Multiplanar reformatted images are provided for review. MIP images are provided for review. Automated exposure control, iterative reconstruction, and/or weight based adjustment of the mA/kV was utilized to reduce the radiation dose to as low as reasonably achievable. COMPARISON: None. HISTORY: ORDERING SYSTEM PROVIDED HISTORY: left sided chest pain, shortness of breath TECHNOLOGIST PROVIDED HISTORY: Reason for exam:->left sided chest pain, shortness of breath Decision Support Exception - unselect if not a suspected or confirmed emergency medical condition->Emergency Medical Condition (MA) Reason for Exam: left sided chest pain, shortness of breath FINDINGS: Pulmonary Arteries: Pulmonary arteries are adequately opacified for evaluation. No evidence of intraluminal filling defect to suggest pulmonary embolism. Main pulmonary artery is normal in caliber. Mediastinum: No evidence of mediastinal lymphadenopathy. The heart and pericardium demonstrate no acute abnormality. There is no acute abnormality of the thoracic aorta. Lungs/pleura:  There is minimal bibasilar atelectasis/scarring. The lungs are without acute process. No focal consolidation or pulmonary edema. No evidence of pleural effusion or pneumothorax. Upper Abdomen: Limited images of the upper abdomen are unremarkable. Soft Tissues/Bones: No acute bone or soft tissue abnormality. No evidence of pulmonary embolism or acute pulmonary abnormality. CT BIOPSY BONE MARROW    Result Date: 9/29/2022  PROCEDURE: CT GUIDED BONE MARROW ASPIRATION AND CORE NEEDLE BONE BIOPSY OF THE RIGHT ILIAC BONE. MODERATE CONSCIOUS SEDATION 9/29/2022 HISTORY: ORDERING SYSTEM PROVIDED HISTORY: anemia, thrombocytopenia TECHNOLOGIST PROVIDED HISTORY: Reason for exam:->anemia, thrombocytopenia Reason for Exam: anemia, thrombocytopenia SEDATION: 2 mgversed and 50 mcg fentanyl were titrated intravenously for moderate sedation monitored under my direction. Total intraservice time of sedation was 5 minutes. The patient's vital signs were monitored throughout the procedure and recorded in the patient's medical record by the nurse. TECHNIQUE: Informed consent was obtained following a detailed explanation of the procedure including risks, benefits, and alternatives. Universal protocol was followed. Sterile gowns, masks, hats and gloves utilized for maximal sterile barrier. Axial images were obtained through the iliac bones using CT guidance and a suitable skin site was prepped and draped in sterile fashion. Local anesthesia was achieved with lidocaine. An 11 gauge [employer] bone marrow biopsy needle was advanced into the right iliac bone and approximately 15 mL of bone marrow aspirate was obtained. A single core biopsy specimen was obtained and the patient tolerated the procedure well. Estimated blood loss: Less than 5 cc Automated exposure control, iterative reconstruction, and/or weight based adjustment of the mA/kV was utilized to reduce the radiation dose to as low as reasonably achievable.  DLP: 71.66 mGy-cm Successful CT guided bone marrow aspiration and core biopsy of the right iliac bone.      EGD    Result Date: 10/3/2022  No dictation         Signed:    Yash Vaughn MD   10/24/2022

## 2022-10-24 NOTE — PROGRESS NOTES
PRN Mucinex given at this time, Tessalon given at 1624 (4:26P), cannot give d/t too soon per order. Receiving scheduled nebulizer treatment at this time. No s/s of distress, call light within reach.

## 2022-10-24 NOTE — CARE COORDINATION
Family is concerned that Hematology is recommending patient stay 2 days,  due to H/H dropping. Eventual plan is to return to [facility].

## 2022-10-24 NOTE — CARE COORDINATION
Notified MD via perfect serve,   Patient called [facility] to appeal the discharge  Case #  [id number]  Detailed Notice of Discharge provided.

## 2022-10-24 NOTE — CARE COORDINATION
[place] records submitted for review as of [phone] [id number]. Awaiting response. SANDY Gusman aware of these discussions. Will continue to follow as needed.      Electronically signed by Nicho Vickers RN on 10/24/22 at 2:15 PM EDT

## 2022-10-25 LAB
ANION GAP SERPL CALCULATED.3IONS-SCNC: 7 MMOL/L (ref 3–16)
ANISOCYTOSIS: ABNORMAL
BANDED NEUTROPHILS RELATIVE PERCENT: 1 % (ref 0–7)
BASOPHILS ABSOLUTE: 0 K/UL (ref 0–0.2)
BASOPHILS RELATIVE PERCENT: 0 %
BUN BLDV-MCNC: 35 MG/DL (ref 7–20)
CALCIUM SERPL-MCNC: 8.8 MG/DL (ref 8.3–10.6)
CHLORIDE BLD-SCNC: 110 MMOL/L (ref 99–110)
CO2: 26 MMOL/L (ref 21–32)
CREAT SERPL-MCNC: 0.8 MG/DL (ref 0.6–1.2)
EOSINOPHILS ABSOLUTE: 0.1 K/UL (ref 0–0.6)
EOSINOPHILS RELATIVE PERCENT: 1 %
GFR SERPL CREATININE-BSD FRML MDRD: >60 ML/MIN/{1.73_M2}
GLUCOSE BLD-MCNC: 137 MG/DL (ref 70–99)
GLUCOSE BLD-MCNC: 148 MG/DL (ref 70–99)
GLUCOSE BLD-MCNC: 231 MG/DL (ref 70–99)
GLUCOSE BLD-MCNC: 237 MG/DL (ref 70–99)
GLUCOSE BLD-MCNC: 290 MG/DL (ref 70–99)
HCT VFR BLD CALC: 22.8 % (ref 36–48)
HEMATOLOGY PATH CONSULT: NO
HEMATOLOGY PATH CONSULT: NORMAL
HEMOGLOBIN: 7.3 G/DL (ref 12–16)
HYPOCHROMIA: ABNORMAL
LYMPHOCYTES ABSOLUTE: 7.9 K/UL (ref 1–5.1)
LYMPHOCYTES RELATIVE PERCENT: 60 %
MCH RBC QN AUTO: 30.8 PG (ref 26–34)
MCHC RBC AUTO-ENTMCNC: 31.9 G/DL (ref 31–36)
MCV RBC AUTO: 96.6 FL (ref 80–100)
MONOCYTES ABSOLUTE: 1.2 K/UL (ref 0–1.3)
MONOCYTES RELATIVE PERCENT: 9 %
NEUTROPHILS ABSOLUTE: 3.9 K/UL (ref 1.7–7.7)
NEUTROPHILS RELATIVE PERCENT: 29 %
OVALOCYTES: ABNORMAL
PDW BLD-RTO: 20.8 % (ref 12.4–15.4)
PERFORMED ON: ABNORMAL
PLATELET # BLD: 28 K/UL (ref 135–450)
PLATELET SLIDE REVIEW: ABNORMAL
PMV BLD AUTO: 11 FL (ref 5–10.5)
POLYCHROMASIA: ABNORMAL
POTASSIUM REFLEX MAGNESIUM: 4.7 MMOL/L (ref 3.5–5.1)
RBC # BLD: 2.37 M/UL (ref 4–5.2)
SLIDE REVIEW: ABNORMAL
SMUDGE CELLS: PRESENT
SODIUM BLD-SCNC: 143 MMOL/L (ref 136–145)
TEAR DROP CELLS: ABNORMAL
WBC # BLD: 13.1 K/UL (ref 4–11)

## 2022-10-25 PROCEDURE — 6370000000 HC RX 637 (ALT 250 FOR IP): Performed by: NURSE PRACTITIONER

## 2022-10-25 PROCEDURE — 80048 BASIC METABOLIC PNL TOTAL CA: CPT

## 2022-10-25 PROCEDURE — 6360000002 HC RX W HCPCS: Performed by: INTERNAL MEDICINE

## 2022-10-25 PROCEDURE — 97530 THERAPEUTIC ACTIVITIES: CPT

## 2022-10-25 PROCEDURE — 6370000000 HC RX 637 (ALT 250 FOR IP): Performed by: INTERNAL MEDICINE

## 2022-10-25 PROCEDURE — 85025 COMPLETE CBC W/AUTO DIFF WBC: CPT

## 2022-10-25 PROCEDURE — 2060000000 HC ICU INTERMEDIATE R&B

## 2022-10-25 PROCEDURE — C9113 INJ PANTOPRAZOLE SODIUM, VIA: HCPCS | Performed by: INTERNAL MEDICINE

## 2022-10-25 PROCEDURE — 36415 COLL VENOUS BLD VENIPUNCTURE: CPT

## 2022-10-25 PROCEDURE — 92526 ORAL FUNCTION THERAPY: CPT

## 2022-10-25 PROCEDURE — 2580000003 HC RX 258: Performed by: INTERNAL MEDICINE

## 2022-10-25 PROCEDURE — 97110 THERAPEUTIC EXERCISES: CPT

## 2022-10-25 PROCEDURE — 94761 N-INVAS EAR/PLS OXIMETRY MLT: CPT

## 2022-10-25 PROCEDURE — 94640 AIRWAY INHALATION TREATMENT: CPT

## 2022-10-25 PROCEDURE — 6370000000 HC RX 637 (ALT 250 FOR IP): Performed by: STUDENT IN AN ORGANIZED HEALTH CARE EDUCATION/TRAINING PROGRAM

## 2022-10-25 RX ADMIN — Medication 10 ML: at 11:52

## 2022-10-25 RX ADMIN — NAPROXEN 250 MG: 250 TABLET ORAL at 11:41

## 2022-10-25 RX ADMIN — INSULIN LISPRO 6 UNITS: 100 INJECTION, SOLUTION INTRAVENOUS; SUBCUTANEOUS at 11:45

## 2022-10-25 RX ADMIN — INSULIN GLARGINE 25 UNITS: 100 INJECTION, SOLUTION SUBCUTANEOUS at 20:44

## 2022-10-25 RX ADMIN — IPRATROPIUM BROMIDE AND ALBUTEROL SULFATE 1 AMPULE: 2.5; .5 SOLUTION RESPIRATORY (INHALATION) at 09:28

## 2022-10-25 RX ADMIN — METOPROLOL TARTRATE 12.5 MG: 25 TABLET, FILM COATED ORAL at 09:07

## 2022-10-25 RX ADMIN — INSULIN LISPRO 4 UNITS: 100 INJECTION, SOLUTION INTRAVENOUS; SUBCUTANEOUS at 11:45

## 2022-10-25 RX ADMIN — HYDROCORTISONE: 25 CREAM TOPICAL at 10:00

## 2022-10-25 RX ADMIN — INSULIN LISPRO 8 UNITS: 100 INJECTION, SOLUTION INTRAVENOUS; SUBCUTANEOUS at 17:39

## 2022-10-25 RX ADMIN — GUAIFENESIN 600 MG: 600 TABLET, EXTENDED RELEASE ORAL at 13:55

## 2022-10-25 RX ADMIN — MELATONIN TAB 3 MG 6 MG: 3 TAB at 20:47

## 2022-10-25 RX ADMIN — NAPROXEN 250 MG: 250 TABLET ORAL at 17:41

## 2022-10-25 RX ADMIN — METHYLPREDNISOLONE SODIUM SUCCINATE 20 MG: 40 INJECTION, POWDER, FOR SOLUTION INTRAMUSCULAR; INTRAVENOUS at 09:07

## 2022-10-25 RX ADMIN — ACETAMINOPHEN 650 MG: 325 TABLET ORAL at 22:08

## 2022-10-25 RX ADMIN — POLYETHYLENE GLYCOL 3350 17 G: 17 POWDER, FOR SOLUTION ORAL at 13:54

## 2022-10-25 RX ADMIN — METOPROLOL TARTRATE 12.5 MG: 25 TABLET, FILM COATED ORAL at 20:47

## 2022-10-25 RX ADMIN — PANTOPRAZOLE SODIUM 40 MG: 40 INJECTION, POWDER, FOR SOLUTION INTRAVENOUS at 20:45

## 2022-10-25 RX ADMIN — ATORVASTATIN CALCIUM 80 MG: 80 TABLET, FILM COATED ORAL at 20:47

## 2022-10-25 RX ADMIN — BENZONATATE 100 MG: 100 CAPSULE ORAL at 11:41

## 2022-10-25 RX ADMIN — ACETAMINOPHEN 650 MG: 325 TABLET ORAL at 09:07

## 2022-10-25 RX ADMIN — LEVOTHYROXINE SODIUM 150 MCG: 150 TABLET ORAL at 11:41

## 2022-10-25 RX ADMIN — PANTOPRAZOLE SODIUM 40 MG: 40 INJECTION, POWDER, FOR SOLUTION INTRAVENOUS at 09:07

## 2022-10-25 RX ADMIN — BENZONATATE 100 MG: 100 CAPSULE ORAL at 20:57

## 2022-10-25 RX ADMIN — Medication 10 ML: at 20:45

## 2022-10-25 RX ADMIN — INSULIN LISPRO 6 UNITS: 100 INJECTION, SOLUTION INTRAVENOUS; SUBCUTANEOUS at 17:39

## 2022-10-25 RX ADMIN — GABAPENTIN 100 MG: 100 CAPSULE ORAL at 09:07

## 2022-10-25 RX ADMIN — DIGOXIN 125 MCG: 125 TABLET ORAL at 09:07

## 2022-10-25 RX ADMIN — GUAIFENESIN 600 MG: 600 TABLET, EXTENDED RELEASE ORAL at 20:46

## 2022-10-25 RX ADMIN — GABAPENTIN 100 MG: 100 CAPSULE ORAL at 13:55

## 2022-10-25 RX ADMIN — Medication 10 ML: at 20:57

## 2022-10-25 RX ADMIN — GABAPENTIN 100 MG: 100 CAPSULE ORAL at 20:47

## 2022-10-25 RX ADMIN — HYDROCORTISONE: 25 CREAM TOPICAL at 20:11

## 2022-10-25 RX ADMIN — INSULIN GLARGINE 25 UNITS: 100 INJECTION, SOLUTION SUBCUTANEOUS at 09:07

## 2022-10-25 RX ADMIN — ACETAMINOPHEN 650 MG: 325 TABLET ORAL at 17:40

## 2022-10-25 ASSESSMENT — PAIN SCALES - GENERAL
PAINLEVEL_OUTOF10: 8
PAINLEVEL_OUTOF10: 7

## 2022-10-25 ASSESSMENT — PAIN DESCRIPTION - ONSET: ONSET: ON-GOING

## 2022-10-25 ASSESSMENT — PAIN DESCRIPTION - FREQUENCY: FREQUENCY: CONTINUOUS

## 2022-10-25 ASSESSMENT — PAIN DESCRIPTION - LOCATION: LOCATION: GENERALIZED

## 2022-10-25 NOTE — PROGRESS NOTES
Patient complaining of increased non productive cough. Tessalon and Mucinex given PRN per MAR. 02 sat 94% on RA. Miralax given for constipation. PT to see patient later this afternoon. Will continue to monitor.

## 2022-10-25 NOTE — PROGRESS NOTES
She will      Hospitalist Progress Note    Name:  Don Kyle    /Age/Sex: 1940  (78 y.o. female)  MRN & CSN:  [id number] & [id number]    PCP: No primary care provider on file. Date of Admission: 10/10/2022    Patient Status:  Inpatient     Chief Complaint: Shoulder pain    Hospital Course: 25-year-old female with past medical history of hyperlipidemia, hypertension, GERD, type 2 diabetes, CAD, atrial fibrillation. Recently diagnosed with mild dysplastic syndrome. Was transferred to the ICU for worsening respiratory distress on 10/17/2022. There was a question if patient had aspirated given her disposition. Patient has been improving on nasal cannula oxygen. Palliative care following. Subjective: Today is:  Hospital Day: 16.  Patient seen and examined in [id number].    In bed still having shoulder and chest discomfort muscle Collatyl no nausea vomiting or fevers    Medications:  Reviewed    Infusion Medications    sodium chloride 100 mL/hr at 10/24/22 0607    dextrose      sodium chloride      sodium chloride      sodium chloride      sodium chloride 100 mL/hr at 10/24/22 0300    dextrose       Scheduled Medications    naproxen  250 mg Oral TID WC    ipratropium-albuterol  1 ampule Inhalation BID    insulin lispro  6 Units SubCUTAneous TID WC    sodium chloride flush  5-40 mL IntraVENous 2 times per day    pantoprazole  40 mg IntraVENous BID    methylPREDNISolone  20 mg IntraVENous Daily    gabapentin  100 mg Oral TID    insulin lispro  0-16 Units SubCUTAneous TID WC    [Held by provider] insulin lispro  0-4 Units SubCUTAneous Nightly    insulin glargine  25 Units SubCUTAneous BID    digoxin  125 mcg Oral Daily    lidocaine  1 patch TransDERmal Daily    metoprolol tartrate  12.5 mg Oral BID    acetaminophen  650 mg Oral TID    atorvastatin  80 mg Oral Nightly    hydrocortisone   Rectal BID    levothyroxine  150 mcg Oral Daily    sodium chloride flush  5-40 mL IntraVENous 2 times per day PRN Meds: sodium chloride flush, sodium chloride, calcium carbonate, guaiFENesin, glucose, dextrose bolus **OR** dextrose bolus, glucagon (rDNA), dextrose, melatonin, sodium chloride, sodium chloride, ipratropium-albuterol, lidocaine viscous hcl, benzonatate, sodium chloride, sodium chloride flush, sodium chloride, ondansetron **OR** ondansetron, polyethylene glycol, acetaminophen **OR** acetaminophen, dextrose bolus **OR** dextrose bolus, glucagon (rDNA), dextrose      Intake/Output Summary (Last 24 hours) at 10/25/2022 1055  Last data filed at 10/25/2022 0545  Gross per 24 hour   Intake 2040. 85 ml   Output 1300 ml   Net 740.85 ml         Physical Exam Performed:    BP (!) 154/65   Pulse 78   Temp 99.2 °F (37.3 °C) (Oral)   Resp 16   Ht 5' (1.524 m)   Wt 248 lb 6.4 oz (112.7 kg)   LMP  (LMP Unknown)   SpO2 95%   BMI 48.51 kg/m²     General appearance: aaox3  HEENT: Pupils equal, round, and reactive to light. Conjunctivae/corneas clear. Neck: Supple, with full range of motion. No jugular venous distention. Trachea midline. Respiratory: no rhonchi or wheezing  Cardiovascular: Regular rate and rhythm with normal S1/S2 without murmur  Abdomen: Soft, non-tender, non-distended with normal bowel sounds. .  Skin: Skin color, texture, turgor normal.  No rashes or lesions. Neurologic:  no gross deficits       Labs:   Recent Labs     10/23/22  0325 10/24/22  0651 10/25/22  0423   WBC 13.5* 13.7* 13.1*   HGB 7.2* 7.4* 7.3*   HCT 22.3* 23.4* 22.8*   PLT 28* 24* 28*       Recent Labs     10/23/22  0325 10/24/22  0651 10/25/22  0423    147* 143   K 4.8 4.7 4.7    113* 110   CO2 26 25 26   BUN 54* 36* 35*   CREATININE 1.0 0.9 0.8   CALCIUM 9.0 8.8 8.8       No results for input(s): AST, ALT, BILIDIR, BILITOT, ALKPHOS in the last 72 hours. No results for input(s): INR in the last 72 hours. No results for input(s): [place] in the last 72 hours.     Urinalysis:      Lab Results   Component Value Date/Time    NITRU Negative 10/17/2022 07:24 PM    WBCUA 19 10/17/2022 07:24 PM    BACTERIA None Seen 10/17/2022 07:24 PM    RBCUA 275 10/17/2022 07:24 PM    BLOODU LARGE 10/17/2022 07:24 PM    SPECGRAV 1.020 10/17/2022 07:24 PM    GLUCOSEU 250 10/17/2022 07:24 PM    GLUCOSEU NEGATIVE 03/09/2012 06:51 AM       Radiology:  XR CHEST PORTABLE   Final Result   No focal lung consolidation. XR CHEST PORTABLE   Final Result   Mild cardiomegaly. Diffuse haziness of the left lung be due to atypical   infection or edema. XR CHEST PORTABLE   Final Result   1. Similar-appearing prominence of the cardiac silhouette with associated   vascular congestion. 2. No focal consolidation. CT HEAD WO CONTRAST   Final Result   1. No acute intracranial abnormality. 2. Cerebral and cerebellar parenchymal volume loss with chronic microvascular   white matter ischemic disease. CT CHEST PULMONARY EMBOLISM W CONTRAST   Final Result   No evidence of pulmonary embolism or acute pulmonary abnormality. XR CHEST PORTABLE   Final Result   No radiographic evidence of acute pulmonary disease.                  Assessment/Plan:    Active Hospital Problems    Diagnosis     History of coronary artery disease [Z86.79]      Priority: Medium    Pulmonary vascular congestion [R09.89]      Priority: Medium    Acute hypoxemic respiratory failure (HCC) [J96.01]      Priority: Medium    Aspiration pneumonia of left lower lobe due to gastric secretions ([facility] [address] [J69.0]      Priority: Medium    Hypervolemia [E87.70]      Priority: Medium    MDS (myelodysplastic syndrome) ([facility] [address] [D46.9]      Priority: Medium    Chronic systolic heart failure (HCC) [I50.22]      Priority: Medium    Arterial hypotension [I95.9]      Priority: Medium    Shoulder pain [M25.519]      Priority: Medium    Severe anemia [D64.9]      Priority: Medium    Severe thrombocytopenia (HCC) [D69.6]      Priority: Medium    Acute diastolic heart failure ([facility] [I50.31]      Priority: Medium    Type 2 diabetes mellitus ([facility] [address] [E11.9]     Acute chest pain [R07.9]     Acute renal failure ([facility] [address] [N17.9]          Hospital Day: 16    This is a 78 y.o. female who presented to [facility] on 10/10/2022 and is being treated for:    Acute hypoxic respiratory failure  -Likely 2/2 pneumonia and CHF  -Wean oxygen as tolerated  -Mucinex for cough  -Pulmonology consulted; appreciate recommendations  -Palliative care consulted; appreciate recommendations    Pneumonia  - finiish atbx today    MDS  -Continue with transfusions as needed  -Daily CBC  -Hematology consulted; appreciate recommendations    ARUN  -Creatinine peaked at 1.9; baseline 1.0  -Downtrending  -Avoid nephrotoxins    CAD  -Statin    Afib  -Continue metoprolol  -Not a candidate for anticoagulation  -Digoxin per cardiology  -Cardiology consulted; appreciate recommendations    Type 2 diabetes  - monitor and titrate insulin today    Shoulder and chest pain : Start naproxen check bmp in am    DVT ppx: Contraindicated  GI ppx: Diet/Tube Feeds  Diet: ADULT DIET; Dysphagia - Soft and Bite Sized; No Drinking Straws  Code Status: DNR-CCA          PT/OT Eval Status: Ordered          Please note that some part of this chart was generated using Dragon dictation software. Although every effort was made to ensure the accuracy of this automated transcription, some errors in transcription may have occurred inadvertently. If you may need any clarification, please do not hesitate to contact me through [facility].

## 2022-10-25 NOTE — PROGRESS NOTES
[facility]   Phone: [phone]    Physical Therapy    [] Initial Evaluation            [x] Daily Treatment Note         [] Discharge Summary      Patient: Naomi Nguyen   : 1940   MRN: [id number]   Date of Service:  10/25/2022  Admitting Diagnosis: Acute hypoxemic respiratory failure [facility])  Current Admission Summary: Naomi Nguyen is a 78 y.o. female who presented to [facility] ER today from her SNF complaining of severe chest shoulder and neck pain. She denies any recent trauma. She was just discharged to a SNF 3 days ago. She has been having left-sided shoulder wrist and thumb pain during that admission and was treated with oral steroids as well as a CMC brace. She tells me that the nursing aides have been lifting her by putting her arms under her shoulders which seems to be aggravating her shoulder pain. She has known bilateral shoulder rotator cuff arthropathy. She has tried injections in the past with limited benefit. She does have peripheral neuropathy and reports her numbness/tingling of her right greater than left hands are at baseline. She describes moderate pain extending from base of her neck into her left shoulder and upper arm rated 6/10. She is much more comfortable since receiving morphine. 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 Imaging review of the left shoulder via chest CT this admission demonstrated: No acute fracture or malalignment, there is cystic change of the greater tuberosity consistent with prior rotator cuff disease. Moderate AC joint arthritis noted. Multilevel cervical degenerative disc disease noted. Patient came in from SNF where she had just discharged to on 10/7 and uses a walker to ambulate.     Past Medical History:  has a past medical history of 15 Left knee repeat repair of median parapatellar arthrotomy with augmentation, Arthritis, Aspiration pneumonia of left lower lobe due to gastric secretions (HCC), Atrial fibrillation ([address] CAD (coronary artery disease), Cataract, Chronic diastolic congestive heart failure ([address] Diabetes mellitus ([address] GERD (gastroesophageal reflux disease), Hyperlipidemia, Hypertension, HYPOTHRYROIDISM, Myelodysplastic disease ([address] Non-English speaking patient, Sleep apnea, Thyroid disease, and UTI. Past Surgical History:  has a past surgical history that includes joint replacement (Bilateral, [address]); Cardiac surgery (2004); Cholecystectomy, laparoscopic (5/15/14); Revision total knee arthroplasty (Right, 11/22/2016); Total knee arthroplasty; knee surgery (Left, 02/28/2017); CT BIOPSY BONE MARROW (9/29/2022); Upper gastrointestinal endoscopy (N/A, 10/3/2022); and Upper gastrointestinal endoscopy (N/A, 10/3/2022). Discharge Recommendations: [place] long-term scored a 8/24 on the AM-PAC short mobility form. Current research shows that an AM-PAC score of 17 or less is typically not associated with a discharge to the patient's home setting. Based on the patient's AM-PAC score and their current functional mobility deficits, it is recommended that the patient have 3-5 sessions per week of Physical Therapy at d/c to increase the patient's independence. Please see assessment section for further patient specific details. If patient discharges prior to next session this note will serve as a discharge summary. Please see below for the latest assessment towards goals.     DME Required For Discharge: no DME required at discharge  Precautions/Restrictions: high fall risk  Weight Bearing Restrictions: no restrictions  [] Right Upper Extremity  [] Left Upper Extremity [] Right Lower Extremity  [] Left Lower Extremity     Required Braces/Orthotics: no braces required   [] Right  [] Left  Positional Restrictions:no positional restrictions    Pre-Admission Information   Lives With: alone    Type of Home: house  Home Layout: one level  Home Access: ramped entry  Bathroom Layout: walker accessible, walk in shower  Bathroom Equipment: grab bars in shower, shower chair  Toilet Height: standard height  Home Equipment: rolling walker, rollator - 4 wheeled walker, manual wheelchair, electric wheelchair  Transfer Assistance: modified independent with use of RW  Ambulation Assistance:modified independent with use of manual w/c, RW for short distances   ADL Assistance: requires assistance with bathing, requires assistance with dressing  IADL Assistance: independent with homemaking tasks home health aide 4 hours/day   Active :        [] Yes  [x] No  Hand Dominance: [] Left  [x] Right  Current Employment: retired. Recent Falls: 1 slip out of wheelchair   *cameras present at home for children to monitor        Subjective  General: Pt in bed upon arrival with family present in room, interpreting as needed. As Pt. Can understand but intermittently needs assist from daughters. Agreeable to PT/OT treatment. Pain: Pain rating taken based on observed faces and behaviors -- pain in B shoulders, buttock due to hemorrhoids   Pain Interventions: pain medication in place prior to arrival       Functional Mobility  Bed Mobility  Supine to Sit: 2 person assistance with Max A x 2   Comments: per pt daughter pt is having hemorrhoid pain. Transfers  Sit to stand transfer: 2 person assistance with dep x 2   Stand to sit transfer: 2 person assistance with dep x 2   Comments: Maxi Grant utilized to transfer patient from EOB to chair. Encouraged to sit up 2 hours. RN staff notified to return Pt. To bed in ~ 2 hours via Maxi-Grant. Sit to stands trialed while in chair. Patient able to lift buttocks 4 inches off chair with Max A of 3. 6 trials but patient unable to stand upright and reported pain with standing in buttock. Ambulation  Ambulation not tested on this date secondary to unable to tolerate due to pain and weakness .   Distance:   Gait Mechanics: Comments:    Stair Mobility  Stair mobility not completed on this date. Comments:  Wheelchair Mobility:  No w/c mobility completed on this date. Comments:  Balance  Static Sitting Balance: fair (-): maintains balance at CGA with use of UE support  Dynamic Sitting Balance: fair (-): maintains balance at CGA with use of UE support  Comments: Pt requires CGA progressing to Close Supervision. Pt. Sat x 10 minutes. Other Therapeutic Interventions  Pt. Is a bit anxious about falling and at first resistant to move to the chair. Pt was unable to isolate quad with quad set exercise. Patient given two exercises for shoulder including shoulder flexion x 10 and elbow flexion x 10. Functional Outcomes  AM-PAC Inpatient Mobility Raw Score : 8              Cognition  Overall Cognitive Status: WFL   Orientation:    alert and oriented x 4  Command Following:   WFL --sometimes requiring increased time or repetition due to language barrier     Education  Barriers To Learning: language  Patient Education: patient educated on goals, PT role and benefits, plan of care, precautions, functional mobility training, family education, discharge recommendations  Learning Assessment:  patient verbalizes understanding, would benefit from continued reinforcement    Assessment  Activity Tolerance: limited by pain and weakness  Impairments Requiring Therapeutic Intervention: decreased functional mobility, decreased ROM, decreased strength, decreased endurance, decreased balance  Prognosis: fair  Clinical Assessment: Pt. Tolerated sitting on the EOB and moving to the chair with the Maxi-Grant although needed encouragement. Was unable to stand from chair with multiple attempts and Max A of 3. Pt continues to present below functional baseline and would continue to benefit from skilled PT services to promote return to PLOF.    Safety Interventions: patient left in chair, chair alarm in place, call light within reach, patient at risk for falls, nurse notified, and family/caregiver present    Plan  Frequency: 3-5 x/per week  Current Treatment Recommendations: strengthening, ROM, balance training, functional mobility training, transfer training, gait training, endurance training, neuromuscular re-education, patient/caregiver education, home management training, and safety education    Goals  Patient Goals: to decrease pain and go home   Short Term Goals:  Time Frame: discharge   Patient will complete rolling at minimal assistance   Patient will complete sit <> supine at minimal assistance   Pt will tolerate 5 minutes of sitting EOB with SBA. Met 0/3 goals   Therapy Session Time      Individual Group Co-treatment   Time In     0220   Time Out     0300   Minutes     40       Timed Code Treatment Minutes:  40 Minutes  Total Treatment Minutes:  40 minutes      SINDHU Paulson  Electronically Signed By: I agree with the above note. PT directly observed the SPT with the patient.   Marco Antonio Elliott DPT [id number]

## 2022-10-25 NOTE — PROGRESS NOTES
Facility/Department: [facility]  Speech Language Pathology   Dysphagia Treatment Note    Patient: Trent Patel   : 1940   MRN: [id number]      Evaluation Date: 10/25/2022      Admitting Dx: Shoulder pain [M25.519]  Acute chest pain [R07.9]  History of coronary artery disease [Z86.79]  Chronic congestive heart failure, unspecified heart failure type ([address] [I50.9]  Treatment Diagnosis: Oropharyngeal Dysphagia   Pain: Pt did not report pain    Diet and Treatment Recommendations 10/25/2022:  Diet Solids Recommendation:  Dysphagia III Soft and bite sized  Liquid Consistency Recommendation: Thin liquids, No straws  Recommended form of Meds: Meds crushed as able in puree    Downgrade to NPO for overt clinical s/s aspiration or decline in respiratory status    Compensatory strategies:   Effortful Swallows , Upright as possible with all PO intake , No straws , Assist Feed , External Pacing , Small bites/sips , Eat/feed slowly, Remain upright 30-45 min     Assessment of Texture Tolerance:  Diet level prior to treatment: Dysphagia III Soft and Bite-Sized with thin liquids, no straws, medications crushed as able   Tolerance of Current Diet Level: Per chart review, no noted difficulty with current diet. Pt's family reported good tolerance of lunch meal this date. Impressions: Pt was positioned upright in bed, awake, alert and cooperative. Pt's family was present throughout the session and provides translation assistance. Pt currently on RA. Trials of thin liquids and soft solids were provided. Mildly prolonged however functional mastication noted with soft solids, good oral clearing achieved. Given thin liquids via cup, delayed swallow initiation was noted however no overt clinical s/s of aspiration were assessed. Pt demonstrated improved ability to self-feed this date. Overall, she continues to demonstrate increased risk for aspiration due to co morbidities , deconditioning , and respiratory status .  Based on today's assessment recommend continuation of the Dysphagia III Soft and bite sized  with Thin liquids, No straws  and aspiration precautions. Dysphagia Goals:   Pt will functionally tolerate ongoing assessment of swallow function with diet to be determined as indicated. (ongoing 10/25/2022)   Pt will advance to least restrictive diet as indicated. (ongoing 10/25/2022)  If clinical s/s of aspiration/penetration continue to be noted, Pt will participate in Modified Barium Swallow Study (ongoing 10/25/2022)    Plan:   3-5 times per week during acute care stay. Patient/Family Education:  Provided education regarding role of SLP, recommendations and general speech pathology plan of care. [x] Pt verbalized understanding and agreement   [x] Pt requires ongoing learning   [] No evidence of comprehension     Discharge Recommendations:    Discharge recommendations to be determined pending ongoing follow-up during acute care stay    Treatment time:  Timed Code Treatment Minutes: 0 minutes   Total Treatment time: 13 minutes     If patient discharges prior to next session this note will serve as a discharge summary.      Amy Marshall M.A., [address] [place] [address]  Speech-Language Pathologist

## 2022-10-25 NOTE — PROGRESS NOTES
[facility] [address]   Phone: [phone]    Occupational Therapy    [] Initial Evaluation            [x] Daily Treatment Note         [] Discharge Summary      Patient: Trent Patel   : 1940   MRN: [id number]   Date of Service:  10/25/2022    Admitting Diagnosis:  Acute hypoxemic respiratory failure [facility])  Current Admission Summary:  Trent Patel is a 78 y.o. female who presented to [facility] today from her SNF complaining of severe chest shoulder and neck pain. She denies any recent trauma. She was just discharged to a SNF 3 days ago. She has been having left-sided shoulder wrist and thumb pain during that admission and was treated with oral steroids as well as a CMC brace. She tells me that the nursing aides have been lifting her by putting her arms under her shoulders which seems to be aggravating her shoulder pain. She has known bilateral shoulder rotator cuff arthropathy. She has tried injections in the past with limited benefit. She does have peripheral neuropathy and reports her numbness/tingling of her right greater than left hands are at baseline. She describes moderate pain extending from base of her neck into her left shoulder and upper arm rated 6/10. She is much more comfortable since receiving morphine. 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 Imaging review of the left shoulder via chest CT this admission demonstrated: No acute fracture or malalignment, there is cystic change of the greater tuberosity consistent with prior rotator cuff disease. Moderate AC joint arthritis noted. Multilevel cervical degenerative disc disease noted.   Past Medical History:  has a past medical history of 15 Left knee repeat repair of median parapatellar arthrotomy with augmentation, Arthritis, Aspiration pneumonia of left lower lobe due to gastric secretions [facility]), Atrial fibrillation ([address] CAD (coronary artery disease), Cataract, Chronic diastolic congestive heart failure ([facility] Diabetes mellitus ([facility] GERD (gastroesophageal reflux disease), Hyperlipidemia, Hypertension, HYPOTHRYROIDISM, Myelodysplastic disease ([facility] Non-English speaking patient, Sleep apnea, Thyroid disease, and UTI. Past Surgical History:  has a past surgical history that includes joint replacement (Bilateral, [address]); Cardiac surgery (2004); Cholecystectomy, laparoscopic (5/15/14); Revision total knee arthroplasty (Right, 11/22/2016); Total knee arthroplasty; knee surgery (Left, 02/28/2017); CT BIOPSY BONE MARROW (9/29/2022); Upper gastrointestinal endoscopy (N/A, 10/3/2022); and Upper gastrointestinal endoscopy (N/A, 10/3/2022). Discharge Recommendations: Jimmy Kelsey scored a 12/24 on the AM-PAC ADL Inpatient form. Current research shows that an AM-PAC score of 17 or less is typically not associated with a discharge to the patient's home setting. Based on the patient's AM-PAC score and their current ADL deficits, it is recommended that the patient have 3-5 sessions per week of Occupational Therapy at d/c to increase the patient's independence. Please see assessment section for further patient specific details. If patient discharges prior to next session this note will serve as a discharge summary. Please see below for the latest assessment towards goals.      DME Required For Discharge: DME to be determined at next level of care    Precautions/Restrictions: high fall risk, up as tolerated, Pain in L shoulder    Pre-Admission Information   Lives With: alone                     Type of Home: house  Home Layout: one level  Home Access: ramped entry  Bathroom Layout: walker accessible, walk in shower  Bathroom Equipment: grab bars in shower, shower chair  Toilet Height: standard height  Home Equipment: rolling walker, rollator - 4 wheeled walker, manual wheelchair, electric wheelchair  Transfer Assistance: modified independent with use of RW  Ambulation Assistance:modified independent with use of manual w/c, RW for short distances   ADL Assistance: requires assistance with bathing, requires assistance with dressing  IADL Assistance: independent with homemaking tasks home health aide 4 hours/day   Active :        [] Yes                 [x] No  Hand Dominance: [] Left                 [x] Right  Current Employment: retired. Occupation:    Recent Falls: 1 slip out of wheelchair   *cameras present at home for children to monitor    Subjective  General: Pt supine in bed upon entry and agreeable for session. RN approval prior to session. Dtr's present throughout for translation. Family members/ visiting pt for prayer/blessing. Cotx with PT to maximize function and safety. Pain: Pain rating taken based on observed faces and behaviors  Pain Interventions: pain medication in place prior to arrival, RN notified, and pt able to continue with session. Activities of Daily Living  Basic Activities of Daily Living  Lower Extremity Dressing: dependent  Dressing Comments: donning socks  Toileting: dependent. Toileting Comments: incontinent of urine in purewick  General Comments: declining all other ADLs  Functional Mobility  Bed Mobility  Supine to Sit: 2 person assistance with max Ax2   Rolling Left: 2 person assistance with maxAx2   Scooting: dependent assistance  Comments: HOB partially elevated  Transfers  Bed / Chair transfer: dependent assistance, maxisky. Bed / Chair comments: pt c/o of discomfort in buttocks throughout  Comments:sat EOB x ~5-7 minutes, jey lift utilized for safety (pt fearful, states she can't stand)  Functional Mobility:  Sitting Balance: stand by assistance, contact guard assistance. No functional mobility completed on this date secondary to inability to fully stand.  With patient transferred to chair via [facility] LIFT, 4-5 reps forward flexion and lifting buttocks from chair (patient able to partially clear buttocks, unable to fully stand with maxA of 2, B knees blocked (patient unable to extend at trunk to stand, remained in flexion, trialed pushing from arms of chair, from chair placed in front of patient, from therapist's forearms in front of patient with similar results)  Functional Outcomes  AM-PAC Inpatient Daily Activity Raw Score: 12    Cognition  Overall Cognitive Status: Impaired  Arousal/Alterness: appropriate responses to stimuli  Following Commands: follows one step commands with repetition, follows one step commands with increased time, inconsistently follows commands  Attention Span: attends with cues to redirect  Insights: decreased awareness of deficits  Initiation: requires cues for all  Sequencing: requires cues for all  Comments: anxious  Orientation:    alert and oriented x 4  Command Following:   impaired     Education  Barriers To Learning: Language/Anxiety  Patient Education: patient educated on goals, OT role and benefits, plan of care, transfer training, discharge recommendations  --increased time required in order to address all pt/family questions regarding rehab potential and POC. Learning Assessment:  patient verbalizes understanding, would benefit from continued reinforcement    Assessment  Activity Tolerance: Primarily limited by pain  Impairments Requiring Therapeutic Intervention: decreased functional mobility, decreased ADL status, decreased ROM, decreased strength, decreased endurance, decreased balance, increased pain  Prognosis: fair  Clinical Assessment: Pt presents with the above deficits impacting daily occupational performance and would benefit from continued skilled OT services.    Safety Interventions: patient left in chair, chair alarm in place, call light within reach, nurse notified, and family/caregiver present    Plan  Frequency: 3-5 x/per week  Current Treatment Recommendations: strengthening, ROM, balance training, functional mobility training, transfer training, gait training, endurance training, manual therapy - soft tissue massage, ADL/self-care training, pain management, home exercise program, safety education, and positioning    Goals  Patient Goals: Return to home   Short Term Goals:  Time Frame: Discharge  Patient will complete upper body ADL while sitting EOB for 2-4 min with Max A  Patient will complete toileting Max A x2 at [address] MET 10/25/2022.     Therapy Session Time     Individual Group Co-treatment   Time In    2676   Time Out    1458   Minutes    38         Timed Code Treatment Minutes: 38 Minutes    Total Treatment Minutes:  38 minutes       Electronically Signed By:Angeles Edwards OTR/L [id number]

## 2022-10-26 VITALS
HEIGHT: 60 IN | WEIGHT: 244.8 LBS | SYSTOLIC BLOOD PRESSURE: 102 MMHG | OXYGEN SATURATION: 96 % | RESPIRATION RATE: 14 BRPM | TEMPERATURE: 98.7 F | HEART RATE: 71 BPM | DIASTOLIC BLOOD PRESSURE: 65 MMHG | BODY MASS INDEX: 48.06 KG/M2

## 2022-10-26 LAB
ANION GAP SERPL CALCULATED.3IONS-SCNC: 10 MMOL/L (ref 3–16)
ANISOCYTOSIS: ABNORMAL
BANDED NEUTROPHILS RELATIVE PERCENT: 3 % (ref 0–7)
BASOPHILS ABSOLUTE: 0 K/UL (ref 0–0.2)
BASOPHILS RELATIVE PERCENT: 0 %
BUN BLDV-MCNC: 30 MG/DL (ref 7–20)
CALCIUM SERPL-MCNC: 8.4 MG/DL (ref 8.3–10.6)
CHLORIDE BLD-SCNC: 108 MMOL/L (ref 99–110)
CO2: 26 MMOL/L (ref 21–32)
CREAT SERPL-MCNC: 0.8 MG/DL (ref 0.6–1.2)
EOSINOPHILS ABSOLUTE: 0 K/UL (ref 0–0.6)
EOSINOPHILS RELATIVE PERCENT: 0 %
GFR SERPL CREATININE-BSD FRML MDRD: >60 ML/MIN/{1.73_M2}
GLUCOSE BLD-MCNC: 102 MG/DL (ref 70–99)
GLUCOSE BLD-MCNC: 82 MG/DL (ref 70–99)
GLUCOSE BLD-MCNC: 86 MG/DL (ref 70–99)
HCT VFR BLD CALC: 22.2 % (ref 36–48)
HEMATOLOGY PATH CONSULT: NO
HEMOGLOBIN: 7.2 G/DL (ref 12–16)
HYPOCHROMIA: ABNORMAL
LYMPHOCYTES ABSOLUTE: 6.8 K/UL (ref 1–5.1)
LYMPHOCYTES RELATIVE PERCENT: 58 %
MACROCYTES: ABNORMAL
MCH RBC QN AUTO: 31.2 PG (ref 26–34)
MCHC RBC AUTO-ENTMCNC: 32.6 G/DL (ref 31–36)
MCV RBC AUTO: 95.7 FL (ref 80–100)
MONOCYTES ABSOLUTE: 1.2 K/UL (ref 0–1.3)
MONOCYTES RELATIVE PERCENT: 10 %
MYELOCYTE PERCENT: 1 %
NEUTROPHILS ABSOLUTE: 3.8 K/UL (ref 1.7–7.7)
NEUTROPHILS RELATIVE PERCENT: 28 %
NUCLEATED RED BLOOD CELLS: 1 /100 WBC
OVALOCYTES: ABNORMAL
PDW BLD-RTO: 19.7 % (ref 12.4–15.4)
PERFORMED ON: ABNORMAL
PERFORMED ON: NORMAL
PLATELET # BLD: 34 K/UL (ref 135–450)
PMV BLD AUTO: 10.5 FL (ref 5–10.5)
POLYCHROMASIA: ABNORMAL
POTASSIUM REFLEX MAGNESIUM: 4.1 MMOL/L (ref 3.5–5.1)
RBC # BLD: 2.32 M/UL (ref 4–5.2)
SARS-COV-2, NAAT: NOT DETECTED
SMUDGE CELLS: PRESENT
SODIUM BLD-SCNC: 144 MMOL/L (ref 136–145)
WBC # BLD: 11.8 K/UL (ref 4–11)

## 2022-10-26 PROCEDURE — 36415 COLL VENOUS BLD VENIPUNCTURE: CPT

## 2022-10-26 PROCEDURE — 6370000000 HC RX 637 (ALT 250 FOR IP): Performed by: INTERNAL MEDICINE

## 2022-10-26 PROCEDURE — 6370000000 HC RX 637 (ALT 250 FOR IP): Performed by: NURSE PRACTITIONER

## 2022-10-26 PROCEDURE — 6370000000 HC RX 637 (ALT 250 FOR IP): Performed by: STUDENT IN AN ORGANIZED HEALTH CARE EDUCATION/TRAINING PROGRAM

## 2022-10-26 PROCEDURE — 6360000002 HC RX W HCPCS: Performed by: INTERNAL MEDICINE

## 2022-10-26 PROCEDURE — 85025 COMPLETE CBC W/AUTO DIFF WBC: CPT

## 2022-10-26 PROCEDURE — 87635 SARS-COV-2 COVID-19 AMP PRB: CPT

## 2022-10-26 PROCEDURE — 2580000003 HC RX 258: Performed by: INTERNAL MEDICINE

## 2022-10-26 PROCEDURE — 80048 BASIC METABOLIC PNL TOTAL CA: CPT

## 2022-10-26 PROCEDURE — 94640 AIRWAY INHALATION TREATMENT: CPT

## 2022-10-26 PROCEDURE — C9113 INJ PANTOPRAZOLE SODIUM, VIA: HCPCS | Performed by: INTERNAL MEDICINE

## 2022-10-26 RX ADMIN — Medication 10 ML: at 09:59

## 2022-10-26 RX ADMIN — GABAPENTIN 100 MG: 100 CAPSULE ORAL at 09:57

## 2022-10-26 RX ADMIN — HYDROCORTISONE: 25 CREAM TOPICAL at 09:59

## 2022-10-26 RX ADMIN — INSULIN GLARGINE 25 UNITS: 100 INJECTION, SOLUTION SUBCUTANEOUS at 10:00

## 2022-10-26 RX ADMIN — LEVOTHYROXINE SODIUM 150 MCG: 150 TABLET ORAL at 10:46

## 2022-10-26 RX ADMIN — DIGOXIN 125 MCG: 125 TABLET ORAL at 09:58

## 2022-10-26 RX ADMIN — ACETAMINOPHEN 650 MG: 325 TABLET ORAL at 09:58

## 2022-10-26 RX ADMIN — INSULIN LISPRO 6 UNITS: 100 INJECTION, SOLUTION INTRAVENOUS; SUBCUTANEOUS at 10:00

## 2022-10-26 RX ADMIN — Medication 10 ML: at 09:58

## 2022-10-26 RX ADMIN — NAPROXEN 250 MG: 250 TABLET ORAL at 09:57

## 2022-10-26 RX ADMIN — IPRATROPIUM BROMIDE AND ALBUTEROL SULFATE 1 AMPULE: .5; 2.5 SOLUTION RESPIRATORY (INHALATION) at 02:39

## 2022-10-26 RX ADMIN — BENZONATATE 100 MG: 100 CAPSULE ORAL at 10:46

## 2022-10-26 RX ADMIN — METHYLPREDNISOLONE SODIUM SUCCINATE 20 MG: 40 INJECTION, POWDER, FOR SOLUTION INTRAMUSCULAR; INTRAVENOUS at 09:57

## 2022-10-26 RX ADMIN — METOPROLOL TARTRATE 12.5 MG: 25 TABLET, FILM COATED ORAL at 09:58

## 2022-10-26 RX ADMIN — GUAIFENESIN 600 MG: 600 TABLET, EXTENDED RELEASE ORAL at 10:46

## 2022-10-26 RX ADMIN — PANTOPRAZOLE SODIUM 40 MG: 40 INJECTION, POWDER, FOR SOLUTION INTRAVENOUS at 09:57

## 2022-10-26 ASSESSMENT — PAIN SCALES - GENERAL: PAINLEVEL_OUTOF10: 8

## 2022-10-26 ASSESSMENT — PAIN DESCRIPTION - LOCATION: LOCATION: CHEST;BUTTOCKS

## 2022-10-26 ASSESSMENT — PAIN DESCRIPTION - FREQUENCY: FREQUENCY: CONTINUOUS

## 2022-10-26 NOTE — PROGRESS NOTES
She will      Hospitalist Progress Note    Name:  Rachel Lerner    /Age/Sex: 1940  (78 y.o. female)  MRN & CSN:  [id number] & [id number]    PCP: No primary care provider on file. Date of Admission: 10/10/2022    Patient Status:  Inpatient     Chief Complaint: Shoulder pain    Hospital Course: 40-year-old female with past medical history of hyperlipidemia, hypertension, GERD, type 2 diabetes, CAD, atrial fibrillation. Recently diagnosed with mild dysplastic syndrome. Was transferred to the ICU for worsening respiratory distress on 10/17/2022. There was a question if patient had aspirated given her disposition. Patient has been improving on nasal cannula oxygen. Palliative care following. Subjective: Today is:  Hospital Day: 16.  Patient seen and examined in [id number].    Shoulder pain improved no fever or chills    Medications:  Reviewed    Infusion Medications    sodium chloride 100 mL/hr at 10/24/22 0607    dextrose      sodium chloride      sodium chloride      sodium chloride      sodium chloride 100 mL/hr at 10/24/22 0300    dextrose       Scheduled Medications    naproxen  250 mg Oral TID WC    insulin lispro  6 Units SubCUTAneous TID WC    sodium chloride flush  5-40 mL IntraVENous 2 times per day    pantoprazole  40 mg IntraVENous BID    methylPREDNISolone  20 mg IntraVENous Daily    gabapentin  100 mg Oral TID    insulin lispro  0-16 Units SubCUTAneous TID WC    [Held by provider] insulin lispro  0-4 Units SubCUTAneous Nightly    insulin glargine  25 Units SubCUTAneous BID    digoxin  125 mcg Oral Daily    lidocaine  1 patch TransDERmal Daily    metoprolol tartrate  12.5 mg Oral BID    acetaminophen  650 mg Oral TID    atorvastatin  80 mg Oral Nightly    hydrocortisone   Rectal BID    levothyroxine  150 mcg Oral Daily    sodium chloride flush  5-40 mL IntraVENous 2 times per day     PRN Meds: sodium chloride flush, sodium chloride, calcium carbonate, guaiFENesin, glucose, dextrose bolus **OR** dextrose bolus, glucagon (rDNA), dextrose, melatonin, sodium chloride, sodium chloride, ipratropium-albuterol, lidocaine viscous hcl, benzonatate, sodium chloride, sodium chloride flush, sodium chloride, ondansetron **OR** ondansetron, polyethylene glycol, acetaminophen **OR** acetaminophen, dextrose bolus **OR** dextrose bolus, glucagon (rDNA), dextrose      Intake/Output Summary (Last 24 hours) at 10/26/2022 1148  Last data filed at 10/25/2022 2057  Gross per 24 hour   Intake 20 ml   Output 1400 ml   Net -1380 ml         Physical Exam Performed:    BP (!) 141/70   Pulse 88   Temp 97.9 °F (36.6 °C) (Oral)   Resp 14   Ht 5' (1.524 m)   Wt 244 lb 12.8 oz (111 kg)   LMP  (LMP Unknown)   SpO2 94%   BMI 47.81 kg/m²     General appearance: aaox3  HEENT: Pupils equal, round, and reactive to light. Conjunctivae/corneas clear. Neck: Supple, with full range of motion. No jugular venous distention. Trachea midline. Respiratory: no rhonchi or wheezing  Cardiovascular: Regular rate and rhythm with normal S1/S2 without murmur  Abdomen: Soft, non-tender, non-distended with normal bowel sounds. .  Skin: Skin color, texture, turgor normal.  No rashes or lesions. Neurologic:  no gross deficits       Labs:   Recent Labs     10/24/22  0651 10/25/22  0423 10/26/22  0416   WBC 13.7* 13.1* 11.8*   HGB 7.4* 7.3* 7.2*   HCT 23.4* 22.8* 22.2*   PLT 24* 28* 34*       Recent Labs     10/24/22  0651 10/25/22  0423 10/26/22  0416   * 143 144   K 4.7 4.7 4.1   * 110 108   CO2 25 26 26   BUN 36* 35* 30*   CREATININE 0.9 0.8 0.8   CALCIUM 8.8 8.8 8.4       No results for input(s): AST, ALT, BILIDIR, BILITOT, ALKPHOS in the last 72 hours. No results for input(s): INR in the last 72 hours. No results for input(s): [place] Knock in the last 72 hours.     Urinalysis:      Lab Results   Component Value Date/Time    NITRU Negative 10/17/2022 07:24 PM    WBCUA 19 10/17/2022 07:24 PM    BACTERIA None Seen 10/17/2022 07:24 PM    RBCUA 275 10/17/2022 07:24 PM    BLOODU LARGE 10/17/2022 07:24 PM    SPECGRAV 1.020 10/17/2022 07:24 PM    GLUCOSEU 250 10/17/2022 07:24 PM    GLUCOSEU NEGATIVE 03/09/2012 06:51 AM       Radiology:  XR CHEST PORTABLE   Final Result   No focal lung consolidation. XR CHEST PORTABLE   Final Result   Mild cardiomegaly. Diffuse haziness of the left lung be due to atypical   infection or edema. XR CHEST PORTABLE   Final Result   1. Similar-appearing prominence of the cardiac silhouette with associated   vascular congestion. 2. No focal consolidation. CT HEAD WO CONTRAST   Final Result   1. No acute intracranial abnormality. 2. Cerebral and cerebellar parenchymal volume loss with chronic microvascular   white matter ischemic disease. CT CHEST PULMONARY EMBOLISM W CONTRAST   Final Result   No evidence of pulmonary embolism or acute pulmonary abnormality. XR CHEST PORTABLE   Final Result   No radiographic evidence of acute pulmonary disease.                  Assessment/Plan:    Active Hospital Problems    Diagnosis     History of coronary artery disease [Z86.79]      Priority: Medium    Pulmonary vascular congestion [R09.89]      Priority: Medium    Acute hypoxemic respiratory failure (HCC) [J96.01]      Priority: Medium    Aspiration pneumonia of left lower lobe due to gastric secretions ([facility] [address] [J69.0]      Priority: Medium    Hypervolemia [E87.70]      Priority: Medium    MDS (myelodysplastic syndrome) ([facility] [address] [D46.9]      Priority: Medium    Chronic systolic heart failure (HCC) [I50.22]      Priority: Medium    Arterial hypotension [I95.9]      Priority: Medium    Shoulder pain [M25.519]      Priority: Medium    Severe anemia [D64.9]      Priority: Medium    Severe thrombocytopenia (HCC) [D69.6]      Priority: Medium    Acute diastolic heart failure (HCC) [I50.31]      Priority: Medium    Type 2 diabetes mellitus ([facility] [E11.9]     Acute chest pain [R07.9] Acute renal failure [facility]) [N17.9]          Hospital Day: 16    This is a 78 y.o. female who presented to [facility] on 10[facility]10/2022 and is being treated for:    Acute hypoxic respiratory failure  -Likely 2/2 pneumonia and CHF  -Wean oxygen as tolerated  -Mucinex for cough  -Pulmonology consulted; appreciate recommendations  -Palliative care consulted; appreciate recommendations    Pneumonia  - finiish atbx today    MDS  -Continue with transfusions as needed  -Daily CBC  -Hematology consulted; appreciate recommendations    ARUN  -Creatinine peaked at 1.9; baseline 1.0  -Downtrending  -Avoid nephrotoxins    CAD  -Statin    Afib  -Continue metoprolol  -Not a candidate for anticoagulation  -Digoxin per cardiology  -Cardiology consulted; appreciate recommendations    Type 2 diabetes  - monitor and titrate insulin today    Shoulder and chest pain : Start naproxen check bmp in am    DVT ppx: Contraindicated  GI ppx: Diet/Tube Feeds  Diet: ADULT DIET; Dysphagia - Soft and Bite Sized; No Drinking Straws  Code Status: DNR-CCA          PT/OT Eval Status: Ordered          Please note that some part of this chart was generated using Dragon dictation software. Although every effort was made to ensure the accuracy of this automated transcription, some errors in transcription may have occurred inadvertently. If you may need any clarification, please do not hesitate to contact me through [facility].

## 2022-10-26 NOTE — PLAN OF CARE
Problem: Hematologic - Adult  Goal: Maintains hematologic stability  Outcome: Progressing     Vitals:    10/25/22 1947   BP: 130/61   Pulse: 86   Resp: 20   Temp: 98.3 °F (36.8 °C)   SpO2: 95%     VSS at shift change. Gave mucinex, tessalon, and melatonin now per pt request. See [facility] & all flowsheets. Will continue to monitor.  Tylenol can be given after 2140

## 2022-10-26 NOTE — PLAN OF CARE
Problem: Discharge Planning  Goal: Discharge to home or other facility with appropriate resources  Outcome: Progressing     Problem: Pain  Goal: Verbalizes/displays adequate comfort level or baseline comfort level  Outcome: Progressing  Note: No pain reported, naproxen seeming to help     Problem: Skin/Tissue Integrity  Goal: Absence of new skin breakdown  Description: 1. Monitor for areas of redness and/or skin breakdown  2. Assess vascular access sites hourly  3. Every 4-6 hours minimum:  Change oxygen saturation probe site  4. Every 4-6 hours:  If on nasal continuous positive airway pressure, respiratory therapy assess nares and determine need for appliance change or resting period.   Outcome: Progressing  Note: Patient bathed and dressed and ready to [facility] to [facility]     Problem: Safety - Adult  Goal: Free from fall injury  Outcome: Progressing     Problem: ABCDS Injury Assessment  Goal: Absence of physical injury  Outcome: Progressing     Problem: Chronic Conditions and Co-morbidities  Goal: Patient's chronic conditions and co-morbidity symptoms are monitored and maintained or improved  Outcome: Progressing     Problem: Nutrition Deficit:  Goal: Optimize nutritional status  Outcome: Progressing     Problem: Hematologic - Adult  Goal: Maintains hematologic stability  Outcome: Progressing

## 2022-10-26 NOTE — PLAN OF CARE
Problem: Hematologic - Adult  Goal: Maintains hematologic stability  Outcome: Progressing     Vitals:    10/26/22 0229   BP: (!) 156/68   Pulse: 81   Resp: 20   Temp: 97.8 °F (36.6 °C)   SpO2: 96%     Pt was asleep, awake with RN at bedside. VSS with /68. SOB noted. Called RT for PRN HHN; Gt to come give PRN. See [facility] & all flowsheets. Will continue to monitor.

## 2022-10-26 NOTE — CARE COORDINATION
Notified patient's daughter of Yvonne Jenkins decision, and issued the [facility]. Plan discharge  TO [address] [place] [address] 12:15PM.... [facility]  [address]   Priscila Mayen [address]  Phone: [phone]  Fax:  [phone] ([facility]) [phone] Griselda Mike

## 2022-10-26 NOTE — PLAN OF CARE
Problem: Discharge Planning  Goal: Discharge to home or other facility with appropriate resources  10/26/2022 1131 by Jordana Keene RN  Outcome: Completed  10/26/2022 1129 by Jordana Keene RN  Outcome: Progressing     Problem: Pain  Goal: Verbalizes/displays adequate comfort level or baseline comfort level  10/26/2022 1131 by Jordana Keene RN  Outcome: Completed  10/26/2022 1129 by Jordana Keene RN  Outcome: Progressing  Note: No pain reported, naproxen seeming to help     Problem: Skin/Tissue Integrity  Goal: Absence of new skin breakdown  Description: 1. Monitor for areas of redness and/or skin breakdown  2. Assess vascular access sites hourly  3. Every 4-6 hours minimum:  Change oxygen saturation probe site  4. Every 4-6 hours:  If on nasal continuous positive airway pressure, respiratory therapy assess nares and determine need for appliance change or resting period.   10/26/2022 1131 by Jordana Keene RN  Outcome: Completed  10/26/2022 1129 by Jordana Keene RN  Outcome: Progressing  Note: Patient bathed and dressed and ready to go to [facility]     Problem: Safety - Adult  Goal: Free from fall injury  10/26/2022 1131 by Jordana Keene RN  Outcome: Completed  10/26/2022 1129 by Jordana Keene RN  Outcome: Progressing     Problem: ABCDS Injury Assessment  Goal: Absence of physical injury  10/26/2022 1131 by Jordana Keene RN  Outcome: Completed  10/26/2022 1129 by Jordana Keene RN  Outcome: Progressing     Problem: ABCDS Injury Assessment  Goal: Absence of physical injury  10/26/2022 1131 by Jordana Keene RN  Outcome: Completed  10/26/2022 1129 by Jordana Keene RN  Outcome: Progressing     Problem: Chronic Conditions and Co-morbidities  Goal: Patient's chronic conditions and co-morbidity symptoms are monitored and maintained or improved  10/26/2022 1131 by Jordana Keene RN  Outcome: Completed  10/26/2022 1129 by Sammy Kirby RN  Outcome: Progressing     Problem: Nutrition Deficit:  Goal: Optimize nutritional status  10/26/2022 1131 by Sammy Kirby RN  Outcome: Completed  10/26/2022 1129 by Sammy Kirby RN  Outcome: Progressing     Problem: Hematologic - Adult  Goal: Maintains hematologic stability  10/26/2022 1131 by Sammy Kirby RN  Outcome: Completed  10/26/2022 1129 by Sammy Kirby RN  Outcome: Progressing

## 2022-10-27 ENCOUNTER — CLINICAL DOCUMENTATION ONLY (OUTPATIENT)
Facility: CLINIC | Age: 82
End: 2022-10-27

## 2022-10-27 PROBLEM — R79.89 ELEVATED TROPONIN: Status: RESOLVED | Noted: 2022-09-27 | Resolved: 2022-10-27

## 2022-10-27 PROBLEM — R77.8 ELEVATED TROPONIN: Status: RESOLVED | Noted: 2022-09-27 | Resolved: 2022-10-27

## 2022-10-31 ENCOUNTER — TELEPHONE (OUTPATIENT)
Dept: CARDIOLOGY CLINIC | Age: 82
End: 2022-10-31

## 2022-10-31 NOTE — TELEPHONE ENCOUNTER
Diagnosed with MDS and following with hematology  OV scheduled with [facility] later this week, however he has not seen pt since 2020    This was managed by EP in the hospital

## 2022-10-31 NOTE — TELEPHONE ENCOUNTER
Looks like she has been on xarelto since 2017. Stopped at last hospital admission. Patient is not a candidate AC and not a candidate for EP intervention per note. EP signed off. No planned follow up with EP at this time. Has follow up with   on Friday. I called and spoke with Brandon Gallegos at [facility] and updated.

## 2022-11-02 ENCOUNTER — HOSPITAL ENCOUNTER (OUTPATIENT)
Age: 82
Discharge: HOME | End: 2022-11-02
Attending: INTERNAL MEDICINE | Admitting: INTERNAL MEDICINE
Payer: MEDICARE

## 2022-11-02 ENCOUNTER — APPOINTMENT (OUTPATIENT)
Dept: GENERAL RADIOLOGY | Age: 82
End: 2022-11-02
Attending: INTERNAL MEDICINE
Payer: MEDICARE

## 2022-11-02 VITALS
DIASTOLIC BLOOD PRESSURE: 64 MMHG | SYSTOLIC BLOOD PRESSURE: 123 MMHG | OXYGEN SATURATION: 94 % | RESPIRATION RATE: 16 BRPM | TEMPERATURE: 98.6 F | HEART RATE: 97 BPM

## 2022-11-02 DIAGNOSIS — D64.9 ANEMIA, UNSPECIFIED TYPE: Primary | ICD-10-CM

## 2022-11-02 LAB
ABO/RH: NORMAL
ANION GAP SERPL CALCULATED.3IONS-SCNC: 11 MMOL/L (ref 3–16)
ANTIBODY SCREEN: NORMAL
BLOOD BANK DISPENSE STATUS: NORMAL
BLOOD BANK PRODUCT CODE: NORMAL
BPU ID: NORMAL
BUN BLDV-MCNC: 34 MG/DL (ref 7–20)
CALCIUM SERPL-MCNC: 8.6 MG/DL (ref 8.3–10.6)
CHLORIDE BLD-SCNC: 107 MMOL/L (ref 99–110)
CO2: 25 MMOL/L (ref 21–32)
CREAT SERPL-MCNC: 0.9 MG/DL (ref 0.6–1.2)
DESCRIPTION BLOOD BANK: NORMAL
GFR SERPL CREATININE-BSD FRML MDRD: >60 ML/MIN/{1.73_M2}
GLUCOSE BLD-MCNC: 164 MG/DL (ref 70–99)
HCT VFR BLD CALC: 22.9 % (ref 36–48)
HEMOGLOBIN: 7.5 G/DL (ref 12–16)
INR BLD: 1.32 (ref 0.87–1.14)
PLATELET # BLD: 56 K/UL (ref 135–450)
POTASSIUM SERPL-SCNC: 4.4 MMOL/L (ref 3.5–5.1)
PROTHROMBIN TIME: 16.3 SEC (ref 11.7–14.5)
SODIUM BLD-SCNC: 143 MMOL/L (ref 136–145)

## 2022-11-02 PROCEDURE — 85018 HEMOGLOBIN: CPT

## 2022-11-02 PROCEDURE — 71045 X-RAY EXAM CHEST 1 VIEW: CPT

## 2022-11-02 PROCEDURE — 36569 INSJ PICC 5 YR+ W/O IMAGING: CPT

## 2022-11-02 PROCEDURE — 85610 PROTHROMBIN TIME: CPT

## 2022-11-02 PROCEDURE — 36430 TRANSFUSION BLD/BLD COMPNT: CPT

## 2022-11-02 PROCEDURE — P9016 RBC LEUKOCYTES REDUCED: HCPCS

## 2022-11-02 PROCEDURE — 86850 RBC ANTIBODY SCREEN: CPT

## 2022-11-02 PROCEDURE — 36415 COLL VENOUS BLD VENIPUNCTURE: CPT

## 2022-11-02 PROCEDURE — 86901 BLOOD TYPING SEROLOGIC RH(D): CPT

## 2022-11-02 PROCEDURE — 85049 AUTOMATED PLATELET COUNT: CPT

## 2022-11-02 PROCEDURE — 85014 HEMATOCRIT: CPT

## 2022-11-02 PROCEDURE — 6370000000 HC RX 637 (ALT 250 FOR IP)

## 2022-11-02 PROCEDURE — C1751 CATH, INF, PER/CENT/MIDLINE: HCPCS

## 2022-11-02 PROCEDURE — 86923 COMPATIBILITY TEST ELECTRIC: CPT

## 2022-11-02 PROCEDURE — 86900 BLOOD TYPING SEROLOGIC ABO: CPT

## 2022-11-02 PROCEDURE — 80048 BASIC METABOLIC PNL TOTAL CA: CPT

## 2022-11-02 RX ORDER — SODIUM CHLORIDE 9 MG/ML
25 INJECTION, SOLUTION INTRAVENOUS PRN
Status: DISCONTINUED | OUTPATIENT
Start: 2022-11-02 | End: 2022-11-03 | Stop reason: HOSPADM

## 2022-11-02 RX ORDER — SODIUM CHLORIDE 0.9 % (FLUSH) 0.9 %
5-40 SYRINGE (ML) INJECTION EVERY 12 HOURS SCHEDULED
Status: DISCONTINUED | OUTPATIENT
Start: 2022-11-02 | End: 2022-11-02

## 2022-11-02 RX ORDER — SODIUM CHLORIDE 0.9 % (FLUSH) 0.9 %
5-40 SYRINGE (ML) INJECTION PRN
Status: DISCONTINUED | OUTPATIENT
Start: 2022-11-02 | End: 2022-11-03 | Stop reason: HOSPADM

## 2022-11-02 RX ORDER — ACETAMINOPHEN 325 MG/1
650 TABLET ORAL SEE ADMIN INSTRUCTIONS
Status: COMPLETED | OUTPATIENT
Start: 2022-11-02 | End: 2022-11-02

## 2022-11-02 RX ORDER — DIPHENHYDRAMINE HCL 25 MG
25 TABLET ORAL SEE ADMIN INSTRUCTIONS
Status: DISCONTINUED | OUTPATIENT
Start: 2022-11-02 | End: 2022-11-02 | Stop reason: HOSPADM

## 2022-11-02 RX ORDER — LIDOCAINE HYDROCHLORIDE 10 MG/ML
5 INJECTION, SOLUTION EPIDURAL; INFILTRATION; INTRACAUDAL; PERINEURAL ONCE
Status: DISCONTINUED | OUTPATIENT
Start: 2022-11-02 | End: 2022-11-02

## 2022-11-02 RX ORDER — DIPHENHYDRAMINE HCL 25 MG
25 TABLET ORAL SEE ADMIN INSTRUCTIONS
Status: DISCONTINUED | OUTPATIENT
Start: 2022-11-02 | End: 2022-11-03 | Stop reason: HOSPADM

## 2022-11-02 RX ORDER — SODIUM CHLORIDE 9 MG/ML
25 INJECTION, SOLUTION INTRAVENOUS PRN
Status: DISCONTINUED | OUTPATIENT
Start: 2022-11-02 | End: 2022-11-02

## 2022-11-02 RX ORDER — SODIUM CHLORIDE 0.9 % (FLUSH) 0.9 %
5-40 SYRINGE (ML) INJECTION EVERY 12 HOURS SCHEDULED
Status: DISCONTINUED | OUTPATIENT
Start: 2022-11-02 | End: 2022-11-03 | Stop reason: HOSPADM

## 2022-11-02 RX ORDER — SODIUM CHLORIDE 0.9 % (FLUSH) 0.9 %
5-40 SYRINGE (ML) INJECTION PRN
Status: DISCONTINUED | OUTPATIENT
Start: 2022-11-02 | End: 2022-11-02

## 2022-11-02 RX ORDER — SODIUM CHLORIDE 9 MG/ML
INJECTION, SOLUTION INTRAVENOUS PRN
Status: DISCONTINUED | OUTPATIENT
Start: 2022-11-02 | End: 2022-11-02 | Stop reason: HOSPADM

## 2022-11-02 RX ORDER — LIDOCAINE HYDROCHLORIDE 10 MG/ML
5 INJECTION, SOLUTION EPIDURAL; INFILTRATION; INTRACAUDAL; PERINEURAL ONCE
Status: DISCONTINUED | OUTPATIENT
Start: 2022-11-02 | End: 2022-11-03 | Stop reason: HOSPADM

## 2022-11-02 RX ORDER — ACETAMINOPHEN 325 MG/1
650 TABLET ORAL SEE ADMIN INSTRUCTIONS
Status: DISCONTINUED | OUTPATIENT
Start: 2022-11-02 | End: 2022-11-02 | Stop reason: HOSPADM

## 2022-11-02 RX ORDER — SODIUM CHLORIDE 9 MG/ML
INJECTION, SOLUTION INTRAVENOUS PRN
Status: DISCONTINUED | OUTPATIENT
Start: 2022-11-02 | End: 2022-11-03 | Stop reason: HOSPADM

## 2022-11-02 RX ADMIN — ACETAMINOPHEN 650 MG: 325 TABLET ORAL at 15:44

## 2022-11-02 ASSESSMENT — PAIN SCALES - GENERAL: PAINLEVEL_OUTOF10: 10

## 2022-11-02 NOTE — DISCHARGE INSTRUCTIONS
PICC Line Insertion Procedure Note    Procedure: Insertion of #4 FR/18G PICC    Indications:  LIMITED ACCESS    Procedure Details   Informed consent was obtained for the procedure, including sedation. Risks of lung perforation, hemorrhage, and adverse drug reaction were discussed. #4 FR/18G PICC inserted to the L Basilic vein per hospital protocol. Blood return:  yes    Findings:  Catheter inserted to 44 cm, with 0 cm. Exposed. Mid upper arm circumference is 38 cm. There were no changes to vital signs. Catheter was flushed with 20 cc NS. Patient did tolerate procedure well. PICC TIP VERIFIED IN TH [facility] VIA CXR INTERPRETATION - OK TO USE      Peripherally Inserted Central Catheter (PICC): Care Instructions  Overview     A peripherally inserted central catheter (PICC) is a soft, flexible tube that runs under your skin from a vein in your arm to a large vein near your heart. One end of the catheter stays outside your body. It is a type of central vascular access device, or central line. You may have it for weeks or months. A PICC is used to give you medicine, blood products, nutrients, or fluids. A PICC makes doing these things more comfortable for you because they are put directly into the catheter. So you will not be stuck with a needle every time. A PICC may be used to draw blood for tests only if another vein, such as in the hand or arm, can't be used. The end of the PICC sometimes has two or three openings so that you can get more than one type of fluid or medicine at a time. Your doctor may give you medicine to make you feel relaxed. You may feel a little pain when your doctor numbs your arm. Your doctor will then thread the catheter up a vein in your arm to a larger vein. You will not feel any pain. The doctor may use stitches or other devices to hold the catheter in place where it exits your arm. After the procedure, the site may be sore for a day or two.   Follow-up care is a key part of your treatment and safety. Be sure to make and go to all appointments, and call your doctor if you are having problems. It's also a good idea to know your test results and keep a list of the medicines you take. How can you care for yourself at home? Do not wear jewelry, such as necklaces, that can catch on the catheter. If the catheter breaks, follow the instructions your doctor gave you. If you have no instructions, clamp or tie off the catheter. Then see a doctor as soon as possible. To help prevent infection, take a shower instead of a bath. Do not go swimming with the catheter. Try to keep the area dry. When you shower, cover the area with waterproof material, such as plastic wrap. Never touch the open end of the catheter if the cap is off. Never use scissors, knives, pins, or other sharp objects near the catheter or other tubing. If your catheter has a clamp, keep it clamped when you are not using it. Fasten or tape the catheter to your body to prevent pulling or dangling. Avoid clothing that rubs or pulls on your catheter. Avoid bending or crimping your catheter. Always wash your hands before you touch your catheter. Wear loose clothing over the catheter for the first 10 to 14 days. When getting dressed, be careful not to pull on the catheter. How to change the dressing  Since the PICC is in one of your arms, you won't be able to change the dressing on your own. You'll need someone to help you change the dressing using the same instructions that your doctor or nurse gave you. Your PICC dressing should be changed at least once a week. If the dressing gets loose, wet, or dirty, it must be changed more often to prevent infection. Your doctor may also give you instructions for when to change the dressing. Be sure you have all your supplies ready. These include medical tape, a surgical mask, sterile gloves, and your dressing kit. The names and brands of the items will vary.  Your doctor or nurse may give you specific instructions for changing the dressing. Here are basic tips for how to change the dressing. Wash your hands with soap and water. Do this for 15 seconds. Dry them with paper towels. Put on the surgical mask. Loosen and remove your old dressing. Peel it toward the PICC, not away from it. You may need to use an adhesive remover if it doesn't come off easily. Look at the site carefully for redness, swelling, drainage, tenderness, or warmth. If you notice any of these, call your doctor. Wash your hands again. Open your dressing kit, and put on the sterile gloves. Clean the site with the supplies in the dressing kit. Use the dressing that your doctor gave you, and place it over the site. Tape the PICC tubing to your skin. Make sure it doesn't dangle or pull. When should you call for help? Call 911 anytime you think you may need emergency care. For example, call if:    You passed out (lost consciousness). You have severe trouble breathing. You have sudden chest pain and shortness of breath, or you cough up blood. You have a fast or uneven pulse. Call your doctor now or seek immediate medical care if:    You have signs of infection, such as: Increased pain, swelling, warmth, or redness. Red streaks leading from the area. Pus or blood draining from the area. A fever. You have swelling in your face, chest, neck, or arm on the side where the catheter is. You have signs of a blood clot, such as bulging veins near the catheter. Your catheter is leaking, cracked, or clogged. You feel resistance when you inject medicine or fluids into your catheter. Your catheter is out of place. This may happen after severe coughing or vomiting, or if you pull on the catheter. You have chest pain or shortness of breath. Watch closely for changes in your health, and be sure to contact your doctor if:    You have any concerns about your catheter.    Where can you learn more?  Go to [email or web address] and sign in to your [employer] account. Enter X729 in the [facility] box to learn more about \"Peripherally Inserted Central Catheter (PICC): Care Instructions. \"     If you do not have an account, please click on the \"Sign Up Now\" link. Current as of: April 5, 2022               Content Version: 13.4  © 2006-2022 [employer]. Care instructions adapted under license by [facility] [address] If you have questions about a medical condition or this instruction, always ask your healthcare professional. [place] [address] any warranty or liability for your use of this information.

## 2022-11-02 NOTE — PROCEDURES
Lyric sent to Dr Ai Severino:    A PICC is ordered for this patient to recieve blood and go home with it in place. Her plt ct is 34. We normally do not place PICCs or midlines with plt ct < 50. In your opinion does the benefit of placing a PICC under these conditions - risk of an arterial stick and bleeding from that or bleeding from the vein - out weigh these and similar risks? If so please write an order stating this. Thank you.

## 2022-11-02 NOTE — FLOWSHEET NOTE
PICC line education:    -Risks  -Benefits  -Alternatives  -Procedure    Discussed the above with patient, verbalized understanding, answered all questions. Provided with information on PICC care to review. PICC tip verified via Chest X-ray (Do not use - waiting for official CXR reading). Reported off to patient's  Nurse [address] RN.    CXR READING - PICC TIP IN THE SVC - OK TO USE

## 2022-11-03 ENCOUNTER — APPOINTMENT (OUTPATIENT)
Dept: GENERAL RADIOLOGY | Age: 82
End: 2022-11-03
Payer: MEDICARE

## 2022-11-03 ENCOUNTER — HOSPITAL ENCOUNTER (OUTPATIENT)
Age: 82
Setting detail: OBSERVATION
Discharge: NURSING, REHAB OR LONG-TERM CARE | End: 2022-11-05
Attending: EMERGENCY MEDICINE | Admitting: HOSPITALIST
Payer: MEDICARE

## 2022-11-03 DIAGNOSIS — D64.9 ANEMIA, UNSPECIFIED TYPE: Primary | ICD-10-CM

## 2022-11-03 DIAGNOSIS — D46.9 MYELODYSPLASIA (MYELODYSPLASTIC SYNDROME) (HCC): ICD-10-CM

## 2022-11-03 DIAGNOSIS — R53.83 OTHER FATIGUE: ICD-10-CM

## 2022-11-03 LAB
A/G RATIO: 0.7 (ref 1.1–2.2)
ABO/RH: NORMAL
ALBUMIN SERPL-MCNC: 2.6 G/DL (ref 3.4–5)
ALP BLD-CCNC: 73 U/L (ref 40–129)
ALT SERPL-CCNC: 19 U/L (ref 10–40)
ANION GAP SERPL CALCULATED.3IONS-SCNC: 11 MMOL/L (ref 3–16)
ANISOCYTOSIS: ABNORMAL
ANTIBODY SCREEN: NORMAL
AST SERPL-CCNC: 18 U/L (ref 15–37)
BASOPHILS ABSOLUTE: 0.1 K/UL (ref 0–0.2)
BASOPHILS RELATIVE PERCENT: 1.1 %
BILIRUB SERPL-MCNC: 0.5 MG/DL (ref 0–1)
BLOOD BANK DISPENSE STATUS: NORMAL
BLOOD BANK PRODUCT CODE: NORMAL
BPU ID: NORMAL
BUN BLDV-MCNC: 29 MG/DL (ref 7–20)
CALCIUM SERPL-MCNC: 8.4 MG/DL (ref 8.3–10.6)
CHLORIDE BLD-SCNC: 101 MMOL/L (ref 99–110)
CO2: 26 MMOL/L (ref 21–32)
CREAT SERPL-MCNC: 0.8 MG/DL (ref 0.6–1.2)
DESCRIPTION BLOOD BANK: NORMAL
EOSINOPHILS ABSOLUTE: 0.1 K/UL (ref 0–0.6)
EOSINOPHILS RELATIVE PERCENT: 0.8 %
GFR SERPL CREATININE-BSD FRML MDRD: >60 ML/MIN/{1.73_M2}
GLUCOSE BLD-MCNC: 218 MG/DL (ref 70–99)
HCT VFR BLD CALC: 20.9 % (ref 36–48)
HEMOGLOBIN: 6.9 G/DL (ref 12–16)
INR BLD: 1.33 (ref 0.87–1.14)
LIPASE: 21 U/L (ref 13–60)
LYMPHOCYTES ABSOLUTE: 2.4 K/UL (ref 1–5.1)
LYMPHOCYTES RELATIVE PERCENT: 28.1 %
MCH RBC QN AUTO: 32.3 PG (ref 26–34)
MCHC RBC AUTO-ENTMCNC: 33 G/DL (ref 31–36)
MCV RBC AUTO: 97.9 FL (ref 80–100)
MONOCYTES ABSOLUTE: 0 K/UL (ref 0–1.3)
MONOCYTES RELATIVE PERCENT: 0.3 %
NEUTROPHILS ABSOLUTE: 6 K/UL (ref 1.7–7.7)
NEUTROPHILS RELATIVE PERCENT: 69.7 %
OCCULT BLOOD DIAGNOSTIC: NORMAL
OVALOCYTES: ABNORMAL
PDW BLD-RTO: 19.5 % (ref 12.4–15.4)
PLATELET # BLD: 41 K/UL (ref 135–450)
PLATELET SLIDE REVIEW: ABNORMAL
PMV BLD AUTO: 9.8 FL (ref 5–10.5)
POLYCHROMASIA: ABNORMAL
POTASSIUM REFLEX MAGNESIUM: 4.1 MMOL/L (ref 3.5–5.1)
PRO-BNP: 1932 PG/ML (ref 0–449)
PROTHROMBIN TIME: 16.4 SEC (ref 11.7–14.5)
RBC # BLD: 2.13 M/UL (ref 4–5.2)
SARS-COV-2, NAAT: NOT DETECTED
SLIDE REVIEW: ABNORMAL
SODIUM BLD-SCNC: 138 MMOL/L (ref 136–145)
TOTAL PROTEIN: 6.1 G/DL (ref 6.4–8.2)
TROPONIN: 0.09 NG/ML
WBC # BLD: 8.6 K/UL (ref 4–11)

## 2022-11-03 PROCEDURE — 82270 OCCULT BLOOD FECES: CPT

## 2022-11-03 PROCEDURE — G0378 HOSPITAL OBSERVATION PER HR: HCPCS

## 2022-11-03 PROCEDURE — 83880 ASSAY OF NATRIURETIC PEPTIDE: CPT

## 2022-11-03 PROCEDURE — 99285 EMERGENCY DEPT VISIT HI MDM: CPT

## 2022-11-03 PROCEDURE — 85025 COMPLETE CBC W/AUTO DIFF WBC: CPT

## 2022-11-03 PROCEDURE — 84484 ASSAY OF TROPONIN QUANT: CPT

## 2022-11-03 PROCEDURE — 87635 SARS-COV-2 COVID-19 AMP PRB: CPT

## 2022-11-03 PROCEDURE — 86850 RBC ANTIBODY SCREEN: CPT

## 2022-11-03 PROCEDURE — 96375 TX/PRO/DX INJ NEW DRUG ADDON: CPT

## 2022-11-03 PROCEDURE — 6360000002 HC RX W HCPCS: Performed by: EMERGENCY MEDICINE

## 2022-11-03 PROCEDURE — P9016 RBC LEUKOCYTES REDUCED: HCPCS

## 2022-11-03 PROCEDURE — 85610 PROTHROMBIN TIME: CPT

## 2022-11-03 PROCEDURE — 80053 COMPREHEN METABOLIC PANEL: CPT

## 2022-11-03 PROCEDURE — 6370000000 HC RX 637 (ALT 250 FOR IP): Performed by: INTERNAL MEDICINE

## 2022-11-03 PROCEDURE — 36415 COLL VENOUS BLD VENIPUNCTURE: CPT

## 2022-11-03 PROCEDURE — 71045 X-RAY EXAM CHEST 1 VIEW: CPT

## 2022-11-03 PROCEDURE — 86900 BLOOD TYPING SEROLOGIC ABO: CPT

## 2022-11-03 PROCEDURE — 86901 BLOOD TYPING SEROLOGIC RH(D): CPT

## 2022-11-03 PROCEDURE — 96374 THER/PROPH/DIAG INJ IV PUSH: CPT

## 2022-11-03 PROCEDURE — 36430 TRANSFUSION BLD/BLD COMPNT: CPT

## 2022-11-03 PROCEDURE — 83690 ASSAY OF LIPASE: CPT

## 2022-11-03 PROCEDURE — 93005 ELECTROCARDIOGRAM TRACING: CPT | Performed by: PHYSICIAN ASSISTANT

## 2022-11-03 PROCEDURE — 6370000000 HC RX 637 (ALT 250 FOR IP): Performed by: PHYSICIAN ASSISTANT

## 2022-11-03 PROCEDURE — 86923 COMPATIBILITY TEST ELECTRIC: CPT

## 2022-11-03 RX ORDER — SODIUM CHLORIDE 9 MG/ML
INJECTION, SOLUTION INTRAVENOUS PRN
Status: DISCONTINUED | OUTPATIENT
Start: 2022-11-03 | End: 2022-11-05 | Stop reason: HOSPADM

## 2022-11-03 RX ORDER — ACETAMINOPHEN 500 MG
1000 TABLET ORAL ONCE
Status: COMPLETED | OUTPATIENT
Start: 2022-11-03 | End: 2022-11-03

## 2022-11-03 RX ORDER — FUROSEMIDE 10 MG/ML
20 INJECTION INTRAMUSCULAR; INTRAVENOUS ONCE
Status: COMPLETED | OUTPATIENT
Start: 2022-11-03 | End: 2022-11-03

## 2022-11-03 RX ORDER — ZOLPIDEM TARTRATE 5 MG/1
5 TABLET ORAL NIGHTLY PRN
Status: DISCONTINUED | OUTPATIENT
Start: 2022-11-03 | End: 2022-11-05 | Stop reason: HOSPADM

## 2022-11-03 RX ORDER — SODIUM CHLORIDE 9 MG/ML
INJECTION, SOLUTION INTRAVENOUS
Status: DISPENSED
Start: 2022-11-03 | End: 2022-11-04

## 2022-11-03 RX ORDER — SODIUM CHLORIDE 9 MG/ML
INJECTION, SOLUTION INTRAVENOUS PRN
Status: DISCONTINUED | OUTPATIENT
Start: 2022-11-03 | End: 2022-11-03

## 2022-11-03 RX ORDER — FENTANYL CITRATE 50 UG/ML
75 INJECTION, SOLUTION INTRAMUSCULAR; INTRAVENOUS ONCE
Status: COMPLETED | OUTPATIENT
Start: 2022-11-03 | End: 2022-11-03

## 2022-11-03 RX ADMIN — FUROSEMIDE 20 MG: 10 INJECTION, SOLUTION INTRAMUSCULAR; INTRAVENOUS at 23:00

## 2022-11-03 RX ADMIN — ACETAMINOPHEN 1000 MG: 500 TABLET ORAL at 18:16

## 2022-11-03 RX ADMIN — FENTANYL CITRATE 75 MCG: 0.05 INJECTION, SOLUTION INTRAMUSCULAR; INTRAVENOUS at 20:57

## 2022-11-03 RX ADMIN — ZOLPIDEM TARTRATE 5 MG: 5 TABLET ORAL at 23:00

## 2022-11-03 ASSESSMENT — ENCOUNTER SYMPTOMS
VOMITING: 0
BACK PAIN: 0
SORE THROAT: 0
EYE PAIN: 0
CONSTIPATION: 0
RHINORRHEA: 0
COUGH: 0
ABDOMINAL PAIN: 0
SHORTNESS OF BREATH: 1
DIARRHEA: 0
NAUSEA: 0

## 2022-11-03 ASSESSMENT — PAIN SCALES - GENERAL
PAINLEVEL_OUTOF10: 8
PAINLEVEL_OUTOF10: 0
PAINLEVEL_OUTOF10: 0

## 2022-11-03 NOTE — ED PROVIDER NOTES
[address] [facility]        Pt Name: Larry Wilson  MRN: [id number]  [place] 1940  Date of evaluation: 11/3/2022  Provider: LINA Dinh  PCP: No primary care provider on file. Note Started: 6:18 PM EDT        I have seen and evaluated this patient with my supervising physician Verlie Kocher, DO.    CHIEF COMPLAINT       Chief Complaint   Patient presents with    Vascular Access Problem     [facility] d/t getting a picc line implanted yesterday and now is complaining of pain and swelling. Per daughter. Pt has been feeling and looking lethargic. States she got a blood transfusion yesterday        HISTORY OF PRESENT ILLNESS   (Location, Timing/Onset, Context/Setting, Quality, Duration, Modifying Factors, Severity, Associated Signs and Symptoms)  Note limiting factors. Chief Complaint: Fatigue and complications of PICC line    Larry Wilson is a 78 y.o. female who presents due to concerns for swelling around her PICC line that was placed yesterday for transfusion and also concern for fatigue, shortness of breath change in appetite and change in activity level per her daughters. Patient is a nursing facility from [facility]. Patient states that she feels really tired and weak. Review of chart shows that she had a blood transfusion yesterday after the PICC line was placed after her hemoglobin was noted to be 7.5. Patient states that the left arm where the PICC line is in place is very painful and also noticed some swelling around the left wrist shoulder and left chest area. Patient states that she does feel some heaviness around her chest and states that she also feels some shortness of breath at times. Patient's daughters are at bedside and states that she does not look well compared to how she was yesterday and states that she is not as active and talkative as she normally is.     Nursing Notes were all reviewed and agreed with or any disagreements were addressed in the HPI. REVIEW OF SYSTEMS    (2-9 systems for level 4, 10 or more for level 5)     Review of Systems   Constitutional:  Positive for activity change, appetite change and fatigue. Negative for chills, diaphoresis and fever. HENT:  Negative for congestion, rhinorrhea and sore throat. Eyes:  Negative for pain and visual disturbance. Respiratory:  Positive for shortness of breath. Negative for cough. Cardiovascular:  Positive for leg swelling. Negative for chest pain. Gastrointestinal:  Negative for abdominal pain, constipation, diarrhea, nausea and vomiting. Genitourinary:  Negative for difficulty urinating, dysuria and frequency. Musculoskeletal:  Positive for joint swelling. Negative for back pain and neck pain. Left shoulder swelling   Skin:  Negative for rash and wound. Neurological:  Negative for dizziness and light-headedness. Positives and Pertinent negatives as per HPI. Except as noted above in the ROS, all other systems were reviewed and negative. PAST MEDICAL HISTORY     Past Medical History:   Diagnosis Date    4/11/17 Left knee repeat repair of median parapatellar arthrotomy with augmentation 4/12/2017    Arthritis     Aspiration pneumonia of left lower lobe due to gastric secretions ([address] 10/18/2022    Atrial fibrillation (HCC)     CAD (coronary artery disease)     Cataract     Chronic diastolic congestive heart failure ([address] 7/27/2022    Diabetes mellitus ([address]     GERD (gastroesophageal reflux disease)     Hyperlipidemia     Hypertension     HYPOTHRYROIDISM     Myelodysplastic disease ([address] 10/17/2022    Non-English speaking patient     SPEAKS Bengali.   SHE SPEAKS A LITTLE ENGLISH    Sleep apnea     unspecified    Thyroid disease     UTI          SURGICAL HISTORY     Past Surgical History:   Procedure Laterality Date    CARDIAC SURGERY  2004    CABG X 4 VESSELS    CHOLECYSTECTOMY, LAPAROSCOPIC  5/15/14 Take by mouth daily     MAGNESIUM 30 MG TABLET    Take 40 mg by mouth daily    METOPROLOL TARTRATE (LOPRESSOR) 25 MG TABLET    Take 0.5 tablets by mouth 2 times daily    NAPROXEN (NAPROSYN) 500 MG TABLET    Take 1 tablet by mouth 2 times daily (with meals)    OMEGA 3 1000 MG CAPS    Take 1,000 mg by mouth daily    OXYBUTYNIN (DITROPAN XL) 15 MG CR TABLET    Take 15 mg by mouth daily. PANTOPRAZOLE (PROTONIX) 40 MG TABLET    Take 40 mg by mouth every morning (before breakfast)    SACUBITRIL-VALSARTAN (ENTRESTO) 24-26 MG PER TABLET    Take 0.5 tablets by mouth 2 times daily    SENNA (SENOKOT) 8.6 MG TABLET    Take 1 tablet by mouth 2 times daily    TIZANIDINE (ZANAFLEX) 2 MG TABLET    Take 2 mg by mouth every 8 hours as needed    VITAMIN D (ERGOCALCIFEROL) 54087 UNITS CAPS CAPSULE    Take 50,000 Units by mouth once a week         ALLERGIES     Aspirin, Ibuprofen, Macrobid [nitrofurantoin monohydrate macrocrystals], Adhesive tape, Lexiscan [regadenoson], and Penicillins    FAMILYHISTORY       Family History   Problem Relation Age of Onset    Arthritis Other     Diabetes Other     Heart Disease Other     Hypertension Other     High Cholesterol Brother           SOCIAL HISTORY       Social History     Tobacco Use    Smoking status: Never    Smokeless tobacco: Never   Vaping Use    Vaping Use: Never used   Substance Use Topics    Alcohol use: No    Drug use: No       SCREENINGS    [place] Coma Scale  Eye Opening: Spontaneous  Best Verbal Response: Oriented  Best Motor Response: Obeys commands  [place] Coma Scale Score: 15        PHYSICAL EXAM    (up to 7 for level 4, 8 or more for level 5)     ED Triage Vitals [11/03/22 1746]   BP Temp Temp Source Heart Rate Resp SpO2 Height Weight   (!) 130/48 98.5 °F (36.9 °C) Oral 78 19 94 % -- 240 lb (108.9 kg)       Physical Exam  Vitals and nursing note reviewed. Constitutional:       General: She is not in acute distress. Appearance: She is obese. She is not ill-appearing. HENT:      Head: Normocephalic. Mouth/Throat:      Mouth: Mucous membranes are dry. Pharynx: Oropharynx is clear. No oropharyngeal exudate or posterior oropharyngeal erythema. Eyes:      Extraocular Movements: Extraocular movements intact. Pupils: Pupils are equal, round, and reactive to light. Cardiovascular:      Rate and Rhythm: Normal rate and regular rhythm. Heart sounds: Normal heart sounds. No murmur heard. No friction rub. No gallop. Comments: Bilateral radial pulses equal intact 2+  Pulmonary:      Effort: Pulmonary effort is normal.      Breath sounds: Normal breath sounds. Abdominal:      General: Bowel sounds are normal. There is no distension. Palpations: Abdomen is soft. Tenderness: There is no abdominal tenderness. Musculoskeletal:      Cervical back: Normal range of motion and neck supple. Right lower leg: Edema present. Left lower leg: Edema present. Neurological:      Mental Status: She is alert and oriented to person, place, and time.    Psychiatric:         Mood and Affect: Mood normal.         Behavior: Behavior normal.       DIAGNOSTIC RESULTS   LABS:    Labs Reviewed   COMPREHENSIVE METABOLIC PANEL W/ REFLEX TO MG FOR LOW K - Abnormal; Notable for the following components:       Result Value    Glucose 218 (*)     BUN 29 (*)     Total Protein 6.1 (*)     Albumin 2.6 (*)     Albumin/Globulin Ratio 0.7 (*)     All other components within normal limits   CBC WITH AUTO DIFFERENTIAL - Abnormal; Notable for the following components:    RBC 2.13 (*)     Hemoglobin 6.9 (*)     Hematocrit 20.9 (*)     RDW 19.5 (*)     Platelets 41 (*)     Anisocytosis 2+ (*)     Polychromasia Occasional (*)     Ovalocytes Occasional (*)     All other components within normal limits    Narrative:     Glorious Ariane  [facility] tel. [phone],  Hematology results called to and read back by Melina REARDON, 11/03/2022  18:41, by [facility]   TROPONIN - Abnormal; Notable for the following components:    Troponin 0.09 (*)     All other components within normal limits   BRAIN NATRIURETIC PEPTIDE - Abnormal; Notable for the following components:    Pro-BNP 1,932 (*)     All other components within normal limits   PROTIME-INR - Abnormal; Notable for the following components:    Protime 16.4 (*)     INR 1.33 (*)     All other components within normal limits   COVID-19, RAPID   LIPASE   BLOOD OCCULT STOOL DIAGNOSTIC    Narrative:     ORDER#: [id number]                          ORDERED BY: JODIE KELLOGG  SOURCE: Stool                              COLLECTED:  11/03/22 19:10  ANTIBIOTICS AT [facility].:                      RECEIVED :  11/03/22 19:20   URINALYSIS WITH REFLEX TO CULTURE   PREPARE RBC (CROSSMATCH)       When ordered only abnormal lab results are displayed. All other labs were within normal range or not returned as of this dictation. EKG: When ordered, EKG's are interpreted by the Emergency Department Physician in the absence of a cardiologist.  Please see their note for interpretation of EKG. RADIOLOGY:   Non-plain film images such as CT, Ultrasound and MRI are read by the radiologist. Plain radiographic images are visualized and preliminarily interpreted by the ED Provider with the below findings:        Interpretation per the Radiologist below, if available at the time of this note:    XR CHEST PORTABLE   Final Result   Cardiomegaly and postthoracotomy changes. Mild congestion but no evidence of   pulmonary edema or focal infiltrates. XR CHEST PORTABLE    Result Date: 11/2/2022  EXAMINATION: ONE XRAY VIEW OF THE CHEST 11/2/2022 2:17 pm COMPARISON: None. HISTORY: ORDERING SYSTEM PROVIDED HISTORY: PICC Placement - tip verification TECHNOLOGIST PROVIDED HISTORY: Reason for exam:->PICC Placement - tip verification Reason for exam:-> Reason for Exam: PICC Placement - tip verification - left - a-fib FINDINGS: Cardiomegaly.   Interval placement of a left-sided PICC line with the tip in the superior vena cava. No acute airspace infiltrate. No pneumothorax or pleural effusion     Left-sided PICC line with the tip in the superior vena cava Cardiomegaly           PROCEDURES   Unless otherwise noted below, none     Procedures    CRITICAL CARE TIME       CONSULTS:  None      EMERGENCY DEPARTMENT COURSE and DIFFERENTIAL DIAGNOSIS/MDM:   Vitals:    Vitals:    11/03/22 1746 11/03/22 1844 11/03/22 1900   BP: (!) 130/48 (!) 119/53 (!) 119/58   Pulse: 78     Resp: 19     Temp: 98.5 °F (36.9 °C)     TempSrc: Oral     SpO2: 94% 94%    Weight: 240 lb (108.9 kg)         Patient was given the following medications:  Medications   furosemide (LASIX) injection 20 mg (has no administration in time range)   fentaNYL (SUBLIMAZE) injection 75 mcg (has no administration in time range)   acetaminophen (TYLENOL) tablet 1,000 mg (1,000 mg Oral Given 11/3/22 1816)         Is this patient to be included in the SEP-1 Core Measure due to severe sepsis or septic shock? No   Exclusion criteria - the patient is NOT to be included for SEP-1 Core Measure due to:  2+ SIRS criteria are not met    49-year-old female with a history of MDS presents to the emergency department due to complications from her PICC line. She states that she has having pain and swelling in the left arm where the PICC line is located and family is also noted that the patient is more fatigued and pale looking compared to where she was at baseline yesterday. Patient did have blood transfusion yesterday and was noted to have a hemoglobin of 7.5. Here today her CBC shows a hemoglobin of 6.9. Patient's BNP was slightly elevated as well at 1932. Troponin was elevated compared to her baseline from 0.05-0.09. Patient having some left-sided chest swelling and arm swelling on exam.  Patient does appear pale but does not appear toxic.   Patient does not have any abdominal pain on exam.  Patient's lungs are clear to auscultation bilaterally and patient has a regular rate and rhythm of her heart. Patient does have some mild swelling of her bilateral lower legs but posterior tibialis pulses are equal and intact 2+. Due to the patient's anemia patient was given transfusion of red blood cells. Patient will also be admitted to the hospital for further evaluation and treatment. Paged Dr. Elmo Frank who will admit the patient to the hospital.      FINAL IMPRESSION      1. Anemia, unspecified type    2. Other fatigue    3. Myelodysplasia (myelodysplastic syndrome) ([facility]          DISPOSITION/PLAN   DISPOSITION        PATIENT REFERRED TO:  No follow-up provider specified.     DISCHARGE MEDICATIONS:  New Prescriptions    No medications on file       DISCONTINUED MEDICATIONS:  Discontinued Medications    No medications on file              (Please note that portions of this note were completed with a voice recognition program.  Efforts were made to edit the dictations but occasionally words are mis-transcribed.)    LINA Angel (electronically signed)            LINA Angel  11/03/22 2006

## 2022-11-03 NOTE — ED NOTES
Blood consent signed and placed on chart.       Peace Zhang RN  11/03/22 [address], RN  11/03/22 8388

## 2022-11-04 ENCOUNTER — TELEPHONE (OUTPATIENT)
Dept: CARDIOLOGY CLINIC | Age: 82
End: 2022-11-04

## 2022-11-04 LAB
ANION GAP SERPL CALCULATED.3IONS-SCNC: 9 MMOL/L (ref 3–16)
BACTERIA: ABNORMAL /HPF
BASOPHILS ABSOLUTE: 0.1 K/UL (ref 0–0.2)
BASOPHILS RELATIVE PERCENT: 0.9 %
BILIRUBIN URINE: NEGATIVE
BLOOD, URINE: ABNORMAL
BUN BLDV-MCNC: 27 MG/DL (ref 7–20)
CALCIUM SERPL-MCNC: 8.1 MG/DL (ref 8.3–10.6)
CHLORIDE BLD-SCNC: 100 MMOL/L (ref 99–110)
CLARITY: CLEAR
CO2: 29 MMOL/L (ref 21–32)
COLOR: YELLOW
CREAT SERPL-MCNC: 0.6 MG/DL (ref 0.6–1.2)
EKG ATRIAL RATE: 52 BPM
EKG DIAGNOSIS: NORMAL
EKG Q-T INTERVAL: 418 MS
EKG QRS DURATION: 166 MS
EKG QTC CALCULATION (BAZETT): 514 MS
EKG R AXIS: 258 DEGREES
EKG T AXIS: 65 DEGREES
EKG VENTRICULAR RATE: 91 BPM
EOSINOPHILS ABSOLUTE: 0.1 K/UL (ref 0–0.6)
EOSINOPHILS RELATIVE PERCENT: 1.6 %
EPITHELIAL CELLS, UA: 1 /HPF (ref 0–5)
GFR SERPL CREATININE-BSD FRML MDRD: >60 ML/MIN/{1.73_M2}
GLUCOSE BLD-MCNC: 343 MG/DL (ref 70–99)
GLUCOSE BLD-MCNC: 363 MG/DL (ref 70–99)
GLUCOSE BLD-MCNC: 435 MG/DL (ref 70–99)
GLUCOSE BLD-MCNC: 446 MG/DL (ref 70–99)
GLUCOSE URINE: 500 MG/DL
HCT VFR BLD CALC: 25.4 % (ref 36–48)
HCT VFR BLD CALC: 26.2 % (ref 36–48)
HEMOGLOBIN: 8.3 G/DL (ref 12–16)
HEMOGLOBIN: 8.6 G/DL (ref 12–16)
HYALINE CASTS: 0 /LPF (ref 0–8)
HYPHAL YEAST: PRESENT
KETONES, URINE: NEGATIVE MG/DL
LEUKOCYTE ESTERASE, URINE: NEGATIVE
LYMPHOCYTES ABSOLUTE: 1.4 K/UL (ref 1–5.1)
LYMPHOCYTES RELATIVE PERCENT: 21.7 %
MCH RBC QN AUTO: 30.7 PG (ref 26–34)
MCHC RBC AUTO-ENTMCNC: 32.7 G/DL (ref 31–36)
MCV RBC AUTO: 94 FL (ref 80–100)
MICROSCOPIC EXAMINATION: YES
MONOCYTES ABSOLUTE: 0 K/UL (ref 0–1.3)
MONOCYTES RELATIVE PERCENT: 0.2 %
NEUTROPHILS ABSOLUTE: 4.9 K/UL (ref 1.7–7.7)
NEUTROPHILS RELATIVE PERCENT: 75.6 %
NITRITE, URINE: NEGATIVE
PDW BLD-RTO: 18.3 % (ref 12.4–15.4)
PERFORMED ON: ABNORMAL
PH UA: 5.5 (ref 5–8)
PLATELET # BLD: 32 K/UL (ref 135–450)
PMV BLD AUTO: 9.8 FL (ref 5–10.5)
POTASSIUM SERPL-SCNC: 4.1 MMOL/L (ref 3.5–5.1)
PROTEIN UA: ABNORMAL MG/DL
RBC # BLD: 2.7 M/UL (ref 4–5.2)
RBC UA: 1 /HPF (ref 0–4)
SODIUM BLD-SCNC: 138 MMOL/L (ref 136–145)
SPECIFIC GRAVITY UA: 1.01 (ref 1–1.03)
URINE REFLEX TO CULTURE: YES
URINE TYPE: ABNORMAL
UROBILINOGEN, URINE: 0.2 E.U./DL
WBC # BLD: 6.5 K/UL (ref 4–11)
WBC UA: 16 /HPF (ref 0–5)

## 2022-11-04 PROCEDURE — 87186 SC STD MICRODIL/AGAR DIL: CPT

## 2022-11-04 PROCEDURE — 87077 CULTURE AEROBIC IDENTIFY: CPT

## 2022-11-04 PROCEDURE — 80048 BASIC METABOLIC PNL TOTAL CA: CPT

## 2022-11-04 PROCEDURE — 36592 COLLECT BLOOD FROM PICC: CPT

## 2022-11-04 PROCEDURE — G0378 HOSPITAL OBSERVATION PER HR: HCPCS

## 2022-11-04 PROCEDURE — 87086 URINE CULTURE/COLONY COUNT: CPT

## 2022-11-04 PROCEDURE — 36415 COLL VENOUS BLD VENIPUNCTURE: CPT

## 2022-11-04 PROCEDURE — 93010 ELECTROCARDIOGRAM REPORT: CPT | Performed by: INTERNAL MEDICINE

## 2022-11-04 PROCEDURE — 81001 URINALYSIS AUTO W/SCOPE: CPT

## 2022-11-04 PROCEDURE — 85014 HEMATOCRIT: CPT

## 2022-11-04 PROCEDURE — 6370000000 HC RX 637 (ALT 250 FOR IP): Performed by: HOSPITALIST

## 2022-11-04 PROCEDURE — 85018 HEMOGLOBIN: CPT

## 2022-11-04 PROCEDURE — 85025 COMPLETE CBC W/AUTO DIFF WBC: CPT

## 2022-11-04 PROCEDURE — 6370000000 HC RX 637 (ALT 250 FOR IP): Performed by: INTERNAL MEDICINE

## 2022-11-04 RX ORDER — INSULIN GLARGINE 100 [IU]/ML
48 INJECTION, SOLUTION SUBCUTANEOUS DAILY
Status: DISCONTINUED | OUTPATIENT
Start: 2022-11-04 | End: 2022-11-05 | Stop reason: HOSPADM

## 2022-11-04 RX ORDER — POLYETHYLENE GLYCOL 3350 17 G/17G
17 POWDER, FOR SOLUTION ORAL DAILY PRN
Status: DISCONTINUED | OUTPATIENT
Start: 2022-11-04 | End: 2022-11-05 | Stop reason: HOSPADM

## 2022-11-04 RX ORDER — FENTANYL 12 UG/H
1 PATCH TRANSDERMAL WEEKLY
Status: ON HOLD | COMMUNITY
End: 2022-11-04 | Stop reason: HOSPADM

## 2022-11-04 RX ORDER — CALCIUM CARBONATE 200(500)MG
500 TABLET,CHEWABLE ORAL DAILY
Status: DISCONTINUED | OUTPATIENT
Start: 2022-11-04 | End: 2022-11-05 | Stop reason: HOSPADM

## 2022-11-04 RX ORDER — FAMOTIDINE 20 MG/1
20 TABLET, FILM COATED ORAL DAILY
Status: DISCONTINUED | OUTPATIENT
Start: 2022-11-04 | End: 2022-11-04

## 2022-11-04 RX ORDER — DEXTROSE MONOHYDRATE 100 MG/ML
INJECTION, SOLUTION INTRAVENOUS CONTINUOUS PRN
Status: DISCONTINUED | OUTPATIENT
Start: 2022-11-04 | End: 2022-11-05 | Stop reason: HOSPADM

## 2022-11-04 RX ORDER — FAMOTIDINE 20 MG/1
20 TABLET, FILM COATED ORAL DAILY
Status: ON HOLD | COMMUNITY
End: 2022-11-20 | Stop reason: HOSPADM

## 2022-11-04 RX ORDER — GABAPENTIN 100 MG/1
100 CAPSULE ORAL 3 TIMES DAILY
Status: DISCONTINUED | OUTPATIENT
Start: 2022-11-04 | End: 2022-11-05 | Stop reason: HOSPADM

## 2022-11-04 RX ORDER — LACTOBACILLUS RHAMNOSUS GG 10B CELL
2 CAPSULE ORAL DAILY
Status: ON HOLD | COMMUNITY
Start: 2022-10-28 | End: 2022-11-16

## 2022-11-04 RX ORDER — LIDOCAINE 4 G/G
1 PATCH TOPICAL DAILY
Status: DISCONTINUED | OUTPATIENT
Start: 2022-11-04 | End: 2022-11-05 | Stop reason: HOSPADM

## 2022-11-04 RX ORDER — INSULIN LISPRO 100 [IU]/ML
0-8 INJECTION, SOLUTION INTRAVENOUS; SUBCUTANEOUS
Status: DISCONTINUED | OUTPATIENT
Start: 2022-11-04 | End: 2022-11-05 | Stop reason: HOSPADM

## 2022-11-04 RX ORDER — INSULIN LISPRO 100 [IU]/ML
6 INJECTION, SOLUTION INTRAVENOUS; SUBCUTANEOUS
Status: DISCONTINUED | OUTPATIENT
Start: 2022-11-04 | End: 2022-11-05 | Stop reason: HOSPADM

## 2022-11-04 RX ORDER — GUAIFENESIN 600 MG/1
600 TABLET, EXTENDED RELEASE ORAL 2 TIMES DAILY PRN
Status: DISCONTINUED | OUTPATIENT
Start: 2022-11-04 | End: 2022-11-04

## 2022-11-04 RX ORDER — OMEGA-3 FATTY ACIDS/FISH OIL 300-1000MG
1000 CAPSULE ORAL DAILY
Status: DISCONTINUED | OUTPATIENT
Start: 2022-11-04 | End: 2022-11-04

## 2022-11-04 RX ORDER — HYDROCODONE BITARTRATE AND ACETAMINOPHEN 5; 325 MG/1; MG/1
1 TABLET ORAL EVERY 6 HOURS PRN
Status: DISCONTINUED | OUTPATIENT
Start: 2022-11-04 | End: 2022-11-05 | Stop reason: HOSPADM

## 2022-11-04 RX ORDER — MAGNESIUM 30 MG
40 TABLET ORAL DAILY
Status: DISCONTINUED | OUTPATIENT
Start: 2022-11-04 | End: 2022-11-04

## 2022-11-04 RX ORDER — TIZANIDINE 4 MG/1
2 TABLET ORAL EVERY 8 HOURS PRN
Status: DISCONTINUED | OUTPATIENT
Start: 2022-11-04 | End: 2022-11-05 | Stop reason: HOSPADM

## 2022-11-04 RX ORDER — SENNA PLUS 8.6 MG/1
1 TABLET ORAL 2 TIMES DAILY
Status: DISCONTINUED | OUTPATIENT
Start: 2022-11-04 | End: 2022-11-05 | Stop reason: HOSPADM

## 2022-11-04 RX ORDER — METRONIDAZOLE 500 MG/1
500 TABLET ORAL 3 TIMES DAILY
Status: ON HOLD | COMMUNITY
Start: 2022-10-26 | End: 2022-11-16 | Stop reason: HOSPADM

## 2022-11-04 RX ORDER — POLYETHYLENE GLYCOL 3350 17 G/17G
17 POWDER, FOR SOLUTION ORAL DAILY PRN
COMMUNITY

## 2022-11-04 RX ORDER — FUROSEMIDE 20 MG/1
20 TABLET ORAL DAILY
Status: DISCONTINUED | OUTPATIENT
Start: 2022-11-04 | End: 2022-11-05 | Stop reason: HOSPADM

## 2022-11-04 RX ORDER — LACTOBACILLUS RHAMNOSUS GG 10B CELL
2 CAPSULE ORAL DAILY
Status: DISCONTINUED | OUTPATIENT
Start: 2022-11-04 | End: 2022-11-05 | Stop reason: HOSPADM

## 2022-11-04 RX ORDER — DIGOXIN 125 MCG
125 TABLET ORAL DAILY
Status: DISCONTINUED | OUTPATIENT
Start: 2022-11-04 | End: 2022-11-05 | Stop reason: HOSPADM

## 2022-11-04 RX ORDER — INSULIN LISPRO 100 [IU]/ML
0-4 INJECTION, SOLUTION INTRAVENOUS; SUBCUTANEOUS NIGHTLY
Status: DISCONTINUED | OUTPATIENT
Start: 2022-11-04 | End: 2022-11-05 | Stop reason: HOSPADM

## 2022-11-04 RX ORDER — OMEPRAZOLE 20 MG/1
20 CAPSULE, DELAYED RELEASE ORAL 2 TIMES DAILY
COMMUNITY

## 2022-11-04 RX ORDER — HYDROCODONE BITARTRATE AND ACETAMINOPHEN 5; 325 MG/1; MG/1
1 TABLET ORAL EVERY 6 HOURS PRN
Status: DISCONTINUED | OUTPATIENT
Start: 2022-11-04 | End: 2022-11-04

## 2022-11-04 RX ORDER — METRONIDAZOLE 250 MG/1
500 TABLET ORAL 3 TIMES DAILY
Status: DISCONTINUED | OUTPATIENT
Start: 2022-11-04 | End: 2022-11-05 | Stop reason: HOSPADM

## 2022-11-04 RX ORDER — ERGOCALCIFEROL 1.25 MG/1
50000 CAPSULE ORAL WEEKLY
Status: DISCONTINUED | OUTPATIENT
Start: 2022-11-08 | End: 2022-11-05 | Stop reason: HOSPADM

## 2022-11-04 RX ORDER — PANTOPRAZOLE SODIUM 40 MG/1
40 TABLET, DELAYED RELEASE ORAL
Status: DISCONTINUED | OUTPATIENT
Start: 2022-11-05 | End: 2022-11-05 | Stop reason: HOSPADM

## 2022-11-04 RX ORDER — CODEINE PHOSPHATE AND GUAIFENESIN 10; 100 MG/5ML; MG/5ML
5 SOLUTION ORAL EVERY 4 HOURS PRN
Status: DISCONTINUED | OUTPATIENT
Start: 2022-11-04 | End: 2022-11-05 | Stop reason: HOSPADM

## 2022-11-04 RX ORDER — HYDROCODONE BITARTRATE AND ACETAMINOPHEN 5; 325 MG/1; MG/1
1 TABLET ORAL EVERY 6 HOURS PRN
Status: ON HOLD | COMMUNITY
End: 2022-11-16 | Stop reason: HOSPADM

## 2022-11-04 RX ORDER — OXYBUTYNIN CHLORIDE 5 MG/1
15 TABLET, EXTENDED RELEASE ORAL DAILY
Status: DISCONTINUED | OUTPATIENT
Start: 2022-11-04 | End: 2022-11-05 | Stop reason: HOSPADM

## 2022-11-04 RX ORDER — ATORVASTATIN CALCIUM 80 MG/1
80 TABLET, FILM COATED ORAL DAILY
Status: DISCONTINUED | OUTPATIENT
Start: 2022-11-04 | End: 2022-11-05 | Stop reason: HOSPADM

## 2022-11-04 RX ADMIN — Medication 2 CAPSULE: at 12:16

## 2022-11-04 RX ADMIN — DIGOXIN 125 MCG: 125 TABLET ORAL at 12:16

## 2022-11-04 RX ADMIN — OXYBUTYNIN CHLORIDE 15 MG: 5 TABLET, EXTENDED RELEASE ORAL at 12:16

## 2022-11-04 RX ADMIN — INSULIN LISPRO 4 UNITS: 100 INJECTION, SOLUTION INTRAVENOUS; SUBCUTANEOUS at 21:37

## 2022-11-04 RX ADMIN — SACUBITRIL AND VALSARTAN 0.5 TABLET: 24; 26 TABLET, FILM COATED ORAL at 12:16

## 2022-11-04 RX ADMIN — METRONIDAZOLE 500 MG: 250 TABLET ORAL at 12:16

## 2022-11-04 RX ADMIN — FUROSEMIDE 20 MG: 20 TABLET ORAL at 12:15

## 2022-11-04 RX ADMIN — SENNOSIDES 8.6 MG: 8.6 TABLET, FILM COATED ORAL at 12:15

## 2022-11-04 RX ADMIN — GABAPENTIN 100 MG: 100 CAPSULE ORAL at 12:15

## 2022-11-04 RX ADMIN — INSULIN LISPRO 6 UNITS: 100 INJECTION, SOLUTION INTRAVENOUS; SUBCUTANEOUS at 17:38

## 2022-11-04 RX ADMIN — GABAPENTIN 100 MG: 100 CAPSULE ORAL at 21:36

## 2022-11-04 RX ADMIN — METRONIDAZOLE 500 MG: 250 TABLET ORAL at 21:35

## 2022-11-04 RX ADMIN — LEVOTHYROXINE SODIUM 175 MCG: 0.15 TABLET ORAL at 12:15

## 2022-11-04 RX ADMIN — HYDROCODONE BITARTRATE AND ACETAMINOPHEN 1 TABLET: 5; 325 TABLET ORAL at 18:39

## 2022-11-04 RX ADMIN — INSULIN LISPRO 6 UNITS: 100 INJECTION, SOLUTION INTRAVENOUS; SUBCUTANEOUS at 12:17

## 2022-11-04 RX ADMIN — SENNOSIDES 8.6 MG: 8.6 TABLET, FILM COATED ORAL at 21:36

## 2022-11-04 RX ADMIN — ANTACID TABLETS 500 MG: 500 TABLET, CHEWABLE ORAL at 12:15

## 2022-11-04 RX ADMIN — ATORVASTATIN CALCIUM 80 MG: 80 TABLET, FILM COATED ORAL at 12:16

## 2022-11-04 RX ADMIN — TIZANIDINE 2 MG: 4 TABLET ORAL at 12:15

## 2022-11-04 RX ADMIN — METOPROLOL TARTRATE 12.5 MG: 25 TABLET, FILM COATED ORAL at 21:35

## 2022-11-04 RX ADMIN — METOPROLOL TARTRATE 12.5 MG: 25 TABLET, FILM COATED ORAL at 12:15

## 2022-11-04 RX ADMIN — INSULIN GLARGINE 48 UNITS: 100 INJECTION, SOLUTION SUBCUTANEOUS at 12:17

## 2022-11-04 RX ADMIN — HYDROCODONE BITARTRATE AND ACETAMINOPHEN 1 TABLET: 5; 325 TABLET ORAL at 12:24

## 2022-11-04 RX ADMIN — HYDROCODONE BITARTRATE AND ACETAMINOPHEN 1 TABLET: 5; 325 TABLET ORAL at 06:27

## 2022-11-04 RX ADMIN — EMPAGLIFLOZIN 10 MG: 10 TABLET, FILM COATED ORAL at 12:16

## 2022-11-04 RX ADMIN — SACUBITRIL AND VALSARTAN 0.5 TABLET: 24; 26 TABLET, FILM COATED ORAL at 21:35

## 2022-11-04 ASSESSMENT — PAIN DESCRIPTION - DESCRIPTORS
DESCRIPTORS: ACHING

## 2022-11-04 ASSESSMENT — PAIN DESCRIPTION - LOCATION
LOCATION: HEAD;WRIST
LOCATION: WRIST
LOCATION: WRIST;ARM

## 2022-11-04 ASSESSMENT — PAIN DESCRIPTION - FREQUENCY: FREQUENCY: CONTINUOUS

## 2022-11-04 ASSESSMENT — PAIN SCALES - GENERAL
PAINLEVEL_OUTOF10: 0
PAINLEVEL_OUTOF10: 5
PAINLEVEL_OUTOF10: 0
PAINLEVEL_OUTOF10: 3
PAINLEVEL_OUTOF10: 5

## 2022-11-04 ASSESSMENT — PAIN DESCRIPTION - ORIENTATION
ORIENTATION: LEFT

## 2022-11-04 ASSESSMENT — PAIN DESCRIPTION - ONSET: ONSET: ON-GOING

## 2022-11-04 ASSESSMENT — PAIN DESCRIPTION - PAIN TYPE: TYPE: ACUTE PAIN

## 2022-11-04 NOTE — PROGRESS NOTES
Pt admitted to room 3324 from ED. VSS. Pt A&Ox4. POC updated with pt, all questions answered. Oriented pt to room and call light. Call light and bedside table within reach. Bed alarm and call sign are active. Pt wearing yellow nonskid socks and fall bracelet. Instructed to call out with any needs.

## 2022-11-04 NOTE — TELEPHONE ENCOUNTER
Kenn Adina called to inform the office that the Pt was transported to [address] [place] [address] had an 4:00pm appt on 11/04, will r/s when the Pt has returned back to the facility.   Please advise

## 2022-11-04 NOTE — ED NOTES
Pt observed for the first 15 minutes of blood transfusion. No signs of transfusion reaction. Will continue to monitor.       Neelima Sorenson RN  11/03/22 1803

## 2022-11-04 NOTE — TELEPHONE ENCOUNTER
Called and spoke with Donny. Gave her appt 12/1/22 230 pm at [facility] office.

## 2022-11-04 NOTE — TELEPHONE ENCOUNTER
Daughter states pt is going to hospital now and not sure if she can make appt at 4PM. Asking if [facility] could see her in the hospital if she cannot make appt. Daughter asking for a call to discuss. Please advise.

## 2022-11-04 NOTE — PROGRESS NOTES
Patient Active Problem List   Diagnosis    DM (diabetes mellitus) ([facility]    Coronary artery disease involving native coronary artery of native heart without angina pectoris    PAF (paroxysmal atrial fibrillation) ([facility]    Peripheral vascular disease (HCC)    CARLOS (obstructive sleep apnea)    SOB (shortness of breath)    Acute renal failure (HCC)    HIGH CHOLESTEROL    HTN (hypertension)    Dizziness    Headache    Debility    11/22/16 Right knee polyethylene exchange    Acute pain of right shoulder    Mixed hyperlipidemia    Acute chest pain    Morbid obesity with BMI of 40.0-44.9, adult ([facility]    Morbid obesity with BMI of 45.0-49.9, adult ([facility]    Type 2 diabetes mellitus ([facility]    Carotid artery disease without cerebral infarction [facility]    2/28/17 Revision with polyethylene exchange left total knee arthroplasty    4/11/17 Left knee repeat repair of median parapatellar arthrotomy with augmentation    Rotator cuff tendonitis, right    Cervical radicular pain    Acute diastolic heart failure (HCC)    Dyspnea on exertion    Severe thrombocytopenia (HCC)    Severe anemia    Wrist arthritis    Chronic atrial fibrillation (HCC)    Primary osteoarthritis of first carpometacarpal joint of left hand    Shoulder pain    Hypervolemia    MDS (myelodysplastic syndrome) (HCC)    Chronic systolic heart failure (HCC)    Arterial hypotension    History of coronary artery disease    Pulmonary vascular congestion    Acute hypoxemic respiratory failure ([facility])    Aspiration pneumonia of left lower lobe due to gastric secretions ([facility])    Anemia     H&P dictated

## 2022-11-04 NOTE — TELEPHONE ENCOUNTER
Spoke with daughter. Dr Wick Hence would like to see patient in hospital fu appt. Scheduled appt day and time with dtr. 12/1 at 230.

## 2022-11-04 NOTE — PROGRESS NOTES
I had done med rec on this patient in presence of patient's 2 daughters in ED when I saw the patient , why it did not show up in epic is beyond my understanding , Hospitalist service is very familiar with this case . Discussed with Dr Teodora Chua . Dr Lela Hunt will take over the care as of this morning , I will sign off .  I see the orders of home meds as I had ordered  in [facility]    Now !!!

## 2022-11-04 NOTE — DISCHARGE INSTR - COC
Continuity of Care Form    Patient Name: rBitney May   :  1940  MRN:  [id number]    Admit date:  11/3/2022  Discharge date:  22    Code Status Order: DNR-CCA   Advance Directives:     Admitting Physician:  Elijah Mckoy MD  PCP: No primary care provider on file.     Discharging Nurse: Padmini Marshall Unit/Room#: 9YP-5084/0981-87  Discharging Unit Phone Number: [phone]    Emergency Contact:   Extended Emergency Contact Information  Primary Emergency Contact: [address] [facility], [address] Phone: [phone]  Relation: Child  Secondary Emergency Contact: [address] Phone: [phone]  Mobile Phone: [phone]  Relation: Child    Past Surgical History:  Past Surgical History:   Procedure Laterality Date    CARDIAC SURGERY      CABG X 4 VESSELS    CHOLECYSTECTOMY, LAPAROSCOPIC  5/15/14    with IOC    CT BONE MARROW BIOPSY  2022    CT BONE MARROW BIOPSY 2022 [facility] CT SCAN    JOINT REPLACEMENT Bilateral  [facility]    knee    KNEE SURGERY Left 2017    left knee poly exchange revision     REVISION TOTAL KNEE ARTHROPLASTY Right 2016    RIGHT TOTAL KNEE REVISION WITH POLY EXCHANGE    TOTAL KNEE ARTHROPLASTY      partical/ left     UPPER GASTROINTESTINAL ENDOSCOPY N/A 10/3/2022    EGD BIOPSY performed by Reymundo Ovalle MD at [facility] N/A 10/3/2022    EGD DILATION BALLOON performed by Reymundo Ovalle MD at [address] ENDOSCOPY       Immunization History:   Immunization History   Administered Date(s) Administered    Influenza Vaccine, unspecified formulation 2016    Influenza Virus Vaccine 10/22/2011    Pneumococcal Polysaccharide (Xocihojwx53) 2012       Active Problems:  Patient Active Problem List   Diagnosis Code    DM (diabetes mellitus) ([facility] [address] E11.9    Coronary artery disease involving native coronary artery of native heart without angina pectoris I25.10    PAF (paroxysmal atrial fibrillation) ([facility]) I48.0    Peripheral vascular disease ([facility]) I73.9    CARLOS (obstructive sleep apnea) G47.33    SOB (shortness of breath) R06.02    Acute renal failure ([facility]) N17.9    HIGH CHOLESTEROL     HTN (hypertension) I10    Dizziness R42    Headache R51.9    Debility R53.81    11/22/16 Right knee polyethylene exchange M25.561, M25.562    Acute pain of right shoulder M25.511    Mixed hyperlipidemia E78.2    Acute chest pain R07.9    Morbid obesity with BMI of 40.0-44.9, adult ([facility]) E66.01, Z68.41    Morbid obesity with BMI of 45.0-49.9, adult ([facility]) E66.01, Z68.42    Type 2 diabetes mellitus ([facility]) E11.9    Carotid artery disease without cerebral infarction [facility]) I77.9    2/28/17 Revision with polyethylene exchange left total knee arthroplasty M23.52    4/11/17 Left knee repeat repair of median parapatellar arthrotomy with augmentation S76.111A    Rotator cuff tendonitis, right M75.81    Cervical radicular pain N50.49    Acute diastolic heart failure ([facility]) I50.31    Dyspnea on exertion R06.09    Severe thrombocytopenia ([facility]) D69.6    Severe anemia D64.9    Wrist arthritis M19.039    Chronic atrial fibrillation ([facility]) I48.20    Primary osteoarthritis of first carpometacarpal joint of left hand M18.12    Shoulder pain M25.519    Hypervolemia E87.70    MDS (myelodysplastic syndrome) ([facility]) D46.9    Chronic systolic heart failure ([facility]) I50.22    Arterial hypotension I95.9    History of coronary artery disease Z86.79    Pulmonary vascular congestion R09.89    Acute hypoxemic respiratory failure ([facility]) J96.01    Aspiration pneumonia of left lower lobe due to gastric secretions ([facility]) J69.0    Anemia D64.9       Isolation/Infection:   Isolation            No Isolation          Patient Infection Status       Infection Onset Added Last Indicated Last Indicated By Review Planned Expiration Resolved Resolved By    None active    Resolved    COVID-19 (Rule Out) 11/03/22 11/03/22 11/03/22 COVID-19, Rapid (Ordered)   11/03/22 Rule-Out Test Resulted            Nurse Assessment:  Last Vital Signs: /61   Pulse 97   Temp 98.7 °F (37.1 °C) (Oral)   Resp 16   Ht 5' (1.524 m)   Wt 231 lb 14.8 oz (105.2 kg)   LMP  (LMP Unknown)   SpO2 95%   BMI 45.29 kg/m²     Last documented pain score (0-10 scale): Pain Level: 5  Last Weight:   Wt Readings from Last 1 Encounters:   11/04/22 231 lb 14.8 oz (105.2 kg)     Mental Status:  oriented and alert    IV Access:  - PICC - site  L Upper Arm, insertion date: 11/2/22    Nursing Mobility/ADLs:  Walking   Dependent  Transfer  Dependent  Bathing  Dependent  Dressing  Dependent  Toileting  Dependent  Feeding  Assisted  Med Admin  Assisted  Med Delivery   whole    Wound Care Documentation and Therapy:  Incision 11/22/16 Knee Right (Active)   Number of days: 2173       Incision 02/28/17 Knee Left (Active)   Number of days: 2075       Incision 04/11/17 Knee Left (Active)   Number of days: 2033       Puncture 09/29/22 Back (Active)   Wound Assessment Erythema 10/16/22 1057   Bety-wound Assessment Ecchymosis 10/16/22 1057   Closure None 10/16/22 1057   Culture Taken No 10/16/22 1057   Drainage Amount None 10/16/22 1057   Drainage Description Sanguinous 09/29/22 1853   Odor None 10/16/22 1057   Dressing/Treatment Foam 10/16/22 1057   Dressing Changed Changed/New 09/29/22 1853   Dressing Status Clean, dry & intact 10/16/22 1057   Number of days: 36       Wound 11/04/22 Sacrum (Active)   Dressing Status New dressing applied 11/04/22 1213   Wound Cleansed Not Cleansed 11/04/22 1213   Dressing/Treatment Moisturizing cream;Foam 11/04/22 1213   Drainage Amount None 11/04/22 1213   Odor None 11/04/22 1213   Bety-wound Assessment Ecchymosis 11/04/22 1213   Margins Defined edges 11/04/22 1213   Number of days: 0        Elimination:  Continence:    Bowel: Yes  Bladder: No  Urinary Catheter: None   Colostomy/Ileostomy/Ileal Conduit: No       Date of Last BM: 11/5/22    Intake/Output Summary (Last 24 hours) at 11/4/2022 1536  Last data filed at 11/4/2022 1030  Gross per 24 hour   Intake 1380 ml   Output 1200 ml   Net 180 ml     I/O last 3 completed shifts: In: 1140 [P.O.:480; I.V.:30; Blood:630]  Out: 900 [Urine:900]    Safety Concerns: At Risk for Falls and Bleeding risk    Impairments/Disabilities:      None    Nutrition Therapy:  Current Nutrition Therapy:   - Oral Diet:  Carb Control 5 carbs/meal (2000kcals/day) and Low Sodium (3-4gm)    Routes of Feeding: Oral  Liquids: Thin Liquids  Daily Fluid Restriction: yes - amount 64 oz/day  Last Modified Barium Swallow with Video (Video Swallowing Test): not done    Treatments at the Time of Hospital Discharge:   Respiratory Treatments: duonebs prn  Oxygen Therapy:  is not on home oxygen therapy.   Ventilator:    - No ventilator support    Rehab Therapies: Physical Therapy and Occupational Therapy  Weight Bearing Status/Restrictions: No weight bearing restrictions  Other Medical Equipment (for information only, NOT a DME order):  walker, bedside commode, and braces knee and ankle  Other Treatments: PICC      Patient's personal belongings (please select all that are sent with patient):  Glasses, Hearing Aides left, Dentures upper, cell phone    RN SIGNATURE:  Electronically signed by Enedelia Wagoner RN on 11/5/22 at 9:49 AM EDT    CASE MANAGEMENT/SOCIAL WORK SECTION    Inpatient Status Date: ***    Readmission Risk Assessment Score:  Readmission Risk              Risk of Unplanned Readmission:  0           Discharging to Facility/ Agency   Name:   Mauricio Gallego  [address] [place] [facility]  [facility]: [address]   Address:  Phone:  Fax:    Dialysis Facility (if applicable)   Name:  Address:  Dialysis Schedule:  Phone:  Fax:    / signature: Electronically signed by Alexandr Mendoza RN on 11/4/22 at 3:36 PM EDT        PHYSICIAN SECTION    Prognosis: Good    Condition at Discharge: Stable    Rehab Potential (if transferring to Rehab): Good    Recommended Labs or Other Treatments After Discharge:     Physician Certification: I certify the above information and transfer of Lisa Jamison  is necessary for the continuing treatment of the diagnosis listed and that she requires Kaz Clyde for greater 30 days.      Update Admission H&P: No change in H&P    PHYSICIAN SIGNATURE:  Electronically signed by Darrin Harper MD on 11/5/22 at 6:44 AM EDT

## 2022-11-04 NOTE — H&P
[address], [address]                              HISTORY AND PHYSICAL    PATIENT NAME: Migdalia King                     :        1940  MED REC NO:   [id number]                          ROOM:       8213  ACCOUNT NO:   [de-identified]                           ADMIT DATE: 2022  PROVIDER:     Yamil Parker MD    HISTORY OF PRESENT ILLNESS:  The patient is an 78-year-old  [address] [place] [address] lady, who came to the emergency room with history of  low blood count. The patient was looking pale with generalized  weakness. She recently had a port placed. She did have a vascular  access problem due to getting a PICC line implanted yesterday. Now, she  is complaining of pain and swelling. Per daughter, the patient has been  feeling and looking lethargic and pale. She has had a blood transfusion  yesterday. The main concern was swelling around the PICC line that was  placed yesterday for transfusion and also concern for fatigue, shortness  of breath, low appetite and overall lethargic activity. The patient is  in a nursing facility from [facility]. She stated that she feels  really tired and weak. Review of the chart shows she had a blood transfusion yesterday after  the PICC line was placed, and her hemoglobin was noted to be around 7.5,  but here in the emergency room, it has again dropped down to 6.9. She  does have myelodysplastic syndrome. The patient's daughter is at her  bedside. She also has pain in the left chest area. PAST MEDICAL HISTORY:  Pertinent for osteoarthritis, aspiration  pneumonia, atrial fibrillation, atherosclerotic heart disease, chronic  diastolic heart failure, type 2 diabetes mellitus, GERD, hyperlipidemia,  hypertension, hypothyroidism, myelodysplastic disease, obstructive sleep  apnea, hypothyroidism, and urinary tract infection.     PAST SURGICAL HISTORY:  Pertinent for cardiac surgery, coronary bypass,  cholecystectomy, revision total knee arthroplasty, knee surgery, CT  biopsy of the bone marrow, upper GI endoscopy on more than one occasion,  bilateral knee joint replacement. FAMILY HISTORY:  Both the parents are  because of natural  causes. SOCIAL HISTORY:  She is a . She has seven children. She is a  nonsmoker and non-drinker. She is always a homemaker. She did work at  [employer]. 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 MEDICATIONS:  The patient is on acetaminophen, fentanyl, and Lasix. ALLERGIES:  She is allergic to ASPIRIN, IBUPROFEN, MACROBID,  MONOHYDRATE, ADHESIVE TAPE, LEXISCAN and PENICILLIN. REVIEW OF SYSTEMS:  Negative for loss of consciousness, but looked pale,  fatigued, exertional shortness of breath. No angina pectoris. No  orthopnea or paroxysmal nocturnal dyspnea. No abdominal pain. No  hematemesis or melena. No obvious GI blood loss or blood loss from any  of the natural orifices. PHYSICAL EXAMINATION:  GENERAL:  Alert, awake and oriented x2, moderately distressed,  57-year-old white lady looking consistent with her stated age. VITAL SIGNS:  Temperature 98.9, blood pressure 113/53, respirations 20,  heart rate 94, O2 sat 93% on room air. HEENT:  Oral mucosa is dry. SKIN:  Warm and dry. NECK:  Supple. Mild jugular venous distention. LUNGS:  Vesicular breath sounds. Poor inspiration overall. No crackles  or wheezing. HEART:  Regular rate and rhythm, S1 and S2 without any S3 or S4 gallop. ABDOMEN:  Soft and nontender. Bowel sounds are present. EXTREMITIES:  Trace edema. NEUROLOGIC:  The patient appears grossly intact. LABORATORY EVALUATION:  Lab evaluation shows sodium 138, potassium 4.1,  chloride 101, CO2 of 26, BUN 29, creatinine 0.8, glucose 218, total  protein 6.1, proBNP 1932. Troponin is 0.09, albumin 2.6, alkaline  phosphatase 73. AST and ALT are 18 and 19.   White blood cell count 8.6,  hemoglobin and hematocrit is 6.9 and 20.9, yesterday it was just _____. Platelet count 41. The patient had hypochromia. PT/INR is 16.4 and  1.33. DIAGNOSTIC DATA:  EKG is atrial fibrillation with controlled ventricular  rate. Chest x-ray shows cardiomegaly _____ mild congestion. There is  no evidence of pulmonary edema or local and focal infiltrate. Transthoracic echocardiogram on 10/17/2022 showed overall LV ejection  fraction was 45% to 31%, normal diastolic filling, but then mild  tricuspid regurgitation, right ventricular systolic pressure at 29. ASSESSMENT:  Severe anemia, chronic systolic heart failure,  hypertension, atherosclerotic heart disease, and myelodysplastic  disease. PLAN:  Get her admitted. Treat her with blood transfusion. Monitoring  for any obvious blood loss. The patient will be admitted, given one  unit of packed red blood cells. Investigate the anemia a little bit. The patient can be dismissed very soon.         Дмитрий Demarco MD    D: 11/03/2022 23:55:14       T: 11/04/2022 4:18:09     [email or web address]  Job#: [id number]     Doc#: [id number]    CC:

## 2022-11-04 NOTE — PROGRESS NOTES
[facility] PROGRESS NOTE    11/4/2022 4:53 PM        Name: Bety Blackmon . Admitted: 11/3/2022  Primary Care Provider: No primary care provider on file. (Tel: None)                        Subjective:  . No acute events overnight. Resting well. Pain control. Diet ok. Labs reviewed  Denies any chest pain sob.      Reviewed interval ancillary notes    Current Medications  levothyroxine (SYNTHROID) tablet 175 mcg, Daily  oxybutynin (DITROPAN-XL) extended release tablet 15 mg, Daily  [START ON 11/8/2022] vitamin D (ERGOCALCIFEROL) capsule 50,000 Units, Weekly  atorvastatin (LIPITOR) tablet 80 mg, Daily  senna (SENOKOT) tablet 8.6 mg, BID  tiZANidine (ZANAFLEX) tablet 2 mg, Q8H PRN  calcium carbonate (TUMS) chewable tablet 500 mg, Daily  empagliflozin (JARDIANCE) tablet 10 mg, Daily  insulin glargine (LANTUS) injection vial 48 Units, Daily  digoxin (LANOXIN) tablet 125 mcg, Daily  guaiFENesin (MUCINEX) extended release tablet 600 mg, BID PRN  insulin lispro (HUMALOG) injection vial 6 Units, TID WC  sacubitril-valsartan (ENTRESTO) 24-26 MG per tablet 0.5 tablet, BID  furosemide (LASIX) tablet 20 mg, Daily  gabapentin (NEURONTIN) capsule 100 mg, TID  metoprolol tartrate (LOPRESSOR) tablet 12.5 mg, BID  HYDROcodone-acetaminophen (NORCO) 5-325 MG per tablet 1 tablet, Q6H PRN  lactobacillus (CULTURELLE) capsule 2 capsule, Daily  metroNIDAZOLE (FLAGYL) tablet 500 mg, TID  polyethylene glycol (GLYCOLAX) packet 17 g, Daily PRN  silver sulfADIAZINE (SILVADENE) 1 % cream, BID  [START ON 11/5/2022] pantoprazole (PROTONIX) tablet 40 mg, QAM AC  lidocaine 4 % external patch 1 patch, Daily  dextrose bolus 10% 125 mL, PRN   Or  dextrose bolus 10% 250 mL, PRN  glucagon (rDNA) injection 1 mg, PRN  dextrose 10 % infusion, Continuous PRN  0.9 % sodium chloride infusion, PRN  zolpidem (AMBIEN) tablet 5 mg, Nightly PRN        Objective:  BP (!) 97/58   Pulse 64   Temp 98.7 °F (37.1 °C) (Oral)   Resp 16   Ht 5' (1.524 m)   Wt 231 lb 14.8 oz (105.2 kg)   LMP  (LMP Unknown)   SpO2 98%   BMI 45.29 kg/m²     Intake/Output Summary (Last 24 hours) at 11/4/2022 1653  Last data filed at 11/4/2022 1030  Gross per 24 hour   Intake 1380 ml   Output 1200 ml   Net 180 ml      Wt Readings from Last 3 Encounters:   11/04/22 231 lb 14.8 oz (105.2 kg)   10/26/22 244 lb 12.8 oz (111 kg)   10/07/22 241 lb 14.4 oz (109.7 kg)       General appearance:  Appears comfortable  Eyes: Sclera clear. Pupils equal.  ENT: Moist oral mucosa. Trachea midline, no adenopathy. Cardiovascular: Regular rhythm, normal S1, S2. No murmur. No edema in lower extremities  Respiratory: Not using accessory muscles. Good inspiratory effort. Clear to auscultation bilaterally, no wheeze or crackles. GI: Abdomen soft, no tenderness, not distended, normal bowel sounds  Musculoskeletal: No cyanosis in digits, neck supple  Neurology: CN 2-12 grossly intact. No speech or motor deficits  Psych: Normal affect. Alert and oriented in time, place and person  Skin: Warm, dry, normal turgor    Labs and Tests:  CBC:   Recent Labs     11/02/22  1035 11/02/22  1823 11/03/22  1818 11/04/22  0038 11/04/22  1242   WBC  --   --  8.6  --  6.5   HGB  --    < > 6.9* 8.6* 8.3*   PLT 56*  --  41*  --  32*    < > = values in this interval not displayed.      BMP:    Recent Labs     11/02/22  1035 11/03/22  1818 11/04/22  1242    138 138   K 4.4 4.1 4.1    101 100   CO2 25 26 29   BUN 34* 29* 27*   CREATININE 0.9 0.8 0.6   GLUCOSE 164* 218* 446*     Hepatic:   Recent Labs     11/03/22 1818   AST 18   ALT 19   BILITOT 0.5   ALKPHOS 73       Discussed care with patient             Problem List  Principal Problem:    Anemia  Active Problems:    Severe thrombocytopenia (HCC)    Severe anemia    Primary osteoarthritis of first carpometacarpal joint of left hand Shoulder pain    Pulmonary vascular congestion    PAF (paroxysmal atrial fibrillation) (HCC)    Peripheral vascular disease (HCC)    CARLOS (obstructive sleep apnea)    SOB (shortness of breath)    Morbid obesity with BMI of 45.0-49.9, adult (HCC)    Type 2 diabetes mellitus (HCC)    Rotator cuff tendonitis, right  Resolved Problems:    * No resolved hospital problems. *       Assessment & Plan:   Symptomatic anemia  -With hemoglobin of 6.9 on presentation baseline status post transfusion-  Hemoglobin is up to 8.3  -Monitor hemoglobin 1 more day if continues to be stable then plan for discharge tomorrow      Chronic thrombocytopenia  -No signs of any acute bleeding  -Only transfuse platelet if drops below 10 with active bleeding    Paroxysmal A. Fib  Chronic CHF    PICC line site swelling seems to be stable right now we will reevaluate in the morning      Urinary retention  -Concern for possible some urinary retention per family. We will do a bladder scan. -May consider adding Flomax    UA looks unremarkable otherwise no signs of infection. Discussed with family over the phone. Diet: ADULT DIET; Regular; 5 carb choices (75 gm/meal);  Low Fat/Low Chol/High Fiber/RIGO; 1500 ml  Code:DNR-CCA  DVT PPX lovenox       Lelo Kapadia MD   11/4/2022 4:53 PM

## 2022-11-04 NOTE — CARE COORDINATION
Discharge Planning'  CM met with patient and daughter - Madi both were agreeable to patient going  back to [facility] when medically stable.

## 2022-11-04 NOTE — ED NOTES
Report given to Rhea Barboza RN. No further questions at this time.       Soo Guerrero RN  11/03/22 2446

## 2022-11-04 NOTE — PROCEDURES
Late Entry:    PICC was placed in left arm per patient's insistence. Right arm was encourage due to  left wrist issues, but patient still wanted it in the left. Daughter was present.

## 2022-11-04 NOTE — PROGRESS NOTES
[facility]   Herbal and Nutritional Product Restrictions      The following herbal, alternative, and/or nutritional/dietary supplement product(s) has been discontinued per [facility] approved policy:    Omega-3 capsule. Please reorder upon discharge if appropriate.     Thank you,  Mara Ohara, [address] [place] [address]  11/4/2022 11:10 AM

## 2022-11-04 NOTE — PROGRESS NOTES
Pt has a 12 mcg fentanyl pain patch located on left deltoid that is dated 11/2/2022. Medication has been added to the patient's medication reconciliation. Per patient's daughter, Vero Silverio, the patch is to be changed every 7 days.

## 2022-11-04 NOTE — CARE COORDINATION
Discharge Planning Assessment    RN discharge planner spoke with Inga  in admissions at [facility]. Pt status: []  LTC [x]  SNF []  AL  []  IL     Bed Status: Yes    Pre-cert required:  No    Level of function/Support:   Facility's staff assists    Additional Information: Patient can go back when medically . Will need a Rapid Covid Test done prior to going back. Transportation at the time of discharge:    Medical Transport. Ned Ren

## 2022-11-05 VITALS
TEMPERATURE: 98.7 F | DIASTOLIC BLOOD PRESSURE: 52 MMHG | RESPIRATION RATE: 18 BRPM | WEIGHT: 227.29 LBS | SYSTOLIC BLOOD PRESSURE: 100 MMHG | BODY MASS INDEX: 44.62 KG/M2 | HEART RATE: 84 BPM | OXYGEN SATURATION: 95 % | HEIGHT: 60 IN

## 2022-11-05 LAB
ANION GAP SERPL CALCULATED.3IONS-SCNC: 10 MMOL/L (ref 3–16)
ANISOCYTOSIS: ABNORMAL
BASOPHILS ABSOLUTE: 0 K/UL (ref 0–0.2)
BASOPHILS RELATIVE PERCENT: 0 %
BUN BLDV-MCNC: 27 MG/DL (ref 7–20)
CALCIUM SERPL-MCNC: 8.3 MG/DL (ref 8.3–10.6)
CHLORIDE BLD-SCNC: 100 MMOL/L (ref 99–110)
CO2: 28 MMOL/L (ref 21–32)
CREAT SERPL-MCNC: 0.7 MG/DL (ref 0.6–1.2)
EOSINOPHILS ABSOLUTE: 0 K/UL (ref 0–0.6)
EOSINOPHILS RELATIVE PERCENT: 0 %
GFR SERPL CREATININE-BSD FRML MDRD: >60 ML/MIN/{1.73_M2}
GLUCOSE BLD-MCNC: 192 MG/DL (ref 70–99)
GLUCOSE BLD-MCNC: 208 MG/DL (ref 70–99)
GLUCOSE BLD-MCNC: 339 MG/DL (ref 70–99)
HCT VFR BLD CALC: 24.1 % (ref 36–48)
HEMATOLOGY PATH CONSULT: NO
HEMOGLOBIN: 8.1 G/DL (ref 12–16)
LYMPHOCYTES ABSOLUTE: 2.7 K/UL (ref 1–5.1)
LYMPHOCYTES RELATIVE PERCENT: 38 %
MCH RBC QN AUTO: 31.6 PG (ref 26–34)
MCHC RBC AUTO-ENTMCNC: 33.5 G/DL (ref 31–36)
MCV RBC AUTO: 94.4 FL (ref 80–100)
MONOCYTES ABSOLUTE: 0.4 K/UL (ref 0–1.3)
MONOCYTES RELATIVE PERCENT: 5 %
MYELOCYTE PERCENT: 1 %
NEUTROPHILS ABSOLUTE: 4 K/UL (ref 1.7–7.7)
NEUTROPHILS RELATIVE PERCENT: 56 %
NUCLEATED RED BLOOD CELLS: 1 /100 WBC
PDW BLD-RTO: 18 % (ref 12.4–15.4)
PERFORMED ON: ABNORMAL
PERFORMED ON: ABNORMAL
PLATELET # BLD: 30 K/UL (ref 135–450)
PLATELET SLIDE REVIEW: ABNORMAL
PMV BLD AUTO: 9.7 FL (ref 5–10.5)
POLYCHROMASIA: ABNORMAL
POTASSIUM SERPL-SCNC: 3.9 MMOL/L (ref 3.5–5.1)
RBC # BLD: 2.55 M/UL (ref 4–5.2)
SLIDE REVIEW: ABNORMAL
SODIUM BLD-SCNC: 138 MMOL/L (ref 136–145)
WBC # BLD: 7 K/UL (ref 4–11)

## 2022-11-05 PROCEDURE — 6370000000 HC RX 637 (ALT 250 FOR IP): Performed by: HOSPITALIST

## 2022-11-05 PROCEDURE — 6370000000 HC RX 637 (ALT 250 FOR IP): Performed by: INTERNAL MEDICINE

## 2022-11-05 PROCEDURE — 85025 COMPLETE CBC W/AUTO DIFF WBC: CPT

## 2022-11-05 PROCEDURE — 2500000003 HC RX 250 WO HCPCS: Performed by: INTERNAL MEDICINE

## 2022-11-05 PROCEDURE — G0378 HOSPITAL OBSERVATION PER HR: HCPCS

## 2022-11-05 PROCEDURE — 80048 BASIC METABOLIC PNL TOTAL CA: CPT

## 2022-11-05 RX ORDER — PHENAZOPYRIDINE HYDROCHLORIDE 100 MG/1
100 TABLET, FILM COATED ORAL 3 TIMES DAILY
Qty: 9 TABLET | Refills: 0 | Status: ON HOLD
Start: 2022-11-05 | End: 2022-11-16 | Stop reason: HOSPADM

## 2022-11-05 RX ADMIN — LEVOTHYROXINE SODIUM 175 MCG: 0.15 TABLET ORAL at 08:44

## 2022-11-05 RX ADMIN — INSULIN LISPRO 6 UNITS: 100 INJECTION, SOLUTION INTRAVENOUS; SUBCUTANEOUS at 11:58

## 2022-11-05 RX ADMIN — SILVER SULFADIAZINE: 10 CREAM TOPICAL at 12:03

## 2022-11-05 RX ADMIN — METOPROLOL TARTRATE 12.5 MG: 25 TABLET, FILM COATED ORAL at 09:01

## 2022-11-05 RX ADMIN — HYDROCODONE BITARTRATE AND ACETAMINOPHEN 1 TABLET: 5; 325 TABLET ORAL at 06:14

## 2022-11-05 RX ADMIN — INSULIN LISPRO 6 UNITS: 100 INJECTION, SOLUTION INTRAVENOUS; SUBCUTANEOUS at 12:00

## 2022-11-05 RX ADMIN — Medication 2 CAPSULE: at 08:44

## 2022-11-05 RX ADMIN — ATORVASTATIN CALCIUM 80 MG: 80 TABLET, FILM COATED ORAL at 08:45

## 2022-11-05 RX ADMIN — TIZANIDINE 2 MG: 4 TABLET ORAL at 09:00

## 2022-11-05 RX ADMIN — SENNOSIDES 8.6 MG: 8.6 TABLET, FILM COATED ORAL at 08:45

## 2022-11-05 RX ADMIN — GABAPENTIN 100 MG: 100 CAPSULE ORAL at 08:45

## 2022-11-05 RX ADMIN — EMPAGLIFLOZIN 10 MG: 10 TABLET, FILM COATED ORAL at 08:44

## 2022-11-05 RX ADMIN — OXYBUTYNIN CHLORIDE 15 MG: 5 TABLET, EXTENDED RELEASE ORAL at 08:44

## 2022-11-05 RX ADMIN — FUROSEMIDE 20 MG: 20 TABLET ORAL at 08:45

## 2022-11-05 RX ADMIN — DIGOXIN 125 MCG: 125 TABLET ORAL at 08:45

## 2022-11-05 RX ADMIN — SACUBITRIL AND VALSARTAN 0.5 TABLET: 24; 26 TABLET, FILM COATED ORAL at 08:45

## 2022-11-05 RX ADMIN — ANTACID TABLETS 500 MG: 500 TABLET, CHEWABLE ORAL at 08:45

## 2022-11-05 RX ADMIN — INSULIN LISPRO 6 UNITS: 100 INJECTION, SOLUTION INTRAVENOUS; SUBCUTANEOUS at 08:46

## 2022-11-05 RX ADMIN — METRONIDAZOLE 500 MG: 250 TABLET ORAL at 08:45

## 2022-11-05 RX ADMIN — PANTOPRAZOLE SODIUM 40 MG: 40 TABLET, DELAYED RELEASE ORAL at 06:14

## 2022-11-05 RX ADMIN — INSULIN GLARGINE 48 UNITS: 100 INJECTION, SOLUTION SUBCUTANEOUS at 09:01

## 2022-11-05 ASSESSMENT — PAIN SCALES - GENERAL
PAINLEVEL_OUTOF10: 10
PAINLEVEL_OUTOF10: 8
PAINLEVEL_OUTOF10: 10

## 2022-11-05 ASSESSMENT — PAIN DESCRIPTION - LOCATION
LOCATION: BUTTOCKS
LOCATION: GENERALIZED
LOCATION: BUTTOCKS

## 2022-11-05 ASSESSMENT — PAIN DESCRIPTION - DESCRIPTORS
DESCRIPTORS: ACHING
DESCRIPTORS: ACHING

## 2022-11-05 NOTE — CARE COORDINATION
Discharge Plan:   Patient discharged to:  [facility]  [address]  MBMEB:[phone] [place] [address] [facility]     SW/DC [address] faxed, [address]   Narcotic Prescriptions faxed were: None  RN: Sammy Salter will call report      Medical Transport with: [facility] [employer]  ([phone])    time: 0142  Family advised of discharge?: Yes- Daughter-  Herman Hernandez -was in pt's room  HENS Submitted?:   N/A    Sammy Salter in admissions was informed of the  time. All discharge needs met per case management.

## 2022-11-05 NOTE — CARE COORDINATION
Discharge Planning; Discharge order noted for patient to go back to SNF. CM spoke with Dannielle Delacruz at [facility] who stated okay for patient to back. Transport scheduled with  time of [phone] via [address]. Patient and daughter updated.

## 2022-11-05 NOTE — DISCHARGE SUMMARY
[facility] DISCHARGE SUMMARY    Patient Demographics    Patient. Verdie Alpers  Date of Birth. 1940  MRN. [id number]     Primary care provider. No primary care provider on file. (Tel: None)    Admit date: 11/3/2022    Discharge date (blank if same as Note Date): 11/5/2022  Note Date: 11/5/2022     Reason for Hospitalization. Chief Complaint   Patient presents with    Vascular Access Problem     [facility] d/t getting a picc line implanted yesterday and now is complaining of pain and swelling. Per daughter. Pt has been feeling and looking lethargic. States she got a blood transfusion yesterday            [facility] Course. Anemia  -Consult for vascular access problem  -Patient was transfused 2 unit of blood with hemoglobin improved to 8.6  -No signs of active bleeding patient with history of MDS needing frequent transfusion  -PICC line was placed patient had some swelling around the PICC line area and less likely DVT patient has good pulse due to her thrombocytopenia she could have some oozing and fluid retention diet but now signs of any overt worsening symptoms. Discussed and reassured family that this is likely to resolve by time. Also with severe thrombocytopenia less likely to be treated for any anticoagulation as well. Patient was hemodynamically stable for this    UTI was ruled out  Uncontrolled diabetes discussed about adjusting Lantus    Consults. None    Physical examination on discharge day. BP (!) 100/52   Pulse 84   Temp 98.7 °F (37.1 °C) (Oral)   Resp 18   Ht 5' (1.524 m)   Wt 227 lb 4.7 oz (103.1 kg)   LMP  (LMP Unknown)   SpO2 95%   BMI 44.39 kg/m²   General appearance. Alert. Looks comfortable. HEENT. Sclera clear. Moist mucus membranes. Cardiovascular. Regular rate and rhythm, normal S1, S2. No murmur. Respiratory.  Not using accessory muscles. Clear to auscultation bilaterally, no wheeze. Gastrointestinal. Abdomen soft, non-tender, not distended, normal bowel sounds  Neurology. Facial symmetry. No speech deficits. Moving all extremities equally. Extremities. No edema in lower extremities. Skin. Warm, dry, normal turgor    Condition at time of discharge stable     Medication instructions provided to patient at discharge. Medication List        START taking these medications      magnesium 30 MG tablet     oxybutynin 15 MG extended release tablet  Commonly known as: DITROPAN XL     phenazopyridine 100 MG tablet  Commonly known as: Pyridium  Take 1 tablet by mouth in the morning, at noon, and at bedtime for 3 days     vitamin D 1.25 MG (02493 UT) Caps capsule  Commonly known as: ERGOCALCIFEROL            CHANGE how you take these medications      atorvastatin 80 MG tablet  Commonly known as: LIPITOR  Take 1 tablet by mouth daily  What changed: how much to take            CONTINUE taking these medications      Basaglar KwikPen 100 UNIT/ML injection pen  Generic drug: insulin glargine  Inject 48 Units into the skin daily     calcium carbonate 500 MG Tabs tablet  Commonly known as: OSCAL     digoxin 125 MCG tablet  Commonly known as: LANOXIN  Take 1 tablet by mouth daily     DUONEB IN     empagliflozin 10 MG tablet  Commonly known as: JARDIANCE  Take 1 tablet by mouth daily     famotidine 20 MG tablet  Commonly known as: PEPCID     furosemide 20 MG tablet  Commonly known as: LASIX  Take 1 tablet by mouth daily     gabapentin 100 MG capsule  Commonly known as: NEURONTIN  Take 1 capsule by mouth 3 times daily for 30 days.      guaiFENesin 600 MG extended release tablet  Commonly known as: MUCINEX  Take 1 tablet by mouth 2 times daily as needed for Congestion     HYDROcodone-acetaminophen 5-325 MG per tablet  Commonly known as: NORCO     insulin lispro 100 UNIT/ML Soln injection vial  Commonly known as: HUMALOG  Inject 6 Units into the skin 3 times daily (with meals)     Insulin Pen Needle 32G X 6 MM Misc     lactobacillus capsule     levothyroxine 175 MCG tablet  Commonly known as: SYNTHROID     lidocaine 5 %  Commonly known as: LIDODERM  Place 1 patch onto the skin daily 12 hours on, 12 hours off. Liraglutide 18 MG/3ML Sopn SC injection  Commonly known as: VICTOZA     metoprolol tartrate 25 MG tablet  Commonly known as: LOPRESSOR  Take 0.5 tablets by mouth 2 times daily     metroNIDAZOLE 500 MG tablet  Commonly known as: FLAGYL     Omega 3 1000 MG Caps     omeprazole 20 MG delayed release capsule  Commonly known as: PRILOSEC     polyethylene glycol 17 g packet  Commonly known as: GLYCOLAX     sacubitril-valsartan 24-26 MG per tablet  Commonly known as: ENTRESTO  Take 0.5 tablets by mouth 2 times daily     senna 8.6 MG tablet  Commonly known as: SENOKOT     silver sulfADIAZINE 1 % cream  Commonly known as: SILVADENE     tiZANidine 2 MG tablet  Commonly known as: Lethia Burton            STOP taking these medications      DOXYCYCLINE HYCLATE PO     fentaNYL 12 MCG/HR  Commonly known as: DURAGESIC     hydrocortisone 2.5 % rectal cream  Commonly known as: ANUSOL-HC     loratadine 10 MG capsule  Commonly known as: CLARITIN     naproxen 500 MG tablet  Commonly known as: NAPROSYN               Where to Get Your Medications        [facility]  [id number], [address], [address] [place] [address]      Phone: [phone]   phenazopyridine 100 MG tablet         Discharge recommendations given to patient. Follow Up. pcp in 1 week   Disposition. [facility]  Activity. activity as tolerated  Diet: No diet orders on file      Spent 45  minutes in discharge process.     Signed:  An Ramírez MD     11/5/2022 6:15 PM

## 2022-11-06 ENCOUNTER — HOSPITAL ENCOUNTER (INPATIENT)
Age: 82
LOS: 14 days | Discharge: HOSPICE | DRG: 314 | End: 2022-11-21
Attending: EMERGENCY MEDICINE | Admitting: STUDENT IN AN ORGANIZED HEALTH CARE EDUCATION/TRAINING PROGRAM
Payer: MEDICARE

## 2022-11-06 DIAGNOSIS — M54.2 NECK PAIN: ICD-10-CM

## 2022-11-06 DIAGNOSIS — R06.00 DYSPNEA, UNSPECIFIED TYPE: Primary | ICD-10-CM

## 2022-11-06 DIAGNOSIS — I50.9 ACUTE ON CHRONIC CONGESTIVE HEART FAILURE, UNSPECIFIED HEART FAILURE TYPE (HCC): ICD-10-CM

## 2022-11-06 LAB
ORGANISM: ABNORMAL
URINE CULTURE, ROUTINE: ABNORMAL

## 2022-11-06 PROCEDURE — 99285 EMERGENCY DEPT VISIT HI MDM: CPT

## 2022-11-07 ENCOUNTER — APPOINTMENT (OUTPATIENT)
Dept: CT IMAGING | Age: 82
DRG: 314 | End: 2022-11-07
Payer: MEDICARE

## 2022-11-07 ENCOUNTER — APPOINTMENT (OUTPATIENT)
Dept: GENERAL RADIOLOGY | Age: 82
DRG: 314 | End: 2022-11-07
Payer: MEDICARE

## 2022-11-07 PROBLEM — J96.01 ACUTE RESPIRATORY FAILURE WITH HYPOXEMIA (HCC): Status: ACTIVE | Noted: 2022-11-07

## 2022-11-07 PROBLEM — I50.20 HFREF (HEART FAILURE WITH REDUCED EJECTION FRACTION) (HCC): Status: ACTIVE | Noted: 2022-11-07

## 2022-11-07 LAB
A/G RATIO: 0.6 (ref 1.1–2.2)
ABO/RH: NORMAL
ALBUMIN SERPL-MCNC: 2.3 G/DL (ref 3.4–5)
ALBUMIN SERPL-MCNC: 2.4 G/DL (ref 3.4–5)
ALP BLD-CCNC: 62 U/L (ref 40–129)
ALT SERPL-CCNC: 16 U/L (ref 10–40)
ANION GAP SERPL CALCULATED.3IONS-SCNC: 12 MMOL/L (ref 3–16)
ANION GAP SERPL CALCULATED.3IONS-SCNC: 8 MMOL/L (ref 3–16)
ANISOCYTOSIS: ABNORMAL
ANISOCYTOSIS: ABNORMAL
ANTIBODY SCREEN: NORMAL
AST SERPL-CCNC: 14 U/L (ref 15–37)
BACTERIA: ABNORMAL /HPF
BANDED NEUTROPHILS RELATIVE PERCENT: 4 % (ref 0–7)
BANDED NEUTROPHILS RELATIVE PERCENT: 7 % (ref 0–7)
BASE EXCESS VENOUS: 3.5 MMOL/L (ref -3–3)
BASOPHILS ABSOLUTE: 0 K/UL (ref 0–0.2)
BASOPHILS ABSOLUTE: 0 K/UL (ref 0–0.2)
BASOPHILS RELATIVE PERCENT: 0 %
BASOPHILS RELATIVE PERCENT: 0 %
BILIRUB SERPL-MCNC: 0.5 MG/DL (ref 0–1)
BILIRUBIN URINE: ABNORMAL
BLOOD BANK DISPENSE STATUS: NORMAL
BLOOD BANK PRODUCT CODE: NORMAL
BLOOD, URINE: ABNORMAL
BPU ID: NORMAL
BUN BLDV-MCNC: 40 MG/DL (ref 7–20)
BUN BLDV-MCNC: 43 MG/DL (ref 7–20)
CALCIUM SERPL-MCNC: 7.7 MG/DL (ref 8.3–10.6)
CALCIUM SERPL-MCNC: 7.9 MG/DL (ref 8.3–10.6)
CARBOXYHEMOGLOBIN: 7.7 % (ref 0–1.5)
CHLORIDE BLD-SCNC: 98 MMOL/L (ref 99–110)
CHLORIDE BLD-SCNC: 98 MMOL/L (ref 99–110)
CLARITY: ABNORMAL
CO2: 24 MMOL/L (ref 21–32)
CO2: 28 MMOL/L (ref 21–32)
COLOR: ABNORMAL
COMMENT UA: ABNORMAL
CREAT SERPL-MCNC: 1.3 MG/DL (ref 0.6–1.2)
CREAT SERPL-MCNC: 1.8 MG/DL (ref 0.6–1.2)
DESCRIPTION BLOOD BANK: NORMAL
DIGOXIN LEVEL: 1 NG/ML (ref 0.8–2)
EKG ATRIAL RATE: 129 BPM
EKG DIAGNOSIS: NORMAL
EKG Q-T INTERVAL: 412 MS
EKG QRS DURATION: 164 MS
EKG QTC CALCULATION (BAZETT): 512 MS
EKG R AXIS: 247 DEGREES
EKG T AXIS: 53 DEGREES
EKG VENTRICULAR RATE: 93 BPM
EOSINOPHILS ABSOLUTE: 0 K/UL (ref 0–0.6)
EOSINOPHILS ABSOLUTE: 0 K/UL (ref 0–0.6)
EOSINOPHILS RELATIVE PERCENT: 0 %
EOSINOPHILS RELATIVE PERCENT: 0 %
EPITHELIAL CELLS, UA: 18 /HPF (ref 0–5)
GFR SERPL CREATININE-BSD FRML MDRD: 27 ML/MIN/{1.73_M2}
GFR SERPL CREATININE-BSD FRML MDRD: 40 ML/MIN/{1.73_M2}
GLUCOSE BLD-MCNC: 225 MG/DL (ref 70–99)
GLUCOSE BLD-MCNC: 255 MG/DL (ref 70–99)
GLUCOSE BLD-MCNC: 258 MG/DL (ref 70–99)
GLUCOSE BLD-MCNC: 267 MG/DL (ref 70–99)
GLUCOSE URINE: 100 MG/DL
HCO3 VENOUS: 27 MMOL/L (ref 23–29)
HCT VFR BLD CALC: 20.6 % (ref 36–48)
HCT VFR BLD CALC: 22 % (ref 36–48)
HEMATOLOGY PATH CONSULT: NORMAL
HEMATOLOGY PATH CONSULT: YES
HEMOGLOBIN: 6.7 G/DL (ref 12–16)
HEMOGLOBIN: 7 G/DL (ref 12–16)
KETONES, URINE: NEGATIVE MG/DL
LACTIC ACID, SEPSIS: 1.2 MMOL/L (ref 0.4–1.9)
LEUKOCYTE ESTERASE, URINE: ABNORMAL
LYMPHOCYTES ABSOLUTE: 2 K/UL (ref 1–5.1)
LYMPHOCYTES ABSOLUTE: 2.9 K/UL (ref 1–5.1)
LYMPHOCYTES RELATIVE PERCENT: 26 %
LYMPHOCYTES RELATIVE PERCENT: 39 %
MAGNESIUM: 1.5 MG/DL (ref 1.8–2.4)
MCH RBC QN AUTO: 31 PG (ref 26–34)
MCH RBC QN AUTO: 31.3 PG (ref 26–34)
MCHC RBC AUTO-ENTMCNC: 31.7 G/DL (ref 31–36)
MCHC RBC AUTO-ENTMCNC: 32.8 G/DL (ref 31–36)
MCV RBC AUTO: 95.5 FL (ref 80–100)
MCV RBC AUTO: 97.8 FL (ref 80–100)
METHEMOGLOBIN VENOUS: 1.1 %
MICROCYTES: ABNORMAL
MICROSCOPIC EXAMINATION: YES
MONOCYTES ABSOLUTE: 0.2 K/UL (ref 0–1.3)
MONOCYTES ABSOLUTE: 0.8 K/UL (ref 0–1.3)
MONOCYTES RELATIVE PERCENT: 11 %
MONOCYTES RELATIVE PERCENT: 3 %
MONONUCLEAR UNIDENTIFIED CELLS: 2 %
MYELOCYTE PERCENT: 1 %
NEUTROPHILS ABSOLUTE: 4.2 K/UL (ref 1.7–7.7)
NEUTROPHILS ABSOLUTE: 4.7 K/UL (ref 1.7–7.7)
NEUTROPHILS RELATIVE PERCENT: 49 %
NEUTROPHILS RELATIVE PERCENT: 57 %
NITRITE, URINE: POSITIVE
O2 CONTENT, VEN: 10 VOL %
O2 SAT, VEN: 100 %
O2 THERAPY: ABNORMAL
OVALOCYTES: ABNORMAL
OVALOCYTES: ABNORMAL
PCO2, VEN: 35 MMHG (ref 40–50)
PDW BLD-RTO: 17.5 % (ref 12.4–15.4)
PDW BLD-RTO: 19.8 % (ref 12.4–15.4)
PERFORMED ON: ABNORMAL
PERFORMED ON: ABNORMAL
PH UA: 7.5 (ref 5–8)
PH VENOUS: 7.5 (ref 7.35–7.45)
PHOSPHORUS: 3.1 MG/DL (ref 2.5–4.9)
PLATELET # BLD: 28 K/UL (ref 135–450)
PLATELET # BLD: 32 K/UL (ref 135–450)
PLATELET SLIDE REVIEW: ABNORMAL
PMV BLD AUTO: 10 FL (ref 5–10.5)
PMV BLD AUTO: 9.6 FL (ref 5–10.5)
PO2, VEN: 160 MMHG (ref 25–40)
POLYCHROMASIA: ABNORMAL
POTASSIUM SERPL-SCNC: 4.3 MMOL/L (ref 3.5–5.1)
POTASSIUM SERPL-SCNC: 4.4 MMOL/L (ref 3.5–5.1)
PRO-BNP: 3064 PG/ML (ref 0–449)
PROCALCITONIN: 0.72 NG/ML (ref 0–0.15)
PROMYELOCYTES PERCENT: 1 %
PROTEIN UA: 300 MG/DL
RBC # BLD: 2.16 M/UL (ref 4–5.2)
RBC # BLD: 2.25 M/UL (ref 4–5.2)
RBC UA: 141 /HPF (ref 0–4)
SLIDE REVIEW: ABNORMAL
SODIUM BLD-SCNC: 134 MMOL/L (ref 136–145)
SODIUM BLD-SCNC: 134 MMOL/L (ref 136–145)
SPECIFIC GRAVITY UA: 1.02 (ref 1–1.03)
TCO2 CALC VENOUS: 63 MMOL/L
TEAR DROP CELLS: ABNORMAL
TOTAL PROTEIN: 6.4 G/DL (ref 6.4–8.2)
TROPONIN: 0.13 NG/ML
TROPONIN: 0.15 NG/ML
TSH REFLEX: 2 UIU/ML (ref 0.27–4.2)
URINE REFLEX TO CULTURE: YES
URINE TYPE: ABNORMAL
UROBILINOGEN, URINE: 1 E.U./DL
WBC # BLD: 7.5 K/UL (ref 4–11)
WBC # BLD: 7.5 K/UL (ref 4–11)
WBC UA: 4947 /HPF (ref 0–5)

## 2022-11-07 PROCEDURE — 6370000000 HC RX 637 (ALT 250 FOR IP): Performed by: STUDENT IN AN ORGANIZED HEALTH CARE EDUCATION/TRAINING PROGRAM

## 2022-11-07 PROCEDURE — 86901 BLOOD TYPING SEROLOGIC RH(D): CPT

## 2022-11-07 PROCEDURE — 83735 ASSAY OF MAGNESIUM: CPT

## 2022-11-07 PROCEDURE — 80053 COMPREHEN METABOLIC PANEL: CPT

## 2022-11-07 PROCEDURE — 6370000000 HC RX 637 (ALT 250 FOR IP): Performed by: INTERNAL MEDICINE

## 2022-11-07 PROCEDURE — 6360000002 HC RX W HCPCS: Performed by: INTERNAL MEDICINE

## 2022-11-07 PROCEDURE — 84443 ASSAY THYROID STIM HORMONE: CPT

## 2022-11-07 PROCEDURE — 94761 N-INVAS EAR/PLS OXIMETRY MLT: CPT

## 2022-11-07 PROCEDURE — 74176 CT ABD & PELVIS W/O CONTRAST: CPT

## 2022-11-07 PROCEDURE — 86900 BLOOD TYPING SEROLOGIC ABO: CPT

## 2022-11-07 PROCEDURE — P9040 RBC LEUKOREDUCED IRRADIATED: HCPCS

## 2022-11-07 PROCEDURE — 6360000004 HC RX CONTRAST MEDICATION

## 2022-11-07 PROCEDURE — 83605 ASSAY OF LACTIC ACID: CPT

## 2022-11-07 PROCEDURE — 87186 SC STD MICRODIL/AGAR DIL: CPT

## 2022-11-07 PROCEDURE — 85025 COMPLETE CBC W/AUTO DIFF WBC: CPT

## 2022-11-07 PROCEDURE — 6360000002 HC RX W HCPCS: Performed by: STUDENT IN AN ORGANIZED HEALTH CARE EDUCATION/TRAINING PROGRAM

## 2022-11-07 PROCEDURE — 94640 AIRWAY INHALATION TREATMENT: CPT

## 2022-11-07 PROCEDURE — 86850 RBC ANTIBODY SCREEN: CPT

## 2022-11-07 PROCEDURE — 93005 ELECTROCARDIOGRAM TRACING: CPT | Performed by: PHYSICIAN ASSISTANT

## 2022-11-07 PROCEDURE — 6370000000 HC RX 637 (ALT 250 FOR IP): Performed by: PHYSICIAN ASSISTANT

## 2022-11-07 PROCEDURE — 2580000003 HC RX 258: Performed by: STUDENT IN AN ORGANIZED HEALTH CARE EDUCATION/TRAINING PROGRAM

## 2022-11-07 PROCEDURE — 84145 PROCALCITONIN (PCT): CPT

## 2022-11-07 PROCEDURE — 93970 EXTREMITY STUDY: CPT

## 2022-11-07 PROCEDURE — 1200000000 HC SEMI PRIVATE

## 2022-11-07 PROCEDURE — 84484 ASSAY OF TROPONIN QUANT: CPT

## 2022-11-07 PROCEDURE — 86923 COMPATIBILITY TEST ELECTRIC: CPT

## 2022-11-07 PROCEDURE — 94150 VITAL CAPACITY TEST: CPT

## 2022-11-07 PROCEDURE — 87077 CULTURE AEROBIC IDENTIFY: CPT

## 2022-11-07 PROCEDURE — 2580000003 HC RX 258: Performed by: INTERNAL MEDICINE

## 2022-11-07 PROCEDURE — 82803 BLOOD GASES ANY COMBINATION: CPT

## 2022-11-07 PROCEDURE — 93010 ELECTROCARDIOGRAM REPORT: CPT | Performed by: INTERNAL MEDICINE

## 2022-11-07 PROCEDURE — 70450 CT HEAD/BRAIN W/O DYE: CPT

## 2022-11-07 PROCEDURE — 2700000000 HC OXYGEN THERAPY PER DAY

## 2022-11-07 PROCEDURE — 81001 URINALYSIS AUTO W/SCOPE: CPT

## 2022-11-07 PROCEDURE — 71045 X-RAY EXAM CHEST 1 VIEW: CPT

## 2022-11-07 PROCEDURE — 83880 ASSAY OF NATRIURETIC PEPTIDE: CPT

## 2022-11-07 PROCEDURE — 80162 ASSAY OF DIGOXIN TOTAL: CPT

## 2022-11-07 PROCEDURE — P9016 RBC LEUKOCYTES REDUCED: HCPCS

## 2022-11-07 PROCEDURE — 36415 COLL VENOUS BLD VENIPUNCTURE: CPT

## 2022-11-07 PROCEDURE — 83036 HEMOGLOBIN GLYCOSYLATED A1C: CPT

## 2022-11-07 PROCEDURE — 36430 TRANSFUSION BLD/BLD COMPNT: CPT

## 2022-11-07 PROCEDURE — 87086 URINE CULTURE/COLONY COUNT: CPT

## 2022-11-07 RX ORDER — GABAPENTIN 100 MG/1
100 CAPSULE ORAL 3 TIMES DAILY
Status: DISCONTINUED | OUTPATIENT
Start: 2022-11-07 | End: 2022-11-21 | Stop reason: HOSPADM

## 2022-11-07 RX ORDER — MAGNESIUM SULFATE IN WATER 40 MG/ML
2000 INJECTION, SOLUTION INTRAVENOUS PRN
Status: DISCONTINUED | OUTPATIENT
Start: 2022-11-07 | End: 2022-11-21 | Stop reason: HOSPADM

## 2022-11-07 RX ORDER — ACETAMINOPHEN 650 MG/1
650 SUPPOSITORY RECTAL EVERY 6 HOURS PRN
Status: DISCONTINUED | OUTPATIENT
Start: 2022-11-07 | End: 2022-11-21 | Stop reason: HOSPADM

## 2022-11-07 RX ORDER — INSULIN LISPRO 100 [IU]/ML
0-16 INJECTION, SOLUTION INTRAVENOUS; SUBCUTANEOUS
Status: DISCONTINUED | OUTPATIENT
Start: 2022-11-07 | End: 2022-11-08

## 2022-11-07 RX ORDER — METHOCARBAMOL 500 MG/1
500 TABLET, FILM COATED ORAL EVERY 8 HOURS
Status: DISCONTINUED | OUTPATIENT
Start: 2022-11-07 | End: 2022-11-21 | Stop reason: HOSPADM

## 2022-11-07 RX ORDER — OXYBUTYNIN CHLORIDE 5 MG/1
15 TABLET, EXTENDED RELEASE ORAL DAILY
Status: DISCONTINUED | OUTPATIENT
Start: 2022-11-07 | End: 2022-11-08

## 2022-11-07 RX ORDER — HYDROCODONE BITARTRATE AND ACETAMINOPHEN 5; 325 MG/1; MG/1
1 TABLET ORAL ONCE
Status: COMPLETED | OUTPATIENT
Start: 2022-11-07 | End: 2022-11-07

## 2022-11-07 RX ORDER — SODIUM CHLORIDE 0.9 % (FLUSH) 0.9 %
5-40 SYRINGE (ML) INJECTION EVERY 12 HOURS SCHEDULED
Status: DISCONTINUED | OUTPATIENT
Start: 2022-11-07 | End: 2022-11-21 | Stop reason: HOSPADM

## 2022-11-07 RX ORDER — FAMOTIDINE 20 MG/1
20 TABLET, FILM COATED ORAL DAILY
Status: DISCONTINUED | OUTPATIENT
Start: 2022-11-07 | End: 2022-11-20

## 2022-11-07 RX ORDER — POTASSIUM CHLORIDE 7.45 MG/ML
10 INJECTION INTRAVENOUS PRN
Status: DISCONTINUED | OUTPATIENT
Start: 2022-11-07 | End: 2022-11-21 | Stop reason: HOSPADM

## 2022-11-07 RX ORDER — POLYETHYLENE GLYCOL 3350 17 G/17G
17 POWDER, FOR SOLUTION ORAL 2 TIMES DAILY
Status: DISCONTINUED | OUTPATIENT
Start: 2022-11-07 | End: 2022-11-21 | Stop reason: HOSPADM

## 2022-11-07 RX ORDER — ACETAMINOPHEN 325 MG/1
650 TABLET ORAL EVERY 6 HOURS PRN
Status: DISCONTINUED | OUTPATIENT
Start: 2022-11-07 | End: 2022-11-21 | Stop reason: HOSPADM

## 2022-11-07 RX ORDER — SODIUM CHLORIDE 0.9 % (FLUSH) 0.9 %
5-40 SYRINGE (ML) INJECTION PRN
Status: DISCONTINUED | OUTPATIENT
Start: 2022-11-07 | End: 2022-11-21 | Stop reason: HOSPADM

## 2022-11-07 RX ORDER — TIZANIDINE 4 MG/1
2 TABLET ORAL EVERY 8 HOURS PRN
Status: DISCONTINUED | OUTPATIENT
Start: 2022-11-07 | End: 2022-11-21 | Stop reason: HOSPADM

## 2022-11-07 RX ORDER — IPRATROPIUM BROMIDE AND ALBUTEROL SULFATE 2.5; .5 MG/3ML; MG/3ML
1 SOLUTION RESPIRATORY (INHALATION)
Status: DISCONTINUED | OUTPATIENT
Start: 2022-11-07 | End: 2022-11-08

## 2022-11-07 RX ORDER — ENOXAPARIN SODIUM 100 MG/ML
40 INJECTION SUBCUTANEOUS DAILY
Status: DISCONTINUED | OUTPATIENT
Start: 2022-11-07 | End: 2022-11-07

## 2022-11-07 RX ORDER — POTASSIUM CHLORIDE 20 MEQ/1
40 TABLET, EXTENDED RELEASE ORAL PRN
Status: DISCONTINUED | OUTPATIENT
Start: 2022-11-07 | End: 2022-11-21 | Stop reason: HOSPADM

## 2022-11-07 RX ORDER — SODIUM CHLORIDE 9 MG/ML
INJECTION, SOLUTION INTRAVENOUS PRN
Status: DISCONTINUED | OUTPATIENT
Start: 2022-11-07 | End: 2022-11-08

## 2022-11-07 RX ORDER — ATORVASTATIN CALCIUM 80 MG/1
80 TABLET, FILM COATED ORAL NIGHTLY
Status: DISCONTINUED | OUTPATIENT
Start: 2022-11-07 | End: 2022-11-20

## 2022-11-07 RX ORDER — MORPHINE SULFATE 2 MG/ML
2 INJECTION, SOLUTION INTRAMUSCULAR; INTRAVENOUS EVERY 4 HOURS PRN
Status: DISCONTINUED | OUTPATIENT
Start: 2022-11-07 | End: 2022-11-18

## 2022-11-07 RX ORDER — INSULIN GLARGINE 100 [IU]/ML
35 INJECTION, SOLUTION SUBCUTANEOUS DAILY
Status: DISCONTINUED | OUTPATIENT
Start: 2022-11-07 | End: 2022-11-08

## 2022-11-07 RX ORDER — DOCUSATE SODIUM 100 MG/1
100 CAPSULE, LIQUID FILLED ORAL 2 TIMES DAILY
Status: DISCONTINUED | OUTPATIENT
Start: 2022-11-07 | End: 2022-11-21 | Stop reason: HOSPADM

## 2022-11-07 RX ORDER — ONDANSETRON 4 MG/1
4 TABLET, ORALLY DISINTEGRATING ORAL EVERY 8 HOURS PRN
Status: DISCONTINUED | OUTPATIENT
Start: 2022-11-07 | End: 2022-11-21 | Stop reason: HOSPADM

## 2022-11-07 RX ORDER — INSULIN LISPRO 100 [IU]/ML
0-4 INJECTION, SOLUTION INTRAVENOUS; SUBCUTANEOUS NIGHTLY
Status: DISCONTINUED | OUTPATIENT
Start: 2022-11-07 | End: 2022-11-08 | Stop reason: SDUPTHER

## 2022-11-07 RX ORDER — ONDANSETRON 4 MG/1
4 TABLET, ORALLY DISINTEGRATING ORAL ONCE
Status: COMPLETED | OUTPATIENT
Start: 2022-11-07 | End: 2022-11-07

## 2022-11-07 RX ORDER — HYDROCODONE BITARTRATE AND ACETAMINOPHEN 5; 325 MG/1; MG/1
1 TABLET ORAL EVERY 6 HOURS PRN
Status: DISCONTINUED | OUTPATIENT
Start: 2022-11-07 | End: 2022-11-21 | Stop reason: HOSPADM

## 2022-11-07 RX ORDER — FUROSEMIDE 10 MG/ML
40 INJECTION INTRAMUSCULAR; INTRAVENOUS DAILY
Status: DISCONTINUED | OUTPATIENT
Start: 2022-11-07 | End: 2022-11-08

## 2022-11-07 RX ORDER — CALCIUM CARBONATE 500(1250)
500 TABLET ORAL DAILY
Status: DISCONTINUED | OUTPATIENT
Start: 2022-11-07 | End: 2022-11-20

## 2022-11-07 RX ORDER — SODIUM CHLORIDE 9 MG/ML
INJECTION, SOLUTION INTRAVENOUS PRN
Status: DISCONTINUED | OUTPATIENT
Start: 2022-11-07 | End: 2022-11-21 | Stop reason: HOSPADM

## 2022-11-07 RX ORDER — HEPARIN SODIUM 5000 [USP'U]/ML
5000 INJECTION, SOLUTION INTRAVENOUS; SUBCUTANEOUS EVERY 12 HOURS
Status: DISCONTINUED | OUTPATIENT
Start: 2022-11-07 | End: 2022-11-21 | Stop reason: HOSPADM

## 2022-11-07 RX ORDER — GUAIFENESIN 600 MG/1
600 TABLET, EXTENDED RELEASE ORAL 2 TIMES DAILY PRN
Status: DISCONTINUED | OUTPATIENT
Start: 2022-11-07 | End: 2022-11-21 | Stop reason: HOSPADM

## 2022-11-07 RX ORDER — ONDANSETRON 2 MG/ML
4 INJECTION INTRAMUSCULAR; INTRAVENOUS EVERY 6 HOURS PRN
Status: DISCONTINUED | OUTPATIENT
Start: 2022-11-07 | End: 2022-11-21 | Stop reason: HOSPADM

## 2022-11-07 RX ORDER — DIGOXIN 125 MCG
125 TABLET ORAL DAILY
Status: DISCONTINUED | OUTPATIENT
Start: 2022-11-07 | End: 2022-11-21 | Stop reason: HOSPADM

## 2022-11-07 RX ORDER — MAGNESIUM 30 MG
40 TABLET ORAL DAILY
Status: DISCONTINUED | OUTPATIENT
Start: 2022-11-07 | End: 2022-11-07 | Stop reason: RX

## 2022-11-07 RX ORDER — POLYETHYLENE GLYCOL 3350 17 G/17G
17 POWDER, FOR SOLUTION ORAL DAILY PRN
Status: DISCONTINUED | OUTPATIENT
Start: 2022-11-07 | End: 2022-11-21 | Stop reason: HOSPADM

## 2022-11-07 RX ORDER — PANTOPRAZOLE SODIUM 40 MG/1
40 TABLET, DELAYED RELEASE ORAL
Status: DISCONTINUED | OUTPATIENT
Start: 2022-11-07 | End: 2022-11-21 | Stop reason: HOSPADM

## 2022-11-07 RX ORDER — DEXTROSE MONOHYDRATE 100 MG/ML
INJECTION, SOLUTION INTRAVENOUS CONTINUOUS PRN
Status: DISCONTINUED | OUTPATIENT
Start: 2022-11-07 | End: 2022-11-21 | Stop reason: HOSPADM

## 2022-11-07 RX ADMIN — METHOCARBAMOL TABLETS 500 MG: 500 TABLET, COATED ORAL at 14:52

## 2022-11-07 RX ADMIN — POLYETHYLENE GLYCOL 3350 17 G: 17 POWDER, FOR SOLUTION ORAL at 22:03

## 2022-11-07 RX ADMIN — FAMOTIDINE 20 MG: 20 TABLET ORAL at 07:16

## 2022-11-07 RX ADMIN — DIGOXIN 125 MCG: 125 TABLET ORAL at 14:51

## 2022-11-07 RX ADMIN — DOCUSATE SODIUM 100 MG: 100 CAPSULE, LIQUID FILLED ORAL at 22:03

## 2022-11-07 RX ADMIN — GABAPENTIN 100 MG: 100 CAPSULE ORAL at 22:03

## 2022-11-07 RX ADMIN — Medication 10 ML: at 15:10

## 2022-11-07 RX ADMIN — Medication 10 ML: at 22:03

## 2022-11-07 RX ADMIN — PANTOPRAZOLE SODIUM 40 MG: 40 TABLET, DELAYED RELEASE ORAL at 14:52

## 2022-11-07 RX ADMIN — CEFTRIAXONE SODIUM 1000 MG: 1 INJECTION, POWDER, FOR SOLUTION INTRAMUSCULAR; INTRAVENOUS at 05:41

## 2022-11-07 RX ADMIN — MORPHINE SULFATE 2 MG: 2 INJECTION, SOLUTION INTRAMUSCULAR; INTRAVENOUS at 16:44

## 2022-11-07 RX ADMIN — INSULIN GLARGINE 35 UNITS: 100 INJECTION, SOLUTION SUBCUTANEOUS at 15:10

## 2022-11-07 RX ADMIN — GABAPENTIN 100 MG: 100 CAPSULE ORAL at 14:49

## 2022-11-07 RX ADMIN — MAGNESIUM SULFATE HEPTAHYDRATE 2000 MG: 40 INJECTION, SOLUTION INTRAVENOUS at 14:53

## 2022-11-07 RX ADMIN — METHOCARBAMOL TABLETS 500 MG: 500 TABLET, COATED ORAL at 22:02

## 2022-11-07 RX ADMIN — LEVOTHYROXINE SODIUM 175 MCG: 0.03 TABLET ORAL at 14:52

## 2022-11-07 RX ADMIN — ONDANSETRON 4 MG: 4 TABLET, ORALLY DISINTEGRATING ORAL at 01:05

## 2022-11-07 RX ADMIN — SODIUM CHLORIDE, PRESERVATIVE FREE 20 ML: 5 INJECTION INTRAVENOUS at 16:43

## 2022-11-07 RX ADMIN — Medication 500 MG: at 14:48

## 2022-11-07 RX ADMIN — IPRATROPIUM BROMIDE AND ALBUTEROL SULFATE 1 AMPULE: .5; 3 SOLUTION RESPIRATORY (INHALATION) at 16:32

## 2022-11-07 RX ADMIN — ACETAMINOPHEN 650 MG: 325 TABLET ORAL at 17:36

## 2022-11-07 RX ADMIN — IPRATROPIUM BROMIDE AND ALBUTEROL SULFATE 1 AMPULE: .5; 3 SOLUTION RESPIRATORY (INHALATION) at 21:36

## 2022-11-07 RX ADMIN — DIGOXIN 125 MCG: 125 TABLET ORAL at 14:48

## 2022-11-07 RX ADMIN — IPRATROPIUM BROMIDE AND ALBUTEROL SULFATE 1 AMPULE: .5; 3 SOLUTION RESPIRATORY (INHALATION) at 12:53

## 2022-11-07 RX ADMIN — INSULIN LISPRO 8 UNITS: 100 INJECTION, SOLUTION INTRAVENOUS; SUBCUTANEOUS at 14:54

## 2022-11-07 RX ADMIN — FAMOTIDINE 20 MG: 20 TABLET ORAL at 22:02

## 2022-11-07 RX ADMIN — DIATRIZOATE MEGLUMINE AND DIATRIZOATE SODIUM 20 ML: 660; 100 LIQUID ORAL; RECTAL at 14:48

## 2022-11-07 RX ADMIN — OXYBUTYNIN CHLORIDE 15 MG: 5 TABLET, EXTENDED RELEASE ORAL at 14:52

## 2022-11-07 RX ADMIN — ATORVASTATIN CALCIUM 80 MG: 80 TABLET, FILM COATED ORAL at 22:03

## 2022-11-07 RX ADMIN — MEROPENEM 500 MG: 500 INJECTION, POWDER, FOR SOLUTION INTRAVENOUS at 17:24

## 2022-11-07 RX ADMIN — HYDROCODONE BITARTRATE AND ACETAMINOPHEN 1 TABLET: 5; 325 TABLET ORAL at 14:50

## 2022-11-07 RX ADMIN — HYDROCODONE BITARTRATE AND ACETAMINOPHEN 1 TABLET: 5; 325 TABLET ORAL at 01:05

## 2022-11-07 RX ADMIN — INSULIN LISPRO 8 UNITS: 100 INJECTION, SOLUTION INTRAVENOUS; SUBCUTANEOUS at 17:37

## 2022-11-07 ASSESSMENT — ENCOUNTER SYMPTOMS
RHINORRHEA: 0
SHORTNESS OF BREATH: 1
VOMITING: 0
DIARRHEA: 0
COUGH: 0
NAUSEA: 0
ABDOMINAL PAIN: 0

## 2022-11-07 ASSESSMENT — PAIN SCALES - GENERAL
PAINLEVEL_OUTOF10: 10
PAINLEVEL_OUTOF10: 10
PAINLEVEL_OUTOF10: 9
PAINLEVEL_OUTOF10: 2
PAINLEVEL_OUTOF10: 10
PAINLEVEL_OUTOF10: 9
PAINLEVEL_OUTOF10: 4
PAINLEVEL_OUTOF10: 10

## 2022-11-07 ASSESSMENT — PAIN DESCRIPTION - LOCATION
LOCATION_3: NECK
LOCATION: NECK
LOCATION: BUTTOCKS
LOCATION: ABDOMEN;FLANK
LOCATION: BUTTOCKS;NECK;SHOULDER
LOCATION_2: SHOULDER
LOCATION: NECK

## 2022-11-07 ASSESSMENT — PAIN DESCRIPTION - ORIENTATION
ORIENTATION: LEFT
ORIENTATION: LEFT

## 2022-11-07 ASSESSMENT — PAIN DESCRIPTION - DESCRIPTORS: DESCRIPTORS: ACHING

## 2022-11-07 NOTE — ACP (ADVANCE CARE PLANNING)
Advanced Care Planning Note. Purpose of Encounter: Advanced care planning in light of acute respiratory failure. Parties In Attendance: Patient, daughter Juan Jose Marcum),   Decisional Capacity: Yes  Subjective: Shortness of breath  Objective: BNP 3064. Troponin 0.13  Goals of Care Determination: Patient/POA wishes to seek treatment for respiratory failure. Plan: Lasix. Labs. Antibiotics. Labs. Code Status: DNR CCA   Time spent on Advanced care Plannin minutes  Advanced Care Planning Documents: Completed advanced directives on chart, daughter is the POA.     Austyn Amin DO  2022 4:04 AM

## 2022-11-07 NOTE — RT PROTOCOL NOTE
RT Inhaler-Nebulizer Bronchodilator Protocol Note    There is a bronchodilator order in the chart from a provider indicating to follow the RT Bronchodilator Protocol and there is an Initiate RT Inhaler-Nebulizer Bronchodilator Protocol order as well (see protocol at bottom of note). CXR Findings:  XR CHEST PORTABLE    Result Date: 11/7/2022  Left PICC line appears to be over the SVC and not significantly changed from the prior exam. Cardiomegaly with pulmonary vascular congestion and interstitial prominence/interstitial opacities suggesting edema. Stable to the slightly worsened from the recent study. The findings from the last RT Protocol Assessment were as follows:   History Pulmonary Disease: None or smoker <15 pack years  Respiratory Pattern: Dyspnea on exertion or RR 21-25 bpm  Breath Sounds: Slightly diminished and/or crackles  Cough: Strong, spontaneous, non-productive  Indication for Bronchodilator Therapy:    Bronchodilator Assessment Score: 4    Aerosolized bronchodilator medication orders have been revised according to the RT Inhaler-Nebulizer Bronchodilator Protocol below. Respiratory Therapist to perform RT Therapy Protocol Assessment initially then follow the protocol. Repeat RT Therapy Protocol Assessment PRN for score 0-3 or on second treatment, BID, and PRN for scores above 3. No Indications - adjust the frequency to every 6 hours PRN wheezing or bronchospasm, if no treatments needed after 48 hours then discontinue using Per Protocol order mode. If indication present, adjust the RT bronchodilator orders based on the Bronchodilator Assessment Score as indicated below.   Use Inhaler orders unless patient has one or more of the following: on home nebulizer, not able to hold breath for 10 seconds, is not alert and oriented, cannot activate and use MDI correctly, or respiratory rate 25 breaths per minute or more, then use the equivalent nebulizer order(s) with same Frequency and PRN reasons based on the score. If a patient is on this medication at home then do not decrease Frequency below that used at home. 0-3 - enter or revise RT bronchodilator order(s) to equivalent RT Bronchodilator order with Frequency of every 4 hours PRN for wheezing or increased work of breathing using Per Protocol order mode. 4-6 - enter or revise RT Bronchodilator order(s) to two equivalent RT bronchodilator orders with one order with BID Frequency and one order with Frequency of every 4 hours PRN wheezing or increased work of breathing using Per Protocol order mode. 7-10 - enter or revise RT Bronchodilator order(s) to two equivalent RT bronchodilator orders with one order with TID Frequency and one order with Frequency of every 4 hours PRN wheezing or increased work of breathing using Per Protocol order mode. 11-13 - enter or revise RT Bronchodilator order(s) to one equivalent RT bronchodilator order with QID Frequency and an Albuterol order with Frequency of every 4 hours PRN wheezing or increased work of breathing using Per Protocol order mode. Greater than 13 - enter or revise RT Bronchodilator order(s) to one equivalent RT bronchodilator order with every 4 hours Frequency and an Albuterol order with Frequency of every 2 hours PRN wheezing or increased work of breathing using Per Protocol order mode. RT to enter RT Home Evaluation for COPD & MDI Assessment order using Per Protocol order mode.     Electronically signed by Linda Brown RCP on 11/7/2022 at 5:36 AM

## 2022-11-07 NOTE — ED PROVIDER NOTES
[facility]        Pt Name: Shan Youssef  MRN: [id number]  [place] 1940  Date of evaluation: 11/6/2022  Provider: Marina Kuo PA-C  PCP: No primary care provider on file. Note Started: 12:34 AM EST       ANA. I have evaluated this patient. My supervising physician was available for consultation. CHIEF COMPLAINT       Chief Complaint   Patient presents with    Shortness of Breath     Pt [facility] with reports of SOB, per EMS Pt sating at 88% on RA. Pt is usually on RA per baseline. Pt satting at 92% RA. Pt recently discharged for Picc Line placement due to reoccurring blood transfusion due to MDS. Per daughter at bedside pt is acting normally. Does not appear to be lethargic or confused at this time. VSS. HISTORY OF PRESENT ILLNESS   (Location, Timing/Onset, Context/Setting, Quality, Duration, Modifying Factors, Severity, Associated Signs and Symptoms)  Note limiting factors. Chief Complaint: Shortness of breath    Shan Youssef is a 78 y.o. female who presents to the emergency department today from [facility] for evaluation for shortness of breath. The patient does not normally wear oxygen. Daughters are at bedside and they do provide the history as the patient's preferred language is French. Patient is able to answer some questions. The patient was actually admitted on 11/3/2022, she has a history of MDS, and was requiring blood transfusions. The patient did receive 2 units while here. Patient was actually discharged yesterday, and the family noticed yesterday that the patient seemed to be more confused, they report that she was nodding off midsentence, and was saying some \"off-the-wall things\". The patient was complaining of increasing shortness of breath yesterday, and today.   Family reports that the patient was requiring oxygen at the nursing home, and she would report several episodes where she would have increased work of breathing. When the patient arrived to the ED, she states that she is feeling short of breath although she has no chest pain. She has not had any cough or congestion. There is not been any documented fever however the family is concerned that the patient's body feels warm. Patient does have a PICC line in place. There is not been any vomiting. No diarrhea. Patient has no other complaints. Nursing Notes were all reviewed and agreed with or any disagreements were addressed in the HPI. REVIEW OF SYSTEMS    (2-9 systems for level 4, 10 or more for level 5)     Review of Systems   Constitutional:  Negative for activity change, appetite change, chills and fever. HENT:  Negative for congestion and rhinorrhea. Respiratory:  Positive for shortness of breath. Negative for cough. Cardiovascular:  Negative for chest pain. Gastrointestinal:  Negative for abdominal pain, diarrhea, nausea and vomiting. Genitourinary:  Negative for difficulty urinating, dysuria and hematuria. Psychiatric/Behavioral:  Positive for confusion. Positives and Pertinent negatives as per HPI. Except as noted above in the ROS, all other systems were reviewed and negative. PAST MEDICAL HISTORY     Past Medical History:   Diagnosis Date    4/11/17 Left knee repeat repair of median parapatellar arthrotomy with augmentation 4/12/2017    Arthritis     Aspiration pneumonia of left lower lobe due to gastric secretions ([address] 10/18/2022    Atrial fibrillation (HCC)     CAD (coronary artery disease)     Cataract     Chronic diastolic congestive heart failure ([address] 7/27/2022    Diabetes mellitus ([address]     GERD (gastroesophageal reflux disease)     Hyperlipidemia     Hypertension     HYPOTHRYROIDISM     Myelodysplastic disease ([address] 10/17/2022    Non-English speaking patient     SPEAKS Azeri.   SHE SPEAKS A LITTLE ENGLISH    Sleep apnea     unspecified    Thyroid disease UTI          SURGICAL HISTORY     Past Surgical History:   Procedure Laterality Date    CARDIAC SURGERY  2004    CABG X 4 VESSELS    CHOLECYSTECTOMY, LAPAROSCOPIC  5/15/14    with IOC    CT BONE MARROW BIOPSY  9/29/2022    CT BONE MARROW BIOPSY 9/29/2022 [facility] CT SCAN    JOINT REPLACEMENT Bilateral 2000 AND 1996 And 1998    knee    KNEE SURGERY Left 02/28/2017    left knee poly exchange revision     REVISION TOTAL KNEE ARTHROPLASTY Right 11/22/2016    RIGHT TOTAL KNEE REVISION WITH POLY EXCHANGE    TOTAL KNEE ARTHROPLASTY      partical/ left     UPPER GASTROINTESTINAL ENDOSCOPY N/A 10/3/2022    EGD BIOPSY performed by Jeremias Kurtz MD at [address] 10/3/2022    EGD DILATION BALLOON performed by Jeremias Kurtz MD at [address]       Previous Medications    ATORVASTATIN (LIPITOR) 80 MG TABLET    Take 1 tablet by mouth daily    CALCIUM CARBONATE (OSCAL) 500 MG TABS TABLET    Take 500 mg by mouth daily    DIGOXIN (LANOXIN) 125 MCG TABLET    Take 1 tablet by mouth daily    EMPAGLIFLOZIN (JARDIANCE) 10 MG TABLET    Take 1 tablet by mouth daily    FAMOTIDINE (PEPCID) 20 MG TABLET    Take 20 mg by mouth daily    FUROSEMIDE (LASIX) 20 MG TABLET    Take 1 tablet by mouth daily    GABAPENTIN (NEURONTIN) 100 MG CAPSULE    Take 1 capsule by mouth 3 times daily for 30 days. GUAIFENESIN (MUCINEX) 600 MG EXTENDED RELEASE TABLET    Take 1 tablet by mouth 2 times daily as needed for Congestion    HYDROCODONE-ACETAMINOPHEN (NORCO) 5-325 MG PER TABLET    Take 1 tablet by mouth every 6 hours as needed for Pain.     INSULIN GLARGINE (BASAGLAR KWIKPEN) 100 UNIT/ML INJECTION PEN    Inject 48 Units into the skin daily    INSULIN LISPRO (HUMALOG) 100 UNIT/ML SOLN INJECTION VIAL    Inject 6 Units into the skin 3 times daily (with meals)    INSULIN PEN NEEDLE 32G X 6 MM MISC    by Does not apply route 3 times daily with meals    IPRATROPIUM-ALBUTEROL (DUONEB IN) Inhale 3 mLs into the lungs every 4 hours as needed (SOB)    LACTOBACILLUS (CULTURELLE) CAPSULE    Take 2 capsules by mouth daily    LEVOTHYROXINE (SYNTHROID) 175 MCG TABLET    Take 175 mcg by mouth daily    LIDOCAINE (LIDODERM) 5 %    Place 1 patch onto the skin daily 12 hours on, 12 hours off. LIRAGLUTIDE (VICTOZA) 18 MG/3ML SOPN SC INJECTION    Inject 1.2 mg into the skin daily    MAGNESIUM 30 MG TABLET    Take 40 mg by mouth daily    METOPROLOL TARTRATE (LOPRESSOR) 25 MG TABLET    Take 0.5 tablets by mouth 2 times daily    METRONIDAZOLE (FLAGYL) 500 MG TABLET    Take 500 mg by mouth 3 times daily    OMEGA 3 1000 MG CAPS    Take 1,000 mg by mouth daily    OMEPRAZOLE (PRILOSEC) 20 MG DELAYED RELEASE CAPSULE    Take 20 mg by mouth in the morning and at bedtime    OXYBUTYNIN (DITROPAN XL) 15 MG CR TABLET    Take 15 mg by mouth daily.     PHENAZOPYRIDINE (PYRIDIUM) 100 MG TABLET    Take 1 tablet by mouth in the morning, at noon, and at bedtime for 3 days    POLYETHYLENE GLYCOL (GLYCOLAX) 17 G PACKET    Take 17 g by mouth daily as needed for Constipation    SACUBITRIL-VALSARTAN (ENTRESTO) 24-26 MG PER TABLET    Take 0.5 tablets by mouth 2 times daily    SENNA (SENOKOT) 8.6 MG TABLET    Take 1 tablet by mouth 2 times daily    SILVER SULFADIAZINE (SILVADENE) 1 % CREAM    Apply topically 2 times daily Apply to inner buttocks every shift for MASD    TIZANIDINE (ZANAFLEX) 2 MG TABLET    Take 2 mg by mouth every 8 hours as needed    VITAMIN D (ERGOCALCIFEROL) 66686 UNITS CAPS CAPSULE    Take 50,000 Units by mouth once a week         ALLERGIES     Aspirin, Ibuprofen, Macrobid [nitrofurantoin monohydrate macrocrystals], Adhesive tape, Lexiscan [regadenoson], and Penicillins    FAMILYHISTORY       Family History   Problem Relation Age of Onset    Arthritis Other     Diabetes Other     Heart Disease Other     Hypertension Other     High Cholesterol Brother           SOCIAL HISTORY       Social History     Tobacco Use Smoking status: Never    Smokeless tobacco: Never   Vaping Use    Vaping Use: Never used   Substance Use Topics    Alcohol use: No    Drug use: No       SCREENINGS    Canadian Coma Scale  Eye Opening: Spontaneous  Best Verbal Response: Oriented  Best Motor Response: Obeys commands  Susannah Coma Scale Score: 15        PHYSICAL EXAM    (up to 7 for level 4, 8 or more for level 5)     ED Triage Vitals [11/07/22 0000]   BP Temp Temp Source Heart Rate Resp SpO2 Height Weight   (!) 107/52 99.2 °F (37.3 °C) Oral 91 (!) 36 90 % -- --       Physical Exam  Vitals and nursing note reviewed. Constitutional:       Appearance: She is well-developed. She is ill-appearing. She is not diaphoretic. HENT:      Head: Normocephalic and atraumatic. Right Ear: External ear normal.      Left Ear: External ear normal.      Nose: Nose normal.   Eyes:      General:         Right eye: No discharge. Left eye: No discharge. Neck:      Trachea: No tracheal deviation. Cardiovascular:      Rate and Rhythm: Normal rate. Rhythm irregular. Pulmonary:      Effort: Pulmonary effort is normal. No respiratory distress. Breath sounds: Rhonchi present. Comments: Scattered rhonchorous breath sounds throughout all lung fields  Abdominal:      General: Bowel sounds are normal. There is no distension. Palpations: Abdomen is soft. Tenderness: There is no abdominal tenderness. There is no guarding. Musculoskeletal:         General: Normal range of motion. Cervical back: Normal range of motion and neck supple. Skin:     General: Skin is warm and dry. Coloration: Skin is pale. Neurological:      General: No focal deficit present. Mental Status: She is alert and oriented to person, place, and time.    Psychiatric:         Behavior: Behavior normal.       DIAGNOSTIC RESULTS   LABS:    Labs Reviewed   CBC WITH AUTO DIFFERENTIAL - Abnormal; Notable for the following components:       Result Value    RBC 2.25 (*)     Hemoglobin 7.0 (*)     Hematocrit 22.0 (*)     RDW 19.8 (*)     Platelets 32 (*)     Unid. Mononu 2 (*)     Anisocytosis 2+ (*)     Microcytes Occasional (*)     Polychromasia Occasional (*)     Ovalocytes Occasional (*)     Tear Drop Cells Occasional (*)     All other components within normal limits   COMPREHENSIVE METABOLIC PANEL - Abnormal; Notable for the following components:    Sodium 134 (*)     Chloride 98 (*)     Glucose 258 (*)     BUN 40 (*)     Creatinine 1.3 (*)     Est, Glom Filt Rate 40 (*)     Calcium 7.9 (*)     Albumin 2.3 (*)     Albumin/Globulin Ratio 0.6 (*)     AST 14 (*)     All other components within normal limits   TROPONIN - Abnormal; Notable for the following components:    Troponin 0.13 (*)     All other components within normal limits   BRAIN NATRIURETIC PEPTIDE - Abnormal; Notable for the following components:    Pro-BNP 3,064 (*)     All other components within normal limits   BLOOD GAS, VENOUS - Abnormal; Notable for the following components:    pH, Manolo 7.495 (*)     pCO2, Manolo 35.0 (*)     pO2, Manolo 160.0 (*)     Base Excess, Manolo 3.5 (*)     Carboxyhemoglobin 7.7 (*)     All other components within normal limits   PROCALCITONIN - Abnormal; Notable for the following components:    Procalcitonin 0.72 (*)     All other components within normal limits   LACTATE, SEPSIS   URINALYSIS WITH REFLEX TO CULTURE       When ordered only abnormal lab results are displayed. All other labs were within normal range or not returned as of this dictation. EKG: When ordered, EKG's are interpreted by the Emergency Department Physician in the absence of a cardiologist.  Please see their note for interpretation of EKG.     RADIOLOGY:   Non-plain film images such as CT, Ultrasound and MRI are read by the radiologist. Plain radiographic images are visualized and preliminarily interpreted by the ED Provider with the below findings:        Interpretation per the Radiologist below, if available at the time of this note:    XR CHEST PORTABLE   Final Result   Left PICC line appears to be over the SVC and not significantly changed from   the prior exam.      Cardiomegaly with pulmonary vascular congestion and interstitial   prominence/interstitial opacities suggesting edema. Stable to the slightly   worsened from the recent study. CT HEAD WO CONTRAST   Final Result   No acute intracranial hemorrhage, mass effect, midline shift, or signs of   acute territorial infarct. Generalized volume loss and chronic ischemic changes in the white matter are   stable. Partial opacification in the left mastoid air cells and edge of the middle   ear cavity are new from the prior exam.  Correlate for infectious symptoms   versus eustachian tube dysfunction. XR CHEST PORTABLE    Result Date: 11/3/2022  EXAMINATION: ONE XRAY VIEW OF THE CHEST 11/3/2022 6:12 pm COMPARISON: November 2, 2022 HISTORY: ORDERING SYSTEM PROVIDED HISTORY: Chest pain TECHNOLOGIST PROVIDED HISTORY: Reason for exam:->Chest pain Reason for Exam: chest pain FINDINGS: Redemonstration of cardiomegaly, postthoracotomy changes and congestion. No evidence of pulmonary edema or focal infiltrates. No evidence of pleural effusion or pneumothorax. Cardiomegaly and postthoracotomy changes. Mild congestion but no evidence of pulmonary edema or focal infiltrates. XR CHEST PORTABLE    Result Date: 11/2/2022  EXAMINATION: ONE XRAY VIEW OF THE CHEST 11/2/2022 2:17 pm COMPARISON: None. HISTORY: ORDERING SYSTEM PROVIDED HISTORY: PICC Placement - tip verification TECHNOLOGIST PROVIDED HISTORY: Reason for exam:->PICC Placement - tip verification Reason for exam:-> Reason for Exam: PICC Placement - tip verification - left - a-fib FINDINGS: Cardiomegaly. Interval placement of a left-sided PICC line with the tip in the superior vena cava. No acute airspace infiltrate.   No pneumothorax or pleural effusion     Left-sided PICC line with the tip in the superior vena cava Cardiomegaly           PROCEDURES   Unless otherwise noted below, none     Procedures    CRITICAL CARE TIME       CONSULTS:  IP CONSULT TO HOSPITALIST      EMERGENCY DEPARTMENT COURSE and DIFFERENTIAL DIAGNOSIS/MDM:   Vitals:    Vitals:    11/07/22 0000 11/07/22 0205   BP: (!) 107/52 (!) 122/102   Pulse: 91 78   Resp: (!) 36 28   Temp: 99.2 °F (37.3 °C)    TempSrc: Oral    SpO2: 90% 99%       Patient was given the following medications:  Medications   HYDROcodone-acetaminophen (NORCO) 5-325 MG per tablet 1 tablet (1 tablet Oral Given 11/7/22 0105)   ondansetron (ZOFRAN-ODT) disintegrating tablet 4 mg (4 mg Oral Given 11/7/22 0105)         Is this patient to be included in the SEP-1 Core Measure due to severe sepsis or septic shock? No   Exclusion criteria - the patient is NOT to be included for SEP-1 Core Measure due to: Infection is not suspected    Briefly, this is an 67-year-old female who presents to the emergency room department today for evaluation for shortness of breath. Patient was actually admitted on 11/3/2022, she does have a history of MDS, causing anemia and did receive 2 units while here. Patient was actually discharged yesterday, however since being discharged has complained of some increasing shortness of breath. She is now requiring oxygen and not normally does she wear this. Family is concerned that she was saying some \"off-the-wall things\" yesterday and today. On examination she is pale, she is ill-appearing. She is in atrial fibrillation which is chronic for her. Does have left prescriptions throughout all lung fields. PICC line in place. EKG seconded by my attending, please see his note for further details. CBC shows no evidence of leukocytosis, she is anemic at 7.0, however I am concerned for fluid overload as BNP is elevated, troponin is elevated and x-ray does show worsening edema. She does have a new ARUN with a BUN of 40, creatinine of 1.3.   She was given a dose of her pain medication, however due to her CHF exacerbation, and anemia she will need to be admitted for further care and evaluation. Hospitalist has been paged for admission, she is otherwise stable for admission    FINAL IMPRESSION      1. Dyspnea, unspecified type    2. Acute on chronic congestive heart failure, unspecified heart failure type [facility])          DISPOSITION/PLAN   DISPOSITION Decision To Admit 11/07/2022 03:04:16 AM      PATIENT REFERRED TO:  No follow-up provider specified.     DISCHARGE MEDICATIONS:  New Prescriptions    No medications on file       DISCONTINUED MEDICATIONS:  Discontinued Medications    No medications on file              (Please note that portions of this note were completed with a voice recognition program.  Efforts were made to edit the dictations but occasionally words are mis-transcribed.)    Lindsey Oconnor PA-C (electronically signed)            Lindsey Oconnor PA-C  11/07/22 2277

## 2022-11-07 NOTE — CARE COORDINATION
Case Management Assessment  Initial Evaluation    Date/Time of Evaluation: 11/7/2022 4:05 PM  Assessment Completed by: Elizabeth Schmidt RN    If patient is discharged prior to next notation, then this note serves as note for discharge by case management. Patient Name: Obinna Silver                   YOB: 1940  Diagnosis: Acute respiratory failure with hypoxemia ([facility] [J96.01]  Dyspnea, unspecified type [R06.00]  Acute on chronic congestive heart failure, unspecified heart failure type [facility]) [I50.9]                   Date / Time: 11/6/2022 11:47 PM    Patient Admission Status: Inpatient   Readmission Risk (Low < 19, Mod (19-27), High > 27): Readmission Risk Score: 29.2    Current PCP: Mica Burch  PCP verified by CM? Yes    Chart Reviewed: Yes      History Provided by: Child/Family  Patient Orientation: Alert and Oriented, Other (see comment) (Language Barrier)    Patient Cognition: Alert    Hospitalization in the last 30 days (Readmission):  Yes    If yes, Readmission Assessment in CM Navigator will be completed. Advance Directives:      Code Status: DNR-CCA   Patient's Primary Decision Maker is: Legal Next of Kin      Discharge Planning:    Patient lives with: Alone Type of Home: Skilled Nursing Facility  Primary Care Giver: Family (Currently at SNF)  Patient Support Systems include: Family Members   Current Financial resources: Medicare  Current community resources:  (none)  Current services prior to admission: Kaz Tang            Current DME:              Type of Home Care services:  None    ADLS  Prior functional level: Assistance with the following:, Bathing, Dressing, Toileting, Cooking, Housework, Shopping, Feeding, Mobility  Current functional level: Assistance with the following:, Bathing, Dressing, Toileting, Feeding, Cooking, Housework, Shopping, Mobility    PT AM-PAC:   /24  OT AM-PAC:   /24    Family can provide assistance at DC:  Yes  Would you like Case Management to discuss the discharge plan with any other family members/significant others, and if so, who? Yes (family and SNF staff)  Plans to Return to Present Housing: Yes  Other Identified Issues/Barriers to RETURNING to current housing: Unstable health status resulting in frequent re-admissions  Potential Assistance needed at discharge: Kaz Tang        Patient expects to discharge to: [address] for transportation at discharge: Family    Financial    Payor: MEDICARE / Plan: MEDICARE PART A AND B / Product Type: *No Product type* /     Does insurance require precert for SNF: No    Potential assistance Purchasing Medications: No  Meds-to-Beds request:        [facility] [id number] Sirena Ramsey, [address] [phone] Nadira Araya [phone]  [address] [place] [address] Road  [address] [facility]  Phone: [phone] Fax: [address], [address]  [phone] - F [phone]  [address] [place] [address]  Phone: [phone] Fax: [phone]      Notes:    Factors facilitating achievement of predicted outcomes: Family support, Caregiver support, and Pleasant    Barriers to discharge: Pain, Decreased motivation, Limited participation, Limited insight into deficits, Unrealistic expectations, and Lower extremity weakness    Additional Case Management Notes: Pt is from home with family initially but d/t recent hospitalizations, pt is requiring SN services. Pt will plan to return to SNF at [place]. Current Nursing Home Information:  Patient admitted from:    [address]. Priscila Mayen [address]  Phone: [phone]  Fax:  [phone] (DR)           [phone] Bakari Neil)      Call to Tyrone Soto, who confirmed the patient is: [address], no Precert required for return.       Patient Covid vaccination status:    Internal Administration   First Dose      Second Dose           Last COVID Lab SARS-CoV-2, NAAT (no units)   Date Value   11/03/2022 Not Detected            Covid Test requirement for return: No Rapid/PCR Covid test needed before return      The Plan for Transition of Care is related to the following treatment goals of Acute respiratory failure with hypoxemia (HCC) [J96.01]  Dyspnea, unspecified type [R06.00]  Acute on chronic congestive heart failure, unspecified heart failure type ([facility] [I50.9]        The Patient and/or Patient Representative Agree with the Discharge Plan?  Yes      Electronically signed by Hilary Abad RN on 11/7/2022 at 4:08 PM

## 2022-11-07 NOTE — CONSENT
Informed Consent for Blood Component Transfusion Note    I have discussed with the patient the rationale for blood component transfusion; its benefits in treating or preventing fatigue, organ damage, or death; and its risk which includes mild transfusion reactions, rare risk of blood borne infection, or more serious but rare reactions. I have discussed the alternatives to transfusion, including the risk and consequences of not receiving transfusion. The patient had an opportunity to ask questions and had agreed to proceed with transfusion of blood components.     Electronically signed by Dallas Agrawal DO on 11/7/22 at 7:13 AM EST

## 2022-11-07 NOTE — CARE COORDINATION
11/07/22 1510   Readmission Assessment   Number of Days since last admission? 1-7 days   Previous Disposition SNF   Who is being Asher Early   (Dtr/family)   What was the patient's/caregiver's perception as to why they think they needed to return back to the hospital? Other (Comment)  (unstble health condition, unplanned change in status)   Did you visit your Primary Care Physician after you left the hospital, before you returned this time? No   Why weren't you able to visit your PCP? Other (Comment)  (Too soon after DC)   Did you see a specialist, such as Cardiac, Pulmonary, Orthopedic Physician, etc. after you left the hospital? No  (Too soon after DC)   Who advised the patient to return to the hospital? Caregiver; Other (Comment)  (family)   Does the patient report anything that got in the way of taking their medications? No   In our efforts to provide the best possible care to you and others like you, can you think of anything that we could have done to help you after you left the hospital the first time, so that you might not have needed to return so soon? Identify patient's health literacy needs; Improved written discharge instructions; Teaching during hospitalization regarding your illness; Teach back instructions regarding management of illness; Discharge instructions that are concise, clear, and non contradictory

## 2022-11-07 NOTE — H&P
Hospital Medicine History & Physical        Name:  Naomi Nguyen  /Age/Sex: 1940  (78 y.o. female)  MRN & CSN:  [id number] & [id number]     PCP: No primary care provider on file. Date of Admission: 2022    Date of Service: Pt seen/examined on 2022    Patient Status:  Inpatient - Patient will most likely require more than 48 hours of treatment and requires intensive medical treatment/monitoring     Chief Complaint:    Chief Complaint   Patient presents with    Shortness of Breath     Pt presents to the ED from [facility] with reports of SOB, per EMS Pt sating at 88% on RA. Pt is usually on RA per baseline. Pt satting at 92% RA. Pt recently discharged for Picc Line placement due to reoccurring blood transfusion due to MDS. Per daughter at bedside pt is acting normally. Does not appear to be lethargic or confused at this time. VSS. History Of Present Illness:      78 y.o. female with PMHx of nondysplastic syndrome, hypertension, hyperlipidemia, type 2 diabetes, CAD, atrial fibrillation who presented to [facility] with complaints of shortness of breath. Patient was at her nursing home when it was noted she was having shortness of breath last night and into the early morning of the day. The patient is relatively sleepy on exam, but 2 of her daughters were present at bedside and able to update history. They state the patient was gasping for air. She is not on any oxygen at home, but she is currently satting well on 2 L O2 via NC in the ED. The daughter states that they had heard some crackling sounds with congestion while she was breathing. Patient denied any chest pain, abdominal pain, nausea, vomiting, GI/ symptoms. Patient has not had much of an appetite over the last 72 hours. Additionally, she has had low urine output, per the daughters. Patient has a left PICC line for blood transfusions, and she did receive 1 approximately 3 days ago.   However, the daughter states that the patient is having some left chest/upper extremity swelling since the PICC line has been placed. Denies tobacco, alcohol, or illicit drug use. Past Medical History:          Diagnosis Date    4/11/17 Left knee repeat repair of median parapatellar arthrotomy with augmentation 4/12/2017    Arthritis     Aspiration pneumonia of left lower lobe due to gastric secretions ([address] 10/18/2022    Atrial fibrillation (HCC)     CAD (coronary artery disease)     Cataract     Chronic diastolic congestive heart failure ([address] 7/27/2022    Diabetes mellitus ([facility]     GERD (gastroesophageal reflux disease)     Hyperlipidemia     Hypertension     HYPOTHRYROIDISM     Myelodysplastic disease ([facility] 10/17/2022    Non-English speaking patient     SPEAKS Sami. SHE SPEAKS A LITTLE ENGLISH    Sleep apnea     unspecified    Thyroid disease     UTI        Past Surgical History:          Procedure Laterality Date    CARDIAC SURGERY  2004    CABG X 4 VESSELS    CHOLECYSTECTOMY, LAPAROSCOPIC  5/15/14    with IOC    CT BONE MARROW BIOPSY  9/29/2022    CT BONE MARROW BIOPSY 9/29/2022 [facility] CT SCAN    JOINT REPLACEMENT Bilateral 2000 AND 1996 And 1998    knee    KNEE SURGERY Left 02/28/2017    left knee poly exchange revision     REVISION TOTAL KNEE ARTHROPLASTY Right 11/22/2016    RIGHT TOTAL KNEE REVISION WITH POLY EXCHANGE    TOTAL KNEE ARTHROPLASTY      partical/ left     UPPER GASTROINTESTINAL ENDOSCOPY N/A 10/3/2022    EGD BIOPSY performed by Lex Chu MD at [facility] N/A 10/3/2022    EGD DILATION BALLOON performed by Lex Chu MD at [address] [place] [address]       Medications Prior to Admission:      Prior to Admission medications    Medication Sig Start Date End Date Taking?  Authorizing Provider   phenazopyridine (PYRIDIUM) 100 MG tablet Take 1 tablet by mouth in the morning, at noon, and at bedtime for 3 days 11/5/22 11/8/22  Teodora Funez MD   Ipratropium-Albuterol (DUONEB IN) Inhale 3 mLs into the lungs every 4 hours as needed (SOB)    Historical Provider, MD   famotidine (PEPCID) 20 MG tablet Take 20 mg by mouth daily    Historical Provider, MD   HYDROcodone-acetaminophen (NORCO) 5-325 MG per tablet Take 1 tablet by mouth every 6 hours as needed for Pain. Historical Provider, MD   lactobacillus (CULTURELLE) capsule Take 2 capsules by mouth daily 10/28/22 11/11/22  Historical Provider, MD   metroNIDAZOLE (FLAGYL) 500 MG tablet Take 500 mg by mouth 3 times daily 10/26/22 11/9/22  Historical Provider, MD   omeprazole (PRILOSEC) 20 MG delayed release capsule Take 20 mg by mouth in the morning and at bedtime    Historical Provider, MD   polyethylene glycol (GLYCOLAX) 17 g packet Take 17 g by mouth daily as needed for Constipation    Historical Provider, MD   silver sulfADIAZINE (SILVADENE) 1 % cream Apply topically 2 times daily Apply to inner buttocks every shift for MASD    Historical Provider, MD   digoxin (LANOXIN) 125 MCG tablet Take 1 tablet by mouth daily 10/25/22   Shira Clark MD   guaiFENesin (MUCINEX) 600 MG extended release tablet Take 1 tablet by mouth 2 times daily as needed for Congestion 10/24/22   Shira Clark MD   insulin lispro (HUMALOG) 100 UNIT/ML SOLN injection vial Inject 6 Units into the skin 3 times daily (with meals) 10/24/22   Shira Clark MD   sacubitril-valsartan (ENTRESTO) 24-26 MG per tablet Take 0.5 tablets by mouth 2 times daily 10/24/22   Shira Clark MD   furosemide (LASIX) 20 MG tablet Take 1 tablet by mouth daily 10/24/22   Shira Clark MD   gabapentin (NEURONTIN) 100 MG capsule Take 1 capsule by mouth 3 times daily for 30 days.  10/24/22 11/23/22  Shira Clark MD   metoprolol tartrate (LOPRESSOR) 25 MG tablet Take 0.5 tablets by mouth 2 times daily 10/24/22   Shira Clark MD   empagliflozin (JARDIANCE) 10 MG tablet Take 1 tablet by mouth daily 10/5/22   Meghan Blair MD   insulin glargine Pa Arriaga KWIKPEN) 100 UNIT/ML injection pen Inject 48 Units into the skin daily 10/4/22   Neelam Dixon MD   calcium carbonate (OSCAL) 500 MG TABS tablet Take 500 mg by mouth daily    Historical Provider, MD   senna (SENOKOT) 8.6 MG tablet Take 1 tablet by mouth 2 times daily    Historical Provider, MD   magnesium 30 MG tablet Take 40 mg by mouth daily    Historical Provider, MD   tiZANidine (ZANAFLEX) 2 MG tablet Take 2 mg by mouth every 8 hours as needed    Historical Provider, MD   atorvastatin (LIPITOR) 80 MG tablet Take 1 tablet by mouth daily 7/29/20   Gilford Boroughs, MD   Insulin Pen Needle 32G X 6 MM MISC by Does not apply route 3 times daily with meals    Historical Provider, MD   vitamin D (ERGOCALCIFEROL) 51441 UNITS CAPS capsule Take 50,000 Units by mouth once a week    Historical Provider, MD   Omega 3 1000 MG CAPS Take 1,000 mg by mouth daily    Historical Provider, MD   Liraglutide (VICTOZA) 18 MG/3ML SOPN SC injection Inject 1.2 mg into the skin daily    Historical Provider, MD   lidocaine (LIDODERM) 5 % Place 1 patch onto the skin daily 12 hours on, 12 hours off. 7/14/15   Milagros Azar, APRN - CNP   oxybutynin (DITROPAN XL) 15 MG CR tablet Take 15 mg by mouth daily. Historical Provider, MD   levothyroxine (SYNTHROID) 175 MCG tablet Take 175 mcg by mouth daily    Historical Provider, MD       Allergies:  Aspirin, Ibuprofen, Macrobid [nitrofurantoin monohydrate macrocrystals], Adhesive tape, Lexiscan [regadenoson], and Penicillins    Social History:      The patient currently lives at [facility]. TOBACCO:   reports that she has never smoked. She has never used smokeless tobacco.  ETOH:   reports no history of alcohol use.   E-cigarette/Vaping       Questions Responses    E-cigarette/Vaping Use Never User    Start Date     Passive Exposure     Quit Date     Counseling Given     Comments               Family History:      Reviewed and negative in regards to presenting illness/complaint. Problem Relation Age of Onset    Arthritis Other     Diabetes Other     Heart Disease Other     Hypertension Other     High Cholesterol Brother        REVIEW OF SYSTEMS COMPLETED:   Pertinent positives as noted in the HPI. All other systems reviewed and negative. PHYSICAL EXAM PERFORMED:    BP (!) 122/102   Pulse 78   Temp 99.2 °F (37.3 °C) (Oral)   Resp 28   LMP  (LMP Unknown)   SpO2 99%     General appearance:  No apparent distress, appears stated age and cooperative. Fatigued. HEENT:  Normal cephalic, atraumatic without obvious deformity. Pupils equal, round, and reactive to light. Extra ocular muscles intact. Conjunctivae/corneas clear. Neck: Supple, with full range of motion. No jugular venous distention. Trachea midline. 2 L O2 via NC. Respiratory:  Normal respiratory effort. Mild expiratory wheezes with slight crackles noted bilaterally. Cardiovascular:  Regular rate and rhythm with normal S1/S2 without murmurs, rubs or gallops. Trace lower extremity pitting edema. Abdomen: Soft,  non-distended with normal bowel sounds. Moderately tender to palpation in epigastric area. : No CVA tenderness  Musculoskeletal:  No clubbing or cyanosis. Full range of motion without deformity. Skin: Skin color, texture, turgor normal.  No rashes or lesions. Neurologic:  Neurovascularly intact without any focal sensory/motor deficits.  Cranial nerves: II-XII intact, grossly non-focal.  Psychiatric:  Alert and oriented, thought content appropriate, normal insight  Peripheral Pulses: +2 palpable, equal bilaterally       Labs:     Recent Labs     11/04/22  1242 11/05/22  0713 11/07/22  0056   WBC 6.5 7.0 7.5   HGB 8.3* 8.1* 7.0*   HCT 25.4* 24.1* 22.0*   PLT 32* 30* 32*     Recent Labs     11/04/22  1242 11/05/22  0713 11/07/22  0056    138 134*   K 4.1 3.9 4.4    100 98*   CO2 29 28 24   BUN 27* 27* 40*   CREATININE 0.6 0.7 1.3*   CALCIUM 8.1* 8.3 7.9*     Recent Labs 11/07/22  0056   AST 14*   ALT 16   BILITOT 0.5   ALKPHOS 62     No results for input(s): INR in the last 72 hours. Recent Labs     11/07/22 0056   TROPONINI 0.13*       Urinalysis:      Lab Results   Component Value Date/Time    NITRU Negative 11/04/2022 11:20 AM    WBCUA 16 11/04/2022 11:20 AM    BACTERIA None Seen 11/04/2022 11:20 AM    RBCUA 1 11/04/2022 11:20 AM    BLOODU SMALL 11/04/2022 11:20 AM    SPECGRAV 1.010 11/04/2022 11:20 AM    GLUCOSEU 500 11/04/2022 11:20 AM    GLUCOSEU NEGATIVE 03/09/2012 06:51 AM       Radiology:     CXR: I have reviewed the CXR with the following interpretation: Pulmonary vascular congestion with interstitial prominence/interstitial opacities suggesting edema  EKG:  I have reviewed the EKG with the following interpretation: Atrial fibrillation    XR CHEST PORTABLE   Final Result   Left PICC line appears to be over the SVC and not significantly changed from   the prior exam.      Cardiomegaly with pulmonary vascular congestion and interstitial   prominence/interstitial opacities suggesting edema. Stable to the slightly   worsened from the recent study. CT HEAD WO CONTRAST   Final Result   No acute intracranial hemorrhage, mass effect, midline shift, or signs of   acute territorial infarct. Generalized volume loss and chronic ischemic changes in the white matter are   stable. Partial opacification in the left mastoid air cells and edge of the middle   ear cavity are new from the prior exam.  Correlate for infectious symptoms   versus eustachian tube dysfunction. Medications:  Not in a hospital admission.   Current Facility-Administered Medications   Medication Dose Route Frequency Provider Last Rate Last Admin    dextrose bolus 10% 125 mL  125 mL IntraVENous PRN Poonam Learn, DO        Or    dextrose bolus 10% 250 mL  250 mL IntraVENous PRN Poonam Learn, DO        glucagon (rDNA) injection 1 mg  1 mg SubCUTAneous PRN Poonam Goode, DO dextrose 10 % infusion   IntraVENous Continuous PRN Dee Sorto, DO        insulin glargine (LANTUS) injection vial 35 Units  35 Units SubCUTAneous Daily Christiano Arroyop, DO        insulin lispro (HUMALOG) injection vial 0-16 Units  0-16 Units SubCUTAneous TID WC Christiano Terlep, DO        insulin lispro (HUMALOG) injection vial 0-4 Units  0-4 Units SubCUTAneous Nightly Christiano Arroyop, DO        atorvastatin (LIPITOR) tablet 80 mg  80 mg Oral Daily Christiano Terjuanp, DO        calcium carbonate (OSCAL) tablet 500 mg  500 mg Oral Daily Dee Sorto, DO        digoxin (LANOXIN) tablet 125 mcg  125 mcg Oral Daily Christiano Terjuanp, DO        famotidine (PEPCID) tablet 20 mg  20 mg Oral Daily Christiano Arroyop, DO        gabapentin (NEURONTIN) capsule 100 mg  100 mg Oral TID Dee Sorto, DO        guaiFENesin [facility]) extended release tablet 600 mg  600 mg Oral BID PRN Dee Sorto, DO        HYDROcodone-acetaminophen (NORCO) 5-325 MG per tablet 1 tablet  1 tablet Oral Q6H PRN Christiano Arroyop, DO        ipratropium-albuterol (DUONEB) nebulizer solution 1 ampule  1 ampule Inhalation Q4H WA Christiano Padron, DO        levothyroxine (SYNTHROID) tablet 175 mcg  175 mcg Oral Daily Christiano Terlep, DO        magnesium tablet 45 mg  45 mg Oral Daily Christiano Terlep, DO        metoprolol tartrate (LOPRESSOR) tablet 12.5 mg  12.5 mg Oral BID Christiano Arroyop, DO        pantoprazole (PROTONIX) tablet 40 mg  40 mg Oral QAM  Christiano Padron, DO        oxybutynin (DITROPAN-XL) extended release tablet 15 mg  15 mg Oral Daily Christiano Terlep, DO        tiZANidine (ZANAFLEX) tablet 2 mg  2 mg Oral Q8H PRN Dee Sorto, DO        [Held by provider] sacubitril-valsartan (ENTRESTO) 24-26 MG per tablet 0.5 tablet  0.5 tablet Oral BID Christiano Arroyop, DO        sodium chloride flush 0.9 % injection 5-40 mL  5-40 mL IntraVENous 2 times per day Dee Sorto, DO        sodium chloride flush 0.9 % injection 5-40 mL  5-40 mL IntraVENous PRN Dee Sorto, DO        0.9 % sodium chloride infusion   IntraVENous PRN Alberto Del Angel, DO        enoxaparin (LOVENOX) injection 40 mg  40 mg SubCUTAneous Daily Christiano Padron DO        ondansetron (ZOFRAN-ODT) disintegrating tablet 4 mg  4 mg Oral Q8H PRN Alberto Del Angel, DO        Or    ondansetron (ZOFRAN) injection 4 mg  4 mg IntraVENous Q6H PRN Alberto Del Angel, DO        polyethylene glycol (GLYCOLAX) packet 17 g  17 g Oral Daily PRN Alberto Del Angel, DO        acetaminophen (TYLENOL) tablet 650 mg  650 mg Oral Q6H PRN Alberto Del Angel, DO        Or    acetaminophen (TYLENOL) suppository 650 mg  650 mg Rectal Q6H PRN Alberto Del Angel, DO        sodium phosphate 10 mmol in sodium chloride 0.9 % 250 mL IVPB  10 mmol IntraVENous PRN Alberto Del Angel, DO        Or    sodium phosphate 15 mmol in sodium chloride 0.9 % 250 mL IVPB  15 mmol IntraVENous PRN Alberto Del Angel, DO        Or    sodium phosphate 20 mmol in sodium chloride 0.9 % 500 mL IVPB  20 mmol IntraVENous PRN Alberto Del Angel, DO        magnesium sulfate 2000 mg in 50 mL IVPB premix  2,000 mg IntraVENous PRN Alberto Del Angel, DO        potassium chloride (KLOR-CON M) extended release tablet 40 mEq  40 mEq Oral PRN Alberto Del Angel, DO        Or    potassium bicarb-citric acid (EFFER-K) effervescent tablet 40 mEq  40 mEq Oral PRN Alberto Del Angel, DO        Or    potassium chloride 10 mEq/100 mL IVPB (Peripheral Line)  10 mEq IntraVENous PRN Alberto Del Angel, DO         Current Outpatient Medications   Medication Sig Dispense Refill    phenazopyridine (PYRIDIUM) 100 MG tablet Take 1 tablet by mouth in the morning, at noon, and at bedtime for 3 days 9 tablet 0    Ipratropium-Albuterol (DUONEB IN) Inhale 3 mLs into the lungs every 4 hours as needed (SOB)      famotidine (PEPCID) 20 MG tablet Take 20 mg by mouth daily      HYDROcodone-acetaminophen (NORCO) 5-325 MG per tablet Take 1 tablet by mouth every 6 hours as needed for Pain.       lactobacillus (CULTURELLE) capsule Take 2 capsules by mouth daily metroNIDAZOLE (FLAGYL) 500 MG tablet Take 500 mg by mouth 3 times daily      omeprazole (PRILOSEC) 20 MG delayed release capsule Take 20 mg by mouth in the morning and at bedtime      polyethylene glycol (GLYCOLAX) 17 g packet Take 17 g by mouth daily as needed for Constipation      silver sulfADIAZINE (SILVADENE) 1 % cream Apply topically 2 times daily Apply to inner buttocks every shift for MASD      digoxin (LANOXIN) 125 MCG tablet Take 1 tablet by mouth daily 30 tablet 3    guaiFENesin (MUCINEX) 600 MG extended release tablet Take 1 tablet by mouth 2 times daily as needed for Congestion 60 tablet 0    insulin lispro (HUMALOG) 100 UNIT/ML SOLN injection vial Inject 6 Units into the skin 3 times daily (with meals) 2 each 0    sacubitril-valsartan (ENTRESTO) 24-26 MG per tablet Take 0.5 tablets by mouth 2 times daily 60 tablet 1    furosemide (LASIX) 20 MG tablet Take 1 tablet by mouth daily 60 tablet 3    gabapentin (NEURONTIN) 100 MG capsule Take 1 capsule by mouth 3 times daily for 30 days.  90 capsule 0    metoprolol tartrate (LOPRESSOR) 25 MG tablet Take 0.5 tablets by mouth 2 times daily 60 tablet 3    empagliflozin (JARDIANCE) 10 MG tablet Take 1 tablet by mouth daily 30 tablet 3    insulin glargine (BASAGLAR KWIKPEN) 100 UNIT/ML injection pen Inject 48 Units into the skin daily 5 Adjustable Dose Pre-filled Pen Syringe 3    calcium carbonate (OSCAL) 500 MG TABS tablet Take 500 mg by mouth daily      senna (SENOKOT) 8.6 MG tablet Take 1 tablet by mouth 2 times daily      magnesium 30 MG tablet Take 40 mg by mouth daily      tiZANidine (ZANAFLEX) 2 MG tablet Take 2 mg by mouth every 8 hours as needed      atorvastatin (LIPITOR) 80 MG tablet Take 1 tablet by mouth daily 90 tablet 4    Insulin Pen Needle 32G X 6 MM MISC by Does not apply route 3 times daily with meals      vitamin D (ERGOCALCIFEROL) 18709 UNITS CAPS capsule Take 50,000 Units by mouth once a week      Omega 3 1000 MG CAPS Take 1,000 mg by mouth daily      Liraglutide (VICTOZA) 18 MG/3ML SOPN SC injection Inject 1.2 mg into the skin daily      lidocaine (LIDODERM) 5 % Place 1 patch onto the skin daily 12 hours on, 12 hours off. 30 patch 0    oxybutynin (DITROPAN XL) 15 MG CR tablet Take 15 mg by mouth daily.       levothyroxine (SYNTHROID) 175 MCG tablet Take 175 mcg by mouth daily         Consults:    IP CONSULT TO HOSPITALIST    ASSESSMENT:    Active Hospital Problems    Diagnosis Date Noted    Acute hypoxemic respiratory failure [facility] [J96.01] 10/18/2022     Priority: High    Carotid artery disease without cerebral infarction ([facility] [I77.9] 11/09/2016     Priority: High    HFrEF (heart failure with reduced ejection fraction) ([facility] [I50.20] 11/07/2022     Priority: Medium    Myelodysplasia (myelodysplastic syndrome) ([facility] [D46.9] 10/17/2022     Priority: Medium    Chronic atrial fibrillation ([facility] [I48.20] 09/27/2022     Priority: Medium    Type 2 diabetes mellitus ([facility] [E11.9] 07/09/2015    Morbid obesity with BMI of 40.0-44.9, adult ([facility] [E66.01, Z68.41] 05/14/2014    ARUN (acute kidney injury) ([facility] [N17.9] 02/09/2012    Hyperlipidemia associated with type 2 diabetes mellitus ([facility] [E11.69, E78.5] 02/09/2012    Hypertension associated with diabetes ([facility] [E11.59, I15.2] 02/09/2012    Peripheral vascular disease ([facility] [I73.9] 10/05/2011         PLAN:  This is a 78 y.o. female who presented to [facility] and is being treated for:    Acute hypoxemic respiratory failure  -Likely 2/2 CHF exacerbation  -BNP elevated at 3064  -Troponin elevated at 0.13 - likely 2/2 ARUN; trend  -Echo 10/17/2022 showed LVEF 45-50% with right ventricle being mildly enlarged and having reduced function  -No need for repeat echo at this time  -Lasix 40 mg IV  -Flu and RSV ordered  -Oxygen therapy; wean as tolerated; keep saturation greater than 92%; may need oxygen outpatient if continues to remain hypoxic  -I/Os  -Can place Kuhn if needed    UTI  -UA on 11/5 indicative of UTI; urine culture positive for Proteus  -Start Rocephin    HFrEF  -Echo as above  -Continue beta-blocker  -Lasix as above  -Hold home Entresto given ARUN; restart when creatinine down trends    ARUN  -Creatinine 1.3 on admission; baseline ~0.8-1.0  -Daily labs; trend creatinine  -Avoid nephrotoxins    Hyperlipidemia  -Statin    Hypertension  -Continue home antihypertensives  -Hold home Entresto given ARUN    Type 2 diabetes  -SSI    Atrial fibrillation  -Continue home digoxin      DVT ppx: Lovenox  GI ppx: PPI  Diet: ADULT DIET; Regular  Code Status: Prior    PT/OT Eval Status: Ordered    Disposition -home after medical stabilization and treatment       Evelio Duran DO  11/7/2022  4:07 AM    Please note that some part of this chart was generated using Dragon dictation software. Although every effort was made to ensure the accuracy of this automated transcription, some errors in transcription may have occurred inadvertently. If you may need any clarification, please do not hesitate to contact me through [facility].

## 2022-11-07 NOTE — ED PROVIDER NOTES
I independently performed a history and physical on Wally Schmidt. I personally saw the patient and performed a substantive portion of the visit including all aspects of the medical decision making. Briefly, this is a 78 y.o. female here for shortness of breath. Started 24 hours ago. Also has chest discomfort however this is been ongoing and associated with arm discomfort from her PICC line. Any movement seems to make it worse. No fevers. Cough is actually improved. Has history of myelo dysplastic syndrome. No diarrhea. No hematochezia/melanotic stools. More lethargic. Falling asleep while talking to her daughters. Was having confusion earlier but not anymore. On exam,   General: Patient is in no acute distress  Skin: No cyanosis  HEENT: Dry mucous membranes  Heart: irregular rhythm, no peripheral edema,  Lung: No respiratory distress, mild rales at bases  Abdomen: Soft, nontender  Neuro: Moving all extremities, no facial droop, no slurred speech, answers questions appropriately        EKG  The Ekg interpreted by me in the absence of a cardiologist shows. A fib  No acute ST changes   HR 93  Old rbbb  No significant EKG changes compared to study in 11/3  Motion artifact    Screenings   [place] Coma Scale  Eye Opening: Spontaneous  Best Verbal Response: Oriented  Best Motor Response: Obeys commands  [place] Coma Scale Score: 15        MDM  Briefly, this is a 78 y.o. female here for shortness of breath. Dehydrated on exam.  Renal function shows ARUN. Blood count has dropped by 1 in the past couple of days. Since hemoglobin 7, holding off on transfusion at the moment. Lungs look like there is failure on chest x-ray. BNP is elevated. Recent echo showed EF 45%. Think that she may be in failure causing her shortness of breath. Has new hypoxia. Was saturating 88% on room air. Now on 2 L of oxygen with improvement. will admit to hospitalist service. .  We will asked nurse to weigh the patient. If weight is increased above her discharge weight, we will give some Lasix. Is this patient to be included in the SEP-1 Core Measure due to severe sepsis or septic shock? No   Exclusion criteria - the patient is NOT to be included for SEP-1 Core Measure due to:  2+ SIRS criteria are not met           Patient Referrals:  No follow-up provider specified. Discharge Medications:  New Prescriptions    No medications on file       FINAL IMPRESSION  1. Dyspnea, unspecified type    2. Acute on chronic congestive heart failure, unspecified heart failure type (HCC)        Blood pressure (!) 122/102, pulse 78, temperature 99.2 °F (37.3 °C), temperature source Oral, resp. rate 28, SpO2 99 %, not currently breastfeeding. For further details of Wally Schmidt's emergency department encounter, please see documentation by advanced practice providerhema.         Darshan Galan MD  11/07/22 [id number]

## 2022-11-07 NOTE — H&P
Hospital Medicine History & Physical      PCP: No primary care provider on file. Date of Admission: 11/6/2022    Date of Service: 11/7/2022  Chief Complaint:  shortness of breath with oxygen desaturation with abdominal spasms. 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 Daughters are at bedside and assisted in providing clinical history. Recent admission 11/3-11/5/22 for left upper extremity swelling and pain after left PICC line placement. She was treated for pain and transfused 2 units pRBC for anemia related to MDS. Patient was discharged on Saturday 11/5 afternoon. Early Sunday morning 11/6, she was experiencing episodes of left abdominal spasms/tightness, followed by sensation of shortness of breath. She was found by the staff at [facility] to desaturate to 85-88% on RA. She reports palpitations. She denies lightheadedness, chest pain during episodes. She was placed on 2-2.5 LPM of supplemental oxygen with improvement of oxygen saturation. She also has been experiencing dysuria for the past 5 days. She has had issues with urinary retention. At [facility] she was straight cath with return of 500 cc of urine. She had been constipated and was placed on bowel regimen, then she had loose-watery stools on Friday-Saturday. She denies nausea. She has not have fevers, but endorses feeling cold. She has some residual mild cough. She was coughing more during episodes of spasms. She continues to experience some pain to left upper extremity.     Her appetite and oral intake have decreased Left 02/28/2017    left knee poly exchange revision     REVISION TOTAL KNEE ARTHROPLASTY Right 11/22/2016    RIGHT TOTAL KNEE REVISION WITH POLY EXCHANGE    TOTAL KNEE ARTHROPLASTY      partical/ left     UPPER GASTROINTESTINAL ENDOSCOPY N/A 10/3/2022    EGD BIOPSY performed by Abdiel Cavanaugh MD at [facility] N/A 10/3/2022    EGD DILATION BALLOON performed by Abdiel Cavanaugh MD at [facility]       Medications Prior to Admission:      Prior to Admission medications    Medication Sig Start Date End Date Taking? Authorizing Provider   phenazopyridine (PYRIDIUM) 100 MG tablet Take 1 tablet by mouth in the morning, at noon, and at bedtime for 3 days 11/5/22 11/8/22  Raimundo Cerrato MD   Ipratropium-Albuterol (DUONEB IN) Inhale 3 mLs into the lungs every 4 hours as needed (SOB)    Historical Provider, MD   famotidine (PEPCID) 20 MG tablet Take 20 mg by mouth daily    Historical Provider, MD   HYDROcodone-acetaminophen (NORCO) 5-325 MG per tablet Take 1 tablet by mouth every 6 hours as needed for Pain.     Historical Provider, MD   lactobacillus (CULTURELLE) capsule Take 2 capsules by mouth daily 10/28/22 11/11/22  Historical Provider, MD   metroNIDAZOLE (FLAGYL) 500 MG tablet Take 500 mg by mouth 3 times daily 10/26/22 11/9/22  Historical Provider, MD   omeprazole (PRILOSEC) 20 MG delayed release capsule Take 20 mg by mouth in the morning and at bedtime    Historical Provider, MD   polyethylene glycol (GLYCOLAX) 17 g packet Take 17 g by mouth daily as needed for Constipation    Historical Provider, MD   silver sulfADIAZINE (SILVADENE) 1 % cream Apply topically 2 times daily Apply to inner buttocks every shift for MASD    Historical Provider, MD   digoxin (LANOXIN) 125 MCG tablet Take 1 tablet by mouth daily 10/25/22   Evon Stoddard MD   guaiFENesin (MUCINEX) 600 MG extended release tablet Take 1 tablet by mouth 2 times daily as needed for Congestion 10/24/22   Mario Alberto Keely Tellez MD   insulin lispro (HUMALOG) 100 UNIT/ML SOLN injection vial Inject 6 Units into the skin 3 times daily (with meals) 10/24/22   Efren Mir MD   sacubitril-valsartan (ENTRESTO) 24-26 MG per tablet Take 0.5 tablets by mouth 2 times daily 10/24/22   Efren Mir MD   furosemide (LASIX) 20 MG tablet Take 1 tablet by mouth daily 10/24/22   Efren Mir MD   gabapentin (NEURONTIN) 100 MG capsule Take 1 capsule by mouth 3 times daily for 30 days.  10/24/22 11/23/22  Efren Mir MD   metoprolol tartrate (LOPRESSOR) 25 MG tablet Take 0.5 tablets by mouth 2 times daily 10/24/22   Efren Mir MD   empagliflozin (JARDIANCE) 10 MG tablet Take 1 tablet by mouth daily 10/5/22   Kavita Choudhary MD   insulin glargine [facility] 100 UNIT/ML injection pen Inject 48 Units into the skin daily 10/4/22   Kavita Choudhary MD   calcium carbonate (OSCAL) 500 MG TABS tablet Take 500 mg by mouth daily    Historical Provider, MD   senna (SENOKOT) 8.6 MG tablet Take 1 tablet by mouth 2 times daily    Historical Provider, MD   magnesium 30 MG tablet Take 40 mg by mouth daily    Historical Provider, MD   tiZANidine (ZANAFLEX) 2 MG tablet Take 2 mg by mouth every 8 hours as needed    Historical Provider, MD   atorvastatin (LIPITOR) 80 MG tablet Take 1 tablet by mouth daily 7/29/20   Peggy Patel MD   Insulin Pen Needle 32G X 6 MM MISC by Does not apply route 3 times daily with meals    Historical Provider, MD   vitamin D (ERGOCALCIFEROL) 73272 UNITS CAPS capsule Take 50,000 Units by mouth once a week    Historical Provider, MD   Omega 3 1000 MG CAPS Take 1,000 mg by mouth daily    Historical Provider, MD   Liraglutide (VICTOZA) 18 MG/3ML SOPN SC injection Inject 1.2 mg into the skin daily    Historical Provider, MD   lidocaine (LIDODERM) 5 % Place 1 patch onto the skin daily 12 hours on, 12 hours off. 7/14/15   MATEUSZ Sol - CNP   oxybutynin (DITROPAN XL) 15 MG CR tablet Take 15 mg by mouth daily. Historical Provider, MD   levothyroxine (SYNTHROID) 175 MCG tablet Take 175 mcg by mouth daily    Historical Provider, MD       Allergies:  Aspirin, Ibuprofen, Macrobid [nitrofurantoin monohydrate macrocrystals], Adhesive tape, Paralee Force [regadenoson], and Penicillins    Social History:      She lives alone. One of four daughters live 1 block away. She has an aide who comes daily 4 hours in the morning and 4 hours in the evening, every day. She has seven adult children, four daughters and three sons. She has cameras installed in her home so daughter can on her. Her  passed 2007. TOBACCO:   reports that she has never smoked. She has never used smokeless tobacco.  ETOH:   reports no history of alcohol use. 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 Family History:      Father - CAD in his 80's, passed at 81 yo. Mother - DM, HTN, dyslipidemia, CAD s/p CABG. Passed at 67 yo. Problem Relation Age of Onset    Arthritis Other     Diabetes Other     Heart Disease Other     Hypertension Other     High Cholesterol Brother        REVIEW OF SYSTEMS:     Refer HPI. PHYSICAL EXAM PERFORMED:    /81   Pulse 81   Temp 98.4 °F (36.9 °C)   Resp 21   LMP  (LMP Unknown)   SpO2 98%       GEN alert, in no distress  HEENT normocephalic, anicteric sclera, EOMI, mucosa moist, no stridor  NECK supple, trachea midline  RESP on 2LPM, in no distress, fine crackles at the bases  CARDS RRR, S1, S2, no murmurs, no edema, radial pulse 2+, DP pulse - difficult to palpate.   ABD +BS, soft nontender  MSK no cyanosis, no clubbing  SKIN warm, dry  NEURO alert, oriented x 3, no facial asymmetry, no dysarthria, moving spontaneously  PSYCH normal mood      Labs:     Recent Labs     11/04/22  1242 11/05/22  0713 11/07/22  0056   WBC 6.5 7.0 7.5   HGB 8.3* 8.1* 7.0*   HCT 25.4* 24.1* 22.0*   PLT 32* 30* 32*     Recent Labs     11/04/22  1242 11/05/22  0713 11/07/22  0056    138 134*   K 4.1 3.9 4.4    100 98*   CO2 29 28 24   BUN 27* 27* 40*   CREATININE 0.6 0.7 1.3*   CALCIUM 8.1* 8.3 7.9*     Recent Labs     11/07/22  0056   AST 14*   ALT 16   BILITOT 0.5   ALKPHOS 62     No results for input(s): INR in the last 72 hours. Recent Labs     11/07/22  0056   TROPONINI 0.13*       Urinalysis:      Lab Results   Component Value Date/Time    NITRU Negative 11/04/2022 11:20 AM    WBCUA 16 11/04/2022 11:20 AM    BACTERIA None Seen 11/04/2022 11:20 AM    RBCUA 1 11/04/2022 11:20 AM    BLOODU SMALL 11/04/2022 11:20 AM    SPECGRAV 1.010 11/04/2022 11:20 AM    GLUCOSEU 500 11/04/2022 11:20 AM    GLUCOSEU NEGATIVE 03/09/2012 06:51 AM       Radiology:     CXR: I have reviewed the CXR with the following interpretation:   EKG:  I have reviewed the EKG with the following interpretation:     VL Extremity Venous Bilateral         XR CHEST PORTABLE   Final Result   Left PICC line appears to be over the SVC and not significantly changed from   the prior exam.      Cardiomegaly with pulmonary vascular congestion and interstitial   prominence/interstitial opacities suggesting edema. Stable to the slightly   worsened from the recent study. CT HEAD WO CONTRAST   Final Result   No acute intracranial hemorrhage, mass effect, midline shift, or signs of   acute territorial infarct. Generalized volume loss and chronic ischemic changes in the white matter are   stable. Partial opacification in the left mastoid air cells and edge of the middle   ear cavity are new from the prior exam.  Correlate for infectious symptoms   versus eustachian tube dysfunction.              ASSESSMENT/PLAN:    Active Hospital Problems    Diagnosis Date Noted    Carotid artery disease without cerebral infarction [facility]) [I77.9] 11/09/2016     Priority: High    HFrEF (heart failure with reduced ejection fraction) ([facility] [I50.20] 11/07/2022     Priority: Medium    Acute hypoxemic respiratory failure ([facility] [J96.01] 10/18/2022     Priority: Medium    Myelodysplasia (myelodysplastic syndrome) ([facility] [D46.9] 10/17/2022     Priority: Medium    Chronic atrial fibrillation ([facility] [I48.20] 09/27/2022     Priority: Medium    Type 2 diabetes mellitus ([facility] [E11.9] 07/09/2015    Morbid obesity with BMI of 40.0-44.9, adult ([facility] [E66.01, Z68.41] 05/14/2014    ARUN (acute kidney injury) ([facility] [N17.9] 02/09/2012    Hyperlipidemia associated with type 2 diabetes mellitus ([facility] [E11.69, E78.5] 02/09/2012    Hypertension associated with diabetes ([facility] [E11.59, I15.2] 02/09/2012    Peripheral vascular disease ([facility] [I73.9] 10/05/2011       Acute Hypoxic Respiratory Failure  Acute on Chronic HFrEF  - found to desaturate to 84% on RA inpatient, independent of abdominal spasms.  - CXR 11/7 showed cardiomegaly with pulmonary vascular congestion and interstitial prominence/interstitial opacities suggesting edema. Stable to the slightly worsened from the recent study.   - SARS-COVID 11/3 negative. - repeat SARS-COVID and influenza screen. - EKG showed noisy, Telemetry showed atrial fibrillation, known RBBB, HR 70's-90's. Troponins 0.13->0. 15. NTproBNP 3064 (previous 1932 on 11/3). - ECHO 10/16/22 showed dilated left ventricular cavity size with normal left ventricular wall thickness, LVEF 45 to 50%, abnormal paradoxical septal motion. Normal diastolic filling pattern for age. RV is mildly enlarged with reduced systolic function. Mildly dilated LA. RVSP 29 mmHg. - Presents with worsening anemia. Adm HgB 6.7-7.0. Recent HgB 8.3-8.6 on 11/4/22.  - Transfuse 1 unit pRBC, administer IV lasix 40 mg.  - Assess volume status daily, then order lasix in the setting for sensitivity to decompensation as she has poor oral intake. Intermittent Confusion  - CT Head wo contrast 11/7 showed no acute intracranial hemorrhage, mass effect, midline shift, or signs of acute territorial infarct.   Generalized volume loss and chronic ischemic changes in the white matter are stable. Partial opacification in the left mastoid air cells and edge of the middle ear cavity are new from the prior exam.  Correlate for infectious symptoms versus eustachian tube dysfunction.  - Urine dirty appearing.   - LFT's demonstrates low albumin and low AST. - in ARUN, with urinary retention and UTI. -- treat UTI and ARUN. - very sensitive to medications. Muscle relaxant and gabapentin made her loopy confused and somnolent. - family does not want her to receive gabapentin. Discussed that we would start low dose robaxin. ARUN  Urinary Retention  UTI, History of MDRO  - Adm BUN/Scr 40/1.3. Baseline Scr 0.6.  - Scr worsened to 1.8 on same day. - CT A/P 11/7 showed punctate bilateral intrarenal calculi. No ureteral stone and no hydronephrosis. - Placed marin 11/7 with return of concentrated and dirty urine.  - Discontinued home oxybutynin 15 mg daily in the setting of recurrent issues with urinary retention.  - Transfuse 1 unit pRBC with IV lasix 40 mg.  - Monitor urine output, electrolytes, BUN/SCr    Abdominal Spasm/Tightness  History of Constipation  - CT A/P with oral contrast, without IV contrast 11/7 showed no acute findings. Moderate amount of stool and air in the colon proximally, which could reflect constipation.    - Start miralax BID, colace BID. - Start robaxin 500 mg Q8H. Left Brachial Vein DVT  - in the setting of PICC placement. - Venous US of Bilateral UE 11/7 showed acute totally occluded SVT involving left proximal brachial vein. - unable to anticoagulate due to low platelet counts. - start SC heparin Q12H if platelet count >07G. - Hematology/oncology consult    Recently Diagnosed MDS  - requiring transfusions. - adm HgB 6.7-7.0  - adm Platelet count 04-46.   - Transfuse 1 unit pPRBC on 11/7.  - Hematology/oncology consult    Acute on Chronic HFrEF   CAD s/p CABG 2004  Paroxysmal Afib  - Home on metoprolol 12.5 mg BID, digoxin 125 mcg daily, entresto 24-26 mg BID, lasix 20 mg daily. - Continue home metoprolol, digoxin.  - Hold entresto in the setting of ARUN. - administer lasix cautiously, today IV lasix 40 mg with transfusion of 1 unit pRBC. - reassess volume status and kidney function tomorrow and determine need for diuretics. PVD  - Continue home lipitor 80 mg HS. History of Hypertension  - Home on metoprolol 12.5 mg BID, digoxin 125 mcg daily, entresto 24-26 mg BID, lasix 20 mg daily. Not hypertensive. SBP range 90's-110's. - Continue home metoprolol 12.5 mg BID, digoxin 125 mcg daily for atrial fibrillation. Dyslipidemia  - Continue home lipitor 80 mg HS. - lipid panel. 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 - start lantus 35 units daily, lispro 8 units TID, low SSI. Hypothyroidism  - TSH 2.0 on 11/7/22.  - Continue home levothyroxine 175 mcg daily. CARLOS    DDD, OA      DVT Prophylaxis:   Diet: ADULT DIET; Regular  Code Status: DNR-CCA    PT/OT Eval Status: ordered    Dispo - when medically stable. Fouzia Nava MD    Thank you No primary care provider on file. for the opportunity to be involved in this patient's care. If you have any questions or concerns please feel free to contact me at [phone].

## 2022-11-08 PROBLEM — Z78.9 NON-ENGLISH SPEAKING PATIENT: Status: ACTIVE | Noted: 2022-11-08

## 2022-11-08 PROBLEM — N20.0 NEPHROLITHIASIS: Status: ACTIVE | Noted: 2022-11-08

## 2022-11-08 PROBLEM — E03.9 HYPOTHYROIDISM: Status: ACTIVE | Noted: 2022-11-08

## 2022-11-08 PROBLEM — I50.9 ACUTE ON CHRONIC CONGESTIVE HEART FAILURE (HCC): Status: ACTIVE | Noted: 2022-11-08

## 2022-11-08 PROBLEM — J18.9 ATYPICAL PNEUMONIA: Status: ACTIVE | Noted: 2022-11-08

## 2022-11-08 PROBLEM — Z86.718 HISTORY OF DVT IN ADULTHOOD: Status: ACTIVE | Noted: 2022-11-08

## 2022-11-08 LAB
A/G RATIO: 0.7 (ref 1.1–2.2)
ALBUMIN SERPL-MCNC: 2.5 G/DL (ref 3.4–5)
ALBUMIN SERPL-MCNC: 2.6 G/DL (ref 3.4–5)
ALP BLD-CCNC: 66 U/L (ref 40–129)
ALT SERPL-CCNC: 15 U/L (ref 10–40)
ANION GAP SERPL CALCULATED.3IONS-SCNC: 14 MMOL/L (ref 3–16)
ANION GAP SERPL CALCULATED.3IONS-SCNC: 8 MMOL/L (ref 3–16)
ANISOCYTOSIS: ABNORMAL
AST SERPL-CCNC: 14 U/L (ref 15–37)
BANDED NEUTROPHILS RELATIVE PERCENT: 4 % (ref 0–7)
BASOPHILS ABSOLUTE: 0 K/UL (ref 0–0.2)
BASOPHILS RELATIVE PERCENT: 0 %
BILIRUB SERPL-MCNC: 0.4 MG/DL (ref 0–1)
BUN BLDV-MCNC: 44 MG/DL (ref 7–20)
BUN BLDV-MCNC: 45 MG/DL (ref 7–20)
CALCIUM SERPL-MCNC: 8 MG/DL (ref 8.3–10.6)
CALCIUM SERPL-MCNC: 8 MG/DL (ref 8.3–10.6)
CHLORIDE BLD-SCNC: 97 MMOL/L (ref 99–110)
CHLORIDE BLD-SCNC: 98 MMOL/L (ref 99–110)
CO2: 25 MMOL/L (ref 21–32)
CO2: 28 MMOL/L (ref 21–32)
CREAT SERPL-MCNC: 1.7 MG/DL (ref 0.6–1.2)
CREAT SERPL-MCNC: 1.7 MG/DL (ref 0.6–1.2)
EKG ATRIAL RATE: 92 BPM
EKG DIAGNOSIS: NORMAL
EKG Q-T INTERVAL: 398 MS
EKG QRS DURATION: 164 MS
EKG QTC CALCULATION (BAZETT): 502 MS
EKG R AXIS: 251 DEGREES
EKG T AXIS: 47 DEGREES
EKG VENTRICULAR RATE: 96 BPM
EOSINOPHILS ABSOLUTE: 0 K/UL (ref 0–0.6)
EOSINOPHILS RELATIVE PERCENT: 0 %
ESTIMATED AVERAGE GLUCOSE: 182.9 MG/DL
GFR SERPL CREATININE-BSD FRML MDRD: 29 ML/MIN/{1.73_M2}
GFR SERPL CREATININE-BSD FRML MDRD: 29 ML/MIN/{1.73_M2}
GLUCOSE BLD-MCNC: 174 MG/DL (ref 70–99)
GLUCOSE BLD-MCNC: 180 MG/DL (ref 70–99)
GLUCOSE BLD-MCNC: 210 MG/DL (ref 70–99)
GLUCOSE BLD-MCNC: 224 MG/DL (ref 70–99)
GLUCOSE BLD-MCNC: 271 MG/DL (ref 70–99)
HBA1C MFR BLD: 8 %
HCT VFR BLD CALC: 24.3 % (ref 36–48)
HEMOGLOBIN: 8.1 G/DL (ref 12–16)
HYPOCHROMIA: ABNORMAL
INFLUENZA A: NOT DETECTED
INFLUENZA B: NOT DETECTED
LYMPHOCYTES ABSOLUTE: 2.8 K/UL (ref 1–5.1)
LYMPHOCYTES RELATIVE PERCENT: 40 %
MAGNESIUM: 2.2 MG/DL (ref 1.8–2.4)
MAGNESIUM: 3 MG/DL (ref 1.8–2.4)
MCH RBC QN AUTO: 31.1 PG (ref 26–34)
MCHC RBC AUTO-ENTMCNC: 33.3 G/DL (ref 31–36)
MCV RBC AUTO: 93.4 FL (ref 80–100)
METAMYELOCYTES RELATIVE PERCENT: 1 %
MONOCYTES ABSOLUTE: 0.9 K/UL (ref 0–1.3)
MONOCYTES RELATIVE PERCENT: 13 %
NEUTROPHILS ABSOLUTE: 3.3 K/UL (ref 1.7–7.7)
NEUTROPHILS RELATIVE PERCENT: 42 %
OVALOCYTES: ABNORMAL
PDW BLD-RTO: 19.3 % (ref 12.4–15.4)
PERFORMED ON: ABNORMAL
PHOSPHORUS: 2.7 MG/DL (ref 2.5–4.9)
PHOSPHORUS: 3.5 MG/DL (ref 2.5–4.9)
PLATELET # BLD: 28 K/UL (ref 135–450)
PLATELET SLIDE REVIEW: ABNORMAL
PMV BLD AUTO: 9.9 FL (ref 5–10.5)
POLYCHROMASIA: ABNORMAL
POTASSIUM SERPL-SCNC: 4.6 MMOL/L (ref 3.5–5.1)
POTASSIUM SERPL-SCNC: 4.7 MMOL/L (ref 3.5–5.1)
PROCALCITONIN: 0.83 NG/ML (ref 0–0.15)
RBC # BLD: 2.61 M/UL (ref 4–5.2)
REPORT: NORMAL
RESPIRATORY PANEL PCR: NORMAL
SARS-COV-2 RNA, RT PCR: NOT DETECTED
SLIDE REVIEW: ABNORMAL
SMUDGE CELLS: PRESENT
SODIUM BLD-SCNC: 134 MMOL/L (ref 136–145)
SODIUM BLD-SCNC: 136 MMOL/L (ref 136–145)
TOTAL PROTEIN: 6.3 G/DL (ref 6.4–8.2)
VITAMIN D 25-HYDROXY: 74.9 NG/ML
WBC # BLD: 7 K/UL (ref 4–11)

## 2022-11-08 PROCEDURE — 6370000000 HC RX 637 (ALT 250 FOR IP): Performed by: INTERNAL MEDICINE

## 2022-11-08 PROCEDURE — 97162 PT EVAL MOD COMPLEX 30 MIN: CPT

## 2022-11-08 PROCEDURE — 82306 VITAMIN D 25 HYDROXY: CPT

## 2022-11-08 PROCEDURE — 94640 AIRWAY INHALATION TREATMENT: CPT

## 2022-11-08 PROCEDURE — 87636 SARSCOV2 & INF A&B AMP PRB: CPT

## 2022-11-08 PROCEDURE — 1200000000 HC SEMI PRIVATE

## 2022-11-08 PROCEDURE — 93005 ELECTROCARDIOGRAM TRACING: CPT | Performed by: INTERNAL MEDICINE

## 2022-11-08 PROCEDURE — 97530 THERAPEUTIC ACTIVITIES: CPT

## 2022-11-08 PROCEDURE — 85025 COMPLETE CBC W/AUTO DIFF WBC: CPT

## 2022-11-08 PROCEDURE — 2580000003 HC RX 258: Performed by: INTERNAL MEDICINE

## 2022-11-08 PROCEDURE — 2580000003 HC RX 258: Performed by: STUDENT IN AN ORGANIZED HEALTH CARE EDUCATION/TRAINING PROGRAM

## 2022-11-08 PROCEDURE — 82570 ASSAY OF URINE CREATININE: CPT

## 2022-11-08 PROCEDURE — 84145 PROCALCITONIN (PCT): CPT

## 2022-11-08 PROCEDURE — 6370000000 HC RX 637 (ALT 250 FOR IP): Performed by: STUDENT IN AN ORGANIZED HEALTH CARE EDUCATION/TRAINING PROGRAM

## 2022-11-08 PROCEDURE — 84300 ASSAY OF URINE SODIUM: CPT

## 2022-11-08 PROCEDURE — 82436 ASSAY OF URINE CHLORIDE: CPT

## 2022-11-08 PROCEDURE — 83735 ASSAY OF MAGNESIUM: CPT

## 2022-11-08 PROCEDURE — 6360000002 HC RX W HCPCS: Performed by: INTERNAL MEDICINE

## 2022-11-08 PROCEDURE — 80053 COMPREHEN METABOLIC PANEL: CPT

## 2022-11-08 PROCEDURE — 94761 N-INVAS EAR/PLS OXIMETRY MLT: CPT

## 2022-11-08 PROCEDURE — 97535 SELF CARE MNGMENT TRAINING: CPT

## 2022-11-08 PROCEDURE — 2700000000 HC OXYGEN THERAPY PER DAY

## 2022-11-08 PROCEDURE — 36415 COLL VENOUS BLD VENIPUNCTURE: CPT

## 2022-11-08 PROCEDURE — 87040 BLOOD CULTURE FOR BACTERIA: CPT

## 2022-11-08 PROCEDURE — 84540 ASSAY OF URINE/UREA-N: CPT

## 2022-11-08 PROCEDURE — 99223 1ST HOSP IP/OBS HIGH 75: CPT | Performed by: INTERNAL MEDICINE

## 2022-11-08 PROCEDURE — 93010 ELECTROCARDIOGRAM REPORT: CPT | Performed by: INTERNAL MEDICINE

## 2022-11-08 PROCEDURE — 97166 OT EVAL MOD COMPLEX 45 MIN: CPT

## 2022-11-08 PROCEDURE — 84133 ASSAY OF URINE POTASSIUM: CPT

## 2022-11-08 PROCEDURE — 0202U NFCT DS 22 TRGT SARS-COV-2: CPT

## 2022-11-08 PROCEDURE — 84100 ASSAY OF PHOSPHORUS: CPT

## 2022-11-08 RX ORDER — SODIUM CHLORIDE 9 MG/ML
25 INJECTION, SOLUTION INTRAVENOUS PRN
Status: DISCONTINUED | OUTPATIENT
Start: 2022-11-08 | End: 2022-11-21 | Stop reason: HOSPADM

## 2022-11-08 RX ORDER — SODIUM CHLORIDE 0.9 % (FLUSH) 0.9 %
5-40 SYRINGE (ML) INJECTION PRN
Status: DISCONTINUED | OUTPATIENT
Start: 2022-11-08 | End: 2022-11-21 | Stop reason: HOSPADM

## 2022-11-08 RX ORDER — INSULIN LISPRO 100 [IU]/ML
8 INJECTION, SOLUTION INTRAVENOUS; SUBCUTANEOUS
Status: DISCONTINUED | OUTPATIENT
Start: 2022-11-08 | End: 2022-11-13

## 2022-11-08 RX ORDER — INSULIN LISPRO 100 [IU]/ML
10 INJECTION, SOLUTION INTRAVENOUS; SUBCUTANEOUS
Status: DISCONTINUED | OUTPATIENT
Start: 2022-11-08 | End: 2022-11-08

## 2022-11-08 RX ORDER — LIDOCAINE HYDROCHLORIDE 10 MG/ML
5 INJECTION, SOLUTION EPIDURAL; INFILTRATION; INTRACAUDAL; PERINEURAL ONCE
Status: DISCONTINUED | OUTPATIENT
Start: 2022-11-08 | End: 2022-11-21 | Stop reason: HOSPADM

## 2022-11-08 RX ORDER — IPRATROPIUM BROMIDE AND ALBUTEROL SULFATE 2.5; .5 MG/3ML; MG/3ML
1 SOLUTION RESPIRATORY (INHALATION)
Status: DISCONTINUED | OUTPATIENT
Start: 2022-11-08 | End: 2022-11-08

## 2022-11-08 RX ORDER — ALBUTEROL SULFATE 90 UG/1
2 AEROSOL, METERED RESPIRATORY (INHALATION) 4 TIMES DAILY
Status: DISCONTINUED | OUTPATIENT
Start: 2022-11-08 | End: 2022-11-08

## 2022-11-08 RX ORDER — SODIUM CHLORIDE 0.9 % (FLUSH) 0.9 %
5-40 SYRINGE (ML) INJECTION EVERY 12 HOURS SCHEDULED
Status: DISCONTINUED | OUTPATIENT
Start: 2022-11-08 | End: 2022-11-21 | Stop reason: HOSPADM

## 2022-11-08 RX ORDER — INSULIN LISPRO 100 [IU]/ML
0-4 INJECTION, SOLUTION INTRAVENOUS; SUBCUTANEOUS
Status: DISCONTINUED | OUTPATIENT
Start: 2022-11-08 | End: 2022-11-10

## 2022-11-08 RX ORDER — MAGNESIUM SULFATE IN WATER 40 MG/ML
4000 INJECTION, SOLUTION INTRAVENOUS ONCE
Status: COMPLETED | OUTPATIENT
Start: 2022-11-08 | End: 2022-11-08

## 2022-11-08 RX ORDER — INSULIN GLARGINE 100 [IU]/ML
40 INJECTION, SOLUTION SUBCUTANEOUS DAILY
Status: DISCONTINUED | OUTPATIENT
Start: 2022-11-09 | End: 2022-11-09

## 2022-11-08 RX ORDER — ALBUTEROL SULFATE 90 UG/1
2 AEROSOL, METERED RESPIRATORY (INHALATION)
Status: DISCONTINUED | OUTPATIENT
Start: 2022-11-08 | End: 2022-11-09

## 2022-11-08 RX ORDER — INSULIN LISPRO 100 [IU]/ML
0-4 INJECTION, SOLUTION INTRAVENOUS; SUBCUTANEOUS NIGHTLY
Status: DISCONTINUED | OUTPATIENT
Start: 2022-11-08 | End: 2022-11-08

## 2022-11-08 RX ADMIN — Medication 2 PUFF: at 12:38

## 2022-11-08 RX ADMIN — ATORVASTATIN CALCIUM 80 MG: 80 TABLET, FILM COATED ORAL at 20:49

## 2022-11-08 RX ADMIN — DOCUSATE SODIUM 100 MG: 100 CAPSULE, LIQUID FILLED ORAL at 10:37

## 2022-11-08 RX ADMIN — MEROPENEM 500 MG: 500 INJECTION, POWDER, FOR SOLUTION INTRAVENOUS at 16:58

## 2022-11-08 RX ADMIN — Medication 2 PUFF: at 20:24

## 2022-11-08 RX ADMIN — Medication 10 ML: at 20:50

## 2022-11-08 RX ADMIN — PANTOPRAZOLE SODIUM 40 MG: 40 TABLET, DELAYED RELEASE ORAL at 05:58

## 2022-11-08 RX ADMIN — INSULIN LISPRO 1 UNITS: 100 INJECTION, SOLUTION INTRAVENOUS; SUBCUTANEOUS at 13:35

## 2022-11-08 RX ADMIN — DOCUSATE SODIUM 100 MG: 100 CAPSULE, LIQUID FILLED ORAL at 20:49

## 2022-11-08 RX ADMIN — HYDROCODONE BITARTRATE AND ACETAMINOPHEN 1 TABLET: 5; 325 TABLET ORAL at 16:25

## 2022-11-08 RX ADMIN — Medication 10 ML: at 10:34

## 2022-11-08 RX ADMIN — INSULIN LISPRO 8 UNITS: 100 INJECTION, SOLUTION INTRAVENOUS; SUBCUTANEOUS at 13:35

## 2022-11-08 RX ADMIN — METHOCARBAMOL TABLETS 500 MG: 500 TABLET, COATED ORAL at 20:49

## 2022-11-08 RX ADMIN — Medication 2 PUFF: at 16:01

## 2022-11-08 RX ADMIN — INSULIN LISPRO 1 UNITS: 100 INJECTION, SOLUTION INTRAVENOUS; SUBCUTANEOUS at 10:41

## 2022-11-08 RX ADMIN — Medication 2 PUFF: at 16:02

## 2022-11-08 RX ADMIN — INSULIN LISPRO 2 UNITS: 100 INJECTION, SOLUTION INTRAVENOUS; SUBCUTANEOUS at 18:21

## 2022-11-08 RX ADMIN — GABAPENTIN 100 MG: 100 CAPSULE ORAL at 10:37

## 2022-11-08 RX ADMIN — Medication 2 PUFF: at 09:11

## 2022-11-08 RX ADMIN — POLYETHYLENE GLYCOL 3350 17 G: 17 POWDER, FOR SOLUTION ORAL at 20:50

## 2022-11-08 RX ADMIN — SODIUM CHLORIDE: 9 INJECTION, SOLUTION INTRAVENOUS at 04:32

## 2022-11-08 RX ADMIN — GABAPENTIN 100 MG: 100 CAPSULE ORAL at 13:37

## 2022-11-08 RX ADMIN — GABAPENTIN 100 MG: 100 CAPSULE ORAL at 20:49

## 2022-11-08 RX ADMIN — INSULIN LISPRO 8 UNITS: 100 INJECTION, SOLUTION INTRAVENOUS; SUBCUTANEOUS at 18:21

## 2022-11-08 RX ADMIN — MAGNESIUM SULFATE HEPTAHYDRATE 4000 MG: 40 INJECTION, SOLUTION INTRAVENOUS at 11:33

## 2022-11-08 RX ADMIN — METHOCARBAMOL TABLETS 500 MG: 500 TABLET, COATED ORAL at 05:58

## 2022-11-08 RX ADMIN — ACETAMINOPHEN 650 MG: 325 TABLET ORAL at 12:53

## 2022-11-08 RX ADMIN — DIGOXIN 125 MCG: 125 TABLET ORAL at 10:39

## 2022-11-08 RX ADMIN — FAMOTIDINE 20 MG: 20 TABLET ORAL at 20:49

## 2022-11-08 RX ADMIN — LEVOTHYROXINE SODIUM 175 MCG: 0.03 TABLET ORAL at 05:58

## 2022-11-08 RX ADMIN — METHOCARBAMOL TABLETS 500 MG: 500 TABLET, COATED ORAL at 13:37

## 2022-11-08 RX ADMIN — POLYETHYLENE GLYCOL 3350 17 G: 17 POWDER, FOR SOLUTION ORAL at 10:37

## 2022-11-08 RX ADMIN — MEROPENEM 500 MG: 500 INJECTION, POWDER, FOR SOLUTION INTRAVENOUS at 04:34

## 2022-11-08 RX ADMIN — Medication 500 MG: at 10:39

## 2022-11-08 RX ADMIN — HYDROCODONE BITARTRATE AND ACETAMINOPHEN 1 TABLET: 5; 325 TABLET ORAL at 08:29

## 2022-11-08 ASSESSMENT — PAIN - FUNCTIONAL ASSESSMENT: PAIN_FUNCTIONAL_ASSESSMENT: PREVENTS OR INTERFERES SOME ACTIVE ACTIVITIES AND ADLS

## 2022-11-08 ASSESSMENT — ENCOUNTER SYMPTOMS
SINUS PRESSURE: 0
RHINORRHEA: 0
SHORTNESS OF BREATH: 0
SINUS PAIN: 0
NAUSEA: 0
COUGH: 0
WHEEZING: 0
DIARRHEA: 0
EYE REDNESS: 0
EYE DISCHARGE: 0
SORE THROAT: 0
CONSTIPATION: 0
BACK PAIN: 0
ABDOMINAL PAIN: 0

## 2022-11-08 ASSESSMENT — PAIN SCALES - GENERAL
PAINLEVEL_OUTOF10: 10
PAINLEVEL_OUTOF10: 8

## 2022-11-08 ASSESSMENT — PAIN DESCRIPTION - ORIENTATION: ORIENTATION: LEFT

## 2022-11-08 ASSESSMENT — PAIN DESCRIPTION - LOCATION
LOCATION: ARM
LOCATION: HEAD

## 2022-11-08 ASSESSMENT — PAIN DESCRIPTION - DESCRIPTORS: DESCRIPTORS: ACHING

## 2022-11-08 NOTE — CONSULTS
[facility]   CONSULT NOTE    11/8/2022 10:02 AM    Patient Information: Boo Camara   Date of Admit:  11/6/2022  Primary Care Physician:  Pa Alonzo  Requesting Physician:  Fabio Lewis MD    Reason for consult:   Evaluation and recommendations for MDS    Chief complaint:    Chief Complaint   Patient presents with    Shortness of Breath     Pt presents to the ED from [facility] with reports of SOB, per EMS Pt sating at 88% on RA. Pt is usually on RA per baseline. Pt satting at 92% RA. Pt recently discharged for Picc Line placement due to reoccurring blood transfusion due to MDS. Per daughter at bedside pt is acting normally. Does not appear to be lethargic or confused at this time. VSS. History of Present Illness:  Boo Camara is a 78 y.o. female on Fabio Lewis MD service who was admitted on 11/6/2022 for acute hypoxemic respiratory failure. She is a patient of Dr. Crista Chan with MDS. She was seen by us on previous admission for anemia and thrombocytopenia. Bone marrow bx on 9/29/22 was consistent with MDS. Patient was recently seen by Dr. Crista Chan in the office on 11/1/22. Patient has a low performance status and it was decided not to pursue treatment for MDS. She is supported with transfusions as needed. She presented to the ED with SOB. She has received one unit pRBCs yesterday for hgb of 6.7. Platelets are currently 28k, no external bleeding  She has a PICC line in her left arm with left arm swelling. Doppler on 11/7/22  showing acute totally occluding DVT left brachial vein.         Past Medical History:     has a past medical history of 4/11/17 Left knee repeat repair of median parapatellar arthrotomy with augmentation, Arthritis, Aspiration pneumonia of left lower lobe due to gastric secretions ([address] Atrial fibrillation ([address] CAD (coronary artery disease), Cataract, Chronic diastolic congestive heart failure ([address] Diabetes mellitus ([address] GERD (gastroesophageal reflux disease), Hyperlipidemia, Hypertension, HYPOTHRYROIDISM, Myelodysplastic disease ([address] Non-English speaking patient, Sleep apnea, Thyroid disease, and UTI. Past Surgical History:    Past Surgical History:   Procedure Laterality Date    CARDIAC SURGERY  2004    CABG X 4 VESSELS    CHOLECYSTECTOMY, LAPAROSCOPIC  5/15/14    with IOC    CT BONE MARROW BIOPSY  9/29/2022    CT BONE MARROW BIOPSY 9/29/2022 F CT SCAN    JOINT REPLACEMENT Bilateral 2000 AND 1996 And 1998    knee    KNEE SURGERY Left 02/28/2017    left knee poly exchange revision     REVISION TOTAL KNEE ARTHROPLASTY Right 11/22/2016    RIGHT TOTAL KNEE REVISION WITH POLY EXCHANGE    TOTAL KNEE ARTHROPLASTY      partical/ left     UPPER GASTROINTESTINAL ENDOSCOPY N/A 10/3/2022    EGD BIOPSY performed by Miracle Lewis MD at [facility] N/A 10/3/2022    EGD DILATION BALLOON performed by Miracle Lewis MD at [address] [facility] ENDOSCOPY        Current Medications:     insulin lispro  0-4 Units SubCUTAneous TID WC    magnesium sulfate  4,000 mg IntraVENous Once    insulin lispro  8 Units SubCUTAneous TID WC    albuterol sulfate HFA  2 puff Inhalation 4x daily    ipratropium  2 puff Inhalation 4x daily    [START ON 11/9/2022] insulin glargine  40 Units SubCUTAneous Daily    atorvastatin  80 mg Oral Nightly    calcium elemental  500 mg Oral Daily    digoxin  125 mcg Oral Daily    famotidine  20 mg Oral Daily    gabapentin  100 mg Oral TID    levothyroxine  175 mcg Oral QAM AC    metoprolol tartrate  12.5 mg Oral BID    pantoprazole  40 mg Oral QAM AC    [Held by provider] sacubitril-valsartan  0.5 tablet Oral BID    sodium chloride flush  5-40 mL IntraVENous 2 times per day    heparin (porcine)  5,000 Units SubCUTAneous Q12H    methocarbamol  500 mg Oral q8h    meropenem  500 mg IntraVENous Q12H    polyethylene glycol  17 g Oral BID    docusate sodium  100 mg Oral BID       Allergies:     Allergies   Allergen Reactions    Aspirin Other (See Comments)     GI upset    Ibuprofen Other (See Comments)     GI upset    Macrobid [Nitrofurantoin Monohydrate Macrocrystals] Other (See Comments)     COLD, SICK    Adhesive Tape Rash    Lexiscan [Regadenoson] Rash     Happened twice with Lexiscan    Penicillins Rash        Social History:    reports that she has never smoked. She has never used smokeless tobacco. She reports that she does not drink alcohol and does not use drugs. Family History:     family history includes Arthritis in an other family member; Diabetes in an other family member; Heart Disease in an other family member; High Cholesterol in her brother; Hypertension in an other family member. ROS:    Constitutional:  Negative for fever, chills or night sweats  Eyes:  Negative for exudate, itching  Ears:  Negative for drainage   Nose:  Negative for rhinorrhea, epistaxis  Mouth/Throat:  Negative for hoarseness, sore throat. Respiratory:   Negative for shortness of breath, hemoptysis, wheezing  Cardiovascular: Negative for chest pain, palpitations   Gastrointestinal:  Negative for nausea, vomiting, diarrhea, black stool, bright red blood in the stool  Genitourinary:  Negative for dysuria, hematuria   Hematologic/Lymphatic:  Negative for  bleeding tendency, easy bruising  Musculoskeletal:  Negative for myalgias, arthralgias  Neurologic:  Negative for  confusion,dysarthria. Skin :  Negative for itching, rash  Psychiatric:  Negative for depression, anxiety. Endocrine:  Negative for polydipsia, polyuria, heat /cold intolerance.     PHYSICAL EXAM:    Vitals:  Vitals:    11/08/22 0912   BP:    Pulse:    Resp:    Temp:    SpO2: 96%        Intake/Output Summary (Last 24 hours) at 11/8/2022 1002  Last data filed at 11/8/2022 0442  Gross per 24 hour   Intake 1486.9 ml   Output 550 ml   Net 936.9 ml      Wt Readings from Last 3 Encounters:   11/08/22 238 lb 8.6 oz (108.2 kg)   11/05/22 227 lb 4.7 oz (103.1 kg)   10/26/22 244 lb 12.8 oz (111 kg)        General appearance: Appears comfortable. Well nourished  Eyes: Sclera clear, pupils equal  ENT: Moist mucus membranes, no thrush  Neck: Trachea midline, symmetrical  Cardiovascular: Regular rhythm, normal S1, S2. No murmur, gallop, rub. No edema in  lower extremities  Respiratory: Clear to auscultation bilaterally. No wheeze. Good inspiratory effort  Gastrointestinal: Abdomen soft, not tender, not distended, normal bowel sounds  Musculoskeletal: No cyanosis in digits, warm extremities  Neurologic: Cranial nerves grossly intact, no motor or speech deficits. Psychiatric: Normal affect. Alert and oriented to time, place and person.   Skin: Warm, dry, normal turgor, no rash    DATA:  CBC:   Lab Results   Component Value Date/Time    WBC 7.0 11/08/2022 04:51 AM    RBC 2.61 11/08/2022 04:51 AM    HGB 8.1 11/08/2022 04:51 AM    HCT 24.3 11/08/2022 04:51 AM    MCV 93.4 11/08/2022 04:51 AM    MCH 31.1 11/08/2022 04:51 AM    MCHC 33.3 11/08/2022 04:51 AM    RDW 19.3 11/08/2022 04:51 AM    PLT 28 11/08/2022 04:51 AM    MPV 9.9 11/08/2022 04:51 AM     BMP:  Lab Results   Component Value Date/Time     11/07/2022 12:50 PM    K 4.3 11/07/2022 12:50 PM    K 4.1 11/03/2022 06:18 PM    CL 98 11/07/2022 12:50 PM    CO2 28 11/07/2022 12:50 PM    BUN 43 11/07/2022 12:50 PM    CREATININE 1.8 11/07/2022 12:50 PM    CALCIUM 7.7 11/07/2022 12:50 PM    GFRAA 30 10/17/2022 05:46 AM    GFRAA >60 07/10/2012 02:05 PM    LABGLOM 27 11/07/2022 12:50 PM    GLUCOSE 255 11/07/2022 12:50 PM     Magnesium:   Lab Results   Component Value Date/Time    MG 1.50 11/07/2022 12:50 PM    MG 2.10 10/18/2022 03:04 PM    MG 2.40 09/29/2022 09:22 AM     LIVER PROFILE:   Recent Labs     11/07/22  0056   AST 14*   ALT 16   BILITOT 0.5   ALKPHOS 62     PT/INR:    Lab Results   Component Value Date/Time    PROTIME 16.4 11/03/2022 06:21 PM    PROTIME 16.3 11/02/2022 10:35 AM    PROTIME 23.6 09/29/2022 11:37 AM    INR 1.33 11/03/2022 06:21 PM    INR 1.32 11/02/2022 10:35 AM    INR 2.10 09/29/2022 11:37 AM       IMPRESSION/RECOMMENDATIONS:    Principal Problem:    Acute hypoxemic respiratory failure (HCC)  Active Problems:    Carotid artery disease without cerebral infarction (HCC)    Chronic atrial fibrillation (HCC)    Myelodysplasia (myelodysplastic syndrome) (HCC)    HFrEF (heart failure with reduced ejection fraction) (HCC)    Peripheral vascular disease (HCC)    ARUN (acute kidney injury) ([facility]    Hyperlipidemia associated with type 2 diabetes mellitus ([facility]    Hypertension associated with diabetes ([facility]    Morbid obesity with BMI of 40.0-44.9, adult ([facility]    Type 2 diabetes mellitus ([facility]  Resolved Problems:    * No resolved hospital problems. *       MDS  - with anemia and thrombocytopenia  - dx Sept 2022, not receiving treatment, supportive transfusions as needed  - transfuse for hgb <7.0 or plts < 10, or <50 with bleeding. Acute LUE DVT  - hold anticoagulation d/t thrombocytopenia    Acute on chronic CHF  - managed per primary team     This plan was discussed with the patient and he/she verbalized understanding. Thank you for allowing us to participate in the care of this patient. Robin Gaviria, ROHITH  [facility]      Complicated situation. Lack of IV access requires PICC line to stay in place given difficulty getting another IV access. I explained to the family that there is risk of clot propagation resulting in death. I explained that anticoagulation is not an option given her thrombocytopenia. In this situation we will repeat a duplex ultrasound of the LUE to make sure the clot is not propagating. Explained to the family if the clot is propagating then there is not choice but to remove the PICC as anticoagulation is not an option. Will get HIT antibody to rule out delayed HIT given previous exposure to heparin.     James Corrigan MD  [facility]

## 2022-11-08 NOTE — PROGRESS NOTES
Spoke with Robel Villalba RN regarding existing PICC, DVT, and possible new PICC. As long as there is adequate circulation in the affected arm, INS recommends leaving the PICC in place while treating for DVT. The patient is hypercoagulable at this time and if a PICC/midline is placed in the other arm she is more likely to develop a clot in that arm as well - clotting off her entire upper extremities and chest.       1510 PICC order canceled at this time. Please re-order if needed.

## 2022-11-08 NOTE — CONSULTS
[facility]  Cardiac Consult     Referring Provider:  Marinus Gosselin         History of Present Illness:  79 y/o female with multiple medical issues including CAD s/p CABG, HTN, Chronic distolic heart failure, PAF and recent diagnosis of MDS admitted with confusion, hypoxia, UTI and upper extremity DVT. She has had frequent admissions and she was last seen by me over 2 years ago in the office. She has had admissions for respiratory failure with concern of diastolic CHF and aspiration pneumonia. She was just discharged yesterday after admit with venous access problems and PICC line placed. She had swelling in the shoulder. She is readmitted with confusion associated with dyspnea and hypoxia. She has been basically bed bound per the daughters. No associated chest pain or fevers. On admit she has been found to have a UTI. Acute on chronic renal insufficiency, LUE DVT at PICC line site, severe anemia and thrombocytopenia. CXR suggests pulmonary edema. Past Medical History:   has a past medical history of 4/11/17 Left knee repeat repair of median parapatellar arthrotomy with augmentation, Arthritis, Aspiration pneumonia of left lower lobe due to gastric secretions ([address] Atrial fibrillation ([address] CAD (coronary artery disease), Cataract, Chronic diastolic congestive heart failure ([address] Diabetes mellitus ([address] GERD (gastroesophageal reflux disease), Hyperlipidemia, Hypertension, HYPOTHRYROIDISM, Myelodysplastic disease ([address] Non-English speaking patient, Sleep apnea, Thyroid disease, and UTI. Surgical History:   has a past surgical history that includes joint replacement (Bilateral, [address]); Cardiac surgery (2004); Cholecystectomy, laparoscopic (5/15/14); Revision total knee arthroplasty (Right, 11/22/2016); Total knee arthroplasty; knee surgery (Left, 02/28/2017); CT BIOPSY BONE MARROW (9/29/2022);  Upper gastrointestinal endoscopy (N/A, 10/3/2022); and Upper gastrointestinal endoscopy (N/A, 10/3/2022). Social History:   reports that she has never smoked. She has never used smokeless tobacco. She reports that she does not drink alcohol and does not use drugs. Family History:  family history includes Arthritis in an other family member; Diabetes in an other family member; Heart Disease in an other family member; High Cholesterol in her brother; Hypertension in an other family member. Medications:   insulin lispro  0-4 Units SubCUTAneous TID WC    magnesium sulfate  4,000 mg IntraVENous Once    insulin lispro  8 Units SubCUTAneous TID WC    albuterol sulfate HFA  2 puff Inhalation 4x daily    ipratropium  2 puff Inhalation 4x daily    [START ON 11/9/2022] insulin glargine  40 Units SubCUTAneous Daily    lidocaine 1 % injection  5 mL IntraDERmal Once    sodium chloride flush  5-40 mL IntraVENous 2 times per day    atorvastatin  80 mg Oral Nightly    calcium elemental  500 mg Oral Daily    digoxin  125 mcg Oral Daily    famotidine  20 mg Oral Daily    gabapentin  100 mg Oral TID    levothyroxine  175 mcg Oral QAM AC    metoprolol tartrate  12.5 mg Oral BID    pantoprazole  40 mg Oral QAM AC    sodium chloride flush  5-40 mL IntraVENous 2 times per day    heparin (porcine)  5,000 Units SubCUTAneous Q12H    methocarbamol  500 mg Oral q8h    meropenem  500 mg IntraVENous Q12H    polyethylene glycol  17 g Oral BID    docusate sodium  100 mg Oral BID         Allergies:  Aspirin, Ibuprofen, Macrobid [nitrofurantoin monohydrate macrocrystals], Adhesive tape, Lexiscan [regadenoson], and Penicillins     [x] Medications and dosages reviewed.     ROS:  [x]Full ROS obtained and negative except as mentioned in HPI      Physical Examination:    Vitals:    11/08/22 1238   BP:    Pulse: 96   Resp: 18   Temp:    SpO2: 98%        GENERAL: Well developed, well nourished, No acute distress  NEUROLOGICAL: Alert and oriented  PSYCH: Calm affect  SKIN: Warm and dry, No visible rash,   EYES: Pupils equal and round, Sclera non-icteric,   HENT:  External ears and nose unremarkable, mucus membranes moist  MUSCULOSKELETAL: Normal cephalic, neck supple  CAROTID: Normal upstroke, no bruits  CARDIAC: JVP normal, Normal PMI, regular rate and rhythm, normal S1S2, no murmur, rub, or gallop  RESPIRATORY: Normal respiratory effort, clear to auscultation bilaterally  EXTREMITIES: No edema  GASTROINTESTINAL: normal bowel sounds, soft, non-tender, No hepatomegaly     All testing and labs listed below were personally reviewed. URINE  Bacteria, UA4+ Abnormal /HPF Urinalysis Commentssee below   Comment: All Else Obscured   WBC, [id number] High /HPF RBC,  High /HPF Epithelial Cells, UA18 High      Color, UADARK YELLOW Abnormal  Clarity, UATURBID Abnormal  Glucose, Ur100 Abnormal mg/dL Bilirubin UrineMODERATE Abnormal  Ketones, UrineNegativemg/dL Specific Gravity, UA1.020 Blood, UrineMODERATE Abnormal  pH, UA7.5 Protein, EJ706cv/dL Urobilinogen, Urine1.0E.U./dL Nitrite, UrinePOSITIVE Abnormal  Leukocyte Esterase, UrineLARGE Abnormal  Microscopic ExaminationYES Urine TypeVoided       CT ABD AND PELVIS  Impression:  1. No acute findings in the abdomen or pelvis. 2. Moderate amount of stool and air in the colon proximally which could   reflect constipation. 3. Punctate bilateral intrarenal calculi. LABS:    WBC7.0K/uL RBC2.61 Low M/uL Hemoglobin8. 1 Low g/dL Ljyvbiqzke81.3 Low % MCV93.4fL MCH31.1pg MCHC33.3g/dL RDW19.3 High % Qemqddmnj84 Low K/uL     Hemoglobin A1C8.0%     Calcium8. 0 Low mg/dL Total Protein6. 3 Low g/dL Albumin2. 5 Low g/dL Albumin/Globulin Ratio0. 7 Low  Total Bilirubin0.4mg/dL Alkaline Ylsspazmkif09Y/L ALT15U/L AST14 Low U/L     Calcium8. 0 Low mg/dL Phosphorus3.5mg/dL Albumin2. 6 Low g/dL     Tgitpj354 Low mmol/L Potassium4.6mmol/L Uostbjfu61 Low mmol/L PR387lodt/L Anion Gap8 Ezgunpj311 High mg/dL BUN44 High mg/dL Creatinine1.7 High mg/dL     INFLUENZA ANOT DETECTED INFLUENZA BNOT DETECTED     SARS-CoV-2 RNA, RT PCRNOT DETECTED     EKG:-personally reviewed  Afib, RBBB    ECHO 10/17/2022  Summary   Technically difficult exam due to body habitus. Left ventricular cavity size is dilated with normal left ventricular wall   thickness. Overall left ventricular systolic function appears mildly reduced with an   ejection fraction that is visually estimated to be 45-50%. Abnormal (paradoxical) septal motion is present. Definity contrast agent administered to better visualize endocardial border   and wall motion. Normal diastolic filling pattern for age. Avg E/e' estimated to be 13.8. The right ventricle is mildly enlarged with reduced function. The left atrium is mildly dilated. Mild tricuspid regurgitation with an RVSP estimated to be 29 mmHg. CT HEAD:  No acute intracranial hemorrhage, mass effect, midline shift, or signs of   acute territorial infarct. Generalized volume loss and chronic ischemic changes in the white matter are   stable. Partial opacification in the left mastoid air cells and edge of the middle   ear cavity are new from the prior exam.  Correlate for infectious symptoms   versus eustachian tube dysfunction. ASSESSMENT:    Dyspnea:  Apparent acute on chronic diastolic CHF  CXR reviewed and under penetrated. I cannot tell if there is much of a change from baseline. Agree with holding entresto due to acute renal issues  No obvious fluid on exam.  Agree with nephrology. Follow off of entresto. If renal function improves, we can restart diuretics    Afib:  Chronic. Rate controlled. Not on anticoagulation due to low platelets    Confusion:  Likely due to UTI and possible hypoxia. Improving    UTI:  On antibiotics    MDS:  Heme following. Frequent transfusions    DVT:  Difficult situation due to severe thrombocytopenia.   Heme following    CRI:  Acute on chronic renal insufficiency  Follow off of entresto    CAD:  Stable  Remote CABG    Plan:  Agree with plan  Follow renal function off of entresto  Follow fluid status  Repeat CXR in am as admit CXR of poor quality    Thank you for allowing me to participate in the care of this individual.      Aldo Richard M.D., [facility] [place]

## 2022-11-08 NOTE — CONSULTS
Infectious Diseases   Consult Note        Admission Date: 11/6/2022  Hospital Day: Hospital Day: 3   Attending: Sally Preciado MD  Date of service: 11/8/22     Reason for admission: Acute respiratory failure with hypoxemia ([facility] [J96.01]  Dyspnea, unspecified type [R06.00]  Acute on chronic congestive heart failure, unspecified heart failure type ([facility] [I50.9]    Chief complaint/ Reason for consult: Atypical pneumonia    Microbiology:        I have reviewed allavailable micro lab data and cultures    Blood culture (2/2) - collected on 11/6/2022: In process    Urine culture  - collected on 11/6/2022: In process      Antibiotics and immunizations:       Current antibiotics: All antibiotics and their doses were reviewed by me    Recent Abx Admin                     meropenem (MERREM) 500 mg IVPB (mini-bag) (mg) 500 mg New Bag 11/08/22 0434     500 mg New Bag 11/07/22 1724                      Immunization History: All immunization history was reviewed by me today. Immunization History   Administered Date(s) Administered    Influenza Vaccine, unspecified formulation 09/01/2016    Influenza Virus Vaccine 10/22/2011    Pneumococcal Polysaccharide ([id number]) 02/11/2012       Known drug allergies: All allergies were reviewed and updated    Allergies   Allergen Reactions    Aspirin Other (See Comments)     GI upset    Ibuprofen Other (See Comments)     GI upset    Macrobid [Nitrofurantoin Monohydrate Macrocrystals] Other (See Comments)     COLD, SICK    Adhesive Tape Rash    Lexiscan [Regadenoson] Rash     Happened twice with Lexiscan    Penicillins Rash       Social history:     Social History:  All social andepidemiologic history was reviewed and updated by me today as needed. Tobacco use:   reports that she has never smoked. She has never used smokeless tobacco.  Alcohol use:   reports no history of alcohol use. Currently lives in: [address] [place] [address]   reports no history of drug use.      COVID VACCINATION AND LAB RESULT RECORDS:     Internal Administration   First Dose      Second Dose           Last COVID Lab SARS-CoV-2 RNA, RT PCR (no units)   Date Value   11/08/2022 NOT DETECTED     SARS-CoV-2, NAAT (no units)   Date Value   11/03/2022 Not Detected            Assessment:     The patient is a 78 y.o. old female who  has a past medical history of 4/11/17 Left knee repeat repair of median parapatellar arthrotomy with augmentation (4/12/2017), Arthritis, Aspiration pneumonia of left lower lobe due to gastric secretions ([address] (10/18/2022), Atrial fibrillation ([address] CAD (coronary artery disease), Cataract, Chronic diastolic congestive heart failure ([address] (7/27/2022), Diabetes mellitus ([address] GERD (gastroesophageal reflux disease), Hyperlipidemia, Hypertension, HYPOTHRYROIDISM, Myelodysplastic disease ([address] (10/17/2022), Non-English speaking patient, Sleep apnea, Thyroid disease, and UTI. with following problems:    Concern for atypical pneumonia and shortness of breath  PICC line in place in the left arm  Acute DVT of the left brachial vein  Complicated urinary tract infection with positive nitrite and leukocyte esterase and 4+ bacteria on urinalysis  Bilateral renal calculi  Negative COVID-19 influenza AMB rapid Moni nucleic acid assay test  Acute kidney injury  Coronary artery disease  Essential hypertension  Mixed hyperlipidemia  Hypothyroidism  Myelodysplastic syndrome  Obstructive sleep apnea  Non-English-speaking patient  Obesity Class 3 due to excess calorie intake : Body mass index is 46.59 kg/m². Discussion:      The patient was afebrile on admission. He was started on IV meropenem by primary team. .  Urine culture is in process. Last 2D echo was on 10/17/2022 and showed ejection fraction of 45 to 50% with no obvious valvular vegetations. The patient has a PICC line in place in the left arm.   She has developed a DVT in the left upper extremity    Plan:     Diagnostic Workup:    Please make sure Blood cultures have been collected  F/u on urine culture  I will order respiratory viral PCR panel for thoroughness of work-up to evaluate for other viral infections including parainfluenza, RSV etc.  Check a procalcitonin level  Continue to follow fever curve, WBC count and blood cultures  Follow up on liverand renal functions closely    Antimicrobials: Will continue iv meropenem 1 gm every 8 hours given urinalysis concerning for urinary tract infection and history of multidrug-resistant organism in setting of bilateral nephrolithiasis  Contact isolation given history of multidrug-resistant organisms. Left arm DVT management per primary  We will follow up on the culture results and clinical progress and will make further recommendations accordingly. Discussed with Dr. Landon Wooten, oncology. Patient has high-grade myelodysplastic syndrome and has poor prognosis per oncology  Continue close vitals monitoring. Maintain good glycemic control. Fall precautions. Aspiration precautions. Continue to watch for new fever or diarrhea. DVT prophylaxis. Discussed all above with patient and RN. Drug Monitoring:    Continue serial monitoring for antibiotic toxicity as follows: CBC, CMP  Continue to watch for following: new or worsening fever, hypotension, hives, lip swelling and redness or purulence at vascular access sites. I/v access Management:    Continue to monitor i.v access sites for erythema, induration, discharge or tenderness. As always, continue efforts to minimizetubes/lines/drains as clinically appropriate to reduce chances of line associated infections. Current isolation precautions:    Currently active isolation(s): Contact, Contact, Droplet Plus       Level of complexity of visit and medical decision making: High     Risk of Complications/Morbidity: High     Illness(es)/ Infection present that pose threat to life/bodily function.    There is potential for severe exacerbation of infection/side effects of treatment. Therapy requires intensive monitoring for antimicrobial agent toxicity. Thank you for involving me in the care of your patient. I will continue to follow. If you have any additional questions, please do not hesitate to contact me. Subjective:     Presenting complaint in ER:     Chief Complaint   Patient presents with    Shortness of Breath     Pt presents to the ED from [facility] with reports of SOB, per EMS Pt sating at 88% on RA. Pt is usually on RA per baseline. Pt satting at 92% RA. Pt recently discharged for Picc Line placement due to reoccurring blood transfusion due to MDS. Per daughter at bedside pt is acting normally. Does not appear to be lethargic or confused at this time. VSS. HPI: Jamie Rodrigues is a 78 y.o. female patient, who was seen at the request of Dr. Zach Frank MD.    History was obtained from chart review and the patient. The patient was admitted on 11/6/2022. I have been consulted to see the patient for above mentioned reason(s). The patient has multiple medical comorbidities, and presented to the ER for concerns for shortness of breath. The patient is an Macedonian speaker. She has history of myelodysplastic syndrome and was recently admitted on 11/3/2022 and required blood transfusions. She  was discharged on 11/5/2022. The patient was becoming more confused at home and was becoming short of breath after discharge from the hospital and was brought back to the ER by family and was admitted. The patient has been afebrile. She was given IV ceftriaxone in the ER on hospitalization and was started on IV meropenem by primary team yesterday. The patient reportedly has history of multidrug-resistant organisms and admitting hospitalist was concerned about UTI    I have been asked for my opinion for management for this patient.                    Past Medical History: All past medical history reviewed today. Past Medical History:   Diagnosis Date    4/11/17 Left knee repeat repair of median parapatellar arthrotomy with augmentation 4/12/2017    Arthritis     Aspiration pneumonia of left lower lobe due to gastric secretions ([address] 10/18/2022    Atrial fibrillation (HCC)     CAD (coronary artery disease)     Cataract     Chronic diastolic congestive heart failure ([address] 7/27/2022    Diabetes mellitus ([address]     GERD (gastroesophageal reflux disease)     Hyperlipidemia     Hypertension     HYPOTHRYROIDISM     Myelodysplastic disease ([address] 10/17/2022    Non-English speaking patient     SPEAKS Luxembourgish. SHE SPEAKS A LITTLE ENGLISH    Sleep apnea     unspecified    Thyroid disease     UTI          Past Surgical History: All pastsurgical history was reviewed today. Past Surgical History:   Procedure Laterality Date    CARDIAC SURGERY  2004    CABG X 4 VESSELS    CHOLECYSTECTOMY, LAPAROSCOPIC  5/15/14    with IOC    CT BONE MARROW BIOPSY  9/29/2022    CT BONE MARROW BIOPSY 9/29/2022 [facility] CT SCAN    JOINT REPLACEMENT Bilateral 2000 [id number]    knee    KNEE SURGERY Left 02/28/2017    left knee poly exchange revision     REVISION TOTAL KNEE ARTHROPLASTY Right 11/22/2016    RIGHT TOTAL KNEE REVISION WITH POLY EXCHANGE    TOTAL KNEE ARTHROPLASTY      partical/ left     UPPER GASTROINTESTINAL ENDOSCOPY N/A 10/3/2022    EGD BIOPSY performed by Tamiko Holt MD at [facility] N/A 10/3/2022    EGD DILATION BALLOON performed by Tamiko Holt MD at [address] [facility] ENDOSCOPY         Family History: All family history was reviewed today. Problem Relation Age of Onset    Arthritis Other     Diabetes Other     Heart Disease Other     Hypertension Other     High Cholesterol Brother          Medications: All current and past medications were reviewed.     Medications Prior to Admission: phenazopyridine (PYRIDIUM) 100 MG tablet, Take 1 tablet by mouth in the morning, at noon, and at bedtime for 3 days  Ipratropium-Albuterol (DUONEB IN), Inhale 3 mLs into the lungs every 4 hours as needed (SOB)  famotidine (PEPCID) 20 MG tablet, Take 20 mg by mouth daily  HYDROcodone-acetaminophen (NORCO) 5-325 MG per tablet, Take 1 tablet by mouth every 6 hours as needed for Pain. lactobacillus (CULTURELLE) capsule, Take 2 capsules by mouth daily  metroNIDAZOLE (FLAGYL) 500 MG tablet, Take 500 mg by mouth 3 times daily  omeprazole (PRILOSEC) 20 MG delayed release capsule, Take 20 mg by mouth in the morning and at bedtime  polyethylene glycol (GLYCOLAX) 17 g packet, Take 17 g by mouth daily as needed for Constipation  silver sulfADIAZINE (SILVADENE) 1 % cream, Apply topically 2 times daily Apply to inner buttocks every shift for MASD  digoxin (LANOXIN) 125 MCG tablet, Take 1 tablet by mouth daily  guaiFENesin (MUCINEX) 600 MG extended release tablet, Take 1 tablet by mouth 2 times daily as needed for Congestion  insulin lispro (HUMALOG) 100 UNIT/ML SOLN injection vial, Inject 6 Units into the skin 3 times daily (with meals)  sacubitril-valsartan (ENTRESTO) 24-26 MG per tablet, Take 0.5 tablets by mouth 2 times daily  furosemide (LASIX) 20 MG tablet, Take 1 tablet by mouth daily  gabapentin (NEURONTIN) 100 MG capsule, Take 1 capsule by mouth 3 times daily for 30 days.   metoprolol tartrate (LOPRESSOR) 25 MG tablet, Take 0.5 tablets by mouth 2 times daily  empagliflozin (JARDIANCE) 10 MG tablet, Take 1 tablet by mouth daily  insulin glargine (BASAGLAR KWIKPEN) 100 UNIT/ML injection pen, Inject 48 Units into the skin daily  calcium carbonate (OSCAL) 500 MG TABS tablet, Take 500 mg by mouth daily  senna (SENOKOT) 8.6 MG tablet, Take 1 tablet by mouth 2 times daily  magnesium 30 MG tablet, Take 40 mg by mouth daily  tiZANidine (ZANAFLEX) 2 MG tablet, Take 2 mg by mouth every 8 hours as needed  atorvastatin (LIPITOR) 80 MG tablet, Take 1 tablet by mouth daily  Insulin Pen Needle 32G X 6 MM MISC, by Does not apply route 3 times daily with meals  vitamin D (ERGOCALCIFEROL) 41275 UNITS CAPS capsule, Take 50,000 Units by mouth once a week  Omega 3 1000 MG CAPS, Take 1,000 mg by mouth daily  Liraglutide (VICTOZA) 18 MG/3ML SOPN SC injection, Inject 1.2 mg into the skin daily  lidocaine (LIDODERM) 5 %, Place 1 patch onto the skin daily 12 hours on, 12 hours off.  oxybutynin (DITROPAN XL) 15 MG CR tablet, Take 15 mg by mouth daily. levothyroxine (SYNTHROID) 175 MCG tablet, Take 175 mcg by mouth daily     insulin lispro  0-4 Units SubCUTAneous TID WC    magnesium sulfate  4,000 mg IntraVENous Once    insulin lispro  8 Units SubCUTAneous TID WC    albuterol sulfate HFA  2 puff Inhalation 4x daily    ipratropium  2 puff Inhalation 4x daily    [START ON 11/9/2022] insulin glargine  40 Units SubCUTAneous Daily    lidocaine 1 % injection  5 mL IntraDERmal Once    sodium chloride flush  5-40 mL IntraVENous 2 times per day    atorvastatin  80 mg Oral Nightly    calcium elemental  500 mg Oral Daily    digoxin  125 mcg Oral Daily    famotidine  20 mg Oral Daily    gabapentin  100 mg Oral TID    levothyroxine  175 mcg Oral QAM AC    metoprolol tartrate  12.5 mg Oral BID    pantoprazole  40 mg Oral QAM AC    sodium chloride flush  5-40 mL IntraVENous 2 times per day    heparin (porcine)  5,000 Units SubCUTAneous Q12H    methocarbamol  500 mg Oral q8h    meropenem  500 mg IntraVENous Q12H    polyethylene glycol  17 g Oral BID    docusate sodium  100 mg Oral BID          REVIEW OF SYSTEMS:       Review of Systems   Constitutional:  Positive for fatigue. Negative for chills, diaphoresis and fever. HENT:  Negative for ear discharge, ear pain, postnasal drip, rhinorrhea, sinus pressure, sinus pain and sore throat. Eyes:  Negative for discharge and redness. Respiratory:  Negative for cough, shortness of breath and wheezing. Cardiovascular:  Negative for chest pain and leg swelling.    Gastrointestinal:  Negative for abdominal pain, constipation, diarrhea and nausea. Endocrine: Negative for cold intolerance, heat intolerance and polydipsia. Genitourinary:  Negative for dysuria, flank pain, frequency, hematuria and urgency. Musculoskeletal:  Negative for back pain and myalgias. Skin:  Negative for rash. Allergic/Immunologic: Negative for immunocompromised state. Neurological:  Negative for dizziness, seizures and headaches. Hematological:  Does not bruise/bleed easily. Psychiatric/Behavioral:  Negative for agitation, hallucinations and suicidal ideas. The patient is not nervous/anxious. All other systems reviewed and are negative. Objective:       PHYSICAL EXAM:      Vitals:   Vitals:    11/08/22 1037 11/08/22 1222 11/08/22 1237 11/08/22 1238   BP: (!) 106/45 (!) 113/50     Pulse: 93 92 96 96   Resp:  16 18 18   Temp:  97 °F (36.1 °C)     TempSrc:       SpO2:  97% 98% 98%   Weight:       Height:           Physical Exam  Vitals and nursing note reviewed. Constitutional:       Appearance: She is well-developed. She is not diaphoretic. Comments: The patient was seen earlier today. HENT:      Head: Normocephalic and atraumatic. Right Ear: External ear normal. There is no impacted cerumen. Left Ear: External ear normal. There is no impacted cerumen. Nose: Nose normal.      Mouth/Throat:      Mouth: Mucous membranes are moist.      Pharynx: Oropharynx is clear. No oropharyngeal exudate. Eyes:      General: No scleral icterus. Right eye: No discharge. Left eye: No discharge. Conjunctiva/sclera: Conjunctivae normal.      Pupils: Pupils are equal, round, and reactive to light. Neck:      Thyroid: No thyromegaly. Cardiovascular:      Rate and Rhythm: Normal rate and regular rhythm. Heart sounds: Normal heart sounds. No murmur heard. No friction rub. Pulmonary:      Effort: No respiratory distress. Breath sounds: No stridor. No wheezing or rales.    Abdominal:      General: Bowel sounds are normal.      Palpations: Abdomen is soft. Tenderness: There is no abdominal tenderness. There is no guarding or rebound. Musculoskeletal:         General: No swelling, tenderness or deformity. Normal range of motion. Cervical back: Normal range of motion and neck supple. Right lower leg: No edema. Left lower leg: No edema. Lymphadenopathy:      Cervical: No cervical adenopathy. Skin:     General: Skin is warm and dry. Coloration: Skin is not jaundiced. Findings: No bruising, erythema or rash. Comments: Left arm PICC line noted   Neurological:      General: No focal deficit present. Mental Status: She is alert and oriented to person, place, and time. Mental status is at baseline. Motor: No abnormal muscle tone. Psychiatric:         Mood and Affect: Mood normal.         Behavior: Behavior normal.         Lines and drains: All vascular access sites are healthy with no local erythema, discharge or tenderness. Intake and output:     I/O last 3 completed shifts: In: 1486.9 [P.O.:1000; Blood:355.5; IV Piggyback:131.4]  Out: 550 [Urine:550]    Lab Data:   All available labs were reviewed by me today.      CBC:   Recent Labs     11/07/22  0056 11/07/22  1250 11/08/22  0451   WBC 7.5 7.5 7.0   RBC 2.25* 2.16* 2.61*   HGB 7.0* 6.7* 8.1*   HCT 22.0* 20.6* 24.3*   PLT 32* 28* 28*   MCV 97.8 95.5 93.4   MCH 31.0 31.3 31.1   MCHC 31.7 32.8 33.3   RDW 19.8* 17.5* 19.3*   BANDSPCT 7 4 4        BMP:  Recent Labs     11/07/22  1250 11/08/22  0450 11/08/22  0451   * 136 134*   K 4.3 4.7 4.6   CL 98* 97* 98*   CO2 28 25 28   BUN 43* 45* 44*   CREATININE 1.8* 1.7* 1.7*   CALCIUM 7.7* 8.0* 8.0*   GLUCOSE 255* 174* 180*        Hepatic FunctionPanel:   Lab Results   Component Value Date/Time    ALKPHOS 66 11/08/2022 04:50 AM    ALT 15 11/08/2022 04:50 AM    AST 14 11/08/2022 04:50 AM    PROT 6.3 11/08/2022 04:50 AM    PROT 7.3 07/10/2012 02:05 PM    BILITOT 0.4 11/08/2022 04:50 AM    LABALBU 2.6 11/08/2022 04:51 AM       CPK:   Lab Results   Component Value Date    CKTOTAL 55 10/10/2022     ESR: No results found for: SEDRATE  CRP: No results found for: CRP      Imaging: All pertinent images and reports for the current visit were reviewed by me during this visit. I reviewed the chest x-ray/CT scan/MRI images and independently interpreted the findings and results today. CT ABDOMEN PELVIS WO CONTRAST Additional Contrast? Oral   Final Result   1. No acute findings in the abdomen or pelvis. 2. Moderate amount of stool and air in the colon proximally which could   reflect constipation. 3. Punctate bilateral intrarenal calculi. VL Extremity Venous Bilateral         XR CHEST PORTABLE   Final Result   Left PICC line appears to be over the SVC and not significantly changed from   the prior exam.      Cardiomegaly with pulmonary vascular congestion and interstitial   prominence/interstitial opacities suggesting edema. Stable to the slightly   worsened from the recent study. CT HEAD WO CONTRAST   Final Result   No acute intracranial hemorrhage, mass effect, midline shift, or signs of   acute territorial infarct. Generalized volume loss and chronic ischemic changes in the white matter are   stable. Partial opacification in the left mastoid air cells and edge of the middle   ear cavity are new from the prior exam.  Correlate for infectious symptoms   versus eustachian tube dysfunction. Outside records:    Labs, Microbiology, Radiology and pertinent results from Care everywhere, if available, were reviewed as a part ofthe consultation.       Problem list:       Patient Active Problem List   Diagnosis Code    DM (diabetes mellitus) ([facility] E11.9    Coronary artery disease involving native coronary artery of native heart without angina pectoris I25.10    PAF (paroxysmal atrial fibrillation) ([facility]) I48.0    Peripheral vascular disease ([facility] I73.9    CARLOS (obstructive sleep apnea) G47.33    Dyspnea R06.00    ARUN (acute kidney injury) ([facility] N17.9    Hyperlipidemia associated with type 2 diabetes mellitus ([facility]) E11.69, E78.5    Hypertension associated with diabetes ([facility] E11.59, I15.2    Dizziness R42    Headache R51.9    Debility R53.81    11/22/16 Right knee polyethylene exchange M25.561, M25.562    Acute pain of right shoulder M25.511    Mixed hyperlipidemia E78.2    Acute chest pain R07.9    Morbid obesity with BMI of 40.0-44.9, adult ([facility]) E66.01, Z68.41    Obesity, Class III, BMI 40-49.9 (morbid obesity) ([facility]) E66.01    Type 2 diabetes mellitus ([facility]) E11.9    Carotid artery disease without cerebral infarction [facility]) I77.9    2/28/17 Revision with polyethylene exchange left total knee arthroplasty M23.52    4/11/17 Left knee repeat repair of median parapatellar arthrotomy with augmentation S76.111A    Rotator cuff tendonitis, right M75.81    Cervical radicular pain Q45.74    Complicated UTI (urinary tract infection) H10.1    Acute diastolic heart failure ([facility]) I50.31    Dyspnea on exertion R06.09    Severe thrombocytopenia ([facility]) D69.6    Severe anemia D64.9    Wrist arthritis M19.039    Chronic atrial fibrillation ([facility]) I48.20    Primary osteoarthritis of first carpometacarpal joint of left hand M18.12    Shoulder pain M25.519    Hypervolemia E87.70    Myelodysplasia (myelodysplastic syndrome) ([facility]) D46.9    Chronic systolic heart failure ([facility]) I50.22    Arterial hypotension I95.9    History of coronary artery disease Z86.79    Pulmonary vascular congestion R09.89    Acute hypoxemic respiratory failure ([facility]) J96.01    Aspiration pneumonia of left lower lobe due to gastric secretions ([facility]) J69.0    Anemia D64.9    HFrEF (heart failure with reduced ejection fraction) ([facility]) I50.20    Acute respiratory failure with hypoxemia ([facility]) J96.01    Non-English speaking patient Z78.9    Hypothyroidism E03.9    History of DVT in adulthood Z86.718    Nephrolithiasis N20.0    Atypical pneumonia J18.9    Acute on chronic congestive heart failure ([facility] I50.9         Please note that this chart was generated using Dragon dictation software. Although every effort was made to ensure the accuracy of this automated transcription, some errors in transcription may have occurred inadvertently. If you may need any clarification, please do not hesitate to contact me through EPIC or at the phone number provided below with my electronic signature. Any pictures or media included in this note were obtained after taking informed verbal consent from the patient and with their approval to include those in the patient's medical record. Manuel Daniels MD, MPH, [address] [place] [address]  11/8/2022, 2:35 PM  [facility] Infectious Disease   [address] [place] [address], [address] [place] [address]  Office: [phone]  Fax: [phone]  In-person Clinic days:  Tuesday & Thursday a.m. Virtual clinic days: Monday, Wednesday & Friday a.m.

## 2022-11-08 NOTE — PROGRESS NOTES
4 Eyes Skin Assessment     NAME:  Wally Schmidt  YOB: 1940  MEDICAL RECORD NUMBER:  [id number]    The patient is being assess for  Admission    I agree that 2 RN's have performed a thorough Head to Toe Skin Assessment on the patient. ALL assessment sites listed below have been assessed. Areas assessed by both nurses:    Head, Face, Ears, Shoulders, Back, Chest, Arms, Elbows, Hands, Sacrum. Buttock, Coccyx, Ischium, and Legs. Feet and Heels        Does the Patient have a Wound? Yes wound(s) were present on assessment.  LDA wound assessment was Initiated and completed        Alphonso Prevention initiated:  Yes   Wound Care Orders initiated:  Yes    Pressure Injury (Stage 3,4, Unstageable, DTI, NWPT, and Complex wounds) if present place referral/consult order under [de-identified] Yes    New and Established Ostomies if present place consult order under : NA      Nurse 1 eSignature: Electronically signed by Mayra Hoang RN on 11/7/22 at 7:44 PM EST    **SHARE this note so that the co-signing nurse is able to place an eSignature**    Nurse 2 eSignature: Electronically signed by Mayra Hoang RN on 11/7/22 at 8:02 PM EST Karen Downing RN

## 2022-11-08 NOTE — PLAN OF CARE
Problem: Discharge Planning  Goal: Discharge to home or other facility with appropriate resources  11/8/2022 1143 by Forrest Sanchez RN  Outcome: Progressing  11/8/2022 0444 by Tawana Velez RN  Outcome: Not Progressing     Problem: Chronic Conditions and Co-morbidities  Goal: Patient's chronic conditions and co-morbidity symptoms are monitored and maintained or improved  11/8/2022 1143 by Forrest Sanchez RN  Outcome: Progressing  11/8/2022 0444 by Tawana Velez RN  Outcome: Not Progressing     Problem: Discharge Planning  Goal: Discharge to home or other facility with appropriate resources  11/8/2022 1143 by Forrest Sanchez RN  Outcome: Progressing  11/8/2022 0444 by Tawana Velez RN  Outcome: Not Progressing     Problem: Chronic Conditions and Co-morbidities  Goal: Patient's chronic conditions and co-morbidity symptoms are monitored and maintained or improved  11/8/2022 1143 by Forrest Sanchez RN  Outcome: Progressing  11/8/2022 0444 by Tawana Velez RN  Outcome: Not Progressing     Problem: Pain  Goal: Verbalizes/displays adequate comfort level or baseline comfort level  11/8/2022 1143 by Forrest Sanchez RN  Outcome: Progressing  11/8/2022 0444 by Tawana Velez RN  Outcome: Not Progressing

## 2022-11-08 NOTE — PROGRESS NOTES
Physical Therapy    [facility] [address]   Phone: [phone]    Physical Therapy    [x] Initial Evaluation            [] Daily Treatment Note         [] Discharge Summary      Patient: Robert Gibbons   : 1940   MRN: [id number]   Date of Service:  2022  Admitting Diagnosis: Acute hypoxemic respiratory failure [facility])  Current Admission Summary: 78 y.o. female with PMHx of nondysplastic syndrome, hypertension, hyperlipidemia, type 2 diabetes, CAD, atrial fibrillation who presented to [facility] with complaints of shortness of breath. Patient was at her nursing home when it was noted she was having shortness of breath last night and into the early morning of the day. The patient is relatively sleepy on exam, but 2 of her daughters were present at bedside and able to update history. They state the patient was gasping for air. She is not on any oxygen at home, but she is currently satting well on 2 L O2 via NC in the ED. The daughter states that they had heard some crackling sounds with congestion while she was breathing. Patient denied any chest pain, abdominal pain, nausea, vomiting, GI/ symptoms. Patient has not had much of an appetite over the last 72 hours. Additionally, she has had low urine output, per the daughters. Patient has a left PICC line for blood transfusions, and she did receive 1 approximately 3 days ago. However, the daughter states that the patient is having some left chest/upper extremity swelling since the PICC line has been placed. Denies tobacco, alcohol, or illicit drug use.   Past Medical History:  has a past medical history of 15 Left knee repeat repair of median parapatellar arthrotomy with augmentation, Arthritis, Aspiration pneumonia of left lower lobe due to gastric secretions ([address] Atrial fibrillation ([address] CAD (coronary artery disease), Cataract, Chronic diastolic congestive heart failure ([address] Diabetes mellitus ([facility] GERD (gastroesophageal reflux disease), Hyperlipidemia, Hypertension, HYPOTHRYROIDISM, Myelodysplastic disease ([facility] Non-English speaking patient, Sleep apnea, Thyroid disease, and UTI. Past Surgical History:  has a past surgical history that includes joint replacement (Bilateral, [address]); Cardiac surgery (2004); Cholecystectomy, laparoscopic (5/15/14); Revision total knee arthroplasty (Right, 11/22/2016); Total knee arthroplasty; knee surgery (Left, 02/28/2017); CT BIOPSY BONE MARROW (9/29/2022); Upper gastrointestinal endoscopy (N/A, 10/3/2022); and Upper gastrointestinal endoscopy (N/A, 10/3/2022). Discharge Recommendations: Jimmy Kelsey scored a 6/24 on the AM-PAC short mobility form. Current research shows that an AM-PAC score of 17 or less is typically not associated with a discharge to the patient's home setting. Based on the patient's AM-PAC score and their current functional mobility deficits, it is recommended that the patient have 3-5 sessions per week of Physical Therapy at d/c to increase the patient's independence. Please see assessment section for further patient specific details. If patient discharges prior to next session this note will serve as a discharge summary. Please see below for the latest assessment towards goals.      DME Required For Discharge: patient has all required DME for discharge  Precautions/Restrictions: high fall risk, Isolation precautions  Weight Bearing Restrictions: no restrictions  [] Right Upper Extremity  [] Left Upper Extremity [] Right Lower Extremity  [] Left Lower Extremity     Required Braces/Orthotics:  Intermittent use of thumb spica   [] Right  [x] Left  Positional Restrictions:no positional restrictions    Pre-Admission Information   Lives With: alone                     Type of Home: house  Home Layout: one level  Home Access: ramped entry  Bathroom Layout: walker accessible, walk in shower  Bathroom Equipment: grab bars in shower, shower chair  Toilet Height: standard height  Home Equipment: rolling walker, rollator - 4 wheeled walker, manual wheelchair, electric wheelchair  Transfer Assistance: modified independent with use of RW  Ambulation Assistance:modified independent with use of manual w/c, RW for short distances   ADL Assistance: requires assistance with bathing, requires assistance with dressing  IADL Assistance: independent with homemaking tasks home health aide 4 hours/day   Active :        [] Yes                 [x] No  Hand Dominance: [] Left                 [x] Right  Current Employment: retired. Recent Falls: 1 slip out of wheelchair   *cameras present at home for children to monitor    Above information gathered from visit in October 2022:  Comments: After previous admission 10/10-10/26/22 pt discharged to [facility] for rehabilitation however has only completed a couple sessions there d/t pt having to return to hospital. At [facility] point pt transferred to chair but has not been able to complete any ambulation. Examination   Vision:   Vision Gross Assessment: Impaired and Vision Corrective Device: wears glasses for reading  Hearing:   hard of hearing, left hearing aid, right hearing aid  Observation:   General Observation:  Edema along dorsum of L hand   Posture:   Fair - forward head when sitting, forward flexed posture in standing  Sensation:   reports numbness and tingling in (B) UE intermittently   ROM:   (B) LE AROM WFL  Strength:   (L) Knee flex/ext: -3     (R) Knee flex/ext: 4  Decision Making: medium complexity  Clinical Presentation: evolving      Subjective  General: Patient lying supine in bed with HOB elevated. Patient's granddaughter present, intermittently translating conversation to patient. Patient is agreeable to PT/OT. Pain: 10/10.   Location: L arm, neck and head  Pain Interventions: heat applied and repositioned        Functional Mobility  Bed Mobility  Supine to Sit: 2 person assistance with Max   Sit to Supine: 2 person assistance with Max   Scootin person assistance with Max   Comments: Patient performed bed mobility with HOB elevated and increased time  Transfers  No transfers completed on this date secondary to fatigue and B LE weakness. Comments:  Ambulation  Ambulation not tested on this date secondary to fatigue and B LE weakness. Distance:   Gait Mechanics:   Comments:    Stair Mobility  Stair mobility not completed on this date. Comments:  Wheelchair Mobility:  No w/c mobility completed on this date. Comments:  Balance  Static Sitting Balance: fair: maintains balance at CGA without use of UE support  Dynamic Sitting Balance: poor: requires mod (A) to maintain balance  Comments: Patient sat EOB for ~25 minutes while performing ADLs with OT. Patient demonstrates fatigue when sitting unsupported with assist varying between CGA-mod Ax1 to correct posterior lean. Other Therapeutic Interventions  See OT note for ADLs  Functional Outcomes  AM-PAC Inpatient Mobility Raw Score : 6              Cognition  WFL  Orientation:    alert and oriented x 4  Command Following:   [facility]    Education  Barriers To Learning: language  Patient Education: patient educated on goals, PT role and benefits, plan of care, general safety, family education, discharge recommendations  Learning Assessment:  patient verbalizes and demonstrates understanding    Assessment  Activity Tolerance: Fair -; patient limited by fatigue and B LE weakness  Impairments Requiring Therapeutic Intervention: decreased functional mobility, decreased strength, decreased endurance, decreased balance, increased pain, decreased posture  Prognosis: good  Clinical Assessment: Patient is 77 y/o female presenting to [facility] secondary to SOB. Patient currently presents below baseline function with all functional mobility. Patient's PLOF includes being mod I with RW for all transfer and ambulation.  At this time, patient requires max Ax2 for all bed mobility with frequent assistance to remain upright in unsupported sitting.  Patient will continue to benefit from skilled PT in order to return to PLOF with all functional mobility prior to d/c from hospital.  Safety Interventions: patient left in bed, bed alarm in place, call light within reach, gait belt, patient at risk for falls, nurse notified, and family/caregiver present    Plan  Frequency: 3-5 x/per week  Current Treatment Recommendations: strengthening, balance training, endurance training, modalities, and patient/caregiver education    Goals  Patient Goals: Return home   Short Term Goals:  Time Frame: upon d/c  Patient will complete bed mobility at stand by assistance   Patient will complete transfers at stand by assistance   Patient will ambulate 25 ft with use of rolling walker at stand by assistance    Therapy Session Time      Individual Group Co-treatment   Time In     0920   Time Out     1020   Minutes     60     Timed Code Treatment Minutes:  45 Minutes  Total Treatment Minutes:  60 Minutes       Electronically Signed By: Cheri Schuler, PT  Cheri Schuler PT, DPT, [id number]

## 2022-11-08 NOTE — PROGRESS NOTES
HOSPITALISTS PROGRESS NOTE    11/8/2022 9:27 AM        Name: Robert Gibbons . Admitted: 11/6/2022  Primary Care Provider: Yoel Ariza (Tel: [phone])      Brief Course: This 77 yo Female with PMHx hypertension dyslipidemia, DM complicated with neuropathy, CAD s/p CABG 2004, chronic HFrEF 6/2022, paroxysmal Afib, hypothyroidism, PVD, CARLOS, DDD, OA, MDS requiring transfusion for anemia and thrombocytopenia, recurrent UTI's, urinary retention, GERD, dysphagia (food stuck), H pylori on treatment, hemorrhoids presented from [address] with shortness of breath. Of note, patient was recently admitted (11/3-11/5/22 ) for left upper extremity swelling and pain after left PICC line placement. She was treated for pain and transfused 2 units pRBC for anemia related to MDS. Patient was discharged on Saturday 11/5 afternoon. Early Sunday morning 11/6, she was experiencing episodes of left abdominal spasms/tightness, followed by sensation of shortness of breath. She was found by the staff at [address] to desaturate to 85-88% on RA       Interval history:   Pt seen and examined today   Overnight events noted and interval ancillary notes reviewed. On 2 nasal cannula satting around 96%; wean as tolerated keep sats above 92%  Afebrile overnight, WC WNL, blood cultures in process  Patient resting in bed stated she is not feeling well      Assessment & Plan:     Acute hypoxic respiratory failure; likely secondary to CHF exacerbation/atypical pneumonia? Elevated proBNP on admission. CXR with pulmonary vascular congestion  Echo 10/17/2022 showed LVEF 45-50%, abnormal septal motion present.   No vegetation noted  Respiratory molecular panel ordered  Cardiology consulted: Holding Lasix due to ARUN    Elevated troponin: Troponin 0.13 on admission; likely secondary to ARUN/CHF  Trend troponin and monitor telemetry    Complicated UTI: UA suggest UTI, urine culture in process. Currently on meropenem due to history of multidrug-resistant organisms in setting of bilateral nephrolithiasis  Infectious disease consulted    Urinary Retention: Status post Kuhn's placement  CT A/P 11/7 showed punctate bilateral intrarenal calculi. No ureteral stone and no hydronephrosis. On Kuhn's catheter placed on 11/7/22  with return of concentrated and dirty urine. Home oxybutynin discontinued in setting of recurrent issues with urinary retention. ARUN: Creatinine 1.3 on admission; trended up to 1.7  Nephrology consulted; Entresto held  Avoid nephrotoxin, strict intake output, daily weights. Monitor renal function closely    Hx of A. fib: On metoprolol and digoxin. Not on any anticoagulation due to low platelets. Monitor on telemetry    History of Hypertension: Continue metoprolol and digoxin  Holding Entresto and diuretics in setting of ARUN    Intermittent Confusion likely secondary to hypoxia/UTI: Improving  CT Head wo contrast 11/7 showed no acute intracranial hemorrhage, mass effect, midline shift, or infarct     Abdominal Spasm/Tightness  CT A/P with oral contrast, without IV contrast 11/7 showed no acute findings. Moderate amount of stool and air in the colon proximally, which could reflect constipation.  - Start miralax BID, colace BID. Left Brachial Vein DVT: - in the setting of PICC placement. PICC line removed  Venous US of Bilateral UE 11/7 showed acute totally occluded SVT involving left proximal brachial vein. unable to anticoagulate due to low platelet counts. Hematology consulted: Appreciate their recs     MDS with anemia and thrombocytopenia  Diagnosed on September 2022; not receiving treatment. Transfuse for hemoglobin less than 7 or platelet less than 10 or less than 50 with bleeding    Hx of PVD; continue home lipitor 80 mg HS.      Dyslipidemia: Continue statins     DM complicated with neuropathy   Hold oral antidiabetic chloride 0.9 % 250 mL IVPB, PRN   Or  sodium phosphate 15 mmol in sodium chloride 0.9 % 250 mL IVPB, PRN   Or  sodium phosphate 20 mmol in sodium chloride 0.9 % 500 mL IVPB, PRN  magnesium sulfate 2000 mg in 50 mL IVPB premix, PRN  potassium chloride (KLOR-CON M) extended release tablet 40 mEq, PRN   Or  potassium bicarb-citric acid (EFFER-K) effervescent tablet 40 mEq, PRN   Or  potassium chloride 10 mEq/100 mL IVPB (Peripheral Line), PRN  morphine (PF) injection 2 mg, Q4H PRN  heparin (porcine) injection 5,000 Units, Q12H  methocarbamol (ROBAXIN) tablet 500 mg, q8h  meropenem (MERREM) 500 mg IVPB (mini-bag), Q12H  0.9 % sodium chloride infusion, PRN  diatrizoate meglumine-sodium (GASTROGRAFIN) 66-10 % solution 20 mL, ONCE PRN  polyethylene glycol (GLYCOLAX) packet 17 g, BID  docusate sodium (COLACE) capsule 100 mg, BID        Objective:  BP (!) 103/51   Pulse 91   Temp 97.4 °F (36.3 °C)   Resp 18   Ht 5' (1.524 m)   Wt 238 lb 8.6 oz (108.2 kg)   LMP  (LMP Unknown)   SpO2 96%   BMI 46.59 kg/m²     Intake/Output Summary (Last 24 hours) at 11/8/2022 4380  Last data filed at 11/8/2022 0442  Gross per 24 hour   Intake 1486.9 ml   Output 550 ml   Net 936.9 ml      Wt Readings from Last 3 Encounters:   11/08/22 238 lb 8.6 oz (108.2 kg)   11/05/22 227 lb 4.7 oz (103.1 kg)   10/26/22 244 lb 12.8 oz (111 kg)       Physical Examination:   General appearance:  No apparent distress, appears stated age and cooperative. HEENT: Normocephalic, sclera clear., PERRLA. Trachea midline, no adenopathy. Cardiovascular: Regular rate and rhythm, normal S1, S2. No murmur. Respiratory:Clear to auscultation bilaterally, no wheeze or crackles. GI: Abdomen soft, no tenderness, not distended, normal bowel sounds  Musculoskeletal: No cyanosis in digits. No BLE edema present  Neurology: CN 2-12 grossly intact.  No speech or motor deficits  Psych: Not agitated, appropriate affect  Skin: Warm, dry, normal turgor    Labs and Tests:  CBC:   Recent Labs     11/07/22  0056 11/07/22  1250 11/08/22  0451   WBC 7.5 7.5 7.0   HGB 7.0* 6.7* 8.1*   PLT 32* 28* 28*     BMP:    Recent Labs     11/07/22  0056 11/07/22  1250   * 134*   K 4.4 4.3   CL 98* 98*   CO2 24 28   BUN 40* 43*   CREATININE 1.3* 1.8*   GLUCOSE 258* 255*     Hepatic:   Recent Labs     11/07/22  0056   AST 14*   ALT 16   BILITOT 0.5   ALKPHOS 62     CT ABDOMEN PELVIS WO CONTRAST Additional Contrast? Oral   Final Result   1. No acute findings in the abdomen or pelvis. 2. Moderate amount of stool and air in the colon proximally which could   reflect constipation. 3. Punctate bilateral intrarenal calculi. VL Extremity Venous Bilateral         XR CHEST PORTABLE   Final Result   Left PICC line appears to be over the SVC and not significantly changed from   the prior exam.      Cardiomegaly with pulmonary vascular congestion and interstitial   prominence/interstitial opacities suggesting edema. Stable to the slightly   worsened from the recent study. CT HEAD WO CONTRAST   Final Result   No acute intracranial hemorrhage, mass effect, midline shift, or signs of   acute territorial infarct. Generalized volume loss and chronic ischemic changes in the white matter are   stable. Partial opacification in the left mastoid air cells and edge of the middle   ear cavity are new from the prior exam.  Correlate for infectious symptoms   versus eustachian tube dysfunction.              Problem List  Principal Problem:    Acute hypoxemic respiratory failure (HCC)  Active Problems:    Carotid artery disease without cerebral infarction (HCC)    Chronic atrial fibrillation (HCC)    Myelodysplasia (myelodysplastic syndrome) (HCC)    HFrEF (heart failure with reduced ejection fraction) ([address]    Peripheral vascular disease ([address]    ARUN (acute kidney injury) ([address]    Hyperlipidemia associated with type 2 diabetes mellitus ([address]    Hypertension associated with diabetes ([facility]    Morbid obesity with BMI of 40.0-44.9, adult ([facility]    Type 2 diabetes mellitus ([facility]  Resolved Problems:    * No resolved hospital problems.  Radha Hui MD   11/8/2022 9:27 AM

## 2022-11-08 NOTE — PLAN OF CARE
Problem: Skin/Tissue Integrity  Goal: Absence of new skin breakdown  Description: 1. Monitor for areas of redness and/or skin breakdown  2. Assess vascular access sites hourly  3. Every 4-6 hours minimum:  Change oxygen saturation probe site  4. Every 4-6 hours:  If on nasal continuous positive airway pressure, respiratory therapy assess nares and determine need for appliance change or resting period.   Outcome: Progressing     Problem: ABCDS Injury Assessment  Goal: Absence of physical injury  Outcome: Progressing     Problem: Safety - Adult  Goal: Free from fall injury  Outcome: Progressing     Problem: Discharge Planning  Goal: Discharge to home or other facility with appropriate resources  Outcome: Not Progressing     Problem: Chronic Conditions and Co-morbidities  Goal: Patient's chronic conditions and co-morbidity symptoms are monitored and maintained or improved  Outcome: Not Progressing     Problem: Pain  Goal: Verbalizes/displays adequate comfort level or baseline comfort level  Outcome: Not Progressing

## 2022-11-08 NOTE — CONSULTS
MD Mena Veliz MD Carmelita Grieve, MD                  Office: [phone]                      Fax: [phone]             [facility]                   NEPHROLOGY INITIAL CONSULT NOTE:     PATIENT NAME: Audelia Jane  : 1940  MRN: [id number]  REASON FOR CONSULT: For evaluation and management of Acute Kidney Injury . (My recommendations will be communicated by way of shared medical record.)      RECOMMENDATIONS: *   - ok for a new midline-picc line from renal POV, as BL CR ~ 0.6-0.7  - check, urine lytes,  - no need for IVF today, encourage PO hydration  - fup Ucx, IV abx as per primary team   - Stop Entresto, w/ lower BP  - monitor dig level, goal <2  - marin inserted  - no need for dialysis,  at higher risk for decompensation, needing closer monitoring. D/C plan from renal stand point:  - expect 3-4 days    - D/W pt, family,   - D/W team nurse, hospitalist       IMPRESSION:       Admitted on:  2022 11:47 PM   For:  Acute respiratory failure with hypoxemia ([address] [J96.01]  Dyspnea, unspecified type [R06.00]  Acute on chronic congestive heart failure, unspecified heart failure type ([address] [I50.9]  deep vein thrombosis involving the left proximal brachial vein    ARUN (NO H/O CKD):   - BL Scr- ~0.6-0.7  ->  1.8 on admission  - Etiology of ARUN - presumed pre-renal   - UA : results reviewed: abnormal, ? UTI  - Renal imaging: on admission w/ /CT abd/p - There are 2 punctate stones at the upper pole  the left kidney. There is a punctate stone at the mid right kidney. There  is no ureteral stone or hydronephrosis. Associated problems:   - Volume status: mild hypervolemic  CXR - Cardiomegaly with pulmonary vascular congestion and interstitial prominence/interstitial opacities suggesting edema.   Stable to the slightly worsened from the recent study.     : HTN : no need for tight control   : Na: hyponatremia - mild - chronic     - Azotemia: pre-renal   - Electrolytes: K: WNL  - Acid-Base: WNL  - Anemia: of likely chronic dz   - Thrombocytopenia - unlikely TMA type pic, r/o that if no improvement      Other major problems: Management per primary and other consulting teams. Hospital Problems             Last Modified POA    * (Principal) Acute hypoxemic respiratory failure ([facility] 11/7/2022 Yes    Carotid artery disease without cerebral infarction ([facility] 11/7/2022 Yes    Chronic atrial fibrillation ([facility] 11/7/2022 Yes    Myelodysplasia (myelodysplastic syndrome) ([facility] 11/7/2022 Yes    HFrEF (heart failure with reduced ejection fraction) ([facility] 11/7/2022 Yes    Peripheral vascular disease ([facility] 11/7/2022 Yes    ARUN (acute kidney injury) ([facility] 11/7/2022 Yes    Hyperlipidemia associated with type 2 diabetes mellitus ([facility] 11/7/2022 Yes    Hypertension associated with diabetes ([facility] 11/7/2022 Yes    Morbid obesity with BMI of 40.0-44.9, adult ([facility] 11/7/2022 Yes    Type 2 diabetes mellitus ([facility] 11/7/2022 Yes         : other supportive care :   - Check daily renal function panel with electrolytes-phosphorus  - Strict monitoring of I/Os, daily weight  - Renal feeds/diet  - Current medications reviewed. - Nephrotoxic medications have been discontinued. - Dose adjusted and appropriate. - Dose meds for eGFR <15 mL/min/1.73m2 during ARUN    - Avoid heavy opioids due to renal failure - may use very low dose dilaudid / fentanyl with close monitoring of CNS and respiratory depression. Please refer to the orders. High Complexity. Multiple complex problems. Discussed with patient and treatment team-  family at bedside, hospitalist - Dr Carey Gonzalez     Time spent > 30 (~35) minutes. Thank you for allowing me to participate in this patient's care. Please do not hesitate to contact me anytime. We will follow along with you.        Sirena Lopez MD,  [facility] [address] [place]: [phone] or [facility]  Fax: [phone]        =======================================================================================   =======================================================================================      CHIEF COMPLAINT:   Chief Complaint   Patient presents with    Shortness of Breath     Pt presents to the ED from [facility] with reports of SOB, per EMS Pt sating at 88% on RA. Pt is usually on RA per baseline. Pt satting at 92% RA. Pt recently discharged for Picc Line placement due to reoccurring blood transfusion due to MDS. Per daughter at bedside pt is acting normally. Does not appear to be lethargic or confused at this time. VSS. History Obtained From:  patient, family member - at bedside + treatment team + Electronic Medical Records    HPI: Ms. Bhaskar Lawson is a 78 y.o. female with significant past medical history as below:   Past Medical History:   Diagnosis Date    4/11/17 Left knee repeat repair of median parapatellar arthrotomy with augmentation 4/12/2017    Arthritis     Aspiration pneumonia of left lower lobe due to gastric secretions ([address] 10/18/2022    Atrial fibrillation (HCC)     CAD (coronary artery disease)     Cataract     Chronic diastolic congestive heart failure ([address] 7/27/2022    Diabetes mellitus ([address]     GERD (gastroesophageal reflux disease)     Hyperlipidemia     Hypertension     HYPOTHRYROIDISM     Myelodysplastic disease ([address] 10/17/2022    Non-English speaking patient     SPEAKS Armenian. SHE SPEAKS A LITTLE ENGLISH    Sleep apnea     unspecified    Thyroid disease     UTI       Presents with Shortness of Breath (Pt presents to the ED from [facility] with reports of SOB, per EMS Pt sating at 88% on RA. Pt is usually on RA per baseline. Pt satting at 92% RA. Pt recently discharged for Picc Line placement due to reoccurring blood transfusion due to MDS. Per daughter at bedside pt is acting normally. Does not appear to be lethargic or confused at this time.  VSS.  )    Admitted with Acute respiratory failure with hypoxemia ([facility]) [J96.01]  Dyspnea, unspecified type [R06.00]  Acute on chronic congestive heart failure, unspecified heart failure type ([address] [I50.9]   Found to have elevated Cr , so we are called for that. No current active complaints. Patient denied chest pain / dizziness/lightheadedness/syncope/ SOB   Chronic leg edema. *  Regarding: ARUN   Duration: acute  Location: kidneys  Severity: Severe  Timing: continous  Context (ex: related to condition):  , refer to my assessment / impression. Modifying factors (ex: medications, interventions):  , refer to my plan / recommendation. Associated signs & symptoms (ex: edema, SOB): As mentioned above in CC and HPI      Past medical, Surgical, Social, Family medical history reviewed by me. PAST MEDICAL HISTORY:   Past Medical History:   Diagnosis Date    4/11/17 Left knee repeat repair of median parapatellar arthrotomy with augmentation 4/12/2017    Arthritis     Aspiration pneumonia of left lower lobe due to gastric secretions ([address] 10/18/2022    Atrial fibrillation (HCC)     CAD (coronary artery disease)     Cataract     Chronic diastolic congestive heart failure ([address] 7/27/2022    Diabetes mellitus ([address]     GERD (gastroesophageal reflux disease)     Hyperlipidemia     Hypertension     HYPOTHRYROIDISM     Myelodysplastic disease ([address] 10/17/2022    Non-English speaking patient     SPEAKS German.   SHE SPEAKS A LITTLE ENGLISH    Sleep apnea     unspecified    Thyroid disease     UTI      PAST SURGICAL HISTORY:   Past Surgical History:   Procedure Laterality Date    CARDIAC SURGERY  2004    CABG X 4 VESSELS    CHOLECYSTECTOMY, LAPAROSCOPIC  5/15/14    with IOC    CT BONE MARROW BIOPSY  9/29/2022    CT BONE MARROW BIOPSY 9/29/2022 [facility] CT SCAN    JOINT REPLACEMENT Bilateral 2000 AND 1996 And 1998    knee    KNEE SURGERY Left 02/28/2017    left knee poly exchange revision     REVISION TOTAL KNEE ARTHROPLASTY Right 11/22/2016    RIGHT TOTAL KNEE REVISION WITH POLY EXCHANGE    TOTAL KNEE ARTHROPLASTY      partical/ left     UPPER GASTROINTESTINAL ENDOSCOPY N/A 10/3/2022    EGD BIOPSY performed by Nory Zimmerman MD at [facility] N/A 10/3/2022    EGD DILATION BALLOON performed by Nory Zimmerman MD at [facility]:   Family History   Problem Relation Age of Onset    Arthritis Other     Diabetes Other     Heart Disease Other     Hypertension Other     High Cholesterol Brother      SOCIAL HISTORY:   Social History     Socioeconomic History    Marital status:      Spouse name: None    Number of children: 7    Years of education: None    Highest education level: None   Occupational History    Occupation: Retired   Tobacco Use    Smoking status: Never    Smokeless tobacco: Never   Vaping Use    Vaping Use: Never used   Substance and Sexual Activity    Alcohol use: No    Drug use: No         MEDICATIONS: reviewed by me. Medications Prior to Admission:  No current facility-administered medications on file prior to encounter. Current Outpatient Medications on File Prior to Encounter   Medication Sig Dispense Refill    phenazopyridine (PYRIDIUM) 100 MG tablet Take 1 tablet by mouth in the morning, at noon, and at bedtime for 3 days 9 tablet 0    Ipratropium-Albuterol (DUONEB IN) Inhale 3 mLs into the lungs every 4 hours as needed (SOB)      famotidine (PEPCID) 20 MG tablet Take 20 mg by mouth daily      HYDROcodone-acetaminophen (NORCO) 5-325 MG per tablet Take 1 tablet by mouth every 6 hours as needed for Pain.       lactobacillus (CULTURELLE) capsule Take 2 capsules by mouth daily      metroNIDAZOLE (FLAGYL) 500 MG tablet Take 500 mg by mouth 3 times daily      omeprazole (PRILOSEC) 20 MG delayed release capsule Take 20 mg by mouth in the morning and at bedtime      polyethylene glycol (GLYCOLAX) 17 g packet Take 17 g by mouth daily as needed for Constipation      silver sulfADIAZINE (SILVADENE) 1 % cream Apply topically 2 times daily Apply to inner buttocks every shift for MASD      digoxin (LANOXIN) 125 MCG tablet Take 1 tablet by mouth daily 30 tablet 3    guaiFENesin (MUCINEX) 600 MG extended release tablet Take 1 tablet by mouth 2 times daily as needed for Congestion 60 tablet 0    insulin lispro (HUMALOG) 100 UNIT/ML SOLN injection vial Inject 6 Units into the skin 3 times daily (with meals) 2 each 0    sacubitril-valsartan (ENTRESTO) 24-26 MG per tablet Take 0.5 tablets by mouth 2 times daily 60 tablet 1    furosemide (LASIX) 20 MG tablet Take 1 tablet by mouth daily 60 tablet 3    gabapentin (NEURONTIN) 100 MG capsule Take 1 capsule by mouth 3 times daily for 30 days. 90 capsule 0    metoprolol tartrate (LOPRESSOR) 25 MG tablet Take 0.5 tablets by mouth 2 times daily 60 tablet 3    empagliflozin (JARDIANCE) 10 MG tablet Take 1 tablet by mouth daily 30 tablet 3    insulin glargine (BASAGLAR KWIKPEN) 100 UNIT/ML injection pen Inject 48 Units into the skin daily 5 Adjustable Dose Pre-filled Pen Syringe 3    calcium carbonate (OSCAL) 500 MG TABS tablet Take 500 mg by mouth daily      senna (SENOKOT) 8.6 MG tablet Take 1 tablet by mouth 2 times daily      magnesium 30 MG tablet Take 40 mg by mouth daily      tiZANidine (ZANAFLEX) 2 MG tablet Take 2 mg by mouth every 8 hours as needed      atorvastatin (LIPITOR) 80 MG tablet Take 1 tablet by mouth daily 90 tablet 4    Insulin Pen Needle 32G X 6 MM MISC by Does not apply route 3 times daily with meals      vitamin D (ERGOCALCIFEROL) 98051 UNITS CAPS capsule Take 50,000 Units by mouth once a week      Omega 3 1000 MG CAPS Take 1,000 mg by mouth daily      Liraglutide (VICTOZA) 18 MG/3ML SOPN SC injection Inject 1.2 mg into the skin daily      lidocaine (LIDODERM) 5 % Place 1 patch onto the skin daily 12 hours on, 12 hours off. 30 patch 0    oxybutynin (DITROPAN XL) 15 MG CR tablet Take 15 mg by mouth daily.       levothyroxine (SYNTHROID) 175 MCG tablet Take 175 mcg by mouth daily           Current Facility-Administered Medications:     insulin lispro (HUMALOG) injection vial 0-4 Units, 0-4 Units, SubCUTAneous, TID IVY, Mikala Preston MD, 1 Units at 11/08/22 1041    magnesium sulfate 4000 mg in 100 mL IVPB premix, 4,000 mg, IntraVENous, Once, Mikala Preston MD, Last Rate: 25 mL/hr at 11/08/22 1133, 4,000 mg at 11/08/22 1133    insulin lispro (HUMALOG) injection vial 8 Units, 8 Units, SubCUTAneous, TID Mikala HAYNES MD    albuterol sulfate HFA (PROVENTIL;VENTOLIN;PROAIR) 108 (90 Base) MCG/ACT inhaler 2 puff, 2 puff, Inhalation, 4x daily, Benjamín Quintana MD, 2 puff at 11/08/22 0911    ipratropium (ATROVENT HFA) 17 MCG/ACT inhaler 2 puff, 2 puff, Inhalation, 4x daily, Benjamín Quintana MD, 2 puff at 11/08/22 0911    [START ON 11/9/2022] insulin glargine (LANTUS) injection vial 40 Units, 40 Units, SubCUTAneous, Daily, Benjamín Quintana MD    dextrose bolus 10% 125 mL, 125 mL, IntraVENous, PRN **OR** dextrose bolus 10% 250 mL, 250 mL, IntraVENous, PRN, Adelina Camarena DO    glucagon (rDNA) injection 1 mg, 1 mg, SubCUTAneous, PRN, Adelina Camarena DO    dextrose 10 % infusion, , IntraVENous, Continuous PRN, Adelina Camarena DO    atorvastatin (LIPITOR) tablet 80 mg, 80 mg, Oral, Nightly, Adelina Camarena DO, 80 mg at 11/07/22 2203    calcium elemental (OSCAL) tablet 500 mg, 500 mg, Oral, Daily, Adelina Camarena DO, 500 mg at 11/08/22 1039    digoxin (LANOXIN) tablet 125 mcg, 125 mcg, Oral, Daily, Adelina Camarena DO, 125 mcg at 11/08/22 1039    famotidine (PEPCID) tablet 20 mg, 20 mg, Oral, Daily, Adelina Camarena DO, 20 mg at 11/07/22 2202    gabapentin (NEURONTIN) capsule 100 mg, 100 mg, Oral, TID, Adelina Camarena DO, 100 mg at 11/08/22 1037    guaiFENesin (MUCINEX) extended release tablet 600 mg, 600 mg, Oral, BID PRN, Adelina Camarena DO    HYDROcodone-acetaminophen (NORCO) 5-325 MG per tablet 1 tablet, 1 tablet, Oral, Q6H PRN, Adelina Camarena DO, 1 tablet at 11/08/22 0172 levothyroxine (SYNTHROID) tablet 175 mcg, 175 mcg, Oral, QAM AC, Christiano Terlep, DO, 175 mcg at 11/08/22 0558    metoprolol tartrate (LOPRESSOR) tablet 12.5 mg, 12.5 mg, Oral, BID, Teryl Bibles, DO    pantoprazole (PROTONIX) tablet 40 mg, 40 mg, Oral, QAM AC, Christiano Terlep, DO, 40 mg at 11/08/22 0558    tiZANidine (ZANAFLEX) tablet 2 mg, 2 mg, Oral, Q8H PRN, Teryl Bibles, DO    [Held by provider] sacubitril-valsartan (ENTRESTO) 24-26 MG per tablet 0.5 tablet, 0.5 tablet, Oral, BID, Christiano Terlep, DO    sodium chloride flush 0.9 % injection 5-40 mL, 5-40 mL, IntraVENous, 2 times per day, Christiano Terlep, DO, 10 mL at 11/08/22 1034    sodium chloride flush 0.9 % injection 5-40 mL, 5-40 mL, IntraVENous, PRN, Christiano Terlep, DO, 20 mL at 11/07/22 1643    0.9 % sodium chloride infusion, , IntraVENous, PRN, Teryl Bibles, DO, Last Rate: 5 mL/hr at 11/08/22 0432, New Bag at 11/08/22 0432    ondansetron (ZOFRAN-ODT) disintegrating tablet 4 mg, 4 mg, Oral, Q8H PRN **OR** ondansetron (ZOFRAN) injection 4 mg, 4 mg, IntraVENous, Q6H PRN, Teryl Bibles, DO    polyethylene glycol (GLYCOLAX) packet 17 g, 17 g, Oral, Daily PRN, Teryl Bibles, DO    acetaminophen (TYLENOL) tablet 650 mg, 650 mg, Oral, Q6H PRN, 650 mg at 11/07/22 1736 **OR** acetaminophen (TYLENOL) suppository 650 mg, 650 mg, Rectal, Q6H PRN, Christiano Terlep, DO    sodium phosphate 10 mmol in sodium chloride 0.9 % 250 mL IVPB, 10 mmol, IntraVENous, PRN **OR** sodium phosphate 15 mmol in sodium chloride 0.9 % 250 mL IVPB, 15 mmol, IntraVENous, PRN **OR** sodium phosphate 20 mmol in sodium chloride 0.9 % 500 mL IVPB, 20 mmol, IntraVENous, PRN, Teryl Bibles, DO    magnesium sulfate 2000 mg in 50 mL IVPB premix, 2,000 mg, IntraVENous, PRN, Teryl Bibles, DO, Stopped at 11/07/22 1611    potassium chloride (KLOR-CON M) extended release tablet 40 mEq, 40 mEq, Oral, PRN **OR** potassium bicarb-citric acid (EFFER-K) effervescent tablet 40 mEq, 40 mEq, Oral, PRN **OR** potassium chloride 10 mEq/100 mL IVPB (Peripheral Line), 10 mEq, IntraVENous, PRN, Alberto Del Angel DO    morphine (PF) injection 2 mg, 2 mg, IntraVENous, Q4H PRN, Alberto Del Angel DO, 2 mg at 11/07/22 1644    heparin (porcine) injection 5,000 Units, 5,000 Units, SubCUTAneous, Q12H, Theron Hewitt MD    methocarbamol (ROBAXIN) tablet 500 mg, 500 mg, Oral, q8h, Theron Hewitt MD, 500 mg at 11/08/22 0558    meropenem (MERREM) 500 mg IVPB (mini-bag), 500 mg, IntraVENous, Q12H, Theron Hewitt MD, Stopped at 11/08/22 1040    0.9 % sodium chloride infusion, , IntraVENous, PRN, Theron Hewitt MD    diatrizoate meglumine-sodium (GASTROGRAFIN) 66-10 % solution 20 mL, 20 mL, Oral, ONCE PRN, Theron Hewitt MD, 20 mL at 11/07/22 1448    polyethylene glycol (GLYCOLAX) packet 17 g, 17 g, Oral, BID, Theron Hewitt MD, 17 g at 11/08/22 1037    docusate sodium (COLACE) capsule 100 mg, 100 mg, Oral, BID, Theron Hewitt MD, 100 mg at 11/08/22 1037      Allergies reviewed by me: Aspirin, Ibuprofen, Macrobid [nitrofurantoin monohydrate macrocrystals], Adhesive tape, Lexiscan [regadenoson], and Penicillins    REVIEW OF SYSTEMS:  As mentioned in chief complaint and HPI , Subjective   All other 10-point review of systems: negative.          =======================================================================================     PHYSICAL EXAM:  Recent vital signs and recent I/Os reviewed by me.      Wt Readings from Last 3 Encounters:   11/08/22 238 lb 8.6 oz (108.2 kg)   11/05/22 227 lb 4.7 oz (103.1 kg)   10/26/22 244 lb 12.8 oz (111 kg)     BP Readings from Last 3 Encounters:   11/08/22 (!) 106/45   11/05/22 (!) 100/52   11/02/22 123/64     Patient Vitals for the past 24 hrs:   BP Temp Temp src Pulse Resp SpO2 Height Weight   11/08/22 1037 (!) 106/45 -- -- 93 -- -- -- --   11/08/22 0912 -- -- -- -- -- 96 % -- --   11/08/22 0911 -- -- -- 91 18 94 % -- --   11/08/22 0910 -- -- -- 91 18 94 % -- --   11/08/22 0730 (!) 103/51 97.4 °F (36.3 °C) -- 93 18 96 % -- -- 11/08/22 0400 (!) 112/47 98.8 °F (37.1 °C) Temporal 88 18 96 % -- 238 lb 8.6 oz (108.2 kg)   11/08/22 0212 (!) 109/46 97 °F (36.1 °C) Temporal 84 18 96 % -- --   11/08/22 0000 (!) 107/45 97 °F (36.1 °C) Temporal 86 18 98 % -- --   11/07/22 2330 (!) 107/43 97.4 °F (36.3 °C) Temporal 87 20 98 % -- --   11/07/22 2312 (!) 106/51 97.2 °F (36.2 °C) Temporal 84 18 98 % -- --   11/07/22 2203 (!) 98/56 -- -- -- -- -- -- --   11/07/22 2143 -- -- -- -- -- 99 % -- --   11/07/22 2136 -- -- -- 88 19 96 % -- --   11/07/22 2000 (!) 87/58 97.5 °F (36.4 °C) Temporal 87 20 97 % -- --   11/07/22 1924 -- -- -- -- -- -- 5' (1.524 m) 231 lb 7.7 oz (105 kg)   11/07/22 1714 -- -- -- -- 20 -- -- --   11/07/22 1600 (!) 111/44 98.3 °F (36.8 °C) Temporal 94 22 99 % -- --   11/07/22 1520 -- -- -- -- 24 -- -- --   11/07/22 1500 (!) 105/41 -- -- 97 23 98 % -- --   11/07/22 1450 -- -- -- -- 22 -- -- --   11/07/22 1400 92/60 -- -- 96 22 99 % -- --   11/07/22 1300 (!) 101/51 -- -- 93 21 98 % -- --   11/07/22 1254 -- -- -- 95 18 99 % -- --   11/07/22 1235 (!) 91/46 98.1 °F (36.7 °C) Temporal 86 22 98 % -- --       Intake/Output Summary (Last 24 hours) at 11/8/2022 1158  Last data filed at 11/8/2022 1131  Gross per 24 hour   Intake 1486.9 ml   Output 825 ml   Net 661.9 ml     Physical Exam  General: no acute distress  HENT: Atraumatic, normocephalic   Eyes: Normal conjunctiva, Non-incteral sclera. Neck: Supple, JVD not visible. CVS:  Heart sounds are normal. No loud murmur. RS: Normal respiratory effort, Breat sound: diminished at bases. Abd: Soft , bowel sounds are normal,    distension and no tenderness . Skin: No rash , some bruises,   CNS: Awake Oriented , No focal.   Extremities/MSK:  Edema, no cyanosis.     =======================================================================================     DATA:  Diagnostic tests reviewed by me for today's visit:   (AS NEEDED FOR MY EVALUATION AND MANAGEMENT).        Recent Labs 11/07/22  0056 11/07/22  1250 11/08/22  0451   WBC 7.5 7.5 7.0   HCT 22.0* 20.6* 24.3*   PLT 32* 28* 28*     Iron Saturation:  No components found for: PERCENTFE  FERRITIN:    Lab Results   Component Value Date/Time    FERRITIN 1,025.0 09/26/2022 04:34 AM     IRON:    Lab Results   Component Value Date/Time    IRON 46 09/25/2022 05:02 AM     TIBC:    Lab Results   Component Value Date/Time    TIBC 230 09/25/2022 05:02 AM       Recent Labs     11/07/22  0056 11/07/22  1250 11/08/22 0450 11/08/22 0451   * 134* 136 134*   K 4.4 4.3 4.7 4.6   CL 98* 98* 97* 98*   CO2 24 28 25 28   BUN 40* 43* 45* 44*   CREATININE 1.3* 1.8* 1.7* 1.7*     Recent Labs     11/07/22 0056 11/07/22  1250 11/08/22 0450 11/08/22 0451   CALCIUM 7.9* 7.7* 8.0* 8.0*   MG  --  1.50* 2.20  --    PHOS  --  3.1  --  3.5     No results for input(s): PH, PCO2, PO2 in the last 72 hours.     Invalid input(s): Arden Yanci    ABG:  No results found for: PH, PCO2, PO2, HCO3, BE, THGB, TCO2, O2SAT  VBG:    Lab Results   Component Value Date/Time    PHVEN 7.495 11/07/2022 12:56 AM    YID8VZJ 35.0 11/07/2022 12:56 AM    BEVEN 3.5 11/07/2022 12:56 AM    [id number] 100 11/07/2022 12:56 AM       LDH:    Lab Results   Component Value Date/Time     10/14/2022 04:50 PM     Uric Acid:    Lab Results   Component Value Date/Time    LABURIC 6.8 09/26/2022 04:34 AM       PT/INR:    Lab Results   Component Value Date/Time    PROTIME 16.4 11/03/2022 06:21 PM    INR 1.33 11/03/2022 06:21 PM     Warfarin PT/INR:  No components found for: PTPATWAR, PTINRWAR  PTT:    Lab Results   Component Value Date/Time    APTT 39.7 02/13/2017 12:07 PM   [APTT}  Last 3 Troponin:    Lab Results   Component Value Date/Time    TROPONINI 0.15 11/07/2022 12:50 PM    TROPONINI 0.13 11/07/2022 12:56 AM    TROPONINI 0.09 11/03/2022 06:18 PM       U/A:    Lab Results   Component Value Date/Time    COLORU DARK YELLOW 11/07/2022 01:45 PM    PROTEINU 300 11/07/2022 01:45 PM PHUR 7.5 11/07/2022 01:45 PM    WBCUA 4947 11/07/2022 01:45 PM    RBCUA 141 11/07/2022 01:45 PM    MUCUS 1+ 05/16/2014 11:50 PM    BACTERIA 4+ 11/07/2022 01:45 PM    CLARITYU TURBID 11/07/2022 01:45 PM    SPECGRAV 1.020 11/07/2022 01:45 PM    LEUKOCYTESUR LARGE 11/07/2022 01:45 PM    UROBILINOGEN 1.0 11/07/2022 01:45 PM    BILIRUBINUR MODERATE 11/07/2022 01:45 PM    BILIRUBINUR NEGATIVE 03/09/2012 06:51 AM    BLOODU MODERATE 11/07/2022 01:45 PM    GLUCOSEU 100 11/07/2022 01:45 PM    GLUCOSEU NEGATIVE 03/09/2012 06:51 AM     Microalbumen/Creatinine ratio:  No components found for: RUCREAT  24 Hour Urine for Protein:  No components found for: RAWUPRO, UHRS3, EUGT10PZ, UTV3  24 Hour Urine for Creatinine Clearance:  No components found for: CREAT4, UHRS10, UTV10  Urine Toxicology:  No components found for: Shannan Jing, IBENZO, ICOCAINE, IMARTHC, IOPIATES, IPHENCYC    HgBA1c:    Lab Results   Component Value Date/Time    LABA1C 8.0 11/07/2022 12:50 PM     RPR:  No results found for: RPR  HIV:  No results found for: HIV  NICOLASA:  No results found for: ANATITER, NICOLASA  RF:  No results found for: RF  DSDNA:  No components found for: DNA  AMYLASE:    Lab Results   Component Value Date/Time    AMYLASE 50 05/09/2014 07:45 PM     LIPASE:    Lab Results   Component Value Date/Time    LIPASE 21.0 11/03/2022 06:18 PM     Fibrinogen Level:  No components found for: FIB       BELOW MENTIONED RADIOLOGY STUDY RESULTS BY ME (AS NEEDED FOR MY EVALUATION AND MANAGEMENT). CT ABDOMEN PELVIS WO CONTRAST Additional Contrast? Oral    Result Date: 11/7/2022  EXAMINATION: CT OF THE ABDOMEN AND PELVIS WITHOUT CONTRAST 11/7/2022 3:56 pm TECHNIQUE: CT of the abdomen and pelvis was performed without the administration of intravenous contrast. Multiplanar reformatted images are provided for review.  Automated exposure control, iterative reconstruction, and/or weight based adjustment of the mA/kV was utilized to reduce the radiation dose to as low as reasonably achievable. COMPARISON: 07/08/2015. HISTORY: ORDERING SYSTEM PROVIDED HISTORY: Abd pain TECHNOLOGIST PROVIDED HISTORY: Oral contrast; NO IV contrast Reason for exam:->Abd pain Additional Contrast?->Oral Reason for Exam: abdomen pain FINDINGS: Lower Chest: Linear atelectasis or scarring is noted at the lung bases. There is no pleural effusion. Organs: The liver, spleen, adrenal glands and pancreas appear normal.  There has been a cholecystectomy. There are 2 punctate stones at the upper pole the left kidney. There is a punctate stone at the mid right kidney. There is no ureteral stone or hydronephrosis. GI/Bowel: There is a normal appendix. There is a moderate amount of stool and air in the colon proximally. There is no bowel dilatation or bowel wall thickening. The stomach is unremarkable. Pelvis: The bladder is nondistended and contains a Kuhn catheter. No abnormal adnexal mass is identified. Peritoneum/Retroperitoneum: There is advanced multifocal fibrocalcific plaque involving the abdominal aorta and its branches without aneurysm. There are no enlarged lymph nodes. No fat stranding, free fluid, free air or focal fluid collection is identified. Bones/Soft Tissues: No destructive bone lesion is identified. 1. No acute findings in the abdomen or pelvis. 2. Moderate amount of stool and air in the colon proximally which could reflect constipation. 3. Punctate bilateral intrarenal calculi. CT HEAD WO CONTRAST    Result Date: 11/7/2022  EXAMINATION: CT OF THE HEAD WITHOUT CONTRAST  11/7/2022 12:25 am TECHNIQUE: CT of the head was performed without the administration of intravenous contrast. Automated exposure control, iterative reconstruction, and/or weight based adjustment of the mA/kV was utilized to reduce the radiation dose to as low as reasonably achievable. COMPARISON: 10/13/2022.  HISTORY: ORDERING SYSTEM PROVIDED HISTORY: confusion TECHNOLOGIST PROVIDED HISTORY: Reason for exam:->confusion Has a \"code stroke\" or \"stroke alert\" been called? ->No Decision Support Exception - unselect if not a suspected or confirmed emergency medical condition->Emergency Medical Condition (MA) Reason for Exam: Shortness of Breath (Pt presents to the ED from [facility] with reports of SOB, per EMS Pt sating at 88% on RA. Pt is usually on RA per baseline. Pt satting at 92% RA. Pt recently discharged for Picc Line placement due to reoccurring blood transfusion due to MDS. Per daughter at bedside pt is acting normally. Does not appear to be lethargic or confused at this time. VSS.  ) FINDINGS: BRAIN/VENTRICLES: There is no acute intracranial hemorrhage or extra-axial fluid collection. Generalized volume loss is redemonstrated there are prominent CSF spaces over the cerebral hemispheres, greater at the anterior and temporal margins than the parietooccipital margin. There is no mass effect or midline shift. Ventricular size and configuration are stable. No definite sign for acute territorial infarct. There are some chronic ischemic changes in the periventricular and deep white matter. Atherosclerotic calcifications at the intracranial arteries. ORBITS: The visualized portion of the orbits demonstrate no acute abnormality. SINUSES: Included paranasal sinuses have minimal mucosal thickening. Partial opacification in the left mastoid air cells and edge of the middle ear cavity is new from the prior exam. SOFT TISSUES/SKULL:  No acute abnormality of the visualized skull or soft tissues. No acute intracranial hemorrhage, mass effect, midline shift, or signs of acute territorial infarct. Generalized volume loss and chronic ischemic changes in the white matter are stable. Partial opacification in the left mastoid air cells and edge of the middle ear cavity are new from the prior exam.  Correlate for infectious symptoms versus eustachian tube dysfunction.      CT HEAD WO CONTRAST    Result Date: 10/14/2022  EXAMINATION: CT OF THE HEAD WITHOUT CONTRAST  10/13/2022 2:52 pm TECHNIQUE: CT of the head was performed without the administration of intravenous contrast. Automated exposure control, iterative reconstruction, and/or weight based adjustment of the mA/kV was utilized to reduce the radiation dose to as low as reasonably achievable. COMPARISON: 02/11/2012 HISTORY: ORDERING SYSTEM PROVIDED HISTORY: dizziness TECHNOLOGIST PROVIDED HISTORY: Reason for exam:->dizziness Has a \"code stroke\" or \"stroke alert\" been called? ->No Reason for Exam: dizziness FINDINGS: BRAIN/VENTRICLES: The cerebral and cerebellar parenchyma demonstrate volume loss, with a frontal lobe predominance. Scattered low-attenuation areas are noted in the periventricular white matter, compatible with chronic microvascular white matter ischemic disease. There are no areas of hemorrhage, mass, or midline shift. No abnormal extra-axial fluid collections. The ventricles are prominent in size, likely related to involutional change. Gray-white differentiation is maintained without evidence of acute infarct. ORBITS: Lens implants from prior cataract surgery are noted. The orbits are otherwise unremarkable. SINUSES: There is scattered mild paranasal sinus disease. Thickened secretions are noted in the left sphenoid sinus. The mastoid air cells are clear. SOFT TISSUES/SKULL:  The calvarium is intact. No appreciable scalp soft tissue swelling. 1. No acute intracranial abnormality. 2. Cerebral and cerebellar parenchymal volume loss with chronic microvascular white matter ischemic disease. XR CHEST PORTABLE    Result Date: 11/7/2022  EXAMINATION: ONE XRAY VIEW OF THE CHEST 11/7/2022 1:02 am COMPARISON: 11/03/2022 HISTORY: ORDERING SYSTEM PROVIDED HISTORY: PICC PLACEMENT TECHNOLOGIST PROVIDED HISTORY: Reason for exam:->PICC PLACEMENT FINDINGS: Sternotomy wires and postsurgical changes.   There is a left PICC line with the tip over the expected SVC. The position is not appear significantly changed from the prior exam.  No endotracheal tube or nasogastric tube. Cardiac silhouette remains enlarged with pulmonary vascular congestion. Prominent interstitial markings and scattered interstitial opacities. No large effusion but small amounts are not excluded at the costophrenic angles. There is no sign of pneumothorax. Underpenetration of the bones and limited bony evaluation. Left PICC line appears to be over the SVC and not significantly changed from the prior exam. Cardiomegaly with pulmonary vascular congestion and interstitial prominence/interstitial opacities suggesting edema. Stable to the slightly worsened from the recent study. VL Extremity Venous Bilateral    Result Date: 11/7/2022  Vascular Upper Extremities Veins Procedure -- PRELIMINARY SONOGRAPHER REPORT --   Demographics   Patient Name      Joy Hardin   Date of Study     11/07/2022          Gender              Female   Patient Number    [id number]          Date of Birth       1940   Visit Number      [id number]           Age                 78 year(s)   Accession Number  [id number]          Room Number         0010   Corporate ID      [id number]            Dasha Uribe RVT, RT   Ordering          DO Alexey Sierra MD  Physician                             Physician  Procedure Type of Study:   Veins:Upper Extremities Veins, VASC EXTREMITY VENOUS DUPLEX BILATERAL. Tech Comments Right No evidence of deep vein thrombosis or superficial vein thrombosis of the right upper extremity Left Acute totally occluded deep vein thrombosis involving the left proximal brachial vein. . No other evidence of deep vein thrombosis or superficial vein thrombosis of the left upper extremity.             This report was transcribed using voice recognition software, mainly. So please excuse brevity and/or typos. Every effort was made to ensure accuracy, however, inadvertent computerized transcription errors may be present. Please contact us, if any questions or clarifications are needed.

## 2022-11-08 NOTE — PROGRESS NOTES
Family refusing PICC to be removed at this time. Geannie Goodell NP notified. Per PICC, current recs in this case are to not remove PIC, Moraima Bryant NP notified.

## 2022-11-08 NOTE — PROGRESS NOTES
Peer Dr Marina Rizzo if ok w/nephrology may place another PICC after PICC is removed. Per nephrology, ok to place. Per Dr Marina Rizzo, order midline.

## 2022-11-08 NOTE — PROGRESS NOTES
[facility] Department   Phone: [phone]    Occupational Therapy    [x] Initial Evaluation            [] Daily Treatment Note         [] Discharge Summary      Patient: Berna Gallo   : 1940   MRN: [id number]   Date of Service:  2022    Admitting Diagnosis:  Acute hypoxemic respiratory failure [facility])    Current Admission Summary: Per H&P-   Recent admission 11/3-22 for left upper extremity swelling and pain after left PICC line placement. She was treated for pain and transfused 2 units pRBC for anemia related to MDS. Patient was discharged on  afternoon. Early  morning , she was experiencing episodes of left abdominal spasms/tightness, followed by sensation of shortness of breath. She was found by the staff at [facility] to desaturate to 85-88% on RA. She reports palpitations. She denies lightheadedness, chest pain during episodes. She was placed on 2-2.5 LPM of supplemental oxygen with improvement of oxygen saturation. She also has been experiencing dysuria for the past 5 days. She has had issues with urinary retention. At [facility] she was straight cath with return of 500 cc of urine. She had been constipated and was placed on bowel regimen, then she had loose-watery stools on Friday-Saturday. She denies nausea. She has not have fevers, but endorses feeling cold. She has some residual mild cough. She was coughing more during episodes of spasms. She continues to experience some pain to left upper extremity. Prior admission 10/10-10/26/22 for evaluation of left shoulder and chest pain. She was evaluated by orthopedic and family requested starting naproxen for pain relief despite thrombocytopenia. During hospitalization patient developed acute hypoxic respiratory failure attributed to aspiration pneumonia for which she was treated with IV antibiotics.   Patient demonstrated anemia and thrombocytopenia for which she was evaluated by hematology and diagnosed with MDS. For atrial fibrillation, decision to not place patient on anticoagulation due to thrombocytopenia. Cardiology evaluated patient during hospitalization. She was to follow up with cardiology to determine if anticoagulation in the future is feasible. 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 Past Surgical History:  has a past surgical history that includes joint replacement (Bilateral, [address]); Cardiac surgery (2004); Cholecystectomy, laparoscopic (5/15/14); Revision total knee arthroplasty (Right, 11/22/2016); Total knee arthroplasty; knee surgery (Left, 02/28/2017); CT BIOPSY BONE MARROW (9/29/2022); Upper gastrointestinal endoscopy (N/A, 10/3/2022); and Upper gastrointestinal endoscopy (N/A, 10/3/2022). Discharge Recommendations: Shan Youssef scored a 10/24 on the AM-PAC ADL Inpatient form. Current research shows that an AM-PAC score of 17 or less is typically not associated with a discharge to the patient's home setting. Based on the patient's AM-PAC score and their current ADL deficits, it is recommended that the patient have 3-5 sessions per week of Occupational Therapy at d/c to increase the patient's independence. Please see assessment section for further patient specific details. If patient discharges prior to next session this note will serve as a discharge summary.   Please see below for the latest assessment towards goals.      DME Required For Discharge: DME to be determined at next level of care    Precautions/Restrictions: high fall risk, contact precautions, COVID R/O, MDRO  Weight Bearing Restrictions: no restrictions  [] Right Upper Extremity  [] Left Upper Extremity [] Right Lower Extremity  [] Left Lower Extremity     Required Braces/Orthotics:  L brace thumb spica for comfort   [] Right  [x] Left   Positional Restrictions:no positional restrictions  Left Brachial Vein DVT noted- arm wrapped in ace bandage at EOS. Pre-Admission Information   Lives With: alone                     Type of Home: house  Home Layout: one level  Home Access: ramped entry   [facility]: walker accessible, walk in shower  Bathroom Equipment: grab bars in shower, shower chair  Toilet Height: standard height  Home Equipment: rolling walker, rollator - 4 wheeled walker, manual wheelchair, electric wheelchair  Transfer Assistance: modified independent with use of RW  Ambulation Assistance:modified independent with use of manual w/c, RW for short distances   ADL Assistance: requires assistance with bathing, requires assistance with dressing  IADL Assistance: independent with homemaking tasks home health aide 4 hours/day   Active :        [] Yes                 [x] No  Hand Dominance: [] Left                 [x] Right  Current Employment: retired. Occupation:    Recent Falls: 1 slip out of wheelchair   *cameras present at home for children to monitor  Comments: After previous admission 10/10-10/26/22 [facility] however has only completed a couple sessions there d/t pt having to return to hospital. At [facility] pt transferred to chair but has not been able to complete any ambulation.        Examination     Vision:   Vision Gross Assessment: Impaired and Vision Corrective Device: wears glasses for reading  Hearing:   left hearing aid, right hearing aid  Perception:   WFL  Observation: General Observation:  rounded shoulders, forward head  Edema: Edema located in (L) UE. Thumb spica removed d/t increased swelling around brace and pt only wears for comfort. LUE wrapped in ace bandage. Sensation:   reports numbness and tingling in (B) UE  Proprioception:    WFL  Tone:   Normotonic  Coordination Testing:   WFL    ROM:   (L) Shoulder: unable to formally assess d/t pain; pt reports she has a rotator cuff injury      (R) Shoulder: WFL  Decreased ROM in L hand d/t increased swelling  Strength:   Formal MMT held secondary to pain    Decision Making: medium complexity  Clinical Presentation: stable      Subjective    General: Pt supine in bed upon arrival, pleasant and agreeable to OT evaluation and treat  Pain: 10/10. Location: arm, neck, head. L arm  Pain Interventions: repositioned            Activities of Daily Living    Basic Activities of Daily Living  Grooming: setup assistance minimal assistance  Grooming Comments: min(A) for thoroughness of [place]oder[place] application  Upper Extremity Bathing: moderate assistance  Lower Extremity Bathing: maximum assistance   Bathing Equipment: none  Bathing Comments: mod(A) for UB, pt with decreased ROM in LUE d/t pain requiring increased assistance  Upper Extremity Dressing: moderate assistance  General Comments: assist to thread LUE and head into shirt. Pt seated EOB for 25-30 minutes to complete ADLs  Instrumental Activities of Daily Living  No IADL completed on this date. Functional Mobility    Bed Mobility  Supine to Sit: 2 person assistance with max(A)x2   Sit to Supine: 2 person assistance with max(A)x2   Scootin person assistance with max(A)x2 to scoot towards [facility]   Comments:  Transfers  No transfers completed on this date secondary to pain/fatigue . Comments:  Functional Mobility:  Sitting Balance: moderate assistance.     Sitting Balance Comment: Varied CGA-Mod(A) while seated EOB, increased assist needed as pt fatigued d/t increased posterior lean  No functional mobility completed on this date secondary to pain/fatigue. Other Therapeutic Interventions      Functional Outcomes  AM-PAC Inpatient Daily Activity Raw Score: 10      Cognition  Overall Cognitive Status: Impaired  Arousal/Alterness: appropriate responses to stimuli  Following Commands: follows one step commands with repetition, follows one step commands with increased time  Attention Span: appears intact  Memory: decreased recall of recent events  Safety Judgement: good awareness of safety precautions  Problem Solving: assistance required to generate solutions, assistance required to identify errors made  Insights: fully aware of deficits  Initiation: requires cues for some  Sequencing: requires cues for some  Comments: battery low on hearing aid this date hard to assess if hearing vs cognition. Orientation:    alert and oriented x 4  Command Following:   accurately follows one step commands     Education    Barriers To Learning: cognition  Patient Education: patient educated on goals, OT role and benefits, plan of care, discharge recommendations, importance of OOB activity  Learning Assessment:  patient verbalizes and demonstrates understanding, patient verbalizes understanding, would benefit from continued reinforcement    Assessment    Activity Tolerance: Pt fatigues easily, unable to attempt standing this date d/t pain and fatigue. Impairments Requiring Therapeutic Intervention: decreased functional mobility, decreased ADL status, decreased ROM, decreased strength, decreased endurance, decreased balance, decreased IADL, decreased fine motor control, decreased posture  Prognosis: good  Clinical Assessment: Pt is currently functioning below occupational baseline and demo the deficits listed above. Prior to recent admissions, pt was IND at home alone. Pt is currently requiring max(A)x2 for bed mobility and CGA-MOD(A) while seated EOB.  Pt is limited by pain, decreased endurance, and strength. Pt is also limited by decreased edema and pain in LUE. Pt would benefit from continued skilled OT services to address these deficits and increase IND, safety, and ease with all occupational pursuits.    Safety Interventions: patient left in bed, bed alarm in place, call light within reach, nurse notified, and family/caregiver present       Plan    Frequency: 3-5 x/per week  Current Treatment Recommendations: strengthening, ROM, balance training, functional mobility training, transfer training, endurance training, patient/caregiver education, ADL/self-care training, safety education, and equipment evaluation/education    Goals    Patient Goals: to get better      Short Term Goals:  Time Frame: by d/c  Patient will complete upper body ADL at stand by assistance   Patient will complete lower body ADL at maximum assistance   Patient will complete toileting at maximum assistance   Patient will complete grooming at stand by assistance   Patient will complete functional transfers at maximum assistance   Patient will increase functional sitting balance to SBA for improved ADL completion       Therapy Session Time     Individual Group Co-treatment   Time In    0920   Time Out    1020   Minutes    60        Timed Code Treatment Minutes:  Timed Code Treatment Minutes: 45 Minutes    Total Treatment Minutes:  60 minutes total    Electronically Signed By: Magdalena Rodney OT, Magdalena Rodney, OTR/L [id number]

## 2022-11-08 NOTE — PROGRESS NOTES
Shift assessment completed. Pt A&O, VSS. Pt being tx w/prn pain meds. Family updated on condition. Denies any needs at this time. Bed locked and in lowest position. Call light within reach. Will continue to monitor.

## 2022-11-09 ENCOUNTER — APPOINTMENT (OUTPATIENT)
Dept: GENERAL RADIOLOGY | Age: 82
DRG: 314 | End: 2022-11-09
Payer: MEDICARE

## 2022-11-09 LAB
A/G RATIO: 0.7 (ref 1.1–2.2)
ALBUMIN SERPL-MCNC: 2.6 G/DL (ref 3.4–5)
ALP BLD-CCNC: 63 U/L (ref 40–129)
ALT SERPL-CCNC: 13 U/L (ref 10–40)
ANION GAP SERPL CALCULATED.3IONS-SCNC: 6 MMOL/L (ref 3–16)
ANISOCYTOSIS: ABNORMAL
AST SERPL-CCNC: 13 U/L (ref 15–37)
BANDED NEUTROPHILS RELATIVE PERCENT: 13 % (ref 0–7)
BASOPHILS ABSOLUTE: 0 K/UL (ref 0–0.2)
BASOPHILS RELATIVE PERCENT: 0 %
BILIRUB SERPL-MCNC: 0.5 MG/DL (ref 0–1)
BUN BLDV-MCNC: 37 MG/DL (ref 7–20)
CALCIUM SERPL-MCNC: 8.3 MG/DL (ref 8.3–10.6)
CHLORIDE BLD-SCNC: 98 MMOL/L (ref 99–110)
CHLORIDE URINE RANDOM: <20 MMOL/L
CO2: 29 MMOL/L (ref 21–32)
CREAT SERPL-MCNC: 1 MG/DL (ref 0.6–1.2)
CREATININE URINE: 131.7 MG/DL (ref 28–259)
EOSINOPHILS ABSOLUTE: 0.1 K/UL (ref 0–0.6)
EOSINOPHILS RELATIVE PERCENT: 1 %
GFR SERPL CREATININE-BSD FRML MDRD: 55 ML/MIN/{1.73_M2}
GLUCOSE BLD-MCNC: 271 MG/DL (ref 70–99)
GLUCOSE BLD-MCNC: 273 MG/DL (ref 70–99)
GLUCOSE BLD-MCNC: 286 MG/DL (ref 70–99)
GLUCOSE BLD-MCNC: 291 MG/DL (ref 70–99)
HCT VFR BLD CALC: 21.9 % (ref 36–48)
HEMATOLOGY PATH CONSULT: NORMAL
HEMATOLOGY PATH CONSULT: YES
HEMOGLOBIN: 7.1 G/DL (ref 12–16)
HEPARIN INDUCED PLATELET ANTIBODY: NEGATIVE
LYMPHOCYTES ABSOLUTE: 1.7 K/UL (ref 1–5.1)
LYMPHOCYTES RELATIVE PERCENT: 21 %
MACROCYTES: ABNORMAL
MAGNESIUM: 2.6 MG/DL (ref 1.8–2.4)
MCH RBC QN AUTO: 30.1 PG (ref 26–34)
MCHC RBC AUTO-ENTMCNC: 32.6 G/DL (ref 31–36)
MCV RBC AUTO: 92.5 FL (ref 80–100)
MICROCYTES: ABNORMAL
MONOCYTES ABSOLUTE: 1.2 K/UL (ref 0–1.3)
MONOCYTES RELATIVE PERCENT: 15 %
MONONUCLEAR UNIDENTIFIED CELLS: 2 %
NEUTROPHILS ABSOLUTE: 4.9 K/UL (ref 1.7–7.7)
NEUTROPHILS RELATIVE PERCENT: 48 %
ORGANISM: ABNORMAL
PDW BLD-RTO: 18.3 % (ref 12.4–15.4)
PELGER HUET CELLS: PRESENT
PERFORMED ON: ABNORMAL
PHOSPHORUS: 2.9 MG/DL (ref 2.5–4.9)
PLATELET # BLD: 25 K/UL (ref 135–450)
PLATELET SLIDE REVIEW: ABNORMAL
PMV BLD AUTO: 9.9 FL (ref 5–10.5)
POLYCHROMASIA: ABNORMAL
POTASSIUM SERPL-SCNC: 4.6 MMOL/L (ref 3.5–5.1)
POTASSIUM, UR: 47.8 MMOL/L
RBC # BLD: 2.37 M/UL (ref 4–5.2)
SLIDE REVIEW: ABNORMAL
SODIUM BLD-SCNC: 133 MMOL/L (ref 136–145)
SODIUM URINE: <20 MMOL/L
TOTAL PROTEIN: 6.3 G/DL (ref 6.4–8.2)
UREA NITROGEN, UR: 626.7 MG/DL (ref 800–1666)
URINE CULTURE, ROUTINE: ABNORMAL
WBC # BLD: 8 K/UL (ref 4–11)

## 2022-11-09 PROCEDURE — 99233 SBSQ HOSP IP/OBS HIGH 50: CPT | Performed by: INTERNAL MEDICINE

## 2022-11-09 PROCEDURE — 2700000000 HC OXYGEN THERAPY PER DAY

## 2022-11-09 PROCEDURE — 94640 AIRWAY INHALATION TREATMENT: CPT

## 2022-11-09 PROCEDURE — 94761 N-INVAS EAR/PLS OXIMETRY MLT: CPT

## 2022-11-09 PROCEDURE — 6360000002 HC RX W HCPCS: Performed by: STUDENT IN AN ORGANIZED HEALTH CARE EDUCATION/TRAINING PROGRAM

## 2022-11-09 PROCEDURE — 2580000003 HC RX 258: Performed by: STUDENT IN AN ORGANIZED HEALTH CARE EDUCATION/TRAINING PROGRAM

## 2022-11-09 PROCEDURE — 2580000003 HC RX 258: Performed by: INTERNAL MEDICINE

## 2022-11-09 PROCEDURE — 80053 COMPREHEN METABOLIC PANEL: CPT

## 2022-11-09 PROCEDURE — 6370000000 HC RX 637 (ALT 250 FOR IP): Performed by: INTERNAL MEDICINE

## 2022-11-09 PROCEDURE — 1200000000 HC SEMI PRIVATE

## 2022-11-09 PROCEDURE — 36415 COLL VENOUS BLD VENIPUNCTURE: CPT

## 2022-11-09 PROCEDURE — 99232 SBSQ HOSP IP/OBS MODERATE 35: CPT | Performed by: INTERNAL MEDICINE

## 2022-11-09 PROCEDURE — 84100 ASSAY OF PHOSPHORUS: CPT

## 2022-11-09 PROCEDURE — 71045 X-RAY EXAM CHEST 1 VIEW: CPT

## 2022-11-09 PROCEDURE — 6370000000 HC RX 637 (ALT 250 FOR IP): Performed by: STUDENT IN AN ORGANIZED HEALTH CARE EDUCATION/TRAINING PROGRAM

## 2022-11-09 PROCEDURE — 85025 COMPLETE CBC W/AUTO DIFF WBC: CPT

## 2022-11-09 PROCEDURE — 93971 EXTREMITY STUDY: CPT

## 2022-11-09 PROCEDURE — 86022 PLATELET ANTIBODIES: CPT

## 2022-11-09 PROCEDURE — 6360000002 HC RX W HCPCS: Performed by: INTERNAL MEDICINE

## 2022-11-09 PROCEDURE — 83735 ASSAY OF MAGNESIUM: CPT

## 2022-11-09 RX ORDER — INSULIN GLARGINE 100 [IU]/ML
44 INJECTION, SOLUTION SUBCUTANEOUS DAILY
Status: DISCONTINUED | OUTPATIENT
Start: 2022-11-10 | End: 2022-11-13

## 2022-11-09 RX ORDER — IPRATROPIUM BROMIDE AND ALBUTEROL SULFATE 2.5; .5 MG/3ML; MG/3ML
1 SOLUTION RESPIRATORY (INHALATION)
Status: DISCONTINUED | OUTPATIENT
Start: 2022-11-10 | End: 2022-11-10

## 2022-11-09 RX ORDER — LIDOCAINE 4 G/G
1 PATCH TOPICAL DAILY
Status: DISCONTINUED | OUTPATIENT
Start: 2022-11-09 | End: 2022-11-21 | Stop reason: HOSPADM

## 2022-11-09 RX ADMIN — POLYETHYLENE GLYCOL 3350 17 G: 17 POWDER, FOR SOLUTION ORAL at 08:04

## 2022-11-09 RX ADMIN — GABAPENTIN 100 MG: 100 CAPSULE ORAL at 08:06

## 2022-11-09 RX ADMIN — MORPHINE SULFATE 2 MG: 2 INJECTION, SOLUTION INTRAMUSCULAR; INTRAVENOUS at 23:37

## 2022-11-09 RX ADMIN — Medication 2 PUFF: at 11:44

## 2022-11-09 RX ADMIN — MEROPENEM 500 MG: 500 INJECTION, POWDER, FOR SOLUTION INTRAVENOUS at 04:58

## 2022-11-09 RX ADMIN — INSULIN LISPRO 2 UNITS: 100 INJECTION, SOLUTION INTRAVENOUS; SUBCUTANEOUS at 08:08

## 2022-11-09 RX ADMIN — Medication 10 ML: at 08:07

## 2022-11-09 RX ADMIN — MORPHINE SULFATE 2 MG: 2 INJECTION, SOLUTION INTRAMUSCULAR; INTRAVENOUS at 04:55

## 2022-11-09 RX ADMIN — INSULIN GLARGINE 40 UNITS: 100 INJECTION, SOLUTION SUBCUTANEOUS at 08:07

## 2022-11-09 RX ADMIN — FAMOTIDINE 20 MG: 20 TABLET ORAL at 20:31

## 2022-11-09 RX ADMIN — DIGOXIN 125 MCG: 125 TABLET ORAL at 08:05

## 2022-11-09 RX ADMIN — HYDROCODONE BITARTRATE AND ACETAMINOPHEN 1 TABLET: 5; 325 TABLET ORAL at 14:22

## 2022-11-09 RX ADMIN — PANTOPRAZOLE SODIUM 40 MG: 40 TABLET, DELAYED RELEASE ORAL at 05:04

## 2022-11-09 RX ADMIN — GABAPENTIN 100 MG: 100 CAPSULE ORAL at 13:46

## 2022-11-09 RX ADMIN — METOPROLOL TARTRATE 12.5 MG: 25 TABLET, FILM COATED ORAL at 08:05

## 2022-11-09 RX ADMIN — ATORVASTATIN CALCIUM 80 MG: 80 TABLET, FILM COATED ORAL at 20:31

## 2022-11-09 RX ADMIN — METHOCARBAMOL TABLETS 500 MG: 500 TABLET, COATED ORAL at 13:46

## 2022-11-09 RX ADMIN — METHOCARBAMOL TABLETS 500 MG: 500 TABLET, COATED ORAL at 04:55

## 2022-11-09 RX ADMIN — MORPHINE SULFATE 2 MG: 2 INJECTION, SOLUTION INTRAMUSCULAR; INTRAVENOUS at 00:20

## 2022-11-09 RX ADMIN — Medication 2 PUFF: at 15:45

## 2022-11-09 RX ADMIN — DOCUSATE SODIUM 100 MG: 100 CAPSULE, LIQUID FILLED ORAL at 08:06

## 2022-11-09 RX ADMIN — INSULIN LISPRO 2 UNITS: 100 INJECTION, SOLUTION INTRAVENOUS; SUBCUTANEOUS at 17:08

## 2022-11-09 RX ADMIN — MORPHINE SULFATE 2 MG: 2 INJECTION, SOLUTION INTRAMUSCULAR; INTRAVENOUS at 10:30

## 2022-11-09 RX ADMIN — Medication 2 PUFF: at 08:39

## 2022-11-09 RX ADMIN — INSULIN LISPRO 8 UNITS: 100 INJECTION, SOLUTION INTRAVENOUS; SUBCUTANEOUS at 12:34

## 2022-11-09 RX ADMIN — ACETAMINOPHEN 650 MG: 325 TABLET ORAL at 04:55

## 2022-11-09 RX ADMIN — HYDROCODONE BITARTRATE AND ACETAMINOPHEN 1 TABLET: 5; 325 TABLET ORAL at 08:05

## 2022-11-09 RX ADMIN — DOCUSATE SODIUM 100 MG: 100 CAPSULE, LIQUID FILLED ORAL at 20:31

## 2022-11-09 RX ADMIN — INSULIN LISPRO 2 UNITS: 100 INJECTION, SOLUTION INTRAVENOUS; SUBCUTANEOUS at 12:32

## 2022-11-09 RX ADMIN — Medication 500 MG: at 08:05

## 2022-11-09 RX ADMIN — Medication 10 ML: at 08:06

## 2022-11-09 RX ADMIN — METHOCARBAMOL TABLETS 500 MG: 500 TABLET, COATED ORAL at 20:31

## 2022-11-09 RX ADMIN — INSULIN LISPRO 8 UNITS: 100 INJECTION, SOLUTION INTRAVENOUS; SUBCUTANEOUS at 08:17

## 2022-11-09 RX ADMIN — CEFTRIAXONE SODIUM 2000 MG: 2 INJECTION, POWDER, FOR SOLUTION INTRAMUSCULAR; INTRAVENOUS at 16:21

## 2022-11-09 RX ADMIN — GABAPENTIN 100 MG: 100 CAPSULE ORAL at 20:31

## 2022-11-09 RX ADMIN — Medication 2 PUFF: at 15:44

## 2022-11-09 RX ADMIN — MORPHINE SULFATE 2 MG: 2 INJECTION, SOLUTION INTRAMUSCULAR; INTRAVENOUS at 17:06

## 2022-11-09 RX ADMIN — Medication 10 ML: at 20:32

## 2022-11-09 RX ADMIN — POLYETHYLENE GLYCOL 3350 17 G: 17 POWDER, FOR SOLUTION ORAL at 20:31

## 2022-11-09 RX ADMIN — LEVOTHYROXINE SODIUM 175 MCG: 0.03 TABLET ORAL at 05:04

## 2022-11-09 ASSESSMENT — PAIN DESCRIPTION - LOCATION
LOCATION: NECK
LOCATION: HEAD
LOCATION: NECK
LOCATION: HEAD
LOCATION: HEAD
LOCATION: NECK
LOCATION: NECK

## 2022-11-09 ASSESSMENT — ENCOUNTER SYMPTOMS
SHORTNESS OF BREATH: 0
BACK PAIN: 0
EYE DISCHARGE: 0
RHINORRHEA: 0
SINUS PAIN: 0
SINUS PRESSURE: 0
SORE THROAT: 0
DIARRHEA: 0
ABDOMINAL PAIN: 0
WHEEZING: 0
CONSTIPATION: 0
NAUSEA: 0
EYE REDNESS: 0
COUGH: 0

## 2022-11-09 ASSESSMENT — PAIN SCALES - GENERAL
PAINLEVEL_OUTOF10: 7
PAINLEVEL_OUTOF10: 9
PAINLEVEL_OUTOF10: 8
PAINLEVEL_OUTOF10: 8
PAINLEVEL_OUTOF10: 9
PAINLEVEL_OUTOF10: 7
PAINLEVEL_OUTOF10: 9
PAINLEVEL_OUTOF10: 5

## 2022-11-09 ASSESSMENT — PAIN DESCRIPTION - DESCRIPTORS
DESCRIPTORS: ACHING

## 2022-11-09 ASSESSMENT — PAIN DESCRIPTION - ORIENTATION
ORIENTATION: ANTERIOR
ORIENTATION: RIGHT;LEFT

## 2022-11-09 NOTE — PROGRESS NOTES
Infectious Diseases   Progress Note      Admission Date: 11/6/2022  Hospital Day: Hospital Day: 4   Attending: Amira Webb MD  Date of service: 11/9/2022     Chief complaint/ Reason for consult:     Concern for atypical pneumonia and shortness of breath  PICC line in place in the left arm  Acute DVT of the left brachial vein  Complicated urinary tract infection with positive nitrite and leukocyte esterase and 4+ bacteria on urinalysis  Bilateral renal calculi  Negative COVID-19 influenza AMB rapid Moni nucleic acid assay test  Acute kidney injury    Microbiology:        I have reviewed allavailable micro lab data and cultures    Blood culture (2/2) - collected on 11/6/2022: Negative    Urine culture  - collected on 11/6/2022: 25,000 CFU per mL of Proteus mirabilis  Susceptibility    Proteus mirabilis (1)    Antibiotic Interpretation Microscan  Method Status    amoxicillin-clavulanate Intermediate 16/8 mcg/mL BACTERIAL SUSCEPTIBILITY PANEL BY EVA     ampicillin Resistant >16 mcg/mL BACTERIAL SUSCEPTIBILITY PANEL BY EVA     ampicillin-sulbactam Resistant >16/8 mcg/mL BACTERIAL SUSCEPTIBILITY PANEL BY EVA     ceFAZolin Resistant 8 mcg/mL BACTERIAL SUSCEPTIBILITY PANEL BY EVA     cefepime Sensitive <=2 mcg/mL BACTERIAL SUSCEPTIBILITY PANEL BY EVA     cefTRIAXone Sensitive <=1 mcg/mL BACTERIAL SUSCEPTIBILITY PANEL BY EVA     cefuroxime Sensitive <=4 mcg/mL BACTERIAL SUSCEPTIBILITY PANEL BY EVA     ciprofloxacin Intermediate 2 mcg/mL BACTERIAL SUSCEPTIBILITY PANEL BY EVA     ertapenem Sensitive <=0.5 mcg/mL BACTERIAL SUSCEPTIBILITY PANEL BY EVA     gentamicin Resistant >8 mcg/mL BACTERIAL SUSCEPTIBILITY PANEL BY EVA     meropenem Sensitive <=1 mcg/mL BACTERIAL SUSCEPTIBILITY PANEL BY EVA     nitrofurantoin Resistant >64 mcg/mL BACTERIAL SUSCEPTIBILITY PANEL BY EVA     piperacillin-tazobactam Sensitive <=16 mcg/mL BACTERIAL SUSCEPTIBILITY PANEL BY EVA     tobramycin Resistant >8 mcg/mL BACTERIAL SUSCEPTIBILITY PANEL BY EVA     trimethoprim-sulfamethoxazole Resistant >2/38 mcg/mL BACTERIAL SUSCEPTIBILITY PANEL BY EVA         Antibiotics and immunizations:       Current antibiotics: All antibiotics and their doses were reviewed by me    Recent Abx Admin                     meropenem (MERREM) 500 mg IVPB (mini-bag) (mg) 500 mg New Bag 11/09/22 0458     500 mg New Bag 11/08/22 1658                      Immunization History: All immunization history was reviewed by me today. Immunization History   Administered Date(s) Administered    Influenza Vaccine, unspecified formulation 09/01/2016    Influenza Virus Vaccine 10/22/2011    Pneumococcal Polysaccharide ([id number]) 02/11/2012       Known drug allergies: All allergies were reviewed and updated    Allergies   Allergen Reactions    Aspirin Other (See Comments)     GI upset    Ibuprofen Other (See Comments)     GI upset    Macrobid [Nitrofurantoin Monohydrate Macrocrystals] Other (See Comments)     COLD, SICK    Adhesive Tape Rash    Lexiscan [Regadenoson] Rash     Happened twice with Lexiscan    Penicillins Rash       Social history:     Social History:  All social andepidemiologic history was reviewed and updated by me today as needed. Tobacco use:   reports that she has never smoked. She has never used smokeless tobacco.  Alcohol use:   reports no history of alcohol use. Currently lives in: [address] [place] [address]   reports no history of drug use.      COVID VACCINATION AND LAB RESULT RECORDS:     Internal Administration   First Dose      Second Dose           Last COVID Lab SARS-CoV-2 RNA, RT PCR (no units)   Date Value   11/08/2022 NOT DETECTED     SARS-CoV-2, NAAT (no units)   Date Value   11/03/2022 Not Detected            Assessment:     The patient is a 78 y.o. old female who  has a past medical history of 4/11/17 Left knee repeat repair of median parapatellar arthrotomy with augmentation (4/12/2017), Arthritis, Aspiration pneumonia of left lower lobe due to gastric secretions (HCC) (10/18/2022), Atrial fibrillation ([facility] CAD (coronary artery disease), Cataract, Chronic diastolic congestive heart failure ([facility] (7/27/2022), Diabetes mellitus ([facility] GERD (gastroesophageal reflux disease), Hyperlipidemia, Hypertension, HYPOTHRYROIDISM, Myelodysplastic disease ([facility] (10/17/2022), Non-English speaking patient, Sleep apnea, Thyroid disease, and UTI. with following problems:    Concern for atypical pneumonia and shortness of breath  PICC line in place in the left arm  Acute DVT of the left brachial vein  Complicated urinary tract infection with positive nitrite and leukocyte esterase and 4+ bacteria on urinalysis  Bilateral renal calculi  Negative COVID-19 influenza AMB rapid Moni nucleic acid assay test  Acute kidney injury  Coronary artery disease  Essential hypertension  Mixed hyperlipidemia  Hypothyroidism  Myelodysplastic syndrome  Obstructive sleep apnea  Non-English-speaking patient  Obesity Class 3 due to excess calorie intake : Body mass index is 46.59 kg/m². Discussion:      The patient is afebrile. She is on IV meropenem. Urine culture from 11/7/2022 grew 25,000 CFU per mL of Proteus mirabilis. Isolate was susceptible to extended generation cephalosporins but was resistant to fluoroquinolones, Bactrim, nitrofurantoin etc.    Respiratory viral PCR panel was negative      Plan:     Diagnostic Workup:      Continue to follow  fever curve, WBC count and blood cultures. Continue to monitor blood counts, liver and renal function. Antimicrobials: Will stop IV meropenem today  Will order IV ceftriaxone 2 g every 24 hours  Maintain contact isolation  Droplet isolation can be discontinued  Has high-grade myelodysplastic syndrome. Oncology following  We will follow up on the culture results and clinical progress and will make further recommendations accordingly. Continue close vitals monitoring. Maintain good glycemic control.   Fall fever.   HENT:  Negative for ear discharge, ear pain, postnasal drip, rhinorrhea, sinus pressure, sinus pain and sore throat. Eyes:  Negative for discharge and redness. Respiratory:  Negative for cough, shortness of breath and wheezing. Cardiovascular:  Negative for chest pain and leg swelling. Gastrointestinal:  Negative for abdominal pain, constipation, diarrhea and nausea. Endocrine: Negative for cold intolerance, heat intolerance and polydipsia. Genitourinary:  Negative for dysuria, flank pain, frequency, hematuria and urgency. Musculoskeletal:  Negative for back pain and myalgias. Skin:  Negative for rash. Allergic/Immunologic: Negative for immunocompromised state. Neurological:  Negative for dizziness, seizures and headaches. Hematological:  Does not bruise/bleed easily. Psychiatric/Behavioral:  Negative for agitation, hallucinations and suicidal ideas. The patient is not nervous/anxious. All other systems reviewed and are negative. Past Medical History: All past medical history reviewed today. Past Medical History:   Diagnosis Date    4/11/17 Left knee repeat repair of median parapatellar arthrotomy with augmentation 4/12/2017    Arthritis     Aspiration pneumonia of left lower lobe due to gastric secretions ([address] 10/18/2022    Atrial fibrillation (HCC)     CAD (coronary artery disease)     Cataract     Chronic diastolic congestive heart failure ([address] 7/27/2022    Diabetes mellitus ([address]     GERD (gastroesophageal reflux disease)     Hyperlipidemia     Hypertension     HYPOTHRYROIDISM     Myelodysplastic disease ([address] 10/17/2022    Non-English speaking patient     SPEAKS Uzbek. SHE SPEAKS A LITTLE ENGLISH    Sleep apnea     unspecified    Thyroid disease     UTI        Past Surgical History: All past surgical history was reviewed today.     Past Surgical History:   Procedure Laterality Date    CARDIAC SURGERY  2004    CABG X 4 VESSELS    CHOLECYSTECTOMY, LAPAROSCOPIC 5/15/14    with [address] [facility]    CT BONE MARROW BIOPSY  9/29/2022    CT BONE MARROW BIOPSY 9/29/2022 [facility] CT SCAN    JOINT REPLACEMENT Bilateral 2000 AND 1996 And 1998    knee    KNEE SURGERY Left 02/28/2017    left knee poly exchange revision     REVISION TOTAL KNEE ARTHROPLASTY Right 11/22/2016    RIGHT TOTAL KNEE REVISION WITH POLY EXCHANGE    TOTAL KNEE ARTHROPLASTY      partical/ left     UPPER GASTROINTESTINAL ENDOSCOPY N/A 10/3/2022    EGD BIOPSY performed by iVckie Kimbrough MD at [facility] N/A 10/3/2022    EGD DILATION BALLOON performed by Vickie Kimbrough MD at [address] [facility] ENDOSCOPY       Family History: All family history was reviewed today. Problem Relation Age of Onset    Arthritis Other     Diabetes Other     Heart Disease Other     Hypertension Other     High Cholesterol Brother        Objective:       PHYSICAL EXAM:      Vitals:   Vitals:    11/09/22 1030 11/09/22 1100 11/09/22 1400 11/09/22 1422   BP:   (!) 102/55    Pulse:   68    Resp: 16 16 18 16   Temp:       TempSrc:       SpO2:   93%    Weight:       Height:           Physical Exam  Vitals and nursing note reviewed. Constitutional:       Appearance: She is well-developed. She is not diaphoretic. Comments: The patient was seen earlier today. HENT:      Head: Normocephalic and atraumatic. Right Ear: External ear normal. There is no impacted cerumen. Left Ear: External ear normal. There is no impacted cerumen. Nose: Nose normal.      Mouth/Throat:      Mouth: Mucous membranes are moist.      Pharynx: Oropharynx is clear. No oropharyngeal exudate. Eyes:      General: No scleral icterus. Right eye: No discharge. Left eye: No discharge. Conjunctiva/sclera: Conjunctivae normal.      Pupils: Pupils are equal, round, and reactive to light. Neck:      Thyroid: No thyromegaly. Cardiovascular:      Rate and Rhythm: Normal rate and regular rhythm.       Heart sounds: Normal heart sounds. No murmur heard. No friction rub. Pulmonary:      Effort: No respiratory distress. Breath sounds: No stridor. No wheezing or rales. Abdominal:      General: Bowel sounds are normal.      Palpations: Abdomen is soft. Tenderness: There is no abdominal tenderness. There is no guarding or rebound. Musculoskeletal:         General: No swelling, tenderness or deformity. Normal range of motion. Cervical back: Normal range of motion and neck supple. Right lower leg: No edema. Left lower leg: No edema. Lymphadenopathy:      Cervical: No cervical adenopathy. Skin:     General: Skin is warm and dry. Coloration: Skin is not jaundiced. Findings: No bruising, erythema or rash. Neurological:      General: No focal deficit present. Mental Status: She is alert and oriented to person, place, and time. Mental status is at baseline. Motor: No abnormal muscle tone. Psychiatric:         Mood and Affect: Mood normal.         Behavior: Behavior normal.          Lines and drains: All vascular access sites are healthy with no local erythema, discharge or tenderness. Intake and output:    I/O last 3 completed shifts: In: 871.1 [I.V.:64.4; Blood:355.5; IV Piggyback:451.2]  Out: 5 [Urine:1875]    Lab Data:   All available labs and old records have been reviewed by me.     CBC:  Recent Labs     11/07/22  1250 11/08/22  0451 11/09/22  0500   WBC 7.5 7.0 8.0   RBC 2.16* 2.61* 2.37*   HGB 6.7* 8.1* 7.1*   HCT 20.6* 24.3* 21.9*   PLT 28* 28* 25*   MCV 95.5 93.4 92.5   MCH 31.3 31.1 30.1   MCHC 32.8 33.3 32.6   RDW 17.5* 19.3* 18.3*   BANDSPCT 4 4 13*        BMP:  Recent Labs     11/08/22  0450 11/08/22  0451 11/09/22  0500    134* 133*   K 4.7 4.6 4.6   CL 97* 98* 98*   CO2 25 28 29   BUN 45* 44* 37*   CREATININE 1.7* 1.7* 1.0   CALCIUM 8.0* 8.0* 8.3   GLUCOSE 174* 180* 286*        Hepatic Function Panel:   Lab Results   Component Value Date/Time    ALKPHOS 63 11/09/2022 05:00 AM    ALT 13 11/09/2022 05:00 AM    AST 13 11/09/2022 05:00 AM    PROT 6.3 11/09/2022 05:00 AM    PROT 7.3 07/10/2012 02:05 PM    BILITOT 0.5 11/09/2022 05:00 AM    LABALBU 2.6 11/09/2022 05:00 AM       CPK:   Lab Results   Component Value Date    CKTOTAL 55 10/10/2022     ESR: No results found for: SEDRATE  CRP: No results found for: CRP        Imaging: All pertinent images and reports for the current visit were reviewed by me during this visit. I reviewed the chest x-ray/CT scan/MRI images and independently interpreted the findings and results today. VL Extremity Venous Left         CT ABDOMEN PELVIS WO CONTRAST Additional Contrast? Oral   Final Result   1. No acute findings in the abdomen or pelvis. 2. Moderate amount of stool and air in the colon proximally which could   reflect constipation. 3. Punctate bilateral intrarenal calculi. VL Extremity Venous Bilateral         XR CHEST PORTABLE   Final Result   Left PICC line appears to be over the SVC and not significantly changed from   the prior exam.      Cardiomegaly with pulmonary vascular congestion and interstitial   prominence/interstitial opacities suggesting edema. Stable to the slightly   worsened from the recent study. CT HEAD WO CONTRAST   Final Result   No acute intracranial hemorrhage, mass effect, midline shift, or signs of   acute territorial infarct. Generalized volume loss and chronic ischemic changes in the white matter are   stable. Partial opacification in the left mastoid air cells and edge of the middle   ear cavity are new from the prior exam.  Correlate for infectious symptoms   versus eustachian tube dysfunction. XR CHEST PORTABLE    (Results Pending)       Medications: All current and past medications were reviewed.      [START ON 11/10/2022] insulin glargine  44 Units SubCUTAneous Daily    lidocaine  1 patch TransDERmal Daily    insulin lispro  0-4 Units SubCUTAneous TID WC insulin lispro  8 Units SubCUTAneous TID WC    lidocaine 1 % injection  5 mL IntraDERmal Once    sodium chloride flush  5-40 mL IntraVENous 2 times per day    albuterol sulfate HFA  2 puff Inhalation Q4H WA    ipratropium  2 puff Inhalation 4x daily    atorvastatin  80 mg Oral Nightly    calcium elemental  500 mg Oral Daily    digoxin  125 mcg Oral Daily    famotidine  20 mg Oral Daily    gabapentin  100 mg Oral TID    levothyroxine  175 mcg Oral QAM AC    metoprolol tartrate  12.5 mg Oral BID    pantoprazole  40 mg Oral QAM AC    sodium chloride flush  5-40 mL IntraVENous 2 times per day    heparin (porcine)  5,000 Units SubCUTAneous Q12H    methocarbamol  500 mg Oral q8h    meropenem  500 mg IntraVENous Q12H    polyethylene glycol  17 g Oral BID    docusate sodium  100 mg Oral BID        sodium chloride      dextrose      sodium chloride         sodium chloride flush, sodium chloride, dextrose bolus **OR** dextrose bolus, glucagon (rDNA), dextrose, guaiFENesin, HYDROcodone-acetaminophen, tiZANidine, sodium chloride flush, ondansetron **OR** ondansetron, polyethylene glycol, acetaminophen **OR** acetaminophen, sodium phosphate IVPB **OR** sodium phosphate IVPB **OR** sodium phosphate IVPB, magnesium sulfate, potassium chloride **OR** potassium alternative oral replacement **OR** potassium chloride, morphine, sodium chloride, diatrizoate meglumine-sodium      Problem list:       Patient Active Problem List   Diagnosis Code    DM (diabetes mellitus) ([facility] E11.9    Coronary artery disease involving native coronary artery of native heart without angina pectoris I25.10    PAF (paroxysmal atrial fibrillation) ([facility]) I48.0    Peripheral vascular disease ([facility]) I73.9    CARLOS (obstructive sleep apnea) G47.33    Dyspnea R06.00    ARUN (acute kidney injury) ([facility] N17.9    Hyperlipidemia associated with type 2 diabetes mellitus ([facility]) E11.69, E78.5    Hypertension associated with diabetes ([facility]) E11.59, I15.2    Dizziness R42 Headache R51.9    Debility R53.81    11/22/16 Right knee polyethylene exchange M25.561, M25.562    Acute pain of right shoulder M25.511    Mixed hyperlipidemia E78.2    Acute chest pain R07.9    Morbid obesity with BMI of 40.0-44.9, adult ([facility]) E66.01, Z68.41    Obesity, Class III, BMI 40-49.9 (morbid obesity) ([facility]) E66.01    Type 2 diabetes mellitus ([facility]) E11.9    Carotid artery disease without cerebral infarction [facility]) I77.9    2/28/17 Revision with polyethylene exchange left total knee arthroplasty M23.52    4/11/17 Left knee repeat repair of median parapatellar arthrotomy with augmentation S76.111A    Rotator cuff tendonitis, right M75.81    Cervical radicular pain V85.51    Complicated UTI (urinary tract infection) K46.3    Acute diastolic heart failure ([facility]) I50.31    Dyspnea on exertion R06.09    Severe thrombocytopenia ([facility]) D69.6    Severe anemia D64.9    Wrist arthritis M19.039    Chronic atrial fibrillation ([facility]) I48.20    Primary osteoarthritis of first carpometacarpal joint of left hand M18.12    Shoulder pain M25.519    Hypervolemia E87.70    Myelodysplasia (myelodysplastic syndrome) ([facility]) D46.9    Chronic systolic heart failure ([facility]) I50.22    Arterial hypotension I95.9    History of coronary artery disease Z86.79    Pulmonary vascular congestion R09.89    Acute hypoxemic respiratory failure ([facility]) J96.01    Aspiration pneumonia of left lower lobe due to gastric secretions ([facility]) J69.0    Anemia D64.9    HFrEF (heart failure with reduced ejection fraction) ([facility]) I50.20    Acute respiratory failure with hypoxemia ([facility]) J96.01    Non-English speaking patient Z78.9    Hypothyroidism E03.9    History of DVT in adulthood Z86.718    Nephrolithiasis N20.0    Atypical pneumonia J18.9    Acute on chronic congestive heart failure ([facility]) I50.9       Please note that this chart was generated using Dragon dictation software.  Although every effort was made to ensure the accuracy of this automated transcription, some errors in transcription may have occurred inadvertently. If you may need any clarification, please do not hesitate to contact me through EPIC or at the phone number provided below with my electronic signature. Any pictures or media included in this note were obtained after taking informed verbal consent from the patient and with their approval to include those in the patient's medical record. Myrna Bosworth, MD, MPH, [address] [place] [address]  11/9/2022, 3:01 PM  [facility] Infectious Disease   [address] [place] [address], [address] [place] [address]  Office: [phone]  Fax: [phone]  In-person Clinic days:  Tuesday & Thursday a.m. Virtual clinic days: Monday, Wednesday & Friday a.m.

## 2022-11-09 NOTE — PROGRESS NOTES
Pt c/o headache , morphine 2 mg given, pt repositioned for comfort. Family at bedside, will continue to monitor.

## 2022-11-09 NOTE — PROGRESS NOTES
MD Gilson Ferguson MD Clemon House, MD                  Office: [phone]                      Fax: [phone]             [facility]                   NEPHROLOGY INPATIENT PROGRESS NOTE:     PATIENT NAME: Obinna Silver  : 1940  MRN: [id number]       RECOMMENDATIONS:    Kuhn was removed, but postvoid residual showed 600 cc so needed Kuhn reinsertion  -Monitor in 2 days, with postvoid residual after bladder scan,  -If still persistent, consider urology work-up,    - no need for IVF, encourage PO hydration  - fup Ucx, IV abx as per primary team   - Stopped Entresto, w/ lower BP   - can resume on dc , if BP and renal fx tolerate. - monitor dig level, goal <2  - trend Mag level,   - ISS for BG control  - monitor anemia  - at higher risk for decompensation, needing closer monitoring. D/C plan from renal stand point: Improving from renal standpoint  - D/W pt, family, including daughter  - D/W team nurse, hospitalist       IMPRESSION:       Admitted on:  2022 11:47 PM   For:  Acute respiratory failure with hypoxemia ([address] [J96.01]  Dyspnea, unspecified type [R06.00]  Acute on chronic congestive heart failure, unspecified heart failure type ([address] [I50.9]  deep vein thrombosis involving the left proximal brachial vein    ARUN (NO H/O CKD): non-oligouric   - BL Scr- ~0.6-0.7  ->  1.8 on admission  - Etiology of ARUN - presumed pre-renal   - UA : results reviewed: abnormal, ? UTI  - Renal imaging: on admission w/ /CT abd/p - There are 2 punctate stones at the upper pole  the left kidney. There is a punctate stone at the mid right kidney. There  is no ureteral stone or hydronephrosis. - reviewed urine lytes - likely hypovolemia    Associated problems:   - Volume status: mild hypervolemic  CXR - Cardiomegaly with pulmonary vascular congestion and interstitial prominence/interstitial opacities suggesting edema. Stable to the slightly worsened from the recent study.      Hazel Garcia HTN : no need for tight control   : Na: hyponatremia - mild - chronic     - Azotemia: pre-renal   - Electrolytes: K: WNL  - Acid-Base: WNL  - Anemia: of likely chronic dz   - Thrombocytopenia - unlikely TMA type pic, r/o that if no improvement      Other major problems: Management per primary and other consulting teams. Hospital Problems             Last Modified POA    * (Principal) Acute hypoxemic respiratory failure ([facility] 11/7/2022 Yes    Carotid artery disease without cerebral infarction ([address] 39/7/3041 Yes    Complicated UTI (urinary tract infection) 11/8/2022 Yes    Chronic atrial fibrillation ([address] 11/7/2022 Yes    Myelodysplasia (myelodysplastic syndrome) ([address] 11/7/2022 Yes    HFrEF (heart failure with reduced ejection fraction) ([address] 11/7/2022 Yes    Non-English speaking patient 11/8/2022 Yes    Hypothyroidism 11/8/2022 Yes    History of DVT in adulthood 11/8/2022 Yes    Nephrolithiasis 11/8/2022 Yes    Atypical pneumonia 11/8/2022 Yes    Acute on chronic congestive heart failure ([address] 11/8/2022 Yes    Peripheral vascular disease ([address] 11/7/2022 Yes    Dyspnea 11/8/2022 Yes    ARUN (acute kidney injury) ([address] 11/7/2022 Yes    Hyperlipidemia associated with type 2 diabetes mellitus ([address] 11/7/2022 Yes    Hypertension associated with diabetes ([address] 11/7/2022 Yes    Morbid obesity with BMI of 40.0-44.9, adult ([address] 11/7/2022 Yes    Obesity, Class III, BMI 40-49.9 (morbid obesity) ([address] 11/8/2022 Yes    Type 2 diabetes mellitus ([address] 11/7/2022 Yes       : other supportive care :   - Check daily renal function panel with electrolytes-phosphorus  - Strict monitoring of I/Os, daily weight  - Renal feeds/diet  - Current medications reviewed. - Nephrotoxic medications have been discontinued. - Dose adjusted and appropriate.      - Dose meds for eGFR <15 mL/min/1.73m2 during ARUN    - Avoid heavy opioids due to renal failure - may use very low dose dilaudid / fentanyl with close monitoring of CNS and respiratory depression. Please refer to the orders. High Complexity. Multiple complex problems. Discussed with patient and treatment team-  family at bedside, hospitalist - Dr Jad Simmons     Time spent > 30 (~35) minutes. Thank you for allowing me to participate in this patient's care. Please do not hesitate to contact me anytime. We will follow along with you. Merrick Dupree MD,  [facility] [address] [place] [address] [place]: [phone] or Via [employer]  Fax: [phone]        =======================================================================================   =======================================================================================  Subjective / interval history:   Patient was seen comfortably as per family has are not of complain of resting in bed, neck pain. Discussing with primary team also  Reported no active complaints,   Renal function better  No SOB  Leg edema +   NC better 2to 1L NC. Kuhn was removed, but postvoid residual showed 600 cc so needed Kuhn reinsertion  -Monitor in 2 days, with postvoid residual after bladder scan,  -If still persistent, consider urology work-up,      Past medical, Surgical, Social, Family medical history reviewed by me. MEDICATIONS: reviewed by me. Medications Prior to Admission:  No current facility-administered medications on file prior to encounter. Current Outpatient Medications on File Prior to Encounter   Medication Sig Dispense Refill    Ipratropium-Albuterol (DUONEB IN) Inhale 3 mLs into the lungs every 4 hours as needed (SOB)      famotidine (PEPCID) 20 MG tablet Take 20 mg by mouth daily      HYDROcodone-acetaminophen (NORCO) 5-325 MG per tablet Take 1 tablet by mouth every 6 hours as needed for Pain.       lactobacillus (CULTURELLE) capsule Take 2 capsules by mouth daily      metroNIDAZOLE (FLAGYL) 500 MG tablet Take 500 mg by mouth 3 times daily      omeprazole (PRILOSEC) 20 MG delayed release capsule Take 20 mg by mouth in the morning and at bedtime      polyethylene glycol (GLYCOLAX) 17 g packet Take 17 g by mouth daily as needed for Constipation      silver sulfADIAZINE (SILVADENE) 1 % cream Apply topically 2 times daily Apply to inner buttocks every shift for MASD      digoxin (LANOXIN) 125 MCG tablet Take 1 tablet by mouth daily 30 tablet 3    guaiFENesin (MUCINEX) 600 MG extended release tablet Take 1 tablet by mouth 2 times daily as needed for Congestion 60 tablet 0    insulin lispro (HUMALOG) 100 UNIT/ML SOLN injection vial Inject 6 Units into the skin 3 times daily (with meals) 2 each 0    sacubitril-valsartan (ENTRESTO) 24-26 MG per tablet Take 0.5 tablets by mouth 2 times daily 60 tablet 1    furosemide (LASIX) 20 MG tablet Take 1 tablet by mouth daily 60 tablet 3    gabapentin (NEURONTIN) 100 MG capsule Take 1 capsule by mouth 3 times daily for 30 days.  90 capsule 0    metoprolol tartrate (LOPRESSOR) 25 MG tablet Take 0.5 tablets by mouth 2 times daily 60 tablet 3    empagliflozin (JARDIANCE) 10 MG tablet Take 1 tablet by mouth daily 30 tablet 3    insulin glargine (BASAGLAR KWIKPEN) 100 UNIT/ML injection pen Inject 48 Units into the skin daily 5 Adjustable Dose Pre-filled Pen Syringe 3    calcium carbonate (OSCAL) 500 MG TABS tablet Take 500 mg by mouth daily      senna (SENOKOT) 8.6 MG tablet Take 1 tablet by mouth 2 times daily      magnesium 30 MG tablet Take 40 mg by mouth daily      tiZANidine (ZANAFLEX) 2 MG tablet Take 2 mg by mouth every 8 hours as needed      atorvastatin (LIPITOR) 80 MG tablet Take 1 tablet by mouth daily 90 tablet 4    Insulin Pen Needle 32G X 6 MM MISC by Does not apply route 3 times daily with meals      vitamin D (ERGOCALCIFEROL) 14079 UNITS CAPS capsule Take 50,000 Units by mouth once a week      Omega 3 1000 MG CAPS Take 1,000 mg by mouth daily      Liraglutide (VICTOZA) 18 MG/3ML SOPN SC injection Inject 1.2 mg into the skin daily      lidocaine (LIDODERM) 5 % Place 1 patch onto the skin daily 12 hours on, 12 hours off. 30 patch 0    oxybutynin (DITROPAN XL) 15 MG CR tablet Take 15 mg by mouth daily.       levothyroxine (SYNTHROID) 175 MCG tablet Take 175 mcg by mouth daily           Current Facility-Administered Medications:     insulin lispro (HUMALOG) injection vial 0-4 Units, 0-4 Units, SubCUTAneous, TID Mikala MD, 2 Units at 11/09/22 0808    insulin lispro (HUMALOG) injection vial 8 Units, 8 Units, SubCUTAneous, TID Mikala MD, 8 Units at 11/09/22 0817    insulin glargine (LANTUS) injection vial 40 Units, 40 Units, SubCUTAneous, Daily, Benjamín Quintana MD, 40 Units at 11/09/22 0807    lidocaine PF 1 % injection 5 mL, 5 mL, IntraDERmal, Once, Benjamín Quintana MD    sodium chloride flush 0.9 % injection 5-40 mL, 5-40 mL, IntraVENous, 2 times per day, Benjamín Quintana MD, 10 mL at 11/09/22 0806    sodium chloride flush 0.9 % injection 5-40 mL, 5-40 mL, IntraVENous, PRN, Benjamín Quintana MD    0.9 % sodium chloride infusion, 25 mL, IntraVENous, PRN, Benjamín Quintana MD    albuterol sulfate HFA (PROVENTIL;VENTOLIN;PROAIR) 108 (90 Base) MCG/ACT inhaler 2 puff, 2 puff, Inhalation, Q4H WA, Benjamín Quintana MD, 2 puff at 11/09/22 0839    ipratropium (ATROVENT HFA) 17 MCG/ACT inhaler 2 puff, 2 puff, Inhalation, 4x daily, Benjamín Quintana MD, 2 puff at 11/09/22 0839    dextrose bolus 10% 125 mL, 125 mL, IntraVENous, PRN **OR** dextrose bolus 10% 250 mL, 250 mL, IntraVENous, PRN, Adelina Camarena, DO    glucagon (rDNA) injection 1 mg, 1 mg, SubCUTAneous, PRN, Adelina Camarena, DO    dextrose 10 % infusion, , IntraVENous, Continuous PRN, Adelina Camarena, DO    atorvastatin (LIPITOR) tablet 80 mg, 80 mg, Oral, Nightly, Christiano Padron DO, 80 mg at 11/08/22 2049    calcium elemental (OSCAL) tablet 500 mg, 500 mg, Oral, Daily, Adelina Camarena DO, 500 mg at 11/09/22 0805    digoxin (LANOXIN) tablet 125 mcg, 125 mcg, Oral, Daily, Adelina Camarena DO, 125 mcg at 11/09/22 0805    famotidine (PEPCID) tablet 20 mg, 20 mg, Oral, Daily, Geoffery Bombay, DO, 20 mg at 11/08/22 2049    gabapentin (NEURONTIN) capsule 100 mg, 100 mg, Oral, TID, Geoffery Bombay, DO, 100 mg at 11/09/22 0806    guaiFENesin (MUCINEX) extended release tablet 600 mg, 600 mg, Oral, BID PRN, Geoffery Bombay, DO    HYDROcodone-acetaminophen (NORCO) 5-325 MG per tablet 1 tablet, 1 tablet, Oral, Q6H PRN, Geoffery Bombay, DO, 1 tablet at 11/09/22 0805    levothyroxine (SYNTHROID) tablet 175 mcg, 175 mcg, Oral, QAM AC, Christiano Terlep, DO, 175 mcg at 11/09/22 0504    metoprolol tartrate (LOPRESSOR) tablet 12.5 mg, 12.5 mg, Oral, BID, Christiano Terlep, DO, 12.5 mg at 11/09/22 0805    pantoprazole (PROTONIX) tablet 40 mg, 40 mg, Oral, QAM AC, Christiano Terlep, DO, 40 mg at 11/09/22 0504    tiZANidine (ZANAFLEX) tablet 2 mg, 2 mg, Oral, Q8H PRN, Geoffery Bombay, DO    sodium chloride flush 0.9 % injection 5-40 mL, 5-40 mL, IntraVENous, 2 times per day, Geoffery Bombay, DO, 10 mL at 11/09/22 0807    sodium chloride flush 0.9 % injection 5-40 mL, 5-40 mL, IntraVENous, PRN, Christiano Terlep, DO, 20 mL at 11/07/22 1643    ondansetron (ZOFRAN-ODT) disintegrating tablet 4 mg, 4 mg, Oral, Q8H PRN **OR** ondansetron (ZOFRAN) injection 4 mg, 4 mg, IntraVENous, Q6H PRN, Geoffery Bombay, DO    polyethylene glycol (GLYCOLAX) packet 17 g, 17 g, Oral, Daily PRN, Geoffery Bombay, DO    acetaminophen (TYLENOL) tablet 650 mg, 650 mg, Oral, Q6H PRN, 650 mg at 11/09/22 0455 **OR** acetaminophen (TYLENOL) suppository 650 mg, 650 mg, Rectal, Q6H PRN, Christiano Padron DO    sodium phosphate 10 mmol in sodium chloride 0.9 % 250 mL IVPB, 10 mmol, IntraVENous, PRN **OR** sodium phosphate 15 mmol in sodium chloride 0.9 % 250 mL IVPB, 15 mmol, IntraVENous, PRN **OR** sodium phosphate 20 mmol in sodium chloride 0.9 % 500 mL IVPB, 20 mmol, IntraVENous, PRN, Jigna Gamboage, DO    magnesium sulfate 2000 mg in 50 mL IVPB premix, 2,000 mg, IntraVENous, PRN, Jigna Gamboage, DO, Stopped at 11/07/22 1611    potassium chloride (KLOR-CON M) extended release tablet 40 mEq, 40 mEq, Oral, PRN **OR** potassium bicarb-citric acid (EFFER-K) effervescent tablet 40 mEq, 40 mEq, Oral, PRN **OR** potassium chloride 10 mEq/100 mL IVPB (Peripheral Line), 10 mEq, IntraVENous, PRN, Adelina Nicol, DO    morphine (PF) injection 2 mg, 2 mg, IntraVENous, Q4H PRN, Adelina Camarena, DO, 2 mg at 11/09/22 0455    heparin (porcine) injection 5,000 Units, 5,000 Units, SubCUTAneous, Q12H, Mikala Preston MD    methocarbamol (ROBAXIN) tablet 500 mg, 500 mg, Oral, q8h, Mikala Preston MD, 500 mg at 11/09/22 0455    meropenem (MERREM) 500 mg IVPB (mini-bag), 500 mg, IntraVENous, Q12H, Mikala Preston MD, Stopped at 11/09/22 0801    0.9 % sodium chloride infusion, , IntraVENous, PRN, Mikala Preston MD    diatrizoate meglumine-sodium (GASTROGRAFIN) 66-10 % solution 20 mL, 20 mL, Oral, ONCE PRN, Mikala Preston MD, 20 mL at 11/07/22 1448    polyethylene glycol (GLYCOLAX) packet 17 g, 17 g, Oral, BID, Mikala Preston MD, 17 g at 11/09/22 0804    docusate sodium (COLACE) capsule 100 mg, 100 mg, Oral, BID, Mikala Preston MD, 100 mg at 11/09/22 8048        REVIEW OF SYSTEMS:  As mentioned in chief complaint and HPI , Subjective         =======================================================================================     PHYSICAL EXAM:  Recent vital signs and recent I/Os reviewed by me.      Wt Readings from Last 3 Encounters:   11/09/22 238 lb 15.7 oz (108.4 kg)   11/05/22 227 lb 4.7 oz (103.1 kg)   10/26/22 244 lb 12.8 oz (111 kg)     BP Readings from Last 3 Encounters:   11/09/22 (!) 113/56   11/05/22 (!) 100/52   11/02/22 123/64     Patient Vitals for the past 24 hrs:   BP Temp Temp src Pulse Resp SpO2 Weight   11/09/22 0839 -- -- -- 89 18 97 % --   11/09/22 0835 -- -- -- -- 18 -- --   11/09/22 0805 -- -- -- -- 16 -- --   11/09/22 0802 (!) 113/56 -- -- -- -- 96 % --   11/09/22 0800 (!) 113/56 97.1 °F (36.2 °C) Temporal (!) 119 16 98 % --   11/09/22 0400 133/69 98.7 °F (37.1 °C) Temporal (!) 111 18 95 % 238 lb 15.7 oz (108.4 kg)   11/09/22 0000 132/72 97.3 °F (36.3 °C) Temporal 86 16 96 % --   11/08/22 2027 -- -- -- -- -- 98 % --   11/08/22 2024 -- -- -- 88 18 98 % --   11/08/22 2000 (!) 116/49 97 °F (36.1 °C) Temporal 82 16 98 % --   11/08/22 1656 (!) 125/50 97.5 °F (36.4 °C) -- 84 16 99 % --   11/08/22 1602 -- -- -- 95 18 98 % --   11/08/22 1601 -- -- -- 95 18 98 % --   11/08/22 1238 -- -- -- 96 18 98 % --   11/08/22 1237 -- -- -- 96 18 98 % --   11/08/22 1222 (!) 113/50 97 °F (36.1 °C) -- 92 16 97 % --   11/08/22 1037 (!) 106/45 -- -- 93 -- -- --         Intake/Output Summary (Last 24 hours) at 11/9/2022 1022  Last data filed at 11/9/2022 0806  Gross per 24 hour   Intake 480.29 ml   Output 1325 ml   Net -844.71 ml       Physical Exam  General: no acute distress  HENT: Atraumatic, normocephalic   Eyes: Normal conjunctiva, Non-incteral sclera. Neck: Supple, JVD not visible. CVS:  Heart sounds are normal. No loud murmur. RS: Normal respiratory effort, Breat sound: diminished at bases. Abd: Soft , bowel sounds are normal,    distension and no tenderness . Skin: No rash , some bruises,   CNS: Awake Oriented , No focal.   Extremities/MSK:  Edema, no cyanosis.     =======================================================================================     DATA:  Diagnostic tests reviewed by me for today's visit:   (AS NEEDED FOR MY EVALUATION AND MANAGEMENT).        Recent Labs     11/07/22 0056 11/07/22  1250 11/08/22  0451 11/09/22  0500   WBC 7.5 7.5 7.0 8.0   HCT 22.0* 20.6* 24.3* 21.9*   PLT 32* 28* 28* 25*       Iron Saturation:  No components found for: PERCENTFE  FERRITIN:    Lab Results   Component Value Date/Time    FERRITIN 1,025.0 09/26/2022 04:34 AM     IRON:    Lab Results   Component Value Date/Time    IRON 46 09/25/2022 05:02 AM     TIBC:    Lab Results   Component Value Date/Time    TIBC 230 09/25/2022 05:02 AM       Recent Labs     11/07/22 0056 11/07/22  1250 11/08/22  0450 11/08/22  0451 11/09/22  0500   * 134* 136 134* 133*   K 4.4 4.3 4.7 4.6 4.6   CL 98* 98* 97* 98* 98*   CO2 24 28 25 28 29   BUN 40* 43* 45* 44* 37*   CREATININE 1.3* 1.8* 1.7* 1.7* 1.0       Recent Labs     11/07/22  0056 11/07/22  1250 11/08/22  0450 11/08/22  0451 11/08/22 2056 11/09/22  0500   CALCIUM 7.9* 7.7* 8.0* 8.0*  --  8.3   MG  --  1.50* 2.20  --  3.00*  --    PHOS  --  3.1  --  3.5 2.7  --        No results for input(s): PH, PCO2, PO2 in the last 72 hours.     Invalid input(s): [id number], INSPIREDO2    ABG:  No results found for: PH, PCO2, PO2, HCO3, BE, THGB, TCO2, O2SAT  VBG:    Lab Results   Component Value Date/Time    PHVEN 7.495 11/07/2022 12:56 AM    ZBI1NXH 35.0 11/07/2022 12:56 AM    BEVEN 3.5 11/07/2022 12:56 AM    G3XMVIFG 100 11/07/2022 12:56 AM       LDH:    Lab Results   Component Value Date/Time     10/14/2022 04:50 PM     Uric Acid:    Lab Results   Component Value Date/Time    LABURIC 6.8 09/26/2022 04:34 AM       PT/INR:    Lab Results   Component Value Date/Time    PROTIME 16.4 11/03/2022 06:21 PM    INR 1.33 11/03/2022 06:21 PM     Warfarin PT/INR:  No components found for: PTPATWAR, PTINRWAR  PTT:    Lab Results   Component Value Date/Time    APTT 39.7 02/13/2017 12:07 PM   [APTT}  Last 3 Troponin:    Lab Results   Component Value Date/Time    TROPONINI 0.15 11/07/2022 12:50 PM    TROPONINI 0.13 11/07/2022 12:56 AM    TROPONINI 0.09 11/03/2022 06:18 PM       U/A:    Lab Results   Component Value Date/Time    COLORU DARK YELLOW 11/07/2022 01:45 PM    PROTEINU 300 11/07/2022 01:45 PM    PHUR 7.5 11/07/2022 01:45 PM    WBCUA 4947 11/07/2022 01:45 PM    RBCUA 141 11/07/2022 01:45 PM    MUCUS 1+ 05/16/2014 11:50 PM    BACTERIA 4+ 11/07/2022 01:45 PM    CLARITYU TURBID 11/07/2022 01:45 PM    SPECGRAV 1.020 11/07/2022 01:45 PM    LEUKOCYTESUR LARGE 11/07/2022 01:45 PM    UROBILINOGEN 1.0 11/07/2022 01:45 PM    BILIRUBINUR MODERATE 11/07/2022 01:45 PM [facility] NE[facility] 03/09/2012 06:51 AM    BLOODU MODERATE 11/07/2022 01:45 PM    GLUCOSEU 100 11/07/2022 01:45 PM    GLUCOSEU NEGATIVE 03/09/2012 06:51 AM     Microalbumen/Creatinine ratio:  No components found for: RUCREAT  24 Hour Urine for Protein:  No components found for: RAWUPRO, UHRS3, TZOT94CP, UTV3  24 Hour Urine for Creatinine Clearance:  No components found for: CREAT4, UHRS10, UTV10  Urine Toxicology:  No components found for: [place], IBENZO, ICOCAINE, IMARTHC, IOPIATES, IPHENCYC    HgBA1c:    Lab Results   Component Value Date/Time    LABA1C 8.0 11/07/2022 12:50 PM     RPR:  No results found for: RPR  HIV:  No results found for: HIV  NICOLASA:  No results found for: ANATITER, NICOLASA  RF:  No results found for: RF  DSDNA:  No components found for: DNA  AMYLASE:    Lab Results   Component Value Date/Time    AMYLASE 50 05/09/2014 07:45 PM     LIPASE:    Lab Results   Component Value Date/Time    LIPASE 21.0 11/03/2022 06:18 PM     Fibrinogen Level:  No components found for: FIB       BELOW MENTIONED RADIOLOGY STUDY RESULTS BY ME (AS NEEDED FOR MY EVALUATION AND MANAGEMENT). CT ABDOMEN PELVIS WO CONTRAST Additional Contrast? Oral    Result Date: 11/7/2022  EXAMINATION: CT OF THE ABDOMEN AND PELVIS WITHOUT CONTRAST 11/7/2022 3:56 pm TECHNIQUE: CT of the abdomen and pelvis was performed without the administration of intravenous contrast. Multiplanar reformatted images are provided for review. Automated exposure control, iterative reconstruction, and/or weight based adjustment of the mA/kV was utilized to reduce the radiation dose to as low as reasonably achievable. COMPARISON: 07/08/2015. HISTORY: ORDERING SYSTEM PROVIDED HISTORY: Abd pain TECHNOLOGIST PROVIDED HISTORY: Oral contrast; NO IV contrast Reason for exam:->Abd pain Additional Contrast?->Oral Reason for Exam: abdomen pain FINDINGS: Lower Chest: Linear atelectasis or scarring is noted at the lung bases. There is no pleural effusion.  Organs: The liver, spleen, adrenal glands and pancreas appear normal.  There has been a cholecystectomy. There are 2 punctate stones at the upper pole the left kidney. There is a punctate stone at the mid right kidney. There is no ureteral stone or hydronephrosis. GI/Bowel: There is a normal appendix. There is a moderate amount of stool and air in the colon proximally. There is no bowel dilatation or bowel wall thickening. The stomach is unremarkable. Pelvis: The bladder is nondistended and contains a Kuhn catheter. No abnormal adnexal mass is identified. Peritoneum/Retroperitoneum: There is advanced multifocal fibrocalcific plaque involving the abdominal aorta and its branches without aneurysm. There are no enlarged lymph nodes. No fat stranding, free fluid, free air or focal fluid collection is identified. Bones/Soft Tissues: No destructive bone lesion is identified. 1. No acute findings in the abdomen or pelvis. 2. Moderate amount of stool and air in the colon proximally which could reflect constipation. 3. Punctate bilateral intrarenal calculi. CT HEAD WO CONTRAST    Result Date: 11/7/2022  EXAMINATION: CT OF THE HEAD WITHOUT CONTRAST  11/7/2022 12:25 am TECHNIQUE: CT of the head was performed without the administration of intravenous contrast. Automated exposure control, iterative reconstruction, and/or weight based adjustment of the mA/kV was utilized to reduce the radiation dose to as low as reasonably achievable. COMPARISON: 10/13/2022. HISTORY: ORDERING SYSTEM PROVIDED HISTORY: confusion TECHNOLOGIST PROVIDED HISTORY: Reason for exam:->confusion Has a \"code stroke\" or \"stroke alert\" been called? ->No Decision Support Exception - unselect if not a suspected or confirmed emergency medical condition->Emergency Medical Condition (MA) Reason for Exam: Shortness of Breath (Pt presents to the ED from [facility] with reports of SOB, per EMS Pt sating at 88% on RA. Pt is usually on RA per baseline.  Pt satting at 92% RA. Pt recently discharged for Picc Line placement due to reoccurring blood transfusion due to MDS. Per daughter at bedside pt is acting normally. Does not appear to be lethargic or confused at this time. VSS.  ) FINDINGS: BRAIN/VENTRICLES: There is no acute intracranial hemorrhage or extra-axial fluid collection. Generalized volume loss is redemonstrated there are prominent CSF spaces over the cerebral hemispheres, greater at the anterior and temporal margins than the parietooccipital margin. There is no mass effect or midline shift. Ventricular size and configuration are stable. No definite sign for acute territorial infarct. There are some chronic ischemic changes in the periventricular and deep white matter. Atherosclerotic calcifications at the intracranial arteries. ORBITS: The visualized portion of the orbits demonstrate no acute abnormality. SINUSES: Included paranasal sinuses have minimal mucosal thickening. Partial opacification in the left mastoid air cells and edge of the middle ear cavity is new from the prior exam. SOFT TISSUES/SKULL:  No acute abnormality of the visualized skull or soft tissues. No acute intracranial hemorrhage, mass effect, midline shift, or signs of acute territorial infarct. Generalized volume loss and chronic ischemic changes in the white matter are stable. Partial opacification in the left mastoid air cells and edge of the middle ear cavity are new from the prior exam.  Correlate for infectious symptoms versus eustachian tube dysfunction. CT HEAD WO CONTRAST    Result Date: 10/14/2022  EXAMINATION: CT OF THE HEAD WITHOUT CONTRAST  10/13/2022 2:52 pm TECHNIQUE: CT of the head was performed without the administration of intravenous contrast. Automated exposure control, iterative reconstruction, and/or weight based adjustment of the mA/kV was utilized to reduce the radiation dose to as low as reasonably achievable.  COMPARISON: 02/11/2012 HISTORY: ORDERING SYSTEM PROVIDED HISTORY: dizziness TECHNOLOGIST PROVIDED HISTORY: Reason for exam:->dizziness Has a \"code stroke\" or \"stroke alert\" been called? ->No Reason for Exam: dizziness FINDINGS: BRAIN/VENTRICLES: The cerebral and cerebellar parenchyma demonstrate volume loss, with a frontal lobe predominance. Scattered low-attenuation areas are noted in the periventricular white matter, compatible with chronic microvascular white matter ischemic disease. There are no areas of hemorrhage, mass, or midline shift. No abnormal extra-axial fluid collections. The ventricles are prominent in size, likely related to involutional change. Gray-white differentiation is maintained without evidence of acute infarct. ORBITS: Lens implants from prior cataract surgery are noted. The orbits are otherwise unremarkable. SINUSES: There is scattered mild paranasal sinus disease. Thickened secretions are noted in the left sphenoid sinus. The mastoid air cells are clear. SOFT TISSUES/SKULL:  The calvarium is intact. No appreciable scalp soft tissue swelling. 1. No acute intracranial abnormality. 2. Cerebral and cerebellar parenchymal volume loss with chronic microvascular white matter ischemic disease. XR CHEST PORTABLE    Result Date: 11/7/2022  EXAMINATION: ONE XRAY VIEW OF THE CHEST 11/7/2022 1:02 am COMPARISON: 11/03/2022 HISTORY: ORDERING SYSTEM PROVIDED HISTORY: PICC PLACEMENT TECHNOLOGIST PROVIDED HISTORY: Reason for exam:->PICC PLACEMENT FINDINGS: Sternotomy wires and postsurgical changes. There is a left PICC line with the tip over the expected SVC. The position is not appear significantly changed from the prior exam.  No endotracheal tube or nasogastric tube. Cardiac silhouette remains enlarged with pulmonary vascular congestion. Prominent interstitial markings and scattered interstitial opacities. No large effusion but small amounts are not excluded at the costophrenic angles.  There is no sign of pneumothorax. Underpenetration of the bones and limited bony evaluation. Left PICC line appears to be over the SVC and not significantly changed from the prior exam. Cardiomegaly with pulmonary vascular congestion and interstitial prominence/interstitial opacities suggesting edema. Stable to the slightly worsened from the recent study. VL Extremity Venous Bilateral    Result Date: 11/7/2022  Vascular Upper Extremities Veins Procedure -- PRELIMINARY SONOGRAPHER REPORT --   Demographics   Patient Name      Kevin Castle   Date of Study     11/07/2022          Gender              Female   Patient Number    [id number]          Date of Birth       1940   Visit Number      [id number]           Age                 78 year(s)   Accession Number  [id number]          Room Number         0010   Corporate ID      [id number]            Prudencio Marshall RVT, RT   Ordering          Rodrigo Lopez DO  Interpreting        Mookie Gastelum MD  Physician                             Physician  Procedure Type of Study:   Veins:Upper Extremities Veins, VASC EXTREMITY VENOUS DUPLEX BILATERAL. Tech Comments Right No evidence of deep vein thrombosis or superficial vein thrombosis of the right upper extremity Left Acute totally occluded deep vein thrombosis involving the left proximal brachial vein. . No other evidence of deep vein thrombosis or superficial vein thrombosis of the left upper extremity. This report was transcribed using voice recognition software, mainly. So please excuse brevity and/or typos. Every effort was made to ensure accuracy, however, inadvertent computerized transcription errors may be present. Please contact us, if any questions or clarifications are needed.

## 2022-11-09 NOTE — PROGRESS NOTES
Pt agitated r/t neck pain, was reported that pt refused to turn. Encouraged turn. With difficulty agreed and turned pt . Encouraged pt to move neck and not keep in same spot.  Family at bedside and understands

## 2022-11-09 NOTE — PROGRESS NOTES
Per hospitalist request message sent to renal to see if it is okay to d/c the marin catheter. House md at bedside. All concerns from patient verbalized by patient and daughter. Aware of pain with arm/neck/head.  Okay with heating pad use

## 2022-11-09 NOTE — PLAN OF CARE
Problem: Chronic Conditions and Co-morbidities  Goal: Patient's chronic conditions and co-morbidity symptoms are monitored and maintained or improved  11/9/2022 0953 by Clari Kay RN  Outcome: Not Progressing  Pt not cooperative with movement in/out of bed.

## 2022-11-09 NOTE — PROGRESS NOTES
[address]   Progress Note  Cardiology    CC:  Dyspnea, confusion, UTI, MDS    HPI:  Denies dyspnea but still needs O2. SaO2 feel to 88% on RA per daughter. Uncomfortable in bed. Neck and head hurt. Given morphine        Medications/Labs all Reviewed    Lab Results   Component Value Date    WBC 8.0 11/09/2022    HGB 7.1 (L) 11/09/2022    HCT 21.9 (L) 11/09/2022    MCV 92.5 11/09/2022    PLT 25 (L) 11/09/2022     Lab Results   Component Value Date    CREATININE 1.0 11/09/2022    BUN 37 (H) 11/09/2022     (L) 11/09/2022    K 4.6 11/09/2022    CL 98 (L) 11/09/2022    CO2 29 11/09/2022     Lab Results   Component Value Date    INR 1.33 (H) 11/03/2022    PROTIME 16.4 (H) 11/03/2022        PHYSICAL EXAM   BP (!) 98/57   Pulse 89   Temp 97.1 °F (36.2 °C) (Temporal)   Resp 16   Ht 5' (1.524 m)   Wt 238 lb 15.7 oz (108.4 kg)   LMP  (LMP Unknown)   SpO2 97%   BMI 46.67 kg/m²      Respiratory:  Resp Assessment: Normal respiratory effort  Resp Auscultation: Clear to auscultation bilaterally   Cardiovascular: Auscultation: regular rate and irregular rhythm, normal S1S2, no murmur, rub or gallop  Palpation:  Nl PMI  JVP:  normal  Extremities: No Edema  Abdomen:  Soft, non-tender  Normal bowel sounds  Extremities:   No Cyanosis or Clubbing  Neurological/Psychiatric:  Oriented to time, place, and person  Non-anxious  Skin:   Warm and dry    VENOUS DOPPLER LUE:  Left   Subacute partially occluded deep vein thrombosis involving the left proximal   brachial vein. No propagation of the thrombus, however, partial flow is now   seen in the proximal brachial vein and is partially compressible. Thrombus of   the left proximal brachial vein appears to be resolving. No other evidence of deep vein thrombosis or superficial vein thrombosis of   the left upper extremity. ASSESSMENT:     Dyspnea:  Apparent acute on chronic diastolic CHF  CXR reviewed and under penetrated.  I cannot tell if there is much of a change from baseline. Agree with holding entresto due to acute renal issues  No obvious fluid on exam.  Agree with nephrology. Follow off of entresto. Recheck CXR  Consider restarting low dose entresto when OK with nephrology     Afib:  Chronic. Rate controlled. Not on anticoagulation due to low platelets  Stable     Confusion:  Likely due to UTI and possible hypoxia. Improving  Alert today     UTI:  On antibiotics     MDS:  Heme following. Frequent transfusions  Poor prognosis     DVT:  Difficult situation due to severe thrombocytopenia. Heme following  Doppler today slightly improved     CRI:  Improved. Renal following. Restart low dose entresto when OK with nephrology     CAD:  Stable  Remote CABG     Plan:  Mental status, respiratory status and renal function somewhat better.   Repeat CXR  Consider restarting low dose entresto at some point when OK with nephrology        Letty Knowles MD, 11/9/2022 11:40 AM

## 2022-11-09 NOTE — PROGRESS NOTES
Physical Therapy/Occupational Attempt Note  Wally Schmidt  Attempted to see pt for PT/OT co-tx however pt declining. Pt family states she has had severe pain in her neck and back of her head for last 24 hours and they are very concerned about cause so they do not want to push her to do therapy this date. Will follow up as schedule and pt permits.      [place]  [facility], OTR/L, [id number]

## 2022-11-09 NOTE — PROGRESS NOTES
Pt agreed to turn fully for wound care nurse inspection. Tri cream applied and new boarder. Wound able to be inspected by myself and wound rn. Wound rn documented with photo.

## 2022-11-09 NOTE — CARE COORDINATION
[facility] Contin[facility] Nurse  Consult Note       NAME:  Fransico Hernandez  MEDICAL RECORD NUMBER:  [id number]  AGE: 78 y.o. GENDER: female  : 1940  TODAY'S DATE:  2022    Subjective   Reason for WOCN Evaluation and Assessment: wounds to buttocks      Fransico Hernandez is a 78 y.o. female referred by:   [x] Physician  [x] Nursing  [] Other:     Wound Identification:  Wound Type: pressure  Contributing Factors: incontinence of urine    Wound History: present on admission, patient from [address] [place] [address] and per daughters at bedside has wounds to intergluteal cleft. Current Wound Care Treatment:  foam dressing     Patient Goal of Care:  [x] Wound Healing  [] Odor Control  [] Palliative Care  [] Pain Control   [] Other:         PAST MEDICAL HISTORY        Diagnosis Date    17 Left knee repeat repair of median parapatellar arthrotomy with augmentation 2017    Arthritis     Aspiration pneumonia of left lower lobe due to gastric secretions ([address] 10/18/2022    Atrial fibrillation (HCC)     CAD (coronary artery disease)     Cataract     Chronic diastolic congestive heart failure ([address] 2022    Diabetes mellitus ([address]     GERD (gastroesophageal reflux disease)     Hyperlipidemia     Hypertension     HYPOTHRYROIDISM     Myelodysplastic disease ([address] 10/17/2022    Non-English speaking patient     SPEAKS Portuguese.   SHE SPEAKS A LITTLE ENGLISH    Sleep apnea     unspecified    Thyroid disease     UTI        PAST SURGICAL HISTORY    Past Surgical History:   Procedure Laterality Date    CARDIAC SURGERY      CABG X 4 VESSELS    CHOLECYSTECTOMY, LAPAROSCOPIC  5/15/14    with IOC    CT BONE MARROW BIOPSY  2022    CT BONE MARROW BIOPSY 2022 [facility] CT SCAN    JOINT REPLACEMENT Bilateral  AND  And     knee    KNEE SURGERY Left 2017    left knee poly exchange revision     REVISION TOTAL KNEE ARTHROPLASTY Right 2016    RIGHT TOTAL KNEE REVISION WITH POLY EXCHANGE    TOTAL KNEE ARTHROPLASTY      partical/ left     UPPER GASTROINTESTINAL ENDOSCOPY N/A 10/3/2022    EGD BIOPSY performed by Xiao Wade MD at [place] [address] N/A 10/3/2022    EGD DILATION BALLOON performed by Xiao Wade MD at [address]    Family History   Problem Relation Age of Onset    Arthritis Other     Diabetes Other     Heart Disease Other     Hypertension Other     High Cholesterol Brother        SOCIAL HISTORY    Social History     Tobacco Use    Smoking status: Never    Smokeless tobacco: Never   Vaping Use    Vaping Use: Never used   Substance Use Topics    Alcohol use: No    Drug use: No       ALLERGIES    Allergies   Allergen Reactions    Aspirin Other (See Comments)     GI upset    Ibuprofen Other (See Comments)     GI upset    Macrobid [Nitrofurantoin Monohydrate Macrocrystals] Other (See Comments)     COLD, SICK    Adhesive Tape Rash    Lexiscan [Regadenoson] Rash     Happened twice with Lexiscan    Penicillins Rash       MEDICATIONS    No current facility-administered medications on file prior to encounter. Current Outpatient Medications on File Prior to Encounter   Medication Sig Dispense Refill    Ipratropium-Albuterol (DUONEB IN) Inhale 3 mLs into the lungs every 4 hours as needed (SOB)      famotidine (PEPCID) 20 MG tablet Take 20 mg by mouth daily      HYDROcodone-acetaminophen (NORCO) 5-325 MG per tablet Take 1 tablet by mouth every 6 hours as needed for Pain.       lactobacillus (CULTURELLE) capsule Take 2 capsules by mouth daily      metroNIDAZOLE (FLAGYL) 500 MG tablet Take 500 mg by mouth 3 times daily      omeprazole (PRILOSEC) 20 MG delayed release capsule Take 20 mg by mouth in the morning and at bedtime      polyethylene glycol (GLYCOLAX) 17 g packet Take 17 g by mouth daily as needed for Constipation      silver sulfADIAZINE (SILVADENE) 1 % cream Apply topically 2 times daily Apply to inner buttocks every shift for MASD digoxin (LANOXIN) 125 MCG tablet Take 1 tablet by mouth daily 30 tablet 3    guaiFENesin (MUCINEX) 600 MG extended release tablet Take 1 tablet by mouth 2 times daily as needed for Congestion 60 tablet 0    insulin lispro (HUMALOG) 100 UNIT/ML SOLN injection vial Inject 6 Units into the skin 3 times daily (with meals) 2 each 0    sacubitril-valsartan (ENTRESTO) 24-26 MG per tablet Take 0.5 tablets by mouth 2 times daily 60 tablet 1    furosemide (LASIX) 20 MG tablet Take 1 tablet by mouth daily 60 tablet 3    gabapentin (NEURONTIN) 100 MG capsule Take 1 capsule by mouth 3 times daily for 30 days. 90 capsule 0    metoprolol tartrate (LOPRESSOR) 25 MG tablet Take 0.5 tablets by mouth 2 times daily 60 tablet 3    empagliflozin (JARDIANCE) 10 MG tablet Take 1 tablet by mouth daily 30 tablet 3    insulin glargine (BASAGLAR KWIKPEN) 100 UNIT/ML injection pen Inject 48 Units into the skin daily 5 Adjustable Dose Pre-filled Pen Syringe 3    calcium carbonate (OSCAL) 500 MG TABS tablet Take 500 mg by mouth daily      senna (SENOKOT) 8.6 MG tablet Take 1 tablet by mouth 2 times daily      magnesium 30 MG tablet Take 40 mg by mouth daily      tiZANidine (ZANAFLEX) 2 MG tablet Take 2 mg by mouth every 8 hours as needed      atorvastatin (LIPITOR) 80 MG tablet Take 1 tablet by mouth daily 90 tablet 4    Insulin Pen Needle 32G X 6 MM MISC by Does not apply route 3 times daily with meals      vitamin D (ERGOCALCIFEROL) 47255 UNITS CAPS capsule Take 50,000 Units by mouth once a week      Omega 3 1000 MG CAPS Take 1,000 mg by mouth daily      Liraglutide (VICTOZA) 18 MG/3ML SOPN SC injection Inject 1.2 mg into the skin daily      lidocaine (LIDODERM) 5 % Place 1 patch onto the skin daily 12 hours on, 12 hours off. 30 patch 0    oxybutynin (DITROPAN XL) 15 MG CR tablet Take 15 mg by mouth daily.       levothyroxine (SYNTHROID) 175 MCG tablet Take 175 mcg by mouth daily         Objective    BP (!) 102/55   Pulse 68   Temp 97.1 °F (36.2 °C) (Temporal)   Resp 16   Ht 5' (1.524 m)   Wt 238 lb 15.7 oz (108.4 kg)   LMP  (LMP Unknown)   SpO2 93%   BMI 46.67 kg/m²     LABS:  WBC:    Lab Results   Component Value Date/Time    WBC 8.0 11/09/2022 05:00 AM     H/H:    Lab Results   Component Value Date/Time    HGB 7.1 11/09/2022 05:00 AM    HCT 21.9 11/09/2022 05:00 AM     PTT:    Lab Results   Component Value Date/Time    APTT 39.7 02/13/2017 12:07 PM   [APTT}  PT/INR:    Lab Results   Component Value Date/Time    PROTIME 16.4 11/03/2022 06:21 PM    INR 1.33 11/03/2022 06:21 PM     HgBA1c:    Lab Results   Component Value Date/Time    LABA1C 8.0 11/07/2022 12:50 PM       Assessment   Alphonso Risk Score: Alphonso Scale Score: 16    Patient Active Problem List   Diagnosis Code    DM (diabetes mellitus) ([facility] E11.9    Coronary artery disease involving native coronary artery of native heart without angina pectoris I25.10    PAF (paroxysmal atrial fibrillation) ([facility]) I48.0    Peripheral vascular disease ([facility]) I73.9    CARLOS (obstructive sleep apnea) G47.33    Dyspnea R06.00    ARUN (acute kidney injury) ([facility] N17.9    Hyperlipidemia associated with type 2 diabetes mellitus ([facility]) E11.69, E78.5    Hypertension associated with diabetes ([facility] E11.59, I15.2    Dizziness R42    Headache R51.9    Debility R53.81    11/22/16 Right knee polyethylene exchange M25.561, M25.562    Acute pain of right shoulder M25.511    Mixed hyperlipidemia E78.2    Acute chest pain R07.9    Morbid obesity with BMI of 40.0-44.9, adult ([facility]) E66.01, Z68.41    Obesity, Class III, BMI 40-49.9 (morbid obesity) ([facility]) E66.01    Type 2 diabetes mellitus ([facility]) E11.9    Carotid artery disease without cerebral infarction ([facility] I77.9    2/28/17 Revision with polyethylene exchange left total knee arthroplasty M23.52    4/11/17 Left knee repeat repair of median parapatellar arthrotomy with augmentation S76.111A    Rotator cuff tendonitis, right M75.81    Cervical radicular pain M54.12 Complicated UTI (urinary tract infection) U49.6    Acute diastolic heart failure ([facility]) I50.31    Dyspnea on exertion R06.09    Severe thrombocytopenia ([facility]) D69.6    Severe anemia D64.9    Wrist arthritis M19.039    Chronic atrial fibrillation ([facility]) I48.20    Primary osteoarthritis of first carpometacarpal joint of left hand M18.12    Shoulder pain M25.519    Hypervolemia E87.70    Myelodysplasia (myelodysplastic syndrome) ([facility]) D46.9    Chronic systolic heart failure ([facility]) I50.22    Arterial hypotension I95.9    History of coronary artery disease Z86.79    Pulmonary vascular congestion R09.89    Acute hypoxemic respiratory failure ([facility]) J96.01    Aspiration pneumonia of left lower lobe due to gastric secretions ([facility]) J69.0    Anemia D64.9    HFrEF (heart failure with reduced ejection fraction) ([facility]) I50.20    Acute respiratory failure with hypoxemia ([facility]) J96.01    Non-English speaking patient Z78.9    Hypothyroidism E03.9    History of DVT in adulthood Z86.718    Nephrolithiasis N20.0    Atypical pneumonia J18.9    Acute on chronic congestive heart failure ([facility]) I50.9       Measurements:  Wound 11/04/22 Sacrum (Active)   Wound Image   11/09/22 1409   Wound Etiology Other 11/09/22 1409   Dressing Status New dressing applied 11/09/22 1409   Wound Cleansed Soap and water 11/09/22 0810   Dressing/Treatment Triad hydro/zinc oxide-based hydrophilic paste 97/12/63 1779   Dressing Change Due 11/08/22 11/07/22 1631   Wound Length (cm) 2 cm 11/09/22 1409   Wound Width (cm) 3 cm 11/09/22 1409   Wound Depth (cm) 0.1 cm 11/09/22 1409   Wound Surface Area (cm^2) 6 cm^2 11/09/22 1409   Wound Volume (cm^3) 0.6 cm^3 11/09/22 1409   Wound Assessment Pink/red;Denuded;Devitalized tissue 11/09/22 1409   Drainage Amount None 11/09/22 1409   Odor None 11/04/22 1609   Bety-wound Assessment Fragile 11/09/22 1409   Margins Defined edges 11/09/22 1409   Number of days: 5       Wound 11/04/22 Buttocks Right Incontinence associated skin Other    Patient/Caregiver Teaching:  Level of patient/caregiver understanding able to:   [x] Indicates understanding       [] Needs reinforcement  [] Unsuccessful      [x] Verbal Understanding  [] Demonstrated understanding       [] No evidence of learning  [] Refused teaching         [] N/A       Electronically signed by  [address], BSN, RN, [address] [place] [address]  [phone] on 11/9/2022 at 3:20 PM

## 2022-11-09 NOTE — CONSULTS
Palliative Care:        Shortness of Breath (Pt presents to the ED from [facility] with reports of SOB, per EMS Pt sating at 88% on RA. Pt is usually on RA per baseline. Pt satting at 92% RA. Pt recently discharged for Picc Line placement due to reoccurring blood transfusion due to MDS. Per daughter at bedside pt is acting normally. Does not appear to be lethargic or confused at this time. VSS.  )    H&P: 78 y.o. female with PMHx of nondysplastic syndrome, hypertension, hyperlipidemia, type 2 diabetes, CAD, atrial fibrillation who presented to [facility] with complaints of shortness of breath. Patient was at her nursing home when it was noted she was having shortness of breath last night and into the early morning of the day. The patient is relatively sleepy on exam, but 2 of her daughters were present at bedside and able to update history. They state the patient was gasping for air. She is not on any oxygen at home, but she is currently satting well on 2 L O2 via NC in the ED. The daughter states that they had heard some crackling sounds with congestion while she was breathing. Patient denied any chest pain, abdominal pain, nausea, vomiting, GI/ symptoms. Patient has not had much of an appetite over the last 72 hours. Additionally, she has had low urine output, per the daughters. Patient has a left PICC line for blood transfusions, and she did receive 1 approximately 3 days ago. However, the daughter states that the patient is having some left chest/upper extremity swelling since the PICC line has been placed.   Admit: 11/6/2022  Consult: 11/08/22      Past Medical History:   has a past medical history of 4/11/17 Left knee repeat repair of median parapatellar arthrotomy with augmentation, Arthritis, Aspiration pneumonia of left lower lobe due to gastric secretions ([address] Atrial fibrillation ([address] CAD (coronary artery disease), Cataract, Chronic diastolic congestive heart failure ([address] Diabetes mellitus ([address] GERD (gastroesophageal reflux disease), Hyperlipidemia, Hypertension, HYPOTHRYROIDISM, Myelodysplastic disease ([address] Non-English speaking patient, Sleep apnea, Thyroid disease, and UTI. Past Surgical History:   has a past surgical history that includes joint replacement (Bilateral, [address]); Cardiac surgery (2004); Cholecystectomy, laparoscopic (5/15/14); Revision total knee arthroplasty (Right, 11/22/2016); Total knee arthroplasty; knee surgery (Left, 02/28/2017); CT BIOPSY BONE MARROW (9/29/2022); Upper gastrointestinal endoscopy (N/A, 10/3/2022); and Upper gastrointestinal endoscopy (N/A, 10/3/2022). Advance Directives:        Advance Care Planning   The patient has the following advanced directives on file:  Advance Directives       Power of [facility] Will ACP-Advance Directive ACP-Power of     Not on File Not on File Not on File Not on File       The Patient has the following current code status:    Code Status: [facility]    Extended Emergency Contact Information  Primary Emergency Contact: [address] [facility], [address] Phone: [phone]  Relation: Child  Secondary Emergency Contact: Rafaela [address] Phone: [phone]  Mobile Phone: [phone]  Relation: Child    Problem Severity: Pain/Other Symptoms:        Weight 238# (was 252 on 10/17)  Body mass index is 46.67 kg/m².   NC 1L    Bed Mobility/Toileting/Transfer:        No PT/OT notes - pt remains too weak and tired    Performance Status:        Palliative Performance Scale:  100% []Full Normal activity & work No evidence of disease  90%   [] Full Normal activity & work Some evidence of disease  80%   [] Full Normal activity with Effort Some evidence of disease  70%   [] Reduced Unable Normal Job/Work Significant disease Full Normal or reduced  60%   [] Ambulation reduced; Significant disease; Can't do hobbies/housework; intake normal   or reduced; occasional assist; LOC full/confusion  50%   [] Mainly sit/lie; Extensive disease; Can't do any work; Considerable assist; intake normal  Or reduced; LOC full/confusion  40%   [] Mainly in bed; Extensive disease; Mainly assist; intake normal or reduced; occasional assist; LOC full/confusion  30%   [x] Bed Bound; Extensive disease; Total care; intake reduced; LOC full/confusion  20%   [] Bed Bound; Extensive disease; Total care; intake minimal; Drowsy/coma  10%   [] Bed Bound; Extensive disease; Total care; Mouth care only; Drowsy/coma    PPS 30%    Symptom Assessment: Appetite/Nausea/Bowels/Fatigue:          Intake/Output Summary (Last 24 hours) at 11/9/2022 1438  Last data filed at 11/9/2022 1435  Gross per 24 hour   Intake 960.29 ml   Output 1300 ml   Net -339.71 ml       ADULT ORAL NUTRITION SUPPLEMENT; AM Snack, PM Snack, HS Snack; Diabetic Oral Supplement  ADULT DIET; Dysphagia - Soft and Bite Sized; 3 carb choices (45 gm/meal); Low Fat/Low Chol/High Fiber/2 gm Na    Social History:   reports that she has never smoked. She has never used smokeless tobacco. She reports that she does not drink alcohol and does not use drugs. Family History:  family history includes Arthritis in an other family member; Diabetes in an other family member; Heart Disease in an other family member; High Cholesterol in her brother; Hypertension in an other family member. Psychological/Spiritual:             Family Discussion:        All MD/RN notes and personal interaction indicate that pt is currently capable of independent decision making. Pt reports being weak and pain in head/neck. Family with many questions and concerns. Reminded them of conversation in October re: pt's likelihood of frequent hospitalizations d/t breathing and swallowing concerns. Pt, family want to receive treatment for this episode. Not interested in further conversation about deterioration at this time.     Will follow up and support patient/family as time allows or circumstances dictate. Please reach out via [id number], [employer], or email [email or web address] if pt condition changes significantly or if family requests support. Thank you.     Electronically signed by Luis E Rodriguez RN, BSN on 11/9/2022 at 2:56 PM

## 2022-11-09 NOTE — PROGRESS NOTES
Family concerned with head and neck pain that pt is having. Md messaged about concerns. Md ordered lidocaine patch. Family states they are still concerned. Md updated that they remain concerned. Md aware.  No new orders

## 2022-11-09 NOTE — PROGRESS NOTES
HOSPITALISTS PROGRESS NOTE    11/9/2022 9:26 AM        Name: Gregory Orr . Admitted: 11/6/2022  Primary Care Provider: Miladys Palencia (Tel: [phone])      Brief Course: This 77 yo Female with PMHx hypertension dyslipidemia, DM complicated with neuropathy, CAD s/p CABG 2004, chronic HFrEF 6/2022, paroxysmal Afib, hypothyroidism, PVD, CARLOS, DDD, OA, MDS requiring transfusion for anemia and thrombocytopenia, recurrent UTI's, urinary retention, GERD, dysphagia (food stuck), H pylori on treatment, hemorrhoids presented from [address] with shortness of breath. Of note, patient was recently admitted (11/3-11/5/22 ) for left upper extremity swelling and pain after left PICC line placement. She was treated for pain and transfused 2 units pRBC for anemia related to MDS. Patient was discharged on Saturday 11/5 afternoon. Early Sunday morning 11/6, she was experiencing episodes of left abdominal spasms/tightness, followed by sensation of shortness of breath. She was found by the staff at [address] to desaturate to 85-88% on RA       Interval history:   Pt seen and examined today. Overnight events noted, interval ancillary notes and labs reviewed. On 1 L nasal cannula; satting around 97%; wean as tolerated sats above 92%  Afebrile overnight, WBC WNL, blood culture negative to date. Urine culture grew Proteus mirabilis  Creatinine trended down  Patient more awake and alert this morning; reported left arm pain and headache but denied cough, SOB, chest pain, nausea, vomiting or abdominal pain      Assessment & Plan:     Acute hypoxic respiratory failure; likely secondary to CHF exacerbation/atypical pneumonia? Elevated proBNP on admission. CXR with pulmonary vascular congestion  Echo 10/17/2022 showed LVEF 45-50%, abnormal septal motion present.   No vegetation noted  Respiratory molecular panel ordered  Cardiology consulted: Holding Lasix due to ARUN    Elevated troponin: Troponin 0.13 on admission; likely secondary to ARUN/CHF  Trend troponin and monitor telemetry    Complicated UTI: UA suggest UTI, urine culture grew 25,000 Proteus mirabilis  Infectious disease on board; meropenem discontinued and started on ceftriaxone    Urinary Retention: Status post Kuhn's placement  CT A/P 11/7 showed punctate bilateral intrarenal calculi. No ureteral stone and no hydronephrosis. On Kuhn's catheter placed on 11/7/22  with return of concentrated and dirty urine. Home oxybutynin discontinued in setting of recurrent issues with urinary retention. ARUN: Creatinine 1.8 on admission; trended down   Nephrology consulted; Entresto held  Avoid nephrotoxin, strict intake output, daily weights. Monitor renal function closely    Hx of A. fib: On metoprolol and digoxin. Not on any anticoagulation due to low platelets. Monitor on telemetry    History of Hypertension: Continue metoprolol and digoxin  Holding Entresto and diuretics in setting of ARUN    Intermittent Confusion likely secondary to hypoxia/UTI: Improving  CT Head wo contrast 11/7 showed no acute intracranial hemorrhage, mass effect, midline shift, or infarct     Abdominal Spasm/Tightness  CT A/P with oral contrast, without IV contrast 11/7 showed no acute findings. Moderate amount of stool and air in the colon proximally, which could reflect constipation.  - Start miralax BID, colace BID. Left Brachial Vein DVT: - in the setting of PICC placement. PICC line removed  Venous US of Bilateral UE 11/7 showed acute totally occluded SVT involving left proximal brachial vein. unable to anticoagulate due to low platelet counts. Hematology consulted: Appreciate their recs     MDS with anemia and thrombocytopenia  Diagnosed on September 2022; not receiving treatment.     Transfuse for hemoglobin less than 7 or platelet less than 10 or less than 50 with bleeding    Hx of PVD; continue home lipitor 80 mg HS. Dyslipidemia: Continue statins     DM complicated with neuropathy   Hold oral antidiabetic medication.   Continue Lantus and SSI and monitor blood glucose closely     Hypothyroidism: Continue     Hx of CARLOS     DDD, OA     DVT PPX: Subcu heparin  Code:[id number]    Disposition: Once acute medical issues have resolved    Current Medications  insulin lispro (HUMALOG) injection vial 0-4 Units, TID WC  insulin lispro (HUMALOG) injection vial 8 Units, TID WC  insulin glargine (LANTUS) injection vial 40 Units, Daily  lidocaine PF 1 % injection 5 mL, Once  sodium chloride flush 0.9 % injection 5-40 mL, 2 times per day  sodium chloride flush 0.9 % injection 5-40 mL, PRN  0.9 % sodium chloride infusion, PRN  albuterol sulfate HFA (PROVENTIL;VENTOLIN;PROAIR) 108 (90 Base) MCG/ACT inhaler 2 puff, Q4H WA  ipratropium (ATROVENT HFA) 17 MCG/ACT inhaler 2 puff, 4x daily  dextrose bolus 10% 125 mL, PRN   Or  dextrose bolus 10% 250 mL, PRN  glucagon (rDNA) injection 1 mg, PRN  dextrose 10 % infusion, Continuous PRN  atorvastatin (LIPITOR) tablet 80 mg, Nightly  calcium elemental (OSCAL) tablet 500 mg, Daily  digoxin (LANOXIN) tablet 125 mcg, Daily  famotidine (PEPCID) tablet 20 mg, Daily  gabapentin (NEURONTIN) capsule 100 mg, TID  guaiFENesin (MUCINEX) extended release tablet 600 mg, BID PRN  HYDROcodone-acetaminophen (NORCO) 5-325 MG per tablet 1 tablet, Q6H PRN  levothyroxine (SYNTHROID) tablet 175 mcg, QAM AC  metoprolol tartrate (LOPRESSOR) tablet 12.5 mg, BID  pantoprazole (PROTONIX) tablet 40 mg, QAM AC  tiZANidine (ZANAFLEX) tablet 2 mg, Q8H PRN  sodium chloride flush 0.9 % injection 5-40 mL, 2 times per day  sodium chloride flush 0.9 % injection 5-40 mL, PRN  ondansetron (ZOFRAN-ODT) disintegrating tablet 4 mg, Q8H PRN   Or  ondansetron (ZOFRAN) injection 4 mg, Q6H PRN  polyethylene glycol (GLYCOLAX) packet 17 g, Daily PRN  acetaminophen (TYLENOL) tablet 650 mg, Q6H PRN   Or  acetaminophen (TYLENOL) suppository 650 mg, Q6H PRN  sodium phosphate 10 mmol in sodium chloride 0.9 % 250 mL IVPB, PRN   Or  sodium phosphate 15 mmol in sodium chloride 0.9 % 250 mL IVPB, PRN   Or  sodium phosphate 20 mmol in sodium chloride 0.9 % 500 mL IVPB, PRN  magnesium sulfate 2000 mg in 50 mL IVPB premix, PRN  potassium chloride (KLOR-CON M) extended release tablet 40 mEq, PRN   Or  potassium bicarb-citric acid (EFFER-K) effervescent tablet 40 mEq, PRN   Or  potassium chloride 10 mEq/100 mL IVPB (Peripheral Line), PRN  morphine (PF) injection 2 mg, Q4H PRN  heparin (porcine) injection 5,000 Units, Q12H  methocarbamol (ROBAXIN) tablet 500 mg, q8h  meropenem (MERREM) 500 mg IVPB (mini-bag), Q12H  0.9 % sodium chloride infusion, PRN  diatrizoate meglumine-sodium (GASTROGRAFIN) 66-10 % solution 20 mL, ONCE PRN  polyethylene glycol (GLYCOLAX) packet 17 g, BID  docusate sodium (COLACE) capsule 100 mg, BID      Objective:  /69   Pulse 89   Temp 97.1 °F (36.2 °C) (Temporal)   Resp 18   Ht 5' (1.524 m)   Wt 238 lb 15.7 oz (108.4 kg)   LMP  (LMP Unknown)   SpO2 97%   BMI 46.67 kg/m²     Intake/Output Summary (Last 24 hours) at 11/9/2022 0926  Last data filed at 11/9/2022 1172  Gross per 24 hour   Intake 480.29 ml   Output 1325 ml   Net -844.71 ml        Wt Readings from Last 3 Encounters:   11/09/22 238 lb 15.7 oz (108.4 kg)   11/05/22 227 lb 4.7 oz (103.1 kg)   10/26/22 244 lb 12.8 oz (111 kg)       Physical Examination:   General appearance:  No apparent distress, appears stated age and cooperative. HEENT: Normocephalic, sclera clear., PERRLA. Trachea midline, no adenopathy. Cardiovascular: Regular rate and rhythm, normal S1, S2. No murmur. Respiratory:Clear to auscultation bilaterally, no wheeze or crackles. GI: Abdomen soft, no tenderness, not distended, normal bowel sounds  Musculoskeletal: No cyanosis in digits. No BLE edema present  Neurology: CN 2-12 grossly intact.  No speech or motor deficits  Psych: Not agitated, appropriate affect  Skin: Warm, dry, normal turgor    Labs and Tests:  CBC:   Recent Labs     11/07/22  1250 11/08/22  0451 11/09/22  0500   WBC 7.5 7.0 8.0   HGB 6.7* 8.1* 7.1*   PLT 28* 28* 25*       BMP:    Recent Labs     11/08/22  0450 11/08/22  0451 11/09/22  0500    134* 133*   K 4.7 4.6 4.6   CL 97* 98* 98*   CO2 25 28 29   BUN 45* 44* 37*   CREATININE 1.7* 1.7* 1.0   GLUCOSE 174* 180* 286*       Hepatic:   Recent Labs     11/07/22  0056 11/08/22  0450 11/09/22  0500   AST 14* 14* 13*   ALT 16 15 13   BILITOT 0.5 0.4 0.5   ALKPHOS 62 66 63       CT ABDOMEN PELVIS WO CONTRAST Additional Contrast? Oral   Final Result   1. No acute findings in the abdomen or pelvis. 2. Moderate amount of stool and air in the colon proximally which could   reflect constipation. 3. Punctate bilateral intrarenal calculi. VL Extremity Venous Bilateral         XR CHEST PORTABLE   Final Result   Left PICC line appears to be over the SVC and not significantly changed from   the prior exam.      Cardiomegaly with pulmonary vascular congestion and interstitial   prominence/interstitial opacities suggesting edema. Stable to the slightly   worsened from the recent study. CT HEAD WO CONTRAST   Final Result   No acute intracranial hemorrhage, mass effect, midline shift, or signs of   acute territorial infarct. Generalized volume loss and chronic ischemic changes in the white matter are   stable. Partial opacification in the left mastoid air cells and edge of the middle   ear cavity are new from the prior exam.  Correlate for infectious symptoms   versus eustachian tube dysfunction.          VL Extremity Venous Left    (Results Pending)       Problem List  Principal Problem:    Acute hypoxemic respiratory failure (HCC)  Active Problems:    Carotid artery disease without cerebral infarction ([address]    Complicated UTI (urinary tract infection)    Chronic atrial fibrillation ([address]    Myelodysplasia (myelodysplastic syndrome) (HCC)    HFrEF (heart failure with reduced ejection fraction) ([facility]    Non-English speaking patient    Hypothyroidism    History of DVT in adulthood    Nephrolithiasis    Atypical pneumonia    Acute on chronic congestive heart failure (HCC)    Peripheral vascular disease (HCC)    Dyspnea    ARUN (acute kidney injury) ([facility]    Hyperlipidemia associated with type 2 diabetes mellitus ([facility]    Hypertension associated with diabetes ([facility]    Morbid obesity with BMI of 40.0-44.9, adult ([facility])    Obesity, Class III, BMI 40-49.9 (morbid obesity) ([facility]    Type 2 diabetes mellitus ([facility]  Resolved Problems:    * No resolved hospital problems.  Marah Kemp MD   11/9/2022 9:26 AM

## 2022-11-10 ENCOUNTER — APPOINTMENT (OUTPATIENT)
Dept: CT IMAGING | Age: 82
DRG: 314 | End: 2022-11-10
Payer: MEDICARE

## 2022-11-10 LAB
A/G RATIO: 0.6 (ref 1.1–2.2)
ABO/RH: NORMAL
ALBUMIN SERPL-MCNC: 2.4 G/DL (ref 3.4–5)
ALP BLD-CCNC: 73 U/L (ref 40–129)
ALT SERPL-CCNC: 12 U/L (ref 10–40)
ANION GAP SERPL CALCULATED.3IONS-SCNC: 8 MMOL/L (ref 3–16)
ANISOCYTOSIS: ABNORMAL
ANTIBODY SCREEN: NORMAL
AST SERPL-CCNC: 15 U/L (ref 15–37)
ATYPICAL LYMPHOCYTE RELATIVE PERCENT: 1 % (ref 0–6)
BANDED NEUTROPHILS RELATIVE PERCENT: 4 % (ref 0–7)
BASOPHILS ABSOLUTE: 0 K/UL (ref 0–0.2)
BASOPHILS RELATIVE PERCENT: 0 %
BILIRUB SERPL-MCNC: 0.4 MG/DL (ref 0–1)
BLOOD BANK DISPENSE STATUS: NORMAL
BLOOD BANK PRODUCT CODE: NORMAL
BPU ID: NORMAL
BUN BLDV-MCNC: 26 MG/DL (ref 7–20)
CALCIUM SERPL-MCNC: 8.2 MG/DL (ref 8.3–10.6)
CHLORIDE BLD-SCNC: 96 MMOL/L (ref 99–110)
CO2: 28 MMOL/L (ref 21–32)
CREAT SERPL-MCNC: 0.8 MG/DL (ref 0.6–1.2)
DESCRIPTION BLOOD BANK: NORMAL
EOSINOPHILS ABSOLUTE: 0 K/UL (ref 0–0.6)
EOSINOPHILS RELATIVE PERCENT: 0 %
GFR SERPL CREATININE-BSD FRML MDRD: >60 ML/MIN/{1.73_M2}
GLUCOSE BLD-MCNC: 242 MG/DL (ref 70–99)
GLUCOSE BLD-MCNC: 256 MG/DL (ref 70–99)
GLUCOSE BLD-MCNC: 337 MG/DL (ref 70–99)
GLUCOSE BLD-MCNC: 365 MG/DL (ref 70–99)
GLUCOSE BLD-MCNC: 366 MG/DL (ref 70–99)
HCT VFR BLD CALC: 20.5 % (ref 36–48)
HEMATOLOGY PATH CONSULT: YES
HEMOGLOBIN: 6.9 G/DL (ref 12–16)
LYMPHOCYTES ABSOLUTE: 1.7 K/UL (ref 1–5.1)
LYMPHOCYTES RELATIVE PERCENT: 15 %
MCH RBC QN AUTO: 30.9 PG (ref 26–34)
MCHC RBC AUTO-ENTMCNC: 33.6 G/DL (ref 31–36)
MCV RBC AUTO: 91.9 FL (ref 80–100)
METAMYELOCYTES RELATIVE PERCENT: 9 %
MONOCYTES ABSOLUTE: 1.9 K/UL (ref 0–1.3)
MONOCYTES RELATIVE PERCENT: 18 %
MONONUCLEAR UNIDENTIFIED CELLS: 1 %
MYELOCYTE PERCENT: 9 %
NEUTROPHILS ABSOLUTE: 7 K/UL (ref 1.7–7.7)
NEUTROPHILS RELATIVE PERCENT: 43 %
PDW BLD-RTO: 18 % (ref 12.4–15.4)
PERFORMED ON: ABNORMAL
PLATELET # BLD: 32 K/UL (ref 135–450)
PLATELET SLIDE REVIEW: ABNORMAL
PMV BLD AUTO: 9.8 FL (ref 5–10.5)
POLYCHROMASIA: ABNORMAL
POTASSIUM REFLEX MAGNESIUM: 4.8 MMOL/L (ref 3.5–5.1)
PROCALCITONIN: 0.43 NG/ML (ref 0–0.15)
RBC # BLD: 2.23 M/UL (ref 4–5.2)
SLIDE REVIEW: ABNORMAL
SODIUM BLD-SCNC: 132 MMOL/L (ref 136–145)
TOTAL PROTEIN: 6.1 G/DL (ref 6.4–8.2)
WBC # BLD: 10.8 K/UL (ref 4–11)

## 2022-11-10 PROCEDURE — 94761 N-INVAS EAR/PLS OXIMETRY MLT: CPT

## 2022-11-10 PROCEDURE — 51798 US URINE CAPACITY MEASURE: CPT

## 2022-11-10 PROCEDURE — 86900 BLOOD TYPING SEROLOGIC ABO: CPT

## 2022-11-10 PROCEDURE — 85025 COMPLETE CBC W/AUTO DIFF WBC: CPT

## 2022-11-10 PROCEDURE — 36415 COLL VENOUS BLD VENIPUNCTURE: CPT

## 2022-11-10 PROCEDURE — 94640 AIRWAY INHALATION TREATMENT: CPT

## 2022-11-10 PROCEDURE — 97530 THERAPEUTIC ACTIVITIES: CPT

## 2022-11-10 PROCEDURE — 84145 PROCALCITONIN (PCT): CPT

## 2022-11-10 PROCEDURE — 99232 SBSQ HOSP IP/OBS MODERATE 35: CPT | Performed by: INTERNAL MEDICINE

## 2022-11-10 PROCEDURE — 6370000000 HC RX 637 (ALT 250 FOR IP): Performed by: STUDENT IN AN ORGANIZED HEALTH CARE EDUCATION/TRAINING PROGRAM

## 2022-11-10 PROCEDURE — 86850 RBC ANTIBODY SCREEN: CPT

## 2022-11-10 PROCEDURE — 6370000000 HC RX 637 (ALT 250 FOR IP): Performed by: INTERNAL MEDICINE

## 2022-11-10 PROCEDURE — 2580000003 HC RX 258: Performed by: INTERNAL MEDICINE

## 2022-11-10 PROCEDURE — 80053 COMPREHEN METABOLIC PANEL: CPT

## 2022-11-10 PROCEDURE — 36430 TRANSFUSION BLD/BLD COMPNT: CPT

## 2022-11-10 PROCEDURE — 86901 BLOOD TYPING SEROLOGIC RH(D): CPT

## 2022-11-10 PROCEDURE — P9016 RBC LEUKOCYTES REDUCED: HCPCS

## 2022-11-10 PROCEDURE — 97535 SELF CARE MNGMENT TRAINING: CPT

## 2022-11-10 PROCEDURE — 2700000000 HC OXYGEN THERAPY PER DAY

## 2022-11-10 PROCEDURE — 6360000002 HC RX W HCPCS: Performed by: INTERNAL MEDICINE

## 2022-11-10 PROCEDURE — 70450 CT HEAD/BRAIN W/O DYE: CPT

## 2022-11-10 PROCEDURE — 2580000003 HC RX 258: Performed by: STUDENT IN AN ORGANIZED HEALTH CARE EDUCATION/TRAINING PROGRAM

## 2022-11-10 PROCEDURE — 6360000002 HC RX W HCPCS: Performed by: STUDENT IN AN ORGANIZED HEALTH CARE EDUCATION/TRAINING PROGRAM

## 2022-11-10 PROCEDURE — 1200000000 HC SEMI PRIVATE

## 2022-11-10 PROCEDURE — 99233 SBSQ HOSP IP/OBS HIGH 50: CPT | Performed by: INTERNAL MEDICINE

## 2022-11-10 PROCEDURE — 86923 COMPATIBILITY TEST ELECTRIC: CPT

## 2022-11-10 PROCEDURE — 6360000002 HC RX W HCPCS: Performed by: NURSE PRACTITIONER

## 2022-11-10 PROCEDURE — 72125 CT NECK SPINE W/O DYE: CPT

## 2022-11-10 RX ORDER — SODIUM CHLORIDE 9 MG/ML
INJECTION, SOLUTION INTRAVENOUS PRN
Status: DISCONTINUED | OUTPATIENT
Start: 2022-11-10 | End: 2022-11-21 | Stop reason: HOSPADM

## 2022-11-10 RX ORDER — BUTALBITAL, ACETAMINOPHEN AND CAFFEINE 50; 325; 40 MG/1; MG/1; MG/1
1 TABLET ORAL
Status: ACTIVE | OUTPATIENT
Start: 2022-11-10 | End: 2022-11-12

## 2022-11-10 RX ORDER — DIAZEPAM 5 MG/ML
2.5 INJECTION, SOLUTION INTRAMUSCULAR; INTRAVENOUS
Status: COMPLETED | OUTPATIENT
Start: 2022-11-10 | End: 2022-11-10

## 2022-11-10 RX ORDER — IPRATROPIUM BROMIDE AND ALBUTEROL SULFATE 2.5; .5 MG/3ML; MG/3ML
1 SOLUTION RESPIRATORY (INHALATION) EVERY 4 HOURS PRN
Status: DISCONTINUED | OUTPATIENT
Start: 2022-11-10 | End: 2022-11-21 | Stop reason: HOSPADM

## 2022-11-10 RX ORDER — SODIUM CHLORIDE 9 MG/ML
INJECTION, SOLUTION INTRAVENOUS PRN
Status: DISCONTINUED | OUTPATIENT
Start: 2022-11-10 | End: 2022-11-13

## 2022-11-10 RX ORDER — INSULIN LISPRO 100 [IU]/ML
0-8 INJECTION, SOLUTION INTRAVENOUS; SUBCUTANEOUS
Status: DISCONTINUED | OUTPATIENT
Start: 2022-11-10 | End: 2022-11-21 | Stop reason: HOSPADM

## 2022-11-10 RX ORDER — INSULIN LISPRO 100 [IU]/ML
0-4 INJECTION, SOLUTION INTRAVENOUS; SUBCUTANEOUS NIGHTLY
Status: DISCONTINUED | OUTPATIENT
Start: 2022-11-10 | End: 2022-11-21 | Stop reason: HOSPADM

## 2022-11-10 RX ADMIN — Medication 10 ML: at 09:20

## 2022-11-10 RX ADMIN — GABAPENTIN 100 MG: 100 CAPSULE ORAL at 20:10

## 2022-11-10 RX ADMIN — ATORVASTATIN CALCIUM 80 MG: 80 TABLET, FILM COATED ORAL at 20:10

## 2022-11-10 RX ADMIN — INSULIN GLARGINE 44 UNITS: 100 INJECTION, SOLUTION SUBCUTANEOUS at 09:29

## 2022-11-10 RX ADMIN — DOCUSATE SODIUM 100 MG: 100 CAPSULE, LIQUID FILLED ORAL at 09:16

## 2022-11-10 RX ADMIN — MORPHINE SULFATE 2 MG: 2 INJECTION, SOLUTION INTRAMUSCULAR; INTRAVENOUS at 17:25

## 2022-11-10 RX ADMIN — GABAPENTIN 100 MG: 100 CAPSULE ORAL at 15:40

## 2022-11-10 RX ADMIN — GABAPENTIN 100 MG: 100 CAPSULE ORAL at 09:16

## 2022-11-10 RX ADMIN — CEFTRIAXONE SODIUM 2000 MG: 2 INJECTION, POWDER, FOR SOLUTION INTRAMUSCULAR; INTRAVENOUS at 16:40

## 2022-11-10 RX ADMIN — Medication 10 ML: at 09:12

## 2022-11-10 RX ADMIN — INSULIN LISPRO 8 UNITS: 100 INJECTION, SOLUTION INTRAVENOUS; SUBCUTANEOUS at 12:30

## 2022-11-10 RX ADMIN — INSULIN LISPRO 6 UNITS: 100 INJECTION, SOLUTION INTRAVENOUS; SUBCUTANEOUS at 17:43

## 2022-11-10 RX ADMIN — FAMOTIDINE 20 MG: 20 TABLET ORAL at 20:10

## 2022-11-10 RX ADMIN — POLYETHYLENE GLYCOL 3350 17 G: 17 POWDER, FOR SOLUTION ORAL at 20:11

## 2022-11-10 RX ADMIN — METHOCARBAMOL TABLETS 500 MG: 500 TABLET, COATED ORAL at 15:40

## 2022-11-10 RX ADMIN — DOCUSATE SODIUM 100 MG: 100 CAPSULE, LIQUID FILLED ORAL at 20:14

## 2022-11-10 RX ADMIN — IPRATROPIUM BROMIDE AND ALBUTEROL SULFATE 1 AMPULE: 2.5; .5 SOLUTION RESPIRATORY (INHALATION) at 08:26

## 2022-11-10 RX ADMIN — LEVOTHYROXINE SODIUM 175 MCG: 0.03 TABLET ORAL at 05:01

## 2022-11-10 RX ADMIN — METHOCARBAMOL TABLETS 500 MG: 500 TABLET, COATED ORAL at 05:01

## 2022-11-10 RX ADMIN — DIGOXIN 125 MCG: 125 TABLET ORAL at 09:16

## 2022-11-10 RX ADMIN — METOPROLOL TARTRATE 12.5 MG: 25 TABLET, FILM COATED ORAL at 09:16

## 2022-11-10 RX ADMIN — IPRATROPIUM BROMIDE AND ALBUTEROL SULFATE 1 AMPULE: 2.5; .5 SOLUTION RESPIRATORY (INHALATION) at 15:11

## 2022-11-10 RX ADMIN — INSULIN LISPRO 1 UNITS: 100 INJECTION, SOLUTION INTRAVENOUS; SUBCUTANEOUS at 09:28

## 2022-11-10 RX ADMIN — METOPROLOL TARTRATE 12.5 MG: 25 TABLET, FILM COATED ORAL at 20:10

## 2022-11-10 RX ADMIN — IPRATROPIUM BROMIDE AND ALBUTEROL SULFATE 1 AMPULE: 2.5; .5 SOLUTION RESPIRATORY (INHALATION) at 11:34

## 2022-11-10 RX ADMIN — MORPHINE SULFATE 2 MG: 2 INJECTION, SOLUTION INTRAMUSCULAR; INTRAVENOUS at 21:26

## 2022-11-10 RX ADMIN — MORPHINE SULFATE 2 MG: 2 INJECTION, SOLUTION INTRAMUSCULAR; INTRAVENOUS at 09:10

## 2022-11-10 RX ADMIN — INSULIN LISPRO 8 UNITS: 100 INJECTION, SOLUTION INTRAVENOUS; SUBCUTANEOUS at 17:43

## 2022-11-10 RX ADMIN — POLYETHYLENE GLYCOL 3350 17 G: 17 POWDER, FOR SOLUTION ORAL at 09:19

## 2022-11-10 RX ADMIN — DIAZEPAM 2.5 MG: 5 INJECTION, SOLUTION INTRAMUSCULAR; INTRAVENOUS at 01:40

## 2022-11-10 RX ADMIN — Medication 500 MG: at 09:16

## 2022-11-10 RX ADMIN — PANTOPRAZOLE SODIUM 40 MG: 40 TABLET, DELAYED RELEASE ORAL at 05:01

## 2022-11-10 ASSESSMENT — ENCOUNTER SYMPTOMS
CONSTIPATION: 0
DIARRHEA: 0
SORE THROAT: 0
EYE REDNESS: 0
BACK PAIN: 0
SINUS PAIN: 0
ABDOMINAL PAIN: 0
NAUSEA: 0
WHEEZING: 0
SHORTNESS OF BREATH: 0
EYE DISCHARGE: 0
RHINORRHEA: 0
SINUS PRESSURE: 0
COUGH: 0

## 2022-11-10 ASSESSMENT — PAIN SCALES - GENERAL
PAINLEVEL_OUTOF10: 10
PAINLEVEL_OUTOF10: 9
PAINLEVEL_OUTOF10: 8
PAINLEVEL_OUTOF10: 10

## 2022-11-10 ASSESSMENT — PAIN DESCRIPTION - ONSET: ONSET: ON-GOING

## 2022-11-10 ASSESSMENT — PAIN DESCRIPTION - LOCATION
LOCATION: NECK
LOCATION: NECK

## 2022-11-10 ASSESSMENT — PAIN DESCRIPTION - FREQUENCY: FREQUENCY: CONTINUOUS

## 2022-11-10 ASSESSMENT — PAIN DESCRIPTION - DESCRIPTORS
DESCRIPTORS: ACHING
DESCRIPTORS: ACHING

## 2022-11-10 ASSESSMENT — PAIN DESCRIPTION - ORIENTATION
ORIENTATION: ANTERIOR
ORIENTATION: PROXIMAL

## 2022-11-10 NOTE — PROGRESS NOTES
[facility]   Progress Note      SUBJECTIVE:      Patient's granddaughter by bedside  She is complaining of neck pain. No external bleeding  No pain or redness in the left upper extremity    OBJECTIVE    Physical  VITALS:  /80   Pulse 90   Temp 97.3 °F (36.3 °C) (Temporal)   Resp 16   Ht 5' (1.524 m)   Wt 238 lb 15.7 oz (108.4 kg)   LMP  (LMP Unknown)   SpO2 95%   BMI 46.67 kg/m²   TEMPERATURE:  Current - Temp: 97.3 °F (36.3 °C); Max - Temp  Av.5 °F (36.4 °C)  Min: 97.1 °F (36.2 °C)  Max: 98.7 °F (37.1 °C)  BLOOD PRESSURE RANGE:  Systolic (88TKM), CEY:573 , Min:98 , PTU:202   ; Diastolic (56YDB), JQF:91, Min:55, Max:80    24HR INTAKE/OUTPUT:    Intake/Output Summary (Last 24 hours) at 2022  Last data filed at 2022 1435  Gross per 24 hour   Intake 960.29 ml   Output 1050 ml   Net -89.71 ml       Conscious alert and oriented  Pallor is present. No icterus  Neck is supple  Respiratory efforts are normal.  Abdomen is not distended  Extremities mild edema noted.   Left upper extremity 1+ edema noted    Data  Labs:  General Labs:  CBC with Differential:    Lab Results   Component Value Date/Time    WBC 8.0 2022 05:00 AM    RBC 2.37 2022 05:00 AM    HGB 7.1 2022 05:00 AM    HCT 21.9 2022 05:00 AM    PLT 25 2022 05:00 AM    MCV 92.5 2022 05:00 AM    MCH 30.1 2022 05:00 AM    MCHC 32.6 2022 05:00 AM    RDW 18.3 2022 05:00 AM    NRBC 1 2022 07:13 AM    SEGSPCT 74.9 07/10/2012 02:05 PM    BANDSPCT 13 2022 05:00 AM    METASPCT 1 2022 04:51 AM    LYMPHOPCT 21.0 2022 05:00 AM    PROMYELOPCT 1 2022 12:50 PM    MONOPCT 15.0 2022 05:00 AM    MYELOPCT 1 2022 12:50 PM    EOSPCT 1.6 2012 06:20 AM    BASOPCT 0.0 2022 05:00 AM    MONOSABS 1.2 2022 05:00 AM    LYMPHSABS 1.7 2022 05:00 AM    EOSABS 0.1 2022 05:00 AM    BASOSABS 0.0 2022 05:00 AM    DIFFTYPE Auto 07/10/2012 02:05 PM     BMP:    Lab Results   Component Value Date/Time     11/09/2022 05:00 AM    K 4.6 11/09/2022 05:00 AM    K 4.1 11/03/2022 06:18 PM    CL 98 11/09/2022 05:00 AM    CO2 29 11/09/2022 05:00 AM    BUN 37 11/09/2022 05:00 AM    LABALBU 2.6 11/09/2022 05:00 AM    CREATININE 1.0 11/09/2022 05:00 AM    CALCIUM 8.3 11/09/2022 05:00 AM    GFRAA 30 10/17/2022 05:46 AM    GFRAA >60 07/10/2012 02:05 PM    LABGLOM 55 11/09/2022 05:00 AM    GLUCOSE 286 11/09/2022 05:00 AM     Hepatic Function Panel:    Lab Results   Component Value Date/Time    ALKPHOS 63 11/09/2022 05:00 AM    ALT 13 11/09/2022 05:00 AM    AST 13 11/09/2022 05:00 AM    PROT 6.3 11/09/2022 05:00 AM    PROT 7.3 07/10/2012 02:05 PM    BILITOT 0.5 11/09/2022 05:00 AM    LABALBU 2.6 11/09/2022 05:00 AM     LDH:    Lab Results   Component Value Date/Time     10/14/2022 04:50 PM     PT/INR:    Lab Results   Component Value Date/Time    PROTIME 16.4 11/03/2022 06:21 PM    INR 1.33 11/03/2022 06:21 PM     PTT:    Lab Results   Component Value Date/Time    APTT 39.7 02/13/2017 12:07 PM   [APTT    Current Medications  Current Facility-Administered Medications: [START ON 11/10/2022] insulin glargine (LANTUS) injection vial 44 Units, 44 Units, SubCUTAneous, Daily  lidocaine 4 % external patch 1 patch, 1 patch, TransDERmal, Daily  cefTRIAXone (ROCEPHIN) 2,000 mg in dextrose 5 % 50 mL IVPB mini-bag, 2,000 mg, IntraVENous, Q24H  [START ON 11/10/2022] ipratropium-albuterol (DUONEB) nebulizer solution 1 ampule, 1 ampule, Inhalation, Q4H WA  insulin lispro (HUMALOG) injection vial 0-4 Units, 0-4 Units, SubCUTAneous, TID WC  insulin lispro (HUMALOG) injection vial 8 Units, 8 Units, SubCUTAneous, TID WC  lidocaine PF 1 % injection 5 mL, 5 mL, IntraDERmal, Once  sodium chloride flush 0.9 % injection 5-40 mL, 5-40 mL, IntraVENous, 2 times per day  sodium chloride flush 0.9 % injection 5-40 mL, 5-40 mL, IntraVENous, PRN  0.9 % sodium chloride infusion, 25 mL, IntraVENous, PRN  dextrose bolus 10% 125 mL, 125 mL, IntraVENous, PRN **OR** dextrose bolus 10% 250 mL, 250 mL, IntraVENous, PRN  glucagon (rDNA) injection 1 mg, 1 mg, SubCUTAneous, PRN  dextrose 10 % infusion, , IntraVENous, Continuous PRN  atorvastatin (LIPITOR) tablet 80 mg, 80 mg, Oral, Nightly  calcium elemental (OSCAL) tablet 500 mg, 500 mg, Oral, Daily  digoxin (LANOXIN) tablet 125 mcg, 125 mcg, Oral, Daily  famotidine (PEPCID) tablet 20 mg, 20 mg, Oral, Daily  gabapentin (NEURONTIN) capsule 100 mg, 100 mg, Oral, TID  guaiFENesin (MUCINEX) extended release tablet 600 mg, 600 mg, Oral, BID PRN  HYDROcodone-acetaminophen (NORCO) 5-325 MG per tablet 1 tablet, 1 tablet, Oral, Q6H PRN  levothyroxine (SYNTHROID) tablet 175 mcg, 175 mcg, Oral, QAM AC  metoprolol tartrate (LOPRESSOR) tablet 12.5 mg, 12.5 mg, Oral, BID  pantoprazole (PROTONIX) tablet 40 mg, 40 mg, Oral, QAM AC  tiZANidine (ZANAFLEX) tablet 2 mg, 2 mg, Oral, Q8H PRN  sodium chloride flush 0.9 % injection 5-40 mL, 5-40 mL, IntraVENous, 2 times per day  sodium chloride flush 0.9 % injection 5-40 mL, 5-40 mL, IntraVENous, PRN  ondansetron (ZOFRAN-ODT) disintegrating tablet 4 mg, 4 mg, Oral, Q8H PRN **OR** ondansetron (ZOFRAN) injection 4 mg, 4 mg, IntraVENous, Q6H PRN  polyethylene glycol (GLYCOLAX) packet 17 g, 17 g, Oral, Daily PRN  acetaminophen (TYLENOL) tablet 650 mg, 650 mg, Oral, Q6H PRN **OR** acetaminophen (TYLENOL) suppository 650 mg, 650 mg, Rectal, Q6H PRN  sodium phosphate 10 mmol in sodium chloride 0.9 % 250 mL IVPB, 10 mmol, IntraVENous, PRN **OR** sodium phosphate 15 mmol in sodium chloride 0.9 % 250 mL IVPB, 15 mmol, IntraVENous, PRN **OR** sodium phosphate 20 mmol in sodium chloride 0.9 % 500 mL IVPB, 20 mmol, IntraVENous, PRN  magnesium sulfate 2000 mg in 50 mL IVPB premix, 2,000 mg, IntraVENous, PRN  potassium chloride (KLOR-CON M) extended release tablet 40 mEq, 40 mEq, Oral, PRN **OR** potassium bicarb-citric acid (EFFER-K) effervescent tablet 40 mEq, 40 mEq, Oral, PRN **OR** potassium chloride 10 mEq/100 mL IVPB (Peripheral Line), 10 mEq, IntraVENous, PRN  morphine (PF) injection 2 mg, 2 mg, IntraVENous, Q4H PRN  heparin (porcine) injection 5,000 Units, 5,000 Units, SubCUTAneous, Q12H  methocarbamol (ROBAXIN) tablet 500 mg, 500 mg, Oral, q8h  0.9 % sodium chloride infusion, , IntraVENous, PRN  diatrizoate meglumine-sodium (GASTROGRAFIN) 66-10 % solution 20 mL, 20 mL, Oral, ONCE PRN  polyethylene glycol (GLYCOLAX) packet 17 g, 17 g, Oral, BID  docusate sodium (COLACE) capsule 100 mg, 100 mg, Oral, BID    ASSESSMENT AND PLAN    78year-old has    1. Myelodysplastic syndrome:    -Currently on supportive care. -Transfuse if hemoglobin is less than 7 or if platelet count is less than 10.    2.  Left upper extremity DVT:    -Related to PICC line  -Not a candidate for anticoagulation due to thrombocytopenia  -PICC nurse not keen on removing PICC line due to concern about dislodging the blood clot. -Dopplers will be repeated today to see if there is progression or not    3. Atypical pneumonia, UTI: Managed by ID    4. Coronary artery disease    5.   Armani Thompson MD

## 2022-11-10 NOTE — PROGRESS NOTES
Hospitalist Progress Note    Name:  Berta Norwood    /Age/Sex: 1940  (78 y.o. female)  MRN & CSN:  [id number] & [id number]    PCP: Nicole Courtney    Date of Admission: 2022    Patient Status:  Inpatient     Chief Complaint: Dyspnea    Hospital Course:   78 y.o. female with PMHx of nondysplastic syndrome, hypertension, hyperlipidemia, type 2 diabetes, CAD, atrial fibrillation who presented to [facility] with complaints of shortness of breath. Patient was at her nursing home when it was noted she was having shortness of breath. She is not on any oxygen at home, but she is currently satting well on 2 L O2 via NC. Left arm PICC line in place for transfusions given history of MDS. Upper extremity Dopplers found acute DVT in left upper extremity. Reluctant to remove PICC line at this time. Hematology following. UTI positive for Proteus. Received IV meropenem, but downgraded to Rocephin. ID following. Patient is coming with ARUN, but that is since resolved. Nephrology signed off. General surgery consulted for port placement on . Subjective: Today is:  Hospital Day: 5. Patient seen and examined in [id number]. I took over patient's care today at the request of the prior attending and family. Seen lying in bed. Daughter at bedside and updated on plan. Patient still complaining of significant neck pain. Oxygen saturation within normal limits on nasal cannula oxygen.       Medications:  Reviewed    Infusion Medications    sodium chloride      dextrose      sodium chloride       Scheduled Medications    butalbital-acetaminophen-caffeine  1 tablet Oral Dinner    insulin glargine  44 Units SubCUTAneous Daily    lidocaine  1 patch TransDERmal Daily    cefTRIAXone (ROCEPHIN) IV  2,000 mg IntraVENous Q24H    ipratropium-albuterol  1 ampule Inhalation Q4H WA    insulin lispro  0-4 Units SubCUTAneous TID WC    insulin lispro  8 Units SubCUTAneous TID  lidocaine 1 % injection  5 mL IntraDERmal Once    sodium chloride flush  5-40 mL IntraVENous 2 times per day    atorvastatin  80 mg Oral Nightly    calcium elemental  500 mg Oral Daily    digoxin  125 mcg Oral Daily    famotidine  20 mg Oral Daily    gabapentin  100 mg Oral TID    levothyroxine  175 mcg Oral QAM AC    metoprolol tartrate  12.5 mg Oral BID    pantoprazole  40 mg Oral QAM AC    sodium chloride flush  5-40 mL IntraVENous 2 times per day    heparin (porcine)  5,000 Units SubCUTAneous Q12H    methocarbamol  500 mg Oral q8h    polyethylene glycol  17 g Oral BID    docusate sodium  100 mg Oral BID     PRN Meds: sodium chloride flush, sodium chloride, dextrose bolus **OR** dextrose bolus, glucagon (rDNA), dextrose, guaiFENesin, HYDROcodone-acetaminophen, tiZANidine, sodium chloride flush, ondansetron **OR** ondansetron, polyethylene glycol, acetaminophen **OR** acetaminophen, sodium phosphate IVPB **OR** sodium phosphate IVPB **OR** sodium phosphate IVPB, magnesium sulfate, potassium chloride **OR** potassium alternative oral replacement **OR** potassium chloride, morphine, sodium chloride, diatrizoate meglumine-sodium      Intake/Output Summary (Last 24 hours) at 11/10/2022 1139  Last data filed at 11/10/2022 0959  Gross per 24 hour   Intake 520 ml   Output 250 ml   Net 270 ml       Physical Exam Performed:    /70   Pulse 86   Temp 97.3 °F (36.3 °C) (Temporal)   Resp 16   Ht 5' (1.524 m)   Wt 244 lb 4.3 oz (110.8 kg)   LMP  (LMP Unknown)   SpO2 92%   BMI 47.71 kg/m²     General appearance: No apparent distress, appears stated age and cooperative. Ill appearing. Nepali speaking. HEENT: Pupils equal, round, and reactive to light. Conjunctivae/corneas clear. NC present. Neck: Supple, with severely limited ROM. Painful to palpation along spinous processes. No jugular venous distention. Trachea midline. Respiratory:  Normal respiratory effort.  Clear to auscultation, bilaterally without Rales/Wheezes/Rhonchi. Cardiovascular: Regular rate and rhythm with normal S1/S2 without murmurs, rubs or gallops. No peripheral edema. Abdomen: Soft, non-tender, non-distended with normal bowel sounds. : No CVA tenderness. Musculoskeletal: No clubbing or cyanosis. Full range of motion without deformity. Skin: Skin color, texture, turgor normal.  No rashes or lesions. Neurologic:  Neurovascularly intact without any focal sensory/motor deficits. Cranial nerves: II-XII intact, grossly non-focal.  Psychiatric: Alert and oriented, thought content appropriate, normal insight  Peripheral Pulses: +2 palpable, equal bilaterally       Labs:   Recent Labs     11/07/22  1250 11/08/22  0451 11/09/22  0500   WBC 7.5 7.0 8.0   HGB 6.7* 8.1* 7.1*   HCT 20.6* 24.3* 21.9*   PLT 28* 28* 25*     Recent Labs     11/08/22  0450 11/08/22  0451 11/08/22 2056 11/09/22  0500    134*  --  133*   K 4.7 4.6  --  4.6   CL 97* 98*  --  98*   CO2 25 28  --  29   BUN 45* 44*  --  37*   CREATININE 1.7* 1.7*  --  1.0   CALCIUM 8.0* 8.0*  --  8.3   PHOS  --  3.5 2.7 2.9     Recent Labs     11/08/22  0450 11/09/22  0500   AST 14* 13*   ALT 15 13   BILITOT 0.4 0.5   ALKPHOS 66 63     No results for input(s): INR in the last 72 hours. Recent Labs     11/07/22  1250   TROPONINI 0.15*       Urinalysis:      Lab Results   Component Value Date/Time    NITRU POSITIVE 11/07/2022 01:45 PM    WBCUA 4947 11/07/2022 01:45 PM    BACTERIA 4+ 11/07/2022 01:45 PM    RBCUA 141 11/07/2022 01:45 PM    BLOODU MODERATE 11/07/2022 01:45 PM    SPECGRAV 1.020 11/07/2022 01:45 PM    GLUCOSEU 100 11/07/2022 01:45 PM    GLUCOSEU NEGATIVE 03/09/2012 06:51 AM       Radiology:  CT HEAD WO CONTRAST   Final Result   No acute intracranial abnormality. Moderate senescent changes with parenchymal volume loss and chronic small   vessel ischemic changes. Chronic paranasal sinus disease. Left mastoid effusion         XR CHEST PORTABLE   Final Result   1. Technically suboptimal exam.   2. Mild congestive heart failure is a consideration given the radiographic   findings; pneumonia is also a consideration in areas of consolidation with   pleural effusion. 3. Calcific atherosclerosis aorta. 4. Cardiomegaly. VL Extremity Venous Left         CT ABDOMEN PELVIS WO CONTRAST Additional Contrast? Oral   Final Result   1. No acute findings in the abdomen or pelvis. 2. Moderate amount of stool and air in the colon proximally which could   reflect constipation. 3. Punctate bilateral intrarenal calculi. VL Extremity Venous Bilateral         XR CHEST PORTABLE   Final Result   Left PICC line appears to be over the SVC and not significantly changed from   the prior exam.      Cardiomegaly with pulmonary vascular congestion and interstitial   prominence/interstitial opacities suggesting edema. Stable to the slightly   worsened from the recent study. CT HEAD WO CONTRAST   Final Result   No acute intracranial hemorrhage, mass effect, midline shift, or signs of   acute territorial infarct. Generalized volume loss and chronic ischemic changes in the white matter are   stable. Partial opacification in the left mastoid air cells and edge of the middle   ear cavity are new from the prior exam.  Correlate for infectious symptoms   versus eustachian tube dysfunction.                  Assessment/Plan:    Active Hospital Problems    Diagnosis     Acute hypoxemic respiratory failure (HCC) [J96.01]      Priority: High    Carotid artery disease without cerebral infarction [facility]) [I77.9]      Priority: High    Non-English speaking patient [Z78.9]      Priority: Medium    Hypothyroidism [E03.9]      Priority: Medium    History of DVT in adulthood [Z86.718]      Priority: Medium    Nephrolithiasis [N20.0]      Priority: Medium    Atypical pneumonia [J18.9]      Priority: Medium    Acute on chronic congestive heart failure ([address] [I50.9]      Priority: Medium HFrEF (heart failure with reduced ejection fraction) ([facility]) [I50.20]      Priority: Medium    Myelodysplasia (myelodysplastic syndrome) ([facility] [D46.9]      Priority: Medium    Chronic atrial fibrillation ([facility]) [I48.20]      Priority: Medium    Complicated UTI (urinary tract infection) [N39.0]      Priority: Medium    Type 2 diabetes mellitus ([facility]) [E11.9]     Obesity, Class III, BMI 40-49.9 (morbid obesity) ([facility] [E66.01]     Morbid obesity with BMI of 40.0-44.9, adult ([facility] [E66.01, Z68.41]     ARUN (acute kidney injury) ([facility] [N17.9]     Hyperlipidemia associated with type 2 diabetes mellitus ([facility] [E11.69, E78.5]     Hypertension associated with diabetes ([facility] [E11.59, I15.2]     Peripheral vascular disease ([facility] [I73.9]     Dyspnea [R06.00]          Hospital Day: 5    This is a 78 y.o. female who presented to [facility] on 11/6/2022 and is being treated for:     UTI  -UA on 11/5 indicative of UTI; urine culture positive for Proteus  -Off meropenam  -Continue rocephin  -ID following; appreciate recommendations    Left upper extremity DVT  -Upper extremity ultrasound shows partially occluded DVT in the left proximal brachial vein, but no propagation of the thrombus; partial flow is seen  -Heparin 5K units every 12 hours  -Port placement tentatively scheduled for Monday 11/14 per general surgery  -Hopeful removal of PICC line in the future  -General surgery consulted; appreciate recommendations    Neck pain  -Questionable if it is 2/2 left upper extremity DVT and patient is having referred pain  -CT head non-acute  -CT C spine ordered  -Pain control  -Do not believe this is meningitis at this time as patient is afebrile and does not have elevated WBCs    MDS  -Frequent transfusions through PICC line  -Would benefit from port placement given upper extremity DVT and PICC line  -Heme/onc following; appreciate recommendations    Acute hypoxemic respiratory failure - improving  -Likely 2/2 CHF exacerbation  -BNP elevated at 3064 on admission  -Echo 10/17/2022 showed LVEF 45-50% with right ventricle being mildly enlarged and having reduced function  -Oxygen therapy; wean as tolerated; keep saturation greater than 92%; may need oxygen outpatient if continues to remain hypoxic     HFrEF  -Echo as above  -Continue beta-blocker  -Hold home Entresto given ARUN - will restart on discharge per nephrology  -I/Os    ARUN - resolved  -Creatinine 1.3 on admission; baseline ~0.8-1.0  -Daily labs; trend creatinine  -Avoid nephrotoxins  -Nephrology was following, but has since signed off     Hyperlipidemia  -Statin    Hypertension  -Continue home antihypertensives  -Hold home Entresto given ARUN - will restart on discharge per nephrology    Type 2 diabetes  -SSI and lantus     Atrial fibrillation  -Continue home digoxin      DVT ppx: Heparin  GI ppx: Diet/Tube Feeds  Diet: ADULT ORAL NUTRITION SUPPLEMENT; AM Snack, PM Snack, HS Snack; Diabetic Oral Supplement  ADULT DIET; Dysphagia - Soft and Bite Sized; 3 carb choices (45 gm/meal); Low Fat/Low Chol/High Fiber/2 gm Na  Code Status: DNR-CCA    Disposition:  UTI on antibiotics. Left upper extremity DVT with PICC in place. Port placement tentatively scheduled for 11/14 with general surgery in hopes PICC can be removed and patient can still receive transfusions. PT/OT Eval Status: ordered      Clari Marin DO  11/10/2022  11:39 AM      Please note that some part of this chart was generated using Dragon dictation software. Although every effort was made to ensure the accuracy of this automated transcription, some errors in transcription may have occurred inadvertently. If you may need any clarification, please do not hesitate to contact me through [facility].

## 2022-11-10 NOTE — PROGRESS NOTES
[facility]   Progress Note  Cardiology    CC:  Dyspnea, confusion, UTI, MDS    HPI:  Worsening neck and head pain. CT planned. Now on room air. Labs from today are pending        Medications/Labs all Reviewed    Lab Results   Component Value Date    WBC 8.0 11/09/2022    HGB 7.1 (L) 11/09/2022    HCT 21.9 (L) 11/09/2022    MCV 92.5 11/09/2022    PLT 25 (L) 11/09/2022     Lab Results   Component Value Date    CREATININE 1.0 11/09/2022    BUN 37 (H) 11/09/2022     (L) 11/09/2022    K 4.6 11/09/2022    CL 98 (L) 11/09/2022    CO2 29 11/09/2022     Lab Results   Component Value Date    INR 1.33 (H) 11/03/2022    PROTIME 16.4 (H) 11/03/2022        PHYSICAL EXAM   /70   Pulse (!) 112   Temp 97.3 °F (36.3 °C) (Temporal)   Resp 16   Ht 5' (1.524 m)   Wt 244 lb 4.3 oz (110.8 kg)   LMP  (LMP Unknown)   SpO2 92%   BMI 47.71 kg/m²      Respiratory:  Resp Assessment: Normal respiratory effort  Resp Auscultation: Clear to auscultation bilaterally   Cardiovascular: Auscultation: regular rate and irregular rhythm, normal S1S2, no murmur, rub or gallop  Palpation:  Nl PMI  JVP:  normal  Extremities: No Edema  Abdomen:  Soft, non-tender  Normal bowel sounds  Extremities:   No Cyanosis or Clubbing  Neurological/Psychiatric:  Oriented to time, place, and person  Non-anxious  Skin:   Warm and dry    CXR  1. Technically suboptimal exam.   2. Mild congestive heart failure is a consideration given the radiographic   findings; pneumonia is also a consideration in areas of consolidation with   pleural effusion. 3. Calcific atherosclerosis aorta. 4. Cardiomegaly. ASSESSMENT:     Dyspnea: Improving. No on RA. CXR as above. On antibiotics. Consider restarting low dose entresto when OK with nephrology (she was on 1/2 24/26 BID)     Afib:  Chronic. Rate controlled. Not on anticoagulation due to low platelets  Stable     Confusion:  Improved.   Repeat CT head planned for headache     UTI:  On antibiotics  Clinically improving     MDS:  Heme following. Frequent transfusions  Poor prognosis  Unclear to me if patient and family understand prognosis     DVT:  Difficult situation due to severe thrombocytopenia. Heme following  Doppler today slightly i     CRI:  Improved. Renal following.   Restart low dose entresto when OK with nephrology     CAD:  Stable  Remote CABG     Plan:  Stable cardiac status  Multiple medical issues  CT repeat CT head planned        Alfred Rod MD, 11/10/2022 9:57 AM

## 2022-11-10 NOTE — PROGRESS NOTES
Oncology and Hematology Care   Progress Note      11/10/2022 1:19 PM        Name: Marv Whitmore . Admitted: 11/6/2022    SUBJECTIVE:  Patient is resting in bed. Daughter states she has been having severe neck pain and headache. Left arm swelling is less. Daughter states the patient wants to be at home and would like hospice with 24 hour care.      Reviewed interval ancillary notes    Current Medications  butalbital-acetaminophen-caffeine (FIORICET, ESGIC) per tablet 1 tablet, Dinner  insulin lispro (HUMALOG) injection vial 0-8 Units, TID WC  insulin lispro (HUMALOG) injection vial 0-4 Units, Nightly  insulin glargine (LANTUS) injection vial 44 Units, Daily  lidocaine 4 % external patch 1 patch, Daily  cefTRIAXone (ROCEPHIN) 2,000 mg in dextrose 5 % 50 mL IVPB mini-bag, Q24H  ipratropium-albuterol (DUONEB) nebulizer solution 1 ampule, Q4H WA  insulin lispro (HUMALOG) injection vial 8 Units, TID WC  lidocaine PF 1 % injection 5 mL, Once  sodium chloride flush 0.9 % injection 5-40 mL, 2 times per day  sodium chloride flush 0.9 % injection 5-40 mL, PRN  0.9 % sodium chloride infusion, PRN  dextrose bolus 10% 125 mL, PRN   Or  dextrose bolus 10% 250 mL, PRN  glucagon (rDNA) injection 1 mg, PRN  dextrose 10 % infusion, Continuous PRN  atorvastatin (LIPITOR) tablet 80 mg, Nightly  calcium elemental (OSCAL) tablet 500 mg, Daily  digoxin (LANOXIN) tablet 125 mcg, Daily  famotidine (PEPCID) tablet 20 mg, Daily  gabapentin (NEURONTIN) capsule 100 mg, TID  guaiFENesin (MUCINEX) extended release tablet 600 mg, BID PRN  HYDROcodone-acetaminophen (NORCO) 5-325 MG per tablet 1 tablet, Q6H PRN  levothyroxine (SYNTHROID) tablet 175 mcg, QAM AC  metoprolol tartrate (LOPRESSOR) tablet 12.5 mg, BID  pantoprazole (PROTONIX) tablet 40 mg, QAM AC  tiZANidine (ZANAFLEX) tablet 2 mg, Q8H PRN  sodium chloride flush 0.9 % injection 5-40 mL, 2 times per day  sodium chloride flush 0.9 % injection 5-40 mL, PRN  ondansetron (ZOFRAN-ODT) disintegrating tablet 4 mg, Q8H PRN   Or  ondansetron (ZOFRAN) injection 4 mg, Q6H PRN  polyethylene glycol (GLYCOLAX) packet 17 g, Daily PRN  acetaminophen (TYLENOL) tablet 650 mg, Q6H PRN   Or  acetaminophen (TYLENOL) suppository 650 mg, Q6H PRN  sodium phosphate 10 mmol in sodium chloride 0.9 % 250 mL IVPB, PRN   Or  sodium phosphate 15 mmol in sodium chloride 0.9 % 250 mL IVPB, PRN   Or  sodium phosphate 20 mmol in sodium chloride 0.9 % 500 mL IVPB, PRN  magnesium sulfate 2000 mg in 50 mL IVPB premix, PRN  potassium chloride (KLOR-CON M) extended release tablet 40 mEq, PRN   Or  potassium bicarb-citric acid (EFFER-K) effervescent tablet 40 mEq, PRN   Or  potassium chloride 10 mEq/100 mL IVPB (Peripheral Line), PRN  morphine (PF) injection 2 mg, Q4H PRN  heparin (porcine) injection 5,000 Units, Q12H  methocarbamol (ROBAXIN) tablet 500 mg, q8h  0.9 % sodium chloride infusion, PRN  diatrizoate meglumine-sodium (GASTROGRAFIN) 66-10 % solution 20 mL, ONCE PRN  polyethylene glycol (GLYCOLAX) packet 17 g, BID  docusate sodium (COLACE) capsule 100 mg, BID        Objective:  /69   Pulse 90   Temp 97.3 °F (36.3 °C) (Temporal)   Resp 16   Ht 5' (1.524 m)   Wt 244 lb 4.3 oz (110.8 kg)   LMP  (LMP Unknown)   SpO2 93%   BMI 47.71 kg/m²     Intake/Output Summary (Last 24 hours) at 11/10/2022 1319  Last data filed at 11/10/2022 0959  Gross per 24 hour   Intake 520 ml   Output --   Net 520 ml      Wt Readings from Last 3 Encounters:   11/10/22 244 lb 4.3 oz (110.8 kg)   11/05/22 227 lb 4.7 oz (103.1 kg)   10/26/22 244 lb 12.8 oz (111 kg)       General appearance:  Appears comfortable  Eyes: Sclera clear. Pupils equal.  ENT: Moist oral mucosa. Trachea midline, no adenopathy. Cardiovascular: Regular rhythm, normal S1, S2. No murmur. No edema in lower extremities  Respiratory: Not using accessory muscles. Good inspiratory effort. Clear to auscultation bilaterally, no wheeze or crackles.    GI: Abdomen soft, no tenderness, not distended  Musculoskeletal: No cyanosis in digits, neck supple  Neurology: CN 2-12 grossly intact. No speech or motor deficits  Psych: Normal affect. Alert and oriented in time, place and person  Skin: Warm, dry, normal turgor    Labs and Tests:  CBC:   Recent Labs     11/08/22 0451 11/09/22  0500 11/10/22  1235   WBC 7.0 8.0 10.8   HGB 8.1* 7.1* 6.9*   PLT 28* 25* 32*     BMP:    Recent Labs     11/08/22 0451 11/09/22  0500 11/10/22  1135   * 133* 132*   K 4.6 4.6 4.8   CL 98* 98* 96*   CO2 28 29 28   BUN 44* 37* 26*   CREATININE 1.7* 1.0 0.8   GLUCOSE 180* 286* 366*     Hepatic:   Recent Labs     11/08/22 0450 11/09/22  0500 11/10/22  1135   AST 14* 13* 15   ALT 15 13 12   BILITOT 0.4 0.5 0.4   ALKPHOS 66 63 73       ASSESSMENT AND PLAN    Principal Problem:    Acute hypoxemic respiratory failure (HCC)  Active Problems:    Carotid artery disease without cerebral infarction (HCC)    Complicated UTI (urinary tract infection)    Chronic atrial fibrillation (HCC)    Myelodysplasia (myelodysplastic syndrome) ([facility])    HFrEF (heart failure with reduced ejection fraction) ([facility]    Non-English speaking patient    Hypothyroidism    History of DVT in adulthood    Nephrolithiasis    Atypical pneumonia    Acute on chronic congestive heart failure (HCC)    Peripheral vascular disease ([facility])    Dyspnea    ARUN (acute kidney injury) ([address]    Hyperlipidemia associated with type 2 diabetes mellitus ([address]    Hypertension associated with diabetes ([address]    Morbid obesity with BMI of 40.0-44.9, adult (HCC)    Obesity, Class III, BMI 40-49.9 (morbid obesity) ([facility])    Type 2 diabetes mellitus ([address]  Resolved Problems:    * No resolved hospital problems. *      78year-old has     1.   Myelodysplastic syndrome:     -Currently on supportive care.  -Palliative care to re-evaluate.   -Transfuse if hemoglobin is less than 7 or if platelet count is less than 10.     2.  Left upper extremity DVT:     -Related to PICC line  -PICC nurse not keen on removing PICC line due to concern about dislodging the blood clot. -Repeat dopplers showed improved flow in the brachial vein.  -Continue to hold anticoagulation due to thrombocytopenia. 3. Atypical pneumonia, UTI: Managed by ID     4. Coronary artery disease     5. Abby Yu, [place]  Oncology Hematology [facility], [place].  [phone]    Patient was seen and examined. Patient has not been doing well. Had lengthy discussion with patient's family. Considering multiple different medical conditions, suggested best supportive care/hospice care. Family has not been able to make a decision.   They will think about this  Meanwhile continue transfusion support    Rojas Lala MD

## 2022-11-10 NOTE — PROGRESS NOTES
Physical [address] [facility] Department   Phone: [phone]    Physical Therapy    [] Initial Evaluation            [x] Daily Treatment Note         [] Discharge Summary      Patient: Berna Gallo   : 1940   MRN: [id number]   Date of Service:  11/10/2022  Admitting Diagnosis: Acute hypoxemic respiratory failure [facility])  Current Admission Summary: 78 y.o. female with PMHx of nondysplastic syndrome, hypertension, hyperlipidemia, type 2 diabetes, CAD, atrial fibrillation who presented to [facility] with complaints of shortness of breath. Patient was at her nursing home when it was noted she was having shortness of breath last night and into the early morning of the day. The patient is relatively sleepy on exam, but 2 of her daughters were present at bedside and able to update history. They state the patient was gasping for air. She is not on any oxygen at home, but she is currently satting well on 2 L O2 via NC in the ED. The daughter states that they had heard some crackling sounds with congestion while she was breathing. Patient denied any chest pain, abdominal pain, nausea, vomiting, GI/ symptoms. Patient has not had much of an appetite over the last 72 hours. Additionally, she has had low urine output, per the daughters. Patient has a left PICC line for blood transfusions, and she did receive 1 approximately 3 days ago. However, the daughter states that the patient is having some left chest/upper extremity swelling since the PICC line has been placed. Denies tobacco, alcohol, or illicit drug use.   Past Medical History:  has a past medical history of 15 Left knee repeat repair of median parapatellar arthrotomy with augmentation, Arthritis, Aspiration pneumonia of left lower lobe due to gastric secretions ([address] Atrial fibrillation ([address] CAD (coronary artery disease), Cataract, Chronic diastolic congestive heart failure ([address] Diabetes mellitus ([facility] [address] GERD (gastroesophageal reflux disease), Hyperlipidemia, Hypertension, HYPOTHRYROIDISM, Myelodysplastic disease ([facility] [address] Non-English speaking patient, Sleep apnea, Thyroid disease, and UTI. Past Surgical History:  has a past surgical history that includes joint replacement (Bilateral, [address]); Cardiac surgery (2004); Cholecystectomy, laparoscopic (5/15/14); Revision total knee arthroplasty (Right, 11/22/2016); Total knee arthroplasty; knee surgery (Left, 02/28/2017); CT BIOPSY BONE MARROW (9/29/2022); Upper gastrointestinal endoscopy (N/A, 10/3/2022); and Upper gastrointestinal endoscopy (N/A, 10/3/2022). Discharge Recommendations: Jimmy Kelsey scored a 6/24 on the AM-PAC short mobility form. Current research shows that an AM-PAC score of 17 or less is typically not associated with a discharge to the patient's home setting. Based on the patient's AM-PAC score and their current functional mobility deficits, it is recommended that the patient have 3-5 sessions per week of Physical Therapy at d/c to increase the patient's independence. Please see assessment section for further patient specific details. If patient discharges prior to next session this note will serve as a discharge summary. Please see below for the latest assessment towards goals.      DME Required For Discharge: patient has all required DME for discharge  Precautions/Restrictions: high fall risk, Isolation precautions  Weight Bearing Restrictions: no restrictions  [] Right Upper Extremity  [] Left Upper Extremity [] Right Lower Extremity  [] Left Lower Extremity     Required Braces/Orthotics:  Intermittent use of thumb spica   [] Right  [x] Left  Positional Restrictions:no positional restrictions    Pre-Admission Information   Lives With: alone                     Type of Home: house  Home Layout: one level  Home Access: ramped entry  Bathroom Layout: walker accessible, walk in shower  Bathroom Equipment: grab bars in shower, shower chair  Toilet Height: standard height  Home Equipment: rolling walker, rollator - 4 wheeled walker, manual wheelchair, electric wheelchair  Transfer Assistance: modified independent with use of RW  Ambulation Assistance:modified independent with use of manual w/c, RW for short distances   ADL Assistance: requires assistance with bathing, requires assistance with dressing  IADL Assistance: independent with homemaking tasks home health aide 4 hours/day   Active :        [] Yes                 [x] No  Hand Dominance: [] Left                 [x] Right  Current Employment: retired. Recent Falls: 1 slip out of wheelchair   *cameras present at home for children to monitor    Above information gathered from visit in October 2022:  Comments: After previous admission 10/10-10/26/22 pt discharged to [facility] for rehabilitation however has only completed a couple sessions there d/t pt having to return to hospital. At [facility] pt transferred to chair but has not been able to complete any ambulation. Examination   Vision:   Vision Gross Assessment: Impaired and Vision Corrective Device: wears glasses for reading  Hearing:   hard of hearing, left hearing aid, right hearing aid  Observation:   General Observation:  Edema along dorsum of L hand   Posture:   Fair - forward head when sitting, forward flexed posture in standing  Sensation:   reports numbness and tingling in (B) UE intermittently   ROM:   (B) LE AROM WFL  Strength:   (L) Knee flex/ext: -3     (R) Knee flex/ext: 4  Decision Making: medium complexity  Clinical Presentation: evolving      Subjective  General: Patient lying supine in bed with HOB elevated. Patient's daughter present, intermittently translating conversation to patient. Patient is agreeable to PT/OT. Pain: 15/10.   Location: L arm, L shoulder,neck and head  Pain Interventions: RN notified of patient request for pain medication and repositioned        Functional Mobility  Bed Mobility  Supine to Sit: 2 person assistance with MaxAx2   Sit to Supine: 2 person assistance with MaxAx2   Rolling Left: 2 person assistance with MaxAx2   Rolling Right: 2 person assistance with MaxAx2   Scootin person assistance with MaxAx2   Comments: Patient performed bed mobility with HOB elevated and increased time; two family members present (1RN from [facility] working on unit) to assist with pt comfort and placing pillows   Transfers  No transfers completed on this date secondary to fatigue and B LE weakness. Comments: Family member stating pt is paralyzed below the waist on LLE- pt showing some initiation when moving LLE to EOB   Ambulation  Ambulation not tested on this date secondary to fatigue and B LE weakness. Comments:  Per family reports pt has not walked in a couple months \  Stair Mobility  Stair mobility not completed on this date. Comments:  Wheelchair Mobility:  No w/c mobility completed on this date. Comments:  Balance  Static Sitting Balance: fair (+): maintains balance at SBA/supervision without use of UE support  Dynamic Sitting Balance: fair: maintains balance at CGA without use of UE support  Comments: Patient sat EOB for ~20 minutes while performing ADLs with OT    Other Therapeutic Interventions  See OT note for ADLs-- pt completed UB/LB dressing and bathing, brief change.    Functional Outcomes  AM-PAC Inpatient Mobility Raw Score : 6              Cognition  WFL  Orientation:    alert and oriented x 4  Command Following:   [facility]    Education  Barriers To Learning: language  Patient Education: patient educated on goals, PT role and benefits, plan of care, general safety, family education, discharge recommendations  Learning Assessment:  patient verbalizes and demonstrates understanding    Assessment  Activity Tolerance: Fair - patient limited by fatigue and B LE weakness; /71mmHg, HR between 90-150bpm throughout session, SPO2 89% on room air upon arrival,

## 2022-11-10 NOTE — PROGRESS NOTES
Patient was very out of it and unable to concentrate, patient just received robaxin and gabapentin and this is what happened the last time she took both of those medications together, per family.

## 2022-11-10 NOTE — PROGRESS NOTES
Spoke with Dr. Bety Main. Dr. Bety Main states he will put her on the schedule for Monday. Will message Vito to inform.

## 2022-11-10 NOTE — PROGRESS NOTES
Consent for blood transfusion obtained, CT called to get CT of spine.  Will wait until patient gets back to start blood transfusion

## 2022-11-10 NOTE — PROGRESS NOTES
[facility] Department   Phone: [phone]    Occupational Therapy    [] Initial Evaluation            [x] Daily Treatment Note         [] Discharge Summary      Patient: Lam Hoffman   : 1940   MRN: [id number]   Date of Service:  11/10/2022    Admitting Diagnosis:  Acute hypoxemic respiratory failure [facility])    Current Admission Summary: Per H&P-   Recent admission 11/3-22 for left upper extremity swelling and pain after left PICC line placement. She was treated for pain and transfused 2 units pRBC for anemia related to MDS. Patient was discharged on  afternoon. Early  morning , she was experiencing episodes of left abdominal spasms/tightness, followed by sensation of shortness of breath. She was found by the staff at [facility] to desaturate to 85-88% on RA. She reports palpitations. She denies lightheadedness, chest pain during episodes. She was placed on 2-2.5 LPM of supplemental oxygen with improvement of oxygen saturation. She also has been experiencing dysuria for the past 5 days. She has had issues with urinary retention. At [facility] she was straight cath with return of 500 cc of urine. She had been constipated and was placed on bowel regimen, then she had loose-watery stools on Friday-Saturday. She denies nausea. She has not have fevers, but endorses feeling cold. She has some residual mild cough. She was coughing more during episodes of spasms. She continues to experience some pain to left upper extremity. Prior admission 10/10-10/26/22 for evaluation of left shoulder and chest pain. She was evaluated by orthopedic and family requested starting naproxen for pain relief despite thrombocytopenia. During hospitalization patient developed acute hypoxic respiratory failure attributed to aspiration pneumonia for which she was treated with IV antibiotics.   Patient demonstrated anemia and thrombocytopenia for which she was evaluated by hematology and diagnosed with MDS. For atrial fibrillation, decision to not place patient on anticoagulation due to thrombocytopenia. Cardiology evaluated patient during hospitalization. She was to follow up with cardiology to determine if anticoagulation in the future is feasible. 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 Past Surgical History:  has a past surgical history that includes joint replacement (Bilateral, [address]); Cardiac surgery (2004); Cholecystectomy, laparoscopic (5/15/14); Revision total knee arthroplasty (Right, 11/22/2016); Total knee arthroplasty; knee surgery (Left, 02/28/2017); CT BIOPSY BONE MARROW (9/29/2022); Upper gastrointestinal endoscopy (N/A, 10/3/2022); and Upper gastrointestinal endoscopy (N/A, 10/3/2022). Discharge Recommendations: Trent Patel scored a 7/24 on the AM-PAC ADL Inpatient form. Current research shows that an AM-PAC score of 17 or less is typically not associated with a discharge to the patient's home setting. Based on the patient's AM-PAC score and their current ADL deficits, it is recommended that the patient have 3-5 sessions per week of Occupational Therapy at d/c to increase the patient's independence. Please see assessment section for further patient specific details. If patient discharges prior to next session this note will serve as a discharge summary. Please see below for the latest assessment towards goals. DME Required For Discharge: DME to be determined at next level of care    Precautions/Restrictions: high fall risk, contact precautions, COVID R/O, MDRO  Weight Bearing Restrictions: no restrictions  [] Right Upper Extremity  [] Left Upper Extremity [] Right Lower Extremity  [] Left Lower Extremity     Required Braces/Orthotics:  L brace thumb spica for comfort   [] Right  [x] Left   Positional Restrictions:no positional restrictions  Left Brachial Vein DVT noted- arm wrapped in ace bandage at EOS. Pre-Admission Information   Lives With: alone                     Type of Home: house  Home Layout: one level  Home Access: ramped entry   [facility]: walker accessible, walk in shower  Bathroom Equipment: grab bars in shower, shower chair  Toilet Height: standard height  Home Equipment: rolling walker, rollator - 4 wheeled walker, manual wheelchair, electric wheelchair  Transfer Assistance: modified independent with use of RW  Ambulation Assistance:modified independent with use of manual w/c, RW for short distances   ADL Assistance: requires assistance with bathing, requires assistance with dressing  IADL Assistance: independent with homemaking tasks home health aide 4 hours/day   Active :        [] Yes                 [x] No  Hand Dominance: [] Left                 [x] Right  Current Employment: retired. Occupation:    Recent Falls: 1 slip out of wheelchair   *cameras present at home for children to monitor  Comments: After previous admission 10/10-10/26/22 [facility] however has only completed a couple sessions there d/t pt having to return to hospital. [facility] but has not been able to complete any ambulation. Subjective    General: Patient supine in bed with family present upon arrival, agreeable to therapy co-treatment session with increased encouragement  Pain: 15/10. Location: LUE shoulder, neck, head. Pain Interventions: RN notified, RN notified of patient request for pain medication, and repositioned            Activities of Daily Living    Basic Activities of Daily Living  Grooming: dependent requires verbal cueing Increased time to complete task  Grooming Comments: dependent for grooming tasks, applying deodorant, washing hair with shower cap, brushing hair, patient with increased time for completion   Upper Extremity Bathing: dependent requires verbal cueing Increased time to complete task  Lower Extremity Bathing: dependent requires verbal cueing Increased time to complete task   Bathing Equipment: none  Bathing Comments: mod(A) for UB, pt with decreased ROM in LUE d/t pain requiring increased assistance  Upper Extremity Dressing: dependent requires verbal cueing Increased time to complete task  Lower Extremity Dressing: dependent requires verbal cueing Increased time to complete task  Dressing Equipment: none  Dressing Comments: dependent with donning/doffing gown, dependent for donning/doffing brief, patient with saturated gown and incontinence of stool on brief. Patient with increased time needed for all dressing activities, patient with increased anxiousness and pain with all functional movements, patient with maximal verbal, tactile, physical cueing needed for hand placement, sequencing, initiation, and task completion  Toileting Comments: catheter in place, incontinence of stool  General Comments: Patient with maximal verbal, tactile, and physical cueing needed for hand placement, sequencing, initiation, hand placement, and task completion, patient with increased time needed for all functional ADLs, patient limited by anxiousness, fearfulness with movement and increased pain   Instrumental Activities of Daily Living  No IADL completed on this date.     Functional Mobility    Bed Mobility  Supine to Sit: 2 person assistance with dependent/totalA   Sit to Supine: 2 person assistance with dependent/totalA   Rolling Left: 2 person assistance with dependent/totalA    Rolling Right: 2 person assistance with dependent/totalA    Scootin person assistance with dependent/totalA    Comments: Patient with increased time needed for all bed mobility, patient with maximal verbal, tactile, and physical cueing needed for initiation, sequencing, hand placement, task completion. Patient sat EOB ~20 minutes for ADL completion. Patient completed multiple rolls L/R for repositioning linens and for for ADL completion  Transfers  No transfers completed on this date secondary to pain/fatigue . Comments: Per family patient has not been ambulatory for ~1-2 months. Functional Mobility:  Sitting Balance: stand by assistance, minimal assistance. Sitting Balance Comment: Cristina progressing to SBA, patient with increased generalized pain and anxiousness with sitting EOB  No functional mobility completed on this date secondary to pain/fatigue. Other Therapeutic Interventions      Functional Outcomes  AM-PAC Inpatient Daily Activity Raw Score: 7      Cognition  Overall Cognitive Status: Impaired  Arousal/Alterness: appropriate responses to stimuli  Following Commands: follows one step commands with repetition, follows one step commands with increased time  Attention Span: appears intact  Memory: decreased recall of recent events  Safety Judgement: good awareness of safety precautions  Problem Solving: assistance required to generate solutions, assistance required to identify errors made  Insights: fully aware of deficits  Initiation: requires cues for some  Sequencing: requires cues for some  Comments: battery low on hearing aid this date hard to assess if hearing vs cognition.    Orientation:    alert and oriented x 4  Command Following:   accurately follows one step commands     Education    Barriers To Learning: cognition and language  Patient Education: patient educated on goals, OT role and benefits, plan of care, ADL adaptive strategies, energy conservation, family education, disease specific education, pressure relief, transfer training, discharge recommendations, importance of OOB activity  Learning Assessment:  patient verbalizes and demonstrates understanding, patient verbalizes understanding, would benefit from continued reinforcement    Assessment    Activity Tolerance: Patient tolerated treatment, however limited by increased pain and anxiousness with functional activities. Impairments Requiring Therapeutic Intervention: decreased functional mobility, decreased ADL status, decreased ROM, decreased strength, decreased safety awareness, decreased endurance, decreased balance, decreased IADL, decreased fine motor control, decreased coordination, increased pain, decreased posture  Prognosis: fair and guarded  Clinical Assessment: Pt is currently functioning below occupational baseline and demonstrates the deficits listed above. Prior to recent admissions, pt was IND at home alone. Pt is currently requiring dependent/totalA x2 for bed mobility and Cristina to SBA while seated EOB. Patient with dependent/totalA for all functional ADLs. Pt is limited by pain, decreased endurance, and strength. Pt is also limited by decreased edema and pain in LUE. Pt would benefit from continued skilled OT services to address these deficits and increase IND, safety, and ease with all occupational pursuits.    Safety Interventions: patient left in bed, bed alarm in place, call light within reach, nurse notified, and family/caregiver present       Plan    Frequency: 3-5 x/per week  Current Treatment Recommendations: strengthening, ROM, balance training, functional mobility training, transfer training, endurance training, patient/caregiver education, ADL/self-care training, pain management, safety education, equipment evaluation/education, and positioning    Goals    Patient Goals: to get better      Short Term Goals:  Time Frame: by d/c - no goals met this date 11/10  Patient will complete upper body ADL at stand by assistance   Patient will complete lower body ADL at maximum assistance   Patient will complete toileting at maximum assistance   Patient will complete grooming at stand by assistance   Patient will complete functional transfers at maximum assistance   Patient will increase functional sitting balance to SBA for improved ADL completion       Therapy Session Time     Individual Group Co-treatment   Time In    0832   Time Out    0915   Minutes    43        Timed Code Treatment Minutes:  43 minutes     Total Treatment Minutes:  43 minutes total    Electronically Signed By: REINA Dorado/KARI [id number]

## 2022-11-11 PROBLEM — I48.21 PERMANENT ATRIAL FIBRILLATION (HCC): Status: ACTIVE | Noted: 2022-11-11

## 2022-11-11 LAB
A/G RATIO: 0.7 (ref 1.1–2.2)
ALBUMIN SERPL-MCNC: 2.4 G/DL (ref 3.4–5)
ALP BLD-CCNC: 73 U/L (ref 40–129)
ALT SERPL-CCNC: 14 U/L (ref 10–40)
ANION GAP SERPL CALCULATED.3IONS-SCNC: 9 MMOL/L (ref 3–16)
ANISOCYTOSIS: ABNORMAL
APTT: 36.6 SEC (ref 23–34.3)
AST SERPL-CCNC: 20 U/L (ref 15–37)
BANDED NEUTROPHILS RELATIVE PERCENT: 3 % (ref 0–7)
BASOPHILS ABSOLUTE: 0 K/UL (ref 0–0.2)
BASOPHILS RELATIVE PERCENT: 0 %
BILIRUB SERPL-MCNC: 0.6 MG/DL (ref 0–1)
BUN BLDV-MCNC: 22 MG/DL (ref 7–20)
CALCIUM SERPL-MCNC: 8.2 MG/DL (ref 8.3–10.6)
CHLORIDE BLD-SCNC: 106 MMOL/L (ref 99–110)
CO2: 30 MMOL/L (ref 21–32)
CREAT SERPL-MCNC: 0.6 MG/DL (ref 0.6–1.2)
EOSINOPHILS ABSOLUTE: 0 K/UL (ref 0–0.6)
EOSINOPHILS RELATIVE PERCENT: 0 %
GFR SERPL CREATININE-BSD FRML MDRD: >60 ML/MIN/{1.73_M2}
GLUCOSE BLD-MCNC: 161 MG/DL (ref 70–99)
GLUCOSE BLD-MCNC: 212 MG/DL (ref 70–99)
GLUCOSE BLD-MCNC: 235 MG/DL (ref 70–99)
GLUCOSE BLD-MCNC: 238 MG/DL (ref 70–99)
GLUCOSE BLD-MCNC: 320 MG/DL (ref 70–99)
HCT VFR BLD CALC: 27.2 % (ref 36–48)
HEMATOLOGY PATH CONSULT: NO
HEMATOLOGY PATH CONSULT: NORMAL
HEMOGLOBIN: 9 G/DL (ref 12–16)
INR BLD: 1.31 (ref 0.87–1.14)
LYMPHOCYTES ABSOLUTE: 2.2 K/UL (ref 1–5.1)
LYMPHOCYTES RELATIVE PERCENT: 19 %
MAGNESIUM: 1.8 MG/DL (ref 1.8–2.4)
MCH RBC QN AUTO: 29.9 PG (ref 26–34)
MCHC RBC AUTO-ENTMCNC: 33 G/DL (ref 31–36)
MCV RBC AUTO: 90.6 FL (ref 80–100)
METAMYELOCYTES RELATIVE PERCENT: 6 %
MONOCYTES ABSOLUTE: 2.3 K/UL (ref 0–1.3)
MONOCYTES RELATIVE PERCENT: 20 %
MYELOCYTE PERCENT: 3 %
NEUTROPHILS ABSOLUTE: 7.1 K/UL (ref 1.7–7.7)
NEUTROPHILS RELATIVE PERCENT: 49 %
OVALOCYTES: ABNORMAL
PDW BLD-RTO: 15.5 % (ref 12.4–15.4)
PERFORMED ON: ABNORMAL
PHOSPHORUS: 2.5 MG/DL (ref 2.5–4.9)
PLATELET # BLD: 28 K/UL (ref 135–450)
PMV BLD AUTO: 9.4 FL (ref 5–10.5)
POLYCHROMASIA: ABNORMAL
POTASSIUM SERPL-SCNC: 5.1 MMOL/L (ref 3.5–5.1)
PREALBUMIN: 7.3 MG/DL (ref 20–40)
PROTHROMBIN TIME: 16.2 SEC (ref 11.7–14.5)
RBC # BLD: 3 M/UL (ref 4–5.2)
SMUDGE CELLS: PRESENT
SODIUM BLD-SCNC: 145 MMOL/L (ref 136–145)
TOTAL PROTEIN: 6 G/DL (ref 6.4–8.2)
TRANSFERRIN: 100 MG/DL (ref 200–360)
WBC # BLD: 11.6 K/UL (ref 4–11)

## 2022-11-11 PROCEDURE — 97530 THERAPEUTIC ACTIVITIES: CPT

## 2022-11-11 PROCEDURE — 2580000003 HC RX 258: Performed by: INTERNAL MEDICINE

## 2022-11-11 PROCEDURE — 80053 COMPREHEN METABOLIC PANEL: CPT

## 2022-11-11 PROCEDURE — 94761 N-INVAS EAR/PLS OXIMETRY MLT: CPT

## 2022-11-11 PROCEDURE — 85730 THROMBOPLASTIN TIME PARTIAL: CPT

## 2022-11-11 PROCEDURE — APPNB15 APP NON BILLABLE TIME 0-15 MINS: Performed by: NURSE PRACTITIONER

## 2022-11-11 PROCEDURE — 85025 COMPLETE CBC W/AUTO DIFF WBC: CPT

## 2022-11-11 PROCEDURE — 6360000002 HC RX W HCPCS: Performed by: STUDENT IN AN ORGANIZED HEALTH CARE EDUCATION/TRAINING PROGRAM

## 2022-11-11 PROCEDURE — 83735 ASSAY OF MAGNESIUM: CPT

## 2022-11-11 PROCEDURE — 94640 AIRWAY INHALATION TREATMENT: CPT

## 2022-11-11 PROCEDURE — 84466 ASSAY OF TRANSFERRIN: CPT

## 2022-11-11 PROCEDURE — 6370000000 HC RX 637 (ALT 250 FOR IP): Performed by: INTERNAL MEDICINE

## 2022-11-11 PROCEDURE — 85610 PROTHROMBIN TIME: CPT

## 2022-11-11 PROCEDURE — 2580000003 HC RX 258: Performed by: ANESTHESIOLOGY

## 2022-11-11 PROCEDURE — APPSS15 APP SPLIT SHARED TIME 0-15 MINUTES: Performed by: NURSE PRACTITIONER

## 2022-11-11 PROCEDURE — 99232 SBSQ HOSP IP/OBS MODERATE 35: CPT | Performed by: INTERNAL MEDICINE

## 2022-11-11 PROCEDURE — 6370000000 HC RX 637 (ALT 250 FOR IP): Performed by: STUDENT IN AN ORGANIZED HEALTH CARE EDUCATION/TRAINING PROGRAM

## 2022-11-11 PROCEDURE — 36415 COLL VENOUS BLD VENIPUNCTURE: CPT

## 2022-11-11 PROCEDURE — 6360000002 HC RX W HCPCS: Performed by: ANESTHESIOLOGY

## 2022-11-11 PROCEDURE — 6360000002 HC RX W HCPCS: Performed by: INTERNAL MEDICINE

## 2022-11-11 PROCEDURE — 2580000003 HC RX 258: Performed by: STUDENT IN AN ORGANIZED HEALTH CARE EDUCATION/TRAINING PROGRAM

## 2022-11-11 PROCEDURE — 84100 ASSAY OF PHOSPHORUS: CPT

## 2022-11-11 PROCEDURE — 2700000000 HC OXYGEN THERAPY PER DAY

## 2022-11-11 PROCEDURE — 84134 ASSAY OF PREALBUMIN: CPT

## 2022-11-11 PROCEDURE — 97535 SELF CARE MNGMENT TRAINING: CPT

## 2022-11-11 PROCEDURE — 1200000000 HC SEMI PRIVATE

## 2022-11-11 RX ORDER — FUROSEMIDE 10 MG/ML
20 INJECTION INTRAMUSCULAR; INTRAVENOUS ONCE
Status: COMPLETED | OUTPATIENT
Start: 2022-11-11 | End: 2022-11-11

## 2022-11-11 RX ORDER — METHYLPREDNISOLONE SODIUM SUCCINATE 40 MG/ML
40 INJECTION, POWDER, LYOPHILIZED, FOR SOLUTION INTRAMUSCULAR; INTRAVENOUS EVERY 8 HOURS
Status: DISCONTINUED | OUTPATIENT
Start: 2022-11-11 | End: 2022-11-12

## 2022-11-11 RX ORDER — FUROSEMIDE 20 MG/1
20 TABLET ORAL DAILY
Status: DISCONTINUED | OUTPATIENT
Start: 2022-11-12 | End: 2022-11-21 | Stop reason: HOSPADM

## 2022-11-11 RX ORDER — DIAZEPAM 5 MG/ML
2.5 INJECTION, SOLUTION INTRAMUSCULAR; INTRAVENOUS
Status: DISCONTINUED | OUTPATIENT
Start: 2022-11-11 | End: 2022-11-21 | Stop reason: HOSPADM

## 2022-11-11 RX ORDER — METHYLPREDNISOLONE SODIUM SUCCINATE 40 MG/ML
40 INJECTION, POWDER, LYOPHILIZED, FOR SOLUTION INTRAMUSCULAR; INTRAVENOUS DAILY
Status: DISCONTINUED | OUTPATIENT
Start: 2022-11-11 | End: 2022-11-11

## 2022-11-11 RX ADMIN — FUROSEMIDE 20 MG: 10 INJECTION, SOLUTION INTRAMUSCULAR; INTRAVENOUS at 11:23

## 2022-11-11 RX ADMIN — GABAPENTIN 100 MG: 100 CAPSULE ORAL at 09:16

## 2022-11-11 RX ADMIN — INSULIN LISPRO 2 UNITS: 100 INJECTION, SOLUTION INTRAVENOUS; SUBCUTANEOUS at 09:18

## 2022-11-11 RX ADMIN — Medication 20 ML: at 09:18

## 2022-11-11 RX ADMIN — INSULIN GLARGINE 44 UNITS: 100 INJECTION, SOLUTION SUBCUTANEOUS at 09:17

## 2022-11-11 RX ADMIN — ACETAMINOPHEN 650 MG: 325 TABLET ORAL at 19:45

## 2022-11-11 RX ADMIN — SACUBITRIL AND VALSARTAN 0.5 TABLET: 24; 26 TABLET, FILM COATED ORAL at 11:30

## 2022-11-11 RX ADMIN — MORPHINE SULFATE 2 MG: 2 INJECTION, SOLUTION INTRAMUSCULAR; INTRAVENOUS at 11:47

## 2022-11-11 RX ADMIN — MORPHINE SULFATE 2 MG: 2 INJECTION, SOLUTION INTRAMUSCULAR; INTRAVENOUS at 05:04

## 2022-11-11 RX ADMIN — DOCUSATE SODIUM 100 MG: 100 CAPSULE, LIQUID FILLED ORAL at 22:47

## 2022-11-11 RX ADMIN — PANTOPRAZOLE SODIUM 40 MG: 40 TABLET, DELAYED RELEASE ORAL at 05:05

## 2022-11-11 RX ADMIN — DOCUSATE SODIUM 100 MG: 100 CAPSULE, LIQUID FILLED ORAL at 09:16

## 2022-11-11 RX ADMIN — Medication 10 ML: at 22:18

## 2022-11-11 RX ADMIN — METOPROLOL TARTRATE 25 MG: 25 TABLET, FILM COATED ORAL at 22:18

## 2022-11-11 RX ADMIN — INSULIN LISPRO 8 UNITS: 100 INJECTION, SOLUTION INTRAVENOUS; SUBCUTANEOUS at 11:33

## 2022-11-11 RX ADMIN — INSULIN LISPRO 8 UNITS: 100 INJECTION, SOLUTION INTRAVENOUS; SUBCUTANEOUS at 09:18

## 2022-11-11 RX ADMIN — MORPHINE SULFATE 1 MG/HR: 10 INJECTION INTRAVENOUS at 20:57

## 2022-11-11 RX ADMIN — IPRATROPIUM BROMIDE AND ALBUTEROL SULFATE 1 AMPULE: .5; 3 SOLUTION RESPIRATORY (INHALATION) at 08:38

## 2022-11-11 RX ADMIN — POLYETHYLENE GLYCOL 3350 17 G: 17 POWDER, FOR SOLUTION ORAL at 22:47

## 2022-11-11 RX ADMIN — IPRATROPIUM BROMIDE AND ALBUTEROL SULFATE 1 AMPULE: .5; 3 SOLUTION RESPIRATORY (INHALATION) at 02:13

## 2022-11-11 RX ADMIN — MORPHINE SULFATE 2 MG: 2 INJECTION, SOLUTION INTRAMUSCULAR; INTRAVENOUS at 01:01

## 2022-11-11 RX ADMIN — FUROSEMIDE 20 MG: 10 INJECTION, SOLUTION INTRAMUSCULAR; INTRAVENOUS at 16:07

## 2022-11-11 RX ADMIN — POLYETHYLENE GLYCOL 3350 17 G: 17 POWDER, FOR SOLUTION ORAL at 09:17

## 2022-11-11 RX ADMIN — LEVOTHYROXINE SODIUM 175 MCG: 0.03 TABLET ORAL at 05:04

## 2022-11-11 RX ADMIN — Medication 500 MG: at 09:16

## 2022-11-11 RX ADMIN — METOPROLOL TARTRATE 25 MG: 25 TABLET, FILM COATED ORAL at 11:30

## 2022-11-11 RX ADMIN — Medication 10 ML: at 09:19

## 2022-11-11 RX ADMIN — DIGOXIN 125 MCG: 125 TABLET ORAL at 09:16

## 2022-11-11 RX ADMIN — METOPROLOL TARTRATE 12.5 MG: 25 TABLET, FILM COATED ORAL at 09:16

## 2022-11-11 RX ADMIN — INSULIN LISPRO 6 UNITS: 100 INJECTION, SOLUTION INTRAVENOUS; SUBCUTANEOUS at 11:33

## 2022-11-11 ASSESSMENT — PAIN SCALES - GENERAL
PAINLEVEL_OUTOF10: 0
PAINLEVEL_OUTOF10: 6
PAINLEVEL_OUTOF10: 7
PAINLEVEL_OUTOF10: 0

## 2022-11-11 ASSESSMENT — ENCOUNTER SYMPTOMS
WHEEZING: 0
EYE DISCHARGE: 0
EYE REDNESS: 0
SINUS PAIN: 0
SHORTNESS OF BREATH: 0
DIARRHEA: 0
RHINORRHEA: 0
BACK PAIN: 0
SINUS PRESSURE: 0
CONSTIPATION: 0
SORE THROAT: 0
ABDOMINAL PAIN: 0
COUGH: 0
NAUSEA: 0

## 2022-11-11 ASSESSMENT — PAIN DESCRIPTION - LOCATION: LOCATION: NECK

## 2022-11-11 ASSESSMENT — PAIN SCALES - WONG BAKER: WONGBAKER_NUMERICALRESPONSE: 0

## 2022-11-11 NOTE — PROGRESS NOTES
[facility]   Progress Note  Cardiology    CC:  Dyspnea, confusion, UTI, MDS    HPI:  She is still complaining of neck and head pain. CT head negative, neck with severe degenerative changes at C1-C2. Medications/Labs all Reviewed    Lab Results   Component Value Date    WBC 11.6 (H) 11/11/2022    HGB 9.0 (L) 11/11/2022    HCT 27.2 (L) 11/11/2022    MCV 90.6 11/11/2022    PLT 28 (L) 11/11/2022     Lab Results   Component Value Date    CREATININE 0.6 11/11/2022    BUN 22 (H) 11/11/2022     11/11/2022    K 5.1 11/11/2022     11/11/2022    CO2 30 11/11/2022     Lab Results   Component Value Date    INR 1.31 (H) 11/11/2022    PROTIME 16.2 (H) 11/11/2022        PHYSICAL EXAM   BP (!) 147/79   Pulse (!) 112   Temp 97.1 °F (36.2 °C) (Temporal)   Resp 28   Ht 5' (1.524 m)   Wt 238 lb 1.6 oz (108 kg)   LMP  (LMP Unknown)   SpO2 96%   BMI 46.50 kg/m²      Respiratory:  Resp Assessment: Normal respiratory effort  Resp Auscultation: Clear to auscultation bilaterally   Cardiovascular: Auscultation: slightly tachycardic rate and irregular rhythm, normal S1S2, no murmur, rub or gallop  Palpation:  Nl PMI  JVP:  normal  Extremities: No Edema  Abdomen:  Soft, non-tender  Normal bowel sounds  Extremities:   No Cyanosis or Clubbing  Neurological/Psychiatric:  Oriented to time, place, and person  Non-anxious  Skin:   Warm and dry    Tele:-personally reviewed  Afib, slightly fast    CT NECK  No acute traumatic fracture or traumatic malalignment. C1-C2 articulation severe degenerative changes and mild C2-C3 disc degenerative changes. No significant canal narrowing or foraminal stenosis. CT HEAD  No acute intracranial abnormality. Moderate senescent changes with parenchymal volume loss and chronic small vessel ischemic changes. Chronic paranasal sinus disease. Left mastoid effusion       ASSESSMENT:     Dyspnea: Improving. No on RA. On antibiotics.   H/o chronic diastolic CHF-Restart low dose entresto and lasix  Follow renal function       Afib:  Chronic. Rate slightly fast today  Not on anticoagulation due to low platelets  Increase lopressor to 25 BID from 12.5     Confusion:  Improved. Repeat CT head planned for headache negative     UTI:  On antibiotics  Clinically improving     MDS:  Heme following. Frequent transfusions  Poor prognosis  Unclear to me if patient and family understand prognosis     DVT:  Difficult situation due to severe thrombocytopenia. Heme following  Doppler today slightly i     CRI:  Improved. Restart low dose entresto   Follow renal function     CAD:  Stable  Remote CABG     Plan:  Restart low dose entresto and lasix.   Increase lopressor  Follow renal function, HR and BP          Aniket Moore MD, 11/11/2022 10:56 AM

## 2022-11-11 NOTE — PROGRESS NOTES
[facility] [address]   Phone: [phone]    Occupational Therapy    [] Initial Evaluation            [x] Daily Treatment Note         [] Discharge Summary      Patient: Magnolia Eisenmenger   : 1940   MRN: [id number]   Date of Service:  2022    Admitting Diagnosis:  Acute hypoxemic respiratory failure [facility])  Current Admission Summary: Per H&P-   Recent admission 11/3-22 for left upper extremity swelling and pain after left PICC line placement. She was treated for pain and transfused 2 units pRBC for anemia related to MDS. Patient was discharged on  afternoon. Early  morning , she was experiencing episodes of left abdominal spasms/tightness, followed by sensation of shortness of breath. She was found by the staff at [facility] to desaturate to 85-88% on RA. She reports palpitations. She denies lightheadedness, chest pain during episodes. She was placed on 2-2.5 LPM of supplemental oxygen with improvement of oxygen saturation. She also has been experiencing dysuria for the past 5 days. She has had issues with urinary retention. At [facility] she was straight cath with return of 500 cc of urine. She had been constipated and was placed on bowel regimen, then she had loose-watery stools on Friday-Saturday. She denies nausea. She has not have fevers, but endorses feeling cold. She has some residual mild cough. She was coughing more during episodes of spasms. She continues to experience some pain to left upper extremity. Prior admission 10/10-10/26/22 for evaluation of left shoulder and chest pain. She was evaluated by orthopedic and family requested starting naproxen for pain relief despite thrombocytopenia. During hospitalization patient developed acute hypoxic respiratory failure attributed to aspiration pneumonia for which she was treated with IV antibiotics.   Patient demonstrated anemia and thrombocytopenia for which she was evaluated by hematology and diagnosed with MDS. For atrial fibrillation, decision to not place patient on anticoagulation due to thrombocytopenia. Cardiology evaluated patient during hospitalization. She was to follow up with cardiology to determine if anticoagulation in the future is feasible. 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 Past Surgical History:  has a past surgical history that includes joint replacement (Bilateral, [address]); Cardiac surgery (2004); Cholecystectomy, laparoscopic (5/15/14); Revision total knee arthroplasty (Right, 11/22/2016); Total knee arthroplasty; knee surgery (Left, 02/28/2017); CT BIOPSY BONE MARROW (9/29/2022); Upper gastrointestinal endoscopy (N/A, 10/3/2022); and Upper gastrointestinal endoscopy (N/A, 10/3/2022). Discharge Recommendations: Larry Wilson scored a 6/24 on the AM-PAC ADL Inpatient form. Current research shows that an AM-PAC score of 17 or less is typically not associated with a discharge to the patient's home setting. Based on the patient's AM-PAC score and their current ADL deficits, it is recommended that the patient have 3-5 sessions per week of Occupational Therapy at d/c to increase the patient's independence. Please see assessment section for further patient specific details. If patient discharges prior to next session this note will serve as a discharge summary. Please see below for the latest assessment towards goals. DME Required For Discharge: DME to be determined at next level of care    Precautions/Restrictions: high fall risk, contact precautions, COVID R/O, MDRO  Weight Bearing Restrictions: no restrictions  [] Right Upper Extremity  [] Left Upper Extremity [] Right Lower Extremity  [] Left Lower Extremity     Required Braces/Orthotics:  L brace thumb spica for comfort   [] Right  [x] Left   Positional Restrictions:no positional restrictions  Left Brachial Vein DVT noted- arm wrapped in ace bandage at EOS. Pre-Admission Information   Lives With: alone                     Type of Home: house  Home Layout: one level  Home Access: ramped entry   [facility]: walker accessible, walk in shower  Bathroom Equipment: grab bars in shower, shower chair  Toilet Height: standard height  Home Equipment: rolling walker, rollator - 4 wheeled walker, manual wheelchair, electric wheelchair  Transfer Assistance: modified independent with use of RW  Ambulation Assistance:modified independent with use of manual w/c, RW for short distances   ADL Assistance: requires assistance with bathing, requires assistance with dressing  IADL Assistance: independent with homemaking tasks home health aide 4 hours/day   Active :        [] Yes                 [x] No  Hand Dominance: [] Left                 [x] Right  Current Employment: retired. Occupation:    Recent Falls: 1 slip out of wheelchair   *cameras present at home for children to monitor  Comments: After previous admission 10/10-10/26/22 [facility] however has only completed a couple sessions there d/t pt having to return to hospital. [facility] transferred to chair but has not been able to complete any ambulation.      Subjective  General: Patient supine in bed with family present upon arrival, agreeable to therapy co-treatment session with increased encouragement  Pain: 10/10, \"everywhere\"  Pain Interventions: pain medication in place prior to arrival and repositioned , heat pack applied to both shoulder, repositioning, and stretching neck   Activities of Daily Living  Basic Activities of Daily Living  Grooming: dependent requires verbal cueing Increased time to complete task  Grooming Comments:  washing hair with shower cap, brushing hair, and oral care with mouth wash, patient with increased time for completion d/t pain  Upper Extremity Bathing: dependent requires verbal cueing Increased time to complete task  Lower Extremity Bathing: dependent requires verbal cueing Increased time to complete task   Bathing Comments: LB sponge bathing supine in bed, UB sponge bathing while long sitting  Upper Extremity Dressing: dependent requires verbal cueing Increased time to complete task  Dressing Comments: gown change while long sitting  Toileting: dependent. Toileting Comments: catheter in place  General Comments:  patient with increased time needed for all functional ADLs, patient limited by anxiousness, fearfulness with movement and increased pain, max cues for active participation throughout ADL, pt stated \"no\" and \"don't touch me\". Pt educated on importance for active participation and pt is not receptive to OT education. Instrumental Activities of Daily Living  No IADL completed on this date. Functional Mobility  Bed Mobility  Supine to Sit: 2 person assistance with dependent/totalA   Comments: Pt seated in long sitting in bed, pt refusing to sit EOB. Seated upright for ~5-7 minutes with encouragement and emotional support, requires increased time d/t pain  Transfers  No transfers completed on this date secondary to pain/fatigue . Functional Mobility:  Sitting Balance: stand by assistance, minimal assistance.     Sitting Balance Comment: Cristina progressing to SBA, patient with increased generalized pain and anxiousness with sitting EOB, long sitting in bed  No functional mobility completed on this date secondary to pain/fatigue. Functional Outcomes  AM-PAC Inpatient Daily Activity Raw Score: 6    Cognition  Overall Cognitive Status: Impaired  Arousal/Alterness: appropriate responses to stimuli  Following Commands: follows one step commands with repetition, follows one step commands with increased time  Attention Span: appears intact  Memory: decreased recall of recent events  Safety Judgement: good awareness of safety precautions  Problem Solving: assistance required to generate solutions, assistance required to identify errors made  Insights: fully aware of deficits  Initiation: requires cues for some  Sequencing: requires cues for some  Comments: anxious, fearful of pain, screaming prior to movmenet. Orientation:    alert and oriented x 4  Command Following:   accurately follows one step commands  Education  Barriers To Learning: cognition and language  Patient Education: patient educated on goals, OT role and benefits, plan of care, ADL adaptive strategies, energy conservation, family education, disease specific education, pressure relief, transfer training, discharge recommendations, importance of OOB activity  Learning Assessment:  patient verbalizes and demonstrates understanding, patient verbalizes understanding, would benefit from continued reinforcement  Assessment  Activity Tolerance: Patient tolerated treatment poorly as she is limited by increased pain and anxiousness with functional activities. Pt stating \"no\" \"stop\" and \"don't touch me\" throughout .   Impairments Requiring Therapeutic Intervention: decreased functional mobility, decreased ADL status, decreased ROM, decreased strength, decreased safety awareness, decreased endurance, decreased balance, decreased IADL, decreased fine motor control, decreased coordination, increased pain, decreased posture  Prognosis: poor  Clinical Assessment: Pt is progressing poorly towards goals at this time as pt is limited by pain throughout entire body, decreased balance, decreased strength, decreased ROM, decreased activity tolerance, decreased endurance, anxiety, fearfulness, and cognition. Pt did not tolerate OT well at this time and requesting to \"stop\" multiple times throughout the session. POC modified at this time from 3-5x per week to 1-2x per week d/t poor progress towards goals, limited participation, and poor activity tolerance.   Safety Interventions: patient left in bed, bed alarm in place, call light within reach, nurse notified, and family/caregiver present    Plan  Frequency: 1-2 x/per week  Current Treatment Recommendations: strengthening, ROM, balance training, functional mobility training, transfer training, endurance training, patient/caregiver education, ADL/self-care training, pain management, safety education, equipment evaluation/education, and positioning    Goals  Patient Goals: to get better    Short Term Goals:  Time Frame: by d/c   Patient will complete upper body ADL at stand by assistance   Patient will complete lower body ADL at maximum assistance   Patient will complete toileting at maximum assistance   Patient will complete grooming at stand by assistance   Patient will complete functional transfers at maximum assistance   Patient will increase functional sitting balance to SBA for improved ADL completion    -Goals not yet met this date, 11/11/22    Therapy Session Time   Individual Group Co-treatment   Time In    0914   Time Out    0955   Minutes    41        Timed Code Treatment Minutes:  41 minutes     Total Treatment Minutes:  41 minutes total    Electronically Signed By: TRISTON Bishop OTR/L, [id number]

## 2022-11-11 NOTE — RT PROTOCOL NOTE
RT Inhaler-Nebulizer Bronchodilator Protocol Note    There is a bronchodilator order in the chart from a provider indicating to follow the RT Bronchodilator Protocol and there is an Initiate RT Inhaler-Nebulizer Bronchodilator Protocol order as well (see protocol at bottom of note). CXR Findings:  XR CHEST PORTABLE    Result Date: 11/9/2022  1. Technically suboptimal exam. 2. Mild congestive heart failure is a consideration given the radiographic findings; pneumonia is also a consideration in areas of consolidation with pleural effusion. 3. Calcific atherosclerosis aorta. 4. Cardiomegaly. The findings from the last RT Protocol Assessment were as follows:   History Pulmonary Disease: None or smoker <15 pack years  Respiratory Pattern: Regular pattern and RR 12-20 bpm  Breath Sounds: Slightly diminished and/or crackles  Cough: Strong, spontaneous, non-productive  Indication for Bronchodilator Therapy: Decreased or absent breath sounds, Wheezing associated with pulm disorder  Bronchodilator Assessment Score: 2    Aerosolized bronchodilator medication orders have been revised according to the RT Inhaler-Nebulizer Bronchodilator Protocol below. Respiratory Therapist to perform RT Therapy Protocol Assessment initially then follow the protocol. Repeat RT Therapy Protocol Assessment PRN for score 0-3 or on second treatment, BID, and PRN for scores above 3. No Indications - adjust the frequency to every 6 hours PRN wheezing or bronchospasm, if no treatments needed after 48 hours then discontinue using Per Protocol order mode. If indication present, adjust the RT bronchodilator orders based on the Bronchodilator Assessment Score as indicated below.   Use Inhaler orders unless patient has one or more of the following: on home nebulizer, not able to hold breath for 10 seconds, is not alert and oriented, cannot activate and use MDI correctly, or respiratory rate 25 breaths per minute or more, then use the equivalent nebulizer order(s) with same Frequency and PRN reasons based on the score. If a patient is on this medication at home then do not decrease Frequency below that used at home. 0-3 - enter or revise RT bronchodilator order(s) to equivalent RT Bronchodilator order with Frequency of every 4 hours PRN for wheezing or increased work of breathing using Per Protocol order mode. 4-6 - enter or revise RT Bronchodilator order(s) to two equivalent RT bronchodilator orders with one order with BID Frequency and one order with Frequency of every 4 hours PRN wheezing or increased work of breathing using Per Protocol order mode. 7-10 - enter or revise RT Bronchodilator order(s) to two equivalent RT bronchodilator orders with one order with TID Frequency and one order with Frequency of every 4 hours PRN wheezing or increased work of breathing using Per Protocol order mode. 11-13 - enter or revise RT Bronchodilator order(s) to one equivalent RT bronchodilator order with QID Frequency and an Albuterol order with Frequency of every 4 hours PRN wheezing or increased work of breathing using Per Protocol order mode. Greater than 13 - enter or revise RT Bronchodilator order(s) to one equivalent RT bronchodilator order with every 4 hours Frequency and an Albuterol order with Frequency of every 2 hours PRN wheezing or increased work of breathing using Per Protocol order mode. RT to enter RT Home Evaluation for COPD & MDI Assessment order using Per Protocol order mode.     Electronically signed by Zoraida Wooten RCP on 11/10/2022 at 8:48 PM

## 2022-11-11 NOTE — PROGRESS NOTES
Patient has audible crackles, was not started back on lasix. Message sent to Dr. Carrie Marvin. Family updated on patient's events/condition overnnight as well as the CT of the spine results.

## 2022-11-11 NOTE — PROGRESS NOTES
MD Narinder Avalos MD Assunta Perl, MD                  Office: [phone]                      Fax: [phone]             [facility]                   NEPHROLOGY INPATIENT PROGRESS NOTE:     PATIENT NAME: Beatriz Justice  : 1940  MRN: [id number]       RECOMMENDATIONS:    Kuhn was removed, but postvoid residual showed 600 cc so needed Kuhn reinsertion  -Monitor in 2 days, with postvoid residual after bladder scan,  -If still persistent, consider urology work-up,    - give lasix 20 mg IV trial w/ mild fluid overload concerns   - fup Ucx, IV abx as per primary team   - Stopped Entresto, w/ lower BP   - can resume on dc , if BP and renal fx tolerate. - monitor dig level, goal <2  - trend Mag level,   - ISS for BG control  - monitor anemia, goal Hb >7 PRBC as per primary team and hematology   - at higher risk for decompensation, needing closer monitoring. D/C plan from renal stand point: Improving from renal standpoint  - D/W pt, family, including daughter  - D/W team nurse, hospitalist     IMPRESSION:       Admitted on:  2022 11:47 PM   For:  Acute respiratory failure with hypoxemia ([address] [J96.01]  Dyspnea, unspecified type [R06.00]  Acute on chronic congestive heart failure, unspecified heart failure type ([address] [I50.9]  deep vein thrombosis involving the left proximal brachial vein    ARUN (NO H/O CKD): non-oligouric   - BL Scr- ~0.6-0.7  ->  1.8 on admission  - Etiology of ARUN - presumed pre-renal   - UA : results reviewed: abnormal, ? UTI  - Renal imaging: on admission w/ /CT abd/p - There are 2 punctate stones at the upper pole  the left kidney. There is a punctate stone at the mid right kidney. There  is no ureteral stone or hydronephrosis.     - reviewed urine lytes - likely hypovolemia initially     Associated problems:   - Volume status: mild hypervolemic  CXR - Cardiomegaly with pulmonary vascular congestion and interstitial prominence/interstitial opacities suggesting edema. Stable to the slightly worsened from the recent study.     : HTN : no need for tight control   : Na: hyponatremia - mild - chronic     - Azotemia: pre-renal   - Electrolytes: K: WNL  - Acid-Base: WNL  - Anemia: of likely chronic dz +  Thrombocytopenia - unlikely TMA type pic, r/o that if no improvement- hem following, MDS hx        Other major problems: Management per primary and other consulting teams. Hospital Problems             Last Modified POA    * (Principal) Acute hypoxemic respiratory failure ([address] 11/7/2022 Yes    Carotid artery disease without cerebral infarction ([address] 11/7/2022 Yes    Weight loss counseling, encounter for 80/70/3856 Yes    Complicated UTI (urinary tract infection) 11/8/2022 Yes    Chronic atrial fibrillation ([address] 11/7/2022 Yes    Myelodysplasia (myelodysplastic syndrome) ([address] 11/7/2022 Yes    HFrEF (heart failure with reduced ejection fraction) ([address] 11/7/2022 Yes    Non-English speaking patient 11/8/2022 Yes    Hypothyroidism 11/8/2022 Yes    History of DVT in adulthood 11/8/2022 Yes    Nephrolithiasis 11/8/2022 Yes    Atypical pneumonia 11/8/2022 Yes    Acute on chronic congestive heart failure ([address] 11/8/2022 Yes    Peripheral vascular disease ([address] 11/7/2022 Yes    Dyspnea 11/8/2022 Yes    ARUN (acute kidney injury) ([address] 11/7/2022 Yes    Hyperlipidemia associated with type 2 diabetes mellitus ([address] 11/7/2022 Yes    Hypertension associated with diabetes ([address] 11/7/2022 Yes    Morbid obesity with BMI of 40.0-44.9, adult ([address] 11/7/2022 Yes    Obesity, Class III, BMI 40-49.9 (morbid obesity) ([address] 11/8/2022 Yes    Type 2 diabetes mellitus ([address] 11/7/2022 Yes     : other supportive care :   - Check daily renal function panel with electrolytes-phosphorus  - Strict monitoring of I/Os, daily weight  - Renal feeds/diet  - Current medications reviewed. - Nephrotoxic medications have been discontinued. - Dose adjusted and appropriate.      - Dose meds for eGFR <15 mL/min/1.73m2 during ARUN    - Avoid heavy opioids due to renal failure - may use very low dose dilaudid / fentanyl with close monitoring of CNS and respiratory depression. Please refer to the orders. High Complexity. Multiple complex problems. Discussed with patient and treatment team-  family at bedside, hospitalist   Thank you for allowing me to participate in this patient's care. Please do not hesitate to contact me anytime. We will follow along with you. Joleen Lee MD,  [facility] [address] [place] [address] [place]: [phone] or Via [employer]  Fax: [phone]        =======================================================================================   =======================================================================================  Subjective / interval history:   Patient was seen comfortably resting in bed  Currently no active complaints,   Renal function better  No SOB   Leg edema +   NC better 2to 1-1.5L NC. Kuhn was removed, but postvoid residual showed 600 cc so needed Kuhn reinsertion  -Monitor in 2 days, with postvoid residual after bladder scan,  -If still persistent, consider urology work-up,      Past medical, Surgical, Social, Family medical history reviewed by me. MEDICATIONS: reviewed by me. Medications Prior to Admission:  No current facility-administered medications on file prior to encounter. Current Outpatient Medications on File Prior to Encounter   Medication Sig Dispense Refill    Ipratropium-Albuterol (DUONEB IN) Inhale 3 mLs into the lungs every 4 hours as needed (SOB)      famotidine (PEPCID) 20 MG tablet Take 20 mg by mouth daily      HYDROcodone-acetaminophen (NORCO) 5-325 MG per tablet Take 1 tablet by mouth every 6 hours as needed for Pain.       lactobacillus (CULTURELLE) capsule Take 2 capsules by mouth daily      omeprazole (PRILOSEC) 20 MG delayed release capsule Take 20 mg by mouth in the morning and at bedtime polyethylene glycol (GLYCOLAX) 17 g packet Take 17 g by mouth daily as needed for Constipation      silver sulfADIAZINE (SILVADENE) 1 % cream Apply topically 2 times daily Apply to inner buttocks every shift for MASD      digoxin (LANOXIN) 125 MCG tablet Take 1 tablet by mouth daily 30 tablet 3    guaiFENesin (MUCINEX) 600 MG extended release tablet Take 1 tablet by mouth 2 times daily as needed for Congestion 60 tablet 0    insulin lispro (HUMALOG) 100 UNIT/ML SOLN injection vial Inject 6 Units into the skin 3 times daily (with meals) 2 each 0    sacubitril-valsartan (ENTRESTO) 24-26 MG per tablet Take 0.5 tablets by mouth 2 times daily 60 tablet 1    furosemide (LASIX) 20 MG tablet Take 1 tablet by mouth daily 60 tablet 3    gabapentin (NEURONTIN) 100 MG capsule Take 1 capsule by mouth 3 times daily for 30 days. 90 capsule 0    metoprolol tartrate (LOPRESSOR) 25 MG tablet Take 0.5 tablets by mouth 2 times daily 60 tablet 3    empagliflozin (JARDIANCE) 10 MG tablet Take 1 tablet by mouth daily 30 tablet 3    insulin glargine (BASAGLAR KWIKPEN) 100 UNIT/ML injection pen Inject 48 Units into the skin daily 5 Adjustable Dose Pre-filled Pen Syringe 3    calcium carbonate (OSCAL) 500 MG TABS tablet Take 500 mg by mouth daily      senna (SENOKOT) 8.6 MG tablet Take 1 tablet by mouth 2 times daily      magnesium 30 MG tablet Take 40 mg by mouth daily      tiZANidine (ZANAFLEX) 2 MG tablet Take 2 mg by mouth every 8 hours as needed      atorvastatin (LIPITOR) 80 MG tablet Take 1 tablet by mouth daily 90 tablet 4    Insulin Pen Needle 32G X 6 MM MISC by Does not apply route 3 times daily with meals      vitamin D (ERGOCALCIFEROL) 64646 UNITS CAPS capsule Take 50,000 Units by mouth once a week      Omega 3 1000 MG CAPS Take 1,000 mg by mouth daily      Liraglutide (VICTOZA) 18 MG/3ML SOPN SC injection Inject 1.2 mg into the skin daily      lidocaine (LIDODERM) 5 % Place 1 patch onto the skin daily 12 hours on, 12 hours off. 30 patch 0    oxybutynin (DITROPAN XL) 15 MG CR tablet Take 15 mg by mouth daily.       levothyroxine (SYNTHROID) 175 MCG tablet Take 175 mcg by mouth daily           Current Facility-Administered Medications:     furosemide (LASIX) injection 20 mg, 20 mg, IntraVENous, Once, Sirena Lopez MD    butalbital-acetaminophen-caffeine (FIORICET, ESGIC) per tablet 1 tablet, 1 tablet, Oral, Dinner, Tahira Maldonado MD    insulin lispro (HUMALOG) injection vial 0-8 Units, 0-8 Units, SubCUTAneous, TID WC, Rosalee [place], DO, 2 Units at 11/11/22 0918    insulin lispro (HUMALOG) injection vial 0-4 Units, 0-4 Units, SubCUTAneous, Nightly, Christiano Padron, DO    0.9 % sodium chloride infusion, , IntraVENous, PRN, Irven Sports, APRN - CNP    0.9 % sodium chloride infusion, , IntraVENous, PRN, Rosalee Ocean, DO    ipratropium-albuterol (DUONEB) nebulizer solution 1 ampule, 1 ampule, Inhalation, Q4H PRN, Delores Baeza MD, 1 ampule at 11/11/22 0838    insulin glargine (LANTUS) injection vial 44 Units, 44 Units, SubCUTAneous, Daily, Tahira Maldonado MD, 44 Units at 11/11/22 0917    lidocaine 4 % external patch 1 patch, 1 patch, TransDERmal, Daily, Tahira Maldonado MD, 1 patch at 11/11/22 0917    cefTRIAXone (ROCEPHIN) 2,000 mg in dextrose 5 % 50 mL IVPB mini-bag, 2,000 mg, IntraVENous, Q24H, Jakub Mcmullen MD, Stopped at 11/10/22 1710    insulin lispro (HUMALOG) injection vial 8 Units, 8 Units, SubCUTAneous, TID WC, Alejandro Torres MD, 8 Units at 11/11/22 0918    lidocaine PF 1 % injection 5 mL, 5 mL, IntraDERmal, Once, Tahira Maldonado MD    sodium chloride flush 0.9 % injection 5-40 mL, 5-40 mL, IntraVENous, 2 times per day, Tahira Maldonado MD, 20 mL at 11/11/22 0918    sodium chloride flush 0.9 % injection 5-40 mL, 5-40 mL, IntraVENous, PRN, Tahira Maldonado MD    0.9 % sodium chloride infusion, 25 mL, IntraVENous, PRN, Tahira Maldonado MD    dextrose bolus 10% 125 mL, 125 mL, IntraVENous, PRN **OR** dextrose bolus 10% 250 mL, 250 mL, IntraVENous, PRN, Luis Levytone, DO    glucagon (rDNA) injection 1 mg, 1 mg, SubCUTAneous, PRN, Luis Levytone, DO    dextrose 10 % infusion, , IntraVENous, Continuous PRN, Luis Levytone, DO    atorvastatin (LIPITOR) tablet 80 mg, 80 mg, Oral, Nightly, Luis Pilolstone, DO, 80 mg at 11/10/22 2010    calcium elemental (OSCAL) tablet 500 mg, 500 mg, Oral, Daily, Luis Hailstone, DO, 500 mg at 11/11/22 1236    digoxin (LANOXIN) tablet 125 mcg, 125 mcg, Oral, Daily, Luis Pilolstone, DO, 125 mcg at 11/11/22 0916    famotidine (PEPCID) tablet 20 mg, 20 mg, Oral, Daily, Luis Pilolstone, DO, 20 mg at 11/10/22 2010    gabapentin (NEURONTIN) capsule 100 mg, 100 mg, Oral, TID, Luis Daiglelstone, DO, 100 mg at 11/11/22 0916    guaiFENesin (MUCINEX) extended release tablet 600 mg, 600 mg, Oral, BID PRN, Luis Daiglelstone, DO    HYDROcodone-acetaminophen (NORCO) 5-325 MG per tablet 1 tablet, 1 tablet, Oral, Q6H PRN, Luis Levytone, DO, 1 tablet at 11/09/22 1422    levothyroxine (SYNTHROID) tablet 175 mcg, 175 mcg, Oral, QAM AC, Christiano Terlep, DO, 175 mcg at 11/11/22 0504    metoprolol tartrate (LOPRESSOR) tablet 12.5 mg, 12.5 mg, Oral, BID, Christiano Terlep, DO, 12.5 mg at 11/11/22 0916    pantoprazole (PROTONIX) tablet 40 mg, 40 mg, Oral, QAM AC, Christiano Terlep, DO, 40 mg at 11/11/22 0505    tiZANidine (ZANAFLEX) tablet 2 mg, 2 mg, Oral, Q8H PRN, Luis Levytone, DO    sodium chloride flush 0.9 % injection 5-40 mL, 5-40 mL, IntraVENous, 2 times per day, Luis Huynh DO, 10 mL at 11/11/22 0919    sodium chloride flush 0.9 % injection 5-40 mL, 5-40 mL, IntraVENous, PRN, Christiano Padron DO, 20 mL at 11/07/22 1643    ondansetron (ZOFRAN-ODT) disintegrating tablet 4 mg, 4 mg, Oral, Q8H PRN **OR** ondansetron (ZOFRAN) injection 4 mg, 4 mg, IntraVENous, Q6H PRN, Luis Huynh DO    polyethylene glycol (GLYCOLAX) packet 17 g, 17 g, Oral, Daily PRN, Luis Huynh DO    acetaminophen (TYLENOL) tablet 650 mg, 650 mg, Oral, Q6H PRN, 650 mg at 11/09/22 5627 **OR** acetaminophen (TYLENOL) suppository 650 mg, 650 mg, Rectal, Q6H PRN, India Schlein, DO    sodium phosphate 10 mmol in sodium chloride 0.9 % 250 mL IVPB, 10 mmol, IntraVENous, PRN **OR** sodium phosphate 15 mmol in sodium chloride 0.9 % 250 mL IVPB, 15 mmol, IntraVENous, PRN **OR** sodium phosphate 20 mmol in sodium chloride 0.9 % 500 mL IVPB, 20 mmol, IntraVENous, PRN, India Schlein, DO    magnesium sulfate 2000 mg in 50 mL IVPB premix, 2,000 mg, IntraVENous, PRN, India Schlein, DO, Stopped at 11/07/22 1611    potassium chloride (KLOR-CON M) extended release tablet 40 mEq, 40 mEq, Oral, PRN **OR** potassium bicarb-citric acid (EFFER-K) effervescent tablet 40 mEq, 40 mEq, Oral, PRN **OR** potassium chloride 10 mEq/100 mL IVPB (Peripheral Line), 10 mEq, IntraVENous, PRN, India Schlein, DO    morphine (PF) injection 2 mg, 2 mg, IntraVENous, Q4H PRN, India Schjennifern, DO, 2 mg at 11/11/22 0504    heparin (porcine) injection 5,000 Units, 5,000 Units, SubCUTAneous, Q12H, Jesús Galvez MD    methocarbamol (ROBAXIN) tablet 500 mg, 500 mg, Oral, q8h, Jesús Galvez MD, 500 mg at 11/10/22 1540    0.9 % sodium chloride infusion, , IntraVENous, PRN, Jesús Galvez MD    diatrizoate meglumine-sodium (GASTROGRAFIN) 66-10 % solution 20 mL, 20 mL, Oral, ONCE PRN, Jesús Galvez MD, 20 mL at 11/07/22 1448    polyethylene glycol (GLYCOLAX) packet 17 g, 17 g, Oral, BID, Jesús Galvez MD, 17 g at 11/11/22 0917    docusate sodium (COLACE) capsule 100 mg, 100 mg, Oral, BID, Jesús Galvez MD, 100 mg at 11/11/22 0916        REVIEW OF SYSTEMS:  As mentioned in chief complaint and HPI , Subjective         =======================================================================================     PHYSICAL EXAM:  Recent vital signs and recent I/Os reviewed by me.      Wt Readings from Last 3 Encounters:   11/11/22 238 lb 1.6 oz (108 kg)   11/05/22 227 lb 4.7 oz (103.1 kg)   10/26/22 244 lb 12.8 oz (111 kg)     BP Readings from Last 3 Encounters: 11/11/22 (!) 147/79   11/05/22 (!) 100/52   11/02/22 123/64     Patient Vitals for the past 24 hrs:   BP Temp Temp src Pulse Resp SpO2 Weight   11/11/22 0910 (!) 147/79 -- -- (!) 112 28 96 % --   11/11/22 0908 -- 97.1 °F (36.2 °C) Temporal (!) 103 22 96 % --   11/11/22 0838 -- -- -- 100 22 97 % --   11/11/22 0400 120/82 97.6 °F (36.4 °C) Temporal 91 23 95 % 238 lb 1.6 oz (108 kg)   11/11/22 0215 130/63 96.8 °F (36 °C) Temporal (!) 109 21 93 % --   11/11/22 0000 (!) 121/46 -- -- -- -- 96 % --   11/10/22 2327 103/73 96.8 °F (36 °C) Temporal 90 24 -- --   11/10/22 2305 (!) 112/58 (!) 96.6 °F (35.9 °C) -- 91 20 97 % --   11/10/22 2000 (!) 135/53 96.8 °F (36 °C) Temporal 90 24 96 % --   11/10/22 1845 133/64 98.3 °F (36.8 °C) Temporal 92 18 95 % --   11/10/22 1737 (!) 136/48 98.9 °F (37.2 °C) Temporal 94 14 95 % --   11/10/22 1716 128/67 98.1 °F (36.7 °C) Temporal 96 16 96 % --   11/10/22 1644 128/67 97.7 °F (36.5 °C) Temporal 92 18 94 % --   11/10/22 1511 -- -- -- 87 16 97 % --   11/10/22 1215 139/69 97.3 °F (36.3 °C) Temporal 90 16 93 % --   11/10/22 1200 139/69 97.9 °F (36.6 °C) Temporal 80 17 93 % --   11/10/22 1134 -- -- -- 86 16 92 % --         Intake/Output Summary (Last 24 hours) at 11/11/2022 1042  Last data filed at 11/11/2022 0434  Gross per 24 hour   Intake 817.5 ml   Output 2025 ml   Net -1207.5 ml       Physical Exam  General: no acute distress  HENT: Atraumatic, normocephalic   Eyes: Normal conjunctiva, Non-incteral sclera. Neck: Supple, JVD not visible. CVS:  Heart sounds are normal. No loud murmur. RS: Normal respiratory effort, Breat sound: diminished at bases. Abd: Soft , bowel sounds are normal,    distension and no tenderness .    Skin: No rash , some bruises,   CNS: Awake Oriented , No focal.   Extremities/MSK:  Edema, no cyanosis.     =======================================================================================     DATA:  Diagnostic tests reviewed by me for today's visit:   (AS NEEDED FOR MY EVALUATION AND MANAGEMENT). Recent Labs     11/09/22  0500 11/10/22  1235 11/11/22  0420   WBC 8.0 10.8 11.6*   HCT 21.9* 20.5* 27.2*   PLT 25* 32* 28*       Iron Saturation:  No components found for: PERCENTFE  FERRITIN:    Lab Results   Component Value Date/Time    FERRITIN 1,025.0 09/26/2022 04:34 AM     IRON:    Lab Results   Component Value Date/Time    IRON 46 09/25/2022 05:02 AM     TIBC:    Lab Results   Component Value Date/Time    TIBC 230 09/25/2022 05:02 AM       Recent Labs     11/09/22  0500 11/10/22  1135 11/11/22  0420   * 132* 145   K 4.6 4.8 5.1   CL 98* 96* 106   CO2 29 28 30   BUN 37* 26* 22*   CREATININE 1.0 0.8 0.6       Recent Labs     11/08/22 2056 11/09/22  0500 11/10/22  1135 11/11/22  0420   CALCIUM  --  8.3 8.2* 8.2*   MG 3.00* 2.60*  --  1.80   PHOS 2.7 2.9  --  2.5       No results for input(s): PH, PCO2, PO2 in the last 72 hours.     Invalid input(s): Brandan Castellon    ABG:  No results found for: PH, PCO2, PO2, HCO3, BE, THGB, TCO2, O2SAT  VBG:    Lab Results   Component Value Date/Time    PHVEN 7.495 11/07/2022 12:56 AM    TRA8ZON 35.0 11/07/2022 12:56 AM    BEVEN 3.5 11/07/2022 12:56 AM    D3WSKWGR 100 11/07/2022 12:56 AM       LDH:    Lab Results   Component Value Date/Time     10/14/2022 04:50 PM     Uric Acid:    Lab Results   Component Value Date/Time    LABURIC 6.8 09/26/2022 04:34 AM       PT/INR:    Lab Results   Component Value Date/Time    PROTIME 16.2 11/11/2022 04:20 AM    INR 1.31 11/11/2022 04:20 AM     Warfarin PT/INR:  No components found for: PTPATWAR, PTINRWAR  PTT:    Lab Results   Component Value Date/Time    APTT 36.6 11/11/2022 04:20 AM   [APTT}  Last 3 Troponin:    Lab Results   Component Value Date/Time    TROPONINI 0.15 11/07/2022 12:50 PM    TROPONINI 0.13 11/07/2022 12:56 AM    TROPONINI 0.09 11/03/2022 06:18 PM       U/A:    Lab Results   Component Value Date/Time    COLORU DARK YELLOW 11/07/2022 01:45 PM PROTEINU 300 11/07/2022 01:45 PM    PHUR 7.5 11/07/2022 01:45 PM    WBCUA 4947 11/07/2022 01:45 PM    RBCUA 141 11/07/2022 01:45 PM    MUCUS 1+ 05/16/2014 11:50 PM    BACTERIA 4+ 11/07/2022 01:45 PM    CLARITYU TURBID 11/07/2022 01:45 PM    SPECGRAV 1.020 11/07/2022 01:45 PM    LEUKOCYTESUR LARGE 11/07/2022 01:45 PM    UROBILINOGEN 1.0 11/07/2022 01:45 PM    BILIRUBINUR MODERATE 11/07/2022 01:45 PM    BILIRUBINUR NEGATIVE 03/09/2012 06:51 AM    BLOODU MODERATE 11/07/2022 01:45 PM    GLUCOSEU 100 11/07/2022 01:45 PM    GLUCOSEU NEGATIVE 03/09/2012 06:51 AM     Microalbumen/Creatinine ratio:  No components found for: RUCREAT  24 Hour Urine for Protein:  No components found for: RAWUPRO, UHRS3, SYXL27HP, UTV3  24 Hour Urine for Creatinine Clearance:  No components found for: CREAT4, UHRS10, UTV10  Urine Toxicology:  No components found for: Gae Helm, IBENZO, ICOCAINE, IMARTHC, IOPIATES, IPHENCYC    HgBA1c:    Lab Results   Component Value Date/Time    LABA1C 8.0 11/07/2022 12:50 PM     RPR:  No results found for: RPR  HIV:  No results found for: HIV  NICOLASA:  No results found for: ANATITER, NICOLASA  RF:  No results found for: RF  DSDNA:  No components found for: DNA  AMYLASE:    Lab Results   Component Value Date/Time    AMYLASE 50 05/09/2014 07:45 PM     LIPASE:    Lab Results   Component Value Date/Time    LIPASE 21.0 11/03/2022 06:18 PM     Fibrinogen Level:  No components found for: FIB       BELOW MENTIONED RADIOLOGY STUDY RESULTS BY ME (AS NEEDED FOR MY EVALUATION AND MANAGEMENT). CT ABDOMEN PELVIS WO CONTRAST Additional Contrast? Oral    Result Date: 11/7/2022  EXAMINATION: CT OF THE ABDOMEN AND PELVIS WITHOUT CONTRAST 11/7/2022 3:56 pm TECHNIQUE: CT of the abdomen and pelvis was performed without the administration of intravenous contrast. Multiplanar reformatted images are provided for review.  Automated exposure control, iterative reconstruction, and/or weight based adjustment of the mA/kV was utilized to reduce the radiation dose to as low as reasonably achievable. COMPARISON: 07/08/2015. HISTORY: ORDERING SYSTEM PROVIDED HISTORY: Abd pain TECHNOLOGIST PROVIDED HISTORY: Oral contrast; NO IV contrast Reason for exam:->Abd pain Additional Contrast?->Oral Reason for Exam: abdomen pain FINDINGS: Lower Chest: Linear atelectasis or scarring is noted at the lung bases. There is no pleural effusion. Organs: The liver, spleen, adrenal glands and pancreas appear normal.  There has been a cholecystectomy. There are 2 punctate stones at the upper pole the left kidney. There is a punctate stone at the mid right kidney. There is no ureteral stone or hydronephrosis. GI/Bowel: There is a normal appendix. There is a moderate amount of stool and air in the colon proximally. There is no bowel dilatation or bowel wall thickening. The stomach is unremarkable. Pelvis: The bladder is nondistended and contains a Kuhn catheter. No abnormal adnexal mass is identified. Peritoneum/Retroperitoneum: There is advanced multifocal fibrocalcific plaque involving the abdominal aorta and its branches without aneurysm. There are no enlarged lymph nodes. No fat stranding, free fluid, free air or focal fluid collection is identified. Bones/Soft Tissues: No destructive bone lesion is identified. 1. No acute findings in the abdomen or pelvis. 2. Moderate amount of stool and air in the colon proximally which could reflect constipation. 3. Punctate bilateral intrarenal calculi. CT HEAD WO CONTRAST    Result Date: 11/7/2022  EXAMINATION: CT OF THE HEAD WITHOUT CONTRAST  11/7/2022 12:25 am TECHNIQUE: CT of the head was performed without the administration of intravenous contrast. Automated exposure control, iterative reconstruction, and/or weight based adjustment of the mA/kV was utilized to reduce the radiation dose to as low as reasonably achievable. COMPARISON: 10/13/2022.  HISTORY: ORDERING SYSTEM PROVIDED HISTORY: confusion TECHNOLOGIST PROVIDED HISTORY: Reason for exam:->confusion Has a \"code stroke\" or \"stroke alert\" been called? ->No Decision Support Exception - unselect if not a suspected or confirmed emergency medical condition->Emergency Medical Condition (MA) Reason for Exam: Shortness of Breath ([facility], per EMS Pt sating at 88% on RA. Pt is usually on RA per baseline. Pt satting at 92% RA. Pt recently discharged for Picc Line placement due to reoccurring blood transfusion due to MDS. Per daughter at bedside pt is acting normally. Does not appear to be lethargic or confused at this time. VSS.  ) FINDINGS: BRAIN/VENTRICLES: There is no acute intracranial hemorrhage or extra-axial fluid collection. Generalized volume loss is redemonstrated there are prominent CSF spaces over the cerebral hemispheres, greater at the anterior and temporal margins than the parietooccipital margin. There is no mass effect or midline shift. Ventricular size and configuration are stable. No definite sign for acute territorial infarct. There are some chronic ischemic changes in the periventricular and deep white matter. Atherosclerotic calcifications at the intracranial arteries. ORBITS: The visualized portion of the orbits demonstrate no acute abnormality. SINUSES: Included paranasal sinuses have minimal mucosal thickening. Partial opacification in the left mastoid air cells and edge of the middle ear cavity is new from the prior exam. SOFT TISSUES/SKULL:  No acute abnormality of the visualized skull or soft tissues. No acute intracranial hemorrhage, mass effect, midline shift, or signs of acute territorial infarct. Generalized volume loss and chronic ischemic changes in the white matter are stable. Partial opacification in the left mastoid air cells and edge of the middle ear cavity are new from the prior exam.  Correlate for infectious symptoms versus eustachian tube dysfunction. PICC line with the tip over the expected SVC. The position is not appear significantly changed from the prior exam.  No endotracheal tube or nasogastric tube. Cardiac silhouette remains enlarged with pulmonary vascular congestion. Prominent interstitial markings and scattered interstitial opacities. No large effusion but small amounts are not excluded at the costophrenic angles. There is no sign of pneumothorax. Underpenetration of the bones and limited bony evaluation. Left PICC line appears to be over the SVC and not significantly changed from the prior exam. Cardiomegaly with pulmonary vascular congestion and interstitial prominence/interstitial opacities suggesting edema. Stable to the slightly worsened from the recent study. VL Extremity Venous Bilateral    Result Date: 11/7/2022  Vascular Upper Extremities Veins Procedure -- PRELIMINARY SONOGRAPHER REPORT --   Demographics   Patient Name      Joy Hardin   Date of Study     11/07/2022          Gender              Female   Patient Number    [id number]          Date of Birth       1940   Visit Number      [id number]           Age                 78 year(s)   Accession Number  [id number]          Room Number         0010   Corporate ID      [id number]            Dasha Uribe RVT, RT   Ordering          Hoda Becker DO  Interpreting        Benjamin Alan MD  Physician                             Physician  Procedure Type of Study:   Veins:Upper Extremities Veins, VASC EXTREMITY VENOUS DUPLEX BILATERAL. Tech Comments Right No evidence of deep vein thrombosis or superficial vein thrombosis of the right upper extremity Left Acute totally occluded deep vein thrombosis involving the left proximal brachial vein. . No other evidence of deep vein thrombosis or superficial vein thrombosis of the left upper extremity.             This report was transcribed using voice recognition software, mainly. So please excuse brevity and/or typos. Every effort was made to ensure accuracy, however, inadvertent computerized transcription errors may be present. Please contact us, if any questions or clarifications are needed.

## 2022-11-11 NOTE — PROGRESS NOTES
Infectious Diseases   Progress Note      Admission Date: 11/6/2022  Hospital Day: Hospital Day: 6   Attending: Jami Guerra DO  Date of service: 11/11/2022     Chief complaint/ Reason for consult:     Concern for atypical pneumonia and shortness of breath  PICC line in place in the left arm  Acute DVT of the left brachial vein  Complicated urinary tract infection with positive nitrite and leukocyte esterase and 4+ bacteria on urinalysis  Bilateral renal calculi  Negative COVID-19 influenza AMB rapid Moni nucleic acid assay test  Acute kidney injury    Microbiology:        I have reviewed allavailable micro lab data and cultures    Blood culture (2/2) - collected on 11/6/2022: Negative    Urine culture  - collected on 11/6/2022: 25,000 CFU per mL of Proteus mirabilis  Susceptibility    Proteus mirabilis (1)    Antibiotic Interpretation Microscan  Method Status    amoxicillin-clavulanate Intermediate 16/8 mcg/mL BACTERIAL SUSCEPTIBILITY PANEL BY EVA     ampicillin Resistant >16 mcg/mL BACTERIAL SUSCEPTIBILITY PANEL BY EVA     ampicillin-sulbactam Resistant >16/8 mcg/mL BACTERIAL SUSCEPTIBILITY PANEL BY EVA     ceFAZolin Resistant 8 mcg/mL BACTERIAL SUSCEPTIBILITY PANEL BY EVA     cefepime Sensitive <=2 mcg/mL BACTERIAL SUSCEPTIBILITY PANEL BY EVA     cefTRIAXone Sensitive <=1 mcg/mL BACTERIAL SUSCEPTIBILITY PANEL BY EVA     cefuroxime Sensitive <=4 mcg/mL BACTERIAL SUSCEPTIBILITY PANEL BY EVA     ciprofloxacin Intermediate 2 mcg/mL BACTERIAL SUSCEPTIBILITY PANEL BY EVA     ertapenem Sensitive <=0.5 mcg/mL BACTERIAL SUSCEPTIBILITY PANEL BY EVA     gentamicin Resistant >8 mcg/mL BACTERIAL SUSCEPTIBILITY PANEL BY EVA     meropenem Sensitive <=1 mcg/mL BACTERIAL SUSCEPTIBILITY PANEL BY EVA     nitrofurantoin Resistant >64 mcg/mL BACTERIAL SUSCEPTIBILITY PANEL BY EVA     piperacillin-tazobactam Sensitive <=16 mcg/mL BACTERIAL SUSCEPTIBILITY PANEL BY EVA     tobramycin Resistant >8 mcg/mL BACTERIAL SUSCEPTIBILITY PANEL BY EVA     trimethoprim-sulfamethoxazole Resistant >2/38 mcg/mL BACTERIAL SUSCEPTIBILITY PANEL BY EVA         Antibiotics and immunizations:       Current antibiotics: All antibiotics and their doses were reviewed by me    Recent Abx Admin                     cefTRIAXone (ROCEPHIN) 2,000 mg in dextrose 5 % 50 mL IVPB mini-bag (mg) 2,000 mg New Bag 11/10/22 1640                      Immunization History: All immunization history was reviewed by me today. Immunization History   Administered Date(s) Administered    Influenza Vaccine, unspecified formulation 09/01/2016    Influenza Virus Vaccine 10/22/2011    Pneumococcal Polysaccharide ([id number]) 02/11/2012       Known drug allergies: All allergies were reviewed and updated    Allergies   Allergen Reactions    Aspirin Other (See Comments)     GI upset    Ibuprofen Other (See Comments)     GI upset    Macrobid [Nitrofurantoin Monohydrate Macrocrystals] Other (See Comments)     COLD, SICK    Adhesive Tape Rash    Lexiscan [Regadenoson] Rash     Happened twice with Lexiscan    Penicillins Rash       Social history:     Social History:  All social andepidemiologic history was reviewed and updated by me today as needed. Tobacco use:   reports that she has never smoked. She has never used smokeless tobacco.  Alcohol use:   reports no history of alcohol use. Currently lives in: [facility]   reports no history of drug use.      COVID VACCINATION AND LAB RESULT RECORDS:     Internal Administration   First Dose      Second Dose           Last COVID Lab SARS-CoV-2 RNA, RT PCR (no units)   Date Value   11/08/2022 NOT DETECTED     SARS-CoV-2, NAAT (no units)   Date Value   11/03/2022 Not Detected            Assessment:     The patient is a 78 y.o. old female who  has a past medical history of 4/11/17 Left knee repeat repair of median parapatellar arthrotomy with augmentation (4/12/2017), Arthritis, Aspiration pneumonia of left lower lobe due to gastric secretions ([facility] (10/18/2022), Atrial fibrillation ([facility] CAD (coronary artery disease), Cataract, Chronic diastolic congestive heart failure ([facility] (7/27/2022), Diabetes mellitus ([facility] GERD (gastroesophageal reflux disease), Hyperlipidemia, Hypertension, HYPOTHRYROIDISM, Myelodysplastic disease ([facility] (10/17/2022), Non-English speaking patient, Sleep apnea, Thyroid disease, and UTI. with following problems:    Concern for atypical pneumonia and shortness of breath-pneumonia was ruled out  PICC line in place in the left arm  Acute DVT of the left brachial vein-primary hematology 20  Complicated urinary tract infection with positive nitrite and leukocyte esterase and 4+ bacteria on urinalysis-urine culture only grew 25,000 CFU per mL of Proteus mirabilis  Bilateral renal calculi  Negative COVID-19 influenza A&B rapid Moni nucleic acid assay test  Acute kidney injury-resolved  Coronary artery disease-stable  Essential hypertension  Mixed hyperlipidemia  Hypothyroidism  Myelodysplastic syndrome  Obstructive sleep apnea  Non-English-speaking patient  Obesity Class 3 due to excess calorie intake : Body mass index is 46.59 kg/m².-Counseling done      Discussion:      The patient is afebrile. 2 sets of blood cultures from 11/8/2022 remain negative. The patient is on IV ceftriaxone. White cell count is 11,600 today. Urine culture from 11/7/2022 grew 25,000 CFU per mL of Proteus mirabilis. Serum creatinine 0.6 today. Liver functions are okay. Platelet count is 41,771 today    CT of the cervical spine done yesterday did not show any traumatic fractures. Showed severe degenerative changes at C1-C2 articulation      Plan:     Diagnostic Workup:      Continue to follow  fever curve, WBC count and blood cultures. Continue to monitor blood counts, liver and renal function.     Antimicrobials:    Neck pain appears musculoskeletal in nature in setting of severe degenerative disease of the cervical spine  Will stop IV ceftriaxone today. Patient has received IV antibiotics for 5 days. I do not think she needs any further antibiotics for the UTI  Continue close vitals monitoring. Maintain good glycemic control. Fall precautions. Aspiration precautions. Continue to watch for new fever or diarrhea. DVT prophylaxis. Discussed all above with patient and RN. Drug Monitoring:    Continue monitoring for antibiotic toxicity as follows: CBC, CMP   Continue to watch for following: new or worsening fever, new hypotension, hives, lip swelling and redness or purulence at vascular access sites. I/v access Management:    Continue to monitor i.v access sites for erythema, induration, discharge or tenderness. As always, continue efforts to minimize tubes/lines/drains as clinically appropriate to reduce chances of line associated infections. Patient education and counseling: The patient was educated in detail about the side-effects of various antibiotics and things to watch for like new rashes, lip swelling, severe reaction, worsening diarrhea, break through fever etc.  Discussed patient's condition and what to expect. All of the patient's questions were addressed in a satisfactory manner and patient verbalized understanding all instructions. Thanks for allowing me to participate in your patient's care. I will sign off today, but will be available to answer any further questions or concerns that may arise during patient's stay in the hospital.        Subjective: Interval history: Interval history was obtained from chart review and patient/ RN. The patient has ongoing neck pain. She is tolerating ceftriaxone okay. REVIEW OF SYSTEMS:      Review of Systems   Constitutional:  Positive for fatigue. Negative for chills, diaphoresis and fever. HENT:  Negative for ear discharge, ear pain, postnasal drip, rhinorrhea, sinus pressure, sinus pain and sore throat. Eyes:  Negative for discharge and redness. Respiratory:  Negative for cough, shortness of breath and wheezing. Cardiovascular:  Negative for chest pain and leg swelling. Gastrointestinal:  Negative for abdominal pain, constipation, diarrhea and nausea. Endocrine: Negative for cold intolerance, heat intolerance and polydipsia. Genitourinary:  Negative for dysuria, flank pain, frequency, hematuria and urgency. Musculoskeletal:  Positive for neck pain. Negative for back pain and myalgias. Skin:  Negative for rash. Allergic/Immunologic: Negative for immunocompromised state. Neurological:  Negative for dizziness, seizures and headaches. Hematological:  Does not bruise/bleed easily. Psychiatric/Behavioral:  Negative for agitation, hallucinations and suicidal ideas. The patient is not nervous/anxious. All other systems reviewed and are negative. Past Medical History: All past medical history reviewed today. Past Medical History:   Diagnosis Date    4/11/17 Left knee repeat repair of median parapatellar arthrotomy with augmentation 4/12/2017    Arthritis     Aspiration pneumonia of left lower lobe due to gastric secretions ([address] 10/18/2022    Atrial fibrillation (HCC)     CAD (coronary artery disease)     Cataract     Chronic diastolic congestive heart failure ([address] 7/27/2022    Diabetes mellitus ([address]     GERD (gastroesophageal reflux disease)     Hyperlipidemia     Hypertension     HYPOTHRYROIDISM     Myelodysplastic disease ([address] 10/17/2022    Non-English speaking patient     SPEAKS Irish. SHE SPEAKS A LITTLE ENGLISH    Sleep apnea     unspecified    Thyroid disease     UTI        Past Surgical History: All past surgical history was reviewed today.     Past Surgical History:   Procedure Laterality Date    CARDIAC SURGERY  2004    CABG X 4 VESSELS    CHOLECYSTECTOMY, LAPAROSCOPIC  5/15/14    with IOC    CT BONE MARROW BIOPSY  9/29/2022    CT BONE MARROW BIOPSY 9/29/2022 [facility] CT SCAN    JOINT REPLACEMENT Bilateral 2000 AND 1996 And 1998    knee    KNEE SURGERY Left 02/28/2017    left knee poly exchange revision     REVISION TOTAL KNEE ARTHROPLASTY Right 11/22/2016    RIGHT TOTAL KNEE REVISION WITH POLY EXCHANGE    TOTAL KNEE ARTHROPLASTY      partical/ left     UPPER GASTROINTESTINAL ENDOSCOPY N/A 10/3/2022    EGD BIOPSY performed by Arielle Greenfield MD at [address] N/A 10/3/2022    EGD DILATION BALLOON performed by Arielle Greenfield MD at [address] [place] [address]       Family History: All family history was reviewed today. Problem Relation Age of Onset    Arthritis Other     Diabetes Other     Heart Disease Other     Hypertension Other     High Cholesterol Brother        Objective:       PHYSICAL EXAM:      Vitals:   Vitals:    11/11/22 0838 11/11/22 0908 11/11/22 0910 11/11/22 1120   BP:   (!) 147/79 (!) 150/76   Pulse: 100 (!) 103 (!) 112 97   Resp: 22 22 28 24   Temp:  97.1 °F (36.2 °C)  97.4 °F (36.3 °C)   TempSrc:  Temporal  Temporal   SpO2: 97% 96% 96% 96%   Weight:       Height:           Physical Exam  Vitals and nursing note reviewed. Constitutional:       Appearance: She is well-developed. She is not diaphoretic. Comments: The patient was seen earlier today. HENT:      Head: Normocephalic and atraumatic. Right Ear: External ear normal. There is no impacted cerumen. Left Ear: External ear normal. There is no impacted cerumen. Nose: Nose normal.      Mouth/Throat:      Mouth: Mucous membranes are moist.      Pharynx: Oropharynx is clear. No oropharyngeal exudate. Eyes:      General: No scleral icterus. Right eye: No discharge. Left eye: No discharge. Conjunctiva/sclera: Conjunctivae normal.      Pupils: Pupils are equal, round, and reactive to light. Neck:      Thyroid: No thyromegaly. Cardiovascular:      Rate and Rhythm: Normal rate and regular rhythm. Heart sounds: Normal heart sounds. No murmur heard. No friction rub.    Pulmonary: Effort: No respiratory distress. Breath sounds: No stridor. No wheezing or rales. Abdominal:      General: Bowel sounds are normal.      Palpations: Abdomen is soft. Tenderness: There is no abdominal tenderness. There is no guarding or rebound. Musculoskeletal:         General: Tenderness (neck) present. No swelling or deformity. Normal range of motion. Cervical back: Normal range of motion and neck supple. Right lower leg: No edema. Left lower leg: No edema. Lymphadenopathy:      Cervical: No cervical adenopathy. Skin:     General: Skin is warm and dry. Coloration: Skin is not jaundiced. Findings: No bruising, erythema or rash. Neurological:      General: No focal deficit present. Mental Status: She is alert and oriented to person, place, and time. Mental status is at baseline. Motor: No abnormal muscle tone. Psychiatric:         Mood and Affect: Mood normal.         Behavior: Behavior normal.      **    Lines and drains: All vascular access sites are healthy with no local erythema, discharge or tenderness. Intake and output:    I/O last 3 completed shifts: In: 1097.5 [P.O.:400; I.V.:10; Blood:637.5; IV Piggyback:50]  Out: 2025 [Urine:2025]    Lab Data:   All available labs and old records have been reviewed by me.     CBC:  Recent Labs     11/09/22  0500 11/10/22  1235 11/11/22  0420   WBC 8.0 10.8 11.6*   RBC 2.37* 2.23* 3.00*   HGB 7.1* 6.9* 9.0*   HCT 21.9* 20.5* 27.2*   PLT 25* 32* 28*   MCV 92.5 91.9 90.6   MCH 30.1 30.9 29.9   MCHC 32.6 33.6 33.0   RDW 18.3* 18.0* 15.5*   BANDSPCT 13* 4 3          BMP:  Recent Labs     11/09/22  0500 11/10/22  1135 11/11/22  0420   * 132* 145   K 4.6 4.8 5.1   CL 98* 96* 106   CO2 29 28 30   BUN 37* 26* 22*   CREATININE 1.0 0.8 0.6   CALCIUM 8.3 8.2* 8.2*   GLUCOSE 286* 366* 235*          Hepatic Function Panel:   Lab Results   Component Value Date/Time    ALKPHOS 73 11/11/2022 04:20 AM    ALT 14 11/11/2022 04:20 AM    AST 20 11/11/2022 04:20 AM    PROT 6.0 11/11/2022 04:20 AM    PROT 7.3 07/10/2012 02:05 PM    BILITOT 0.6 11/11/2022 04:20 AM    LABALBU 2.4 11/11/2022 04:20 AM       CPK:   Lab Results   Component Value Date    CKTOTAL 55 10/10/2022     ESR: No results found for: SEDRATE  CRP: No results found for: CRP        Imaging: All pertinent images and reports for the current visit were reviewed by me during this visit. I reviewed the chest x-ray/CT scan/MRI images and independently interpreted the findings and results today. CT CERVICAL SPINE WO CONTRAST   Preliminary Result   No acute traumatic fracture or traumatic malalignment. C1-C2 articulation severe degenerative changes and mild C2-C3 disc   degenerative changes. No significant canal narrowing or foraminal stenosis. CT HEAD WO CONTRAST   Final Result   No acute intracranial abnormality. Moderate senescent changes with parenchymal volume loss and chronic small   vessel ischemic changes. Chronic paranasal sinus disease. Left mastoid effusion         XR CHEST PORTABLE   Final Result   1. Technically suboptimal exam.   2. Mild congestive heart failure is a consideration given the radiographic   findings; pneumonia is also a consideration in areas of consolidation with   pleural effusion. 3. Calcific atherosclerosis aorta. 4. Cardiomegaly. VL Extremity Venous Left         CT ABDOMEN PELVIS WO CONTRAST Additional Contrast? Oral   Final Result   1. No acute findings in the abdomen or pelvis. 2. Moderate amount of stool and air in the colon proximally which could   reflect constipation. 3. Punctate bilateral intrarenal calculi.          VL Extremity Venous Bilateral         XR CHEST PORTABLE   Final Result   Left PICC line appears to be over the SVC and not significantly changed from   the prior exam.      Cardiomegaly with pulmonary vascular congestion and interstitial   prominence/interstitial opacities suggesting edema.  Stable to the slightly   worsened from the recent study. CT HEAD WO CONTRAST   Final Result   No acute intracranial hemorrhage, mass effect, midline shift, or signs of   acute territorial infarct. Generalized volume loss and chronic ischemic changes in the white matter are   stable. Partial opacification in the left mastoid air cells and edge of the middle   ear cavity are new from the prior exam.  Correlate for infectious symptoms   versus eustachian tube dysfunction. Medications: All current and past medications were reviewed.      [START ON 11/12/2022] furosemide  20 mg Oral Daily    sacubitril-valsartan  0.5 tablet Oral BID    metoprolol tartrate  25 mg Oral BID    butalbital-acetaminophen-caffeine  1 tablet Oral Dinner    insulin lispro  0-8 Units SubCUTAneous TID WC    insulin lispro  0-4 Units SubCUTAneous Nightly    insulin glargine  44 Units SubCUTAneous Daily    lidocaine  1 patch TransDERmal Daily    cefTRIAXone (ROCEPHIN) IV  2,000 mg IntraVENous Q24H    insulin lispro  8 Units SubCUTAneous TID WC    lidocaine 1 % injection  5 mL IntraDERmal Once    sodium chloride flush  5-40 mL IntraVENous 2 times per day    atorvastatin  80 mg Oral Nightly    calcium elemental  500 mg Oral Daily    digoxin  125 mcg Oral Daily    famotidine  20 mg Oral Daily    gabapentin  100 mg Oral TID    levothyroxine  175 mcg Oral QAM AC    pantoprazole  40 mg Oral QAM AC    sodium chloride flush  5-40 mL IntraVENous 2 times per day    heparin (porcine)  5,000 Units SubCUTAneous Q12H    methocarbamol  500 mg Oral q8h    polyethylene glycol  17 g Oral BID    docusate sodium  100 mg Oral BID        sodium chloride      sodium chloride      sodium chloride      dextrose      sodium chloride         sodium chloride, sodium chloride, ipratropium-albuterol, sodium chloride flush, sodium chloride, dextrose bolus **OR** dextrose bolus, glucagon (rDNA), dextrose, guaiFENesin, HYDROcodone-acetaminophen, tiZANidine, sodium chloride flush, ondansetron **OR** ondansetron, polyethylene glycol, acetaminophen **OR** acetaminophen, sodium phosphate IVPB **OR** sodium phosphate IVPB **OR** sodium phosphate IVPB, magnesium sulfate, potassium chloride **OR** potassium alternative oral replacement **OR** potassium chloride, morphine, sodium chloride, diatrizoate meglumine-sodium      Problem list:       Patient Active Problem List   Diagnosis Code    DM (diabetes mellitus) ([facility] E11.9    Coronary artery disease involving native coronary artery of native heart without angina pectoris I25.10    PAF (paroxysmal atrial fibrillation) ([facility]) I48.0    Peripheral vascular disease ([facility]) I73.9    CARLOS (obstructive sleep apnea) G47.33    Dyspnea R06.00    ARUN (acute kidney injury) ([facility] N17.9    Hyperlipidemia associated with type 2 diabetes mellitus ([facility]) E11.69, E78.5    Hypertension associated with diabetes ([facility] E11.59, I15.2    Dizziness R42    Headache R51.9    Debility R53.81    11/22/16 Right knee polyethylene exchange M25.561, M25.562    Acute pain of right shoulder M25.511    Mixed hyperlipidemia E78.2    Acute chest pain R07.9    Morbid obesity with BMI of 40.0-44.9, adult ([facility]) E66.01, Z68.41    Obesity, Class III, BMI 40-49.9 (morbid obesity) ([facility]) E66.01    Type 2 diabetes mellitus ([facility]) E11.9    Carotid artery disease without cerebral infarction ([facility]) I77.9    Weight loss counseling, encounter for Z71.3    2/28/17 Revision with polyethylene exchange left total knee arthroplasty M23.52    4/11/17 Left knee repeat repair of median parapatellar arthrotomy with augmentation S76.111A    Rotator cuff tendonitis, right M75.81    Cervical radicular pain T03.70    Complicated UTI (urinary tract infection) B12.3    Acute diastolic heart failure ([facility]) I50.31    Dyspnea on exertion R06.09    Severe thrombocytopenia ([facility]) D69.6    Severe anemia D64.9    Wrist arthritis M19.039    Chronic atrial fibrillation ([facility] I48.20    Primary osteoarthritis of first carpometacarpal joint of left hand M18.12    Shoulder pain M25.519    Hypervolemia E87.70    MDS (myelodysplastic syndrome) (HCC) D46.9    Chronic systolic heart failure (HCC) I50.22    Arterial hypotension I95.9    History of coronary artery disease Z86.79    Pulmonary vascular congestion R09.89    Acute hypoxemic respiratory failure (HCC) J96.01    Aspiration pneumonia of left lower lobe due to gastric secretions (HCC) J69.0    Anemia D64.9    HFrEF (heart failure with reduced ejection fraction) ([facility]) I50.20    Acute respiratory failure with hypoxemia (HCC) J96.01    Non-English speaking patient Z78.9    Hypothyroidism E03.9    History of DVT in adulthood Z86.718    Nephrolithiasis N20.0    Atypical pneumonia J18.9    Acute on chronic congestive heart failure (HCC) I50.9    Permanent atrial fibrillation ([facility] I48.21       Please note that this chart was generated using Dragon dictation software. Although every effort was made to ensure the accuracy of this automated transcription, some errors in transcription may have occurred inadvertently. If you may need any clarification, please do not hesitate to contact me through EPIC or at the phone number provided below with my electronic signature. Any pictures or media included in this note were obtained after taking informed verbal consent from the patient and with their approval to include those in the patient's medical record. Bernadine Sutherland MD, MPH, [address] [place] [address]  11/11/2022, 3:32 PM  [facility] Infectious Disease   [address] [place], [place] [address]coni [address] [facility], [address] [place] [address]  Office: [phone]  Fax: [phone]  In-person Clinic days:  Tuesday & Thursday a.m. Virtual clinic days: Monday, Wednesday & Friday a.m.

## 2022-11-11 NOTE — PLAN OF CARE
Problem: Discharge Planning  Goal: Discharge to home or other facility with appropriate resources  Outcome: Progressing     Problem: Chronic Conditions and Co-morbidities  Goal: Patient's chronic conditions and co-morbidity symptoms are monitored and maintained or improved  Outcome: Progressing     Problem: Pain  Goal: Verbalizes/displays adequate comfort level or baseline comfort level  Outcome: Progressing     Problem: Skin/Tissue Integrity  Goal: Absence of new skin breakdown  Description: 1. Monitor for areas of redness and/or skin breakdown  2. Assess vascular access sites hourly  3. Every 4-6 hours minimum:  Change oxygen saturation probe site  4. Every 4-6 hours:  If on nasal continuous positive airway pressure, respiratory therapy assess nares and determine need for appliance change or resting period.   Outcome: Progressing     Problem: ABCDS Injury Assessment  Goal: Absence of physical injury  Outcome: Progressing     Problem: Safety - Adult  Goal: Free from fall injury  Outcome: Progressing

## 2022-11-11 NOTE — CONSULTS
Thank you for the consult  The patient is an 78years old with a complicated medical Hx   I had a long chat with the family and all the members of the family agreed to the plan (2 daughter, 2 sisters, a brother and a grandson)  1- make her comfort care, continue her current medications    2- will start her on Morphine 1 mg / IV a hour to keep her comfortable  3- will start ativan IV 0.5 mg q 2 hours PRN   4- if ok with nephrology to start her on IV Colchicein to her with her gout attack  5- if ok with hematology to start the patient in IV solumedrol   6- change the bed to air mattress to help with the bedsore     Will follow     Radha Woods MD   [phone]

## 2022-11-11 NOTE — PROGRESS NOTES
Hospitalist Progress Note    Name:  Maximiliano Wright    /Age/Sex: 1940  (78 y.o. female)  MRN & CSN:  [id number] & [id number]    PCP: Porter Arora    Date of Admission: 2022    Patient Status:  Inpatient     Chief Complaint: Dyspnea    Hospital Course:   78 y.o. female with PMHx of nondysplastic syndrome, hypertension, hyperlipidemia, type 2 diabetes, CAD, atrial fibrillation who presented to [facility] with complaints of shortness of breath. Patient was at her nursing home when it was noted she was having shortness of breath. She is not on any oxygen at home, but she is currently satting well on 2 L O2 via NC. Left arm PICC line in place for transfusions given history of MDS. Upper extremity Dopplers found acute DVT in left upper extremity. Reluctant to remove PICC line at this time. Hematology following. UTI positive for Proteus. Received IV meropenem, but downgraded to Rocephin. ID following. Patient is coming with ARUN, but that has since resolved. Nephrology signed off. General surgery consulted for port placement on . Subjective: Today is:  Hospital Day: 6. Patient seen and examined in [id number]. Still complaining of neck pain. Otherwise, no acute complaints. Daughter at bedside and updated on plan.       Medications:  Reviewed    Infusion Medications    sodium chloride      sodium chloride      sodium chloride      dextrose      sodium chloride       Scheduled Medications    butalbital-acetaminophen-caffeine  1 tablet Oral Dinner    insulin lispro  0-8 Units SubCUTAneous TID WC    insulin lispro  0-4 Units SubCUTAneous Nightly    insulin glargine  44 Units SubCUTAneous Daily    lidocaine  1 patch TransDERmal Daily    cefTRIAXone (ROCEPHIN) IV  2,000 mg IntraVENous Q24H    insulin lispro  8 Units SubCUTAneous TID WC    lidocaine 1 % injection  5 mL IntraDERmal Once    sodium chloride flush  5-40 mL IntraVENous 2 times per day rhythm with normal S1/S2 without murmurs, rubs or gallops. No peripheral edema. Abdomen: Soft, non-tender, non-distended with normal bowel sounds. : No CVA tenderness. Musculoskeletal: No clubbing or cyanosis. Full range of motion without deformity. Skin: Skin color, texture, turgor normal.  No rashes or lesions. Neurologic:  Neurovascularly intact without any focal sensory/motor deficits. Cranial nerves: II-XII intact, grossly non-focal.  Psychiatric: Alert and oriented, thought content appropriate, normal insight  Peripheral Pulses: +2 palpable, equal bilaterally       Labs:   Recent Labs     11/09/22  0500 11/10/22  1235 11/11/22  0420   WBC 8.0 10.8 11.6*   HGB 7.1* 6.9* 9.0*   HCT 21.9* 20.5* 27.2*   PLT 25* 32* 28*       Recent Labs     11/08/22 2056 11/09/22  0500 11/10/22  1135 11/11/22  0420   NA  --  133* 132* 145   K  --  4.6 4.8 5.1   CL  --  98* 96* 106   CO2  --  29 28 30   BUN  --  37* 26* 22*   CREATININE  --  1.0 0.8 0.6   CALCIUM  --  8.3 8.2* 8.2*   PHOS 2.7 2.9  --  2.5       Recent Labs     11/09/22  0500 11/10/22  1135 11/11/22  0420   AST 13* 15 20   ALT 13 12 14   BILITOT 0.5 0.4 0.6   ALKPHOS 63 73 73       Recent Labs     11/11/22  0420   INR 1.31*     No results for input(s): CKTOTAL, TROPONINI in the last 72 hours. Urinalysis:      Lab Results   Component Value Date/Time    NITRU POSITIVE 11/07/2022 01:45 PM    WBCUA 4947 11/07/2022 01:45 PM    BACTERIA 4+ 11/07/2022 01:45 PM    RBCUA 141 11/07/2022 01:45 PM    BLOODU MODERATE 11/07/2022 01:45 PM    SPECGRAV 1.020 11/07/2022 01:45 PM    GLUCOSEU 100 11/07/2022 01:45 PM    GLUCOSEU NEGATIVE 03/09/2012 06:51 AM       Radiology:  CT CERVICAL SPINE WO CONTRAST   Preliminary Result   No acute traumatic fracture or traumatic malalignment. C1-C2 articulation severe degenerative changes and mild C2-C3 disc   degenerative changes. No significant canal narrowing or foraminal stenosis.          CT HEAD WO CONTRAST   Final Result No acute intracranial abnormality. Moderate senescent changes with parenchymal volume loss and chronic small   vessel ischemic changes. Chronic paranasal sinus disease. Left mastoid effusion         XR CHEST PORTABLE   Final Result   1. Technically suboptimal exam.   2. Mild congestive heart failure is a consideration given the radiographic   findings; pneumonia is also a consideration in areas of consolidation with   pleural effusion. 3. Calcific atherosclerosis aorta. 4. Cardiomegaly. VL Extremity Venous Left         CT ABDOMEN PELVIS WO CONTRAST Additional Contrast? Oral   Final Result   1. No acute findings in the abdomen or pelvis. 2. Moderate amount of stool and air in the colon proximally which could   reflect constipation. 3. Punctate bilateral intrarenal calculi. VL Extremity Venous Bilateral         XR CHEST PORTABLE   Final Result   Left PICC line appears to be over the SVC and not significantly changed from   the prior exam.      Cardiomegaly with pulmonary vascular congestion and interstitial   prominence/interstitial opacities suggesting edema. Stable to the slightly   worsened from the recent study. CT HEAD WO CONTRAST   Final Result   No acute intracranial hemorrhage, mass effect, midline shift, or signs of   acute territorial infarct. Generalized volume loss and chronic ischemic changes in the white matter are   stable. Partial opacification in the left mastoid air cells and edge of the middle   ear cavity are new from the prior exam.  Correlate for infectious symptoms   versus eustachian tube dysfunction.                  Assessment/Plan:    Active Hospital Problems    Diagnosis     Acute hypoxemic respiratory failure ([facility] [J96.01]      Priority: High    Carotid artery disease without cerebral infarction [facility]) [I77.9]      Priority: High    Weight loss counseling, encounter for [Z71.3]      Priority: High    Non-English speaking patient [Z78.9]      Priority: Medium    Hypothyroidism [E03.9]      Priority: Medium    History of DVT in adulthood [Z86.718]      Priority: Medium    Nephrolithiasis [N20.0]      Priority: Medium    Atypical pneumonia [J18.9]      Priority: Medium    Acute on chronic congestive heart failure ([facility] [address] [I50.9]      Priority: Medium    HFrEF (heart failure with reduced ejection fraction) ([facility] [address] [I50.20]      Priority: Medium    Myelodysplasia (myelodysplastic syndrome) ([facility] [address] [D46.9]      Priority: Medium    Chronic atrial fibrillation (HCC) [I48.20]      Priority: Medium    Complicated UTI (urinary tract infection) [N39.0]      Priority: Medium    Type 2 diabetes mellitus ([facility] [address] [E11.9]     Obesity, Class III, BMI 40-49.9 (morbid obesity) ([facility] [address] [E66.01]     Morbid obesity with BMI of 40.0-44.9, adult (HCC) [E66.01, Z68.41]     ARUN (acute kidney injury) ([facility] [address] [N17.9]     Hyperlipidemia associated with type 2 diabetes mellitus ([facility] [address] [E11.69, E78.5]     Hypertension associated with diabetes ([facility] [address] [E11.59, I15.2]     Peripheral vascular disease ([facility] [address] [I73.9]     Dyspnea [R06.00]          Hospital Day: 6    This is a 78 y.o. female who presented to [facility] on 11/6/2022 and is being treated for:     UTI  -UA on 11/5 indicative of UTI; urine culture positive for Proteus  -Off meropenam  -Continue rocephin  -ID following; appreciate recommendations    Left upper extremity DVT  -Upper extremity ultrasound shows partially occluded DVT in the left proximal brachial vein, but no propagation of the thrombus; partial flow is seen  -Heparin 5K units every 12 hours  -Port placement tentatively scheduled for Monday 11/14 per general surgery  -Hopeful removal of PICC line in the future  -General surgery consulted; appreciate recommendations    Neck pain  -Questionable if it is 2/2 left upper extremity DVT and patient is having referred pain  -CT head non-acute  -CT C spine shows C1-C2 articulation severe degenerative changes and mild C2-C3 disc degenerative changes, no canal narrowing or stenosis  -Pain control  -Pain management consulted at request of family    MDS  -Frequent transfusions through PICC line  -Would benefit from port placement given upper extremity DVT and PICC line  -Heme/onc following; appreciate recommendations    Acute hypoxemic respiratory failure - improving  -Likely 2/2 CHF exacerbation  -BNP elevated at 3064 on admission  -Echo 10/17/2022 showed LVEF 45-50% with right ventricle being mildly enlarged and having reduced function  -Oxygen therapy; wean as tolerated; keep saturation greater than 92%; may need oxygen outpatient if continues to remain hypoxic     HFrEF  -Echo as above  -Continue beta-blocker  -Hold home Entresto given ARUN - will restart on discharge per nephrology  -I/Os    ARUN - resolved  -Creatinine 1.3 on admission; baseline ~0.8-1.0  -Daily labs; trend creatinine  -Avoid nephrotoxins  -Nephrology was following, but has since signed off     Hyperlipidemia  -Statin    Hypertension  -Continue home antihypertensives  -Hold home Entresto given ARUN - will restart on discharge per nephrology    Type 2 diabetes  -SSI and lantus     Atrial fibrillation  -Continue home digoxin      DVT ppx: Heparin  GI ppx: Diet/Tube Feeds  Diet: ADULT ORAL NUTRITION SUPPLEMENT; AM Snack, PM Snack, HS Snack; Diabetic Oral Supplement  ADULT DIET; Dysphagia - Soft and Bite Sized; 3 carb choices (45 gm/meal); Low Fat/Low Chol/High Fiber/2 gm Na  Code Status: DNR-CCA    Disposition:  UTI on antibiotics. Left upper extremity DVT with PICC in place. Port placement tentatively scheduled for 11/14 with general surgery in hopes PICC can be removed and patient can still receive transfusions. PT/OT Eval Status: ordered      Laverne Kirkpatrick DO  11/11/2022  8:02 AM      Please note that some part of this chart was generated using Dragon dictation software.  Although every effort was made to ensure the accuracy of this automated transcription, some errors in transcription may have occurred inadvertently. If you may need any clarification, please do not hesitate to contact me through [facility].

## 2022-11-11 NOTE — PROGRESS NOTES
11/10/22 2047   RT Protocol   History Pulmonary Disease 0   Respiratory pattern 0   Breath sounds 2   Cough 0   Indications for Bronchodilator Therapy Decreased or absent breath sounds; Wheezing associated with pulm disorder   Bronchodilator Assessment Score 2

## 2022-11-11 NOTE — PROGRESS NOTES
Oncology and Hematology Care   Progress Note      11/11/2022 6:43 PM        Name: Macey Oconnell . Admitted: 11/6/2022    SUBJECTIVE:      Events noted. The patient had shortness of breath. She does not communicate. Patient's daughters are by bedside and had a lot of questions.       Reviewed interval ancillary notes    Current Medications  [START ON 11/12/2022] furosemide (LASIX) tablet 20 mg, Daily  sacubitril-valsartan (ENTRESTO) 24-26 MG per tablet 0.5 tablet, BID  metoprolol tartrate (LOPRESSOR) tablet 25 mg, BID  morphine (PF) 100 mg in sodium chloride 0.9 % 100 mL infusion, Continuous  butalbital-acetaminophen-caffeine (FIORICET, ESGIC) per tablet 1 tablet, Dinner  insulin lispro (HUMALOG) injection vial 0-8 Units, TID WC  insulin lispro (HUMALOG) injection vial 0-4 Units, Nightly  0.9 % sodium chloride infusion, PRN  0.9 % sodium chloride infusion, PRN  ipratropium-albuterol (DUONEB) nebulizer solution 1 ampule, Q4H PRN  insulin glargine (LANTUS) injection vial 44 Units, Daily  lidocaine 4 % external patch 1 patch, Daily  insulin lispro (HUMALOG) injection vial 8 Units, TID WC  lidocaine PF 1 % injection 5 mL, Once  sodium chloride flush 0.9 % injection 5-40 mL, 2 times per day  sodium chloride flush 0.9 % injection 5-40 mL, PRN  0.9 % sodium chloride infusion, PRN  dextrose bolus 10% 125 mL, PRN   Or  dextrose bolus 10% 250 mL, PRN  glucagon (rDNA) injection 1 mg, PRN  dextrose 10 % infusion, Continuous PRN  atorvastatin (LIPITOR) tablet 80 mg, Nightly  calcium elemental (OSCAL) tablet 500 mg, Daily  digoxin (LANOXIN) tablet 125 mcg, Daily  famotidine (PEPCID) tablet 20 mg, Daily  gabapentin (NEURONTIN) capsule 100 mg, TID  guaiFENesin (MUCINEX) extended release tablet 600 mg, BID PRN  HYDROcodone-acetaminophen (NORCO) 5-325 MG per tablet 1 tablet, Q6H PRN  levothyroxine (SYNTHROID) tablet 175 mcg, QAM AC  pantoprazole (PROTONIX) tablet 40 mg, QAM AC  tiZANidine (ZANAFLEX) tablet 2 mg, Q8H PRN  sodium chloride flush 0.9 % injection 5-40 mL, 2 times per day  sodium chloride flush 0.9 % injection 5-40 mL, PRN  ondansetron (ZOFRAN-ODT) disintegrating tablet 4 mg, Q8H PRN   Or  ondansetron (ZOFRAN) injection 4 mg, Q6H PRN  polyethylene glycol (GLYCOLAX) packet 17 g, Daily PRN  acetaminophen (TYLENOL) tablet 650 mg, Q6H PRN   Or  acetaminophen (TYLENOL) suppository 650 mg, Q6H PRN  sodium phosphate 10 mmol in sodium chloride 0.9 % 250 mL IVPB, PRN   Or  sodium phosphate 15 mmol in sodium chloride 0.9 % 250 mL IVPB, PRN   Or  sodium phosphate 20 mmol in sodium chloride 0.9 % 500 mL IVPB, PRN  magnesium sulfate 2000 mg in 50 mL IVPB premix, PRN  potassium chloride (KLOR-CON M) extended release tablet 40 mEq, PRN   Or  potassium bicarb-citric acid (EFFER-K) effervescent tablet 40 mEq, PRN   Or  potassium chloride 10 mEq/100 mL IVPB (Peripheral Line), PRN  morphine (PF) injection 2 mg, Q4H PRN  heparin (porcine) injection 5,000 Units, Q12H  methocarbamol (ROBAXIN) tablet 500 mg, q8h  0.9 % sodium chloride infusion, PRN  diatrizoate meglumine-sodium (GASTROGRAFIN) 66-10 % solution 20 mL, ONCE PRN  polyethylene glycol (GLYCOLAX) packet 17 g, BID  docusate sodium (COLACE) capsule 100 mg, BID      Objective:  BP (!) 150/76   Pulse 97   Temp 97.4 °F (36.3 °C) (Temporal)   Resp 24   Ht 5' (1.524 m)   Wt 238 lb 1.6 oz (108 kg)   LMP  (LMP Unknown)   SpO2 96%   BMI 46.50 kg/m²     Intake/Output Summary (Last 24 hours) at 11/11/2022 1843  Last data filed at 11/11/2022 1438  Gross per 24 hour   Intake 757.5 ml   Output 1100 ml   Net -342.5 ml        Wt Readings from Last 3 Encounters:   11/11/22 238 lb 1.6 oz (108 kg)   11/05/22 227 lb 4.7 oz (103.1 kg)   10/26/22 244 lb 12.8 oz (111 kg)       General appearance:  Appears comfortable  Eyes: Sclera clear. Pupils equal.  ENT: Moist oral mucosa. Trachea midline, no adenopathy. Cardiovascular: Regular rhythm, normal S1, S2. No murmur.  No edema in lower extremities  Respiratory: Not using accessory muscles. Good inspiratory effort. Clear to auscultation bilaterally, no wheeze or crackles. GI: Abdomen soft, no tenderness, not distended  Musculoskeletal: No cyanosis in digits, neck supple  Neurology: CN 2-12 grossly intact. No speech or motor deficits  Psych: Normal affect. Alert and oriented in time, place and person  Skin: Warm, dry, normal turgor    Labs and Tests:  CBC:   Recent Labs     11/09/22  0500 11/10/22  1235 11/11/22  0420   WBC 8.0 10.8 11.6*   HGB 7.1* 6.9* 9.0*   PLT 25* 32* 28*       BMP:    Recent Labs     11/09/22  0500 11/10/22  1135 11/11/22  0420   * 132* 145   K 4.6 4.8 5.1   CL 98* 96* 106   CO2 29 28 30   BUN 37* 26* 22*   CREATININE 1.0 0.8 0.6   GLUCOSE 286* 366* 235*       Hepatic:   Recent Labs     11/09/22  0500 11/10/22  1135 11/11/22  0420   AST 13* 15 20   ALT 13 12 14   BILITOT 0.5 0.4 0.6   ALKPHOS 63 73 73         ASSESSMENT AND PLAN    Principal Problem:    Acute hypoxemic respiratory failure (HCC)  Active Problems:    Carotid artery disease without cerebral infarction (HCC)    Weight loss counseling, encounter for    Complicated UTI (urinary tract infection)    Chronic atrial fibrillation (HCC)    Myelodysplasia (myelodysplastic syndrome) (HCC)    HFrEF (heart failure with reduced ejection fraction) ([address]    Non-English speaking patient    Hypothyroidism    History of DVT in adulthood    Nephrolithiasis    Atypical pneumonia    Acute on chronic congestive heart failure (HCC)    Permanent atrial fibrillation (HCC)    Peripheral vascular disease (HCC)    Dyspnea    ARUN (acute kidney injury) ([address]    Hyperlipidemia associated with type 2 diabetes mellitus ([address]    Hypertension associated with diabetes ([address]    Morbid obesity with BMI of 40.0-44.9, adult (HCC)    Obesity, Class III, BMI 40-49.9 (morbid obesity) ([facility])    Type 2 diabetes mellitus ([address]  Resolved Problems:    * No resolved hospital problems.  *      78year-old has     1.  Myelodysplastic syndrome:     -Currently on supportive care.  -Palliative care to re-evaluate.   -Transfuse if hemoglobin is less than 7 or if platelet count is less than 10.     2.  Left upper extremity DVT:     -Related to PICC line  -PICC nurse not keen on removing PICC line due to concern about dislodging the blood clot. -Repeat dopplers showed improved flow in the brachial vein.  -Continue to hold anticoagulation due to thrombocytopenia. 3. Atypical pneumonia, UTI: Managed by ID     4. Coronary artery disease     Had a lengthy discussion with the patient's 2 daughters-  Dayne and [place]. I again suggested best supportive care/hospice care. Family needs one more week to make that decision.   Meanwhile we decided following    -Keep left arm PICC line  -I will order Dopplers of the left upper extremity on Monday, 11/14/2022 to look for progression at the request of family.  -No port placement at this time.  -Transfuse PRBCs 1 unit if hemoglobin is less than 7 or platelets if platelet count is less than 10.  - Pain management as per recommendations from pain specialist          Peng Platt MD

## 2022-11-11 NOTE — PLAN OF CARE
Oncology recommending best supportive care/hospice care, final decision has not been made by patient/family. PICC line currently in place and functioning, concerns with removing PICC and possibly dislodging left upper extremity DVT. Will hold on port placement at this time. Will follow as needed. Please call with questions or concerns; or if condition changes and patient needs port in the future. Plans discussed with nursing. Reviewed and discussed with Dr. Jordan Booth.       Signed:  MATEUSZ Carrion CNP  11/11/2022 10:01 AM

## 2022-11-11 NOTE — PROGRESS NOTES
Assessment completed, see doc flowsheets. Pt is A&O X4. Lung sounds are clear/ wheezes in R lower lobe. VSS. Tele on. Medication given per [facility]. Patient has no needs at this time. Call light within in reach, will continue to monitor.

## 2022-11-12 LAB
ALBUMIN SERPL-MCNC: 2.3 G/DL (ref 3.4–5)
ANION GAP SERPL CALCULATED.3IONS-SCNC: 9 MMOL/L (ref 3–16)
ANISOCYTOSIS: ABNORMAL
BANDED NEUTROPHILS RELATIVE PERCENT: 13 % (ref 0–7)
BASOPHILS ABSOLUTE: 0 K/UL (ref 0–0.2)
BASOPHILS RELATIVE PERCENT: 0 %
BLASTS RELATIVE PERCENT: 1 %
BLOOD CULTURE, ROUTINE: NORMAL
BUN BLDV-MCNC: 26 MG/DL (ref 7–20)
CALCIUM SERPL-MCNC: 9 MG/DL (ref 8.3–10.6)
CHLORIDE BLD-SCNC: 97 MMOL/L (ref 99–110)
CO2: 29 MMOL/L (ref 21–32)
CREAT SERPL-MCNC: 0.7 MG/DL (ref 0.6–1.2)
CULTURE, BLOOD 2: NORMAL
EOSINOPHILS ABSOLUTE: 0 K/UL (ref 0–0.6)
EOSINOPHILS RELATIVE PERCENT: 0 %
GFR SERPL CREATININE-BSD FRML MDRD: >60 ML/MIN/{1.73_M2}
GLUCOSE BLD-MCNC: 160 MG/DL (ref 70–99)
GLUCOSE BLD-MCNC: 174 MG/DL (ref 70–99)
GLUCOSE BLD-MCNC: 467 MG/DL (ref 70–99)
GLUCOSE BLD-MCNC: 482 MG/DL (ref 70–99)
HCT VFR BLD CALC: 28.3 % (ref 36–48)
HEMATOLOGY PATH CONSULT: NO
HEMOGLOBIN: 9.6 G/DL (ref 12–16)
LYMPHOCYTES ABSOLUTE: 1.6 K/UL (ref 1–5.1)
LYMPHOCYTES RELATIVE PERCENT: 15 %
MACROCYTES: ABNORMAL
MAGNESIUM: 1.6 MG/DL (ref 1.8–2.4)
MCH RBC QN AUTO: 31.1 PG (ref 26–34)
MCHC RBC AUTO-ENTMCNC: 33.7 G/DL (ref 31–36)
MCV RBC AUTO: 92.3 FL (ref 80–100)
METAMYELOCYTES RELATIVE PERCENT: 1 %
MICROCYTES: ABNORMAL
MONOCYTES ABSOLUTE: 1.7 K/UL (ref 0–1.3)
MONOCYTES RELATIVE PERCENT: 16 %
MONONUCLEAR UNIDENTIFIED CELLS: 0 %
MYELOCYTE PERCENT: 5 %
NEUTROPHILS ABSOLUTE: 7.3 K/UL (ref 1.7–7.7)
NEUTROPHILS RELATIVE PERCENT: 48 %
OVALOCYTES: ABNORMAL
PDW BLD-RTO: 15.7 % (ref 12.4–15.4)
PELGER HUET CELLS: PRESENT
PERFORMED ON: ABNORMAL
PHOSPHORUS: 3.3 MG/DL (ref 2.5–4.9)
PLATELET # BLD: 29 K/UL (ref 135–450)
PLATELET SLIDE REVIEW: ABNORMAL
PMV BLD AUTO: 9.8 FL (ref 5–10.5)
POLYCHROMASIA: ABNORMAL
POTASSIUM SERPL-SCNC: 4.7 MMOL/L (ref 3.5–5.1)
PROMYELOCYTES PERCENT: 1 %
RBC # BLD: 3.07 M/UL (ref 4–5.2)
SLIDE REVIEW: ABNORMAL
SODIUM BLD-SCNC: 135 MMOL/L (ref 136–145)
WBC # BLD: 10.8 K/UL (ref 4–11)

## 2022-11-12 PROCEDURE — 6370000000 HC RX 637 (ALT 250 FOR IP): Performed by: STUDENT IN AN ORGANIZED HEALTH CARE EDUCATION/TRAINING PROGRAM

## 2022-11-12 PROCEDURE — 6370000000 HC RX 637 (ALT 250 FOR IP): Performed by: INTERNAL MEDICINE

## 2022-11-12 PROCEDURE — 99222 1ST HOSP IP/OBS MODERATE 55: CPT | Performed by: INTERNAL MEDICINE

## 2022-11-12 PROCEDURE — 6360000002 HC RX W HCPCS: Performed by: INTERNAL MEDICINE

## 2022-11-12 PROCEDURE — 80069 RENAL FUNCTION PANEL: CPT

## 2022-11-12 PROCEDURE — 1200000000 HC SEMI PRIVATE

## 2022-11-12 PROCEDURE — 2580000003 HC RX 258: Performed by: INTERNAL MEDICINE

## 2022-11-12 PROCEDURE — 85025 COMPLETE CBC W/AUTO DIFF WBC: CPT

## 2022-11-12 PROCEDURE — 6360000002 HC RX W HCPCS: Performed by: STUDENT IN AN ORGANIZED HEALTH CARE EDUCATION/TRAINING PROGRAM

## 2022-11-12 PROCEDURE — 99233 SBSQ HOSP IP/OBS HIGH 50: CPT | Performed by: NURSE PRACTITIONER

## 2022-11-12 PROCEDURE — 6360000002 HC RX W HCPCS: Performed by: ANESTHESIOLOGY

## 2022-11-12 PROCEDURE — 2580000003 HC RX 258: Performed by: STUDENT IN AN ORGANIZED HEALTH CARE EDUCATION/TRAINING PROGRAM

## 2022-11-12 PROCEDURE — 94760 N-INVAS EAR/PLS OXIMETRY 1: CPT

## 2022-11-12 PROCEDURE — 83735 ASSAY OF MAGNESIUM: CPT

## 2022-11-12 RX ORDER — INSULIN LISPRO 100 [IU]/ML
4 INJECTION, SOLUTION INTRAVENOUS; SUBCUTANEOUS ONCE
Status: COMPLETED | OUTPATIENT
Start: 2022-11-12 | End: 2022-11-13

## 2022-11-12 RX ORDER — METHYLPREDNISOLONE SODIUM SUCCINATE 40 MG/ML
40 INJECTION, POWDER, LYOPHILIZED, FOR SOLUTION INTRAMUSCULAR; INTRAVENOUS EVERY 12 HOURS
Status: DISCONTINUED | OUTPATIENT
Start: 2022-11-12 | End: 2022-11-17

## 2022-11-12 RX ORDER — LACTULOSE 10 G/15ML
20 SOLUTION ORAL
Status: DISPENSED | OUTPATIENT
Start: 2022-11-12 | End: 2022-11-12

## 2022-11-12 RX ADMIN — Medication 500 MG: at 08:00

## 2022-11-12 RX ADMIN — PANTOPRAZOLE SODIUM 40 MG: 40 TABLET, DELAYED RELEASE ORAL at 06:25

## 2022-11-12 RX ADMIN — DIAZEPAM 2.5 MG: 5 INJECTION, SOLUTION INTRAMUSCULAR; INTRAVENOUS at 04:34

## 2022-11-12 RX ADMIN — LACTULOSE 20 G: 20 SOLUTION ORAL at 21:36

## 2022-11-12 RX ADMIN — ONDANSETRON 4 MG: 2 INJECTION INTRAMUSCULAR; INTRAVENOUS at 08:00

## 2022-11-12 RX ADMIN — ATORVASTATIN CALCIUM 80 MG: 80 TABLET, FILM COATED ORAL at 21:37

## 2022-11-12 RX ADMIN — POLYETHYLENE GLYCOL 3350 17 G: 17 POWDER, FOR SOLUTION ORAL at 21:36

## 2022-11-12 RX ADMIN — POLYETHYLENE GLYCOL 3350 17 G: 17 POWDER, FOR SOLUTION ORAL at 08:01

## 2022-11-12 RX ADMIN — SACUBITRIL AND VALSARTAN 0.5 TABLET: 24; 26 TABLET, FILM COATED ORAL at 21:37

## 2022-11-12 RX ADMIN — INSULIN LISPRO 8 UNITS: 100 INJECTION, SOLUTION INTRAVENOUS; SUBCUTANEOUS at 16:41

## 2022-11-12 RX ADMIN — LACTULOSE 20 G: 20 SOLUTION ORAL at 15:06

## 2022-11-12 RX ADMIN — DOCUSATE SODIUM 100 MG: 100 CAPSULE, LIQUID FILLED ORAL at 21:37

## 2022-11-12 RX ADMIN — INSULIN LISPRO 8 UNITS: 100 INJECTION, SOLUTION INTRAVENOUS; SUBCUTANEOUS at 16:40

## 2022-11-12 RX ADMIN — METOPROLOL TARTRATE 25 MG: 25 TABLET, FILM COATED ORAL at 21:37

## 2022-11-12 RX ADMIN — SACUBITRIL AND VALSARTAN 0.5 TABLET: 24; 26 TABLET, FILM COATED ORAL at 08:00

## 2022-11-12 RX ADMIN — DOCUSATE SODIUM 100 MG: 100 CAPSULE, LIQUID FILLED ORAL at 08:00

## 2022-11-12 RX ADMIN — FUROSEMIDE 20 MG: 20 TABLET ORAL at 08:00

## 2022-11-12 RX ADMIN — Medication 10 ML: at 08:25

## 2022-11-12 RX ADMIN — FAMOTIDINE 20 MG: 20 TABLET ORAL at 21:36

## 2022-11-12 RX ADMIN — LEVOTHYROXINE SODIUM 175 MCG: 0.03 TABLET ORAL at 06:25

## 2022-11-12 RX ADMIN — METHYLPREDNISOLONE SODIUM SUCCINATE 40 MG: 40 INJECTION, POWDER, FOR SOLUTION INTRAMUSCULAR; INTRAVENOUS at 04:34

## 2022-11-12 RX ADMIN — DIAZEPAM 2.5 MG: 5 INJECTION, SOLUTION INTRAMUSCULAR; INTRAVENOUS at 00:37

## 2022-11-12 RX ADMIN — GABAPENTIN 100 MG: 100 CAPSULE ORAL at 08:00

## 2022-11-12 RX ADMIN — INSULIN GLARGINE 44 UNITS: 100 INJECTION, SOLUTION SUBCUTANEOUS at 08:01

## 2022-11-12 RX ADMIN — DIGOXIN 125 MCG: 125 TABLET ORAL at 08:00

## 2022-11-12 RX ADMIN — Medication 10 ML: at 21:52

## 2022-11-12 RX ADMIN — METHOCARBAMOL TABLETS 500 MG: 500 TABLET, COATED ORAL at 21:36

## 2022-11-12 RX ADMIN — METHOCARBAMOL TABLETS 500 MG: 500 TABLET, COATED ORAL at 15:06

## 2022-11-12 RX ADMIN — INSULIN LISPRO 4 UNITS: 100 INJECTION, SOLUTION INTRAVENOUS; SUBCUTANEOUS at 21:46

## 2022-11-12 RX ADMIN — METOPROLOL TARTRATE 25 MG: 25 TABLET, FILM COATED ORAL at 08:00

## 2022-11-12 RX ADMIN — GABAPENTIN 100 MG: 100 CAPSULE ORAL at 15:07

## 2022-11-12 RX ADMIN — Medication 10 ML: at 08:01

## 2022-11-12 RX ADMIN — METHYLPREDNISOLONE SODIUM SUCCINATE 40 MG: 40 INJECTION, POWDER, FOR SOLUTION INTRAMUSCULAR; INTRAVENOUS at 16:47

## 2022-11-12 RX ADMIN — GABAPENTIN 100 MG: 100 CAPSULE ORAL at 21:38

## 2022-11-12 RX ADMIN — METHOCARBAMOL TABLETS 500 MG: 500 TABLET, COATED ORAL at 06:24

## 2022-11-12 ASSESSMENT — PAIN DESCRIPTION - LOCATION
LOCATION: HEAD;NECK
LOCATION: ABDOMEN
LOCATION: HEAD;NECK

## 2022-11-12 ASSESSMENT — PAIN SCALES - WONG BAKER
WONGBAKER_NUMERICALRESPONSE: 0
WONGBAKER_NUMERICALRESPONSE: 0
WONGBAKER_NUMERICALRESPONSE: 2
WONGBAKER_NUMERICALRESPONSE: 0
WONGBAKER_NUMERICALRESPONSE: 0
WONGBAKER_NUMERICALRESPONSE: 4;2
WONGBAKER_NUMERICALRESPONSE: 0

## 2022-11-12 ASSESSMENT — PAIN DESCRIPTION - FREQUENCY: FREQUENCY: CONTINUOUS

## 2022-11-12 ASSESSMENT — PAIN SCALES - GENERAL
PAINLEVEL_OUTOF10: 0
PAINLEVEL_OUTOF10: 4
PAINLEVEL_OUTOF10: 5
PAINLEVEL_OUTOF10: 1
PAINLEVEL_OUTOF10: 5

## 2022-11-12 ASSESSMENT — PAIN DESCRIPTION - DESCRIPTORS
DESCRIPTORS: ACHING
DESCRIPTORS: ACHING

## 2022-11-12 ASSESSMENT — PAIN DESCRIPTION - PAIN TYPE: TYPE: CHRONIC PAIN

## 2022-11-12 ASSESSMENT — PAIN DESCRIPTION - ONSET: ONSET: GRADUAL

## 2022-11-12 NOTE — CONSULTS
PULMONARY AND CRITICAL CARE MEDICINE CONSULTATION NOTE    CONSULTING PHYSICIAN:  Valerie Young MD    ADMISSION DATE: 11/6/2022  ADMISSION DIAGNOSIS: Acute respiratory failure with hypoxemia (HCC) [J96.01]  Dyspnea, unspecified type [R06.00]  Acute on chronic congestive heart failure, unspecified heart failure type ([address] [I50.9]    REASON FOR CONSULT:   Chief Complaint   Patient presents with    Shortness of Breath     Pt presents to the ED from [facility] with reports of SOB, per EMS Pt sating at 88% on RA. Pt is usually on RA per baseline. Pt satting at 92% RA. Pt recently discharged for Picc Line placement due to reoccurring blood transfusion due to MDS. Per daughter at bedside pt is acting normally. Does not appear to be lethargic or confused at this time. VSS. DATE OF CONSULT: 11/6/2022    HISTORY OF PRESENT ILLNESS: 78y.o. year old female with a past medical history significant for hypertension, dyslipidemia, coronary artery disease s/p CABG, congestive heart failure, paroxysmal atrial fibrillation, vascular disease, CARLOS, myelodysplastic syndrome, recurrent UTIs presented to the hospital with shortness of breath. Patient was seen to have heart failure exacerbation and has been diuresed. Despite this not making any significant progress. Oxygen requirement stable at 1.5 L/min with patient saturating in high 90s. No significant respiratory distress seen. Patient's family has decided to change her goals of care providing her with comfort. REVIEW OF SYSTEMS:   14 point ROS could not be obtained due to patient factors. Patient speaks only Telugu and currently drowsy.       PAST MEDICAL HISTORY:   Past Medical History:   Diagnosis Date    4/11/17 Left knee repeat repair of median parapatellar arthrotomy with augmentation 4/12/2017    Arthritis     Aspiration pneumonia of left lower lobe due to gastric secretions ([facility] 10/18/2022    Atrial fibrillation (HCC)     CAD (coronary artery disease)     Cataract     Chronic diastolic congestive heart failure ([facility] 7/27/2022    Diabetes mellitus (HCC)     GERD (gastroesophageal reflux disease)     Hyperlipidemia     Hypertension     HYPOTHRYROIDISM     Myelodysplastic disease ([facility] [address] 10/17/2022    Non-English speaking patient     SPEAKS Hungarian. SHE SPEAKS A LITTLE ENGLISH    Sleep apnea     unspecified    Thyroid disease     UTI        PAST SURGICAL HISTORY:   Past Surgical History:   Procedure Laterality Date    CARDIAC SURGERY  2004    CABG X 4 VESSELS    CHOLECYSTECTOMY, LAPAROSCOPIC  5/15/14    with IOC    CT BONE MARROW BIOPSY  9/29/2022    CT BONE MARROW BIOPSY 9/29/2022 [facility] CT SCAN    JOINT REPLACEMENT Bilateral 2000 AND 1996 And 1998    knee    KNEE SURGERY Left 02/28/2017    left knee poly exchange revision     REVISION TOTAL KNEE ARTHROPLASTY Right 11/22/2016    RIGHT TOTAL KNEE REVISION WITH POLY EXCHANGE    TOTAL KNEE ARTHROPLASTY      partical/ left     UPPER GASTROINTESTINAL ENDOSCOPY N/A 10/3/2022    EGD BIOPSY performed by Arielle Greenfield MD at [facility] N/A 10/3/2022    EGD DILATION BALLOON performed by Arielle Greenfield MD at [address] [place] [address] St:   Social History     Tobacco Use    Smoking status: Never    Smokeless tobacco: Never   Vaping Use    Vaping Use: Never used   Substance Use Topics    Alcohol use: No    Drug use: No       FAMILY HISTORY:   Family History   Problem Relation Age of Onset    Arthritis Other     Diabetes Other     Heart Disease Other     Hypertension Other     High Cholesterol Brother        MEDICATIONS:     No current facility-administered medications on file prior to encounter.      Current Outpatient Medications on File Prior to Encounter   Medication Sig Dispense Refill    Ipratropium-Albuterol (DUONEB IN) Inhale 3 mLs into the lungs every 4 hours as needed (SOB)      famotidine (PEPCID) 20 MG tablet Take 20 mg by mouth daily      HYDROcodone-acetaminophen (NORCO) 5-325 MG per tablet Take 1 tablet by mouth every 6 hours as needed for Pain. omeprazole (PRILOSEC) 20 MG delayed release capsule Take 20 mg by mouth in the morning and at bedtime      polyethylene glycol (GLYCOLAX) 17 g packet Take 17 g by mouth daily as needed for Constipation      silver sulfADIAZINE (SILVADENE) 1 % cream Apply topically 2 times daily Apply to inner buttocks every shift for MASD      digoxin (LANOXIN) 125 MCG tablet Take 1 tablet by mouth daily 30 tablet 3    guaiFENesin (MUCINEX) 600 MG extended release tablet Take 1 tablet by mouth 2 times daily as needed for Congestion 60 tablet 0    insulin lispro (HUMALOG) 100 UNIT/ML SOLN injection vial Inject 6 Units into the skin 3 times daily (with meals) 2 each 0    sacubitril-valsartan (ENTRESTO) 24-26 MG per tablet Take 0.5 tablets by mouth 2 times daily 60 tablet 1    furosemide (LASIX) 20 MG tablet Take 1 tablet by mouth daily 60 tablet 3    gabapentin (NEURONTIN) 100 MG capsule Take 1 capsule by mouth 3 times daily for 30 days.  90 capsule 0    metoprolol tartrate (LOPRESSOR) 25 MG tablet Take 0.5 tablets by mouth 2 times daily 60 tablet 3    empagliflozin (JARDIANCE) 10 MG tablet Take 1 tablet by mouth daily 30 tablet 3    insulin glargine (BASAGLAR KWIKPEN) 100 UNIT/ML injection pen Inject 48 Units into the skin daily 5 Adjustable Dose Pre-filled Pen Syringe 3    calcium carbonate (OSCAL) 500 MG TABS tablet Take 500 mg by mouth daily      senna (SENOKOT) 8.6 MG tablet Take 1 tablet by mouth 2 times daily      magnesium 30 MG tablet Take 40 mg by mouth daily      tiZANidine (ZANAFLEX) 2 MG tablet Take 2 mg by mouth every 8 hours as needed      atorvastatin (LIPITOR) 80 MG tablet Take 1 tablet by mouth daily 90 tablet 4    Insulin Pen Needle 32G X 6 MM MISC by Does not apply route 3 times daily with meals      vitamin D (ERGOCALCIFEROL) 87059 UNITS CAPS capsule Take 50,000 Units by mouth once a week      Omega 3 1000 MG CAPS Take 1,000 mg by mouth daily      Liraglutide (VICTOZA) 18 MG/3ML SOPN SC injection Inject 1.2 mg into the skin daily      lidocaine (LIDODERM) 5 % Place 1 patch onto the skin daily 12 hours on, 12 hours off. 30 patch 0    oxybutynin (DITROPAN XL) 15 MG CR tablet Take 15 mg by mouth daily.       levothyroxine (SYNTHROID) 175 MCG tablet Take 175 mcg by mouth daily          methylPREDNISolone  40 mg IntraVENous Q12H    lactulose  20 g Oral 6 times per day    furosemide  20 mg Oral Daily    sacubitril-valsartan  0.5 tablet Oral BID    metoprolol tartrate  25 mg Oral BID    butalbital-acetaminophen-caffeine  1 tablet Oral Dinner    insulin lispro  0-8 Units SubCUTAneous TID WC    insulin lispro  0-4 Units SubCUTAneous Nightly    insulin glargine  44 Units SubCUTAneous Daily    lidocaine  1 patch TransDERmal Daily    insulin lispro  8 Units SubCUTAneous TID WC    lidocaine 1 % injection  5 mL IntraDERmal Once    sodium chloride flush  5-40 mL IntraVENous 2 times per day    atorvastatin  80 mg Oral Nightly    calcium elemental  500 mg Oral Daily    digoxin  125 mcg Oral Daily    famotidine  20 mg Oral Daily    gabapentin  100 mg Oral TID    levothyroxine  175 mcg Oral QAM AC    pantoprazole  40 mg Oral QAM AC    sodium chloride flush  5-40 mL IntraVENous 2 times per day    heparin (porcine)  5,000 Units SubCUTAneous Q12H    methocarbamol  500 mg Oral q8h    polyethylene glycol  17 g Oral BID    docusate sodium  100 mg Oral BID      morphine infusion 1 mg/mL 1 mg/hr (11/11/22 4844)    sodium chloride      sodium chloride      sodium chloride      dextrose      sodium chloride       diazePAM, sodium chloride, sodium chloride, ipratropium-albuterol, sodium chloride flush, sodium chloride, dextrose bolus **OR** dextrose bolus, glucagon (rDNA), dextrose, guaiFENesin, HYDROcodone-acetaminophen, tiZANidine, sodium chloride flush, ondansetron **OR** ondansetron, polyethylene glycol, acetaminophen **OR** acetaminophen, sodium phosphate IVPB **OR** sodium phosphate IVPB **OR** sodium phosphate IVPB, magnesium sulfate, potassium chloride **OR** potassium alternative oral replacement **OR** potassium chloride, morphine, sodium chloride, diatrizoate meglumine-sodium      ALLERGIES:   Allergies as of 11/06/2022 - Fully Reviewed 11/03/2022   Allergen Reaction Noted    Aspirin Other (See Comments) 10/05/2011    Ibuprofen Other (See Comments) 10/05/2011    Macrobid [nitrofurantoin monohydrate macrocrystals] Other (See Comments) 10/05/2011    Adhesive tape Rash 02/07/2012    Lexiscan [regadenoson] Rash 03/04/2019    Penicillins Rash 10/05/2011      OBJECTIVE:   height is 5' (1.524 m) and weight is 232 lb 11.2 oz (105.6 kg). Her temporal temperature is 98.6 °F (37 °C). Her blood pressure is 112/64 and her pulse is 94. Her respiration is 24 and oxygen saturation is 97%. I/O this shift:  In: 120 [P.O.:120]  Out: -      PHYSICAL EXAM:    CONSTITUTIONAL: She is a 78y.o.-year-old who appears her stated age. She is drowsy but responds to tactile stimuli. HEENT: PERRLA, EOMI. No scleral icterus. No thrush, atraumatic, normocephalic. NECK: Supple, without cervical or supraclavicular lymphadenopathy:  CARDIOVASCULAR: S1 S2 RRR. Without murmer. No JVD or hepatojugular reflux seen. RESPIRATORY & CHEST: Bibasilar decreased breath sounds. No wheezing or crackles heard. GASTROINTESTINAL & ABDOMEN: Soft, nontender, positive bowel sounds in all quadrants, non-distended, without hepatosplenomegaly. GENITOURINARY: No gross anatomical abnormalities seen. MUSCULOSKELETAL: No tenderness to palpation of the axial skeleton. There is no clubbing. No cyanosis. 2+ pitting edema in bilateral lower extremities. SKIN OF BODY: No rash or jaundice. PSYCHIATRIC EVALUATION: Unable to assess. HEMATOLOGIC/LYMPHATIC/ IMMUNOLOGIC: No palpable lymphadenopathy. NEUROLOGIC: Drowsy but arousable to tactile stimuli.      LABS:  Recent Labs     11/10/22  1135 11/10/22  1235 11/11/22  0420 11/12/22  0630   WBC  --  10.8 11.6* 10.8   HGB  --  6.9* 9.0* 9.6*   HCT  --  20.5* 27.2* 28.3*   PLT  --  32* 28* 29*   ALT 12  --  14  --    AST 15  --  20  --    *  --  145 135*   K 4.8  --  5.1 4.7   CL 96*  --  106 97*   CREATININE 0.8  --  0.6 0.7   BUN 26*  --  22* 26*   CO2 28  --  30 29   INR  --   --  1.31*  --        Recent Labs     11/10/22  1135 11/11/22  0420 11/12/22  0630   GLUCOSE 366* 235* 160*   CALCIUM 8.2* 8.2* 9.0   * 145 135*   K 4.8 5.1 4.7   CO2 28 30 29   CL 96* 106 97*   BUN 26* 22* 26*   CREATININE 0.8 0.6 0.7       No results for input(s): PHART, RWH9CFR, PO2ART, ZVL4MGL, U1TTISIU, BEART, U5RRZQCZ in the last 72 hours. Lab Results   Component Value Date    INR 1.31 (H) 11/11/2022    INR 1.33 (H) 11/03/2022    INR 1.32 (H) 11/02/2022    PROTIME 16.2 (H) 11/11/2022    PROTIME 16.4 (H) 11/03/2022    PROTIME 16.3 (H) 11/02/2022     Lab Results   Component Value Date/Time    AMYLASE 50 05/09/2014 07:45 PM      Lab Results   Component Value Date    LABA1C 8.0 11/07/2022     Lab Results   Component Value Date    .9 11/07/2022     Lab Results   Component Value Date    TSH 0.13 (L) 07/10/2012    G8VPQGO 0.78 07/10/2012    T4FREE 1.65 07/10/2012     Lab Results   Component Value Date    CKTOTAL 55 10/10/2022    TROPONINI 0.15 (H) 11/07/2022      No results found for: CRP   Lab Results   Component Value Date    BNP 26 03/05/2012      No results found for: [facility]   Lab Results   Component Value Date    FERRITIN 1,025.0 (H) 09/26/2022      Lab Results   Component Value Date    LACTA 2.1 (H) 10/13/2022           IMAGING:    Narrative   EXAMINATION:   ONE XRAY VIEW OF THE CHEST       11/9/2022 3:08 pm       FINDINGS:   Technically suboptimal exam, RPO projection and supine. The cardiac silhouette is enlarged. Calcifications involving the aorta   reflect atherosclerosis. The mediastinal and hilar silhouettes appear   unremarkable.        Vascular engorgement and cephalization is demonstrated with bilateral   peribronchial cuffing and perivascular haziness. Possible small volume right   pleural effusion. No pneumothorax is seen. No acute osseous abnormality is identified. Sequela   from CABG. Left upper extremity PICC tip overlies the proximal superior vena cava level. Impression   1. Technically suboptimal exam.   2. Mild congestive heart failure is a consideration given the radiographic   findings; pneumonia is also a consideration in areas of consolidation with   pleural effusion. 3. Calcific atherosclerosis aorta. 4. Cardiomegaly. IMPRESSION:     Acute respiratory distress  Congestive heart failure in exacerbation  Chronic paroxysmal atrial fibrillation  UTI  Myelodysplastic syndrome    RECOMMENDATION:     Patient had presented to the hospital with acute respiratory distress and found to be in CHF exacerbation. Her oxygen requirements remained stable at 1.5 L/min. Chest imaging findings was personally reviewed by me and it shows bilateral prominent bronchovascular markings suggestive of pulm edema. Her wheezing is most likely due to bronchial wall edema. Albuterol can be used as needed. Patient's family has decided to withdraw care and provide her with comfort. Nothing further to add from pulmonary standpoint. Thank you for your consultation. We will sign off. Arthur Phalen, MD  Pulmonary Critical Care and Sleep Medicine  11/6/2022, 3:17 PM    This note was completed using dragon medical speech recognition software. Grammatical errors, random word insertions, pronoun errors and incomplete sentences are occasional consequences of this technology due to software limitations. If there are questions or concerns about the content of this note of information contained within the body of this dictation they should be addressed with the provider for clarification.

## 2022-11-12 NOTE — PROGRESS NOTES
Assessment charted. Pt sitting up in bed awake c family at bedside&becoming more alert. Denies pain/SOB but states she feels hot. Temp 100.8, cool rag placed on pt's forehead. PO Tylenol admin. POA&pt want to hold off on Morphine gtt so she can say payal to family&attempt to eat now that she has more energy. Pt&family also requesting some HS meds be held tonight. Meds explained to pt&family to assist c what they want&don't want admin. Afib 109 on monitor, Lopressor admin c okay from POA. Pt states she feels constipated&bloated, Colace&Glycolax admin. Labored/tachypneic breathing noted c RR-26. Morphine gtt started at 2219, infusing at 1 mg/hr as ordered. PRN Valium admin for restlessness/anxiousness. Pt moved to Specialty bed/repositioned. Bety&marin care provided. Pillow support place under arms/legs. Pillow wedge placed. Lights dimmed for comfort. Family member at bedside to stay the night. Call light&belongings c in reach. Fall precautions in place. No changes. AM meds admin. Mouth care preformed. Afebrile at 98.3F AM labs collected&sent. Afib 92, RR 24, SpO2 96% in 1.5L, /95 (106). Pt turned for comfort. Heels&arms elevated c pillow support. Pt's daughter resting at bedside. Call light&belongings c in reach.

## 2022-11-12 NOTE — PROGRESS NOTES
[address]   Progress Note  Cardiology  CC:  SOB  Reason for Follow up: CHF, atrial fibrillaiton    Interval Hx: Today, she is being seen for follow up. DNR-CC orders. HR better today with metoprolol. Remains in AF with rates 's  No new complaints today. No major events overnight. Drowsy from continuous pain medication, unable to answer questions. Patient seen and examined. Clinical notes reviewed. Telemetry reviewed. Medications/Labs all Reviewed    Lab Results   Component Value Date    WBC 11.6 (H) 11/11/2022    HGB 9.0 (L) 11/11/2022    HCT 27.2 (L) 11/11/2022    MCV 90.6 11/11/2022    PLT 28 (L) 11/11/2022     Lab Results   Component Value Date    CREATININE 0.6 11/11/2022    BUN 22 (H) 11/11/2022     11/11/2022    K 5.1 11/11/2022     11/11/2022    CO2 30 11/11/2022     Lab Results   Component Value Date    INR 1.31 (H) 11/11/2022    PROTIME 16.2 (H) 11/11/2022     Review of Systems:  Unable to obtain due to drowsiness, see HPI    Physical Examination:  Vitals:    11/12/22 0700   BP: 121/65   Pulse: 99   Resp: 25   Temp:    SpO2: 98%      In: 437.8 [P.O.:430; I.V.:7.8]  Out: 1355    Wt Readings from Last 3 Encounters:   11/12/22 232 lb 11.2 oz (105.6 kg)   11/05/22 227 lb 4.7 oz (103.1 kg)   10/26/22 244 lb 12.8 oz (111 kg)       Intake/Output Summary (Last 24 hours) at 11/12/2022 2849  Last data filed at 11/12/2022 0600  Gross per 24 hour   Intake 437.78 ml   Output 1355 ml   Net -917.22 ml     Telemetry: Personally Reviewed Atrial fibrillation   Constitutional: Cooperative and in no apparent distress, and appears well nourished, appears pale/ill  Skin: Warm; no cyanosis, bruising, or clubbing  HEENT: Symmetric and normocephalic. Conjunctiva pink with clear sclera. Mucus membranes pink and moist.   Cardiovascular: irregular rate and rhythm. S1 & S2, negative for murmurs. Peripheral pulses 2+, capillary refill < 3 seconds. negative elevation of JVP.  No significant edema  Respiratory: Respirations symmetric and unlabored. Lungs clear to auscultation bilaterally, no wheezing, crackles, or rhonchi  Gastrointestinal: Abdomen soft and round. Bowel sounds normoactive in all quadrants. Musculoskeletal: QIU  Neurologic/Psych: + drowsy     Pertinent labs, diagnostic, device, and imaging results reviewed as a part of this visit    CT NECK  No acute traumatic fracture or traumatic malalignment. C1-C2 articulation severe degenerative changes and mild C2-C3 disc degenerative changes. No significant canal narrowing or foraminal stenosis. CT HEAD  No acute intracranial abnormality. Moderate senescent changes with parenchymal volume loss and chronic small vessel ischemic changes. Chronic paranasal sinus disease. Left mastoid effusion     ASSESSMENT:     Dyspnea:  On 1.5 lpm, breathing unlabored, on continuous morphine gtt  On antibiotics  Continue Entresto, lasix 20 mg daily and metoprolol 25 mg bid;  Renal fx has been stable     Chronic Afib:  AF with CVR on telemetry overnight  Off AC due to thrombocytopenia, plt 26   Metoprolol was increased, patient tolerated, HR are improved, continue current dose     Confusion:  Improved  CT showed no acute abnormality     UTI:  On antibiotics  Per primary team     MDS:  Heme following.   Frequent transfusions, planning for port placement  DNR-CC     DVT:  Tx limited treatment due to MDS and thrombocytopenia  Heme following     CRI:  Stable, continue Entresto, follow daily renal     CAD:  Stable  Remote CABG, continue BB, medical therapy limited due to hematology issues     Plan:  HR improved  Continue current medications  Cardiology will follow peripherally, please call with questions or changes in clinical status    MATEUSZ Kaba CNP, 11/12/2022 7:15 AM

## 2022-11-12 NOTE — PROGRESS NOTES
MD Mansi Lacey MD Dominga Callow, MD                  Office: [phone]                      Fax: [phone]             [facility]                   NEPHROLOGY INPATIENT PROGRESS NOTE:     PATIENT NAME: Gregory Orr  : 1940  MRN: [id number]       RECOMMENDATIONS:    As per reports: now [facility], comfort care measures only,   Can use colchine, as requested by pain mx  We will sign off currently, please do not hesitate consult on Friday, if any questions or concerns. Thank you for allowing us to participate in this patient's care. Kuhn was removed, but postvoid residual showed 600 cc so needed Kuhn reinsertion  -Monitor in 2 days, with postvoid residual after bladder scan,  -If still persistent, consider urology work-up,    - give lasix 20 mg IV PRN   - fup Ucx, IV abx as per primary team   - Stopped Entresto, w/ lower BP   - can resume on dc , if BP and renal fx tolerate. - monitor dig level, goal <2  - trend Mag level,   - ISS for BG control  - monitor anemia, goal Hb >7 PRBC as per primary team and hematology   - at higher risk for decompensation, needing closer monitoring. IMPRESSION:       Admitted on:  2022 11:47 PM   For:  Acute respiratory failure with hypoxemia (HCC) [J96.01]  Dyspnea, unspecified type [R06.00]  Acute on chronic congestive heart failure, unspecified heart failure type ([facility] [I50.9]  deep vein thrombosis involving the left proximal brachial vein    ARUN (NO H/O CKD): non-oligouric   - BL Scr- ~0.6-0.7  ->  1.8 on admission  - Etiology of ARUN - presumed pre-renal   - UA : results reviewed: abnormal, ? UTI  - Renal imaging: on admission w/ /CT abd/p - There are 2 punctate stones at the upper pole  the left kidney. There is a punctate stone at the mid right kidney. There  is no ureteral stone or hydronephrosis.     - reviewed urine lytes - likely hypovolemia initially     Associated problems:   - Volume status: mild hypervolemic  CXR - Cardiomegaly with pulmonary vascular congestion and interstitial prominence/interstitial opacities suggesting edema. Stable to the slightly worsened from the recent study.     : HTN : no need for tight control   : Na: hyponatremia - mild - chronic     - Azotemia: pre-renal   - Electrolytes: K: WNL  - Acid-Base: WNL  - Anemia: of likely chronic dz +  Thrombocytopenia - unlikely TMA type pic, r/o that if no improvement- hem following, MDS hx        Other major problems: Management per primary and other consulting teams.          Hospital Problems             Last Modified POA    * (Principal) Acute hypoxemic respiratory failure ([address] 11/7/2022 Yes    Carotid artery disease without cerebral infarction ([address] 11/7/2022 Yes    Weight loss counseling, encounter for 17/79/0157 Yes    Complicated UTI (urinary tract infection) 11/8/2022 Yes    Chronic atrial fibrillation ([address] 11/7/2022 Yes    Myelodysplasia (myelodysplastic syndrome) ([address] 11/11/2022 Yes    HFrEF (heart failure with reduced ejection fraction) ([address] 11/7/2022 Yes    Non-English speaking patient 11/8/2022 Yes    Hypothyroidism 11/8/2022 Yes    History of DVT in adulthood 11/8/2022 Yes    Nephrolithiasis 11/8/2022 Yes    Atypical pneumonia 11/8/2022 Yes    Acute on chronic congestive heart failure ([address] 11/8/2022 Yes    Permanent atrial fibrillation ([address] 11/11/2022 Yes    Peripheral vascular disease ([address] 11/7/2022 Yes    Dyspnea 11/8/2022 Yes    ARUN (acute kidney injury) ([address] 11/7/2022 Yes    Hyperlipidemia associated with type 2 diabetes mellitus ([address] 11/7/2022 Yes    Hypertension associated with diabetes ([address] 11/7/2022 Yes    Morbid obesity with BMI of 40.0-44.9, adult ([address] 11/7/2022 Yes    Obesity, Class III, BMI 40-49.9 (morbid obesity) ([address] 11/8/2022 Yes    Type 2 diabetes mellitus ([address] 11/7/2022 Yes   : other supportive care :   - Check daily renal function panel with electrolytes-phosphorus  - Strict monitoring of I/Os, daily weight  - Renal feeds/diet  - Current medications reviewed. - Nephrotoxic medications have been discontinued. - Dose adjusted and appropriate. - Dose meds for eGFR <15 mL/min/1.73m2 during ARUN    - Avoid heavy opioids due to renal failure - may use very low dose dilaudid / fentanyl with close monitoring of CNS and respiratory depression. Please refer to the orders. High Complexity. Multiple complex problems. Discussed with patient and treatment team-  family at bedside, hospitalist in past  No family at bedside now     Thank you for allowing me to participate in this patient's care. Please do not hesitate to contact me anytime. We will follow along with you. Sheridan Santana MD,  [facility] [address] [place]: [phone] or Via [email or web address]  Fax: [phone]        =======================================================================================   =======================================================================================  Subjective / interval history:   Resting in bed, drowsy,  Now DNR-CC, plans for comfort measures     Renal function better  No SOB   Leg edema +   NC to 1.5L     Kuhn was removed, but postvoid residual showed 600 cc so needed Kuhn reinsertion  -Monitor in 2 days, with postvoid residual after bladder scan,  -If still persistent, consider urology work-up,      Past medical, Surgical, Social, Family medical history reviewed by me. MEDICATIONS: reviewed by me. Medications Prior to Admission:  No current facility-administered medications on file prior to encounter. Current Outpatient Medications on File Prior to Encounter   Medication Sig Dispense Refill    Ipratropium-Albuterol (DUONEB IN) Inhale 3 mLs into the lungs every 4 hours as needed (SOB)      famotidine (PEPCID) 20 MG tablet Take 20 mg by mouth daily      HYDROcodone-acetaminophen (NORCO) 5-325 MG per tablet Take 1 tablet by mouth every 6 hours as needed for Pain.       omeprazole (PRILOSEC) 20 MG delayed release capsule Take 20 mg by mouth in the morning and at bedtime      polyethylene glycol (GLYCOLAX) 17 g packet Take 17 g by mouth daily as needed for Constipation      silver sulfADIAZINE (SILVADENE) 1 % cream Apply topically 2 times daily Apply to inner buttocks every shift for MASD      digoxin (LANOXIN) 125 MCG tablet Take 1 tablet by mouth daily 30 tablet 3    guaiFENesin (MUCINEX) 600 MG extended release tablet Take 1 tablet by mouth 2 times daily as needed for Congestion 60 tablet 0    insulin lispro (HUMALOG) 100 UNIT/ML SOLN injection vial Inject 6 Units into the skin 3 times daily (with meals) 2 each 0    sacubitril-valsartan (ENTRESTO) 24-26 MG per tablet Take 0.5 tablets by mouth 2 times daily 60 tablet 1    furosemide (LASIX) 20 MG tablet Take 1 tablet by mouth daily 60 tablet 3    gabapentin (NEURONTIN) 100 MG capsule Take 1 capsule by mouth 3 times daily for 30 days.  90 capsule 0    metoprolol tartrate (LOPRESSOR) 25 MG tablet Take 0.5 tablets by mouth 2 times daily 60 tablet 3    empagliflozin (JARDIANCE) 10 MG tablet Take 1 tablet by mouth daily 30 tablet 3    insulin glargine (BASAGLAR KWIKPEN) 100 UNIT/ML injection pen Inject 48 Units into the skin daily 5 Adjustable Dose Pre-filled Pen Syringe 3    calcium carbonate (OSCAL) 500 MG TABS tablet Take 500 mg by mouth daily      senna (SENOKOT) 8.6 MG tablet Take 1 tablet by mouth 2 times daily      magnesium 30 MG tablet Take 40 mg by mouth daily      tiZANidine (ZANAFLEX) 2 MG tablet Take 2 mg by mouth every 8 hours as needed      atorvastatin (LIPITOR) 80 MG tablet Take 1 tablet by mouth daily 90 tablet 4    Insulin Pen Needle 32G X 6 MM MISC by Does not apply route 3 times daily with meals      vitamin D (ERGOCALCIFEROL) 45272 UNITS CAPS capsule Take 50,000 Units by mouth once a week      Omega 3 1000 MG CAPS Take 1,000 mg by mouth daily      Liraglutide (VICTOZA) 18 MG/3ML SOPN SC injection Inject 1.2 mg into the skin patch at 11/12/22 0803    insulin lispro (HUMALOG) injection vial 8 Units, 8 Units, SubCUTAneous, TID WC, Annie Hdoges MD, 8 Units at 11/11/22 1133    lidocaine PF 1 % injection 5 mL, 5 mL, IntraDERmal, Once, Arcelia Villarreal MD    sodium chloride flush 0.9 % injection 5-40 mL, 5-40 mL, IntraVENous, 2 times per day, Arcelia Villarreal MD, 10 mL at 11/12/22 0801    sodium chloride flush 0.9 % injection 5-40 mL, 5-40 mL, IntraVENous, PRN, Arcelia Villarreal MD    0.9 % sodium chloride infusion, 25 mL, IntraVENous, PRN, Arcelia Villarreal MD    dextrose bolus 10% 125 mL, 125 mL, IntraVENous, PRN **OR** dextrose bolus 10% 250 mL, 250 mL, IntraVENous, PRN, Dorlene Ramu, DO    glucagon (rDNA) injection 1 mg, 1 mg, SubCUTAneous, PRN, Dorlene Ramu, DO    dextrose 10 % infusion, , IntraVENous, Continuous PRN, Dorlene Ramu, DO    atorvastatin (LIPITOR) tablet 80 mg, 80 mg, Oral, Nightly, Dorlene Ramu, DO, 80 mg at 11/10/22 2010    calcium elemental (OSCAL) tablet 500 mg, 500 mg, Oral, Daily, Dorlene Ramu, DO, 500 mg at 11/12/22 0800    digoxin (LANOXIN) tablet 125 mcg, 125 mcg, Oral, Daily, Dorlene Ramu, DO, 125 mcg at 11/12/22 0800    famotidine (PEPCID) tablet 20 mg, 20 mg, Oral, Daily, Dorlene Ramu, DO, 20 mg at 11/10/22 2010    gabapentin (NEURONTIN) capsule 100 mg, 100 mg, Oral, TID, Dorlene Ramu, DO, 100 mg at 11/12/22 0800    guaiFENesin (MUCINEX) extended release tablet 600 mg, 600 mg, Oral, BID PRN, Dorlene Ramu, DO    HYDROcodone-acetaminophen (NORCO) 5-325 MG per tablet 1 tablet, 1 tablet, Oral, Q6H PRN, Dorlene Ramu, DO, 1 tablet at 11/09/22 1422    levothyroxine (SYNTHROID) tablet 175 mcg, 175 mcg, Oral, Christiano CASTANEDA, DO, 175 mcg at 11/12/22 0625    pantoprazole (PROTONIX) tablet 40 mg, 40 mg, Oral, Christiano CASTANEDA, DO, 40 mg at 11/12/22 5007    tiZANidine (ZANAFLEX) tablet 2 mg, 2 mg, Oral, Q8H PRN, Dorbrendae Ramu, DO    sodium chloride flush 0.9 % injection 5-40 mL, 5-40 mL, IntraVENous, 2 times per day, Luis Hailstone, DO, 10 mL at 11/12/22 0825    sodium chloride flush 0.9 % injection 5-40 mL, 5-40 mL, IntraVENous, PRN, Christiano Padron, DO, 20 mL at 11/07/22 1643    ondansetron (ZOFRAN-ODT) disintegrating tablet 4 mg, 4 mg, Oral, Q8H PRN **OR** ondansetron (ZOFRAN) injection 4 mg, 4 mg, IntraVENous, Q6H PRN, Luis Mildredtone, DO, 4 mg at 11/12/22 0800    polyethylene glycol (GLYCOLAX) packet 17 g, 17 g, Oral, Daily PRN, Luis Levytone, DO    acetaminophen (TYLENOL) tablet 650 mg, 650 mg, Oral, Q6H PRN, 650 mg at 11/11/22 1945 **OR** acetaminophen (TYLENOL) suppository 650 mg, 650 mg, Rectal, Q6H PRN, Christiano Padron, DO    sodium phosphate 10 mmol in sodium chloride 0.9 % 250 mL IVPB, 10 mmol, IntraVENous, PRN **OR** sodium phosphate 15 mmol in sodium chloride 0.9 % 250 mL IVPB, 15 mmol, IntraVENous, PRN **OR** sodium phosphate 20 mmol in sodium chloride 0.9 % 500 mL IVPB, 20 mmol, IntraVENous, PRN, Luis Hailstone, DO    magnesium sulfate 2000 mg in 50 mL IVPB premix, 2,000 mg, IntraVENous, PRN, Luis Hailstone, DO, Stopped at 11/07/22 1611    potassium chloride (KLOR-CON M) extended release tablet 40 mEq, 40 mEq, Oral, PRN **OR** potassium bicarb-citric acid (EFFER-K) effervescent tablet 40 mEq, 40 mEq, Oral, PRN **OR** potassium chloride 10 mEq/100 mL IVPB (Peripheral Line), 10 mEq, IntraVENous, PRN, Luis Pilolstone, DO    morphine (PF) injection 2 mg, 2 mg, IntraVENous, Q4H PRN, Luis Hailstone, DO, 2 mg at 11/11/22 1147    heparin (porcine) injection 5,000 Units, 5,000 Units, SubCUTAneous, Q12H, Dexter Vázquez MD    methocarbamol (ROBAXIN) tablet 500 mg, 500 mg, Oral, q8h, Dexter Vázquez MD, 500 mg at 11/12/22 0624    0.9 % sodium chloride infusion, , IntraVENous, PRN, Dexter Vázquez MD    diatrizoate meglumine-sodium (GASTROGRAFIN) 66-10 % solution 20 mL, 20 mL, Oral, ONCE PRN, Dexter Vázquez MD, 20 mL at 11/07/22 7900    polyethylene glycol (GLYCOLAX) packet 17 g, 17 g, Oral, BID, Dexter Vázquez MD, 17 g at 11/12/22 3581 docusate sodium (COLACE) capsule 100 mg, 100 mg, Oral, BID, Jose Coelho MD, 100 mg at 11/12/22 0800        REVIEW OF SYSTEMS:  As mentioned in chief complaint and HPI , Subjective         =======================================================================================     PHYSICAL EXAM:  Recent vital signs and recent I/Os reviewed by me. Wt Readings from Last 3 Encounters:   11/12/22 232 lb 11.2 oz (105.6 kg)   11/05/22 227 lb 4.7 oz (103.1 kg)   10/26/22 244 lb 12.8 oz (111 kg)     BP Readings from Last 3 Encounters:   11/12/22 112/64   11/05/22 (!) 100/52   11/02/22 123/64     Patient Vitals for the past 24 hrs:   BP Temp Temp src Pulse Resp SpO2 Weight   11/12/22 0800 112/64 98.6 °F (37 °C) Temporal 94 24 97 % --   11/12/22 0700 121/65 -- -- 99 25 98 % --   11/12/22 0624 -- -- -- -- -- -- 232 lb 11.2 oz (105.6 kg)   11/12/22 0600 (!) 138/95 -- -- 94 25 97 % --   11/12/22 0500 117/63 -- -- 91 21 97 % --   11/12/22 0400 (!) 124/47 98.5 °F (36.9 °C) Axillary 83 22 97 % --   11/12/22 0300 126/74 -- -- 97 18 97 % --   11/12/22 0200 101/60 -- -- 83 23 97 % --   11/12/22 0100 (!) 103/54 -- -- 83 20 95 % --   11/12/22 0000 (!) 115/52 97.6 °F (36.4 °C) Axillary 77 23 95 % --   11/11/22 2301 -- -- -- 85 -- -- --   11/11/22 2300 120/62 97.8 °F (36.6 °C) Axillary 97 25 94 % --   11/11/22 2218 (!) 139/52 98.3 °F (36.8 °C) Axillary 99 27 91 % --   11/11/22 2200 (!) 139/52 98.7 °F (37.1 °C) Axillary 91 23 93 % --   11/11/22 2100 124/65 99 °F (37.2 °C) Axillary 94 21 93 % --   11/11/22 2000 134/74 (!) 100.8 °F (38.2 °C) Axillary 97 27 96 % --   11/11/22 1120 (!) 150/76 97.4 °F (36.3 °C) Temporal 97 24 96 % --         Intake/Output Summary (Last 24 hours) at 11/12/2022 1017  Last data filed at 11/12/2022 9793  Gross per 24 hour   Intake 557.78 ml   Output 1355 ml   Net -797.22 ml       Physical Exam  General: no acute distress  HENT: Atraumatic, normocephalic   Eyes: Normal conjunctiva, Non-incteral sclera.    Neck: Supple, JVD not visible. CVS:  Heart sounds are normal. No loud murmur. RS: Normal respiratory effort, Breat sound: diminished at bases. Abd: Soft , bowel sounds are normal,    distension and no tenderness . Skin: No rash , some bruises,   CNS: Awake Oriented , No focal.   Extremities/MSK:  Edema, no cyanosis.     =======================================================================================     DATA:  Diagnostic tests reviewed by me for today's visit:   (AS NEEDED FOR MY EVALUATION AND MANAGEMENT). Recent Labs     11/10/22  1235 11/11/22  0420 11/12/22  0630   WBC 10.8 11.6* 10.8   HCT 20.5* 27.2* 28.3*   PLT 32* 28* 29*       Iron Saturation:  No components found for: PERCENTFE  FERRITIN:    Lab Results   Component Value Date/Time    FERRITIN 1,025.0 09/26/2022 04:34 AM     IRON:    Lab Results   Component Value Date/Time    IRON 46 09/25/2022 05:02 AM     TIBC:    Lab Results   Component Value Date/Time    TIBC 230 09/25/2022 05:02 AM       Recent Labs     11/10/22  1135 11/11/22  0420 11/12/22  0630   * 145 135*   K 4.8 5.1 4.7   CL 96* 106 97*   CO2 28 30 29   BUN 26* 22* 26*   CREATININE 0.8 0.6 0.7       Recent Labs     11/10/22  1135 11/11/22  0420 11/12/22  0630   CALCIUM 8.2* 8.2* 9.0   MG  --  1.80 1.60*   PHOS  --  2.5 3.3       No results for input(s): PH, PCO2, PO2 in the last 72 hours.     Invalid input(s): Sid Lombard    ABG:  No results found for: PH, PCO2, PO2, HCO3, BE, THGB, TCO2, O2SAT  VBG:    Lab Results   Component Value Date/Time    PHVEN 7.495 11/07/2022 12:56 AM    JCZ5IET 35.0 11/07/2022 12:56 AM    BEVEN 3.5 11/07/2022 12:56 AM    L5MWGEHZ 100 11/07/2022 12:56 AM       LDH:    Lab Results   Component Value Date/Time     10/14/2022 04:50 PM     Uric Acid:    Lab Results   Component Value Date/Time    LABURIC 6.8 09/26/2022 04:34 AM       PT/INR:    Lab Results   Component Value Date/Time    PROTIME 16.2 11/11/2022 04:20 AM    INR 1.31 MANAGEMENT). CT ABDOMEN PELVIS WO CONTRAST Additional Contrast? Oral    Result Date: 11/7/2022  EXAMINATION: CT OF THE ABDOMEN AND PELVIS WITHOUT CONTRAST 11/7/2022 3:56 pm TECHNIQUE: CT of the abdomen and pelvis was performed without the administration of intravenous contrast. Multiplanar reformatted images are provided for review. Automated exposure control, iterative reconstruction, and/or weight based adjustment of the mA/kV was utilized to reduce the radiation dose to as low as reasonably achievable. COMPARISON: 07/08/2015. HISTORY: ORDERING SYSTEM PROVIDED HISTORY: Abd pain TECHNOLOGIST PROVIDED HISTORY: Oral contrast; NO IV contrast Reason for exam:->Abd pain Additional Contrast?->Oral Reason for Exam: abdomen pain FINDINGS: Lower Chest: Linear atelectasis or scarring is noted at the lung bases. There is no pleural effusion. Organs: The liver, spleen, adrenal glands and pancreas appear normal.  There has been a cholecystectomy. There are 2 punctate stones at the upper pole the left kidney. There is a punctate stone at the mid right kidney. There is no ureteral stone or hydronephrosis. GI/Bowel: There is a normal appendix. There is a moderate amount of stool and air in the colon proximally. There is no bowel dilatation or bowel wall thickening. The stomach is unremarkable. Pelvis: The bladder is nondistended and contains a Kuhn catheter. No abnormal adnexal mass is identified. Peritoneum/Retroperitoneum: There is advanced multifocal fibrocalcific plaque involving the abdominal aorta and its branches without aneurysm. There are no enlarged lymph nodes. No fat stranding, free fluid, free air or focal fluid collection is identified. Bones/Soft Tissues: No destructive bone lesion is identified. 1. No acute findings in the abdomen or pelvis. 2. Moderate amount of stool and air in the colon proximally which could reflect constipation. 3. Punctate bilateral intrarenal calculi.      CT HEAD WO CONTRAST    Result Date: 11/7/2022  EXAMINATION: CT OF THE HEAD WITHOUT CONTRAST  11/7/2022 12:25 am TECHNIQUE: CT of the head was performed without the administration of intravenous contrast. Automated exposure control, iterative reconstruction, and/or weight based adjustment of the mA/kV was utilized to reduce the radiation dose to as low as reasonably achievable. COMPARISON: 10/13/2022. HISTORY: ORDERING SYSTEM PROVIDED HISTORY: confusion TECHNOLOGIST PROVIDED HISTORY: Reason for exam:->confusion Has a \"code stroke\" or \"stroke alert\" been called? ->No Decision Support Exception - unselect if not a suspected or confirmed emergency medical condition->Emergency Medical Condition (MA) Reason for Exam: Shortness of Breath (Pt presents to the ED from [facility] with reports of SOB, per EMS Pt sating at 88% on RA. Pt is usually on RA per baseline. Pt satting at 92% RA. Pt recently discharged for Picc Line placement due to reoccurring blood transfusion due to MDS. Per daughter at bedside pt is acting normally. Does not appear to be lethargic or confused at this time. VSS.  ) FINDINGS: BRAIN/VENTRICLES: There is no acute intracranial hemorrhage or extra-axial fluid collection. Generalized volume loss is redemonstrated there are prominent CSF spaces over the cerebral hemispheres, greater at the anterior and temporal margins than the parietooccipital margin. There is no mass effect or midline shift. Ventricular size and configuration are stable. No definite sign for acute territorial infarct. There are some chronic ischemic changes in the periventricular and deep white matter. Atherosclerotic calcifications at the intracranial arteries. ORBITS: The visualized portion of the orbits demonstrate no acute abnormality. SINUSES: Included paranasal sinuses have minimal mucosal thickening.   Partial opacification in the left mastoid air cells and edge of the middle ear cavity is new from the prior exam. SOFT TISSUES/SKULL:  No acute abnormality of the visualized skull or soft tissues. No acute intracranial hemorrhage, mass effect, midline shift, or signs of acute territorial infarct. Generalized volume loss and chronic ischemic changes in the white matter are stable. Partial opacification in the left mastoid air cells and edge of the middle ear cavity are new from the prior exam.  Correlate for infectious symptoms versus eustachian tube dysfunction. CT HEAD WO CONTRAST    Result Date: 10/14/2022  EXAMINATION: CT OF THE HEAD WITHOUT CONTRAST  10/13/2022 2:52 pm TECHNIQUE: CT of the head was performed without the administration of intravenous contrast. Automated exposure control, iterative reconstruction, and/or weight based adjustment of the mA/kV was utilized to reduce the radiation dose to as low as reasonably achievable. COMPARISON: 02/11/2012 HISTORY: ORDERING SYSTEM PROVIDED HISTORY: dizziness TECHNOLOGIST PROVIDED HISTORY: Reason for exam:->dizziness Has a \"code stroke\" or \"stroke alert\" been called? ->No Reason for Exam: dizziness FINDINGS: BRAIN/VENTRICLES: The cerebral and cerebellar parenchyma demonstrate volume loss, with a frontal lobe predominance. Scattered low-attenuation areas are noted in the periventricular white matter, compatible with chronic microvascular white matter ischemic disease. There are no areas of hemorrhage, mass, or midline shift. No abnormal extra-axial fluid collections. The ventricles are prominent in size, likely related to involutional change. Gray-white differentiation is maintained without evidence of acute infarct. ORBITS: Lens implants from prior cataract surgery are noted. The orbits are otherwise unremarkable. SINUSES: There is scattered mild paranasal sinus disease. Thickened secretions are noted in the left sphenoid sinus. The mastoid air cells are clear. SOFT TISSUES/SKULL:  The calvarium is intact. No appreciable scalp soft tissue swelling.      1. No acute intracranial abnormality. 2. Cerebral and cerebellar parenchymal volume loss with chronic microvascular white matter ischemic disease. XR CHEST PORTABLE    Result Date: 11/7/2022  EXAMINATION: ONE XRAY VIEW OF THE CHEST 11/7/2022 1:02 am COMPARISON: 11/03/2022 HISTORY: ORDERING SYSTEM PROVIDED HISTORY: PICC PLACEMENT TECHNOLOGIST PROVIDED HISTORY: Reason for exam:->PICC PLACEMENT FINDINGS: Sternotomy wires and postsurgical changes. There is a left PICC line with the tip over the expected SVC. The position is not appear significantly changed from the prior exam.  No endotracheal tube or nasogastric tube. Cardiac silhouette remains enlarged with pulmonary vascular congestion. Prominent interstitial markings and scattered interstitial opacities. No large effusion but small amounts are not excluded at the costophrenic angles. There is no sign of pneumothorax. Underpenetration of the bones and limited bony evaluation. Left PICC line appears to be over the SVC and not significantly changed from the prior exam. Cardiomegaly with pulmonary vascular congestion and interstitial prominence/interstitial opacities suggesting edema. Stable to the slightly worsened from the recent study.                VL Extremity Venous Bilateral    Result Date: 11/7/2022  Vascular Upper Extremities Veins Procedure -- PRELIMINARY SONOGRAPHER REPORT --   Demographics   Patient Name      Dexter Ply   Date of Study     11/07/2022          Gender              Female   Patient Number    [id number]          Date of Birth       1940   Visit Number      [id number]           Age                 78 year(s)   Accession Number  [id number]          Room Number         0010   Corporate ID      [id number]            Sonographer         Ladan Bowling RVT, RT   Ordering          Agnes Garcia, DO  Interpreting        Smiley Young MD  Physician Physician  Procedure Type of Study:   Veins:Upper Extremities Veins, VASC EXTREMITY VENOUS DUPLEX BILATERAL. Tech Comments Right No evidence of deep vein thrombosis or superficial vein thrombosis of the right upper extremity Left Acute totally occluded deep vein thrombosis involving the left proximal brachial vein. . No other evidence of deep vein thrombosis or superficial vein thrombosis of the left upper extremity. This report was transcribed using voice recognition software, mainly. So please excuse brevity and/or typos. Every effort was made to ensure accuracy, however, inadvertent computerized transcription errors may be present. Please contact us, if any questions or clarifications are needed.

## 2022-11-12 NOTE — PROGRESS NOTES
Hematology Oncology Daily Progress Note    Admit Date: 11/6/2022  Hospital day several    Subjective:     Patient sleeping currently--daughters feel pain is better--denies sob/cp.    Medication side effects: none    Scheduled Meds:   furosemide  20 mg Oral Daily    sacubitril-valsartan  0.5 tablet Oral BID    metoprolol tartrate  25 mg Oral BID    methylPREDNISolone  40 mg IntraVENous Q8H    butalbital-acetaminophen-caffeine  1 tablet Oral Dinner    insulin lispro  0-8 Units SubCUTAneous TID WC    insulin lispro  0-4 Units SubCUTAneous Nightly    insulin glargine  44 Units SubCUTAneous Daily    lidocaine  1 patch TransDERmal Daily    insulin lispro  8 Units SubCUTAneous TID WC    lidocaine 1 % injection  5 mL IntraDERmal Once    sodium chloride flush  5-40 mL IntraVENous 2 times per day    atorvastatin  80 mg Oral Nightly    calcium elemental  500 mg Oral Daily    digoxin  125 mcg Oral Daily    famotidine  20 mg Oral Daily    gabapentin  100 mg Oral TID    levothyroxine  175 mcg Oral QAM AC    pantoprazole  40 mg Oral QAM AC    sodium chloride flush  5-40 mL IntraVENous 2 times per day    heparin (porcine)  5,000 Units SubCUTAneous Q12H    methocarbamol  500 mg Oral q8h    polyethylene glycol  17 g Oral BID    docusate sodium  100 mg Oral BID     Continuous Infusions:   morphine infusion 1 mg/mL 1 mg/hr (11/11/22 9757)    sodium chloride      sodium chloride      sodium chloride      dextrose      sodium chloride       PRN Meds:diazePAM, sodium chloride, sodium chloride, ipratropium-albuterol, sodium chloride flush, sodium chloride, dextrose bolus **OR** dextrose bolus, glucagon (rDNA), dextrose, guaiFENesin, HYDROcodone-acetaminophen, tiZANidine, sodium chloride flush, ondansetron **OR** ondansetron, polyethylene glycol, acetaminophen **OR** acetaminophen, sodium phosphate IVPB **OR** sodium phosphate IVPB **OR** sodium phosphate IVPB, magnesium sulfate, potassium chloride **OR** potassium alternative oral replacement **OR** potassium chloride, morphine, sodium chloride, diatrizoate meglumine-sodium    Review of Systems  Pt sleeping--daughters did not want me to wake her. Objective:     Patient Vitals for the past 8 hrs:   BP Temp Temp src Pulse Resp SpO2 Weight   11/12/22 0800 112/64 98.6 °F (37 °C) Temporal 94 24 97 % --   11/12/22 0700 121/65 -- -- 99 25 98 % --   11/12/22 0624 -- -- -- -- -- -- 232 lb 11.2 oz (105.6 kg)   11/12/22 0600 (!) 138/95 -- -- 94 25 97 % --   11/12/22 0500 117/63 -- -- 91 21 97 % --   11/12/22 0400 (!) 124/47 98.5 °F (36.9 °C) Axillary 83 22 97 % --   11/12/22 0300 126/74 -- -- 97 18 97 % --     I/O last 3 completed shifts:   In: 1135.3 [P.O.:490; I.V.:7.8; Blood:637.5]  Out: 7797 [Urine:1830]  I/O this shift:  In: 120 [P.O.:120]  Out: -     /64   Pulse 94   Temp 98.6 °F (37 °C) (Temporal)   Resp 24   Ht 5' (1.524 m)   Wt 232 lb 11.2 oz (105.6 kg)   LMP  (LMP Unknown)   SpO2 97%   BMI 45.45 kg/m²     General Appearance:    Alert, cooperative, no distress, appears stated age   Head:    Normocephalic, without obvious abnormality, atraumatic   Eyes:    PERRL, conjunctiva/corneas clear, EOM's intact, fundi     benign, both eyes   Ears:    Normal TM's and external ear canals, both ears   Nose:   Nares normal, septum midline, mucosa normal, no drainage    or sinus tenderness   Throat:   Lips, mucosa, and tongue normal; teeth and gums normal   Neck:   Supple, symmetrical, trachea midline, no adenopathy;     thyroid:  no enlargement/tenderness/nodules; no carotid    bruit or JVD   Back:     Symmetric, no curvature, ROM normal, no CVA tenderness   Lungs:     Clear to auscultation bilaterally, respirations unlabored   Chest Wall:    No tenderness or deformity    Heart:    Regular rate and rhythm, S1 and S2 normal, no murmur, rub   or gallop       Abdomen:     Soft, non-tender, bowel sounds active all four quadrants,     no masses, no organomegaly           Extremities:   Extremities normal, atraumatic, no cyanosis or edema   Pulses:   2+ and symmetric all extremities   Skin:   Skin color, texture, turgor normal, no rashes or lesions   Lymph nodes:   Cervical, supraclavicular, and axillary nodes normal   Neurologic:   Stable       Data ReviewCBC:   Lab Results   Component Value Date/Time    WBC 10.8 11/12/2022 06:30 AM    RBC 3.07 11/12/2022 06:30 AM       Assessment:     Principal Problem:    Acute hypoxemic respiratory failure (HCC)  Active Problems:    Carotid artery disease without cerebral infarction (HCC)    Weight loss counseling, encounter for    Complicated UTI (urinary tract infection)    Chronic atrial fibrillation (HCC)    Myelodysplasia (myelodysplastic syndrome) ([facility])    HFrEF (heart failure with reduced ejection fraction) ([address]    Non-English speaking patient    Hypothyroidism    History of DVT in adulthood    Nephrolithiasis    Atypical pneumonia    Acute on chronic congestive heart failure (HCC)    Permanent atrial fibrillation (HCC)    Peripheral vascular disease ([facility])    Dyspnea    ARUN (acute kidney injury) ([address]    Hyperlipidemia associated with type 2 diabetes mellitus ([address]    Hypertension associated with diabetes ([address]    Morbid obesity with BMI of 40.0-44.9, adult ([facility])    Obesity, Class III, BMI 40-49.9 (morbid obesity) ([address]    Type 2 diabetes mellitus ([address]  Resolved Problems:    * No resolved hospital problems. *      Plan:     MDS. Counts stable  UE DVT. Not on anticoagulation due to TCP  Pain control. On Morphine drip. Steroids added yesterday  Constipation. Will add lactulose.           Electronically signed by Mirtha Norwood MD on 11/12/2022 at 10:29 AM

## 2022-11-12 NOTE — PROGRESS NOTES
Hospitalist Progress Note    Name:  Francisco Corley    /Age/Sex: 1940  (78 y.o. female)  MRN & CSN:  [id number] & [id number]    PCP: Orestes Ngo    Date of Admission: 2022    Patient Status:  Inpatient     Chief Complaint: Dyspnea    Hospital Course:   78 y.o. female with PMHx of nondysplastic syndrome, hypertension, hyperlipidemia, type 2 diabetes, CAD, atrial fibrillation who presented to [facility] with complaints of shortness of breath. Patient was at her nursing home when it was noted she was having shortness of breath. She is not on any oxygen at home, but she is currently satting well on 2 L O2 via NC. Left arm PICC line in place for transfusions given history of MDS. Upper extremity Dopplers found acute DVT in left upper extremity. Reluctant to remove PICC line at this time. Hematology following. UTI positive for Proteus. Received IV meropenem, but downgraded to Rocephin. ID following. Patient is coming with ARUN, but that has since resolved. Nephrology signed off. General surgery consulted for port placement on  has been cancelled. Patient is now DNR CC and on a morphine gtt. Subjective: Today is:  Hospital Day: 7. Patient seen and examined in [id number]. Patient states she feels better now on the morphine drip. Family no longer wants to pursue PICC line removal and port placement for transfusions as patient is DNR CC. Again, discussed possibility of hospice. No other acute complaints.       Medications:  Reviewed    Infusion Medications    morphine infusion 1 mg/mL 1 mg/hr (22 4904)    sodium chloride      sodium chloride      sodium chloride      dextrose      sodium chloride       Scheduled Medications    furosemide  20 mg Oral Daily    sacubitril-valsartan  0.5 tablet Oral BID    metoprolol tartrate  25 mg Oral BID    methylPREDNISolone  40 mg IntraVENous Q8H    butalbital-acetaminophen-caffeine  1 tablet Oral Dinner insulin lispro  0-8 Units SubCUTAneous TID WC    insulin lispro  0-4 Units SubCUTAneous Nightly    insulin glargine  44 Units SubCUTAneous Daily    lidocaine  1 patch TransDERmal Daily    insulin lispro  8 Units SubCUTAneous TID WC    lidocaine 1 % injection  5 mL IntraDERmal Once    sodium chloride flush  5-40 mL IntraVENous 2 times per day    atorvastatin  80 mg Oral Nightly    calcium elemental  500 mg Oral Daily    digoxin  125 mcg Oral Daily    famotidine  20 mg Oral Daily    gabapentin  100 mg Oral TID    levothyroxine  175 mcg Oral QAM AC    pantoprazole  40 mg Oral QAM AC    sodium chloride flush  5-40 mL IntraVENous 2 times per day    heparin (porcine)  5,000 Units SubCUTAneous Q12H    methocarbamol  500 mg Oral q8h    polyethylene glycol  17 g Oral BID    docusate sodium  100 mg Oral BID     PRN Meds: diazePAM, sodium chloride, sodium chloride, ipratropium-albuterol, sodium chloride flush, sodium chloride, dextrose bolus **OR** dextrose bolus, glucagon (rDNA), dextrose, guaiFENesin, HYDROcodone-acetaminophen, tiZANidine, sodium chloride flush, ondansetron **OR** ondansetron, polyethylene glycol, acetaminophen **OR** acetaminophen, sodium phosphate IVPB **OR** sodium phosphate IVPB **OR** sodium phosphate IVPB, magnesium sulfate, potassium chloride **OR** potassium alternative oral replacement **OR** potassium chloride, morphine, sodium chloride, diatrizoate meglumine-sodium      Intake/Output Summary (Last 24 hours) at 11/12/2022 0805  Last data filed at 11/12/2022 0600  Gross per 24 hour   Intake 437.78 ml   Output 1355 ml   Net -917.22 ml         Physical Exam Performed:    /65   Pulse 99   Temp 98.5 °F (36.9 °C) (Axillary)   Resp 25   Ht 5' (1.524 m)   Wt 232 lb 11.2 oz (105.6 kg)   LMP  (LMP Unknown)   SpO2 98%   BMI 45.45 kg/m²     General appearance: No apparent distress, appears stated age and cooperative. Ill appearing. Khmer speaking.   HEENT: Pupils equal, round, and reactive to light. Conjunctivae/corneas clear. NC present. Neck: Supple, with severely limited ROM. Painful to palpation along spinous processes. No jugular venous distention. Trachea midline. Respiratory:  Normal respiratory effort. Clear to auscultation, bilaterally without Rales/Wheezes/Rhonchi. Cardiovascular: Regular rate and rhythm with normal S1/S2 without murmurs, rubs or gallops. No peripheral edema. Abdomen: Soft, non-tender, non-distended with normal bowel sounds. : No CVA tenderness. Musculoskeletal: No clubbing or cyanosis. Full range of motion without deformity. Skin: Skin color, texture, turgor normal.  No rashes or lesions. Neurologic:  Neurovascularly intact without any focal sensory/motor deficits. Cranial nerves: II-XII intact, grossly non-focal.  Psychiatric: Alert and oriented, thought content appropriate, normal insight  Peripheral Pulses: +2 palpable, equal bilaterally       Labs:   Recent Labs     11/10/22  1235 11/11/22  0420 11/12/22  0630   WBC 10.8 11.6* 10.8   HGB 6.9* 9.0* 9.6*   HCT 20.5* 27.2* 28.3*   PLT 32* 28* 29*       Recent Labs     11/10/22  1135 11/11/22  0420 11/12/22  0630   * 145 135*   K 4.8 5.1 4.7   CL 96* 106 97*   CO2 28 30 29   BUN 26* 22* 26*   CREATININE 0.8 0.6 0.7   CALCIUM 8.2* 8.2* 9.0   PHOS  --  2.5 3.3       Recent Labs     11/10/22  1135 11/11/22  0420   AST 15 20   ALT 12 14   BILITOT 0.4 0.6   ALKPHOS 73 73       Recent Labs     11/11/22  0420   INR 1.31*       No results for input(s): CKTOTAL, TROPONINI in the last 72 hours.       Urinalysis:      Lab Results   Component Value Date/Time    NITRU POSITIVE 11/07/2022 01:45 PM    WBCUA 4947 11/07/2022 01:45 PM    BACTERIA 4+ 11/07/2022 01:45 PM    RBCUA 141 11/07/2022 01:45 PM    BLOODU MODERATE 11/07/2022 01:45 PM    SPECGRAV 1.020 11/07/2022 01:45 PM    GLUCOSEU 100 11/07/2022 01:45 PM    GLUCOSEU NEGATIVE 03/09/2012 06:51 AM       Radiology:  CT CERVICAL SPINE WO CONTRAST   Preliminary Result   No acute traumatic fracture or traumatic malalignment. C1-C2 articulation severe degenerative changes and mild C2-C3 disc   degenerative changes. No significant canal narrowing or foraminal stenosis. CT HEAD WO CONTRAST   Final Result   No acute intracranial abnormality. Moderate senescent changes with parenchymal volume loss and chronic small   vessel ischemic changes. Chronic paranasal sinus disease. Left mastoid effusion         XR CHEST PORTABLE   Final Result   1. Technically suboptimal exam.   2. Mild congestive heart failure is a consideration given the radiographic   findings; pneumonia is also a consideration in areas of consolidation with   pleural effusion. 3. Calcific atherosclerosis aorta. 4. Cardiomegaly. VL Extremity Venous Left         CT ABDOMEN PELVIS WO CONTRAST Additional Contrast? Oral   Final Result   1. No acute findings in the abdomen or pelvis. 2. Moderate amount of stool and air in the colon proximally which could   reflect constipation. 3. Punctate bilateral intrarenal calculi. VL Extremity Venous Bilateral         XR CHEST PORTABLE   Final Result   Left PICC line appears to be over the SVC and not significantly changed from   the prior exam.      Cardiomegaly with pulmonary vascular congestion and interstitial   prominence/interstitial opacities suggesting edema. Stable to the slightly   worsened from the recent study. CT HEAD WO CONTRAST   Final Result   No acute intracranial hemorrhage, mass effect, midline shift, or signs of   acute territorial infarct. Generalized volume loss and chronic ischemic changes in the white matter are   stable. Partial opacification in the left mastoid air cells and edge of the middle   ear cavity are new from the prior exam.  Correlate for infectious symptoms   versus eustachian tube dysfunction.                  Assessment/Plan:    Active Hospital Problems    Diagnosis     Acute hypoxemic respiratory failure (HCC) [J96.01]      Priority: High    Carotid artery disease without cerebral infarction ([facility] [I77.9]      Priority: High    Weight loss counseling, encounter for [Z71.3]      Priority: High    Permanent atrial fibrillation ([facility] [I48.21]      Priority: Medium    Non-English speaking patient [Z78.9]      Priority: Medium    Hypothyroidism [E03.9]      Priority: Medium    History of DVT in adulthood [Z86.718]      Priority: Medium    Nephrolithiasis [N20.0]      Priority: Medium    Atypical pneumonia [J18.9]      Priority: Medium    Acute on chronic congestive heart failure ([address] [I50.9]      Priority: Medium    HFrEF (heart failure with reduced ejection fraction) ([facility] [I50.20]      Priority: Medium    Myelodysplasia (myelodysplastic syndrome) ([facility] [D46.9]      Priority: Medium    Chronic atrial fibrillation (HCC) [I48.20]      Priority: Medium    Complicated UTI (urinary tract infection) [N39.0]      Priority: Medium    Type 2 diabetes mellitus ([facility] [E11.9]     Obesity, Class III, BMI 40-49.9 (morbid obesity) ([facility] [E66.01]     Morbid obesity with BMI of 40.0-44.9, adult (HCC) [E66.01, Z68.41]     ARUN (acute kidney injury) ([facility] [N17.9]     Hyperlipidemia associated with type 2 diabetes mellitus ([facility] [E11.69, E78.5]     Hypertension associated with diabetes ([facility] [E11.59, I15.2]     Peripheral vascular disease ([facility] [I73.9]     Dyspnea [R06.00]          Hospital Day: 7    This is a 78 y.o. female who presented to [facility] on [facility]2022 and is being treated for:     UTI - resolved  -UA on 11/5 indicative of UTI; urine culture positive for Proteus  -Off antibiotics  -ID following; appreciate recommendations    Left upper extremity DVT  -Upper extremity ultrasound shows partially occluded DVT in the left proximal brachial vein, but no propagation of the thrombus; partial flow is seen  -Heparin 5K units every 12 hours  -Port placement was scheduled for 11/14, but patient and family no longer wants it  -General surgery consulted; appreciate recommendations    Neck pain  -Questionable if it is 2/2 left upper extremity DVT and patient is having referred pain  -CT head non-acute  -CT C spine shows C1-C2 articulation severe degenerative changes and mild C2-C3 disc degenerative changes, no canal narrowing or stenosis  -Continue morphine drip  -Pain management consulted at request of family    MDS  -Frequent transfusions through PICC line  -Monitor CBC  -Heme/onc following; appreciate recommendations    Acute hypoxemic respiratory failure - improving  -BNP elevated at 3064 on admission  -Echo 10/17/2022 showed LVEF 45-50% with right ventricle being mildly enlarged and having reduced function  -Oxygen therapy; wean as tolerated; keep saturation greater than 92%; may need oxygen outpatient if continues to remain hypoxic     HFrEF  -Echo as above  -Continue beta-blocker  -Hold home Entresto given ARUN - will restart on discharge per nephrology  -I/Os    ARUN - resolved  -Creatinine 1.3 on admission; baseline ~0.8-1.0  -Daily labs; trend creatinine  -Avoid nephrotoxins  -Nephrology was following, but has since signed off     Hyperlipidemia  -Statin    Hypertension  -Continue home antihypertensives  -Hold home Entresto given ARUN - will restart on discharge per nephrology    Type 2 diabetes  -SSI and lantus     Atrial fibrillation  -Continue home digoxin      Patient is DNR CC. Will continue to recommend hospice. DVT ppx: Heparin  GI ppx: Diet/Tube Feeds  Diet: ADULT DIET; Dysphagia - Soft and Bite Sized; 3 carb choices (45 gm/meal); Low Fat/Low Chol/High Fiber/2 gm Na  ADULT ORAL NUTRITION SUPPLEMENT; Breakfast, Lunch, Dinner; Diabetic Oral Supplement  Code Status: DNR-CC    Disposition:  Left upper extremity DVT with PICC in place. DNR CC on morphine gtt. Pain management following. Plan for discharge in 1-2 days.       PT/OT Eval Status: ordered      Guilherme Perdomo DO  11/12/2022  8:05 AM      Please note that some part of this chart was generated using Dragon dictation software. Although every effort was made to ensure the accuracy of this automated transcription, some errors in transcription may have occurred inadvertently. If you may need any clarification, please do not hesitate to contact me through [facility].

## 2022-11-13 LAB
GLUCOSE BLD-MCNC: 339 MG/DL (ref 70–99)
GLUCOSE BLD-MCNC: 342 MG/DL (ref 70–99)
GLUCOSE BLD-MCNC: 407 MG/DL (ref 70–99)
GLUCOSE BLD-MCNC: 467 MG/DL (ref 70–99)
GLUCOSE BLD-MCNC: 479 MG/DL (ref 70–99)
GLUCOSE BLD-MCNC: 481 MG/DL (ref 70–99)
GLUCOSE BLD-MCNC: 511 MG/DL (ref 70–99)
PERFORMED ON: ABNORMAL

## 2022-11-13 PROCEDURE — 6360000002 HC RX W HCPCS: Performed by: INTERNAL MEDICINE

## 2022-11-13 PROCEDURE — 2580000003 HC RX 258: Performed by: INTERNAL MEDICINE

## 2022-11-13 PROCEDURE — 6370000000 HC RX 637 (ALT 250 FOR IP): Performed by: STUDENT IN AN ORGANIZED HEALTH CARE EDUCATION/TRAINING PROGRAM

## 2022-11-13 PROCEDURE — 6370000000 HC RX 637 (ALT 250 FOR IP): Performed by: INTERNAL MEDICINE

## 2022-11-13 PROCEDURE — 1200000000 HC SEMI PRIVATE

## 2022-11-13 PROCEDURE — 6370000000 HC RX 637 (ALT 250 FOR IP): Performed by: NURSE PRACTITIONER

## 2022-11-13 RX ORDER — INSULIN LISPRO 100 [IU]/ML
14 INJECTION, SOLUTION INTRAVENOUS; SUBCUTANEOUS
Status: DISCONTINUED | OUTPATIENT
Start: 2022-11-13 | End: 2022-11-14

## 2022-11-13 RX ORDER — INSULIN GLARGINE 100 [IU]/ML
30 INJECTION, SOLUTION SUBCUTANEOUS 2 TIMES DAILY
Status: DISCONTINUED | OUTPATIENT
Start: 2022-11-13 | End: 2022-11-21 | Stop reason: HOSPADM

## 2022-11-13 RX ORDER — INSULIN LISPRO 100 [IU]/ML
6 INJECTION, SOLUTION INTRAVENOUS; SUBCUTANEOUS
Status: DISCONTINUED | OUTPATIENT
Start: 2022-11-13 | End: 2022-11-13

## 2022-11-13 RX ORDER — INSULIN LISPRO 100 [IU]/ML
20 INJECTION, SOLUTION INTRAVENOUS; SUBCUTANEOUS ONCE
Status: COMPLETED | OUTPATIENT
Start: 2022-11-13 | End: 2022-11-13

## 2022-11-13 RX ADMIN — FAMOTIDINE 20 MG: 20 TABLET ORAL at 21:35

## 2022-11-13 RX ADMIN — METHOCARBAMOL TABLETS 500 MG: 500 TABLET, COATED ORAL at 14:43

## 2022-11-13 RX ADMIN — INSULIN LISPRO 8 UNITS: 100 INJECTION, SOLUTION INTRAVENOUS; SUBCUTANEOUS at 09:17

## 2022-11-13 RX ADMIN — METHYLPREDNISOLONE SODIUM SUCCINATE 40 MG: 40 INJECTION, POWDER, FOR SOLUTION INTRAMUSCULAR; INTRAVENOUS at 16:41

## 2022-11-13 RX ADMIN — INSULIN LISPRO 20 UNITS: 100 INJECTION, SOLUTION INTRAVENOUS; SUBCUTANEOUS at 11:27

## 2022-11-13 RX ADMIN — DOCUSATE SODIUM 100 MG: 100 CAPSULE, LIQUID FILLED ORAL at 21:34

## 2022-11-13 RX ADMIN — INSULIN LISPRO 14 UNITS: 100 INJECTION, SOLUTION INTRAVENOUS; SUBCUTANEOUS at 16:43

## 2022-11-13 RX ADMIN — GABAPENTIN 100 MG: 100 CAPSULE ORAL at 09:25

## 2022-11-13 RX ADMIN — METOPROLOL TARTRATE 25 MG: 25 TABLET, FILM COATED ORAL at 09:25

## 2022-11-13 RX ADMIN — SACUBITRIL AND VALSARTAN 0.5 TABLET: 24; 26 TABLET, FILM COATED ORAL at 09:22

## 2022-11-13 RX ADMIN — INSULIN GLARGINE 30 UNITS: 100 INJECTION, SOLUTION SUBCUTANEOUS at 21:36

## 2022-11-13 RX ADMIN — Medication 10 ML: at 09:26

## 2022-11-13 RX ADMIN — METHOCARBAMOL TABLETS 500 MG: 500 TABLET, COATED ORAL at 05:24

## 2022-11-13 RX ADMIN — INSULIN LISPRO 8 UNITS: 100 INJECTION, SOLUTION INTRAVENOUS; SUBCUTANEOUS at 14:39

## 2022-11-13 RX ADMIN — INSULIN GLARGINE 44 UNITS: 100 INJECTION, SOLUTION SUBCUTANEOUS at 09:17

## 2022-11-13 RX ADMIN — LEVOTHYROXINE SODIUM 175 MCG: 0.03 TABLET ORAL at 05:24

## 2022-11-13 RX ADMIN — METOPROLOL TARTRATE 25 MG: 25 TABLET, FILM COATED ORAL at 21:35

## 2022-11-13 RX ADMIN — INSULIN LISPRO 8 UNITS: 100 INJECTION, SOLUTION INTRAVENOUS; SUBCUTANEOUS at 16:43

## 2022-11-13 RX ADMIN — ATORVASTATIN CALCIUM 80 MG: 80 TABLET, FILM COATED ORAL at 21:35

## 2022-11-13 RX ADMIN — FUROSEMIDE 20 MG: 20 TABLET ORAL at 09:25

## 2022-11-13 RX ADMIN — METHYLPREDNISOLONE SODIUM SUCCINATE 40 MG: 40 INJECTION, POWDER, FOR SOLUTION INTRAMUSCULAR; INTRAVENOUS at 05:24

## 2022-11-13 RX ADMIN — METHOCARBAMOL TABLETS 500 MG: 500 TABLET, COATED ORAL at 21:34

## 2022-11-13 RX ADMIN — Medication 10 ML: at 21:42

## 2022-11-13 RX ADMIN — PANTOPRAZOLE SODIUM 40 MG: 40 TABLET, DELAYED RELEASE ORAL at 05:24

## 2022-11-13 RX ADMIN — GABAPENTIN 100 MG: 100 CAPSULE ORAL at 14:43

## 2022-11-13 RX ADMIN — INSULIN LISPRO 4 UNITS: 100 INJECTION, SOLUTION INTRAVENOUS; SUBCUTANEOUS at 01:39

## 2022-11-13 RX ADMIN — SACUBITRIL AND VALSARTAN 0.5 TABLET: 24; 26 TABLET, FILM COATED ORAL at 21:34

## 2022-11-13 RX ADMIN — INSULIN LISPRO 4 UNITS: 100 INJECTION, SOLUTION INTRAVENOUS; SUBCUTANEOUS at 21:36

## 2022-11-13 RX ADMIN — Medication 500 MG: at 09:25

## 2022-11-13 RX ADMIN — POLYETHYLENE GLYCOL 3350 17 G: 17 POWDER, FOR SOLUTION ORAL at 21:42

## 2022-11-13 RX ADMIN — DOCUSATE SODIUM 100 MG: 100 CAPSULE, LIQUID FILLED ORAL at 09:24

## 2022-11-13 RX ADMIN — DIGOXIN 125 MCG: 125 TABLET ORAL at 09:24

## 2022-11-13 RX ADMIN — INSULIN LISPRO 8 UNITS: 100 INJECTION, SOLUTION INTRAVENOUS; SUBCUTANEOUS at 09:18

## 2022-11-13 ASSESSMENT — PAIN DESCRIPTION - PAIN TYPE: TYPE: CHRONIC PAIN

## 2022-11-13 ASSESSMENT — PAIN SCALES - GENERAL: PAINLEVEL_OUTOF10: 5

## 2022-11-13 ASSESSMENT — PAIN DESCRIPTION - LOCATION: LOCATION: GENERALIZED

## 2022-11-13 NOTE — CONSENT
Informed Consent for Blood Component Transfusion Note    I have discussed with the patient the rationale for blood component transfusion; its benefits in treating or preventing fatigue, organ damage, or death; and its risk which includes mild transfusion reactions, rare risk of blood borne infection, or more serious but rare reactions. I have discussed the alternatives to transfusion, including the risk and consequences of not receiving transfusion. The patient had an opportunity to ask questions and had agreed to proceed with transfusion of blood components.     Electronically signed by Guilherme Perdomo DO on 11/13/22 at 7:39 AM EST

## 2022-11-13 NOTE — PROGRESS NOTES
Hospitalist Progress Note    Name:  Francisco Corley    /Age/Sex: 1940  (78 y.o. female)  MRN & CSN:  [id number] & [id number]    PCP: Orestes Ngo    Date of Admission: 2022    Patient Status:  Inpatient     Chief Complaint: Dyspnea    Hospital Course:   78 y.o. female with PMHx of nondysplastic syndrome, hypertension, hyperlipidemia, type 2 diabetes, CAD, atrial fibrillation who presented to [facility] with complaints of shortness of breath. Patient was at her nursing home when it was noted she was having shortness of breath. She is not on any oxygen at home, but she is currently satting well on 2 L O2 via NC. Left arm PICC line in place for transfusions given history of MDS. Upper extremity Dopplers found acute DVT in left upper extremity. Reluctant to remove PICC line at this time. Hematology following. UTI positive for Proteus. Received IV meropenem, but downgraded to Rocephin. ID following. Patient is coming with ARUN, but that has since resolved. Nephrology signed off. General surgery consulted for port placement on  has been cancelled. Patient is now DNR CC and on a morphine gtt. Subjective: Today is:  Hospital Day: 8. Patient seen and examined in [id number]. Patient seen on morphine drip. Complains of mild pain, but much improved. Family at bedside and updated on plan and disposition regarding discharge planning with hospice so patient can remain on morphine drip.       Medications:  Reviewed    Infusion Medications    morphine infusion 1 mg/mL 1 mg/hr (222)    sodium chloride      sodium chloride      dextrose      sodium chloride       Scheduled Medications    methylPREDNISolone  40 mg IntraVENous Q12H    furosemide  20 mg Oral Daily    sacubitril-valsartan  0.5 tablet Oral BID    metoprolol tartrate  25 mg Oral BID    insulin lispro  0-8 Units SubCUTAneous TID WC    insulin lispro  0-4 Units SubCUTAneous Nightly insulin glargine  44 Units SubCUTAneous Daily    lidocaine  1 patch TransDERmal Daily    insulin lispro  8 Units SubCUTAneous TID WC    lidocaine 1 % injection  5 mL IntraDERmal Once    sodium chloride flush  5-40 mL IntraVENous 2 times per day    atorvastatin  80 mg Oral Nightly    calcium elemental  500 mg Oral Daily    digoxin  125 mcg Oral Daily    famotidine  20 mg Oral Daily    gabapentin  100 mg Oral TID    levothyroxine  175 mcg Oral QAM AC    pantoprazole  40 mg Oral QAM AC    sodium chloride flush  5-40 mL IntraVENous 2 times per day    heparin (porcine)  5,000 Units SubCUTAneous Q12H    methocarbamol  500 mg Oral q8h    polyethylene glycol  17 g Oral BID    docusate sodium  100 mg Oral BID     PRN Meds: diazePAM, sodium chloride, ipratropium-albuterol, sodium chloride flush, sodium chloride, dextrose bolus **OR** dextrose bolus, glucagon (rDNA), dextrose, guaiFENesin, HYDROcodone-acetaminophen, tiZANidine, sodium chloride flush, ondansetron **OR** ondansetron, polyethylene glycol, acetaminophen **OR** acetaminophen, sodium phosphate IVPB **OR** sodium phosphate IVPB **OR** sodium phosphate IVPB, magnesium sulfate, potassium chloride **OR** potassium alternative oral replacement **OR** potassium chloride, morphine, sodium chloride, diatrizoate meglumine-sodium      Intake/Output Summary (Last 24 hours) at 11/13/2022 0741  Last data filed at 11/13/2022 0145  Gross per 24 hour   Intake 480 ml   Output 350 ml   Net 130 ml         Physical Exam Performed:    /68   Pulse 76   Temp 96.9 °F (36.1 °C) (Temporal)   Resp 20   Ht 5' (1.524 m)   Wt 231 lb 11.2 oz (105.1 kg)   LMP  (LMP Unknown)   SpO2 97%   BMI 45.25 kg/m²     General appearance: No apparent distress, appears stated age and cooperative. Ill appearing. Tajik speaking. HEENT: Pupils equal, round, and reactive to light. Conjunctivae/corneas clear. NC present. Neck: Supple, with severely limited ROM.  Painful to palpation along spinous processes. No jugular venous distention. Trachea midline. Respiratory:  Normal respiratory effort. Clear to auscultation, bilaterally without Rales/Wheezes/Rhonchi. Cardiovascular: Regular rate and rhythm with normal S1/S2 without murmurs, rubs or gallops. No peripheral edema. Abdomen: Soft, non-tender, non-distended with normal bowel sounds. : No CVA tenderness. Musculoskeletal: No clubbing or cyanosis. Full range of motion without deformity. Skin: Skin color, texture, turgor normal.  No rashes or lesions. Neurologic:  Neurovascularly intact without any focal sensory/motor deficits. Cranial nerves: II-XII intact, grossly non-focal.  Psychiatric: Alert and oriented, thought content appropriate, normal insight  Peripheral Pulses: +2 palpable, equal bilaterally       Labs:   Recent Labs     11/10/22  1235 11/11/22  0420 11/12/22  0630   WBC 10.8 11.6* 10.8   HGB 6.9* 9.0* 9.6*   HCT 20.5* 27.2* 28.3*   PLT 32* 28* 29*       Recent Labs     11/10/22  1135 11/11/22  0420 11/12/22  0630   * 145 135*   K 4.8 5.1 4.7   CL 96* 106 97*   CO2 28 30 29   BUN 26* 22* 26*   CREATININE 0.8 0.6 0.7   CALCIUM 8.2* 8.2* 9.0   PHOS  --  2.5 3.3       Recent Labs     11/10/22  1135 11/11/22  0420   AST 15 20   ALT 12 14   BILITOT 0.4 0.6   ALKPHOS 73 73       Recent Labs     11/11/22  0420   INR 1.31*       No results for input(s): CKTOTAL, TROPONINI in the last 72 hours. Urinalysis:      Lab Results   Component Value Date/Time    NITRU POSITIVE 11/07/2022 01:45 PM    WBCUA 4947 11/07/2022 01:45 PM    BACTERIA 4+ 11/07/2022 01:45 PM    RBCUA 141 11/07/2022 01:45 PM    BLOODU MODERATE 11/07/2022 01:45 PM    SPECGRAV 1.020 11/07/2022 01:45 PM    GLUCOSEU 100 11/07/2022 01:45 PM    GLUCOSEU NEGATIVE 03/09/2012 06:51 AM       Radiology:  CT CERVICAL SPINE WO CONTRAST   Preliminary Result   No acute traumatic fracture or traumatic malalignment.       C1-C2 articulation severe degenerative changes and mild C2-C3 disc degenerative changes. No significant canal narrowing or foraminal stenosis. CT HEAD WO CONTRAST   Final Result   No acute intracranial abnormality. Moderate senescent changes with parenchymal volume loss and chronic small   vessel ischemic changes. Chronic paranasal sinus disease. Left mastoid effusion         XR CHEST PORTABLE   Final Result   1. Technically suboptimal exam.   2. Mild congestive heart failure is a consideration given the radiographic   findings; pneumonia is also a consideration in areas of consolidation with   pleural effusion. 3. Calcific atherosclerosis aorta. 4. Cardiomegaly. VL Extremity Venous Left         CT ABDOMEN PELVIS WO CONTRAST Additional Contrast? Oral   Final Result   1. No acute findings in the abdomen or pelvis. 2. Moderate amount of stool and air in the colon proximally which could   reflect constipation. 3. Punctate bilateral intrarenal calculi. VL Extremity Venous Bilateral         XR CHEST PORTABLE   Final Result   Left PICC line appears to be over the SVC and not significantly changed from   the prior exam.      Cardiomegaly with pulmonary vascular congestion and interstitial   prominence/interstitial opacities suggesting edema. Stable to the slightly   worsened from the recent study. CT HEAD WO CONTRAST   Final Result   No acute intracranial hemorrhage, mass effect, midline shift, or signs of   acute territorial infarct. Generalized volume loss and chronic ischemic changes in the white matter are   stable. Partial opacification in the left mastoid air cells and edge of the middle   ear cavity are new from the prior exam.  Correlate for infectious symptoms   versus eustachian tube dysfunction.                  Assessment/Plan:    Active Hospital Problems    Diagnosis     Acute hypoxemic respiratory failure ([address] [J96.01]      Priority: High    Carotid artery disease without cerebral infarction ([address] [I77.9] Priority: High    Weight loss counseling, encounter for [Z71.3]      Priority: High    Permanent atrial fibrillation ([facility] [I48.21]      Priority: Medium    Non-English speaking patient [Z78.9]      Priority: Medium    Hypothyroidism [E03.9]      Priority: Medium    History of DVT in adulthood [Z86.718]      Priority: Medium    Nephrolithiasis [N20.0]      Priority: Medium    Atypical pneumonia [J18.9]      Priority: Medium    Acute on chronic congestive heart failure (HCC) [I50.9]      Priority: Medium    HFrEF (heart failure with reduced ejection fraction) ([facility] [I50.20]      Priority: Medium    Myelodysplasia (myelodysplastic syndrome) ([facility] [D46.9]      Priority: Medium    Chronic atrial fibrillation (HCC) [I48.20]      Priority: Medium    Complicated UTI (urinary tract infection) [N39.0]      Priority: Medium    Type 2 diabetes mellitus ([address] [E11.9]     Obesity, Class III, BMI 40-49.9 (morbid obesity) ([facility] [E66.01]     Morbid obesity with BMI of 40.0-44.9, adult ([facility] [E66.01, Z68.41]     ARUN (acute kidney injury) ([facility] [N17.9]     Hyperlipidemia associated with type 2 diabetes mellitus ([address] [E11.69, E78.5]     Hypertension associated with diabetes ([address] [E11.59, I15.2]     Peripheral vascular disease ([facility] [I73.9]     Dyspnea [R06.00]          Hospital Day: 8    This is a 78 y.o. female who presented to [facility] on 11/6/2022 and is being treated for:     Left upper extremity DVT  -Upper extremity ultrasound shows partially occluded DVT in the left proximal brachial vein, but no propagation of the thrombus; partial flow is seen  -Heparin 5K units every 12 hours    Neck pain  -Questionable if it is 2/2 left upper extremity DVT and patient is having referred pain  -Imaging nonacute  -Continue morphine drip  -Pain management consulted    MDS  -Frequent transfusions through PICC line - no longer receiving  -Monitor CBC  -Heme/onc following; appreciate recommendations     HFrEF  -Echo 10/17/2022 showed LVEF 45-50% with right ventricle being mildly enlarged and having reduced function  -Continue beta-blocker and Entresto  -I/Os     Hyperlipidemia  -Statin    Hypertension  -Continue home antihypertensives    Type 2 diabetes  -SSI and lantus     Atrial fibrillation  -Continue home digoxin      Patient is DNR CC. Will continue to recommend hospice. DVT ppx: Heparin  GI ppx: Diet/Tube Feeds  Diet: ADULT DIET; Dysphagia - Soft and Bite Sized; 3 carb choices (45 gm/meal); Low Fat/Low Chol/High Fiber/2 gm Na  ADULT ORAL NUTRITION SUPPLEMENT; Breakfast, Lunch, Dinner; Diabetic Oral Supplement  Code Status: DNR-CC    Disposition:  Left upper extremity DVT with PICC in place. DNR CC on morphine gtt. Pain management following. Plan for discharge in 1-2 days. PT/OT Eval Status: ordered      Clari Marin DO  11/13/2022  7:41 AM      Please note that some part of this chart was generated using Dragon dictation software. Although every effort was made to ensure the accuracy of this automated transcription, some errors in transcription may have occurred inadvertently. If you may need any clarification, please do not hesitate to contact me through [facility].

## 2022-11-14 LAB
GLUCOSE BLD-MCNC: 397 MG/DL (ref 70–99)
GLUCOSE BLD-MCNC: 397 MG/DL (ref 70–99)
GLUCOSE BLD-MCNC: 426 MG/DL (ref 70–99)
GLUCOSE BLD-MCNC: 456 MG/DL (ref 70–99)
GLUCOSE BLD-MCNC: 468 MG/DL (ref 70–99)
PERFORMED ON: ABNORMAL

## 2022-11-14 PROCEDURE — 6370000000 HC RX 637 (ALT 250 FOR IP): Performed by: INTERNAL MEDICINE

## 2022-11-14 PROCEDURE — 6370000000 HC RX 637 (ALT 250 FOR IP): Performed by: STUDENT IN AN ORGANIZED HEALTH CARE EDUCATION/TRAINING PROGRAM

## 2022-11-14 PROCEDURE — 6360000002 HC RX W HCPCS: Performed by: INTERNAL MEDICINE

## 2022-11-14 PROCEDURE — 1200000000 HC SEMI PRIVATE

## 2022-11-14 RX ORDER — INSULIN LISPRO 100 [IU]/ML
20 INJECTION, SOLUTION INTRAVENOUS; SUBCUTANEOUS
Status: DISCONTINUED | OUTPATIENT
Start: 2022-11-14 | End: 2022-11-21 | Stop reason: HOSPADM

## 2022-11-14 RX ADMIN — METHYLPREDNISOLONE SODIUM SUCCINATE 40 MG: 40 INJECTION, POWDER, FOR SOLUTION INTRAMUSCULAR; INTRAVENOUS at 06:07

## 2022-11-14 RX ADMIN — DOCUSATE SODIUM 100 MG: 100 CAPSULE, LIQUID FILLED ORAL at 11:59

## 2022-11-14 RX ADMIN — GABAPENTIN 100 MG: 100 CAPSULE ORAL at 00:24

## 2022-11-14 RX ADMIN — INSULIN LISPRO 8 UNITS: 100 INJECTION, SOLUTION INTRAVENOUS; SUBCUTANEOUS at 13:38

## 2022-11-14 RX ADMIN — DIGOXIN 125 MCG: 125 TABLET ORAL at 12:02

## 2022-11-14 RX ADMIN — GABAPENTIN 100 MG: 100 CAPSULE ORAL at 11:59

## 2022-11-14 RX ADMIN — Medication 500 MG: at 11:59

## 2022-11-14 RX ADMIN — INSULIN GLARGINE 30 UNITS: 100 INJECTION, SOLUTION SUBCUTANEOUS at 20:58

## 2022-11-14 RX ADMIN — FUROSEMIDE 20 MG: 20 TABLET ORAL at 11:59

## 2022-11-14 RX ADMIN — SACUBITRIL AND VALSARTAN 0.5 TABLET: 24; 26 TABLET, FILM COATED ORAL at 11:59

## 2022-11-14 RX ADMIN — INSULIN LISPRO 4 UNITS: 100 INJECTION, SOLUTION INTRAVENOUS; SUBCUTANEOUS at 20:58

## 2022-11-14 RX ADMIN — PANTOPRAZOLE SODIUM 40 MG: 40 TABLET, DELAYED RELEASE ORAL at 06:07

## 2022-11-14 RX ADMIN — INSULIN LISPRO 8 UNITS: 100 INJECTION, SOLUTION INTRAVENOUS; SUBCUTANEOUS at 17:23

## 2022-11-14 RX ADMIN — INSULIN LISPRO 20 UNITS: 100 INJECTION, SOLUTION INTRAVENOUS; SUBCUTANEOUS at 13:38

## 2022-11-14 RX ADMIN — INSULIN LISPRO 8 UNITS: 100 INJECTION, SOLUTION INTRAVENOUS; SUBCUTANEOUS at 12:05

## 2022-11-14 RX ADMIN — INSULIN LISPRO 20 UNITS: 100 INJECTION, SOLUTION INTRAVENOUS; SUBCUTANEOUS at 17:23

## 2022-11-14 RX ADMIN — LEVOTHYROXINE SODIUM 175 MCG: 0.03 TABLET ORAL at 06:06

## 2022-11-14 RX ADMIN — METHOCARBAMOL TABLETS 500 MG: 500 TABLET, COATED ORAL at 13:40

## 2022-11-14 RX ADMIN — METHYLPREDNISOLONE SODIUM SUCCINATE 40 MG: 40 INJECTION, POWDER, FOR SOLUTION INTRAMUSCULAR; INTRAVENOUS at 17:09

## 2022-11-14 RX ADMIN — INSULIN GLARGINE 30 UNITS: 100 INJECTION, SOLUTION SUBCUTANEOUS at 12:05

## 2022-11-14 ASSESSMENT — PAIN SCALES - GENERAL: PAINLEVEL_OUTOF10: 0

## 2022-11-14 NOTE — PROGRESS NOTES
NP notified about pt BP 83/44 map of 57. Per NP, no new orders since pt is [facility] and per MD notes family is withdrawing care.

## 2022-11-14 NOTE — CONSULTS
Palliative Care:     Duplicate consult     Hospice ([facility]) to meet with family this afternoon. Addendum 1700: HOC RN indicated not all pt's children on board with Hospice; some still inquiring about PEG.

## 2022-11-14 NOTE — FLOWSHEET NOTE
[facility] Nurse  Follow-up Progress Note       NAME:  Zain Adames  MEDICAL RECORD NUMBER:  [id number]  AGE:  78 y.o. GENDER:  female  :  1940  TODAY'S DATE:  2022    Subjective:   Wound Identification:  Wound Type:  MASD/pressure  Contributing Factors: chronic pressure, decreased mobility, shear force, incontinence of stool, and incontinence of urine        Patient Goal of Care:  [] Wound Healing  [] Odor Control  [] Palliative Care  [] Pain Control   [] Other:     Objective:    BP (!) 98/47   Pulse 79   Temp (!) 96.3 °F (35.7 °C) (Axillary)   Resp 19   Ht 5' (1.524 m)   Wt 231 lb 11.2 oz (105.1 kg)   LMP  (LMP Unknown)   SpO2 (!) 88%   BMI 45.25 kg/m²   Alphonso Risk Score: Alphonso Scale Score: 13  Assessment:   Measurements:  Wound 22 Sacrum (Active)   Wound Image   22 1409   Wound Etiology Pressure Stage 2 22 151   Dressing Status Clean;Dry; Intact 22   Wound Cleansed Cleansed with saline 22 1510   Dressing/Treatment Triad hydro/zinc oxide-based hydrophilic paste;Silicone pad 81 1629   Dressing Change Due 11/15/22 11/14/22 151   Wound Assessment Bleeding;Pink/red;Slough 22   Drainage Amount Small 22   Drainage Description Serosanguinous 22   Odor None 22   Bety-wound Assessment Non-blanchable erythema 22 151   Margins Defined edges; Attached edges 22 151   Wound Thickness Description not for Pressure Injury Partial thickness 22 1510   Number of days: 10       Wound 22 Buttocks Right Incontinence associated skin damage (Active)   Wound Image   22 1510   Wound Etiology Other 22 0912   Dressing Status Clean;Dry; Intact 22   Wound Cleansed Cleansed with saline 22 1510   Dressing/Treatment Triad hydro/zinc oxide-based hydrophilic paste;Silicone border 49/70/42 1510   Dressing Change Due 11/15/22 11/14/22 1510 Wound Assessment Pink/red;Non-blanchable erythema 11/14/22 1510   Drainage Amount None 11/14/22 1510   Drainage Description Serosanguinous 11/13/22 2030   Bety-wound Assessment Non-blanchable erythema 11/14/22 1510   Margins Attached edges 11/11/22 2009   Wound Thickness Description not for Pressure Injury Partial thickness 11/14/22 1510   Number of days: 10   Patient seen for for follow up from 11/09/22, patient has pre existing wounds to sacrum and right buttocks. Wounds being treated with Triad and mepilex dressings. Wounds have less slough and devitalized tissue. Pink granulation noted on sacral wound. Right buttock wound appears shallow. Will continue to use Triad ointment and mepilex dressings. Sacral wound 11/9/22    Sacral wound 11/14/22    Right buttocks wound 11/14/22    Response to treatment:  Well tolerated by patient. Pain Assessment:  Severity:  0 / 10  Quality of pain: N/A  Wound Pain Timing/Severity: none  Premedicated: Yes  Plan:   Plan of Care: Wound 11/04/22 Sacrum-Dressing/Treatment: Triad hydro/zinc oxide-based hydrophilic paste, Silicone pad  Wound 81/49/56 Buttocks Right Incontinence associated skin damage-Dressing/Treatment: Triad hydro/zinc oxide-based hydrophilic paste, Silicone border    Specialty Bed Required : No   [] Low Air Loss   [] Pressure Redistribution  [] Fluid Immersion  [] Bariatric  [] Total Pressure Relief  [] Other:     Current Diet: ADULT ORAL NUTRITION SUPPLEMENT; Breakfast, Lunch, Dinner; Diabetic Oral Supplement  ADULT DIET; Dysphagia - Soft and Bite Sized; 3 carb choices (45 gm/meal); Low Fat/Low Chol/High Fiber/2 gm Na;  No Concentrated sweets  Dietician consult:  No    Discharge Plan:  Placement for patient upon discharge: skilled nursing   Patient appropriate for Outpatient [address]: No    Referrals:  []   [] [address]  [] Supplies  [] Other    Patient/Caregiver Teaching:Daughter at bedside  Level of patient/caregiver understanding able to:   [x] Indicates understanding       [] Needs reinforcement  [] Unsuccessful      [] Verbal Understanding  [] Demonstrated understanding       [] No evidence of learning  [] Refused teaching         [] N/A       Electronically signed by  BRIAN Finn, RN, [address] [place] [address]  [phone] on 11/14/2022 at 3:13 PM

## 2022-11-14 NOTE — PROGRESS NOTES
Occupational Therapy/Physical Therapy  Wally Schmidt     Pt with low BP this date, pt new DNR CC with new Hospice consult. Will hold pt this date and check back tomorrow for medical appropriateness.   Danuta Hankins, [address], [place], DPT [id number]

## 2022-11-14 NOTE — PROGRESS NOTES
Hospitalist Progress Note    Name:  Shan Youssef    /Age/Sex: 1940  (78 y.o. female)  MRN & CSN:  [id number] & [id number]    PCP: Erica Davis    Date of Admission: 2022    Patient Status:  Inpatient     Chief Complaint: Dyspnea    Hospital Course:   78 y.o. female with PMHx of nondysplastic syndrome, hypertension, hyperlipidemia, type 2 diabetes, CAD, atrial fibrillation who presented to [facility] with complaints of shortness of breath. Patient was at her nursing home when it was noted she was having shortness of breath. She is not on any oxygen at home, but she is currently satting well on 2 L O2 via NC. Left arm PICC line in place for transfusions given history of MDS. Upper extremity Dopplers found acute DVT in left upper extremity. Reluctant to remove PICC line at this time. Hematology following. UTI positive for Proteus. Received IV meropenem, but downgraded to Rocephin. ID following. Patient is coming with ARUN, but that has since resolved. Nephrology signed off. General surgery consulted for port placement on  has been cancelled. Patient is now DNR CC and on a morphine gtt. Subjective: Today is:  Hospital Day: 9. Patient seen and examined in [id number]. Patient having waxing and waning mentation today. States her pain in her neck is much better, however. Family at bedside and updated on plan.       Medications:  Reviewed    Infusion Medications    morphine infusion 1 mg/mL 1 mg/hr (22 0521)    sodium chloride      sodium chloride      dextrose      sodium chloride       Scheduled Medications    insulin glargine  30 Units SubCUTAneous BID    insulin lispro  14 Units SubCUTAneous TID     methylPREDNISolone  40 mg IntraVENous Q12H    furosemide  20 mg Oral Daily    sacubitril-valsartan  0.5 tablet Oral BID    metoprolol tartrate  25 mg Oral BID    insulin lispro  0-8 Units SubCUTAneous TID     insulin lispro  0-4 Units SubCUTAneous Nightly    lidocaine  1 patch TransDERmal Daily    lidocaine 1 % injection  5 mL IntraDERmal Once    sodium chloride flush  5-40 mL IntraVENous 2 times per day    atorvastatin  80 mg Oral Nightly    calcium elemental  500 mg Oral Daily    digoxin  125 mcg Oral Daily    famotidine  20 mg Oral Daily    gabapentin  100 mg Oral TID    levothyroxine  175 mcg Oral QAM AC    pantoprazole  40 mg Oral QAM AC    sodium chloride flush  5-40 mL IntraVENous 2 times per day    heparin (porcine)  5,000 Units SubCUTAneous Q12H    methocarbamol  500 mg Oral q8h    polyethylene glycol  17 g Oral BID    docusate sodium  100 mg Oral BID     PRN Meds: diazePAM, sodium chloride, ipratropium-albuterol, sodium chloride flush, sodium chloride, dextrose bolus **OR** dextrose bolus, glucagon (rDNA), dextrose, guaiFENesin, HYDROcodone-acetaminophen, tiZANidine, sodium chloride flush, ondansetron **OR** ondansetron, polyethylene glycol, acetaminophen **OR** acetaminophen, sodium phosphate IVPB **OR** sodium phosphate IVPB **OR** sodium phosphate IVPB, magnesium sulfate, potassium chloride **OR** potassium alternative oral replacement **OR** potassium chloride, morphine, sodium chloride, diatrizoate meglumine-sodium      Intake/Output Summary (Last 24 hours) at 11/14/2022 0706  Last data filed at 11/14/2022 0700  Gross per 24 hour   Intake 100.65 ml   Output 150 ml   Net -49.35 ml         Physical Exam Performed:    BP (!) 98/47   Pulse 82   Temp (!) 96.3 °F (35.7 °C) (Axillary)   Resp 19   Ht 5' (1.524 m)   Wt 231 lb 11.2 oz (105.1 kg)   LMP  (LMP Unknown)   SpO2 (!) 88%   BMI 45.25 kg/m²     General appearance: No apparent distress, appears stated age and cooperative. Ill appearing. Croatian speaking. HEENT: Pupils equal, round, and reactive to light. Conjunctivae/corneas clear. NC present. Neck: Supple, with severely limited ROM. Painful to palpation along spinous processes. No jugular venous distention.  Trachea midline. Respiratory:  Normal respiratory effort. Clear to auscultation, bilaterally without Rales/Wheezes/Rhonchi. Cardiovascular: Regular rate and rhythm with normal S1/S2 without murmurs, rubs or gallops. No peripheral edema. Abdomen: Soft, non-tender, non-distended with normal bowel sounds. : No CVA tenderness. Musculoskeletal: No clubbing or cyanosis. Full range of motion without deformity. Skin: Skin color, texture, turgor normal.  No rashes or lesions. Neurologic:  Neurovascularly intact without any focal sensory/motor deficits. Cranial nerves: II-XII intact, grossly non-focal.  Psychiatric: Alert and oriented, thought content appropriate, normal insight  Peripheral Pulses: +2 palpable, equal bilaterally       Labs:   Recent Labs     11/12/22 0630   WBC 10.8   HGB 9.6*   HCT 28.3*   PLT 29*       Recent Labs     11/12/22 0630   *   K 4.7   CL 97*   CO2 29   BUN 26*   CREATININE 0.7   CALCIUM 9.0   PHOS 3.3       No results for input(s): AST, ALT, BILIDIR, BILITOT, ALKPHOS in the last 72 hours. No results for input(s): INR in the last 72 hours. No results for input(s): Breanna Grandfield in the last 72 hours. Urinalysis:      Lab Results   Component Value Date/Time    NITRU POSITIVE 11/07/2022 01:45 PM    WBCUA 4947 11/07/2022 01:45 PM    BACTERIA 4+ 11/07/2022 01:45 PM    RBCUA 141 11/07/2022 01:45 PM    BLOODU MODERATE 11/07/2022 01:45 PM    SPECGRAV 1.020 11/07/2022 01:45 PM    GLUCOSEU 100 11/07/2022 01:45 PM    GLUCOSEU NEGATIVE 03/09/2012 06:51 AM       Radiology:  CT CERVICAL SPINE WO CONTRAST   Preliminary Result   No acute traumatic fracture or traumatic malalignment. C1-C2 articulation severe degenerative changes and mild C2-C3 disc   degenerative changes. No significant canal narrowing or foraminal stenosis. CT HEAD WO CONTRAST   Final Result   No acute intracranial abnormality.       Moderate senescent changes with parenchymal volume loss and chronic small vessel ischemic changes. Chronic paranasal sinus disease. Left mastoid effusion         XR CHEST PORTABLE   Final Result   1. Technically suboptimal exam.   2. Mild congestive heart failure is a consideration given the radiographic   findings; pneumonia is also a consideration in areas of consolidation with   pleural effusion. 3. Calcific atherosclerosis aorta. 4. Cardiomegaly. VL Extremity Venous Left         CT ABDOMEN PELVIS WO CONTRAST Additional Contrast? Oral   Final Result   1. No acute findings in the abdomen or pelvis. 2. Moderate amount of stool and air in the colon proximally which could   reflect constipation. 3. Punctate bilateral intrarenal calculi. VL Extremity Venous Bilateral         XR CHEST PORTABLE   Final Result   Left PICC line appears to be over the SVC and not significantly changed from   the prior exam.      Cardiomegaly with pulmonary vascular congestion and interstitial   prominence/interstitial opacities suggesting edema. Stable to the slightly   worsened from the recent study. CT HEAD WO CONTRAST   Final Result   No acute intracranial hemorrhage, mass effect, midline shift, or signs of   acute territorial infarct. Generalized volume loss and chronic ischemic changes in the white matter are   stable. Partial opacification in the left mastoid air cells and edge of the middle   ear cavity are new from the prior exam.  Correlate for infectious symptoms   versus eustachian tube dysfunction.                  Assessment/Plan:    Active Hospital Problems    Diagnosis     Acute hypoxemic respiratory failure (HCC) [J96.01]      Priority: High    Carotid artery disease without cerebral infarction [facility]) [I77.9]      Priority: High    Weight loss counseling, encounter for [Z71.3]      Priority: High    Permanent atrial fibrillation ([facility] [address] [I48.21]      Priority: Medium    Non-English speaking patient [Z78.9]      Priority: Medium    Hypothyroidism diabetes  -SSI and lantus     Atrial fibrillation  -Continue home digoxin      Patient is DNR CC. Hospice consulted. No longer drawing labs. Not preparing for any future transfusions. Continuing on morphine drip. DVT ppx: Heparin  GI ppx: Diet/Tube Feeds  Diet: ADULT ORAL NUTRITION SUPPLEMENT; Breakfast, Lunch, Dinner; Diabetic Oral Supplement  ADULT DIET; Dysphagia - Soft and Bite Sized; 3 carb choices (45 gm/meal); Low Fat/Low Chol/High Fiber/2 gm Na; No Concentrated sweets  Code Status: DNR-CC    Disposition:  Left upper extremity DVT with PICC in place. DNR CC on morphine gtt. Pain management following. Hospice consulted. Plan for discharge in 1-2 days. PT/OT Eval Status: ordered      Jigna Mary DO  11/14/2022  7:06 AM      Please note that some part of this chart was generated using Dragon dictation software. Although every effort was made to ensure the accuracy of this automated transcription, some errors in transcription may have occurred inadvertently. If you may need any clarification, please do not hesitate to contact me through [facility].

## 2022-11-14 NOTE — PLAN OF CARE
Problem: Discharge Planning  Goal: Discharge to home or other facility with appropriate resources  Outcome: Not Progressing     Problem: Skin/Tissue Integrity  Goal: Absence of new skin breakdown  Description: 1. Monitor for areas of redness and/or skin breakdown  2. Assess vascular access sites hourly  3. Every 4-6 hours minimum:  Change oxygen saturation probe site  4. Every 4-6 hours:  If on nasal continuous positive airway pressure, respiratory therapy assess nares and determine need for appliance change or resting period. Outcome: Not Progressing     Problem: Discharge Planning  Goal: Discharge to home or other facility with appropriate resources  Outcome: Not Progressing     Problem: Skin/Tissue Integrity  Goal: Absence of new skin breakdown  Description: 1. Monitor for areas of redness and/or skin breakdown  2. Assess vascular access sites hourly  3. Every 4-6 hours minimum:  Change oxygen saturation probe site  4. Every 4-6 hours:  If on nasal continuous positive airway pressure, respiratory therapy assess nares and determine need for appliance change or resting period.   Outcome: Not Progressing

## 2022-11-14 NOTE — PROGRESS NOTES
Assessment completed, see doc flowsheets. Pt is A&O X3. Lung sounds are clear. VSS. Tele on. Medication given per [facility]. Patient has no needs at this time. Call light within in reach, will continue to monitor.

## 2022-11-14 NOTE — PLAN OF CARE
Problem: Discharge Planning  Goal: Discharge to home or other facility with appropriate resources  11/13/2022 2322 by Lisandra Mays RN  Outcome: Progressing    Problem: Chronic Conditions and Co-morbidities  Goal: Patient's chronic conditions and co-morbidity symptoms are monitored and maintained or improved  Outcome: Progressing     Problem: Skin/Tissue Integrity  Goal: Absence of new skin breakdown  Description: 1. Monitor for areas of redness and/or skin breakdown  2. Assess vascular access sites hourly  3. Every 4-6 hours minimum:  Change oxygen saturation probe site  4. Every 4-6 hours:  If on nasal continuous positive airway pressure, respiratory therapy assess nares and determine need for appliance change or resting period. 11/13/2022 2322 by Lisandra Mays RN  Outcome: Progressing       Problem: ABCDS Injury Assessment  Goal: Absence of physical injury  Outcome: Progressing     Problem: Discharge Planning  Goal: Discharge to home or other facility with appropriate resources  11/13/2022 2322 by Lisandra Mays, RN  Outcome: Progressing       Problem: Skin/Tissue Integrity  Goal: Absence of new skin breakdown  Description: 1. Monitor for areas of redness and/or skin breakdown  2. Assess vascular access sites hourly  3. Every 4-6 hours minimum:  Change oxygen saturation probe site  4. Every 4-6 hours:  If on nasal continuous positive airway pressure, respiratory therapy assess nares and determine need for appliance change or resting period.   11/13/2022 2322 by Lisandra Mays RN  Outcome: Progressing

## 2022-11-15 LAB
GLUCOSE BLD-MCNC: 224 MG/DL (ref 70–99)
GLUCOSE BLD-MCNC: 228 MG/DL (ref 70–99)
GLUCOSE BLD-MCNC: 311 MG/DL (ref 70–99)
PERFORMED ON: ABNORMAL

## 2022-11-15 PROCEDURE — 6370000000 HC RX 637 (ALT 250 FOR IP): Performed by: INTERNAL MEDICINE

## 2022-11-15 PROCEDURE — 6360000002 HC RX W HCPCS: Performed by: INTERNAL MEDICINE

## 2022-11-15 PROCEDURE — 6360000002 HC RX W HCPCS: Performed by: ANESTHESIOLOGY

## 2022-11-15 PROCEDURE — 6370000000 HC RX 637 (ALT 250 FOR IP): Performed by: STUDENT IN AN ORGANIZED HEALTH CARE EDUCATION/TRAINING PROGRAM

## 2022-11-15 PROCEDURE — 1200000000 HC SEMI PRIVATE

## 2022-11-15 PROCEDURE — 2580000003 HC RX 258: Performed by: STUDENT IN AN ORGANIZED HEALTH CARE EDUCATION/TRAINING PROGRAM

## 2022-11-15 PROCEDURE — 94760 N-INVAS EAR/PLS OXIMETRY 1: CPT

## 2022-11-15 PROCEDURE — 2580000003 HC RX 258: Performed by: ANESTHESIOLOGY

## 2022-11-15 PROCEDURE — 2580000003 HC RX 258: Performed by: INTERNAL MEDICINE

## 2022-11-15 RX ORDER — MORPHINE SULFATE 2 MG/ML
1 INJECTION, SOLUTION INTRAMUSCULAR; INTRAVENOUS
Status: DISCONTINUED | OUTPATIENT
Start: 2022-11-15 | End: 2022-11-18

## 2022-11-15 RX ADMIN — MORPHINE SULFATE 1 MG/HR: 10 INJECTION INTRAVENOUS at 03:53

## 2022-11-15 RX ADMIN — HEPARIN SODIUM 5000 UNITS: 5000 INJECTION INTRAVENOUS; SUBCUTANEOUS at 14:02

## 2022-11-15 RX ADMIN — METHOCARBAMOL TABLETS 500 MG: 500 TABLET, COATED ORAL at 13:59

## 2022-11-15 RX ADMIN — MORPHINE SULFATE 1 MG: 2 INJECTION, SOLUTION INTRAMUSCULAR; INTRAVENOUS at 22:35

## 2022-11-15 RX ADMIN — METHYLPREDNISOLONE SODIUM SUCCINATE 40 MG: 40 INJECTION, POWDER, FOR SOLUTION INTRAMUSCULAR; INTRAVENOUS at 18:03

## 2022-11-15 RX ADMIN — Medication 10 ML: at 10:30

## 2022-11-15 RX ADMIN — SODIUM CHLORIDE, PRESERVATIVE FREE 10 ML: 5 INJECTION INTRAVENOUS at 18:04

## 2022-11-15 RX ADMIN — FUROSEMIDE 20 MG: 20 TABLET ORAL at 10:21

## 2022-11-15 RX ADMIN — DOCUSATE SODIUM 100 MG: 100 CAPSULE, LIQUID FILLED ORAL at 10:21

## 2022-11-15 RX ADMIN — INSULIN LISPRO 20 UNITS: 100 INJECTION, SOLUTION INTRAVENOUS; SUBCUTANEOUS at 10:32

## 2022-11-15 RX ADMIN — INSULIN LISPRO 6 UNITS: 100 INJECTION, SOLUTION INTRAVENOUS; SUBCUTANEOUS at 12:21

## 2022-11-15 RX ADMIN — SODIUM CHLORIDE, PRESERVATIVE FREE 20 ML: 5 INJECTION INTRAVENOUS at 14:02

## 2022-11-15 RX ADMIN — Medication 10 ML: at 20:16

## 2022-11-15 RX ADMIN — MORPHINE SULFATE 1 MG: 2 INJECTION, SOLUTION INTRAMUSCULAR; INTRAVENOUS at 18:12

## 2022-11-15 RX ADMIN — METHYLPREDNISOLONE SODIUM SUCCINATE 40 MG: 40 INJECTION, POWDER, FOR SOLUTION INTRAMUSCULAR; INTRAVENOUS at 06:13

## 2022-11-15 RX ADMIN — INSULIN LISPRO 4 UNITS: 100 INJECTION, SOLUTION INTRAVENOUS; SUBCUTANEOUS at 09:00

## 2022-11-15 RX ADMIN — MORPHINE SULFATE 1 MG: 2 INJECTION, SOLUTION INTRAMUSCULAR; INTRAVENOUS at 14:02

## 2022-11-15 RX ADMIN — METOPROLOL TARTRATE 25 MG: 25 TABLET, FILM COATED ORAL at 10:30

## 2022-11-15 RX ADMIN — INSULIN LISPRO 20 UNITS: 100 INJECTION, SOLUTION INTRAVENOUS; SUBCUTANEOUS at 12:22

## 2022-11-15 RX ADMIN — POLYETHYLENE GLYCOL 3350 17 G: 17 POWDER, FOR SOLUTION ORAL at 10:30

## 2022-11-15 RX ADMIN — INSULIN GLARGINE 30 UNITS: 100 INJECTION, SOLUTION SUBCUTANEOUS at 09:00

## 2022-11-15 RX ADMIN — SACUBITRIL AND VALSARTAN 0.5 TABLET: 24; 26 TABLET, FILM COATED ORAL at 10:20

## 2022-11-15 RX ADMIN — GABAPENTIN 100 MG: 100 CAPSULE ORAL at 10:21

## 2022-11-15 RX ADMIN — DIGOXIN 125 MCG: 125 TABLET ORAL at 10:21

## 2022-11-15 RX ADMIN — INSULIN GLARGINE 30 UNITS: 100 INJECTION, SOLUTION SUBCUTANEOUS at 20:15

## 2022-11-15 RX ADMIN — Medication 10 ML: at 10:29

## 2022-11-15 ASSESSMENT — PAIN SCALES - GENERAL
PAINLEVEL_OUTOF10: 0
PAINLEVEL_OUTOF10: 10
PAINLEVEL_OUTOF10: 0

## 2022-11-15 ASSESSMENT — PAIN DESCRIPTION - LOCATION: LOCATION: GENERALIZED

## 2022-11-15 NOTE — PLAN OF CARE
Problem: Discharge Planning  Goal: Discharge to home or other facility with appropriate resources  11/15/2022 1656 by Zoe Ellis RN  Outcome: Not Progressing     Problem: Pain  Goal: Verbalizes/displays adequate comfort level or baseline comfort level  11/15/2022 1656 by Zoe Ellis RN  Outcome: Progressing     Problem: Discharge Planning  Goal: Discharge to home or other facility with appropriate resources  11/15/2022 1656 by Zoe Ellis RN  Outcome: Not Progressing  11/15/2022 0310 by Blaise Alba RN  Outcome: Progressing

## 2022-11-15 NOTE — PROGRESS NOTES
Patient responsive to stimuli during the night, rested comfortably on the morphine gtt. Family remained at bedside most of the night - tearful, but ok with the patient remained on comfort care. Patient did have a 5.08s pause and converted from Afib to junctional around midnight. Unable to safely give PO medications this shift. Urine output low, but appears that it's been low from I/O charting.

## 2022-11-15 NOTE — PROGRESS NOTES
Heparin needs to be held if platelets remain below 30. Patients family requested I made a note about this. Gabapentin not given due to patient being too lethargic to take with applesauce. Family requested the MD be notified to decrease morphine drip.

## 2022-11-15 NOTE — PROGRESS NOTES
RN Shift Summary  1600/1700 accucheck/meds not done/given per family request, stated that pt was sleeping, pt resting quietly w/ eyes closed. Family concerned about pt receiving SQ Heparin without a recent plt level. Message sent to Dr. Sam Reyna per family request regarding SQ Heparin being adm without plt levels. [facility] and to let family know that a bleeding risk associated with Heparin. Family updated, and does not appear happy with response, stated \"that does not seem right\"    Family refuses to have pt turned the since pt is sleeping.

## 2022-11-15 NOTE — PROGRESS NOTES
Hospitalist Progress Note    Name:  Don Kyle    /Age/Sex: 1940  (78 y.o. female)  MRN & CSN:  [id number] & [id number]    PCP: Nela Collins    Date of Admission: 2022    Patient Status:  Inpatient     Chief Complaint: Dyspnea    Hospital Course:   78 y.o. female with PMHx of nondysplastic syndrome, hypertension, hyperlipidemia, type 2 diabetes, CAD, atrial fibrillation who presented to [facility] with complaints of shortness of breath. Patient was at her nursing home when it was noted she was having shortness of breath. She is not on any oxygen at home, but she is currently satting well on 2 L O2 via NC. Left arm PICC line in place for transfusions given history of MDS. Upper extremity Dopplers found acute DVT in left upper extremity. Reluctant to remove PICC line at this time. Hematology following. UTI positive for Proteus. Received IV meropenem, but downgraded to Rocephin. ID following. Patient is coming with ARUN, but that has since resolved. Nephrology signed off. General surgery consulted for port placement on  has been cancelled. Patient is now DNR CC and on a morphine gtt. Subjective: Today is:  Hospital Day: 10.  Patient seen and examined in [id number]. Pt is sedated with morphine gtt  Apparently pain management came and saw the patient and now family no longer want hospice. Now want a SNF and frequent pain medication.            Medications:  Reviewed    Infusion Medications    sodium chloride      sodium chloride      dextrose      sodium chloride       Scheduled Medications    morphine  1 mg IntraVENous Q2H    insulin lispro  20 Units SubCUTAneous TID WC    insulin glargine  30 Units SubCUTAneous BID    methylPREDNISolone  40 mg IntraVENous Q12H    furosemide  20 mg Oral Daily    sacubitril-valsartan  0.5 tablet Oral BID    metoprolol tartrate  25 mg Oral BID    insulin lispro  0-8 Units SubCUTAneous TID WC    insulin lispro 0-4 Units SubCUTAneous Nightly    lidocaine  1 patch TransDERmal Daily    lidocaine 1 % injection  5 mL IntraDERmal Once    sodium chloride flush  5-40 mL IntraVENous 2 times per day    atorvastatin  80 mg Oral Nightly    calcium elemental  500 mg Oral Daily    digoxin  125 mcg Oral Daily    famotidine  20 mg Oral Daily    gabapentin  100 mg Oral TID    levothyroxine  175 mcg Oral QAM AC    pantoprazole  40 mg Oral QAM AC    sodium chloride flush  5-40 mL IntraVENous 2 times per day    heparin (porcine)  5,000 Units SubCUTAneous Q12H    methocarbamol  500 mg Oral q8h    polyethylene glycol  17 g Oral BID    docusate sodium  100 mg Oral BID     PRN Meds: diazePAM, sodium chloride, ipratropium-albuterol, sodium chloride flush, sodium chloride, dextrose bolus **OR** dextrose bolus, glucagon (rDNA), dextrose, guaiFENesin, HYDROcodone-acetaminophen, tiZANidine, sodium chloride flush, ondansetron **OR** ondansetron, polyethylene glycol, acetaminophen **OR** acetaminophen, sodium phosphate IVPB **OR** sodium phosphate IVPB **OR** sodium phosphate IVPB, magnesium sulfate, potassium chloride **OR** potassium alternative oral replacement **OR** potassium chloride, morphine, sodium chloride, diatrizoate meglumine-sodium      Intake/Output Summary (Last 24 hours) at 11/15/2022 1649  Last data filed at 11/15/2022 1200  Gross per 24 hour   Intake 49.64 ml   Output 100 ml   Net -50.36 ml         Physical Exam Performed:    /61   Pulse 52   Temp 97.1 °F (36.2 °C) (Temporal)   Resp 15   Ht 5' (1.524 m)   Wt 240 lb 1.6 oz (108.9 kg)   LMP  (LMP Unknown)   SpO2 98%   BMI 46.89 kg/m²     General appearance: No apparent distress, appears stated age and cooperative. Ill appearing. [place]ian speaking. HEENT: Pupils equal, round, and reactive to light. Conjunctivae/corneas clear. NC present. Neck: Supple, with severely limited ROM. Painful to palpation along spinous processes. No jugular venous distention.  Trachea midline. Respiratory:  Normal respiratory effort. Clear to auscultation, bilaterally without Rales/Wheezes/Rhonchi. Cardiovascular: Regular rate and rhythm with normal S1/S2 without murmurs, rubs or gallops. No peripheral edema. Abdomen: Soft, non-tender, non-distended with normal bowel sounds. : No CVA tenderness. Musculoskeletal: No clubbing or cyanosis. Full range of motion without deformity. Skin: Skin color, texture, turgor normal.  No rashes or lesions. Neurologic:  Neurovascularly intact without any focal sensory/motor deficits. Cranial nerves: II-XII intact, grossly non-focal.  Psychiatric: Alert and oriented, thought content appropriate, normal insight  Peripheral Pulses: +2 palpable, equal bilaterally       Labs:   No results for input(s): WBC, HGB, HCT, PLT in the last 72 hours. No results for input(s): NA, K, CL, CO2, BUN, CREATININE, CALCIUM, PHOS in the last 72 hours. Invalid input(s): MAGNES    No results for input(s): AST, ALT, BILIDIR, BILITOT, ALKPHOS in the last 72 hours. No results for input(s): INR in the last 72 hours. No results for input(s): [place] in the last 72 hours. Urinalysis:      Lab Results   Component Value Date/Time    NITRU POSITIVE 11/07/2022 01:45 PM    WBCUA 4947 11/07/2022 01:45 PM    BACTERIA 4+ 11/07/2022 01:45 PM    RBCUA 141 11/07/2022 01:45 PM    BLOODU MODERATE 11/07/2022 01:45 PM    SPECGRAV 1.020 11/07/2022 01:45 PM    GLUCOSEU 100 11/07/2022 01:45 PM    GLUCOSEU NEGATIVE 03/09/2012 06:51 AM       Radiology:  CT CERVICAL SPINE WO CONTRAST   Final Result   No acute traumatic fracture or traumatic malalignment. C1-C2 articulation severe degenerative changes and mild C2-C3 disc   degenerative changes. No significant canal narrowing or foraminal stenosis. CT HEAD WO CONTRAST   Final Result   No acute intracranial abnormality.       Moderate senescent changes with parenchymal volume loss and chronic small   vessel ischemic changes. Chronic paranasal sinus disease. Left mastoid effusion         XR CHEST PORTABLE   Final Result   1. Technically suboptimal exam.   2. Mild congestive heart failure is a consideration given the radiographic   findings; pneumonia is also a consideration in areas of consolidation with   pleural effusion. 3. Calcific atherosclerosis aorta. 4. Cardiomegaly. VL Extremity Venous Left   Final Result      CT ABDOMEN PELVIS WO CONTRAST Additional Contrast? Oral   Final Result   1. No acute findings in the abdomen or pelvis. 2. Moderate amount of stool and air in the colon proximally which could   reflect constipation. 3. Punctate bilateral intrarenal calculi. VL Extremity Venous Bilateral   Final Result      XR CHEST PORTABLE   Final Result   Left PICC line appears to be over the SVC and not significantly changed from   the prior exam.      Cardiomegaly with pulmonary vascular congestion and interstitial   prominence/interstitial opacities suggesting edema. Stable to the slightly   worsened from the recent study. CT HEAD WO CONTRAST   Final Result   No acute intracranial hemorrhage, mass effect, midline shift, or signs of   acute territorial infarct. Generalized volume loss and chronic ischemic changes in the white matter are   stable. Partial opacification in the left mastoid air cells and edge of the middle   ear cavity are new from the prior exam.  Correlate for infectious symptoms   versus eustachian tube dysfunction.                  Assessment/Plan:    Active Hospital Problems    Diagnosis     Carotid artery disease without cerebral infarction [facility]) [I77.9]      Priority: High    Weight loss counseling, encounter for [Z71.3]      Priority: High    Permanent atrial fibrillation ([facility] [address] [I48.21]      Priority: Medium    Non-English speaking patient [Z78.9]      Priority: Medium    Hypothyroidism [E03.9]      Priority: Medium    History of DVT in adulthood [Z86.718] Priority: Medium    Nephrolithiasis [N20.0]      Priority: Medium    Atypical pneumonia [J18.9]      Priority: Medium    Acute on chronic congestive heart failure (HCC) [I50.9]      Priority: Medium    HFrEF (heart failure with reduced ejection fraction) ([facility] [address] [I50.20]      Priority: Medium    Acute hypoxemic respiratory failure (HCC) [J96.01]      Priority: Medium    Myelodysplasia (myelodysplastic syndrome) ([facility] [address] [D46.9]      Priority: Medium    Chronic atrial fibrillation ([facility]) [I48.20]      Priority: Medium    Complicated UTI (urinary tract infection) [N39.0]      Priority: Medium    Type 2 diabetes mellitus ([facility]) [E11.9]     Obesity, Class III, BMI 40-49.9 (morbid obesity) ([facility] [address] [E66.01]     Morbid obesity with BMI of 40.0-44.9, adult ([facility]) [E66.01, Z68.41]     ARUN (acute kidney injury) ([facility] [address] [N17.9]     Hyperlipidemia associated with type 2 diabetes mellitus ([facility] [address] [E11.69, E78.5]     Hypertension associated with diabetes ([facility] [address] [E11.59, I15.2]     Peripheral vascular disease ([facility] [address] [I73.9]     Dyspnea [R06.00]          Hospital Day: 10    This is a 78 y.o. female who presented to [facility] on 11/6/2022 and is being treated for:     Left upper extremity DVT  -Upper extremity ultrasound shows partially occluded DVT in the left proximal brachial vein, but no propagation of the thrombus; partial flow is seen  -Heparin 5K units every 12 hours    Neck pain  -Questionable if it is 2/2 left upper extremity DVT and patient is having referred pain  -Imaging nonacute  -Continue morphine drip  -Pain management consulted  - will be weaning gtt and doing IV q 2 hours with goal of weaning this as well    MDS  -Frequent transfusions through PICC line - no longer receiving, however it seems that family is changing mind. -Monitor CBC  -Heme/onc following; appreciate recommendations  -Hospice consulted, but no on has signed anything and now unclear if family want to move forward with hospice.      HFrEF  -Echo 10/17/2022 showed LVEF 45-50% with right ventricle being mildly enlarged and having reduced function  -Continue beta-blocker and Entresto  -I/Os     Hyperlipidemia  -Statin    Hypertension  -Continue home antihypertensives    Type 2 diabetes  -SSI and lantus     Atrial fibrillation  -Continue home digoxin      Patient is DNR CC. Hospice was consulted. Family seems to have changed mind now. Will order labs then if they are now wanting to go to SNF. DVT ppx: Heparin  GI ppx: Diet/Tube Feeds  Diet: ADULT ORAL NUTRITION SUPPLEMENT; Breakfast, Lunch, Dinner; Diabetic Oral Supplement  ADULT DIET; Dysphagia - Soft and Bite Sized; 3 carb choices (45 gm/meal); Low Fat/Low Chol/High Fiber/2 gm Na; No Concentrated sweets  Code Status: DNR-CC    Disposition:  Left upper extremity DVT with PICC in place. DNR CC Pain management following. NOT REALLY SURE WHAT FAMILY WANTS AT THIS POINT  The situation is frustrating. PT/OT Eval Status: ordered      Ernesto Bingham MD  11/15/2022  4:49 PM      Please note that some part of this chart was generated using Dragon dictation software. Although every effort was made to ensure the accuracy of this automated transcription, some errors in transcription may have occurred inadvertently. If you may need any clarification, please do not hesitate to contact me through [facility].

## 2022-11-16 LAB
ANION GAP SERPL CALCULATED.3IONS-SCNC: 10 MMOL/L (ref 3–16)
BUN BLDV-MCNC: 99 MG/DL (ref 7–20)
CALCIUM SERPL-MCNC: 9 MG/DL (ref 8.3–10.6)
CHLORIDE BLD-SCNC: 100 MMOL/L (ref 99–110)
CO2: 28 MMOL/L (ref 21–32)
CREAT SERPL-MCNC: 1.4 MG/DL (ref 0.6–1.2)
GFR SERPL CREATININE-BSD FRML MDRD: 37 ML/MIN/{1.73_M2}
GLUCOSE BLD-MCNC: 123 MG/DL (ref 70–99)
GLUCOSE BLD-MCNC: 150 MG/DL (ref 70–99)
GLUCOSE BLD-MCNC: 241 MG/DL (ref 70–99)
GLUCOSE BLD-MCNC: 265 MG/DL (ref 70–99)
GLUCOSE BLD-MCNC: 332 MG/DL (ref 70–99)
HCT VFR BLD CALC: 28.4 % (ref 36–48)
HEMOGLOBIN: 9.3 G/DL (ref 12–16)
MCH RBC QN AUTO: 30.3 PG (ref 26–34)
MCHC RBC AUTO-ENTMCNC: 32.9 G/DL (ref 31–36)
MCV RBC AUTO: 92 FL (ref 80–100)
PDW BLD-RTO: 16.4 % (ref 12.4–15.4)
PERFORMED ON: ABNORMAL
PLATELET # BLD: 39 K/UL (ref 135–450)
PMV BLD AUTO: 9.3 FL (ref 5–10.5)
POTASSIUM SERPL-SCNC: 4.7 MMOL/L (ref 3.5–5.1)
RBC # BLD: 3.08 M/UL (ref 4–5.2)
SODIUM BLD-SCNC: 138 MMOL/L (ref 136–145)
WBC # BLD: 19.1 K/UL (ref 4–11)

## 2022-11-16 PROCEDURE — 93971 EXTREMITY STUDY: CPT

## 2022-11-16 PROCEDURE — 6360000002 HC RX W HCPCS: Performed by: INTERNAL MEDICINE

## 2022-11-16 PROCEDURE — 97530 THERAPEUTIC ACTIVITIES: CPT

## 2022-11-16 PROCEDURE — 6370000000 HC RX 637 (ALT 250 FOR IP): Performed by: INTERNAL MEDICINE

## 2022-11-16 PROCEDURE — 80048 BASIC METABOLIC PNL TOTAL CA: CPT

## 2022-11-16 PROCEDURE — 94760 N-INVAS EAR/PLS OXIMETRY 1: CPT

## 2022-11-16 PROCEDURE — 1200000000 HC SEMI PRIVATE

## 2022-11-16 PROCEDURE — 85027 COMPLETE CBC AUTOMATED: CPT

## 2022-11-16 PROCEDURE — 6370000000 HC RX 637 (ALT 250 FOR IP): Performed by: STUDENT IN AN ORGANIZED HEALTH CARE EDUCATION/TRAINING PROGRAM

## 2022-11-16 PROCEDURE — 6360000002 HC RX W HCPCS: Performed by: ANESTHESIOLOGY

## 2022-11-16 PROCEDURE — 2580000003 HC RX 258: Performed by: STUDENT IN AN ORGANIZED HEALTH CARE EDUCATION/TRAINING PROGRAM

## 2022-11-16 PROCEDURE — 97535 SELF CARE MNGMENT TRAINING: CPT

## 2022-11-16 PROCEDURE — 2580000003 HC RX 258: Performed by: INTERNAL MEDICINE

## 2022-11-16 RX ORDER — OXYCODONE HYDROCHLORIDE AND ACETAMINOPHEN 5; 325 MG/1; MG/1
1 TABLET ORAL EVERY 4 HOURS PRN
Qty: 12 TABLET | Refills: 0 | Status: SHIPPED | OUTPATIENT
Start: 2022-11-16 | End: 2022-11-21

## 2022-11-16 RX ORDER — INSULIN GLARGINE 100 [IU]/ML
30 INJECTION, SOLUTION SUBCUTANEOUS 2 TIMES DAILY
Qty: 10 ML | Refills: 3 | Status: SHIPPED | OUTPATIENT
Start: 2022-11-16

## 2022-11-16 RX ORDER — ONDANSETRON 4 MG/1
4 TABLET, ORALLY DISINTEGRATING ORAL EVERY 8 HOURS PRN
Qty: 30 TABLET | Refills: 0 | Status: SHIPPED | OUTPATIENT
Start: 2022-11-16

## 2022-11-16 RX ORDER — LACTOBACILLUS RHAMNOSUS GG 10B CELL
2 CAPSULE ORAL DAILY
Qty: 28 CAPSULE | Refills: 0 | Status: SHIPPED | OUTPATIENT
Start: 2022-11-16 | End: 2022-11-30

## 2022-11-16 RX ORDER — LIDOCAINE HYDROCHLORIDE 10 MG/ML
5 INJECTION, SOLUTION EPIDURAL; INFILTRATION; INTRACAUDAL; PERINEURAL ONCE
Qty: 5 ML | Refills: 0 | Status: SHIPPED | OUTPATIENT
Start: 2022-11-16 | End: 2022-11-16

## 2022-11-16 RX ORDER — PREDNISONE 20 MG/1
40 TABLET ORAL DAILY
Qty: 10 TABLET | Refills: 0 | Status: SHIPPED
Start: 2022-11-16 | End: 2022-11-20 | Stop reason: HOSPADM

## 2022-11-16 RX ORDER — METHOCARBAMOL 500 MG/1
500 TABLET, FILM COATED ORAL EVERY 8 HOURS
Qty: 30 TABLET | Refills: 0 | Status: SHIPPED | OUTPATIENT
Start: 2022-11-16 | End: 2022-11-26

## 2022-11-16 RX ADMIN — Medication 10 ML: at 09:52

## 2022-11-16 RX ADMIN — MORPHINE SULFATE 1 MG: 2 INJECTION, SOLUTION INTRAMUSCULAR; INTRAVENOUS at 02:20

## 2022-11-16 RX ADMIN — INSULIN GLARGINE 30 UNITS: 100 INJECTION, SOLUTION SUBCUTANEOUS at 09:54

## 2022-11-16 RX ADMIN — INSULIN LISPRO 2 UNITS: 100 INJECTION, SOLUTION INTRAVENOUS; SUBCUTANEOUS at 17:17

## 2022-11-16 RX ADMIN — SACUBITRIL AND VALSARTAN 0.5 TABLET: 24; 26 TABLET, FILM COATED ORAL at 21:40

## 2022-11-16 RX ADMIN — FAMOTIDINE 20 MG: 20 TABLET ORAL at 21:40

## 2022-11-16 RX ADMIN — INSULIN LISPRO 6 UNITS: 100 INJECTION, SOLUTION INTRAVENOUS; SUBCUTANEOUS at 13:03

## 2022-11-16 RX ADMIN — MORPHINE SULFATE 1 MG: 2 INJECTION, SOLUTION INTRAMUSCULAR; INTRAVENOUS at 13:02

## 2022-11-16 RX ADMIN — INSULIN LISPRO 20 UNITS: 100 INJECTION, SOLUTION INTRAVENOUS; SUBCUTANEOUS at 17:17

## 2022-11-16 RX ADMIN — Medication 500 MG: at 09:50

## 2022-11-16 RX ADMIN — METOPROLOL TARTRATE 25 MG: 25 TABLET, FILM COATED ORAL at 21:41

## 2022-11-16 RX ADMIN — MORPHINE SULFATE 1 MG: 2 INJECTION, SOLUTION INTRAMUSCULAR; INTRAVENOUS at 18:47

## 2022-11-16 RX ADMIN — MORPHINE SULFATE 1 MG: 2 INJECTION, SOLUTION INTRAMUSCULAR; INTRAVENOUS at 17:05

## 2022-11-16 RX ADMIN — METOPROLOL TARTRATE 25 MG: 25 TABLET, FILM COATED ORAL at 09:50

## 2022-11-16 RX ADMIN — ATORVASTATIN CALCIUM 80 MG: 80 TABLET, FILM COATED ORAL at 21:40

## 2022-11-16 RX ADMIN — Medication 10 ML: at 21:45

## 2022-11-16 RX ADMIN — DOCUSATE SODIUM 100 MG: 100 CAPSULE, LIQUID FILLED ORAL at 09:50

## 2022-11-16 RX ADMIN — SACUBITRIL AND VALSARTAN 0.5 TABLET: 24; 26 TABLET, FILM COATED ORAL at 09:49

## 2022-11-16 RX ADMIN — Medication 10 ML: at 10:10

## 2022-11-16 RX ADMIN — INSULIN LISPRO 20 UNITS: 100 INJECTION, SOLUTION INTRAVENOUS; SUBCUTANEOUS at 13:03

## 2022-11-16 RX ADMIN — MORPHINE SULFATE 1 MG: 2 INJECTION, SOLUTION INTRAMUSCULAR; INTRAVENOUS at 21:50

## 2022-11-16 RX ADMIN — POLYETHYLENE GLYCOL 3350 17 G: 17 POWDER, FOR SOLUTION ORAL at 09:51

## 2022-11-16 RX ADMIN — INSULIN LISPRO 4 UNITS: 100 INJECTION, SOLUTION INTRAVENOUS; SUBCUTANEOUS at 09:54

## 2022-11-16 RX ADMIN — INSULIN GLARGINE 30 UNITS: 100 INJECTION, SOLUTION SUBCUTANEOUS at 21:44

## 2022-11-16 RX ADMIN — DIGOXIN 125 MCG: 125 TABLET ORAL at 09:50

## 2022-11-16 RX ADMIN — FUROSEMIDE 20 MG: 20 TABLET ORAL at 09:49

## 2022-11-16 RX ADMIN — METHYLPREDNISOLONE SODIUM SUCCINATE 40 MG: 40 INJECTION, POWDER, FOR SOLUTION INTRAMUSCULAR; INTRAVENOUS at 05:11

## 2022-11-16 RX ADMIN — GABAPENTIN 100 MG: 100 CAPSULE ORAL at 21:40

## 2022-11-16 RX ADMIN — METHOCARBAMOL TABLETS 500 MG: 500 TABLET, COATED ORAL at 13:00

## 2022-11-16 RX ADMIN — MORPHINE SULFATE 1 MG: 2 INJECTION, SOLUTION INTRAMUSCULAR; INTRAVENOUS at 09:53

## 2022-11-16 RX ADMIN — GABAPENTIN 100 MG: 100 CAPSULE ORAL at 17:22

## 2022-11-16 RX ADMIN — GABAPENTIN 100 MG: 100 CAPSULE ORAL at 09:50

## 2022-11-16 RX ADMIN — HEPARIN SODIUM 5000 UNITS: 5000 INJECTION INTRAVENOUS; SUBCUTANEOUS at 02:34

## 2022-11-16 RX ADMIN — HEPARIN SODIUM 5000 UNITS: 5000 INJECTION INTRAVENOUS; SUBCUTANEOUS at 13:01

## 2022-11-16 RX ADMIN — INSULIN LISPRO 20 UNITS: 100 INJECTION, SOLUTION INTRAVENOUS; SUBCUTANEOUS at 09:54

## 2022-11-16 RX ADMIN — METHOCARBAMOL TABLETS 500 MG: 500 TABLET, COATED ORAL at 21:41

## 2022-11-16 RX ADMIN — METHYLPREDNISOLONE SODIUM SUCCINATE 40 MG: 40 INJECTION, POWDER, FOR SOLUTION INTRAMUSCULAR; INTRAVENOUS at 17:22

## 2022-11-16 ASSESSMENT — PAIN DESCRIPTION - LOCATION
LOCATION: GENERALIZED

## 2022-11-16 ASSESSMENT — PAIN SCALES - GENERAL
PAINLEVEL_OUTOF10: 0
PAINLEVEL_OUTOF10: 5
PAINLEVEL_OUTOF10: 5
PAINLEVEL_OUTOF10: 8
PAINLEVEL_OUTOF10: 7
PAINLEVEL_OUTOF10: 0
PAINLEVEL_OUTOF10: 4
PAINLEVEL_OUTOF10: 5
PAINLEVEL_OUTOF10: 3
PAINLEVEL_OUTOF10: 5

## 2022-11-16 ASSESSMENT — PAIN SCALES - WONG BAKER
WONGBAKER_NUMERICALRESPONSE: 0
WONGBAKER_NUMERICALRESPONSE: 0

## 2022-11-16 ASSESSMENT — PAIN DESCRIPTION - DESCRIPTORS
DESCRIPTORS: ACHING

## 2022-11-16 ASSESSMENT — PAIN - FUNCTIONAL ASSESSMENT: PAIN_FUNCTIONAL_ASSESSMENT: PREVENTS OR INTERFERES SOME ACTIVE ACTIVITIES AND ADLS

## 2022-11-16 NOTE — PLAN OF CARE
Ongoing  Problem: Discharge Planning  Goal: Discharge to home or other facility with appropriate resources  11/16/2022 0333 by Kris Goode RN  Outcome: Progressing  11/15/2022 1656 by Ang Garcia RN  Outcome: Not Progressing

## 2022-11-16 NOTE — PROGRESS NOTES
Hospitalist Progress Note    Name:  Gregory Orr    /Age/Sex: 1940  (78 y.o. female)  MRN & CSN:  [id number] & [id number]    PCP: Milad[facility] Palencia    Date of Admission: 2022    Patient Status:  Inpatient     Chief Complaint: Dyspnea    Hospital Course:   78 y.o. female with PMHx of nondysplastic syndrome, hypertension, hyperlipidemia, type 2 diabetes, CAD, atrial fibrillation who presented to [facility] with complaints of shortness of breath. Patient was at her nursing home when it was noted she was having shortness of breath. She is not on any oxygen at home, but she is currently satting well on 2 L O2 via NC. Left arm PICC line in place for transfusions given history of MDS. Upper extremity Dopplers found acute DVT in left upper extremity. Reluctant to remove PICC line at this time. Hematology following. UTI positive for Proteus. Received IV meropenem, but downgraded to Rocephin. ID following. Patient is coming with ARUN, but that has since resolved. Nephrology signed off. General surgery consulted for port placement on  has been cancelled. Patient is now DNR CC and on a morphine gtt. Subjective: Today is:  Hospital Day: 11.  Patient seen and examined in [id number]/5906-01. Pt is sedated with morphine gtt  Apparently pain management came and saw the patient and now family no longer want hospice. Now want a SNF and frequent pain medication.            Medications:  Reviewed    Infusion Medications    sodium chloride      sodium chloride      dextrose      sodium chloride       Scheduled Medications    morphine  1 mg IntraVENous Q2H    insulin lispro  20 Units SubCUTAneous TID WC    insulin glargine  30 Units SubCUTAneous BID    methylPREDNISolone  40 mg IntraVENous Q12H    furosemide  20 mg Oral Daily    sacubitril-valsartan  0.5 tablet Oral BID    metoprolol tartrate  25 mg Oral BID    insulin lispro  0-8 Units SubCUTAneous TID     insulin lispro 0-4 Units SubCUTAneous Nightly    lidocaine  1 patch TransDERmal Daily    lidocaine 1 % injection  5 mL IntraDERmal Once    sodium chloride flush  5-40 mL IntraVENous 2 times per day    atorvastatin  80 mg Oral Nightly    calcium elemental  500 mg Oral Daily    digoxin  125 mcg Oral Daily    famotidine  20 mg Oral Daily    gabapentin  100 mg Oral TID    levothyroxine  175 mcg Oral QAM AC    pantoprazole  40 mg Oral QAM AC    sodium chloride flush  5-40 mL IntraVENous 2 times per day    heparin (porcine)  5,000 Units SubCUTAneous Q12H    methocarbamol  500 mg Oral q8h    polyethylene glycol  17 g Oral BID    docusate sodium  100 mg Oral BID     PRN Meds: diazePAM, sodium chloride, ipratropium-albuterol, sodium chloride flush, sodium chloride, dextrose bolus **OR** dextrose bolus, glucagon (rDNA), dextrose, guaiFENesin, HYDROcodone-acetaminophen, tiZANidine, sodium chloride flush, ondansetron **OR** ondansetron, polyethylene glycol, acetaminophen **OR** acetaminophen, sodium phosphate IVPB **OR** sodium phosphate IVPB **OR** sodium phosphate IVPB, magnesium sulfate, potassium chloride **OR** potassium alternative oral replacement **OR** potassium chloride, morphine, sodium chloride, diatrizoate meglumine-sodium      Intake/Output Summary (Last 24 hours) at 11/16/2022 1540  Last data filed at 11/16/2022 1403  Gross per 24 hour   Intake 730 ml   Output 1525 ml   Net -795 ml         Physical Exam Performed:    BP (!) 110/49   Pulse 63   Temp 96.8 °F (36 °C) (Temporal)   Resp 15   Ht 5' (1.524 m)   Wt 240 lb 10.7 oz (109.2 kg)   LMP  (LMP Unknown)   SpO2 94%   BMI 47.00 kg/m²     General appearance: No apparent distress, appears stated age and cooperative. Ill appearing. [place]ive[place] speaking. HEENT: Pupils equal, round, and reactive to light. Conjunctivae/corneas clear. NC present. Neck: Supple, with severely limited ROM. Painful to palpation along spinous processes. No jugular venous distention.  Trachea midline. Respiratory:  Normal respiratory effort. Clear to auscultation, bilaterally without Rales/Wheezes/Rhonchi. Cardiovascular: Regular rate and rhythm with normal S1/S2 without murmurs, rubs or gallops. No peripheral edema. Abdomen: Soft, non-tender, non-distended with normal bowel sounds. : No CVA tenderness. Musculoskeletal: No clubbing or cyanosis. Full range of motion without deformity. Skin: Skin color, texture, turgor normal.  No rashes or lesions. Neurologic:  Neurovascularly intact without any focal sensory/motor deficits. Cranial nerves: II-XII intact, grossly non-focal.  Psychiatric: Alert and oriented, thought content appropriate, normal insight  Peripheral Pulses: +2 palpable, equal bilaterally       Labs:   No results for input(s): WBC, HGB, HCT, PLT in the last 72 hours. No results for input(s): NA, K, CL, CO2, BUN, CREATININE, CALCIUM, PHOS in the last 72 hours. Invalid input(s): MAGNES    No results for input(s): AST, ALT, BILIDIR, BILITOT, ALKPHOS in the last 72 hours. No results for input(s): INR in the last 72 hours. No results for input(s): [place] in the last 72 hours. Urinalysis:      Lab Results   Component Value Date/Time    NITRU POSITIVE 11/07/2022 01:45 PM    WBCUA 4947 11/07/2022 01:45 PM    BACTERIA 4+ 11/07/2022 01:45 PM    RBCUA 141 11/07/2022 01:45 PM    BLOODU MODERATE 11/07/2022 01:45 PM    SPECGRAV 1.020 11/07/2022 01:45 PM    GLUCOSEU 100 11/07/2022 01:45 PM    GLUCOSEU NEGATIVE 03/09/2012 06:51 AM       Radiology:  VL Extremity Venous Left         CT CERVICAL SPINE WO CONTRAST   Final Result   No acute traumatic fracture or traumatic malalignment. C1-C2 articulation severe degenerative changes and mild C2-C3 disc   degenerative changes. No significant canal narrowing or foraminal stenosis. CT HEAD WO CONTRAST   Final Result   No acute intracranial abnormality.       Moderate senescent changes with parenchymal volume loss and chronic small   vessel ischemic changes. Chronic paranasal sinus disease. Left mastoid effusion         XR CHEST PORTABLE   Final Result   1. Technically suboptimal exam.   2. Mild congestive heart failure is a consideration given the radiographic   findings; pneumonia is also a consideration in areas of consolidation with   pleural effusion. 3. Calcific atherosclerosis aorta. 4. Cardiomegaly. VL Extremity Venous Left   Final Result      CT ABDOMEN PELVIS WO CONTRAST Additional Contrast? Oral   Final Result   1. No acute findings in the abdomen or pelvis. 2. Moderate amount of stool and air in the colon proximally which could   reflect constipation. 3. Punctate bilateral intrarenal calculi. VL Extremity Venous Bilateral   Final Result      XR CHEST PORTABLE   Final Result   Left PICC line appears to be over the SVC and not significantly changed from   the prior exam.      Cardiomegaly with pulmonary vascular congestion and interstitial   prominence/interstitial opacities suggesting edema. Stable to the slightly   worsened from the recent study. CT HEAD WO CONTRAST   Final Result   No acute intracranial hemorrhage, mass effect, midline shift, or signs of   acute territorial infarct. Generalized volume loss and chronic ischemic changes in the white matter are   stable. Partial opacification in the left mastoid air cells and edge of the middle   ear cavity are new from the prior exam.  Correlate for infectious symptoms   versus eustachian tube dysfunction.                  Assessment/Plan:    Active Hospital Problems    Diagnosis     Carotid artery disease without cerebral infarction [facility]) [I77.9]      Priority: High    Weight loss counseling, encounter for [Z71.3]      Priority: High    Permanent atrial fibrillation ([facility] [address] [I48.21]      Priority: Medium    Non-English speaking patient [Z78.9]      Priority: Medium    Hypothyroidism [E03.9]      Priority: Medium    History of DVT in adulthood [Z86.718]      Priority: Medium    Nephrolithiasis [N20.0]      Priority: Medium    Atypical pneumonia [J18.9]      Priority: Medium    Acute on chronic congestive heart failure (HCC) [I50.9]      Priority: Medium    HFrEF (heart failure with reduced ejection fraction) ([facility] [address] [I50.20]      Priority: Medium    Acute hypoxemic respiratory failure (HCC) [J96.01]      Priority: Medium    Myelodysplasia (myelodysplastic syndrome) ([facility] [address] [D46.9]      Priority: Medium    Chronic atrial fibrillation (HCC) [I48.20]      Priority: Medium    Complicated UTI (urinary tract infection) [N39.0]      Priority: Medium    Type 2 diabetes mellitus ([facility]) [E11.9]     Obesity, Class III, BMI 40-49.9 (morbid obesity) ([facility] [address] [E66.01]     Morbid obesity with BMI of 40.0-44.9, adult ([facility] [address] [E66.01, Z68.41]     ARUN (acute kidney injury) ([facility] [address] [N17.9]     Hyperlipidemia associated with type 2 diabetes mellitus ([facility] [address] [E11.69, E78.5]     Hypertension associated with diabetes ([facility] [address] [E11.59, I15.2]     Peripheral vascular disease ([facility] [address] [I73.9]     Dyspnea [R06.00]          Hospital Day: 11    This is a 78 y.o. female who presented to [facility] on 11/6/2022 and is being treated for:     Left upper extremity DVT  -Upper extremity ultrasound shows partially occluded DVT in the left proximal brachial vein, but no propagation of the thrombus; partial flow is seen  -Heparin 5K units every 12 hours  - repeat US    Neck pain  -Questionable if it is 2/2 left upper extremity DVT and patient is having referred pain  -Imaging nonacute  -Continue morphine drip  -Pain management consulted  - will be weaning gtt and doing IV q 2 hours with goal of weaning this as well  - what we really need to do is transition to oral meds as she will not be getting IV pain meds at the SNF. MDS  -Frequent transfusions through PICC line - no longer receiving, however it seems that family is changing mind.    -Monitor CBC  -Heme/onc following; appreciate recommendations  -Hospice consulted, but no on has signed anything and now unclear if family want to move forward with hospice.  - [address] [facility] SNF. HFrEF  -Echo 10/17/2022 showed LVEF 45-50% with right ventricle being mildly enlarged and having reduced function  -Continue beta-blocker and Entresto  -I/Os     Hyperlipidemia  -Statin    Hypertension  -Continue home antihypertensives    Type 2 diabetes  -SSI and lantus     Atrial fibrillation  -Continue home digoxin      Patient is DNR CC. Hospice was consulted. Family seems to have changed mind now. Will order labs then if they are now wanting to go to SNF. DVT ppx: Heparin  GI ppx: Diet/Tube Feeds  Diet: ADULT ORAL NUTRITION SUPPLEMENT; Breakfast, Lunch, Dinner; Diabetic Oral Supplement  ADULT DIET; Dysphagia - Soft and Bite Sized; 3 carb choices (45 gm/meal); Low Fat/Low Chol/High Fiber/2 gm Na; No Concentrated sweets  Code Status: DNR-CC    Disposition:  Left upper extremity DVT with PICC in place. DNR CC Pain management following. NOT REALLY SURE WHAT FAMILY WANTS AT THIS POINT  The situation is frustrating. PT/OT Eval Status: ordered      Francis Bui MD  11/16/2022  3:40 PM      Please note that some part of this chart was generated using Dragon dictation software. Although every effort was made to ensure the accuracy of this automated transcription, some errors in transcription may have occurred inadvertently. If you may need any clarification, please do not hesitate to contact me through [facility].

## 2022-11-16 NOTE — PROGRESS NOTES
[facility] [address] Department   Phone: [phone]    Occupational Therapy    [] Initial Evaluation            [x] Daily Treatment Note         [] Discharge Summary      Patient: Bety Blackmon   : 1940   MRN: [id number]   Date of Service:  2022    Admitting Diagnosis:  Acute hypoxemic respiratory failure [facility])  Current Admission Summary: Per H&P-   Recent admission 11/3-22 for left upper extremity swelling and pain after left PICC line placement. She was treated for pain and transfused 2 units pRBC for anemia related to MDS. Patient was discharged on  afternoon. Early  morning , she was experiencing episodes of left abdominal spasms/tightness, followed by sensation of shortness of breath. She was found by the staff at [facility] to desaturate to 85-88% on RA. She reports palpitations. She denies lightheadedness, chest pain during episodes. She was placed on 2-2.5 LPM of supplemental oxygen with improvement of oxygen saturation. She also has been experiencing dysuria for the past 5 days. She has had issues with urinary retention. At SNF she was straight cath with return of 500 cc of urine. She had been constipated and was placed on bowel regimen, then she had loose-watery stools on Friday-Saturday. She denies nausea. She has not have fevers, but endorses feeling cold. She has some residual mild cough. She was coughing more during episodes of spasms. She continues to experience some pain to left upper extremity. Prior admission 10/10-10/26/22 for evaluation of left shoulder and chest pain. She was evaluated by orthopedic and family requested starting naproxen for pain relief despite thrombocytopenia. During hospitalization patient developed acute hypoxic respiratory failure attributed to aspiration pneumonia for which she was treated with IV antibiotics.   Patient demonstrated anemia and thrombocytopenia for which she was evaluated by hematology and diagnosed with MDS. For atrial fibrillation, decision to not place patient on anticoagulation due to thrombocytopenia. Cardiology evaluated patient during hospitalization. She was to follow up with cardiology to determine if anticoagulation in the future is feasible. 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 Past Surgical History:  has a past surgical history that includes joint replacement (Bilateral, [address]); Cardiac surgery (2004); Cholecystectomy, laparoscopic (5/15/14); Revision total knee arthroplasty (Right, 11/22/2016); Total knee arthroplasty; knee surgery (Left, 02/28/2017); CT BIOPSY BONE MARROW (9/29/2022); Upper gastrointestinal endoscopy (N/A, 10/3/2022); and Upper gastrointestinal endoscopy (N/A, 10/3/2022). Discharge Recommendations: [facility] scored a 6/24 on the AM-PAC ADL Inpatient form. Current research shows that an AM-PAC score of 17 or less is typically not associated with a discharge to the patient's home setting. Based on the patient's AM-PAC score and their current ADL deficits, it is recommended that the patient have 3-5 sessions per week of Occupational Therapy at d/c to increase the patient's independence. Please see assessment section for further patient specific details. If patient discharges prior to next session this note will serve as a discharge summary. Please see below for the latest assessment towards goals. DME Required For Discharge: DME to be determined at next level of care    Precautions/Restrictions: high fall risk, contact precautions, COVID R/O, MDRO  Weight Bearing Restrictions: no restrictions  [] Right Upper Extremity  [] Left Upper Extremity [] Right Lower Extremity  [] Left Lower Extremity     Required Braces/Orthotics:  L brace thumb spica for comfort   [] Right  [x] Left   Positional Restrictions:no positional restrictions  Left Brachial Vein DVT noted- arm wrapped in ace bandage at EOS. Pre-Admission Information   Lives With: alone                     Type of Home: house  Home Layout: one level  Home Access: ramped entry  [address]: walker accessible, walk in shower  Bathroom Equipment: grab bars in shower, shower chair  Toilet Height: standard height  Home Equipment: rolling walker, rollator - 4 wheeled walker, manual wheelchair, electric wheelchair  Transfer Assistance: modified independent with use of RW  Ambulation Assistance:modified independent with use of manual w/c, RW for short distances   ADL Assistance: requires assistance with bathing, requires assistance with dressing  IADL Assistance: independent with homemaking tasks home health aide 4 hours/day   Active :        [] Yes                 [x] No  Hand Dominance: [] Left                 [x] Right  Current Employment: retired. Occupation:    Recent Falls: 1 slip out of wheelchair   *cameras present at home for children to monitor  Comments: After previous admission 10/10-10/26/22 pt [facility] however has only completed a couple sessions there d/t pt having to return to hospital. [facility] to [facility] but has not been able to complete any ambulation. Subjective    General: Patient supine in bed upon arrival, daughter and granddaughter present throughout; translating PRN, patient agreeable to OT/PT cotx session with encouragement.  Family reporting patient is \"seeing things; flies on the garbage, talking about war\" since receiving morphine. Pain: Patient does not rate upon questioning  Pain Interventions: pain medication in place prior to arrival and repositioned      Activities of Daily Living  Basic Activities of Daily Living  Grooming: dependent Increased time to complete task  Grooming Comments: hair wash with shower cap/dry/comb, lotion application to posterior only seated EOB  Toileting: dependent. Toileting Comments: catheter in place  General Comments:  patient with increased time needed for all functional ADLs, patient limited by anxiousness, fearfulness with movement. Instrumental Activities of Daily Living  No IADL completed on this date. Functional Mobility  Bed Mobility  Supine to Sit: 2 person assistance with DEP x2   Sit to Supine: 2 person assistance with DEP x2   Rolling Left: 2 person assistance with DEP x2   Rolling Right: 2 person assistance with DEP x2   Scootin person assistance with DEP x2   Comments: Increased time required for all bed mobility, poor initiation of LE with MAX verbal/tactile cues  Transfers  No transfers completed on this date secondary to pain/fatigue . Functional Mobility:  Sitting Balance: contact guard assistance, minimal assistance. Sitting Balance Comment: Caden progressing to CGA seated EOB, patient anxious with movement and fearful of falling. Tolerated ~14 minutes   No functional mobility completed on this date secondary to pain/fatigue.     Functional Outcomes  AM-PAC Inpatient Daily Activity Raw Score: 6    Cognition  Overall Cognitive Status: Impaired  Arousal/Alterness: appropriate responses to stimuli  Following Commands: follows one step commands with repetition, follows one step commands with increased time  Attention Span: appears intact  Memory: decreased recall of recent events  Safety Judgement: good awareness of safety precautions  Problem Solving: assistance required to generate solutions, assistance required to identify errors made  Insights: fully aware of deficits  Initiation: requires cues for some  Sequencing: requires cues for some  Comments: anxious, fearful of pain, screaming prior to movmenet. Orientation:    alert and oriented x 4  Command Following:   accurately follows one step commands  Education  Barriers To Learning: cognition and language  Patient Education: patient educated on goals, OT role and benefits, plan of care, ADL adaptive strategies, energy conservation, family education, disease specific education, pressure relief, transfer training, discharge recommendations, importance of OOB activity  Learning Assessment:  patient verbalizes and demonstrates understanding, patient verbalizes understanding, would benefit from continued reinforcement  Assessment  Activity Tolerance: Poor, limited by anxiousness and activity tolerance  Impairments Requiring Therapeutic Intervention: decreased functional mobility, decreased ADL status, decreased ROM, decreased strength, decreased safety awareness, decreased endurance, decreased balance, decreased IADL, decreased fine motor control, decreased coordination, increased pain, decreased posture  Prognosis: poor  Clinical Assessment: Patient is limited by the above listed deficits and would benefit from continued skill OT treatment to address. Currently requiring DEP assist for all bed mobility and ADLs, functional transfers/mobility limited this date by anxiousness, pain and poor sitting tolerance from EOB.    Safety Interventions: patient left in bed, bed alarm in place, call light within reach, nurse notified, and family/caregiver present    Plan  Frequency: 1-2 x/per week  Current Treatment Recommendations: strengthening, ROM, balance training, functional mobility training, transfer training, endurance training, patient/caregiver education, ADL/self-care training, pain management, safety education, equipment evaluation/education, and positioning    Goals  Patient Goals: to get better    Short Term Goals:  Time Frame: by d/c   Patient will complete upper body ADL at stand by assistance   Patient will complete lower body ADL at maximum assistance   Patient will complete toileting at maximum assistance   Patient will complete grooming at stand by assistance   Patient will complete functional transfers at maximum assistance   Patient will increase functional sitting balance to SBA for improved ADL completion    -Goals not yet met this date, 11/16/22    Therapy Session Time   Individual Group Co-treatment   Time In    1120   Time Out    1200   Minutes    40        Timed Code Treatment Minutes:  40 minutes     Total Treatment Minutes:  40 minutes total    Electronically Signed By: MATTHEW Gordon/[id number]

## 2022-11-16 NOTE — PROGRESS NOTES
[facility] [address] Department   Phone: [phone]    Physical Therapy    [] Initial Evaluation            [x] Daily Treatment Note         [] Discharge Summary      Patient: Audelia Jane   : 1940   MRN: [id number]   Date of Service:  2022  Admitting Diagnosis: Acute hypoxemic respiratory failure [facility])  Current Admission Summary: 78 y.o. female with PMHx of nondysplastic syndrome, hypertension, hyperlipidemia, type 2 diabetes, CAD, atrial fibrillation who presented to [facility] with complaints of shortness of breath. Patient was at her nursing home when it was noted she was having shortness of breath last night and into the early morning of the day. The patient is relatively sleepy on exam, but 2 of her daughters were present at bedside and able to update history. They state the patient was gasping for air. She is not on any oxygen at home, but she is currently satting well on 2 L O2 via NC in the ED. The daughter states that they had heard some crackling sounds with congestion while she was breathing. Patient denied any chest pain, abdominal pain, nausea, vomiting, GI/ symptoms. Patient has not had much of an appetite over the last 72 hours. Additionally, she has had low urine output, per the daughters. Patient has a left PICC line for blood transfusions, and she did receive 1 approximately 3 days ago. However, the daughter states that the patient is having some left chest/upper extremity swelling since the PICC line has been placed. Denies tobacco, alcohol, or illicit drug use.   Past Medical History:  has a past medical history of 15 Left knee repeat repair of median parapatellar arthrotomy with augmentation, Arthritis, Aspiration pneumonia of left lower lobe due to gastric secretions ([address] Atrial fibrillation ([address] CAD (coronary artery disease), Cataract, Chronic diastolic congestive heart failure ([address] Diabetes mellitus ([facility] GERD (gastroesophageal reflux disease), Hyperlipidemia, Hypertension, HYPOTHRYROIDISM, Myelodysplastic disease ([facility] Non-English speaking patient, Sleep apnea, Thyroid disease, and UTI. Past Surgical History:  has a past surgical history that includes joint replacement (Bilateral, [address]); Cardiac surgery (2004); Cholecystectomy, laparoscopic (5/15/14); Revision total knee arthroplasty (Right, 11/22/2016); Total knee arthroplasty; knee surgery (Left, 02/28/2017); CT BIOPSY BONE MARROW (9/29/2022); Upper gastrointestinal endoscopy (N/A, 10/3/2022); and Upper gastrointestinal endoscopy (N/A, 10/3/2022). Discharge Recommendations: Larry Wilson scored a 6/24 on the AM-PAC short mobility form. Current research shows that an AM-PAC score of 17 or less is typically not associated with a discharge to the patient's home setting. Based on the patient's AM-PAC score and their current functional mobility deficits, it is recommended that the patient have 3-5 sessions per week of Physical Therapy at d/c to increase the patient's independence. Please see assessment section for further patient specific details. If patient discharges prior to next session this note will serve as a discharge summary. Please see below for the latest assessment towards goals.      DME Required For Discharge: patient has all required DME for discharge  Precautions/Restrictions: high fall risk, Isolation precautions  Weight Bearing Restrictions: no restrictions  [] Right Upper Extremity  [] Left Upper Extremity [] Right Lower Extremity  [] Left Lower Extremity     Required Braces/Orthotics:  Intermittent use of thumb spica   [] Right  [x] Left  Positional Restrictions:no positional restrictions    Pre-Admission Information   Lives With: alone                     Type of Home: house  Home Layout: one level  Home Access: ramped entry  Bathroom Layout: walker accessible, walk in shower  Bathroom Equipment: grab bars in shower, shower chair  Toilet Height: standard height  Home Equipment: rolling walker, rollator - 4 wheeled walker, manual wheelchair, electric wheelchair  Transfer Assistance: modified independent with use of RW  Ambulation Assistance:modified independent with use of manual w/c, RW for short distances   ADL Assistance: requires assistance with bathing, requires assistance with dressing  IADL Assistance: independent with homemaking tasks home health aide 4 hours/day   Active :        [] Yes                 [x] No  Hand Dominance: [] Left                 [x] Right  Current Employment: retired. Recent Falls: 1 slip out of wheelchair   *cameras present at home for children to monitor    Above information gathered from visit in 2022:  Comments: After previous admission 10/10-10/26/22 pt discharged to [facility] for rehabilitation however has only completed a couple sessions there d/t pt having to return to hospital. At [facility] pt transferred to chair but has not been able to complete any ambulation. Subjective  General: Patient lying supine in bed with HOB elevated. Patient has two family members present, intermittently translating conversation to patient. Patient is agreeable to PT/OT with encouragement. Pain: Did not rate with questioning. Location: L arm, L shoulder,neck and head  Pain Interventions: RN notified and repositioned      Functional Mobility  Bed Mobility  Supine to Sit: 2 person assistance with total A of 2   Sit to Supine: 2 person assistance with total A of 2   Scootin person assistance with total A of 2   Comments: Patient performed bed mobility with HOB elevated and increased time. Fearful of falling. Transfers  No transfers completed on this date secondary to fatigue and B LE weakness. Comments:  Ambulation  Ambulation not tested on this date secondary to fatigue and B LE weakness.   Comments:   Stair Mobility  Stair mobility not completed on this date.  Comments:  Wheelchair Mobility:  No w/c mobility completed on this date. Comments:  Balance  Static Sitting Balance: fair: maintains balance at CGA without use of UE support  Dynamic Sitting Balance: poor (+): requires min (A) to maintain balance  Comments: Sat EOB 12-15' with min A initially progressing to CGA    Other Therapeutic Interventions  See OT note for ADLs  Functional Outcomes  AM-PAC Inpatient Mobility Raw Score : 6              Cognition  WFL  Orientation:    alert and oriented x 4  Command Following:   [facility]  Barriers To Learning: language  Patient Education: patient educated on goals, PT role and benefits, plan of care, general safety, family education, discharge recommendations  Learning Assessment:  patient verbalizes and demonstrates understanding    Assessment  Activity Tolerance: Fair; patient limited by pain this session, fearful of falling, limited active ROM of BLEs  Impairments Requiring Therapeutic Intervention: decreased functional mobility, decreased strength, decreased endurance, decreased balance, increased pain, decreased posture  Prognosis: good  Clinical Assessment: Patient continues to need assist of 2 for all functional mobility. Limited participation in session this date due to pain and fear of falling.  Patient will continue to benefit from skilled PT in order to return to [facility] with all functional mobility prior to d/c from hospital.  Safety Interventions: patient left in bed, bed alarm in place, call light within reach, gait belt, patient at risk for falls, nurse notified, and family/caregiver present    Plan  Frequency: 1-2 x/per week  Current Treatment Recommendations: strengthening, balance training, endurance training, modalities, and patient/caregiver education    Goals  Patient Goals: Return home   Short Term Goals:  Time Frame: upon d/c  Patient will complete bed mobility at stand by assistance   Patient will complete transfers at stand by assistance Patient will ambulate 25 ft with use of rolling walker at stand by assistance  No goals met this treatment.       Therapy Session Time      Individual Group Co-treatment   Time In     1120   Time Out     1200   Minutes     40     Timed Code Treatment Minutes:  40 Minutes  Total Treatment Minutes:  40 Minutes       Electronically Signed By: Jerry Freitas PT  Jerome, Jerry Freitas PT, DPT [id number]

## 2022-11-16 NOTE — CARE COORDINATION
Discharge Planning Note:    SANDY received a call from dtr Flaco Nieto that she, brit and other family went and toured the [facility] (hospice) and feel that it they way they will plan to go. [facility] plans to have final discussion with family tonight. CM will update [place] (on-site) and f/u with pt/family in morning. CM advised [facility] that pt is ready to dc to [facility] or SNF from a medical stability stand-point. LIANET in Palliative updated via Teams.      Electronically signed by Alfred Hooks RN on 11/16/2022 at 3:38 PM

## 2022-11-17 LAB
GLUCOSE BLD-MCNC: 121 MG/DL (ref 70–99)
GLUCOSE BLD-MCNC: 126 MG/DL (ref 70–99)
GLUCOSE BLD-MCNC: 179 MG/DL (ref 70–99)
GLUCOSE BLD-MCNC: 280 MG/DL (ref 70–99)
PERFORMED ON: ABNORMAL

## 2022-11-17 PROCEDURE — 6360000002 HC RX W HCPCS: Performed by: ANESTHESIOLOGY

## 2022-11-17 PROCEDURE — 1200000000 HC SEMI PRIVATE

## 2022-11-17 PROCEDURE — 6370000000 HC RX 637 (ALT 250 FOR IP): Performed by: INTERNAL MEDICINE

## 2022-11-17 PROCEDURE — 6370000000 HC RX 637 (ALT 250 FOR IP): Performed by: STUDENT IN AN ORGANIZED HEALTH CARE EDUCATION/TRAINING PROGRAM

## 2022-11-17 PROCEDURE — 6360000002 HC RX W HCPCS: Performed by: INTERNAL MEDICINE

## 2022-11-17 PROCEDURE — 2580000003 HC RX 258: Performed by: INTERNAL MEDICINE

## 2022-11-17 PROCEDURE — 2580000003 HC RX 258: Performed by: STUDENT IN AN ORGANIZED HEALTH CARE EDUCATION/TRAINING PROGRAM

## 2022-11-17 RX ADMIN — ATORVASTATIN CALCIUM 80 MG: 80 TABLET, FILM COATED ORAL at 21:47

## 2022-11-17 RX ADMIN — DIGOXIN 125 MCG: 125 TABLET ORAL at 09:10

## 2022-11-17 RX ADMIN — GABAPENTIN 100 MG: 100 CAPSULE ORAL at 09:09

## 2022-11-17 RX ADMIN — INSULIN LISPRO 20 UNITS: 100 INJECTION, SOLUTION INTRAVENOUS; SUBCUTANEOUS at 13:16

## 2022-11-17 RX ADMIN — METHOCARBAMOL TABLETS 500 MG: 500 TABLET, COATED ORAL at 21:47

## 2022-11-17 RX ADMIN — METHYLPREDNISOLONE SODIUM SUCCINATE 40 MG: 40 INJECTION, POWDER, FOR SOLUTION INTRAMUSCULAR; INTRAVENOUS at 04:00

## 2022-11-17 RX ADMIN — DIAZEPAM 2.5 MG: 5 INJECTION, SOLUTION INTRAMUSCULAR; INTRAVENOUS at 09:56

## 2022-11-17 RX ADMIN — METOPROLOL TARTRATE 25 MG: 25 TABLET, FILM COATED ORAL at 21:47

## 2022-11-17 RX ADMIN — MORPHINE SULFATE 1 MG: 2 INJECTION, SOLUTION INTRAMUSCULAR; INTRAVENOUS at 20:57

## 2022-11-17 RX ADMIN — MORPHINE SULFATE 1 MG: 2 INJECTION, SOLUTION INTRAMUSCULAR; INTRAVENOUS at 09:13

## 2022-11-17 RX ADMIN — GABAPENTIN 100 MG: 100 CAPSULE ORAL at 21:47

## 2022-11-17 RX ADMIN — METHOCARBAMOL TABLETS 500 MG: 500 TABLET, COATED ORAL at 05:36

## 2022-11-17 RX ADMIN — METOPROLOL TARTRATE 25 MG: 25 TABLET, FILM COATED ORAL at 09:10

## 2022-11-17 RX ADMIN — MORPHINE SULFATE 1 MG: 2 INJECTION, SOLUTION INTRAMUSCULAR; INTRAVENOUS at 10:05

## 2022-11-17 RX ADMIN — MORPHINE SULFATE 1 MG: 2 INJECTION, SOLUTION INTRAMUSCULAR; INTRAVENOUS at 13:03

## 2022-11-17 RX ADMIN — LEVOTHYROXINE SODIUM 175 MCG: 0.03 TABLET ORAL at 05:36

## 2022-11-17 RX ADMIN — PANTOPRAZOLE SODIUM 40 MG: 40 TABLET, DELAYED RELEASE ORAL at 05:36

## 2022-11-17 RX ADMIN — MORPHINE SULFATE 1 MG: 2 INJECTION, SOLUTION INTRAMUSCULAR; INTRAVENOUS at 18:33

## 2022-11-17 RX ADMIN — Medication 10 ML: at 21:00

## 2022-11-17 RX ADMIN — INSULIN GLARGINE 30 UNITS: 100 INJECTION, SOLUTION SUBCUTANEOUS at 09:52

## 2022-11-17 RX ADMIN — Medication 10 ML: at 09:51

## 2022-11-17 RX ADMIN — DOCUSATE SODIUM 100 MG: 100 CAPSULE, LIQUID FILLED ORAL at 09:09

## 2022-11-17 RX ADMIN — FAMOTIDINE 20 MG: 20 TABLET ORAL at 21:47

## 2022-11-17 RX ADMIN — INSULIN LISPRO 4 UNITS: 100 INJECTION, SOLUTION INTRAVENOUS; SUBCUTANEOUS at 13:15

## 2022-11-17 RX ADMIN — MORPHINE SULFATE 1 MG: 2 INJECTION, SOLUTION INTRAMUSCULAR; INTRAVENOUS at 22:41

## 2022-11-17 RX ADMIN — HEPARIN SODIUM 5000 UNITS: 5000 INJECTION INTRAVENOUS; SUBCUTANEOUS at 01:43

## 2022-11-17 RX ADMIN — MORPHINE SULFATE 1 MG: 2 INJECTION, SOLUTION INTRAMUSCULAR; INTRAVENOUS at 05:36

## 2022-11-17 RX ADMIN — INSULIN LISPRO 20 UNITS: 100 INJECTION, SOLUTION INTRAVENOUS; SUBCUTANEOUS at 09:53

## 2022-11-17 RX ADMIN — FUROSEMIDE 20 MG: 20 TABLET ORAL at 09:47

## 2022-11-17 RX ADMIN — MORPHINE SULFATE 1 MG: 2 INJECTION, SOLUTION INTRAMUSCULAR; INTRAVENOUS at 04:00

## 2022-11-17 RX ADMIN — Medication 500 MG: at 09:56

## 2022-11-17 RX ADMIN — GABAPENTIN 100 MG: 100 CAPSULE ORAL at 18:32

## 2022-11-17 RX ADMIN — Medication 10 ML: at 09:56

## 2022-11-17 ASSESSMENT — PAIN SCALES - GENERAL
PAINLEVEL_OUTOF10: 6
PAINLEVEL_OUTOF10: 3

## 2022-11-17 ASSESSMENT — PAIN SCALES - WONG BAKER
WONGBAKER_NUMERICALRESPONSE: 0

## 2022-11-17 ASSESSMENT — PAIN DESCRIPTION - LOCATION: LOCATION: NECK

## 2022-11-17 ASSESSMENT — PAIN - FUNCTIONAL ASSESSMENT: PAIN_FUNCTIONAL_ASSESSMENT: PREVENTS OR INTERFERES SOME ACTIVE ACTIVITIES AND ADLS

## 2022-11-17 ASSESSMENT — PAIN DESCRIPTION - DESCRIPTORS: DESCRIPTORS: ACHING

## 2022-11-17 ASSESSMENT — PAIN DESCRIPTION - ORIENTATION: ORIENTATION: MID

## 2022-11-17 NOTE — PROGRESS NOTES
Hospice [facility]    Met with family to discuss hospice services, hospice philosophy, levels of care and locations of care. Patient is appropriate to receive pain and anxiety management at inpatient facility. Family declined bed at [facility] inpatient unit at this time due to wanting to wait until all 7 children are in agreement with hospice care. Ivan Kline RN explained that inpatient bed could not be reserved for them until came to a decision over the weekend. Family verbalized understanding. Sepideh DELGADO and [address] RN aware and know how to contact  Diamond Kline if needed. HOC will follow up daily until discharge.     Kandis Hernandez RN  Hospice of [address] [place] [address]  Main #: [phone]  Cell: [phone]

## 2022-11-17 NOTE — CARE COORDINATION
Discharge Planning Note:    CM met with dtrs Que and Dayne to discuss DC plan and barriers. Dayne explained that they cannot make a decision to go hospice without the agreement of all siblings although that is what they are pushing for. There are x2 brothers, one local and one in [place]. The one local is \"coming to terms\" with the situation but the brother in [place] is not. The brother in [place] is currently ill with \"the Flu\" and plans  to travel here this weekend to see pt, \"once out of his sick-window. \"  Almaz Tinoco feels that once her brother sees the pt and her condition he will agree to hospice. Dayne and Luis Eduardo Gates also voiced concern for reoccurring UTI because pt's WBC have increased to 19.1. [facility] requesting another urine study be dong. CM discussed with Dayne and [employer] that if hospice is not the plan, we needed to think about returning to the SNF. Luis Eduardo Gates and Dayne voiced that they will not allow pt to return to [facility]. CM asked them to start thinking about what SNF they would like if the brother does not agree with hospice. Dayne explained that Dr. Irais Artis has a \"good, long talk\" with her today and felt he understood the cultural concerns. Per Dayne, Dr. Kaia Mckeon said that he would not DC pt until all were in agreement. SANDY advised that a DC order was in at this time and that Dr. Kaia Mckeon would have to DC the order. [facility] asked that CM reach out to Dr. Kaia Mckeon to see if he can come talk with them and discuss canceling the dc order. Sandy sent a message to Dr. Kaia Mckeon via PS and received instruction to cancel DC order. DC order cancelled. SANDY updated Ivan Burk RN on-site. CM will continue to F/U with family.      Electronically signed by Patti Fisher RN on 11/17/2022 at 1:32 PM

## 2022-11-17 NOTE — PROGRESS NOTES
Hospitalist Progress Note      PCP: Lawyer Briceno    Date of Admission: 11/6/2022    Chief Complaint: SOB    Hospital Course: 77 yo F with MDS, HTN, CAD, DM 2, Afib, morbid obesity came to ER from SNF with SOB. Left arm PICC line in place POA for transfusions given history of MDS. Upper extremity Dopplers found acute DVT in left upper extremity. Reluctant to remove PICC line at this time. Hematology following. Repeat Doppler US on 11/16 without DVT in LUE     UTI positive for Proteus. Received IV meropenem, but downgraded to Rocephin and take off by ID following. Patient presented with ARUN, but has since resolved. Nephrology signed off. General surgery consulted for port placement on Monday 11/14 has been cancelled. Patient is now DNR CC. Family in disagreement with disposition as 4 of 7 children agree for IP hospice, other 3 plan to convene and discuss this weekend. Subjective: Patient awake. Not hallucinating today. Feels SOB and anxious. Some CP.   No abdominal pain or fevers       Medications:  Reviewed    Infusion Medications    sodium chloride      sodium chloride      dextrose      sodium chloride       Scheduled Medications    morphine  1 mg IntraVENous Q2H    insulin lispro  20 Units SubCUTAneous TID WC    insulin glargine  30 Units SubCUTAneous BID    methylPREDNISolone  40 mg IntraVENous Q12H    furosemide  20 mg Oral Daily    [Held by provider] sacubitril-valsartan  0.5 tablet Oral BID    metoprolol tartrate  25 mg Oral BID    insulin lispro  0-8 Units SubCUTAneous TID     insulin lispro  0-4 Units SubCUTAneous Nightly    lidocaine  1 patch TransDERmal Daily    lidocaine 1 % injection  5 mL IntraDERmal Once    sodium chloride flush  5-40 mL IntraVENous 2 times per day    atorvastatin  80 mg Oral Nightly    calcium elemental  500 mg Oral Daily    digoxin  125 mcg Oral Daily    famotidine  20 mg Oral Daily    gabapentin  100 mg Oral TID    levothyroxine  175 mcg Oral QAM AC    pantoprazole  40 mg Oral QAM AC    sodium chloride flush  5-40 mL IntraVENous 2 times per day    heparin (porcine)  5,000 Units SubCUTAneous Q12H    methocarbamol  500 mg Oral q8h    polyethylene glycol  17 g Oral BID    docusate sodium  100 mg Oral BID     PRN Meds: diazePAM, sodium chloride, ipratropium-albuterol, sodium chloride flush, sodium chloride, dextrose bolus **OR** dextrose bolus, glucagon (rDNA), dextrose, guaiFENesin, HYDROcodone-acetaminophen, tiZANidine, sodium chloride flush, ondansetron **OR** ondansetron, polyethylene glycol, acetaminophen **OR** acetaminophen, sodium phosphate IVPB **OR** sodium phosphate IVPB **OR** sodium phosphate IVPB, magnesium sulfate, potassium chloride **OR** potassium alternative oral replacement **OR** potassium chloride, morphine, sodium chloride, diatrizoate meglumine-sodium      Intake/Output Summary (Last 24 hours) at 11/17/2022 1540  Last data filed at 11/17/2022 1200  Gross per 24 hour   Intake 570 ml   Output 3990 ml   Net -3420 ml       Physical Exam Performed:    BP (!) 140/65   Pulse 60   Temp 97.3 °F (36.3 °C) (Temporal)   Resp 15   Ht 5' (1.524 m)   Wt 237 lb 5.4 oz (107.7 kg)   LMP  (LMP Unknown)   SpO2 95%   BMI 46.35 kg/m²     General appearance: No apparent distress, appears stated age and cooperative. Morbidly obese, chronically ill appearing  HEENT: Pupils equal, round, and reactive to light. Conjunctivae/corneas clear. Neck: Supple, with full range of motion. No jugular venous distention. Trachea midline. Respiratory:  Normal respiratory effort. Clear to auscultation, bilaterally without Rales/Wheezes/Rhonchi. Cardiovascular: Regular rate and rhythm with normal S1/S2 without murmurs, rubs or gallops. Abdomen: Soft, non-tender, non-distended with normal bowel sounds. Musculoskeletal: No clubbing, cyanosis or edema bilaterally. Full range of motion without deformity.   Skin: Skin color, texture, turgor normal.  No rashes or lesions. Neurologic:  Neurovascularly intact without any focal sensory/motor deficits. Cranial nerves: II-XII intact, grossly non-focal.  Psychiatric: Alert and oriented, thought content appropriate, normal insight  Capillary Refill: Brisk, 3 seconds, normal   Peripheral Pulses: +2 palpable, equal bilaterally       Labs:   Recent Labs     11/16/22 2130   WBC 19.1*   HGB 9.3*   HCT 28.4*   PLT 39*     Recent Labs     11/16/22 2130      K 4.7      CO2 28   BUN 99*   CREATININE 1.4*   CALCIUM 9.0     No results for input(s): AST, ALT, BILIDIR, BILITOT, ALKPHOS in the last 72 hours. No results for input(s): INR in the last 72 hours. No results for input(s): Zakiya Markus in the last 72 hours. Urinalysis:      Lab Results   Component Value Date/Time    NITRU POSITIVE 11/07/2022 01:45 PM    WBCUA 4947 11/07/2022 01:45 PM    BACTERIA 4+ 11/07/2022 01:45 PM    RBCUA 141 11/07/2022 01:45 PM    BLOODU MODERATE 11/07/2022 01:45 PM    SPECGRAV 1.020 11/07/2022 01:45 PM    GLUCOSEU 100 11/07/2022 01:45 PM    GLUCOSEU NEGATIVE 03/09/2012 06:51 AM       Radiology:  VL Extremity Venous Left   Final Result      CT CERVICAL SPINE WO CONTRAST   Final Result   No acute traumatic fracture or traumatic malalignment. C1-C2 articulation severe degenerative changes and mild C2-C3 disc   degenerative changes. No significant canal narrowing or foraminal stenosis. CT HEAD WO CONTRAST   Final Result   No acute intracranial abnormality. Moderate senescent changes with parenchymal volume loss and chronic small   vessel ischemic changes. Chronic paranasal sinus disease. Left mastoid effusion         XR CHEST PORTABLE   Final Result   1. Technically suboptimal exam.   2. Mild congestive heart failure is a consideration given the radiographic   findings; pneumonia is also a consideration in areas of consolidation with   pleural effusion. 3. Calcific atherosclerosis aorta. 4. Cardiomegaly. VL Extremity Venous Left   Final Result      CT ABDOMEN PELVIS WO CONTRAST Additional Contrast? Oral   Final Result   1. No acute findings in the abdomen or pelvis. 2. Moderate amount of stool and air in the colon proximally which could   reflect constipation. 3. Punctate bilateral intrarenal calculi. VL Extremity Venous Bilateral   Final Result      XR CHEST PORTABLE   Final Result   Left PICC line appears to be over the SVC and not significantly changed from   the prior exam.      Cardiomegaly with pulmonary vascular congestion and interstitial   prominence/interstitial opacities suggesting edema. Stable to the slightly   worsened from the recent study. CT HEAD WO CONTRAST   Final Result   No acute intracranial hemorrhage, mass effect, midline shift, or signs of   acute territorial infarct. Generalized volume loss and chronic ischemic changes in the white matter are   stable. Partial opacification in the left mastoid air cells and edge of the middle   ear cavity are new from the prior exam.  Correlate for infectious symptoms   versus eustachian tube dysfunction.              IP CONSULT TO HOSPITALIST  IP CONSULT TO ONCOLOGY  IP CONSULT TO CARDIOLOGY  IP CONSULT TO NEPHROLOGY  IP CONSULT TO INFECTIOUS DISEASES  IP CONSULT TO PALLIATIVE CARE  IP CONSULT TO PAIN MANAGEMENT  IP CONSULT TO PULMONOLOGY  IP CONSULT TO HOSPICE  IP CONSULT TO PALLIATIVE CARE  IP CONSULT TO SOCIAL WORK    Assessment/Plan:    Active Hospital Problems    Diagnosis     Carotid artery disease without cerebral infarction [facility]) [I77.9]      Priority: High    Weight loss counseling, encounter for [Z71.3]      Priority: High    Permanent atrial fibrillation ([facility] [I48.21]      Priority: Medium    Non-English speaking patient [Z78.9]      Priority: Medium    Hypothyroidism [E03.9]      Priority: Medium    History of DVT in adulthood [Z86.718]      Priority: Medium    Nephrolithiasis [N20.0]      Priority: Medium Atypical pneumonia [J18.9]      Priority: Medium    Acute on chronic congestive heart failure (HCC) [I50.9]      Priority: Medium    HFrEF (heart failure with reduced ejection fraction) ([facility] [I50.20]      Priority: Medium    Acute hypoxemic respiratory failure (HCC) [J96.01]      Priority: Medium    Myelodysplasia (myelodysplastic syndrome) ([facility] [D46.9]      Priority: Medium    Chronic atrial fibrillation (HCC) [I48.20]      Priority: Medium    Complicated UTI (urinary tract infection) [N39.0]      Priority: Medium    Type 2 diabetes mellitus ([facility]) [E11.9]     Obesity, Class III, BMI 40-49.9 (morbid obesity) ([facility] [E66.01]     Morbid obesity with BMI of 40.0-44.9, adult ([facility] [E66.01, Z68.41]     ARUN (acute kidney injury) ([facility] [N17.9]     Hyperlipidemia associated with type 2 diabetes mellitus ([facility] [E11.69, E78.5]     Hypertension associated with diabetes ([facility] [E11.59, I15.2]     Peripheral vascular disease ([facility] [I73.9]     Dyspnea [R06.00]      Family refuses discharge to SNF or hospice today  Remains [facility]  Family refuses labs  Stop Solumedrol IV (added by Oncology on 11/12 for pain management)  Repeat Doppler US without DVT on 11/16/22  Continue Lantus 30 U bid and Humalog 20 U with meals  Continue Morphine and Norco PRN pain  Continue Valium IV PRN anxiety  Continue Lipitor, Digoxin    DVT Prophylaxis: Hep SQ  Diet: ADULT ORAL NUTRITION SUPPLEMENT; Breakfast, Lunch, Dinner; Diabetic Oral Supplement  ADULT DIET; Dysphagia - Soft and Bite Sized; 3 carb choices (45 gm/meal); Low Fat/Low Chol/High Fiber/2 gm Na; No Concentrated sweets  Code Status: DNR-CC  PT/OT Eval Status: Following    Dispo - SNF vs IP Hospice    Discussed with patient and daughter in great detail today. We are not improving this patient's condition. Family had steered toward [facility] with [address] at [place] and today refused to go there. I signed a DNR form. Family is refusing discharge to SNF or hospice.   The remainder of family plans to come this weekend and hopefully come to consensus for disposition. I have recommended comfort care to daughter, Moshe Lassiter.  She is in agreement but eldest daughter is firmly against disposition at this time. Dr Cheryl Moreno to assume attending service tomorrow for [facility].     Jeison Anderson MD

## 2022-11-17 NOTE — CARE COORDINATION
Discharge Planning Note:    CM stopped by room to f/u with family, dtr Dayne bedside and asked CM to come back later to talk. CM  called and spoke to dtr [facility] regarding [facility] [address] [facility] would [facility] discuss further with this CM and sister Harleen Ivey to be onsite in a few hours. [facility] to call CM when on site. CM reviewed IMM with [facility] and copy placed in room. Bhargavi from [address] will be on-site soon, [facility] JUSTIN aware.      Electronically signed by Elizabeth Schmidt RN on 11/17/2022 at 9:25 AM

## 2022-11-17 NOTE — ACP (ADVANCE CARE PLANNING)
Advanced Care Planning Note. Purpose of Encounter: Advanced care planning in light of MDS  Parties In Attendance: Patient, daughter Ana Laura Ramirez  Decisional Capacity: No  Subjective: Patient with pain everywhere  Objective: Cr 1.4 on 22  Goals of Care Determination: Patient/POA want limited support (No CPR, no vent, no HD, no surgery, no trach, no PEG)  Plan:  IV Abx. ID, Heme Onc, Pain Mgmt, Renal, Cardio and Pulm consults. SNF vs inpatient hospice placement  Code Status: DNR CC    Time spent on Advanced care Plannin minutes  Advanced Care Planning Documents: Completed advanced directives on chart, daughter is the POA.     Hannah Garcia MD  2022 3:53 PM

## 2022-11-18 LAB
BACTERIA: ABNORMAL /HPF
BILIRUBIN URINE: NEGATIVE
BLOOD, URINE: ABNORMAL
CLARITY: ABNORMAL
COLOR: YELLOW
EPITHELIAL CELLS, UA: ABNORMAL /HPF (ref 0–5)
GLUCOSE BLD-MCNC: 115 MG/DL (ref 70–99)
GLUCOSE BLD-MCNC: 153 MG/DL (ref 70–99)
GLUCOSE BLD-MCNC: 194 MG/DL (ref 70–99)
GLUCOSE BLD-MCNC: 232 MG/DL (ref 70–99)
GLUCOSE URINE: 500 MG/DL
KETONES, URINE: NEGATIVE MG/DL
LEUKOCYTE ESTERASE, URINE: NEGATIVE
MICROSCOPIC EXAMINATION: YES
NITRITE, URINE: NEGATIVE
PERFORMED ON: ABNORMAL
PH UA: 5.5 (ref 5–8)
PROTEIN UA: 100 MG/DL
RBC UA: ABNORMAL /HPF (ref 0–4)
SPECIFIC GRAVITY UA: 1.01 (ref 1–1.03)
URINE REFLEX TO CULTURE: YES
URINE TYPE: ABNORMAL
UROBILINOGEN, URINE: 0.2 E.U./DL
WBC UA: ABNORMAL /HPF (ref 0–5)
YEAST: PRESENT /HPF

## 2022-11-18 PROCEDURE — 6360000002 HC RX W HCPCS: Performed by: ANESTHESIOLOGY

## 2022-11-18 PROCEDURE — 6370000000 HC RX 637 (ALT 250 FOR IP): Performed by: STUDENT IN AN ORGANIZED HEALTH CARE EDUCATION/TRAINING PROGRAM

## 2022-11-18 PROCEDURE — 6360000002 HC RX W HCPCS: Performed by: PHYSICIAN ASSISTANT

## 2022-11-18 PROCEDURE — 2580000003 HC RX 258: Performed by: INTERNAL MEDICINE

## 2022-11-18 PROCEDURE — 6370000000 HC RX 637 (ALT 250 FOR IP): Performed by: INTERNAL MEDICINE

## 2022-11-18 PROCEDURE — 6360000002 HC RX W HCPCS: Performed by: STUDENT IN AN ORGANIZED HEALTH CARE EDUCATION/TRAINING PROGRAM

## 2022-11-18 PROCEDURE — 51702 INSERT TEMP BLADDER CATH: CPT

## 2022-11-18 PROCEDURE — 1200000000 HC SEMI PRIVATE

## 2022-11-18 PROCEDURE — 2580000003 HC RX 258: Performed by: STUDENT IN AN ORGANIZED HEALTH CARE EDUCATION/TRAINING PROGRAM

## 2022-11-18 PROCEDURE — 6360000002 HC RX W HCPCS: Performed by: INTERNAL MEDICINE

## 2022-11-18 PROCEDURE — 94760 N-INVAS EAR/PLS OXIMETRY 1: CPT

## 2022-11-18 PROCEDURE — 81001 URINALYSIS AUTO W/SCOPE: CPT

## 2022-11-18 PROCEDURE — 87086 URINE CULTURE/COLONY COUNT: CPT

## 2022-11-18 RX ORDER — HYDROMORPHONE HYDROCHLORIDE 1 MG/ML
1 INJECTION, SOLUTION INTRAMUSCULAR; INTRAVENOUS; SUBCUTANEOUS
Status: DISCONTINUED | OUTPATIENT
Start: 2022-11-18 | End: 2022-11-21 | Stop reason: HOSPADM

## 2022-11-18 RX ORDER — HYDROMORPHONE HYDROCHLORIDE 1 MG/ML
1 INJECTION, SOLUTION INTRAMUSCULAR; INTRAVENOUS; SUBCUTANEOUS ONCE
Status: COMPLETED | OUTPATIENT
Start: 2022-11-18 | End: 2022-11-18

## 2022-11-18 RX ORDER — HYDROMORPHONE HYDROCHLORIDE 1 MG/ML
0.5 INJECTION, SOLUTION INTRAMUSCULAR; INTRAVENOUS; SUBCUTANEOUS
Status: DISCONTINUED | OUTPATIENT
Start: 2022-11-18 | End: 2022-11-21 | Stop reason: HOSPADM

## 2022-11-18 RX ADMIN — FAMOTIDINE 20 MG: 20 TABLET ORAL at 21:32

## 2022-11-18 RX ADMIN — LEVOTHYROXINE SODIUM 175 MCG: 0.03 TABLET ORAL at 06:14

## 2022-11-18 RX ADMIN — GABAPENTIN 100 MG: 100 CAPSULE ORAL at 21:32

## 2022-11-18 RX ADMIN — MORPHINE SULFATE 2 MG: 2 INJECTION, SOLUTION INTRAMUSCULAR; INTRAVENOUS at 16:55

## 2022-11-18 RX ADMIN — ACETAMINOPHEN 650 MG: 325 TABLET ORAL at 23:58

## 2022-11-18 RX ADMIN — GABAPENTIN 100 MG: 100 CAPSULE ORAL at 08:12

## 2022-11-18 RX ADMIN — MORPHINE SULFATE 1 MG: 2 INJECTION, SOLUTION INTRAMUSCULAR; INTRAVENOUS at 02:18

## 2022-11-18 RX ADMIN — FUROSEMIDE 20 MG: 20 TABLET ORAL at 08:12

## 2022-11-18 RX ADMIN — Medication 10 ML: at 21:25

## 2022-11-18 RX ADMIN — MORPHINE SULFATE 1 MG: 2 INJECTION, SOLUTION INTRAMUSCULAR; INTRAVENOUS at 17:49

## 2022-11-18 RX ADMIN — ATORVASTATIN CALCIUM 80 MG: 80 TABLET, FILM COATED ORAL at 21:32

## 2022-11-18 RX ADMIN — HYDROMORPHONE HYDROCHLORIDE 1 MG: 1 INJECTION, SOLUTION INTRAMUSCULAR; INTRAVENOUS; SUBCUTANEOUS at 21:24

## 2022-11-18 RX ADMIN — INSULIN GLARGINE 30 UNITS: 100 INJECTION, SOLUTION SUBCUTANEOUS at 21:44

## 2022-11-18 RX ADMIN — METHOCARBAMOL TABLETS 500 MG: 500 TABLET, COATED ORAL at 21:34

## 2022-11-18 RX ADMIN — METHOCARBAMOL TABLETS 500 MG: 500 TABLET, COATED ORAL at 06:14

## 2022-11-18 RX ADMIN — MORPHINE SULFATE 1 MG: 2 INJECTION, SOLUTION INTRAMUSCULAR; INTRAVENOUS at 08:13

## 2022-11-18 RX ADMIN — ACETAMINOPHEN 650 MG: 325 TABLET ORAL at 17:50

## 2022-11-18 RX ADMIN — Medication 10 ML: at 08:40

## 2022-11-18 RX ADMIN — DIGOXIN 125 MCG: 125 TABLET ORAL at 08:12

## 2022-11-18 RX ADMIN — HYDROMORPHONE HYDROCHLORIDE 1 MG: 1 INJECTION, SOLUTION INTRAMUSCULAR; INTRAVENOUS; SUBCUTANEOUS at 10:30

## 2022-11-18 RX ADMIN — MORPHINE SULFATE 1 MG: 2 INJECTION, SOLUTION INTRAMUSCULAR; INTRAVENOUS at 00:01

## 2022-11-18 RX ADMIN — METOPROLOL TARTRATE 25 MG: 25 TABLET, FILM COATED ORAL at 08:12

## 2022-11-18 RX ADMIN — GABAPENTIN 100 MG: 100 CAPSULE ORAL at 17:50

## 2022-11-18 RX ADMIN — Medication 10 ML: at 08:14

## 2022-11-18 RX ADMIN — PANTOPRAZOLE SODIUM 40 MG: 40 TABLET, DELAYED RELEASE ORAL at 06:14

## 2022-11-18 RX ADMIN — METHOCARBAMOL TABLETS 500 MG: 500 TABLET, COATED ORAL at 17:50

## 2022-11-18 RX ADMIN — METOPROLOL TARTRATE 25 MG: 25 TABLET, FILM COATED ORAL at 21:32

## 2022-11-18 RX ADMIN — MORPHINE SULFATE 1 MG: 2 INJECTION, SOLUTION INTRAMUSCULAR; INTRAVENOUS at 04:20

## 2022-11-18 RX ADMIN — MORPHINE SULFATE 1 MG: 2 INJECTION, SOLUTION INTRAMUSCULAR; INTRAVENOUS at 06:14

## 2022-11-18 ASSESSMENT — PAIN SCALES - GENERAL
PAINLEVEL_OUTOF10: 7
PAINLEVEL_OUTOF10: 8
PAINLEVEL_OUTOF10: 4
PAINLEVEL_OUTOF10: 6
PAINLEVEL_OUTOF10: 7
PAINLEVEL_OUTOF10: 7
PAINLEVEL_OUTOF10: 8

## 2022-11-18 ASSESSMENT — PAIN DESCRIPTION - LOCATION
LOCATION: HEAD
LOCATION: GENERALIZED

## 2022-11-18 ASSESSMENT — PAIN SCALES - WONG BAKER
WONGBAKER_NUMERICALRESPONSE: 0

## 2022-11-18 NOTE — ACP (ADVANCE CARE PLANNING)
Advanced Care Planning Note. Purpose of Encounter: Advanced care planning in light of MDS  Parties In Attendance: Patient, daughter Almaz Tinoco  Decisional Capacity: No  Subjective: Patient with pain everywhere  Objective:   Goals of Care Determination: Patient/POA want limited support (No CPR, no vent, no HD, no surgery, no trach, no PEG)  Plan:  IV Abx. ID, Heme Onc, Pain Mgmt, Renal, Cardio and Pulm consults. SNF vs inpatient hospice placement  Code Status: DNR CC    Time spent on Advanced care Plannin minutes  Advanced Care Planning Documents: Completed advanced directives on chart, 7 children want to share the POA.     Stevenson Womack MD  2022 1:20 PM

## 2022-11-18 NOTE — PROGRESS NOTES
Assessment completed and documented. Scheduled pain medication given. Daughter at bedside. Daughter wants to speak to doctor regarding pain control. Will continue to monitor.

## 2022-11-18 NOTE — CARE COORDINATION
Discharge Planning Note:    CM met with pt's dtrs Que and Dayne, discussing DC plan. CM discussed need for family to initiate the appeal process if not ready to move patient. Dayne FRANKS. Family goal/plan at this time are to go to [address] [place] [address] bed availability with focus on pain management and stabilization. One brother is traveling from [place], arriving this weekend to see pt and discuss with family, pt needs and hospice goals. CM updated Bhargavi from [facility] on the above. Bhargavi will f/u with this CM Monday. Family had questions regarding potential LTC placement with hospice support at [facility]. Alicia Sylvester FF on site to answer family questions. Brandie Duffy and Dayne left to [address] [place] [address] and will return. Per Brandie Oscar, pt was experiencing severe pain this morning and requiring stronger medication to manage pain.      Electronically signed by Adrián Mccall RN on 11/18/2022 at 4:14 PM

## 2022-11-18 NOTE — PLAN OF CARE
Problem: Discharge Planning  Goal: Discharge to home or other facility with appropriate resources  11/18/2022 1055 by Donald Owens RN  Outcome: Progressing  11/18/2022 0634 by Marilynn Sood RN  Outcome: Progressing     Problem: Chronic Conditions and Co-morbidities  Goal: Patient's chronic conditions and co-morbidity symptoms are monitored and maintained or improved  11/18/2022 1055 by Donald Owens RN  Outcome: Progressing  11/18/2022 0634 by Marilynn Sood RN  Outcome: Progressing     Problem: Pain  Goal: Verbalizes/displays adequate comfort level or baseline comfort level  11/18/2022 1055 by Donald Owens RN  Outcome: Progressing  11/18/2022 0634 by Marilynn Sood RN  Outcome: Progressing     Problem: Skin/Tissue Integrity  Goal: Absence of new skin breakdown  Description: 1. Monitor for areas of redness and/or skin breakdown  2. Assess vascular access sites hourly  3. Every 4-6 hours minimum:  Change oxygen saturation probe site  4. Every 4-6 hours:  If on nasal continuous positive airway pressure, respiratory therapy assess nares and determine need for appliance change or resting period.   11/18/2022 1055 by Donald Owens RN  Outcome: Progressing  11/18/2022 0634 by Marilynn Sood RN  Outcome: Progressing     Problem: ABCDS Injury Assessment  Goal: Absence of physical injury  11/18/2022 1055 by Donald Owens RN  Outcome: Progressing  11/18/2022 0634 by Marilynn Sood RN  Outcome: Progressing     Problem: Safety - Adult  Goal: Free from fall injury  11/18/2022 1055 by Donald Owens RN  Outcome: Progressing  11/18/2022 0634 by Marilynn Sood RN  Outcome: Progressing

## 2022-11-18 NOTE — PROGRESS NOTES
Hospitalist Progress Note      PCP: Nicole Courtney    Date of Admission: 11/6/2022    Chief Complaint: SOB    Hospital Course: 77 yo F with MDS, HTN, CAD, DM 2, Afib, morbid obesity came to ER from SNF with SOB. Left arm PICC line in place POA for transfusions given history of MDS. Upper extremity Dopplers found acute DVT in left upper extremity. Reluctant to remove PICC line at this time. Hematology following. Repeat Doppler US on 11/16 without DVT in LUE     UTI positive for Proteus. Received IV meropenem, but downgraded to Rocephin and take off by ID following. Patient presented with ARUN, but has since resolved. Nephrology signed off. General surgery consulted for port placement on Monday 11/14 has been cancelled. Patient is now DNR CC. Family in disagreement with disposition as 4 of 7 children agree for IP hospice, other 3 plan to convene and discuss this weekend. Given one dose of IV Dilaudid on 11/18. Medically stable for DC to IP hospice or SNF on 11/18. Subjective: Patient complaining of neck pain and HA. Tells daughter she CP but no SOB.   No abdominal pain or fevers       Medications:  Reviewed    Infusion Medications    sodium chloride      sodium chloride      dextrose      sodium chloride       Scheduled Medications    morphine  1 mg IntraVENous Q2H    insulin lispro  20 Units SubCUTAneous TID WC    insulin glargine  30 Units SubCUTAneous BID    furosemide  20 mg Oral Daily    [Held by provider] sacubitril-valsartan  0.5 tablet Oral BID    metoprolol tartrate  25 mg Oral BID    insulin lispro  0-8 Units SubCUTAneous TID WC    insulin lispro  0-4 Units SubCUTAneous Nightly    lidocaine  1 patch TransDERmal Daily    lidocaine 1 % injection  5 mL IntraDERmal Once    sodium chloride flush  5-40 mL IntraVENous 2 times per day    atorvastatin  80 mg Oral Nightly    calcium elemental  500 mg Oral Daily    digoxin  125 mcg Oral Daily    famotidine  20 mg Oral Daily gabapentin  100 mg Oral TID    levothyroxine  175 mcg Oral QAM AC    pantoprazole  40 mg Oral QAM AC    sodium chloride flush  5-40 mL IntraVENous 2 times per day    heparin (porcine)  5,000 Units SubCUTAneous Q12H    methocarbamol  500 mg Oral q8h    polyethylene glycol  17 g Oral BID    docusate sodium  100 mg Oral BID     PRN Meds: diazePAM, sodium chloride, ipratropium-albuterol, sodium chloride flush, sodium chloride, dextrose bolus **OR** dextrose bolus, glucagon (rDNA), dextrose, guaiFENesin, HYDROcodone-acetaminophen, tiZANidine, sodium chloride flush, ondansetron **OR** ondansetron, polyethylene glycol, acetaminophen **OR** acetaminophen, sodium phosphate IVPB **OR** sodium phosphate IVPB **OR** sodium phosphate IVPB, magnesium sulfate, potassium chloride **OR** potassium alternative oral replacement **OR** potassium chloride, morphine, sodium chloride, diatrizoate meglumine-sodium      Intake/Output Summary (Last 24 hours) at 11/18/2022 1311  Last data filed at 11/18/2022 0428  Gross per 24 hour   Intake --   Output 1950 ml   Net -1950 ml         Physical Exam Performed:    BP (!) 149/56   Pulse 68   Temp 97.1 °F (36.2 °C) (Temporal)   Resp 22   Ht 5' (1.524 m)   Wt 237 lb 10.5 oz (107.8 kg)   LMP  (LMP Unknown)   SpO2 94%   BMI 46.41 kg/m²     General appearance: No apparent distress, appears stated age and cooperative. Morbidly obese, chronically ill appearing  HEENT: Pupils equal, round, and reactive to light. Conjunctivae/corneas clear. Neck: Supple, with full range of motion. No jugular venous distention. Trachea midline. Respiratory:  Normal respiratory effort. Clear to auscultation, bilaterally without Rales/Wheezes/Rhonchi. Cardiovascular: Regular rate and rhythm with normal S1/S2 without murmurs, rubs or gallops. Abdomen: Soft, non-tender, non-distended with normal bowel sounds. Musculoskeletal: No clubbing, cyanosis or edema bilaterally.   Full range of motion without deformity. Skin: Skin color, texture, turgor normal.  No rashes or lesions. Neurologic:  Neurovascularly intact without any focal sensory/motor deficits. Cranial nerves: II-XII intact, grossly non-focal.  Psychiatric: Alert and oriented, thought content appropriate, normal insight  Capillary Refill: Brisk, 3 seconds, normal   Peripheral Pulses: +2 palpable, equal bilaterally       Labs:   Recent Labs     11/16/22 2130   WBC 19.1*   HGB 9.3*   HCT 28.4*   PLT 39*       Recent Labs     11/16/22 2130      K 4.7      CO2 28   BUN 99*   CREATININE 1.4*   CALCIUM 9.0       No results for input(s): AST, ALT, BILIDIR, BILITOT, ALKPHOS in the last 72 hours. No results for input(s): INR in the last 72 hours. No results for input(s): Hermelindo Pander in the last 72 hours. Urinalysis:      Lab Results   Component Value Date/Time    NITRU POSITIVE 11/07/2022 01:45 PM    WBCUA 4947 11/07/2022 01:45 PM    BACTERIA 4+ 11/07/2022 01:45 PM    RBCUA 141 11/07/2022 01:45 PM    BLOODU MODERATE 11/07/2022 01:45 PM    SPECGRAV 1.020 11/07/2022 01:45 PM    GLUCOSEU 100 11/07/2022 01:45 PM    GLUCOSEU NEGATIVE 03/09/2012 06:51 AM       Radiology:  VL Extremity Venous Left   Final Result      CT CERVICAL SPINE WO CONTRAST   Final Result   No acute traumatic fracture or traumatic malalignment. C1-C2 articulation severe degenerative changes and mild C2-C3 disc   degenerative changes. No significant canal narrowing or foraminal stenosis. CT HEAD WO CONTRAST   Final Result   No acute intracranial abnormality. Moderate senescent changes with parenchymal volume loss and chronic small   vessel ischemic changes. Chronic paranasal sinus disease. Left mastoid effusion         XR CHEST PORTABLE   Final Result   1.  Technically suboptimal exam.   2. Mild congestive heart failure is a consideration given the radiographic   findings; pneumonia is also a consideration in areas of consolidation with   pleural effusion. 3. Calcific atherosclerosis aorta. 4. Cardiomegaly. VL Extremity Venous Left   Final Result      CT ABDOMEN PELVIS WO CONTRAST Additional Contrast? Oral   Final Result   1. No acute findings in the abdomen or pelvis. 2. Moderate amount of stool and air in the colon proximally which could   reflect constipation. 3. Punctate bilateral intrarenal calculi. VL Extremity Venous Bilateral   Final Result      XR CHEST PORTABLE   Final Result   Left PICC line appears to be over the SVC and not significantly changed from   the prior exam.      Cardiomegaly with pulmonary vascular congestion and interstitial   prominence/interstitial opacities suggesting edema. Stable to the slightly   worsened from the recent study. CT HEAD WO CONTRAST   Final Result   No acute intracranial hemorrhage, mass effect, midline shift, or signs of   acute territorial infarct. Generalized volume loss and chronic ischemic changes in the white matter are   stable. Partial opacification in the left mastoid air cells and edge of the middle   ear cavity are new from the prior exam.  Correlate for infectious symptoms   versus eustachian tube dysfunction.              IP CONSULT TO HOSPITALIST  IP CONSULT TO ONCOLOGY  IP CONSULT TO CARDIOLOGY  IP CONSULT TO NEPHROLOGY  IP CONSULT TO INFECTIOUS DISEASES  IP CONSULT TO PALLIATIVE CARE  IP CONSULT TO PAIN MANAGEMENT  IP CONSULT TO PULMONOLOGY  IP CONSULT TO HOSPICE  IP CONSULT TO PALLIATIVE CARE  IP CONSULT TO SOCIAL WORK    Assessment/Plan:    Active Hospital Problems    Diagnosis     Carotid artery disease without cerebral infarction [facility]) [I77.9]      Priority: High    Weight loss counseling, encounter for [Z71.3]      Priority: High    Permanent atrial fibrillation ([facility] [I48.21]      Priority: Medium    Non-English speaking patient [Z78.9]      Priority: Medium    Hypothyroidism [E03.9]      Priority: Medium    History of DVT in adulthood [Z86.718] Priority: Medium    Nephrolithiasis [N20.0]      Priority: Medium    Atypical pneumonia [J18.9]      Priority: Medium    Acute on chronic congestive heart failure (HCC) [I50.9]      Priority: Medium    HFrEF (heart failure with reduced ejection fraction) ([facility] [I50.20]      Priority: Medium    Acute hypoxemic respiratory failure (HCC) [J96.01]      Priority: Medium    Myelodysplasia (myelodysplastic syndrome) ([facility] [D46.9]      Priority: Medium    Chronic atrial fibrillation ([facility]) [I48.20]      Priority: Medium    Complicated UTI (urinary tract infection) [N39.0]      Priority: Medium    Type 2 diabetes mellitus ([facility]) [E11.9]     Obesity, Class III, BMI 40-49.9 (morbid obesity) ([facility] [E66.01]     Morbid obesity with BMI of 40.0-44.9, adult ([facility]) [E66.01, Z68.41]     ARUN (acute kidney injury) ([facility] [N17.9]     Hyperlipidemia associated with type 2 diabetes mellitus ([facility] [E11.69, E78.5]     Hypertension associated with diabetes ([facility] [E11.59, I15.2]     Peripheral vascular disease ([facility] [I73.9]     Dyspnea [R06.00]      Dilaudid 1 mg IV X 1 for pain  Consider increasing to Valium 5 mg IV q4h PRN anxiety/comfort  Consider changing Morphine 2 mg IV to Dilaudid 1 mg IV PRN comfort  Family does not agree with discharge to SNF or hospice today  Remains [facility]  Further labs to do not improve her outcome  Stopped Solumedrol IV (added by Oncology on 11/12 for pain management)  Repeat Doppler US without DVT on 11/16/22  Continue Lantus 30 U bid and Humalog 20 U with meals  Continue Morphine IV and Norco PRN pain at this time  Continue Valium 2.5 mg IV PRN anxiety  Continue Lipitor, Digoxin    DVT Prophylaxis: Hep SQ  Diet: ADULT ORAL NUTRITION SUPPLEMENT; Breakfast, Lunch, Dinner; Diabetic Oral Supplement  ADULT DIET; Dysphagia - Soft and Bite Sized; 3 carb choices (45 gm/meal); Low Fat/Low Chol/High Fiber/2 gm Na; No Concentrated sweets  Code Status: DNR-CC  PT/OT Eval Status:  Following    Dispo - SNF vs IP Hospice    Discussed with patient and daughter Yudi Ornelas in great detail again today. Family demonstrates poor insight into labs being indicative for improvement. Her Hg and BUN have no standing in her progressing, terminal condition. Her labs offer no correlation to her well being. She is worsening regardless of what the numbers look like and how she looks from moment to moment. Some of her family have levels of denial about patient's truly terminal status, and they are apparently coming to see her this weekend. Family is supposed to come and decide disposition this weekend. I have firmly recommended comfort care. This family needs professional bereavement support from hospice.     Guillermina Jennings MD

## 2022-11-18 NOTE — PROGRESS NOTES
Kuhn switched out so urine sample could be obtain. Pt tolerated fairly well. Will continue to monitor.

## 2022-11-18 NOTE — PROGRESS NOTES
Assessment completed. VSS. Patient awake, alert, and in bed. . Medications given per [facility], some refused. Marin care provided and marin remains patent. Patient complains of pain and was repositioned multiple times. Music placed on TV to help comfort patient. Safety precautions in place. Call light within reach. Will continue to monitor.

## 2022-11-18 NOTE — CARE COORDINATION
Family voiced concern for another possible UTI with pt having low grade fever. Family asked CM to notify MD requesting for urine to be tested. CM sent PS message to MIM[facility] Golden and order obtained.      Electronically signed by Alley Vasquez RN on 11/18/2022 at 4:47 PM

## 2022-11-19 LAB
ANION GAP SERPL CALCULATED.3IONS-SCNC: 9 MMOL/L (ref 3–16)
BUN BLDV-MCNC: 33 MG/DL (ref 7–20)
CALCIUM SERPL-MCNC: 8.5 MG/DL (ref 8.3–10.6)
CHLORIDE BLD-SCNC: 100 MMOL/L (ref 99–110)
CO2: 31 MMOL/L (ref 21–32)
CREAT SERPL-MCNC: 0.6 MG/DL (ref 0.6–1.2)
GFR SERPL CREATININE-BSD FRML MDRD: >60 ML/MIN/{1.73_M2}
GLUCOSE BLD-MCNC: 100 MG/DL (ref 70–99)
GLUCOSE BLD-MCNC: 118 MG/DL (ref 70–99)
GLUCOSE BLD-MCNC: 201 MG/DL (ref 70–99)
GLUCOSE BLD-MCNC: 244 MG/DL (ref 70–99)
GLUCOSE BLD-MCNC: 67 MG/DL (ref 70–99)
GLUCOSE BLD-MCNC: 94 MG/DL (ref 70–99)
PERFORMED ON: ABNORMAL
POTASSIUM SERPL-SCNC: 3.8 MMOL/L (ref 3.5–5.1)
SODIUM BLD-SCNC: 140 MMOL/L (ref 136–145)
URINE CULTURE, ROUTINE: NORMAL

## 2022-11-19 PROCEDURE — 6370000000 HC RX 637 (ALT 250 FOR IP): Performed by: INTERNAL MEDICINE

## 2022-11-19 PROCEDURE — 6370000000 HC RX 637 (ALT 250 FOR IP): Performed by: STUDENT IN AN ORGANIZED HEALTH CARE EDUCATION/TRAINING PROGRAM

## 2022-11-19 PROCEDURE — 6360000002 HC RX W HCPCS: Performed by: PHYSICIAN ASSISTANT

## 2022-11-19 PROCEDURE — 6360000002 HC RX W HCPCS: Performed by: INTERNAL MEDICINE

## 2022-11-19 PROCEDURE — 2580000003 HC RX 258: Performed by: STUDENT IN AN ORGANIZED HEALTH CARE EDUCATION/TRAINING PROGRAM

## 2022-11-19 PROCEDURE — 1200000000 HC SEMI PRIVATE

## 2022-11-19 PROCEDURE — 80048 BASIC METABOLIC PNL TOTAL CA: CPT

## 2022-11-19 PROCEDURE — 2580000003 HC RX 258: Performed by: INTERNAL MEDICINE

## 2022-11-19 RX ORDER — KETOROLAC TROMETHAMINE 15 MG/ML
15 INJECTION, SOLUTION INTRAMUSCULAR; INTRAVENOUS ONCE
Status: COMPLETED | OUTPATIENT
Start: 2022-11-19 | End: 2022-11-19

## 2022-11-19 RX ORDER — KETOROLAC TROMETHAMINE 15 MG/ML
15 INJECTION, SOLUTION INTRAMUSCULAR; INTRAVENOUS EVERY 6 HOURS PRN
Status: DISCONTINUED | OUTPATIENT
Start: 2022-11-19 | End: 2022-11-21 | Stop reason: HOSPADM

## 2022-11-19 RX ADMIN — GABAPENTIN 100 MG: 100 CAPSULE ORAL at 08:36

## 2022-11-19 RX ADMIN — Medication 500 MG: at 08:36

## 2022-11-19 RX ADMIN — PANTOPRAZOLE SODIUM 40 MG: 40 TABLET, DELAYED RELEASE ORAL at 06:13

## 2022-11-19 RX ADMIN — DIGOXIN 125 MCG: 125 TABLET ORAL at 08:36

## 2022-11-19 RX ADMIN — HYDROMORPHONE HYDROCHLORIDE 1 MG: 1 INJECTION, SOLUTION INTRAMUSCULAR; INTRAVENOUS; SUBCUTANEOUS at 23:20

## 2022-11-19 RX ADMIN — HYDROMORPHONE HYDROCHLORIDE 1 MG: 1 INJECTION, SOLUTION INTRAMUSCULAR; INTRAVENOUS; SUBCUTANEOUS at 06:54

## 2022-11-19 RX ADMIN — HYDROMORPHONE HYDROCHLORIDE 1 MG: 1 INJECTION, SOLUTION INTRAMUSCULAR; INTRAVENOUS; SUBCUTANEOUS at 03:43

## 2022-11-19 RX ADMIN — FUROSEMIDE 20 MG: 20 TABLET ORAL at 08:36

## 2022-11-19 RX ADMIN — Medication 10 ML: at 20:52

## 2022-11-19 RX ADMIN — KETOROLAC TROMETHAMINE 15 MG: 15 INJECTION, SOLUTION INTRAMUSCULAR; INTRAVENOUS at 23:59

## 2022-11-19 RX ADMIN — GABAPENTIN 100 MG: 100 CAPSULE ORAL at 20:30

## 2022-11-19 RX ADMIN — HYDROMORPHONE HYDROCHLORIDE 1 MG: 1 INJECTION, SOLUTION INTRAMUSCULAR; INTRAVENOUS; SUBCUTANEOUS at 09:58

## 2022-11-19 RX ADMIN — METOPROLOL TARTRATE 25 MG: 25 TABLET, FILM COATED ORAL at 08:36

## 2022-11-19 RX ADMIN — INSULIN LISPRO 20 UNITS: 100 INJECTION, SOLUTION INTRAVENOUS; SUBCUTANEOUS at 08:46

## 2022-11-19 RX ADMIN — Medication 10 ML: at 08:38

## 2022-11-19 RX ADMIN — LEVOTHYROXINE SODIUM 175 MCG: 0.03 TABLET ORAL at 06:13

## 2022-11-19 RX ADMIN — METHOCARBAMOL TABLETS 500 MG: 500 TABLET, COATED ORAL at 06:13

## 2022-11-19 RX ADMIN — ATORVASTATIN CALCIUM 80 MG: 80 TABLET, FILM COATED ORAL at 20:29

## 2022-11-19 RX ADMIN — HYDROMORPHONE HYDROCHLORIDE 1 MG: 1 INJECTION, SOLUTION INTRAMUSCULAR; INTRAVENOUS; SUBCUTANEOUS at 20:30

## 2022-11-19 RX ADMIN — METOPROLOL TARTRATE 25 MG: 25 TABLET, FILM COATED ORAL at 20:31

## 2022-11-19 RX ADMIN — INSULIN GLARGINE 30 UNITS: 100 INJECTION, SOLUTION SUBCUTANEOUS at 08:48

## 2022-11-19 RX ADMIN — Medication 10 ML: at 08:48

## 2022-11-19 RX ADMIN — HYDROCODONE BITARTRATE AND ACETAMINOPHEN 1 TABLET: 5; 325 TABLET ORAL at 17:02

## 2022-11-19 RX ADMIN — HYDROMORPHONE HYDROCHLORIDE 1 MG: 1 INJECTION, SOLUTION INTRAMUSCULAR; INTRAVENOUS; SUBCUTANEOUS at 17:03

## 2022-11-19 RX ADMIN — INSULIN LISPRO 2 UNITS: 100 INJECTION, SOLUTION INTRAVENOUS; SUBCUTANEOUS at 08:47

## 2022-11-19 RX ADMIN — HYDROCODONE BITARTRATE AND ACETAMINOPHEN 1 TABLET: 5; 325 TABLET ORAL at 08:37

## 2022-11-19 RX ADMIN — Medication 10 ML: at 21:00

## 2022-11-19 RX ADMIN — KETOROLAC TROMETHAMINE 15 MG: 15 INJECTION, SOLUTION INTRAMUSCULAR; INTRAVENOUS at 08:35

## 2022-11-19 ASSESSMENT — PAIN SCALES - GENERAL
PAINLEVEL_OUTOF10: 8
PAINLEVEL_OUTOF10: 8
PAINLEVEL_OUTOF10: 7
PAINLEVEL_OUTOF10: 6
PAINLEVEL_OUTOF10: 8
PAINLEVEL_OUTOF10: 8
PAINLEVEL_OUTOF10: 7

## 2022-11-19 ASSESSMENT — PAIN SCALES - WONG BAKER
WONGBAKER_NUMERICALRESPONSE: 0

## 2022-11-19 ASSESSMENT — PAIN DESCRIPTION - DESCRIPTORS
DESCRIPTORS: ACHING
DESCRIPTORS: ACHING

## 2022-11-19 ASSESSMENT — PAIN DESCRIPTION - LOCATION
LOCATION: NECK
LOCATION: GENERALIZED

## 2022-11-19 NOTE — CARE COORDINATION
11/19/22 1814   Documentation for Discharge Appeal   Discharge Appealed by Family   Date notifed by LELAND of appeal request: 11/19/22   Time notified by Ligia Haro of appeal request: [phone]   Detailed Notice of Discharge given to: Family   Date Notice of Discharge given: 11/19/22   Time Notice of Discharge given: [phone]   Date records sent to [facility] 11/19/22   Time records sent to [facility]     Discharge information sent to [facility]. Outcome pending. SANDY Milan aware of this process. Will continue to follow as needed.      Electronically signed by Jaelyn Orozco RN on 11/19/22 at 6:17 PM EST

## 2022-11-19 NOTE — PROGRESS NOTES
Patient's family requesting morphine be changed to dilaudid. Patient has been receiving IV morphine q2 hrs with minimal relief but family reports one time dose of dilaudid given early today significantly decreased pain. Discontinued orders for morphine 1 mg q2h routine and 2 mg q4h PRN. New order placed for dilaudid 0.5-1 mg q3h PRN.     Gunner Branch PA-C

## 2022-11-19 NOTE — PROGRESS NOTES
Hospitalist Progress Note    Name:  Berna Gallo    /Age/Sex: 1940  (78 y.o. female)  MRN & CSN:  [id number] & [id number]    PCP: Skeet Runner    Date of Admission: 2022    Patient Status:  Inpatient     Chief Complaint: Dyspnea    Hospital Course:   78 y.o. female with PMHx of nondysplastic syndrome, hypertension, hyperlipidemia, type 2 diabetes, CAD, atrial fibrillation who presented to [facility] with complaints of shortness of breath. Patient was at her nursing home when it was noted she was having shortness of breath. She is not on any oxygen at home, but she is currently satting well on 2 L O2 via NC. Left arm PICC line in place for transfusions given history of MDS. Upper extremity Dopplers found acute DVT in left upper extremity. Reluctant to remove PICC line at this time. Hematology following. UTI positive for Proteus. Received IV meropenem, but downgraded to Rocephin. ID following. Patient is coming with ARUN, but that has since resolved. Nephrology signed off. General surgery consulted for port placement on  has been cancelled. Patient is now DNR CC and on a morphine gtt. Subjective: Today is:  Hospital Day: 14.  Patient seen and examined in [id number]. Pt was stable for d/c to hospice inpatient unit and family refused. Refusing to d/c to SNF also. They need to appeal the dc then. This is ridiculous. They need to make a decision. There is nothing that we are continuing to do in the hospital that cannot be done else where, especially if comfort measure are the case.           Medications:  Reviewed    Infusion Medications    sodium chloride      sodium chloride      dextrose      sodium chloride       Scheduled Medications    insulin lispro  20 Units SubCUTAneous TID WC    insulin glargine  30 Units SubCUTAneous BID    furosemide  20 mg Oral Daily    [Held by provider] sacubitril-valsartan  0.5 tablet Oral BID    metoprolol tartrate  25 mg Oral BID    insulin lispro  0-8 Units SubCUTAneous TID WC    insulin lispro  0-4 Units SubCUTAneous Nightly    lidocaine  1 patch TransDERmal Daily    lidocaine 1 % injection  5 mL IntraDERmal Once    sodium chloride flush  5-40 mL IntraVENous 2 times per day    atorvastatin  80 mg Oral Nightly    calcium elemental  500 mg Oral Daily    digoxin  125 mcg Oral Daily    famotidine  20 mg Oral Daily    gabapentin  100 mg Oral TID    levothyroxine  175 mcg Oral QAM AC    pantoprazole  40 mg Oral QAM AC    sodium chloride flush  5-40 mL IntraVENous 2 times per day    heparin (porcine)  5,000 Units SubCUTAneous Q12H    methocarbamol  500 mg Oral q8h    polyethylene glycol  17 g Oral BID    docusate sodium  100 mg Oral BID     PRN Meds: HYDROmorphone **OR** HYDROmorphone, diazePAM, sodium chloride, ipratropium-albuterol, sodium chloride flush, sodium chloride, dextrose bolus **OR** dextrose bolus, glucagon (rDNA), dextrose, guaiFENesin, HYDROcodone-acetaminophen, tiZANidine, sodium chloride flush, ondansetron **OR** ondansetron, polyethylene glycol, acetaminophen **OR** acetaminophen, sodium phosphate IVPB **OR** sodium phosphate IVPB **OR** sodium phosphate IVPB, magnesium sulfate, potassium chloride **OR** potassium alternative oral replacement **OR** potassium chloride, sodium chloride, diatrizoate meglumine-sodium      Intake/Output Summary (Last 24 hours) at 11/19/2022 1739  Last data filed at 11/19/2022 8165  Gross per 24 hour   Intake 360 ml   Output 400 ml   Net -40 ml         Physical Exam Performed:    /71   Pulse 78   Temp 97.7 °F (36.5 °C) (Temporal)   Resp 23   Ht 5' (1.524 m)   Wt 237 lb 10.5 oz (107.8 kg)   LMP  (LMP Unknown)   SpO2 94%   BMI 46.41 kg/m²     General appearance: No apparent distress, appears stated age and cooperative. Ill appearing. Divehi speaking. HEENT: Pupils equal, round, and reactive to light. Conjunctivae/corneas clear. NC present.   Neck: Supple, with severely limited ROM. Painful to palpation along spinous processes. No jugular venous distention. Trachea midline. Respiratory:  Normal respiratory effort. Clear to auscultation, bilaterally without Rales/Wheezes/Rhonchi. Cardiovascular: Regular rate and rhythm with normal S1/S2 without murmurs, rubs or gallops. No peripheral edema. Abdomen: Soft, non-tender, non-distended with normal bowel sounds. : No CVA tenderness. Musculoskeletal: No clubbing or cyanosis. Full range of motion without deformity. Skin: Skin color, texture, turgor normal.  No rashes or lesions. Neurologic:  Neurovascularly intact without any focal sensory/motor deficits. Cranial nerves: II-XII intact, grossly non-focal.  Psychiatric: Alert and oriented, thought content appropriate, normal insight  Peripheral Pulses: +2 palpable, equal bilaterally       Labs:   Recent Labs     11/16/22 2130   WBC 19.1*   HGB 9.3*   HCT 28.4*   PLT 39*       Recent Labs     11/16/22 2130      K 4.7      CO2 28   BUN 99*   CREATININE 1.4*   CALCIUM 9.0       No results for input(s): AST, ALT, BILIDIR, BILITOT, ALKPHOS in the last 72 hours. No results for input(s): INR in the last 72 hours. No results for input(s): Nargis Earnest in the last 72 hours. Urinalysis:      Lab Results   Component Value Date/Time    NITRU Negative 11/18/2022 05:42 PM    WBCUA 10-20 11/18/2022 05:42 PM    BACTERIA 2+ 11/18/2022 05:42 PM    RBCUA 3-4 11/18/2022 05:42 PM    BLOODU MODERATE 11/18/2022 05:42 PM    SPECGRAV 1.015 11/18/2022 05:42 PM    GLUCOSEU 500 11/18/2022 05:42 PM    GLUCOSEU NEGATIVE 03/09/2012 06:51 AM       Radiology:  VL Extremity Venous Left   Final Result      CT CERVICAL SPINE WO CONTRAST   Final Result   No acute traumatic fracture or traumatic malalignment. C1-C2 articulation severe degenerative changes and mild C2-C3 disc   degenerative changes. No significant canal narrowing or foraminal stenosis.          CT HEAD WO CONTRAST   Final Result   No acute intracranial abnormality. Moderate senescent changes with parenchymal volume loss and chronic small   vessel ischemic changes. Chronic paranasal sinus disease. Left mastoid effusion         XR CHEST PORTABLE   Final Result   1. Technically suboptimal exam.   2. Mild congestive heart failure is a consideration given the radiographic   findings; pneumonia is also a consideration in areas of consolidation with   pleural effusion. 3. Calcific atherosclerosis aorta. 4. Cardiomegaly. VL Extremity Venous Left   Final Result      CT ABDOMEN PELVIS WO CONTRAST Additional Contrast? Oral   Final Result   1. No acute findings in the abdomen or pelvis. 2. Moderate amount of stool and air in the colon proximally which could   reflect constipation. 3. Punctate bilateral intrarenal calculi. VL Extremity Venous Bilateral   Final Result      XR CHEST PORTABLE   Final Result   Left PICC line appears to be over the SVC and not significantly changed from   the prior exam.      Cardiomegaly with pulmonary vascular congestion and interstitial   prominence/interstitial opacities suggesting edema. Stable to the slightly   worsened from the recent study. CT HEAD WO CONTRAST   Final Result   No acute intracranial hemorrhage, mass effect, midline shift, or signs of   acute territorial infarct. Generalized volume loss and chronic ischemic changes in the white matter are   stable. Partial opacification in the left mastoid air cells and edge of the middle   ear cavity are new from the prior exam.  Correlate for infectious symptoms   versus eustachian tube dysfunction.                  Assessment/Plan:    Active Hospital Problems    Diagnosis     Carotid artery disease without cerebral infarction [facility]) [I77.9]      Priority: High    Weight loss counseling, encounter for [Z71.3]      Priority: High    Permanent atrial fibrillation ([address] [I48.21]      Priority: Medium    Non-English speaking patient [Z78.9]      Priority: Medium    Hypothyroidism [E03.9]      Priority: Medium    History of DVT in adulthood [Z86.718]      Priority: Medium    Nephrolithiasis [N20.0]      Priority: Medium    Atypical pneumonia [J18.9]      Priority: Medium    Acute on chronic congestive heart failure (HCC) [I50.9]      Priority: Medium    HFrEF (heart failure with reduced ejection fraction) ([facility] [address] [I50.20]      Priority: Medium    Acute hypoxemic respiratory failure (HCC) [J96.01]      Priority: Medium    Myelodysplasia (myelodysplastic syndrome) ([address] [D46.9]      Priority: Medium    Chronic atrial fibrillation (HCC) [I48.20]      Priority: Medium    Complicated UTI (urinary tract infection) [N39.0]      Priority: Medium    Type 2 diabetes mellitus (HCC) [E11.9]     Obesity, Class III, BMI 40-49.9 (morbid obesity) ([facility] [address] [E66.01]     Morbid obesity with BMI of 40.0-44.9, adult ([facility] [address] [E66.01, Z68.41]     ARUN (acute kidney injury) ([facility] [address] [N17.9]     Hyperlipidemia associated with type 2 diabetes mellitus ([address] [E11.69, E78.5]     Hypertension associated with diabetes ([address] [E11.59, I15.2]     Peripheral vascular disease ([address] [I73.9]     Dyspnea [R06.00]          Hospital Day: 14    This is a 78 y.o. female who presented to [facility] on 11/6/2022 and is being treated for:     Left upper extremity DVT  -Upper extremity ultrasound shows partially occluded DVT in the left proximal brachial vein, but no propagation of the thrombus; partial flow is seen  -Heparin 5K units every 12 hours  - repeat US    Neck pain  -Questionable if it is 2/2 left upper extremity DVT and patient is having referred pain  -Imaging nonacute  -Continue morphine drip  -Pain management consulted  - will be weaning gtt and doing IV q 2 hours with goal of weaning this as well  - what we really need to do is transition to oral meds as she will not be getting IV pain meds at the SNF.    - off gtt now on diladid    MDS  -Frequent transfusions through PICC line - no longer receiving, however it seems that family is changing mind. -Monitor CBC  -Heme/onc following; appreciate recommendations  -Hospice consulted, but no on has signed anything and now unclear if family want to move forward with hospice. - family now refusing discharge to anywhere. Apparently now challenge d/c.     HFrEF  -Echo 10/17/2022 showed LVEF 45-50% with right ventricle being mildly enlarged and having reduced function  -Continue beta-blocker and Entresto  -I/Os     Hyperlipidemia  -Statin    Hypertension  -Continue home antihypertensives    Type 2 diabetes  -SSI and lantus     Atrial fibrillation  -Continue home digoxin      Patient is DNR CC. Hospice was consulted. DVT ppx: Heparin  GI ppx: Diet/Tube Feeds  Diet: ADULT ORAL NUTRITION SUPPLEMENT; Breakfast, Lunch, Dinner; Diabetic Oral Supplement  ADULT DIET; Dysphagia - Soft and Bite Sized; 3 carb choices (45 gm/meal); Low Fat/Low Chol/High Fiber/2 gm Na; No Concentrated sweets  Code Status: DNR-CC    Disposition:  Left upper extremity DVT with PICC in place. DNR CC Pain management following. NOT REALLY SURE WHAT FAMILY WANTS AT THIS POINT  The situation is frustrating. PT/OT Eval Status: ordered      Abdirahman Nagel MD  11/19/2022  5:39 PM      Please note that some part of this chart was generated using Dragon dictation software. Although every effort was made to ensure the accuracy of this automated transcription, some errors in transcription may have occurred inadvertently. If you may need any clarification, please do not hesitate to contact me through [facility].

## 2022-11-19 NOTE — PLAN OF CARE
Problem: Pain  Goal: Verbalizes/displays adequate comfort level or baseline comfort level  Outcome: Progressing  Note: Dilaudid given for pain per MAR. Pt resting with eyes closed. Problem: Skin/Tissue Integrity  Goal: Absence of new skin breakdown  Description: 1. Monitor for areas of redness and/or skin breakdown  2. Assess vascular access sites hourly  3. Every 4-6 hours minimum:  Change oxygen saturation probe site  4. Every 4-6 hours:  If on nasal continuous positive airway pressure, respiratory therapy assess nares and determine need for appliance change or resting period.   Outcome: Progressing     Problem: ABCDS Injury Assessment  Goal: Absence of physical injury  Outcome: Progressing     Problem: Safety - Adult  Goal: Free from fall injury  Outcome: Progressing

## 2022-11-19 NOTE — CARE COORDINATION
The SW spoke with the patient's daughter regarding filling a appeal for discharge. The patient's daughter stated she lost the initial IMM form which was given by previous  and needs a new one to file a appeal.    The SW provided the family with a new IMM form to file a  appeal. The SW informed the patient's daughter that the appeal has to filled this morning or they will become finically responsible for the patient's hospital stay. The patient's daughter stated she understood.        Electronically signed by Laurelyn Oppenheim, MSW on 11/19/2022 at 10:05 AM

## 2022-11-20 LAB
GLUCOSE BLD-MCNC: 121 MG/DL (ref 70–99)
GLUCOSE BLD-MCNC: 186 MG/DL (ref 70–99)
GLUCOSE BLD-MCNC: 246 MG/DL (ref 70–99)
GLUCOSE BLD-MCNC: 275 MG/DL (ref 70–99)
GLUCOSE BLD-MCNC: 72 MG/DL (ref 70–99)
PERFORMED ON: ABNORMAL
PERFORMED ON: NORMAL

## 2022-11-20 PROCEDURE — 6370000000 HC RX 637 (ALT 250 FOR IP): Performed by: INTERNAL MEDICINE

## 2022-11-20 PROCEDURE — 2580000003 HC RX 258: Performed by: INTERNAL MEDICINE

## 2022-11-20 PROCEDURE — 2580000003 HC RX 258: Performed by: STUDENT IN AN ORGANIZED HEALTH CARE EDUCATION/TRAINING PROGRAM

## 2022-11-20 PROCEDURE — 6370000000 HC RX 637 (ALT 250 FOR IP): Performed by: STUDENT IN AN ORGANIZED HEALTH CARE EDUCATION/TRAINING PROGRAM

## 2022-11-20 PROCEDURE — 6360000002 HC RX W HCPCS: Performed by: PHYSICIAN ASSISTANT

## 2022-11-20 PROCEDURE — 1200000000 HC SEMI PRIVATE

## 2022-11-20 PROCEDURE — 6360000002 HC RX W HCPCS: Performed by: INTERNAL MEDICINE

## 2022-11-20 RX ORDER — KETOROLAC TROMETHAMINE 15 MG/ML
15 INJECTION, SOLUTION INTRAMUSCULAR; INTRAVENOUS EVERY 6 HOURS PRN
Status: DISCONTINUED | OUTPATIENT
Start: 2022-11-20 | End: 2022-11-20 | Stop reason: SDUPTHER

## 2022-11-20 RX ADMIN — GABAPENTIN 100 MG: 100 CAPSULE ORAL at 16:54

## 2022-11-20 RX ADMIN — HYDROMORPHONE HYDROCHLORIDE 1 MG: 1 INJECTION, SOLUTION INTRAMUSCULAR; INTRAVENOUS; SUBCUTANEOUS at 02:27

## 2022-11-20 RX ADMIN — PANTOPRAZOLE SODIUM 40 MG: 40 TABLET, DELAYED RELEASE ORAL at 06:34

## 2022-11-20 RX ADMIN — METHOCARBAMOL TABLETS 500 MG: 500 TABLET, COATED ORAL at 22:50

## 2022-11-20 RX ADMIN — LEVOTHYROXINE SODIUM 175 MCG: 0.03 TABLET ORAL at 05:40

## 2022-11-20 RX ADMIN — INSULIN LISPRO 4 UNITS: 100 INJECTION, SOLUTION INTRAVENOUS; SUBCUTANEOUS at 17:19

## 2022-11-20 RX ADMIN — METHOCARBAMOL TABLETS 500 MG: 500 TABLET, COATED ORAL at 14:03

## 2022-11-20 RX ADMIN — INSULIN GLARGINE 30 UNITS: 100 INJECTION, SOLUTION SUBCUTANEOUS at 20:35

## 2022-11-20 RX ADMIN — GABAPENTIN 100 MG: 100 CAPSULE ORAL at 07:46

## 2022-11-20 RX ADMIN — FUROSEMIDE 20 MG: 20 TABLET ORAL at 07:45

## 2022-11-20 RX ADMIN — DIGOXIN 125 MCG: 125 TABLET ORAL at 07:46

## 2022-11-20 RX ADMIN — KETOROLAC TROMETHAMINE 15 MG: 15 INJECTION, SOLUTION INTRAMUSCULAR; INTRAVENOUS at 14:57

## 2022-11-20 RX ADMIN — Medication 10 ML: at 20:36

## 2022-11-20 RX ADMIN — KETOROLAC TROMETHAMINE 15 MG: 15 INJECTION, SOLUTION INTRAMUSCULAR; INTRAVENOUS at 20:35

## 2022-11-20 RX ADMIN — HYDROMORPHONE HYDROCHLORIDE 1 MG: 1 INJECTION, SOLUTION INTRAMUSCULAR; INTRAVENOUS; SUBCUTANEOUS at 05:41

## 2022-11-20 RX ADMIN — METOPROLOL TARTRATE 25 MG: 25 TABLET, FILM COATED ORAL at 07:45

## 2022-11-20 RX ADMIN — GABAPENTIN 100 MG: 100 CAPSULE ORAL at 22:50

## 2022-11-20 RX ADMIN — HYDROMORPHONE HYDROCHLORIDE 1 MG: 1 INJECTION, SOLUTION INTRAMUSCULAR; INTRAVENOUS; SUBCUTANEOUS at 20:36

## 2022-11-20 RX ADMIN — HYDROCODONE BITARTRATE AND ACETAMINOPHEN 1 TABLET: 5; 325 TABLET ORAL at 07:46

## 2022-11-20 RX ADMIN — Medication 10 ML: at 08:47

## 2022-11-20 RX ADMIN — KETOROLAC TROMETHAMINE 15 MG: 15 INJECTION, SOLUTION INTRAMUSCULAR; INTRAVENOUS at 06:34

## 2022-11-20 RX ADMIN — HEPARIN SODIUM 5000 UNITS: 5000 INJECTION INTRAVENOUS; SUBCUTANEOUS at 14:03

## 2022-11-20 RX ADMIN — ACETAMINOPHEN 650 MG: 325 TABLET ORAL at 18:43

## 2022-11-20 RX ADMIN — METOPROLOL TARTRATE 25 MG: 25 TABLET, FILM COATED ORAL at 20:35

## 2022-11-20 ASSESSMENT — PAIN SCALES - WONG BAKER
WONGBAKER_NUMERICALRESPONSE: 0

## 2022-11-20 ASSESSMENT — PAIN SCALES - GENERAL
PAINLEVEL_OUTOF10: 6
PAINLEVEL_OUTOF10: 7
PAINLEVEL_OUTOF10: 5
PAINLEVEL_OUTOF10: 7

## 2022-11-20 ASSESSMENT — PAIN DESCRIPTION - DESCRIPTORS: DESCRIPTORS: ACHING

## 2022-11-20 ASSESSMENT — PAIN DESCRIPTION - LOCATION
LOCATION: GENERALIZED
LOCATION: GENERALIZED
LOCATION: HEAD

## 2022-11-20 NOTE — PROGRESS NOTES
Hospitalist Progress Note    Name:  Maximiliano Wright    /Age/Sex: 1940  (78 y.o. female)  MRN & CSN:  [id number] & [id number]    PCP: Porter Arora    Date of Admission: 2022    Patient Status:  Inpatient     Chief Complaint: Dyspnea    Hospital Course:   78 y.o. female with PMHx of nondysplastic syndrome, hypertension, hyperlipidemia, type 2 diabetes, CAD, atrial fibrillation who presented to [facility] with complaints of shortness of breath. Patient was at her nursing home when it was noted she was having shortness of breath. She is not on any oxygen at home, but she is currently satting well on 2 L O2 via NC. Left arm PICC line in place for transfusions given history of MDS. Upper extremity Dopplers found acute DVT in left upper extremity. Reluctant to remove PICC line at this time. Hematology following. UTI positive for Proteus. Received IV meropenem, but downgraded to Rocephin. ID following. Patient is coming with ARUN, but that has since resolved. Nephrology signed off. General surgery consulted for port placement on  has been cancelled. Patient is now DNR CC     Subjective: Today is:  Hospital Day: 15.  Patient seen and examined in [id number].    Patient remains awake  Family still seem to have a difficult time making a decision  Spoke to the daughter  She is pretty clear that she wants the patient to be hospice  She does not want any labs drawn of the patient  I discussed that she is currently getting insulin heparin all of this require her to be monitored which causes discomfort and return  Daughter voiced understanding but feels that the family is not at the point where they can look at any of this and she wants that to be continued she does understand that it does cause a disc discomfort  They have filed for appeal and they want her to go to inpatient hospice however are trying to get the entire family on board which does not seem is the case currently  Patient denies any pain            Medications:  Reviewed      Physical Exam Performed:    BP (!) 133/53   Pulse 72   Temp 97.8 °F (36.6 °C) (Temporal)   Resp 23   Ht 5' (1.524 m)   Wt 237 lb (107.5 kg)   LMP  (LMP Unknown)   SpO2 95%   BMI 46.29 kg/m²     General appearance: No apparent distress, appears stated age and cooperative. Ill appearing. Welsh speaking. HEENT: Pupils equal, round, and reactive to light. Conjunctivae/corneas clear. NC present. Neck: Supple, with severely limited ROM. Painful to palpation along spinous processes. No jugular venous distention. Trachea midline. Respiratory:  Normal respiratory effort. Clear to auscultation, bilaterally without Rales/Wheezes/Rhonchi. Cardiovascular: Regular rate and rhythm with normal S1/S2 without murmurs, rubs or gallops. No peripheral edema. Abdomen: Soft, non-tender, non-distended with normal bowel sounds. : No CVA tenderness. Musculoskeletal: No clubbing or cyanosis. Full range of motion without deformity. Skin: Skin color, texture, turgor normal.  No rashes or lesions. Today seems a more awake oriented to person    Labs:   No results for input(s): WBC, HGB, HCT, PLT in the last 72 hours. Recent Labs     11/19/22  2225      K 3.8      CO2 31   BUN 33*   CREATININE 0.6   CALCIUM 8.5       No results for input(s): AST, ALT, BILIDIR, BILITOT, ALKPHOS in the last 72 hours. No results for input(s): INR in the last 72 hours. No results for input(s): Will Arianne in the last 72 hours.       Urinalysis:      Lab Results   Component Value Date/Time    NITRU Negative 11/18/2022 05:42 PM    WBCUA 10-20 11/18/2022 05:42 PM    BACTERIA 2+ 11/18/2022 05:42 PM    RBCUA 3-4 11/18/2022 05:42 PM    BLOODU MODERATE 11/18/2022 05:42 PM    SPECGRAV 1.015 11/18/2022 05:42 PM    GLUCOSEU 500 11/18/2022 05:42 PM    GLUCOSEU NEGATIVE 03/09/2012 06:51 AM       Radiology:  VL Extremity Venous Left   Final Result      CT CERVICAL SPINE WO CONTRAST   Final Result   No acute traumatic fracture or traumatic malalignment. C1-C2 articulation severe degenerative changes and mild C2-C3 disc   degenerative changes. No significant canal narrowing or foraminal stenosis. CT HEAD WO CONTRAST   Final Result   No acute intracranial abnormality. Moderate senescent changes with parenchymal volume loss and chronic small   vessel ischemic changes. Chronic paranasal sinus disease. Left mastoid effusion         XR CHEST PORTABLE   Final Result   1. Technically suboptimal exam.   2. Mild congestive heart failure is a consideration given the radiographic   findings; pneumonia is also a consideration in areas of consolidation with   pleural effusion. 3. Calcific atherosclerosis aorta. 4. Cardiomegaly. VL Extremity Venous Left   Final Result      CT ABDOMEN PELVIS WO CONTRAST Additional Contrast? Oral   Final Result   1. No acute findings in the abdomen or pelvis. 2. Moderate amount of stool and air in the colon proximally which could   reflect constipation. 3. Punctate bilateral intrarenal calculi. VL Extremity Venous Bilateral   Final Result      XR CHEST PORTABLE   Final Result   Left PICC line appears to be over the SVC and not significantly changed from   the prior exam.      Cardiomegaly with pulmonary vascular congestion and interstitial   prominence/interstitial opacities suggesting edema. Stable to the slightly   worsened from the recent study. CT HEAD WO CONTRAST   Final Result   No acute intracranial hemorrhage, mass effect, midline shift, or signs of   acute territorial infarct. Generalized volume loss and chronic ischemic changes in the white matter are   stable. Partial opacification in the left mastoid air cells and edge of the middle   ear cavity are new from the prior exam.  Correlate for infectious symptoms   versus eustachian tube dysfunction. Assessment/Plan:    Active Hospital Problems    Diagnosis     Carotid artery disease without cerebral infarction ([facility] [address] [I77.9]      Priority: High    Weight loss counseling, encounter for [Z71.3]      Priority: High    Permanent atrial fibrillation ([facility] [address] [I48.21]      Priority: Medium    Non-English speaking patient [Z78.9]      Priority: Medium    Hypothyroidism [E03.9]      Priority: Medium    History of DVT in adulthood [Z86.718]      Priority: Medium    Nephrolithiasis [N20.0]      Priority: Medium    Atypical pneumonia [J18.9]      Priority: Medium    Acute on chronic congestive heart failure ([address] [I50.9]      Priority: Medium    HFrEF (heart failure with reduced ejection fraction) ([facility] [address] [I50.20]      Priority: Medium    Acute hypoxemic respiratory failure ([facility] [address] [J96.01]      Priority: Medium    Myelodysplasia (myelodysplastic syndrome) ([facility] [address] [D46.9]      Priority: Medium    Chronic atrial fibrillation (HCC) [I48.20]      Priority: Medium    Complicated UTI (urinary tract infection) [N39.0]      Priority: Medium    Type 2 diabetes mellitus ([facility] [address] [E11.9]     Obesity, Class III, BMI 40-49.9 (morbid obesity) ([facility] [address] [E66.01]     Morbid obesity with BMI of 40.0-44.9, adult ([facility] [address] [E66.01, Z68.41]     ARUN (acute kidney injury) ([facility] [address] [N17.9]     Hyperlipidemia associated with type 2 diabetes mellitus ([facility] [address] [E11.69, E78.5]     Hypertension associated with diabetes ([facility] [address] [E11.59, I15.2]     Peripheral vascular disease ([facility] [address] [I73.9]     Dyspnea [R06.00]          Hospital Day: 15    This is a 78 y.o. female who presented to [facility] on 11/6/2022 and is being treated for:     Left upper extremity DVT  -Upper extremity ultrasound shows partially occluded DVT in the left proximal brachial vein, but no propagation of the thrombus; partial flow is seen  -Heparin 5K units every 12 hours  - repeat US    Neck pain  -Questionable if it is 2/2 left upper extremity DVT and patient is having referred pain  -Imaging nonacute  -Continue morphine drip  -Pain management consulted  - will be weaning gtt and doing IV q 2 hours with goal of weaning this as well  - what we really need to do is transition to oral meds as she will not be getting IV pain meds at the SNF. - off gtt now on diladid    MDS  -Frequent transfusions through PICC line - no longer receiving, however it seems that family is changing mind. -Monitor CBC  -Heme/onc following; appreciate recommendations  -Hospice consulted, but no on has signed anything and now unclear if family want to move forward with hospice. - family now refusing discharge to anywhere. Apparently now challenge d/c.     HFrEF  -Echo 10/17/2022 showed LVEF 45-50% with right ventricle being mildly enlarged and having reduced function  -Continue beta-blocker and Entresto  -I/Os     Hyperlipidemia  -Statin    Hypertension  -Continue home antihypertensives    Type 2 diabetes  -SSI and lantus     Atrial fibrillation  -Continue home digoxin      Patient is DNR CC. Hospice was consulted.    I had a lengthy discussion with the daughter  Daughter wants to stop vitamins statin after I discussed that all of these are not going to prolong her add to her quality of life  Daughter pretty certain she wants her to be hospice feels like not all siblings are on board but she is pretty sure that that is where they are going they are waiting for one of the family members to come in today  She does not want any further invasive measures including daily labs  She does understand the intent of our discussions however not ready to make any decisions about medications and wants all of that to continue  Does not want any lab monitoring  Data filed an appeal to discharge with the hopes of buying some time so that the other family members can get on board  She is pretty sure that she does not want her mother to suffer and wants her to be hospice and comfort care  I am not sure that there is a whole lot more t that can be added from a medical standpoint as it seems to be somewhat of an impasse given that family does not want any medical care and yet not quite at the point to withdraw the current care which is making it challenging      DVT ppx: Heparin  GI ppx: Diet/Tube Feeds  Diet: ADULT ORAL NUTRITION SUPPLEMENT; Breakfast, Lunch, Dinner; Diabetic Oral Supplement  ADULT DIET; Dysphagia - Soft and Bite Sized; 3 carb choices (45 gm/meal); Low Fat/Low Chol/High Fiber/2 gm Na; No Concentrated sweets  Code Status: DNR-CC        PT/OT Eval Status: ordered      Connor Snider MD  11/20/2022  2:41 PM      Please note that some part of this chart was generated using Dragon dictation software. Although every effort was made to ensure the accuracy of this automated transcription, some errors in transcription may have occurred inadvertently. If you may need any clarification, please do not hesitate to contact me through [facility].

## 2022-11-20 NOTE — PROGRESS NOTES
Hospice of [address] [place] [address] Drive  Called daughter Aiden Pina and informed her hospital has asked me to pre-schedule transport to [facility]. Bed availability is assumed in this arrangement. Aiden Pina states family will be present at hospital to sign consents. Will tentatively plan for transport to [address] [place] [address] with [facility] transporting.     Stephanie Purvis RN  [phone] (main and referrals)

## 2022-11-20 NOTE — PROGRESS NOTES
H[facility] OF [place]  Talked with family. Family agreeable to hospice care. Offered [facility] inpatient as there is a bed available tonight and family had stated they need to be out by noon tomorrow. Family declines [facility] bed tonight as they feel it is \"too much movement for the patient this evening. \" Family agreeable to discharge to [facility] inpatient unit tomorrow, stating they are fine with her leaving tomorrow. Patient eligible for [facility] since she is symptomatic at this time, patient will remain eligible for [facility] tomorrow if she is still symptomatic tomorrow. Will make patient #1 on list for first bed at [facility] inpatient unit tomorrow. Plan to transfer to [address] [place] [address] tomorrow assuming remains symptomatic. Update 1700- Received a call from Scott Baker with case management. Scott Baker requests transport be pre-scheduled. Scheduled transport to [address] [place] [address] tomorrow with [place] at 36 am tomorrow, made Scott Baker aware this is pending bed availability at the [address] [place] [address] as tomorrow's census is not yet known, also assumes patient remains symptomatic tomorrow. Tentative plan to send to [address] tomorrow at 1145 am pending bed availability. Thank you for the opportunity to be involved in this patient's care.      [address]   (work cell)  [phone] (main and referrals)

## 2022-11-20 NOTE — CARE COORDINATION
Discharge Planning. The SW spoke with the patient's family who stated they have received a call from [facility] and [facility] stating that the appeal has been denied and they have until 12pm on 11/21/2022 to discharge or they become finically reliable. The SW informed the patient's family of this information. The patient's family stated they would like the patient to go to the inpatient unit. Rj Fontanez the admission nurse from [facility] is to meet with the family to discuss the discharge Plan.        Electronically signed by MADELINE Pizarro on 11/20/2022 at 3:45 PM

## 2022-11-21 VITALS
HEIGHT: 60 IN | TEMPERATURE: 97.4 F | HEART RATE: 72 BPM | BODY MASS INDEX: 46.53 KG/M2 | WEIGHT: 237 LBS | OXYGEN SATURATION: 97 % | DIASTOLIC BLOOD PRESSURE: 51 MMHG | SYSTOLIC BLOOD PRESSURE: 140 MMHG | RESPIRATION RATE: 19 BRPM

## 2022-11-21 LAB
GLUCOSE BLD-MCNC: 170 MG/DL (ref 70–99)
GLUCOSE BLD-MCNC: 211 MG/DL (ref 70–99)
PERFORMED ON: ABNORMAL
PERFORMED ON: ABNORMAL

## 2022-11-21 PROCEDURE — 6370000000 HC RX 637 (ALT 250 FOR IP): Performed by: INTERNAL MEDICINE

## 2022-11-21 PROCEDURE — 2580000003 HC RX 258: Performed by: STUDENT IN AN ORGANIZED HEALTH CARE EDUCATION/TRAINING PROGRAM

## 2022-11-21 PROCEDURE — 2580000003 HC RX 258: Performed by: INTERNAL MEDICINE

## 2022-11-21 PROCEDURE — 6370000000 HC RX 637 (ALT 250 FOR IP): Performed by: STUDENT IN AN ORGANIZED HEALTH CARE EDUCATION/TRAINING PROGRAM

## 2022-11-21 PROCEDURE — 6360000002 HC RX W HCPCS: Performed by: INTERNAL MEDICINE

## 2022-11-21 PROCEDURE — 6360000002 HC RX W HCPCS: Performed by: PHYSICIAN ASSISTANT

## 2022-11-21 PROCEDURE — 94760 N-INVAS EAR/PLS OXIMETRY 1: CPT

## 2022-11-21 RX ADMIN — INSULIN LISPRO 20 UNITS: 100 INJECTION, SOLUTION INTRAVENOUS; SUBCUTANEOUS at 08:36

## 2022-11-21 RX ADMIN — LEVOTHYROXINE SODIUM 175 MCG: 0.03 TABLET ORAL at 07:44

## 2022-11-21 RX ADMIN — INSULIN GLARGINE 30 UNITS: 100 INJECTION, SOLUTION SUBCUTANEOUS at 08:34

## 2022-11-21 RX ADMIN — PANTOPRAZOLE SODIUM 40 MG: 40 TABLET, DELAYED RELEASE ORAL at 07:45

## 2022-11-21 RX ADMIN — HYDROMORPHONE HYDROCHLORIDE 1 MG: 1 INJECTION, SOLUTION INTRAMUSCULAR; INTRAVENOUS; SUBCUTANEOUS at 08:37

## 2022-11-21 RX ADMIN — HYDROCODONE BITARTRATE AND ACETAMINOPHEN 1 TABLET: 5; 325 TABLET ORAL at 11:53

## 2022-11-21 RX ADMIN — HYDROMORPHONE HYDROCHLORIDE 1 MG: 1 INJECTION, SOLUTION INTRAMUSCULAR; INTRAVENOUS; SUBCUTANEOUS at 11:53

## 2022-11-21 RX ADMIN — DIGOXIN 125 MCG: 125 TABLET ORAL at 08:38

## 2022-11-21 RX ADMIN — METOPROLOL TARTRATE 25 MG: 25 TABLET, FILM COATED ORAL at 08:34

## 2022-11-21 RX ADMIN — Medication 10 ML: at 08:38

## 2022-11-21 RX ADMIN — HEPARIN SODIUM 5000 UNITS: 5000 INJECTION INTRAVENOUS; SUBCUTANEOUS at 02:18

## 2022-11-21 RX ADMIN — INSULIN LISPRO 2 UNITS: 100 INJECTION, SOLUTION INTRAVENOUS; SUBCUTANEOUS at 08:35

## 2022-11-21 RX ADMIN — FUROSEMIDE 20 MG: 20 TABLET ORAL at 08:34

## 2022-11-21 RX ADMIN — KETOROLAC TROMETHAMINE 15 MG: 15 INJECTION, SOLUTION INTRAMUSCULAR; INTRAVENOUS at 08:39

## 2022-11-21 RX ADMIN — KETOROLAC TROMETHAMINE 15 MG: 15 INJECTION, SOLUTION INTRAMUSCULAR; INTRAVENOUS at 02:21

## 2022-11-21 RX ADMIN — GABAPENTIN 100 MG: 100 CAPSULE ORAL at 08:34

## 2022-11-21 RX ADMIN — METHOCARBAMOL TABLETS 500 MG: 500 TABLET, COATED ORAL at 07:44

## 2022-11-21 RX ADMIN — HYDROMORPHONE HYDROCHLORIDE 1 MG: 1 INJECTION, SOLUTION INTRAMUSCULAR; INTRAVENOUS; SUBCUTANEOUS at 02:21

## 2022-11-21 ASSESSMENT — PAIN SCALES - GENERAL
PAINLEVEL_OUTOF10: 8
PAINLEVEL_OUTOF10: 9
PAINLEVEL_OUTOF10: 8

## 2022-11-21 ASSESSMENT — PAIN DESCRIPTION - LOCATION
LOCATION: NECK;GENERALIZED
LOCATION: GENERALIZED

## 2022-11-21 ASSESSMENT — PAIN DESCRIPTION - DESCRIPTORS: DESCRIPTORS: ACHING

## 2022-11-21 NOTE — PROGRESS NOTES
[facility] consent signed by family. Report called in to [facility] inpatient unit. Transportation confirmed for 1130am with [facility]. All questions addressed.      Thank you for including us in the care of Mallory Stearns RN  [facility]  Main #: [phone]  Cell: [phone]

## 2022-11-21 NOTE — PROGRESS NOTES
Physical Therapy    D/c Summary    Chart reviewed, Pt. Being transferred to inpatient Hospice this date. Will D/c from PT. Please refer to previous session for d/c summary.     Isidra Reyes, [address] [facility], [id number]

## 2022-11-21 NOTE — PROGRESS NOTES
Reassessment documented. No changes noted in care. Pt sleeping with care. Denies needs or concerns at present. Will continue to monitor.

## 2022-11-21 NOTE — CARE COORDINATION
Discharge note:      CM/KAREN has been notified of discharge. Patient noted to have the following needs at discharge. CM/KAREN has coordinated the following services:    Hospice of [address] [place] [address]  [address], [address]    [phone]    Transportation: 11:30 am [address]:  Patient signed Nighata Schaumann 15, daughter was present in room. Form explained to daughter. All CM/SW needs met, will sign off.      Electronically signed by Alem Berumen RN on 11/21/2022 at 9:03 AM

## 2022-11-21 NOTE — PROGRESS NOTES
PM assessment complete and documented. Meds given per MAR. Medicated with toradol and dilaudid for pain. Colace and glycolax held per request after pt had loose stool. Kuhn patent and secure, draining without difficulty. No other needs expressed. Will continue to monitor.

## 2022-11-21 NOTE — DISCHARGE SUMMARY
Patient: Verdie Alpers     Gender: female  : 1940   Age: 78 y.o.   MRN: [id number]    Admitting Physician: Amrik Burciaga DO  Discharge Physician: Connor Snider MD     Code Status: Prior     Admit Date: 2022   Discharge Date: 2022      Disposition:  Inpatient hospice     Discharge Diagnoses:  Myelodysplastic syndrome  Hypertension  Hyperlipidemia  Acute kidney injury  Atrial fibrillation  Left upper extremity DVT  Neck pain        Active Hospital Problems    Diagnosis Date Noted    Carotid artery disease without cerebral infarction ([facility] [I77.9] 2016     Priority: High    Weight loss counseling, encounter for [Z71.3] 2016     Priority: High    Permanent atrial fibrillation ([facility] [I48.21] 2022     Priority: Medium    Non-English speaking patient [Z78.9] 2022     Priority: Medium    Hypothyroidism [E03.9] 2022     Priority: Medium    History of DVT in adulthood [Z86.718] 2022     Priority: Medium    Nephrolithiasis [N20.0] 2022     Priority: Medium    Atypical pneumonia [J18.9] 2022     Priority: Medium    Acute on chronic congestive heart failure ([facility] [I50.9] 2022     Priority: Medium    HFrEF (heart failure with reduced ejection fraction) ([facility] [I50.20] 2022     Priority: Medium    Acute hypoxemic respiratory failure ([facility] [J96.01] 10/18/2022     Priority: Medium    Myelodysplasia (myelodysplastic syndrome) ([facility] [D46.9] 10/17/2022     Priority: Medium    Chronic atrial fibrillation ([facility] [I48.20] 2022     Priority: Medium    Complicated UTI (urinary tract infection) [N39.0] 2022     Priority: Medium    Type 2 diabetes mellitus ([address] [E11.9] 2015    Obesity, Class III, BMI 40-49.9 (morbid obesity) ([facility] [E66.01] 2015    Morbid obesity with BMI of 40.0-44.9, adult ([facility] [E66.01, Z68.41] 2014    ARUN (acute kidney injury) ([facility] [N17.9] 2012    Hyperlipidemia associated with type 2 diabetes mellitus ([facility] [E11.69, E78.5] 02/09/2012    Hypertension associated with diabetes ([facility] [E11.59, I15.2] 02/09/2012    Peripheral vascular disease ([facility] [I73.9] 10/05/2011    Dyspnea [R06.00] 10/05/2011         Condition at Discharge:  Terminal    Hospital Course:   78 y.o. female with PMHx of nondysplastic syndrome, hypertension, hyperlipidemia, type 2 diabetes, CAD, atrial fibrillation who presented to [facility] with complaints of shortness of breath. Patient was at her nursing home when it was noted she was having shortness of breath. She is not on any oxygen at home, but she is currently satting well on 2 L O2 via NC. Left arm PICC line in place for transfusions given history of MDS. Upper extremity Dopplers found acute DVT in left upper extremity. Reluctant to remove PICC line at this time. Hematology following. UTI positive for Proteus. Received IV meropenem, but downgraded to Rocephin. ID following. Patient is coming with ARUN, but that has since resolved. Nephrology signed off. General surgery consulted for port placement on Monday 11/14 has been cancelled.      After lengthy discussion with the patient and family members patient was made comfort care  Some of her medications including Lasix digoxin etc. were continued as family did not want to stop all medication  I have discontinued statin and some multivitamins per family's request  He has been discharged to inpatient hospice    Discharge Medications:   Discharge Medication List as of 11/21/2022  9:08 AM        START taking these medications    Details   insulin glargine (LANTUS) 100 UNIT/ML injection vial Inject 30 Units into the skin 2 times daily, Disp-10 mL, R-3Normal      lidocaine PF 1 % SOLN injection Inject 5 mLs into the skin once for 1 dose, Disp-5 mL, R-0Normal      methocarbamol (ROBAXIN) 500 MG tablet Take 1 tablet by mouth every 8 (eight) hours for 10 days, Disp-30 tablet, R-0Normal      ondansetron (ZOFRAN-ODT) 4 MG disintegrating tablet Take 1 tablet by mouth every 8 hours as needed for Nausea or Vomiting, Disp-30 tablet, R-0Normal      oxyCODONE-acetaminophen (PERCOCET) 5-325 MG per tablet Take 1 tablet by mouth every 4 hours as needed for Pain for up to 5 days. Intended supply: 3 days. Take lowest dose possible to manage pain, Disp-12 tablet, R-0Normal           Discharge Medication List as of 11/21/2022  9:08 AM        CONTINUE these medications which have CHANGED    Details   metoprolol tartrate (LOPRESSOR) 25 MG tablet Take 1 tablet by mouth 2 times daily, Disp-60 tablet, R-3Normal      lactobacillus (CULTURELLE) capsule Take 2 capsules by mouth daily for 14 days, Disp-28 capsule, R-0Normal           Discharge Medication List as of 11/21/2022  9:08 AM        CONTINUE these medications which have NOT CHANGED    Details   Ipratropium-Albuterol (DUONEB IN) Inhale 3 mLs into the lungs every 4 hours as needed (SOB)Historical Med      omeprazole (PRILOSEC) 20 MG delayed release capsule Take 20 mg by mouth in the morning and at bedtimeHistorical Med      polyethylene glycol (GLYCOLAX) 17 g packet Take 17 g by mouth daily as needed for ConstipationHistorical Med      silver sulfADIAZINE (SILVADENE) 1 % cream Apply topically 2 times daily Apply to inner buttocks every shift for MASD, Topical, 2 TIMES DAILY, Historical Med      digoxin (LANOXIN) 125 MCG tablet Take 1 tablet by mouth daily, Disp-30 tablet, R-3Normal      guaiFENesin (MUCINEX) 600 MG extended release tablet Take 1 tablet by mouth 2 times daily as needed for Congestion, Disp-60 tablet, R-0Normal      insulin lispro (HUMALOG) 100 UNIT/ML SOLN injection vial Inject 6 Units into the skin 3 times daily (with meals), Disp-2 each, R-0Normal      furosemide (LASIX) 20 MG tablet Take 1 tablet by mouth daily, Disp-60 tablet, R-3Normal      gabapentin (NEURONTIN) 100 MG capsule Take 1 capsule by mouth 3 times daily for 30 days. , Disp-90 capsule, R-0Normal      senna (SENOKOT) 8.6 MG tablet Take 1 tablet by mouth 2 times dailyHistorical Med      tiZANidine (ZANAFLEX) 2 MG tablet Take 2 mg by mouth every 8 hours as neededHistorical Med      Insulin Pen Needle 32G X 6 MM MISC Historical Med3 times daily with meals      lidocaine (LIDODERM) 5 % Place 1 patch onto the skin daily 12 hours on, 12 hours off., Disp-30 patch, R-0      levothyroxine (SYNTHROID) 175 MCG tablet Take 175 mcg by mouth dailyHistorical Med           Discharge Medication List as of 11/21/2022  9:08 AM        STOP taking these medications       phenazopyridine (PYRIDIUM) 100 MG tablet Comments:   Reason for Stopping:         famotidine (PEPCID) 20 MG tablet Comments:   Reason for Stopping:         HYDROcodone-acetaminophen (Cara Pilar) 5-325 MG per tablet Comments:   Reason for Stopping:         metroNIDAZOLE (FLAGYL) 500 MG tablet Comments:   Reason for Stopping:         sacubitril-valsartan (ENTRESTO) 24-26 MG per tablet Comments:   Reason for Stopping:         empagliflozin (JARDIANCE) 10 MG tablet Comments:   Reason for Stopping:         insulin glargine (BASAGLAR KWIKPEN) 100 UNIT/ML injection pen Comments:   Reason for Stopping:         calcium carbonate (OSCAL) 500 MG TABS tablet Comments:   Reason for Stopping:         magnesium 30 MG tablet Comments:   Reason for Stopping:         atorvastatin (LIPITOR) 80 MG tablet Comments:   Reason for Stopping:         vitamin D (ERGOCALCIFEROL) 55496 UNITS CAPS capsule Comments:   Reason for Stopping:         Omega 3 1000 MG CAPS Comments:   Reason for Stopping:         Liraglutide (VICTOZA) 18 MG/3ML SOPN SC injection Comments:   Reason for Stopping:         oxybutynin (DITROPAN XL) 15 MG CR tablet Comments:   Reason for Stopping:               Discharge ROS:  A complete review of systems was asked and negative     Discharge Exam:    BP (!) 140/51   Pulse 72   Temp 97.4 °F (36.3 °C) (Temporal)   Resp 19   Ht 5' (1.524 m)   Wt 237 lb (107.5 kg)   LMP  (LMP Unknown)   SpO2 97%   BMI 46.29 kg/m²   General appearance:  NAD  HEENT:   Normal cephalic, atraumatic, moist mucous membranes, no oropharyngeal erythema or exudate  Heart[de-identified] Normal s1/s2, RRR, no murmurs, gallops, or rubs. 1+ leg edema  Lungs:  Normal respiratory effort. Clear to auscultation, bilaterally without Rales/Wheezes/Rhonchi. Abdomen: Soft, non-tender, non-distended, bowel sounds present, no masses  Musculoskeletal:  No clubbing, no cyanosis, *  Neurologic:  Neurovascularly intact without any focal sensory/motor deficits. Cranial nerves: II-XII intact, grossly non-focal.    Labs: For convenience and continuity at follow-up the following most recent labs are provided:    Lab Results   Component Value Date/Time    WBC 19.1 11/16/2022 09:30 PM    HGB 9.3 11/16/2022 09:30 PM    HCT 28.4 11/16/2022 09:30 PM    MCV 92.0 11/16/2022 09:30 PM    PLT 39 11/16/2022 09:30 PM     11/19/2022 10:25 PM    K 3.8 11/19/2022 10:25 PM    K 4.8 11/10/2022 11:35 AM     11/19/2022 10:25 PM    CO2 31 11/19/2022 10:25 PM    BUN 33 11/19/2022 10:25 PM    CREATININE 0.6 11/19/2022 10:25 PM    CALCIUM 8.5 11/19/2022 10:25 PM    PHOS 3.3 11/12/2022 06:30 AM    BNP 26 03/05/2012 04:40 AM    ALKPHOS 73 11/11/2022 04:20 AM    ALT 14 11/11/2022 04:20 AM    AST 20 11/11/2022 04:20 AM    BILITOT 0.6 11/11/2022 04:20 AM    LABALBU 2.3 11/12/2022 06:30 AM    LDLCALC 61 07/13/2012 09:30 AM    TRIG 247 07/13/2012 09:30 AM     Lab Results   Component Value Date    INR 1.31 (H) 11/11/2022    INR 1.33 (H) 11/03/2022    INR 1.32 (H) 11/02/2022           The patient was seen and examined on day of discharge and this discharge summary is in conjunction with any daily progress note from day of discharge. Time Spent on discharge is 45 minutes  in the examination, evaluation, counseling and review of medications and discharge plan.       Note that greater  than 30 minutes was spent in preparing discharge papers, discussing discharge with patient, medication review, etc.       Signed:    Melisa Douglas MD   11/21/2022      Thank you Arvind Hi for the opportunity to be involved in this patient's care.  If you have any questions or concerns please feel free to contact me

## 2022-11-21 NOTE — PROGRESS NOTES
Palliative Care:        Shortness of Breath (Pt presents to the ED from [facility] with reports of SOB, per EMS Pt sating at 88% on RA. Pt is usually on RA per baseline. Pt satting at 92% RA. Pt recently discharged for Picc Line placement due to reoccurring blood transfusion due to MDS. Per daughter at bedside pt is acting normally. Does not appear to be lethargic or confused at this time. VSS.  )    H&P: 78 y.o. female with PMHx of nondysplastic syndrome, hypertension, hyperlipidemia, type 2 diabetes, CAD, atrial fibrillation who presented to [facility] with complaints of shortness of breath. Patient was at her nursing home when it was noted she was having shortness of breath last night and into the early morning of the day. The patient is relatively sleepy on exam, but 2 of her daughters were present at bedside and able to update history. They state the patient was gasping for air. She is not on any oxygen at home, but she is currently satting well on 2 L O2 via NC in the ED. The daughter states that they had heard some crackling sounds with congestion while she was breathing. Patient denied any chest pain, abdominal pain, nausea, vomiting, GI/ symptoms. Patient has not had much of an appetite over the last 72 hours. Additionally, she has had low urine output, per the daughters. Patient has a left PICC line for blood transfusions, and she did receive 1 approximately 3 days ago. However, the daughter states that the patient is having some left chest/upper extremity swelling since the PICC line has been placed.   Admit: 11/6/2022  Consult: 11/08/22      Past Medical History:   has a past medical history of 4/11/17 Left knee repeat repair of median parapatellar arthrotomy with augmentation, Arthritis, Aspiration pneumonia of left lower lobe due to gastric secretions ([address] Atrial fibrillation ([address] CAD (coronary artery disease), Cataract, Chronic diastolic congestive heart failure ([address] Diabetes mellitus ([facility] GERD (gastroesophageal reflux disease), Hyperlipidemia, Hypertension, HYPOTHRYROIDISM, Myelodysplastic disease ([facility] Non-English speaking patient, Sleep apnea, Thyroid disease, and UTI. Past Surgical History:   has a past surgical history that includes joint replacement (Bilateral, [address]); Cardiac surgery (2004); Cholecystectomy, laparoscopic (5/15/14); Revision total knee arthroplasty (Right, 11/22/2016); Total knee arthroplasty; knee surgery (Left, 02/28/2017); CT BIOPSY BONE MARROW (9/29/2022); Upper gastrointestinal endoscopy (N/A, 10/3/2022); and Upper gastrointestinal endoscopy (N/A, 10/3/2022). Advance Directives:        Advance Care Planning   The patient has the following advanced directives on file:  Advance Directives       Power of [facility] Will ACP-Advance Directive ACP-Power of     Not on File Not on File Not on File Not on File       The Patient has the following current code status:    Code Status: Prior    Extended Emergency Contact Information  Primary Emergency Contact: [facility], [address] Phone: [phone]  Relation: Child  Secondary Emergency Contact: [address] Phone: [phone]  Mobile Phone: [phone]  Relation: Child    Problem Severity: Pain/Other Symptoms:        Weight 238# (was 252 on 10/17)  Body mass index is 46.29 kg/m².   NC 1L    Bed Mobility/Toileting/Transfer:        No PT/OT notes - pt remains too weak and tired    Performance Status:        Palliative Performance Scale:  100% []Full Normal activity & work No evidence of disease  90%   [] Full Normal activity & work Some evidence of disease  80%   [] Full Normal activity with Effort Some evidence of disease  70%   [] Reduced Unable Normal Job/Work Significant disease Full Normal or reduced  60%   [] Ambulation reduced; Significant disease; Can't do hobbies/housework; intake normal   or reduced; occasional assist; LOC full/confusion  50%   [] Mainly sit/lie; Extensive disease; Can't do any work; Considerable assist; intake normal  Or reduced; LOC full/confusion  40%   [] Mainly in bed; Extensive disease; Mainly assist; intake normal or reduced; occasional assist; LOC full/confusion  30%   [x] Bed Bound; Extensive disease; Total care; intake reduced; LOC full/confusion  20%   [] Bed Bound; Extensive disease; Total care; intake minimal; Drowsy/coma  10%   [] Bed Bound; Extensive disease; Total care; Mouth care only; Drowsy/coma    PPS 30%    Symptom Assessment: Appetite/Nausea/Bowels/Fatigue:          Intake/Output Summary (Last 24 hours) at 11/21/2022 1644  Last data filed at 11/21/2022 0508  Gross per 24 hour   Intake --   Output 700 ml   Net -700 ml     No diet orders on file    Social History:   reports that she has never smoked. She has never used smokeless tobacco. She reports that she does not drink alcohol and does not use drugs. Family History:  family history includes Arthritis in an other family member; Diabetes in an other family member; Heart Disease in an other family member; High Cholesterol in her brother; Hypertension in an other family member. Psychological/Spiritual:             Family Discussion:        Pt is transferring to [address] pt and family have no additional questions or concerns today.      Electronically signed by MATEUSZ Hansen CNP, BSN on 11/21/2022 at 4:44 PM

## 2022-11-22 ENCOUNTER — CLINICAL DOCUMENTATION ONLY (OUTPATIENT)
Facility: CLINIC | Age: 82
End: 2022-11-22

## 2023-03-02 NOTE — ED NOTES
Bedside handoff given to [facility].   Pt transported to room 460 with belongings     Salina Crisostomo RN  10/10/22 2270 average

## 2023-08-06 NOTE — TELEPHONE ENCOUNTER
Medication Refill    Medication needing refilled: Entresto    Dosage of the medication: 24-26mg    How are you taking this medication (QD, BID, TID, QID, PRN):  Take 1 tablet by mouth 2 times daily  30 or 90 day supply called in: 60    When will you run out of your medication:    Which Pharmacy are we sending the medication to?:  [facility]  Phone  [phone]    NOTE: Ismael Roberto, daughter call stating that the Pt have a new Pharmacy. Ismael Mejia did not have no address are the fax number.   Please advise
The patient is a 89y Female complaining of vomiting.

## 2024-02-18 NOTE — TELEPHONE ENCOUNTER
Asking to speak to nurse about recent GXT . Patient keeps forgetting to tell [facility] that she is allergic to the dye they use during the GXT. She is swollen and has a rash , no problem breathing. She is getting a cortisone shot for her rash today  . Daughter in law called but she is not on hippa so please call naina back .
Last OV 8/12/20    Spoke to the pt's son, Jesika Castillo. Asking what was used as the injection for the stress test. Chart states it was a Lexiscan. Asking to have this noted on the allergy list.   She is seeing her PCP for this.
Wayne Torres is on patient allergy list. Will notify [facility].
2

## (undated) DEVICE — FORCEPS BX L240CM WRK CHN 2.8MM STD CAP W/ NDL MIC MESH

## (undated) DEVICE — MOUTHPIECE ENDOSCP L CTRL OPN AND SIDE PORTS DISP

## (undated) DEVICE — AIR/WATER CLEANING ADAPTER FOR OLYMPUS® GI ENDOSCOPE: Brand: BULLDOG®

## (undated) DEVICE — DILATOR ENDOSCP L180CM DIA6FR BLLN L8CM DIA54-60FR

## (undated) DEVICE — VALVE SUCTION AIR H2O SET ORCA POD + DISP

## (undated) DEVICE — GOWN AURORA NONREINF LG: Brand: MEDLINE INDUSTRIES, INC.

## (undated) DEVICE — BW-412T DISP COMBO CLEANING BRUSH: Brand: SINGLE USE COMBINATION CLEANING BRUSH

## (undated) DEVICE — ENDOSCOPIC KIT 6X3/16 FT COLON W/ 1.1 OZ 2 GWN W/O BRSH

## (undated) DEVICE — SYRINGE INFL 60ML DISP ALLIANCE II

## (undated) DEVICE — SOLUTION IV IRRIG WATER 500ML POUR BRL ST 2F7113